# Patient Record
Sex: MALE | Race: BLACK OR AFRICAN AMERICAN | NOT HISPANIC OR LATINO | Employment: FULL TIME | URBAN - METROPOLITAN AREA
[De-identification: names, ages, dates, MRNs, and addresses within clinical notes are randomized per-mention and may not be internally consistent; named-entity substitution may affect disease eponyms.]

---

## 2017-01-17 ENCOUNTER — OFFICE VISIT (OUTPATIENT)
Dept: CARDIOLOGY | Facility: CLINIC | Age: 57
End: 2017-01-17
Attending: NURSE PRACTITIONER
Payer: COMMERCIAL

## 2017-01-17 VITALS
BODY MASS INDEX: 22.49 KG/M2 | DIASTOLIC BLOOD PRESSURE: 90 MMHG | HEART RATE: 70 BPM | SYSTOLIC BLOOD PRESSURE: 148 MMHG | WEIGHT: 157.1 LBS | HEIGHT: 70 IN

## 2017-01-17 DIAGNOSIS — I73.9 PAD (PERIPHERAL ARTERY DISEASE) (H): ICD-10-CM

## 2017-01-17 DIAGNOSIS — E11.00 TYPE 2 DIABETES MELLITUS WITH HYPEROSMOLARITY WITHOUT COMA, WITHOUT LONG-TERM CURRENT USE OF INSULIN (H): Primary | ICD-10-CM

## 2017-01-17 DIAGNOSIS — E78.5 HYPERLIPIDEMIA LDL GOAL <100: ICD-10-CM

## 2017-01-17 LAB
ALT SERPL W P-5'-P-CCNC: 7 U/L (ref 5–30)
CHOLEST SERPL-MCNC: 132 MG/DL
HDLC SERPL-MCNC: 37 MG/DL
LDLC SERPL CALC-MCNC: 84 MG/DL
NONHDLC SERPL-MCNC: 95 MG/DL
TRIGL SERPL-MCNC: 55 MG/DL

## 2017-01-17 PROCEDURE — 80061 LIPID PANEL: CPT | Performed by: NURSE PRACTITIONER

## 2017-01-17 PROCEDURE — 84460 ALANINE AMINO (ALT) (SGPT): CPT | Performed by: NURSE PRACTITIONER

## 2017-01-17 PROCEDURE — 99214 OFFICE O/P EST MOD 30 MIN: CPT | Performed by: INTERNAL MEDICINE

## 2017-01-17 PROCEDURE — 36415 COLL VENOUS BLD VENIPUNCTURE: CPT | Performed by: NURSE PRACTITIONER

## 2017-01-17 NOTE — PROGRESS NOTES
HPI and Plan:   See dictation    Orders Placed This Encounter   Procedures     ENDOCRINOLOGY ADULT REFERRAL     Follow-Up with Cardiologist       No orders of the defined types were placed in this encounter.       Medications Discontinued During This Encounter   Medication Reason     clopidogrel (PLAVIX) 75 MG tablet      docusate sodium (COLACE) 100 MG tablet      GENTLE LAXATIVE 10 MG suppository          Encounter Diagnoses   Name Primary?     PAD (peripheral artery disease) (H)      Type 2 diabetes mellitus with hyperosmolarity without coma, without long-term current use of insulin (H) Yes       CURRENT MEDICATIONS:  Current Outpatient Prescriptions   Medication Sig Dispense Refill     atorvastatin (LIPITOR) 40 MG tablet Take 1 tablet (40 mg) by mouth daily 90 tablet 3     linaclotide (LINZESS) 290 MCG capsule Take 1 capsule (290 mcg) by mouth every morning (before breakfast) 30 capsule 3     gabapentin (NEURONTIN) 300 MG capsule Take 1 tablet (300 mg) every night for 1-3 days, then 1 tablet twice daily for 1-3 days, then 1 tablet three times daily 90 capsule 0     blood glucose monitoring (ONE TOUCH ULTRASOFT) lancets Use as direct 3 Box 1     blood glucose monitoring (ONE TOUCH ULTRA) test strip Use QID or as directed 3 Box 1     insulin pen needle (B-D U/F) 31G X 8 MM USE ONCE DAILY WITH LANTUS SOLOSTAR  each 1     sitagliptin (JANUVIA) 100 MG tablet Take 1 tablet (100 mg) by mouth daily 90 tablet 1     insulin glargine (LANTUS SOLOSTAR) 100 UNIT/ML PEN INJECT 26 UNITS SUBCUTANEOUS TWICE DAILY 15 mL 3     sildenafil (VIAGRA) 100 MG tablet Take 0.5-1 tablets ( mg) by mouth daily as needed for erectile dysfunction Take 30 min to 4 hours before intercourse.  Never use with nitroglycerin, terazosin or doxazosin. 6 tablet prn     Blood Glucose Monitoring Suppl (BLOOD GLUCOSE METER) KIT 1 Device See Admin Instructions. per patient health plan 1 kit 0     ACE/ARB NOT PRESCRIBED, INTENTIONAL, by Other  route continuous prn.       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     eval by optho, considered be related to microvascular problem ie DM     Type II or unspecified type diabetes mellitus with neurological manifestations, not stated as uncontrolled 6/23/2014     PVD (peripheral vascular disease) with claudication (H) 10/12/2015       PAST SURGICAL HISTORY:  Past Surgical History   Procedure Laterality Date     No history of surgery       Colonoscopy  10/27/16     Colonoscopy N/A 10/31/2016     Procedure: COLONOSCOPY;  Surgeon: Pollo Lopez MD;  Location:  GI       FAMILY HISTORY:  Family History   Problem Relation Age of Onset     DIABETES Father      DIABETES Mother      DIABETES Sister      DIABETES Brother      Hypertension No family hx of      CEREBROVASCULAR DISEASE No family hx of      Prostate Cancer No family hx of      Cancer - colorectal No family hx of      Breast Cancer No family hx of        SOCIAL HISTORY:  Social History     Social History     Marital Status:      Spouse Name: Mary     Number of Children: 7     Years of Education: 16     Occupational History      store      Social History Main Topics     Smoking status: Former Smoker -- 0.50 packs/day for 15 years     Types: Cigarettes     Smokeless tobacco: Never Used      Comment: 10 cig daily     Alcohol Use: No     Drug Use: No     Sexual Activity:     Partners: Female     Other Topics Concern      Service No     Blood Transfusions No     Caffeine Concern No     1 coffee,pop 1 time weekly     Occupational Exposure No     Hobby Hazards No     Sleep Concern No     Stress Concern No     Weight Concern Yes     Special Diet No     Back Care No  "    Exercise No     Bike Helmet No     No Bike     Seat Belt Yes     Self-Exams No     Parent/Sibling W/ Cabg, Mi Or Angioplasty Before 65f 55m? No     Social History Narrative    moved here from [place], moved here 1990               Review of Systems:  Skin:  Negative       Eyes:  Positive for glasses    ENT:  Negative      Respiratory:  Negative       Cardiovascular:  Negative      Gastroenterology: Negative      Genitourinary:  Negative      Musculoskeletal:  Negative      Neurologic:         Psychiatric:  Negative      Heme/Lymph/Imm:  Negative      Endocrine:  Positive for diabetes      Physical Exam:  Vitals: /90 mmHg  Pulse 70  Ht 1.778 m (5' 10\")  Wt 71.26 kg (157 lb 1.6 oz)  BMI 22.54 kg/m2    Constitutional:  cooperative;in no acute distress        Skin:  warm and dry to the touch        Head:           Eyes:           ENT:  no pallor or cyanosis        Neck:  carotid pulses are full and equal bilaterally;JVP normal        Chest:  clear to auscultation          Cardiac: regular rhythm;normal S1 and S2;no murmurs, gallops or rubs detected                  Abdomen:  abdomen soft;no masses;non-tender        Vascular:     1+         0 0 2+         1+            Extremities and Back:  no edema              Neurological:  no gross motor deficits              TERI Ferguson Ekman, APRN CNP  [facility]  [address]  [place] [address]                "

## 2017-01-17 NOTE — LETTER
2017            Shaun Bedolla MD   [address] [place]  [address]       RE: Wyatt Mahajan   MRN: [id number]   : 1960   REASON FOR VISIT:  Followup for peripheral arterial disease.      Dear Dr. Bedolla:       I had the pleasure of seeing Mr. Mahajan at the [facility] in [place] this morning.  As you know, he is a very pleasant 56-year-old gentleman with known peripheral vascular disease, poorly controlled diabetes, hyperlipidemia and nicotine dependence (quit smoking recently) who is here for followup.  In , he was evaluated at [facility].  He underwent peripheral angiogram at that time which demonstrated no significant aortoiliac disease.  His right common femoral artery was patent.  The right superficial femoral artery was 100% occluded in its mid segment with flow reconstitution in the distal right SFA above the knee.  His left lower extremity was not assessed at the time.  He was offered to undergo right fem-pop bypass but he elected to continue with a walking program.      He has not seen a vascular specialist until this past summer when I first saw him.  At that time he complained of worsening right lower extremity claudication.  He also complained of mild left lower extremity claudication.  I recommended a repeat peripheral angiogram with an eye towards revascularization of his right SFA disease.  However, he fell ill around the time of his angiogram and has not come back to see us until 2016.  When I saw him in early 2016, his right lower extremity claudication remained stable.  However, he endorsed a rather severe left lower extremity claudication.  We performed a CT angiogram which demonstrated moderate severe right common iliac artery stenosis, occluded right SFA proximally with flow reconstitution distally and a short segment occlusion in his left popliteal.  Given the CTA findings,  the patient proceeded to have peripheral angiogram.  This demonstrated a high-grade right common iliac artery stenosis as well as a short segment occlusion of his left popliteal.  He had a single vessel runoff to the left foot.  His right SFA was occluded proximally with flow reconstitution distally and a three-vessel runoff to the right foot with proximal disease in his tibial arteries.  We then performed an angioplasty with drug-coated balloons of his left popliteal and distal SFA.  We also performed a right common iliac artery angioplasty and stenting.      Since the aforementioned procedure, the patient's left lower extremity claudication resolved.  ELOISA post-procedure demonstrated mild abnormality in the left of 0.85 and moderate arterial insufficiency in the right of 0.70.  Ultrasound was also performed which showed patent right common iliac artery stent and patent left popliteal and distal SFA        IMPRESSION AND PLAN:   1.  Severe peripheral arterial disease.   2.  History of bilateral lower extremity claudication.  Symptoms resolved on the left and are stable on the right.   3.  Diabetes, poorly controlled.   4.  Hyperlipidemia, poorly controlled.   5.  Nicotine dependence.      I have reviewed the patient's recent ABIs as well as ultrasound.  He no longer has claudication in the left since the procedure.  His claudication on the right has also improved significantly after we stented his right common iliac artery.  Overall, he is quite pleased with the outcome of his recent procedure.      He is wondering what to do with residual right SFA disease.  Since his symptoms are stable I recommended that he continue with his walking program.  Down the road if his symptoms become more lifestyle limiting, we can certainly pursue endovascular revascularization or fem-pop as recommended by Vascular Surgery in the past.      He is on excellent medical program which includes atorvastatin with improvement of his  hyperlipidemia.  His diabetes appears to be still poorly controlled.  I again recommended to follow up with an endocrinologist to get this under better control.      We will see him in approximately 6 months for followup or sooner if he develops any worsening vascular symptoms.  I appreciate the opportunity to participate in this patient's care.      Sincerely,         BEV PARKER MD

## 2017-01-18 NOTE — PROGRESS NOTES
2017            Shaun Bedolla MD   [address] [place]  [address]       RE: Wyatt Mahajan   MRN: [id number]   : 1960   REASON FOR VISIT:  Followup for peripheral arterial disease.      Dear Dr. Bedolla:       I had the pleasure of seeing Mr. Mahajan at the [facility] in [place] this morning.  As you know, he is a very pleasant 56-year-old gentleman with known peripheral vascular disease, poorly controlled diabetes, hyperlipidemia and nicotine dependence (quit smoking recently) who is here for followup.  In , he was evaluated at [facility].  He underwent peripheral angiogram at that time which demonstrated no significant aortoiliac disease.  His right common femoral artery was patent.  The right superficial femoral artery was 100% occluded in its mid segment with flow reconstitution in the distal right SFA above the knee.  His left lower extremity was not assessed at the time.  He was offered to undergo right fem-pop bypass but he elected to continue with a walking program.      He has not seen a vascular specialist until this past summer when I first saw him.  At that time he complained of worsening right lower extremity claudication.  He also complained of mild left lower extremity claudication.  I recommended a repeat peripheral angiogram with an eye towards revascularization of his right SFA disease.  However, he fell ill around the time of his angiogram and has not come back to see us until 2016.  When I saw him in early 2016, his right lower extremity claudication remained stable.  However, he endorsed a rather severe left lower extremity claudication.  We performed a CT angiogram which demonstrated moderate severe right common iliac artery stenosis, occluded right SFA proximally with flow reconstitution distally and a short segment occlusion in his left popliteal.  Given the CTA findings,  the patient proceeded to have peripheral angiogram.  This demonstrated a high-grade right common iliac artery stenosis as well as a short segment occlusion of his left popliteal.  He had a single vessel runoff to the left foot.  His right SFA was occluded proximally with flow reconstitution distally and a three-vessel runoff to the right foot with proximal disease in his tibial arteries.  We then performed an angioplasty with drug-coated balloons of his left popliteal and distal SFA.  We also performed a right common iliac artery angioplasty and stenting.      Since the aforementioned procedure, the patient's left lower extremity claudication resolved.  ELOISA post-procedure demonstrated mild abnormality in the left of 0.85 and moderate arterial insufficiency in the right of 0.70.  Ultrasound was also performed which showed patent right common iliac artery stent and patent left popliteal and distal SFA        IMPRESSION AND PLAN:   1.  Severe peripheral arterial disease.   2.  History of bilateral lower extremity claudication.  Symptoms resolved on the left and are stable on the right.   3.  Diabetes, poorly controlled.   4.  Hyperlipidemia, poorly controlled.   5.  Nicotine dependence.      I have reviewed the patient's recent ABIs as well as ultrasound.  He no longer has claudication in the left since the procedure.  His claudication on the right has also improved significantly after we stented his right common iliac artery.  Overall, he is quite pleased with the outcome of his recent procedure.      He is wondering what to do with residual right SFA disease.  Since his symptoms are stable I recommended that he continue with his walking program.  Down the road if his symptoms become more lifestyle limiting, we can certainly pursue endovascular revascularization or fem-pop as recommended by Vascular Surgery in the past.      He is on excellent medical program which includes atorvastatin with improvement of his  hyperlipidemia.  His diabetes appears to be still poorly controlled.  I again recommended to follow up with an endocrinologist to get this under better control.      We will see him in approximately 6 months for followup or sooner if he develops any worsening vascular symptoms.  I appreciate the opportunity to participate in this patient's care.      Sincerely,         BEV PARKER MD             D: 2017 15:16   T: 2017 08:25   MT: KARISSA      Name:     ELENASPRINGAMAURY   MRN:      [id number]        Account:      [id number]   :      1960           Service Date: 2017      Document: [id number]

## 2017-02-20 ENCOUNTER — TRANSFERRED RECORDS (OUTPATIENT)
Dept: HEALTH INFORMATION MANAGEMENT | Facility: CLINIC | Age: 57
End: 2017-02-20

## 2017-03-11 ENCOUNTER — TELEPHONE (OUTPATIENT)
Dept: FAMILY MEDICINE | Facility: CLINIC | Age: 57
End: 2017-03-11

## 2017-03-11 DIAGNOSIS — E11.43 TYPE 2 DIABETES MELLITUS WITH AUTONOMIC NEUROPATHY (H): Primary | ICD-10-CM

## 2017-03-11 RX ORDER — INSULIN GLARGINE 100 [IU]/ML
26 INJECTION, SOLUTION SUBCUTANEOUS 2 TIMES DAILY
Qty: 15 ML | Refills: 1 | Status: SHIPPED | OUTPATIENT
Start: 2017-03-11 | End: 2017-03-17

## 2017-03-11 NOTE — TELEPHONE ENCOUNTER
S-(situation): Patient called stating he is out of Lantus insulin and has been for two days now.  He states that [employer] tells him that his insurance will not cover the Lantus and he needs to have the generic alternative ordered for him.  Per pharmacist the dosing would be the same as the Lantus.  Alternative medication is Basaglar.  Last office visit with PCP was 11/2016.    B-(background): Patient has type 2 diabetes and is out of his Lantus.  Alternative mediation order needed.    A-(assessment): Patient has not taken his insulin in two days, but is doing okay so far and does not feel ill.    R-(recommendations): Page placed with on call provider for [facility] clinic.  Dr. Lai is on call, await response. Dr. Geoff Lai returned call and approved medication.  Order sent and patient advised that medication was sent to his pharmacy.     Katelyn Chang RN  [facility]  [phone]

## 2017-03-16 ENCOUNTER — HOSPITAL ENCOUNTER (EMERGENCY)
Facility: CLINIC | Age: 57
Discharge: HOME | End: 2017-03-16
Attending: NURSE PRACTITIONER | Admitting: NURSE PRACTITIONER
Payer: COMMERCIAL

## 2017-03-16 ENCOUNTER — APPOINTMENT (OUTPATIENT)
Dept: GENERAL RADIOLOGY | Facility: CLINIC | Age: 57
End: 2017-03-16
Attending: EMERGENCY MEDICINE
Payer: COMMERCIAL

## 2017-03-16 VITALS
WEIGHT: 155 LBS | HEART RATE: 102 BPM | SYSTOLIC BLOOD PRESSURE: 129 MMHG | OXYGEN SATURATION: 97 % | DIASTOLIC BLOOD PRESSURE: 62 MMHG | RESPIRATION RATE: 18 BRPM | TEMPERATURE: 96.8 F | BODY MASS INDEX: 29.27 KG/M2 | HEIGHT: 61 IN

## 2017-03-16 DIAGNOSIS — J10.1 INFLUENZA B: ICD-10-CM

## 2017-03-16 DIAGNOSIS — R73.09 ELEVATED GLUCOSE: ICD-10-CM

## 2017-03-16 DIAGNOSIS — J18.9 PNEUMONIA OF RIGHT LUNG DUE TO INFECTIOUS ORGANISM, UNSPECIFIED PART OF LUNG: ICD-10-CM

## 2017-03-16 LAB
ANION GAP SERPL CALCULATED.3IONS-SCNC: 9 MMOL/L (ref 3–14)
BASOPHILS # BLD AUTO: 0 10E9/L (ref 0–0.2)
BASOPHILS NFR BLD AUTO: 0.1 %
BUN SERPL-MCNC: 12 MG/DL (ref 7–30)
CALCIUM SERPL-MCNC: 8 MG/DL (ref 8.5–10.1)
CHLORIDE SERPL-SCNC: 104 MMOL/L (ref 94–109)
CO2 SERPL-SCNC: 25 MMOL/L (ref 20–32)
CREAT SERPL-MCNC: 0.77 MG/DL (ref 0.66–1.25)
DIFFERENTIAL METHOD BLD: ABNORMAL
EOSINOPHIL # BLD AUTO: 0 10E9/L (ref 0–0.7)
EOSINOPHIL NFR BLD AUTO: 0 %
ERYTHROCYTE [DISTWIDTH] IN BLOOD BY AUTOMATED COUNT: 12.8 % (ref 10–15)
FLUAV+FLUBV AG SPEC QL: ABNORMAL
FLUAV+FLUBV AG SPEC QL: NEGATIVE
GFR SERPL CREATININE-BSD FRML MDRD: ABNORMAL ML/MIN/1.7M2
GLUCOSE SERPL-MCNC: 302 MG/DL (ref 70–99)
HCT VFR BLD AUTO: 41.8 % (ref 40–53)
HGB BLD-MCNC: 14.7 G/DL (ref 13.3–17.7)
IMM GRANULOCYTES # BLD: 0 10E9/L (ref 0–0.4)
IMM GRANULOCYTES NFR BLD: 0.3 %
LYMPHOCYTES # BLD AUTO: 1.1 10E9/L (ref 0.8–5.3)
LYMPHOCYTES NFR BLD AUTO: 12 %
MCH RBC QN AUTO: 32.1 PG (ref 26.5–33)
MCHC RBC AUTO-ENTMCNC: 35.2 G/DL (ref 31.5–36.5)
MCV RBC AUTO: 91 FL (ref 78–100)
MONOCYTES # BLD AUTO: 0.2 10E9/L (ref 0–1.3)
MONOCYTES NFR BLD AUTO: 2.3 %
NEUTROPHILS # BLD AUTO: 8.1 10E9/L (ref 1.6–8.3)
NEUTROPHILS NFR BLD AUTO: 85.3 %
NRBC # BLD AUTO: 0 10*3/UL
NRBC BLD AUTO-RTO: 0 /100
PLATELET # BLD AUTO: 119 10E9/L (ref 150–450)
POTASSIUM SERPL-SCNC: 3.5 MMOL/L (ref 3.4–5.3)
RBC # BLD AUTO: 4.58 10E12/L (ref 4.4–5.9)
SODIUM SERPL-SCNC: 138 MMOL/L (ref 133–144)
SPECIMEN SOURCE: ABNORMAL
TROPONIN I SERPL-MCNC: NORMAL UG/L (ref 0–0.04)
WBC # BLD AUTO: 9.7 10E9/L (ref 4–11)

## 2017-03-16 PROCEDURE — 99284 EMERGENCY DEPT VISIT MOD MDM: CPT | Mod: 25

## 2017-03-16 PROCEDURE — 71020 XR CHEST 2 VW: CPT

## 2017-03-16 PROCEDURE — 96374 THER/PROPH/DIAG INJ IV PUSH: CPT

## 2017-03-16 PROCEDURE — 87804 INFLUENZA ASSAY W/OPTIC: CPT | Performed by: EMERGENCY MEDICINE

## 2017-03-16 PROCEDURE — 25000128 H RX IP 250 OP 636: Performed by: NURSE PRACTITIONER

## 2017-03-16 PROCEDURE — 25000132 ZZH RX MED GY IP 250 OP 250 PS 637: Performed by: NURSE PRACTITIONER

## 2017-03-16 PROCEDURE — 80048 BASIC METABOLIC PNL TOTAL CA: CPT | Performed by: EMERGENCY MEDICINE

## 2017-03-16 PROCEDURE — 84484 ASSAY OF TROPONIN QUANT: CPT | Performed by: EMERGENCY MEDICINE

## 2017-03-16 PROCEDURE — 85025 COMPLETE CBC W/AUTO DIFF WBC: CPT | Performed by: EMERGENCY MEDICINE

## 2017-03-16 RX ORDER — METHOCARBAMOL 500 MG/1
1000 TABLET, FILM COATED ORAL ONCE
Status: COMPLETED | OUTPATIENT
Start: 2017-03-16 | End: 2017-03-16

## 2017-03-16 RX ORDER — ACETAMINOPHEN 325 MG/1
650 TABLET ORAL ONCE
Status: COMPLETED | OUTPATIENT
Start: 2017-03-16 | End: 2017-03-16

## 2017-03-16 RX ORDER — KETOROLAC TROMETHAMINE 30 MG/ML
30 INJECTION, SOLUTION INTRAMUSCULAR; INTRAVENOUS ONCE
Status: COMPLETED | OUTPATIENT
Start: 2017-03-16 | End: 2017-03-16

## 2017-03-16 RX ORDER — AZITHROMYCIN 250 MG/1
TABLET, FILM COATED ORAL
Qty: 6 TABLET | Refills: 0 | Status: ON HOLD | OUTPATIENT
Start: 2017-03-16 | End: 2017-04-11

## 2017-03-16 RX ORDER — METHOCARBAMOL 500 MG/1
TABLET, FILM COATED ORAL
Qty: 24 TABLET | Refills: 0 | Status: SHIPPED | OUTPATIENT
Start: 2017-03-16 | End: 2017-03-17

## 2017-03-16 RX ADMIN — METHOCARBAMOL 1000 MG: 500 TABLET ORAL at 12:33

## 2017-03-16 RX ADMIN — KETOROLAC TROMETHAMINE 30 MG: 30 INJECTION, SOLUTION INTRAMUSCULAR at 12:10

## 2017-03-16 RX ADMIN — ACETAMINOPHEN 650 MG: 325 TABLET, FILM COATED ORAL at 12:11

## 2017-03-16 ASSESSMENT — ENCOUNTER SYMPTOMS
COUGH: 1
CHILLS: 1

## 2017-03-16 NOTE — ED NOTES
Pt has been sick for 2 days with cough and fever and chills. This morning woke up with sharp pain in his right lateral rib area. Feels like he can't take a deep breath and it hurts to breathe. Resps shallow and tachypneic.

## 2017-03-16 NOTE — ED PROVIDER NOTES
"  History     Chief Complaint:  Rib Pain     HPI   Wyatt Mahajan is a 57 year old male with a history of diabetes and hyperlipidemia who presents to the emergency department today for evaluation of rib pain. The patient has had a cough, chills and cold symptoms for the past two days. This morning he woke up with increased right sided pain pain and the feeling that he cannot breathe. He has no history of asthma. The patient has been taking Nyquil and Advil, this last time being around 10 AM this morning.     Allergies:  No Known Drug Allergies    Medications:    Insulin   Lipitor   Linzess  Gabapentin   Januvia  Viagra  Aspirin     Past Medical History:    Cranial nerve III palsy   Mixed hyperlipidemia  PVD with claudication   Tobacco use disorder   Type II or unspecified type diabetes mellitus with neurological manifestations     Past Surgical History:    Colonoscopy (2016)     Family History:    Father - Diabetes   Mother - Diabetes  Sister - Diabetes  Brother - Diabetes     Social History:  The patient was accompanied to the ED by his wife.  Smoking Status: Current Some Day Smoker  Smokeless Tobacco: Negative  Alcohol Use: Negative  Marital Status:   [2]     Review of Systems   Constitutional: Positive for chills.   Respiratory: Positive for cough.    Musculoskeletal:        Right sided pain over the lateral ribs   All other systems reviewed and are negative.    Physical Exam   Vitals:   Patient Vitals for the past 24 hrs:   BP Temp Temp src Pulse Resp SpO2 Height Weight   03/16/17 1235 - - - 102 18 97 % - -   03/16/17 1230 129/62 - - - - - - -   03/16/17 1215 132/60 - - 99 - 100 % - -   03/16/17 1200 151/84 - - 99 20 94 % - -   03/16/17 1145 158/82 - - 100 - 96 % - -   03/16/17 1110 147/73 96.8  F (36  C) Temporal 104 20 99 % 1.554 m (5' 1.2\") 70.3 kg (155 lb)     Physical Exam  General: Alert, Mild discomfort, well kept  Eyes: PERRL, conjunctivae pink no scleral icterus or conjunctival injection  ENT: "   Moist mucus membranes, posterior oropharynx clear without erythema or exudates, No lymphadenopathy, Normal voice  Resp:  Course cough and decreased breath sounds due to shallow inspiration, no crackles/rubs/wheezes. Good air movement  CV:  Normal rate and rhythm, no murmurs/rubs/gallops  GI:  Abdomen soft and non-distended.  Normoactive BS.  No tenderness, guarding or rebound, No masses  Skin:  Warm, dry.  No rashes or petechiae  Musculoskeletal: No peripheral edema or calf tenderness, Normal gross ROM. Exactly reproducible lower right rib tenderness.    Neuro: Alert and oriented to person/place/time, normal sensation  Psychiatric: Normal affect, cooperative, good eye contact    Emergency Department Course     Imaging:  Radiology findings were communicated with the patient who voiced understanding of the findings.    Chest XR, PA & LAT:   IMPRESSION:  Somewhat shallow inspiration. Subtle patchy haziness in  the mid and lower right lung could represent a subtle early pneumonia.  Reading per radiology    Laboratory:  Laboratory findings were communicated with the patient who voiced understanding of the findings.    CBC:  (L) o/w WNL. (WBC 9.7, HGB 14.7)   BMP: Glucose 302 (H), Calcium 8.0 (L) (Creatinine 0.77)  Troponin (Collected 1140): <0.015    Influenza A/B antigen: Positive for Influenza B    Interventions:  1210 Toradol 30 mg IV  1211 Tylenol 650 mg PO  1233 Robaxin 1000 mg PO     Emergency Department Course:  Nursing notes and vitals reviewed.  I performed an exam of the patient as documented above.   IV was inserted and blood was drawn for laboratory testing, results above.  The patient was sent for an x-ray while in the emergency department, results above.   At 1234 the patient was rechecked and was updated on the results of his laboratory and imaging studies.   I discussed the treatment plan with the patient and wife. They expressed understanding of this plan and consented to discharge. They will be  discharged home with instructions for care and follow up. In addition, the patient will return to the emergency department if their symptoms persist, worsen, if new symptoms arise or if there is any concern.  All questions were answered.  I personally reviewed the laboratory results with the patient and answered all related questions prior to discharge.    Impression & Plan      Medical Decision Making:  Wyatt Mahajan is a 57 year old male presents for evaluation of upper respiratory symptoms. Patient is concerned for cough and right sided rib pain. Evaluation consisted of CXR, physical exam and rapid flu; positive CXR and positive flu. Exam consistent with pnuemonia and influenza. No meningismus, pneumothorax, or pleural effusion. No evidence of sepsis. Given Azithromycin and Methocarbamol. Discharged with advice for symptomatic treatment including over the counter medication such as Tylenol and Ibuprofen. Advised to follow up with primary care provider in 3-5 days if continued symptoms, immediately if worse. Patient will return to the ER/UR if they develop high fevers not controlled with medication, difficulty breathing, shortness of breath, or has other concerns. Of note, patient's blood sugar was elevated today and patient was made aware of this and the need to keep close track of sugars while sick.     Diagnosis:    ICD-10-CM    1. Influenza B J10.1    2. Pneumonia of right lung due to infectious organism, unspecified part of lung J18.9      Disposition:   Discharge to home    Discharge Medications:  New Prescriptions    AZITHROMYCIN (ZITHROMAX) 250 MG TABLET    2 tabs day one, 1 tab days 2-5    METHOCARBAMOL (ROBAXIN) 500 MG TABLET    1-2 tabs q TID PRN for muscle spasm/pain     Scribe Disclosure:  I, Keara Roy, am serving as a scribe at 11:58 AM on 3/16/2017 to document services personally performed by Jon Gonsalves APRN, based on my observations and the provider's statements to  me.    3/16/2017    EMERGENCY DEPARTMENT       Jon Gonsalves, APRN CNP  03/16/17 9443

## 2017-03-16 NOTE — ED AVS SNAPSHOT
Emergency Department    [address] [facility] [address]    Phone:  [phone]    Fax:  [phone]                                       Wyatt Mahajan   MRN: [id number]    Department:   Emergency Department   Date of Visit:  3/16/2017           Patient Information     Date Of Birth          1960        Your diagnoses for this visit were:     Influenza B     Pneumonia of right lung due to infectious organism, unspecified part of lung     Elevated glucose        You were seen by Jon Gonsalves APRN CNP.      Follow-up Information     Follow up with Shaun Bedolla MD In 5 days.    Specialty:  Family Practice    Why:  if continuned symptoms or sooner if worsening    Contact information:    [place] [id number] [facility] [address] [phone]          Follow up with  Emergency Department.    Specialty:  EMERGENCY MEDICINE    Why:  If symptoms worsen    Contact information:    [address] [facility] [address] [phone]        Discharge Instructions         Pneumonia (Adult)  Pneumonia is an infection deep within the lungs. It is in the small air sacs (alveoli). Pneumonia may be caused by a virus or bacteria. Pneumonia caused by bacteria is usually treated with an antibiotic. Severe cases may need to be treated in the hospital. Milder cases can be treated at home. Symptoms usually start to get better during the first 2 days of treatment.    Home care  Follow these guidelines when caring for yourself at home:    Rest at home for the first 2 to 3 days, or until you feel stronger. Don t let yourself get overly tired when you go back to your activities.    Stay away from cigarette smoke - yours or other people s.    You may use acetaminophen or ibuprofen to control fever or pain, unless another medicine was prescribed. If you have chronic liver or kidney disease, talk with your health care provider before using these medicines. Also  talk with your provider if you ve had a stomach ulcer or GI bleeding. Don t give aspirin to anyone younger than 18 years of age who is ill with a fever. It may cause severe liver damage.    Your appetite may be poor, so a light diet is fine.    Drink 6 to 8 glasses of fluids every day to make sure you are getting enough fluids. Beverages can include water, sport drinks, sodas without caffeine, juices, tea, or soup. Fluids will help loosen secretions in the lung. This will make it easier for you to cough up the phlegm (sputum). If you also have heart or kidney disease, check with your health care provider before you drink extra fluids.    Take antibiotic medicine prescribed until it is all gone, even if you are feeling better after a few days.  Follow-up care  Follow up with your health care provider in the next 2 to 3 days, or as advised. This is to be sure the medicine is helping you get better.  If you are 65 or older, you should get a pneumococcal vaccine and a yearly flu (influenza) shot. You should also get these vaccines if you have chronic lung disease like asthma, emphysema, or COPD. Ask your provider about this.  When to seek medical advice  Call your health care provider right away if any of these occur:    You don t get better within the first 48 hours of treatment    Shortness of breath gets worse    Rapid breathing (more than 25 breaths per minute)    Coughing up blood    Chest pain gets worse with breathing    Fever of 102 F (38 C) or higher that doesn t get better with fever medicine    Weakness, dizziness, or fainting that gets worse    Thirst or dry mouth that gets worse    Sinus pain, headache, or a stiff neck    Chest pain not caused by coughing    6773-1683 [employer]. [address] [place] [address]. All rights reserved. This information is not intended as a substitute for professional medical care. Always follow your healthcare professional's  instructions.        Influenza  Influenza ( the flu ) is an infection that affects your respiratory tract (the mouth, nose, and lungs, and the passages between them). Unlike a cold, the flu can make you very ill. And it can lead to pneumonia, a serious lung infection. For some people, especially older adults, young children, and people with certain chronic conditions, the flu can have serious complications and even be fatal.  What Are the Risk Factors for the Flu?     Viruses that cause influenza spread through the air in droplets when someone who has the flu coughs, sneezes, laughs, or talks.   Anyone can get the flu. But you re more likely to become infected if you:    Have a weakened immune system.    Work in a health care setting where you may be exposed to flu germs.    Live or work with someone who has the flu.    Haven t received an annual flu shot.  How Does the Flu Spread?  The flu is caused by viruses. The viruses spread through the air in droplets when someone who has the flu coughs, sneezes, laughs, or talks. You can become infected when you inhale these viruses directly. You can also become infected when you touch a surface on which the droplets have landed and then transfer the germs to your eyes, nose, or mouth. Touching used tissues, or sharing utensils, drinking glasses, or a toothbrush with an infected person can expose you to flu viruses, too.  What Are the Symptoms of the Flu?  Flu symptoms tend to come on quickly and may last a few days to a few weeks. They include:    Fever usually higher than 101 F  (38.3 C) and chills    Sore throat and headache    Dry cough    Runny nose    Tiredness and weakness    Muscle aches  Factors That Can Make Flu Worse  For some people, the flu can be very serious. The risk of complications is greater for:    Children under age 5.    Adults 65 years of age and older.    People with a chronic illness, such as diabetes or heart, kidney, or lung disease.    People who  live in a nursing home or long-term care facility.   How Is the Flu Treated?  Influenza usually improves after 7 days or so. In some cases, your health care provider may prescribe an antiviral medication. This may help you get well sooner. For the medication to help, you need to take it as soon as possible (ideally within 48 hours) after your symptoms start. If you develop pneumonia or other serious illness, hospital care may be needed.  Easing Flu Symptoms    Drink lots of fluids such as water, juice, and warm soup. A good rule is to drink enough so that you urinate your normal amount.    Get plenty of rest.    Ask your health care provider what to take for fever and pain.    Call your provider if your fever rises over 101 F (38.3 C) or you become dizzy, lightheaded, or short of breath.  Taking Steps to Protect Others    Wash your hands often, especially after coughing or sneezing. Or, clean your hands with an alcohol-based hand  containing at least 60 percent alcohol.    Cough or sneeze into a tissue. Then throw the tissue away and wash your hands. If you don t have a tissue, cough and sneeze into the crook of your elbow.    Stay home until at least 24 hours after you no longer have a fever or chills. Be sure the fever isn t being hidden by fever-reducing medication.    Don t share food, utensils, drinking glasses, or a toothbrush with others.    Ask your health care provider if others in your household should receive antiviral medication to help them avoid infection.  How Can the Flu Be Prevented?    One of the best ways to avoid the flu is to get a flu vaccination each year. Viruses that cause the flu change from year to year. For that reason, doctors recommend getting the flu vaccine each year, as soon as it's available in your area. The vaccine may be given as a shot or as a nasal spray. Your health care provider can tell you which vaccine is right for you.    Wash your hands often. Frequent handwashing  is a proven way to help prevent infection.    Carry an alcohol-based hand gel containing at least 60 percent alcohol. Use it when you don t have access to soap and water. Then wash your hands as soon as you can.    Avoid touching your eyes, nose, and mouth.    At home and work, clean phones, computer keyboards, and toys often with disinfectant wipes.    If possible, avoid close contact with others who have the flu or symptoms of the flu.  Handwashing Tips  Handwashing is one of the best ways to prevent many common infections. If you re caring for or visiting someone with the flu, wash your hands each time you enter and leave the room. Follow these steps:    Use warm water and plenty of soap. Rub your hands together well.    Clean the whole hand, under your nails, between your fingers, and up the wrists.    Wash for at least 15 seconds.    Rinse, letting the water run down your fingers, not up your wrists.    Dry your hands well. Use a paper towel to turn off the faucet and open the door.  Using Alcohol-Based Hand   Alcohol-based hand  are also a good choice. Use them when you don t have access to soap and water. Follow these steps:    Squeeze about a tablespoon of gel into the palm of one hand.    Rub your hands together briskly, cleaning the backs of your hands, the palms, between your fingers, and up the wrists.    Rub until the gel is gone and your hands are completely dry.  Preventing Influenza in Healthcare Settings  The flu is a special concern for people in hospitals and long-term care facilities. To help prevent the spread of flu, many hospitals and nursing homes take these steps:    Health care providers wash their hands or use an alcohol-based hand  before and after treating each patient.    People with the flu have private rooms and bathrooms or share a room with someone with the same infection.    High-risk patients who don t have the flu are encouraged to get the flu and pneumonia  vaccines.    All health care workers are encouraged or required to get flu shots.        4838-3521 [employer]. [address] [place] [address], [place] [address]. All rights reserved. This information is not intended as a substitute for professional medical care. Always follow your healthcare professional's instructions.            24 Hour Appointment Hotline       To make an appointment at any [facility], call [phone] ([phone]). If you don't have a family doctor or clinic, we will help you find one. [facility] clinics are conveniently located to serve the needs of you and your family.             Review of your medicines      START taking        Dose / Directions Last dose taken    azithromycin 250 MG tablet   Commonly known as:  ZITHROMAX   Quantity:  6 tablet        2 tabs day one, 1 tab days 2-5   Refills:  0        methocarbamol 500 MG tablet   Commonly known as:  ROBAXIN   Quantity:  24 tablet        1-2 tabs q TID PRN for muscle spasm/pain   Refills:  0          Our records show that you are taking the medicines listed below. If these are incorrect, please call your family doctor or clinic.        Dose / Directions Last dose taken    ACE/ARB NOT PRESCRIBED (INTENTIONAL)        by Other route continuous prn.   Refills:  0        aspirin 81 MG tablet   Quantity:  100        1 tab per day   Refills:  3        atorvastatin 40 MG tablet   Commonly known as:  LIPITOR   Dose:  40 mg   Quantity:  90 tablet        Take 1 tablet (40 mg) by mouth daily   Refills:  3        blood glucose monitoring lancets   Quantity:  3 Box        Use as direct   Refills:  1        blood glucose monitoring meter device kit   Commonly known as:  no brand specified   Dose:  1 Device   Quantity:  1 kit        1 Device See Admin Instructions. per patient health plan   Refills:  0        blood glucose monitoring test strip   Commonly known as:  ONE TOUCH ULTRA   Quantity:  3 Box        Use QID or as directed   Refills:   1        gabapentin 300 MG capsule   Commonly known as:  NEURONTIN   Quantity:  90 capsule        Take 1 tablet (300 mg) every night for 1-3 days, then 1 tablet twice daily for 1-3 days, then 1 tablet three times daily   Refills:  0        * insulin glargine 100 UNIT/ML injection   Commonly known as:  LANTUS SOLOSTAR   Quantity:  15 mL        INJECT 26 UNITS SUBCUTANEOUS TWICE DAILY   Refills:  3        * insulin glargine 100 UNIT/ML injection   Dose:  26 Units   Quantity:  15 mL        Inject 26 Units Subcutaneous 2 times daily   Refills:  1        insulin pen needle 31G X 8 MM   Commonly known as:  B-D U/F   Quantity:  100 each        USE ONCE DAILY WITH LANTUS SOLOSTAR PEN   Refills:  1        linaclotide 290 MCG capsule   Commonly known as:  LINZESS   Dose:  290 mcg   Quantity:  30 capsule        Take 1 capsule (290 mcg) by mouth every morning (before breakfast)   Refills:  3        sildenafil 100 MG cap/tab   Commonly known as:  VIAGRA   Dose:   mg   Quantity:  6 tablet        Take 0.5-1 tablets ( mg) by mouth daily as needed for erectile dysfunction Take 30 min to 4 hours before intercourse.  Never use with nitroglycerin, terazosin or doxazosin.   Refills:  prn        sitagliptin 100 MG tablet   Commonly known as:  JANUVIA   Dose:  100 mg   Quantity:  90 tablet        Take 1 tablet (100 mg) by mouth daily   Refills:  1        * Notice:  This list has 2 medication(s) that are the same as other medications prescribed for you. Read the directions carefully, and ask your doctor or other care provider to review them with you.            Prescriptions were sent or printed at these locations (2 Prescriptions)                   Other Prescriptions                Printed at Department/Unit printer (2 of 2)         azithromycin (ZITHROMAX) 250 MG tablet               methocarbamol (ROBAXIN) 500 MG tablet                Procedures and tests performed during your visit     Basic metabolic panel    CBC with  platelets differential    Chest XR,  PA & LAT    Influenza A/B antigen    Troponin I      Orders Needing Specimen Collection     None      Pending Results     Date and Time Order Name Status Description    3/16/2017 1120 Chest XR,  PA & LAT Preliminary             Pending Culture Results     No orders found from 3/14/2017 to 3/17/2017.             Test Results from your hospital stay     3/16/2017 11:37 AM - Interface, Radiant Ib      Narrative     CHEST TWO VIEWS  3/16/2017 11:30 AM    HISTORY:  Shortness of breath, diminished breath sounds on the right.    COMPARISON:  None.        Impression     IMPRESSION:  Somewhat shallow inspiration. Subtle patchy haziness in  the mid and lower right lung could represent a subtle early pneumonia.         3/16/2017 12:02 PM - Interface, Flexilab Results      Component Results     Component Value Ref Range & Units Status    WBC 9.7 4.0 - 11.0 10e9/L Final    RBC Count 4.58 4.4 - 5.9 10e12/L Final    Hemoglobin 14.7 13.3 - 17.7 g/dL Final    Hematocrit 41.8 40.0 - 53.0 % Final    MCV 91 78 - 100 fl Final    MCH 32.1 26.5 - 33.0 pg Final    MCHC 35.2 31.5 - 36.5 g/dL Final    RDW 12.8 10.0 - 15.0 % Final    Platelet Count 119 (L) 150 - 450 10e9/L Final    Diff Method Automated Method  Final    % Neutrophils 85.3 % Final    % Lymphocytes 12.0 % Final    % Monocytes 2.3 % Final    % Eosinophils 0.0 % Final    % Basophils 0.1 % Final    % Immature Granulocytes 0.3 % Final    Nucleated RBCs 0 0 /100 Final    Absolute Neutrophil 8.1 1.6 - 8.3 10e9/L Final    Absolute Lymphocytes 1.1 0.8 - 5.3 10e9/L Final    Absolute Monocytes 0.2 0.0 - 1.3 10e9/L Final    Absolute Eosinophils 0.0 0.0 - 0.7 10e9/L Final    Absolute Basophils 0.0 0.0 - 0.2 10e9/L Final    Abs Immature Granulocytes 0.0 0 - 0.4 10e9/L Final    Absolute Nucleated RBC 0.0  Final         3/16/2017 12:10 PM - Interface, Flexilab Results      Component Results     Component Value Ref Range & Units Status    Sodium 138 133 -  144 mmol/L Final    Potassium 3.5 3.4 - 5.3 mmol/L Final    Chloride 104 94 - 109 mmol/L Final    Carbon Dioxide 25 20 - 32 mmol/L Final    Anion Gap 9 3 - 14 mmol/L Final    Glucose 302 (H) 70 - 99 mg/dL Final    Urea Nitrogen 12 7 - 30 mg/dL Final    Creatinine 0.77 0.66 - 1.25 mg/dL Final    GFR Estimate >90  Non  GFR Calc   >60 mL/min/1.7m2 Final    GFR Estimate If Black >90   GFR Calc   >60 mL/min/1.7m2 Final    Calcium 8.0 (L) 8.5 - 10.1 mg/dL Final         3/16/2017 12:14 PM - Interface, Flexilab Results      Component Results     Component Value Ref Range & Units Status    Troponin I ES  0.000 - 0.045 ug/L Final    <0.015  The 99th percentile for upper reference range is 0.045 ug/L.  Troponin values in   the range of 0.045 - 0.120 ug/L may be associated with risks of adverse   clinical events.           3/16/2017 12:26 PM - Interface, Flexilab Results      Component Results     Component Value Ref Range & Units Status    Influenza A/B Agn Specimen Nasal  Final    Influenza A Negative NEG Final    Influenza B  NEG Final    Positive   Test results must be correlated with clinical data. If necessary, results   should be confirmed by a molecular assay or viral culture.   (A)                Clinical Quality Measure: Blood Pressure Screening     Your blood pressure was checked while you were in the emergency department today. The last reading we obtained was  BP: 129/62 . Please read the guidelines below about what these numbers mean and what you should do about them.  If your systolic blood pressure (the top number) is less than 120 and your diastolic blood pressure (the bottom number) is less than 80, then your blood pressure is normal. There is nothing more that you need to do about it.  If your systolic blood pressure (the top number) is 120-139 or your diastolic blood pressure (the bottom number) is 80-89, your blood pressure may be higher than it should be. You should have  your blood pressure rechecked within a year by a primary care provider.  If your systolic blood pressure (the top number) is 140 or greater or your diastolic blood pressure (the bottom number) is 90 or greater, you may have high blood pressure. High blood pressure is treatable, but if left untreated over time it can put you at risk for heart attack, stroke, or kidney failure. You should have your blood pressure rechecked by a primary care provider within the next 4 weeks.  If your provider in the emergency department today gave you specific instructions to follow-up with your doctor or provider even sooner than that, you should follow that instruction and not wait for up to 4 weeks for your follow-up visit.        Thank you for choosing [facility]       Thank you for choosing [facility] for your care. Our goal is always to provide you with excellent care. Hearing back from our patients is one way we can continue to improve our services. Please take a few minutes to complete the written survey that you may receive in the mail after you visit with us. Thank you!        [employer] Information     [employer] gives you secure access to your electronic health record. If you see a primary care provider, you can also send messages to your care team and make appointments. If you have questions, please call your primary care clinic.  If you do not have a primary care provider, please call [phone] and they will assist you.        Care EveryWhere ID     This is your Care EveryWhere ID. This could be used by other organizations to access your [facility] medical records  [id number]        After Visit Summary       This is your record. Keep this with you and show to your community pharmacist(s) and doctor(s) at your next visit.

## 2017-03-16 NOTE — ED AVS SNAPSHOT
Emergency Department    [address] [place] [address]    Phone:  [phone]    Fax:  [phone]                                       Wyatt Mahajan   MRN: [id number]    Department:   Emergency Department   Date of Visit:  3/16/2017           After Visit Summary Signature Page     I have received my discharge instructions, and my questions have been answered. I have discussed any challenges I see with this plan with the nurse or doctor.    ..........................................................................................................................................  Patient/Patient Representative Signature      ..........................................................................................................................................  Patient Representative Print Name and Relationship to Patient    ..................................................               ................................................  Date                                            Time    ..........................................................................................................................................  Reviewed by Signature/Title    ...................................................              ..............................................  Date                                                            Time

## 2017-03-16 NOTE — DISCHARGE INSTRUCTIONS
Pneumonia (Adult)  Pneumonia is an infection deep within the lungs. It is in the small air sacs (alveoli). Pneumonia may be caused by a virus or bacteria. Pneumonia caused by bacteria is usually treated with an antibiotic. Severe cases may need to be treated in the hospital. Milder cases can be treated at home. Symptoms usually start to get better during the first 2 days of treatment.    Home care  Follow these guidelines when caring for yourself at home:    Rest at home for the first 2 to 3 days, or until you feel stronger. Don t let yourself get overly tired when you go back to your activities.    Stay away from cigarette smoke - yours or other people s.    You may use acetaminophen or ibuprofen to control fever or pain, unless another medicine was prescribed. If you have chronic liver or kidney disease, talk with your health care provider before using these medicines. Also talk with your provider if you ve had a stomach ulcer or GI bleeding. Don t give aspirin to anyone younger than 18 years of age who is ill with a fever. It may cause severe liver damage.    Your appetite may be poor, so a light diet is fine.    Drink 6 to 8 glasses of fluids every day to make sure you are getting enough fluids. Beverages can include water, sport drinks, sodas without caffeine, juices, tea, or soup. Fluids will help loosen secretions in the lung. This will make it easier for you to cough up the phlegm (sputum). If you also have heart or kidney disease, check with your health care provider before you drink extra fluids.    Take antibiotic medicine prescribed until it is all gone, even if you are feeling better after a few days.  Follow-up care  Follow up with your health care provider in the next 2 to 3 days, or as advised. This is to be sure the medicine is helping you get better.  If you are 65 or older, you should get a pneumococcal vaccine and a yearly flu (influenza) shot. You should also get these vaccines if you have  chronic lung disease like asthma, emphysema, or COPD. Ask your provider about this.  When to seek medical advice  Call your health care provider right away if any of these occur:    You don t get better within the first 48 hours of treatment    Shortness of breath gets worse    Rapid breathing (more than 25 breaths per minute)    Coughing up blood    Chest pain gets worse with breathing    Fever of 102 F (38 C) or higher that doesn t get better with fever medicine    Weakness, dizziness, or fainting that gets worse    Thirst or dry mouth that gets worse    Sinus pain, headache, or a stiff neck    Chest pain not caused by coughing    2354-6538 [employer]. [address] [place] [address]. All rights reserved. This information is not intended as a substitute for professional medical care. Always follow your healthcare professional's instructions.        Influenza  Influenza ( the flu ) is an infection that affects your respiratory tract (the mouth, nose, and lungs, and the passages between them). Unlike a cold, the flu can make you very ill. And it can lead to pneumonia, a serious lung infection. For some people, especially older adults, young children, and people with certain chronic conditions, the flu can have serious complications and even be fatal.  What Are the Risk Factors for the Flu?     Viruses that cause influenza spread through the air in droplets when someone who has the flu coughs, sneezes, laughs, or talks.   Anyone can get the flu. But you re more likely to become infected if you:    Have a weakened immune system.    Work in a health care setting where you may be exposed to flu germs.    Live or work with someone who has the flu.    Haven t received an annual flu shot.  How Does the Flu Spread?  The flu is caused by viruses. The viruses spread through the air in droplets when someone who has the flu coughs, sneezes, laughs, or talks. You can become infected when you inhale  these viruses directly. You can also become infected when you touch a surface on which the droplets have landed and then transfer the germs to your eyes, nose, or mouth. Touching used tissues, or sharing utensils, drinking glasses, or a toothbrush with an infected person can expose you to flu viruses, too.  What Are the Symptoms of the Flu?  Flu symptoms tend to come on quickly and may last a few days to a few weeks. They include:    Fever usually higher than 101 F  (38.3 C) and chills    Sore throat and headache    Dry cough    Runny nose    Tiredness and weakness    Muscle aches  Factors That Can Make Flu Worse  For some people, the flu can be very serious. The risk of complications is greater for:    Children under age 5.    Adults 65 years of age and older.    People with a chronic illness, such as diabetes or heart, kidney, or lung disease.    People who live in a nursing home or long-term care facility.   How Is the Flu Treated?  Influenza usually improves after 7 days or so. In some cases, your health care provider may prescribe an antiviral medication. This may help you get well sooner. For the medication to help, you need to take it as soon as possible (ideally within 48 hours) after your symptoms start. If you develop pneumonia or other serious illness, hospital care may be needed.  Easing Flu Symptoms    Drink lots of fluids such as water, juice, and warm soup. A good rule is to drink enough so that you urinate your normal amount.    Get plenty of rest.    Ask your health care provider what to take for fever and pain.    Call your provider if your fever rises over 101 F (38.3 C) or you become dizzy, lightheaded, or short of breath.  Taking Steps to Protect Others    Wash your hands often, especially after coughing or sneezing. Or, clean your hands with an alcohol-based hand  containing at least 60 percent alcohol.    Cough or sneeze into a tissue. Then throw the tissue away and wash your hands. If  you don t have a tissue, cough and sneeze into the crook of your elbow.    Stay home until at least 24 hours after you no longer have a fever or chills. Be sure the fever isn t being hidden by fever-reducing medication.    Don t share food, utensils, drinking glasses, or a toothbrush with others.    Ask your health care provider if others in your household should receive antiviral medication to help them avoid infection.  How Can the Flu Be Prevented?    One of the best ways to avoid the flu is to get a flu vaccination each year. Viruses that cause the flu change from year to year. For that reason, doctors recommend getting the flu vaccine each year, as soon as it's available in your area. The vaccine may be given as a shot or as a nasal spray. Your health care provider can tell you which vaccine is right for you.    Wash your hands often. Frequent handwashing is a proven way to help prevent infection.    Carry an alcohol-based hand gel containing at least 60 percent alcohol. Use it when you don t have access to soap and water. Then wash your hands as soon as you can.    Avoid touching your eyes, nose, and mouth.    At home and work, clean phones, computer keyboards, and toys often with disinfectant wipes.    If possible, avoid close contact with others who have the flu or symptoms of the flu.  Handwashing Tips  Handwashing is one of the best ways to prevent many common infections. If you re caring for or visiting someone with the flu, wash your hands each time you enter and leave the room. Follow these steps:    Use warm water and plenty of soap. Rub your hands together well.    Clean the whole hand, under your nails, between your fingers, and up the wrists.    Wash for at least 15 seconds.    Rinse, letting the water run down your fingers, not up your wrists.    Dry your hands well. Use a paper towel to turn off the faucet and open the door.  Using Alcohol-Based Hand   Alcohol-based hand  are also  a good choice. Use them when you don t have access to soap and water. Follow these steps:    Squeeze about a tablespoon of gel into the palm of one hand.    Rub your hands together briskly, cleaning the backs of your hands, the palms, between your fingers, and up the wrists.    Rub until the gel is gone and your hands are completely dry.  Preventing Influenza in Healthcare Settings  The flu is a special concern for people in hospitals and long-term care facilities. To help prevent the spread of flu, many hospitals and nursing homes take these steps:    Health care providers wash their hands or use an alcohol-based hand  before and after treating each patient.    People with the flu have private rooms and bathrooms or share a room with someone with the same infection.    High-risk patients who don t have the flu are encouraged to get the flu and pneumonia vaccines.    All health care workers are encouraged or required to get flu shots.        6662-1332 [employer]. [address] [place] [address], [place] [address]. All rights reserved. This information is not intended as a substitute for professional medical care. Always follow your healthcare professional's instructions.

## 2017-03-17 ENCOUNTER — APPOINTMENT (OUTPATIENT)
Dept: CT IMAGING | Facility: CLINIC | Age: 57
DRG: 870 | End: 2017-03-17
Attending: EMERGENCY MEDICINE
Payer: COMMERCIAL

## 2017-03-17 ENCOUNTER — APPOINTMENT (OUTPATIENT)
Dept: GENERAL RADIOLOGY | Facility: CLINIC | Age: 57
DRG: 870 | End: 2017-03-17
Attending: INTERNAL MEDICINE
Payer: COMMERCIAL

## 2017-03-17 ENCOUNTER — HOSPITAL ENCOUNTER (INPATIENT)
Facility: CLINIC | Age: 57
LOS: 25 days | Discharge: NURSING, REHAB OR LONG-TERM CARE | DRG: 870 | End: 2017-04-11
Attending: EMERGENCY MEDICINE | Admitting: HOSPITALIST
Payer: COMMERCIAL

## 2017-03-17 DIAGNOSIS — J96.01 ACUTE RESPIRATORY FAILURE WITH HYPOXIA (H): Primary | ICD-10-CM

## 2017-03-17 DIAGNOSIS — J10.1 INFLUENZA B: ICD-10-CM

## 2017-03-17 DIAGNOSIS — K64.4 EXTERNAL HEMORRHOIDS: ICD-10-CM

## 2017-03-17 DIAGNOSIS — J15.211 PNEUMONIA DUE TO METHICILLIN SUSCEPTIBLE STAPHYLOCOCCUS AUREUS, UNSPECIFIED LATERALITY, UNSPECIFIED PART OF LUNG: ICD-10-CM

## 2017-03-17 DIAGNOSIS — A41.01 MSSA (METHICILLIN SUSCEPTIBLE STAPHYLOCOCCUS AUREUS) SEPTICEMIA (H): ICD-10-CM

## 2017-03-17 DIAGNOSIS — E11.40 TYPE 2 DIABETES MELLITUS WITH DIABETIC NEUROPATHY, WITH LONG-TERM CURRENT USE OF INSULIN (H): Chronic | ICD-10-CM

## 2017-03-17 DIAGNOSIS — Z79.4 TYPE 2 DIABETES MELLITUS WITH DIABETIC NEUROPATHY, WITH LONG-TERM CURRENT USE OF INSULIN (H): Chronic | ICD-10-CM

## 2017-03-17 DIAGNOSIS — E78.5 HYPERLIPIDEMIA LDL GOAL <100: ICD-10-CM

## 2017-03-17 DIAGNOSIS — R50.9 FEVER, UNSPECIFIED FEVER CAUSE: ICD-10-CM

## 2017-03-17 DIAGNOSIS — D72.829 LEUKOCYTOSIS, UNSPECIFIED TYPE: ICD-10-CM

## 2017-03-17 DIAGNOSIS — J18.9 PNEUMONIA OF BOTH LUNGS DUE TO INFECTIOUS ORGANISM, UNSPECIFIED PART OF LUNG: ICD-10-CM

## 2017-03-17 LAB
ALBUMIN SERPL-MCNC: 2.7 G/DL (ref 3.4–5)
ALBUMIN SERPL-MCNC: 3.2 G/DL (ref 3.4–5)
ALP SERPL-CCNC: 63 U/L (ref 40–150)
ALP SERPL-CCNC: 71 U/L (ref 40–150)
ALT SERPL W P-5'-P-CCNC: 35 U/L (ref 0–70)
ALT SERPL W P-5'-P-CCNC: 45 U/L (ref 0–70)
ANION GAP SERPL CALCULATED.3IONS-SCNC: 13 MMOL/L (ref 3–14)
ANION GAP SERPL CALCULATED.3IONS-SCNC: 7 MMOL/L (ref 3–14)
AST SERPL W P-5'-P-CCNC: 28 U/L (ref 0–45)
AST SERPL W P-5'-P-CCNC: 32 U/L (ref 0–45)
BASE DEFICIT BLDA-SCNC: 2.3 MMOL/L
BASE DEFICIT BLDV-SCNC: 3 MMOL/L
BASOPHILS # BLD AUTO: 0 10E9/L (ref 0–0.2)
BASOPHILS NFR BLD AUTO: 0.2 %
BILIRUB DIRECT SERPL-MCNC: 0.2 MG/DL (ref 0–0.2)
BILIRUB SERPL-MCNC: 0.5 MG/DL (ref 0.2–1.3)
BILIRUB SERPL-MCNC: 0.8 MG/DL (ref 0.2–1.3)
BUN SERPL-MCNC: 15 MG/DL (ref 7–30)
BUN SERPL-MCNC: 17 MG/DL (ref 7–30)
CALCIUM SERPL-MCNC: 7.9 MG/DL (ref 8.5–10.1)
CALCIUM SERPL-MCNC: 8.5 MG/DL (ref 8.5–10.1)
CHLORIDE SERPL-SCNC: 105 MMOL/L (ref 94–109)
CHLORIDE SERPL-SCNC: 109 MMOL/L (ref 94–109)
CO2 SERPL-SCNC: 21 MMOL/L (ref 20–32)
CO2 SERPL-SCNC: 25 MMOL/L (ref 20–32)
CREAT SERPL-MCNC: 0.55 MG/DL (ref 0.66–1.25)
CREAT SERPL-MCNC: 0.67 MG/DL (ref 0.66–1.25)
DIFFERENTIAL METHOD BLD: ABNORMAL
EOSINOPHIL # BLD AUTO: 0 10E9/L (ref 0–0.7)
EOSINOPHIL NFR BLD AUTO: 0.4 %
ERYTHROCYTE [DISTWIDTH] IN BLOOD BY AUTOMATED COUNT: 12.8 % (ref 10–15)
ERYTHROCYTE [DISTWIDTH] IN BLOOD BY AUTOMATED COUNT: 13.2 % (ref 10–15)
GFR SERPL CREATININE-BSD FRML MDRD: ABNORMAL ML/MIN/1.7M2
GFR SERPL CREATININE-BSD FRML MDRD: ABNORMAL ML/MIN/1.7M2
GLUCOSE BLDC GLUCOMTR-MCNC: 100 MG/DL (ref 70–99)
GLUCOSE BLDC GLUCOMTR-MCNC: 103 MG/DL (ref 70–99)
GLUCOSE BLDC GLUCOMTR-MCNC: 122 MG/DL (ref 70–99)
GLUCOSE BLDC GLUCOMTR-MCNC: 146 MG/DL (ref 70–99)
GLUCOSE BLDC GLUCOMTR-MCNC: 165 MG/DL (ref 70–99)
GLUCOSE BLDC GLUCOMTR-MCNC: 219 MG/DL (ref 70–99)
GLUCOSE BLDC GLUCOMTR-MCNC: 226 MG/DL (ref 70–99)
GLUCOSE BLDC GLUCOMTR-MCNC: 253 MG/DL (ref 70–99)
GLUCOSE SERPL-MCNC: 232 MG/DL (ref 70–99)
GLUCOSE SERPL-MCNC: 315 MG/DL (ref 70–99)
GRAM STN SPEC: ABNORMAL
HBA1C MFR BLD: 8.1 % (ref 4.3–6)
HCO3 BLD-SCNC: 23 MMOL/L (ref 21–28)
HCO3 BLDV-SCNC: 24 MMOL/L (ref 21–28)
HCT VFR BLD AUTO: 42.3 % (ref 40–53)
HCT VFR BLD AUTO: 44.2 % (ref 40–53)
HGB BLD-MCNC: 14.6 G/DL (ref 13.3–17.7)
HGB BLD-MCNC: 15.7 G/DL (ref 13.3–17.7)
IMM GRANULOCYTES # BLD: 0 10E9/L (ref 0–0.4)
IMM GRANULOCYTES NFR BLD: 0.8 %
L PNEUMO1 AG UR QL IA: NORMAL
LACTATE BLD-SCNC: 2.1 MMOL/L (ref 0.7–2.1)
LACTATE BLD-SCNC: 3.1 MMOL/L (ref 0.7–2.1)
LACTATE BLD-SCNC: 3.3 MMOL/L (ref 0.7–2.1)
LIPASE SERPL-CCNC: 59 U/L (ref 73–393)
LYMPHOCYTES # BLD AUTO: 0.5 10E9/L (ref 0.8–5.3)
LYMPHOCYTES NFR BLD AUTO: 9.7 %
Lab: ABNORMAL
MCH RBC QN AUTO: 31.7 PG (ref 26.5–33)
MCH RBC QN AUTO: 32.3 PG (ref 26.5–33)
MCHC RBC AUTO-ENTMCNC: 34.5 G/DL (ref 31.5–36.5)
MCHC RBC AUTO-ENTMCNC: 35.5 G/DL (ref 31.5–36.5)
MCV RBC AUTO: 91 FL (ref 78–100)
MCV RBC AUTO: 92 FL (ref 78–100)
MICRO REPORT STATUS: ABNORMAL
MICRO REPORT STATUS: NORMAL
MONOCYTES # BLD AUTO: 0.3 10E9/L (ref 0–1.3)
MONOCYTES NFR BLD AUTO: 5.4 %
NEUTROPHILS # BLD AUTO: 4 10E9/L (ref 1.6–8.3)
NEUTROPHILS NFR BLD AUTO: 83.5 %
NRBC # BLD AUTO: 0 10*3/UL
NRBC BLD AUTO-RTO: 0 /100
O2/TOTAL GAS SETTING VFR VENT: ABNORMAL %
OXYHGB MFR BLD: 88 % (ref 92–100)
OXYHGB MFR BLDV: 95 %
PCO2 BLD: 38 MM HG (ref 35–45)
PCO2 BLDV: 49 MM HG (ref 40–50)
PH BLD: 7.39 PH (ref 7.35–7.45)
PH BLDV: 7.29 PH (ref 7.32–7.43)
PLATELET # BLD AUTO: 118 10E9/L (ref 150–450)
PLATELET # BLD AUTO: 120 10E9/L (ref 150–450)
PO2 BLD: 58 MM HG (ref 80–105)
PO2 BLDV: 94 MM HG (ref 25–47)
POTASSIUM SERPL-SCNC: 3.9 MMOL/L (ref 3.4–5.3)
POTASSIUM SERPL-SCNC: 3.9 MMOL/L (ref 3.4–5.3)
PROCALCITONIN SERPL-MCNC: 27.57 NG/ML
PROT SERPL-MCNC: 6.4 G/DL (ref 6.8–8.8)
PROT SERPL-MCNC: 7.3 G/DL (ref 6.8–8.8)
RBC # BLD AUTO: 4.6 10E12/L (ref 4.4–5.9)
RBC # BLD AUTO: 4.86 10E12/L (ref 4.4–5.9)
SODIUM SERPL-SCNC: 139 MMOL/L (ref 133–144)
SODIUM SERPL-SCNC: 141 MMOL/L (ref 133–144)
SPECIMEN SOURCE: ABNORMAL
SPECIMEN SOURCE: NORMAL
TROPONIN I SERPL-MCNC: NORMAL UG/L (ref 0–0.04)
WBC # BLD AUTO: 4.8 10E9/L (ref 4–11)
WBC # BLD AUTO: 5 10E9/L (ref 4–11)

## 2017-03-17 PROCEDURE — 96361 HYDRATE IV INFUSION ADD-ON: CPT

## 2017-03-17 PROCEDURE — 87205 SMEAR GRAM STAIN: CPT | Performed by: HOSPITALIST

## 2017-03-17 PROCEDURE — 87899 AGENT NOS ASSAY W/OPTIC: CPT | Performed by: INTERNAL MEDICINE

## 2017-03-17 PROCEDURE — 99207 ZZC APP CREDIT; MD BILLING SHARED VISIT: CPT | Performed by: INTERNAL MEDICINE

## 2017-03-17 PROCEDURE — 87077 CULTURE AEROBIC IDENTIFY: CPT | Performed by: EMERGENCY MEDICINE

## 2017-03-17 PROCEDURE — 83605 ASSAY OF LACTIC ACID: CPT | Performed by: HOSPITALIST

## 2017-03-17 PROCEDURE — 99291 CRITICAL CARE FIRST HOUR: CPT | Mod: 25 | Performed by: INTERNAL MEDICINE

## 2017-03-17 PROCEDURE — 84484 ASSAY OF TROPONIN QUANT: CPT | Performed by: EMERGENCY MEDICINE

## 2017-03-17 PROCEDURE — 74177 CT ABD & PELVIS W/CONTRAST: CPT

## 2017-03-17 PROCEDURE — 87070 CULTURE OTHR SPECIMN AEROBIC: CPT | Performed by: HOSPITALIST

## 2017-03-17 PROCEDURE — 36415 COLL VENOUS BLD VENIPUNCTURE: CPT | Performed by: HOSPITALIST

## 2017-03-17 PROCEDURE — 40000275 ZZH STATISTIC RCP TIME EA 10 MIN

## 2017-03-17 PROCEDURE — 36415 COLL VENOUS BLD VENIPUNCTURE: CPT | Performed by: INTERNAL MEDICINE

## 2017-03-17 PROCEDURE — 87186 SC STD MICRODIL/AGAR DIL: CPT | Performed by: EMERGENCY MEDICINE

## 2017-03-17 PROCEDURE — 96375 TX/PRO/DX INJ NEW DRUG ADDON: CPT

## 2017-03-17 PROCEDURE — 87186 SC STD MICRODIL/AGAR DIL: CPT | Performed by: HOSPITALIST

## 2017-03-17 PROCEDURE — 83605 ASSAY OF LACTIC ACID: CPT | Performed by: INTERNAL MEDICINE

## 2017-03-17 PROCEDURE — 87633 RESP VIRUS 12-25 TARGETS: CPT | Performed by: INTERNAL MEDICINE

## 2017-03-17 PROCEDURE — 5A1955Z RESPIRATORY VENTILATION, GREATER THAN 96 CONSECUTIVE HOURS: ICD-10-PCS | Performed by: INTERNAL MEDICINE

## 2017-03-17 PROCEDURE — 80048 BASIC METABOLIC PNL TOTAL CA: CPT | Performed by: INTERNAL MEDICINE

## 2017-03-17 PROCEDURE — 71260 CT THORAX DX C+: CPT

## 2017-03-17 PROCEDURE — 87040 BLOOD CULTURE FOR BACTERIA: CPT | Performed by: EMERGENCY MEDICINE

## 2017-03-17 PROCEDURE — 00000146 ZZHCL STATISTIC GLUCOSE BY METER IP

## 2017-03-17 PROCEDURE — 25000132 ZZH RX MED GY IP 250 OP 250 PS 637: Performed by: EMERGENCY MEDICINE

## 2017-03-17 PROCEDURE — 85027 COMPLETE CBC AUTOMATED: CPT | Performed by: EMERGENCY MEDICINE

## 2017-03-17 PROCEDURE — 31500 INSERT EMERGENCY AIRWAY: CPT | Performed by: INTERNAL MEDICINE

## 2017-03-17 PROCEDURE — 80076 HEPATIC FUNCTION PANEL: CPT | Performed by: HOSPITALIST

## 2017-03-17 PROCEDURE — 25000125 ZZHC RX 250: Performed by: INTERNAL MEDICINE

## 2017-03-17 PROCEDURE — 82805 BLOOD GASES W/O2 SATURATION: CPT | Performed by: INTERNAL MEDICINE

## 2017-03-17 PROCEDURE — 25000128 H RX IP 250 OP 636: Performed by: INTERNAL MEDICINE

## 2017-03-17 PROCEDURE — 99285 EMERGENCY DEPT VISIT HI MDM: CPT | Mod: 25

## 2017-03-17 PROCEDURE — 25000132 ZZH RX MED GY IP 250 OP 250 PS 637: Performed by: HOSPITALIST

## 2017-03-17 PROCEDURE — 87800 DETECT AGNT MULT DNA DIREC: CPT | Performed by: EMERGENCY MEDICINE

## 2017-03-17 PROCEDURE — 93005 ELECTROCARDIOGRAM TRACING: CPT

## 2017-03-17 PROCEDURE — 40000940 XR CHEST PORT 1 VW

## 2017-03-17 PROCEDURE — 25000128 H RX IP 250 OP 636: Performed by: HOSPITALIST

## 2017-03-17 PROCEDURE — 87077 CULTURE AEROBIC IDENTIFY: CPT | Performed by: HOSPITALIST

## 2017-03-17 PROCEDURE — 40000008 ZZH STATISTIC AIRWAY CARE

## 2017-03-17 PROCEDURE — 96365 THER/PROPH/DIAG IV INF INIT: CPT

## 2017-03-17 PROCEDURE — 85025 COMPLETE CBC W/AUTO DIFF WBC: CPT | Performed by: INTERNAL MEDICINE

## 2017-03-17 PROCEDURE — 25000125 ZZHC RX 250: Performed by: HOSPITALIST

## 2017-03-17 PROCEDURE — 80053 COMPREHEN METABOLIC PANEL: CPT | Performed by: EMERGENCY MEDICINE

## 2017-03-17 PROCEDURE — 25500064 ZZH RX 255 OP 636: Performed by: EMERGENCY MEDICINE

## 2017-03-17 PROCEDURE — 25000132 ZZH RX MED GY IP 250 OP 250 PS 637: Performed by: INTERNAL MEDICINE

## 2017-03-17 PROCEDURE — 83605 ASSAY OF LACTIC ACID: CPT | Performed by: EMERGENCY MEDICINE

## 2017-03-17 PROCEDURE — 99223 1ST HOSP IP/OBS HIGH 75: CPT | Mod: AI | Performed by: HOSPITALIST

## 2017-03-17 PROCEDURE — 83690 ASSAY OF LIPASE: CPT | Performed by: EMERGENCY MEDICINE

## 2017-03-17 PROCEDURE — 25000131 ZZH RX MED GY IP 250 OP 636 PS 637: Performed by: INTERNAL MEDICINE

## 2017-03-17 PROCEDURE — 40000940 XR ABDOMEN PORT F1 VW

## 2017-03-17 PROCEDURE — 83036 HEMOGLOBIN GLYCOSYLATED A1C: CPT | Performed by: HOSPITALIST

## 2017-03-17 PROCEDURE — 84145 PROCALCITONIN (PCT): CPT | Performed by: HOSPITALIST

## 2017-03-17 PROCEDURE — 25000128 H RX IP 250 OP 636: Performed by: EMERGENCY MEDICINE

## 2017-03-17 PROCEDURE — 25000125 ZZHC RX 250

## 2017-03-17 PROCEDURE — 20000003 ZZH R&B ICU

## 2017-03-17 PROCEDURE — 25000125 ZZHC RX 250: Performed by: EMERGENCY MEDICINE

## 2017-03-17 RX ORDER — ETOMIDATE 2 MG/ML
20 INJECTION INTRAVENOUS ONCE
Status: COMPLETED | OUTPATIENT
Start: 2017-03-17 | End: 2017-03-17

## 2017-03-17 RX ORDER — CEFTRIAXONE 2 G/1
2 INJECTION, POWDER, FOR SOLUTION INTRAMUSCULAR; INTRAVENOUS EVERY 24 HOURS
Status: DISCONTINUED | OUTPATIENT
Start: 2017-03-18 | End: 2017-03-17

## 2017-03-17 RX ORDER — NICOTINE POLACRILEX 4 MG
15-30 LOZENGE BUCCAL
Status: DISCONTINUED | OUTPATIENT
Start: 2017-03-17 | End: 2017-03-29

## 2017-03-17 RX ORDER — IOPAMIDOL 755 MG/ML
78 INJECTION, SOLUTION INTRAVASCULAR ONCE
Status: COMPLETED | OUTPATIENT
Start: 2017-03-17 | End: 2017-03-17

## 2017-03-17 RX ORDER — AMOXICILLIN 250 MG
1-2 CAPSULE ORAL 2 TIMES DAILY
Status: DISCONTINUED | OUTPATIENT
Start: 2017-03-17 | End: 2017-03-23

## 2017-03-17 RX ORDER — ALBUTEROL SULFATE 0.83 MG/ML
3 SOLUTION RESPIRATORY (INHALATION)
Status: DISCONTINUED | OUTPATIENT
Start: 2017-03-17 | End: 2017-04-11 | Stop reason: HOSPADM

## 2017-03-17 RX ORDER — AMOXICILLIN 250 MG
1-2 CAPSULE ORAL 2 TIMES DAILY PRN
Status: DISCONTINUED | OUTPATIENT
Start: 2017-03-17 | End: 2017-03-17

## 2017-03-17 RX ORDER — ROCURONIUM BROMIDE 50 MG/5 ML
0.6 SYRINGE (ML) INTRAVENOUS ONCE
Status: DISCONTINUED | OUTPATIENT
Start: 2017-03-17 | End: 2017-04-02

## 2017-03-17 RX ORDER — ACETAMINOPHEN 325 MG/1
650 TABLET ORAL EVERY 4 HOURS PRN
Status: DISCONTINUED | OUTPATIENT
Start: 2017-03-17 | End: 2017-04-11 | Stop reason: HOSPADM

## 2017-03-17 RX ORDER — POLYETHYLENE GLYCOL 3350 17 G/17G
17 POWDER, FOR SOLUTION ORAL DAILY PRN
Status: DISCONTINUED | OUTPATIENT
Start: 2017-03-17 | End: 2017-04-11 | Stop reason: HOSPADM

## 2017-03-17 RX ORDER — ATORVASTATIN CALCIUM 40 MG/1
40 TABLET, FILM COATED ORAL DAILY
Status: DISCONTINUED | OUTPATIENT
Start: 2017-03-17 | End: 2017-03-23

## 2017-03-17 RX ORDER — NICOTINE POLACRILEX 4 MG
15-30 LOZENGE BUCCAL
Status: DISCONTINUED | OUTPATIENT
Start: 2017-03-17 | End: 2017-03-17

## 2017-03-17 RX ORDER — NALOXONE HYDROCHLORIDE 0.4 MG/ML
.1-.4 INJECTION, SOLUTION INTRAMUSCULAR; INTRAVENOUS; SUBCUTANEOUS
Status: DISCONTINUED | OUTPATIENT
Start: 2017-03-17 | End: 2017-03-17

## 2017-03-17 RX ORDER — ACETAMINOPHEN 650 MG/1
650 SUPPOSITORY RECTAL EVERY 4 HOURS PRN
Status: DISCONTINUED | OUTPATIENT
Start: 2017-03-17 | End: 2017-04-11 | Stop reason: HOSPADM

## 2017-03-17 RX ORDER — BISACODYL 10 MG
10 SUPPOSITORY, RECTAL RECTAL DAILY PRN
Status: DISCONTINUED | OUTPATIENT
Start: 2017-03-17 | End: 2017-04-11 | Stop reason: HOSPADM

## 2017-03-17 RX ORDER — NALOXONE HYDROCHLORIDE 0.4 MG/ML
.1-.4 INJECTION, SOLUTION INTRAMUSCULAR; INTRAVENOUS; SUBCUTANEOUS
Status: DISCONTINUED | OUTPATIENT
Start: 2017-03-17 | End: 2017-04-11 | Stop reason: HOSPADM

## 2017-03-17 RX ORDER — AZITHROMYCIN 200 MG/5ML
250 POWDER, FOR SUSPENSION ORAL DAILY
Status: DISCONTINUED | OUTPATIENT
Start: 2017-03-17 | End: 2017-03-17

## 2017-03-17 RX ORDER — OSELTAMIVIR PHOSPHATE 75 MG/1
75 CAPSULE ORAL ONCE
Status: COMPLETED | OUTPATIENT
Start: 2017-03-17 | End: 2017-03-17

## 2017-03-17 RX ORDER — PROPOFOL 10 MG/ML
5-75 INJECTION, EMULSION INTRAVENOUS CONTINUOUS
Status: DISCONTINUED | OUTPATIENT
Start: 2017-03-17 | End: 2017-03-21

## 2017-03-17 RX ORDER — ONDANSETRON 4 MG/1
4 TABLET, ORALLY DISINTEGRATING ORAL EVERY 6 HOURS PRN
Status: DISCONTINUED | OUTPATIENT
Start: 2017-03-17 | End: 2017-04-11 | Stop reason: HOSPADM

## 2017-03-17 RX ORDER — DEXTROSE MONOHYDRATE 25 G/50ML
25-50 INJECTION, SOLUTION INTRAVENOUS
Status: DISCONTINUED | OUTPATIENT
Start: 2017-03-17 | End: 2017-03-29

## 2017-03-17 RX ORDER — CHLORHEXIDINE GLUCONATE ORAL RINSE 1.2 MG/ML
15 SOLUTION DENTAL EVERY 12 HOURS
Status: DISCONTINUED | OUTPATIENT
Start: 2017-03-17 | End: 2017-04-11 | Stop reason: HOSPADM

## 2017-03-17 RX ORDER — PROCHLORPERAZINE 25 MG
25 SUPPOSITORY, RECTAL RECTAL EVERY 12 HOURS PRN
Status: DISCONTINUED | OUTPATIENT
Start: 2017-03-17 | End: 2017-04-11 | Stop reason: HOSPADM

## 2017-03-17 RX ORDER — ROCURONIUM BROMIDE 10 MG/ML
41.8 INJECTION, SOLUTION INTRAVENOUS ONCE
Status: DISCONTINUED | OUTPATIENT
Start: 2017-03-17 | End: 2017-03-17

## 2017-03-17 RX ORDER — LIDOCAINE 40 MG/G
CREAM TOPICAL
Status: DISCONTINUED | OUTPATIENT
Start: 2017-03-17 | End: 2017-03-24

## 2017-03-17 RX ORDER — CEFTRIAXONE 1 G/1
1 INJECTION, POWDER, FOR SOLUTION INTRAMUSCULAR; INTRAVENOUS ONCE
Status: COMPLETED | OUTPATIENT
Start: 2017-03-17 | End: 2017-03-17

## 2017-03-17 RX ORDER — SODIUM CHLORIDE 9 MG/ML
INJECTION, SOLUTION INTRAVENOUS CONTINUOUS
Status: DISCONTINUED | OUTPATIENT
Start: 2017-03-17 | End: 2017-04-11 | Stop reason: HOSPADM

## 2017-03-17 RX ORDER — AZITHROMYCIN 250 MG/1
250 TABLET, FILM COATED ORAL DAILY
Status: DISCONTINUED | OUTPATIENT
Start: 2017-03-17 | End: 2017-03-17

## 2017-03-17 RX ORDER — MORPHINE SULFATE 4 MG/ML
4 INJECTION, SOLUTION INTRAMUSCULAR; INTRAVENOUS ONCE
Status: COMPLETED | OUTPATIENT
Start: 2017-03-17 | End: 2017-03-17

## 2017-03-17 RX ORDER — OSELTAMIVIR PHOSPHATE 6 MG/ML
75 FOR SUSPENSION ORAL 2 TIMES DAILY
Status: DISCONTINUED | OUTPATIENT
Start: 2017-03-17 | End: 2017-03-22

## 2017-03-17 RX ORDER — HYDROMORPHONE HYDROCHLORIDE 1 MG/ML
.3-.5 INJECTION, SOLUTION INTRAMUSCULAR; INTRAVENOUS; SUBCUTANEOUS
Status: DISCONTINUED | OUTPATIENT
Start: 2017-03-17 | End: 2017-03-17

## 2017-03-17 RX ORDER — DEXTROSE MONOHYDRATE 25 G/50ML
25-50 INJECTION, SOLUTION INTRAVENOUS
Status: DISCONTINUED | OUTPATIENT
Start: 2017-03-17 | End: 2017-03-17

## 2017-03-17 RX ORDER — PROCHLORPERAZINE MALEATE 5 MG
5-10 TABLET ORAL EVERY 6 HOURS PRN
Status: DISCONTINUED | OUTPATIENT
Start: 2017-03-17 | End: 2017-04-11 | Stop reason: HOSPADM

## 2017-03-17 RX ORDER — ROCURONIUM BROMIDE 10 MG/ML
42.2 INJECTION, SOLUTION INTRAVENOUS ONCE
Status: COMPLETED | OUTPATIENT
Start: 2017-03-17 | End: 2017-03-17

## 2017-03-17 RX ORDER — CEFTRIAXONE 1 G/1
1 INJECTION, POWDER, FOR SOLUTION INTRAMUSCULAR; INTRAVENOUS EVERY 24 HOURS
Status: DISCONTINUED | OUTPATIENT
Start: 2017-03-18 | End: 2017-03-19

## 2017-03-17 RX ORDER — PROPOFOL 10 MG/ML
INJECTION, EMULSION INTRAVENOUS
Status: COMPLETED
Start: 2017-03-17 | End: 2017-03-17

## 2017-03-17 RX ORDER — ONDANSETRON 2 MG/ML
4 INJECTION INTRAMUSCULAR; INTRAVENOUS EVERY 6 HOURS PRN
Status: DISCONTINUED | OUTPATIENT
Start: 2017-03-17 | End: 2017-04-11 | Stop reason: HOSPADM

## 2017-03-17 RX ORDER — ASPIRIN 81 MG/1
81 TABLET ORAL DAILY
Status: DISCONTINUED | OUTPATIENT
Start: 2017-03-17 | End: 2017-03-17

## 2017-03-17 RX ORDER — CEFAZOLIN SODIUM 1 G/50ML
1250 SOLUTION INTRAVENOUS EVERY 12 HOURS
Status: DISCONTINUED | OUTPATIENT
Start: 2017-03-17 | End: 2017-03-18 | Stop reason: DRUGHIGH

## 2017-03-17 RX ORDER — OSELTAMIVIR PHOSPHATE 75 MG/1
75 CAPSULE ORAL 2 TIMES DAILY
Status: DISCONTINUED | OUTPATIENT
Start: 2017-03-17 | End: 2017-03-17

## 2017-03-17 RX ORDER — HYDROMORPHONE HYDROCHLORIDE 1 MG/ML
0.2 INJECTION, SOLUTION INTRAMUSCULAR; INTRAVENOUS; SUBCUTANEOUS
Status: DISCONTINUED | OUTPATIENT
Start: 2017-03-17 | End: 2017-03-27

## 2017-03-17 RX ORDER — LIDOCAINE 40 MG/G
CREAM TOPICAL
Status: DISCONTINUED | OUTPATIENT
Start: 2017-03-17 | End: 2017-04-05

## 2017-03-17 RX ORDER — OXYCODONE HYDROCHLORIDE 5 MG/1
5-10 TABLET ORAL
Status: DISCONTINUED | OUTPATIENT
Start: 2017-03-17 | End: 2017-04-11 | Stop reason: HOSPADM

## 2017-03-17 RX ADMIN — AZITHROMYCIN MONOHYDRATE 250 MG: 500 INJECTION, POWDER, LYOPHILIZED, FOR SOLUTION INTRAVENOUS at 14:48

## 2017-03-17 RX ADMIN — ROCURONIUM BROMIDE 42.2 MG: 10 INJECTION, SOLUTION INTRAVENOUS at 10:30

## 2017-03-17 RX ADMIN — OSELTAMIVIR PHOSPHATE 75 MG: 75 CAPSULE ORAL at 05:05

## 2017-03-17 RX ADMIN — ALBUTEROL SULFATE 2.5 MG: 2.5 SOLUTION RESPIRATORY (INHALATION) at 07:50

## 2017-03-17 RX ADMIN — SODIUM CHLORIDE 87 ML: 9 INJECTION, SOLUTION INTRAVENOUS at 04:23

## 2017-03-17 RX ADMIN — HYDROMORPHONE HYDROCHLORIDE 0.2 MG: 1 INJECTION, SOLUTION INTRAMUSCULAR; INTRAVENOUS; SUBCUTANEOUS at 06:40

## 2017-03-17 RX ADMIN — VANCOMYCIN HYDROCHLORIDE 1250 MG: 5 INJECTION, POWDER, LYOPHILIZED, FOR SOLUTION INTRAVENOUS at 20:52

## 2017-03-17 RX ADMIN — ACETAMINOPHEN 650 MG: 325 TABLET, FILM COATED ORAL at 20:52

## 2017-03-17 RX ADMIN — CHLORHEXIDINE GLUCONATE 15 ML: 1.2 RINSE ORAL at 20:52

## 2017-03-17 RX ADMIN — HYDROMORPHONE HYDROCHLORIDE 0.2 MG: 1 INJECTION, SOLUTION INTRAMUSCULAR; INTRAVENOUS; SUBCUTANEOUS at 17:00

## 2017-03-17 RX ADMIN — CHLORHEXIDINE GLUCONATE 15 ML: 1.2 RINSE ORAL at 12:49

## 2017-03-17 RX ADMIN — CEFTRIAXONE 1 G: 1 INJECTION, POWDER, FOR SOLUTION INTRAMUSCULAR; INTRAVENOUS at 06:49

## 2017-03-17 RX ADMIN — SODIUM CHLORIDE 1 UNITS/HR: 9 INJECTION, SOLUTION INTRAVENOUS at 22:18

## 2017-03-17 RX ADMIN — CEFTRIAXONE 1 G: 1 INJECTION, POWDER, FOR SOLUTION INTRAMUSCULAR; INTRAVENOUS at 04:28

## 2017-03-17 RX ADMIN — SODIUM CHLORIDE 2 UNITS/HR: 9 INJECTION, SOLUTION INTRAVENOUS at 19:27

## 2017-03-17 RX ADMIN — HYDROMORPHONE HYDROCHLORIDE 0.2 MG: 1 INJECTION, SOLUTION INTRAMUSCULAR; INTRAVENOUS; SUBCUTANEOUS at 20:52

## 2017-03-17 RX ADMIN — ACETAMINOPHEN 650 MG: 650 SUPPOSITORY RECTAL at 13:45

## 2017-03-17 RX ADMIN — MORPHINE SULFATE 4 MG: 4 INJECTION, SOLUTION INTRAMUSCULAR; INTRAVENOUS at 04:05

## 2017-03-17 RX ADMIN — HYDROMORPHONE HYDROCHLORIDE 0.2 MG: 1 INJECTION, SOLUTION INTRAMUSCULAR; INTRAVENOUS; SUBCUTANEOUS at 09:19

## 2017-03-17 RX ADMIN — ONDANSETRON 4 MG: 2 SOLUTION INTRAMUSCULAR; INTRAVENOUS at 09:35

## 2017-03-17 RX ADMIN — SODIUM CHLORIDE 1000 ML: 9 INJECTION, SOLUTION INTRAVENOUS at 05:05

## 2017-03-17 RX ADMIN — PROPOFOL 60 MCG/KG/MIN: 10 INJECTION, EMULSION INTRAVENOUS at 20:51

## 2017-03-17 RX ADMIN — PROPOFOL 50 MCG/KG/MIN: 10 INJECTION, EMULSION INTRAVENOUS at 10:00

## 2017-03-17 RX ADMIN — SODIUM CHLORIDE: 9 INJECTION, SOLUTION INTRAVENOUS at 06:46

## 2017-03-17 RX ADMIN — ETOMIDATE 20 MG: 2 INJECTION INTRAVENOUS at 10:20

## 2017-03-17 RX ADMIN — SODIUM CHLORIDE 1000 ML: 9 INJECTION, SOLUTION INTRAVENOUS at 04:00

## 2017-03-17 RX ADMIN — OSELTAMIVIR PHOSPHATE 75 MG: 6 POWDER, FOR SUSPENSION ORAL at 20:52

## 2017-03-17 RX ADMIN — PROPOFOL 60 MCG/KG/MIN: 10 INJECTION, EMULSION INTRAVENOUS at 17:48

## 2017-03-17 RX ADMIN — VANCOMYCIN HYDROCHLORIDE 1250 MG: 5 INJECTION, POWDER, LYOPHILIZED, FOR SOLUTION INTRAVENOUS at 09:26

## 2017-03-17 RX ADMIN — IOPAMIDOL 78 ML: 755 INJECTION, SOLUTION INTRAVENOUS at 04:23

## 2017-03-17 RX ADMIN — SODIUM CHLORIDE 2.5 UNITS/HR: 9 INJECTION, SOLUTION INTRAVENOUS at 13:26

## 2017-03-17 ASSESSMENT — ACTIVITIES OF DAILY LIVING (ADL)
TOILETING: 0-->INDEPENDENT
AMBULATION: 0-->INDEPENDENT
SWALLOWING: 0-->SWALLOWS FOODS/LIQUIDS WITHOUT DIFFICULTY
BATHING: 0-->INDEPENDENT
TRANSFERRING: 0-->INDEPENDENT
RETIRED_EATING: 0-->INDEPENDENT
DRESS: 0-->INDEPENDENT
COGNITION: 0 - NO COGNITION ISSUES REPORTED
RETIRED_COMMUNICATION: 0-->UNDERSTANDS/COMMUNICATES WITHOUT DIFFICULTY
FALL_HISTORY_WITHIN_LAST_SIX_MONTHS: NO

## 2017-03-17 ASSESSMENT — ENCOUNTER SYMPTOMS
FEVER: 0
SHORTNESS OF BREATH: 1
DYSURIA: 0
ABDOMINAL PAIN: 1

## 2017-03-17 NOTE — PROGRESS NOTES
Placed call to wife Sorin, via mobile phone number listed in contacts, no answer left message and call back number.

## 2017-03-17 NOTE — CONSULTS
"CLINICAL NUTRITION SERVICES  -  ASSESSMENT NOTE      Recommendations Ordered by Registered Dietitian (RD): Preliminary TF goal --> Promote with Fiber at 35 mL/hr to provide:  840 kcal, 53 g protein (0.8 g per kg or 96% needs), 116 g CHO, 12 g fiber, 697 mL H2O   Total (TF + Propofol)= 1392 kcal (20 kcal per kg)  Flushes 60 mL every 4 hours   Certavite while on low drip TF    Malnutrition: Patient does not meet two of the above criteria necessary for diagnosing malnutrition        REASON FOR ASSESSMENT  Wyatt Mahajan is a 57 year old male seen by Registered Dietitian for Provider Order - Registered Dietitian to Assess and Order TF per Medical Nutrition protocol + Admission Screen - New/uncontrolled diabetes        NUTRITION HISTORY  - Information obtained from daughter as patient intubated and sedated.  - Daughter reports that patient was eating well as per usual until about 2 days prior to admit.  She indicates that her mom was making him soup and lighter foods but he didn't even want that much.  At baseline he has a good appetite and intake and eats a regular \"no pork\" diet.  She indicated that he is diabetic but does not necessarily follow a diabetic diet.       CURRENT NUTRITION ORDERS  Diet Order:     NPO     Current Intake/Tolerance:  N/A      PHYSICAL FINDINGS  Observed  Thin appearing   Obtained from Chart/Interdisciplinary Team  None noted    ANTHROPOMETRICS  Height: 5' 10\"  Weight: 69.6 kg (153#)(3/17)  Body mass index is 22.02 kg/(m^2).  Weight Status:  Normal BMI  IBW: 75.5 kg   % IBW: 92%  Weight History: Per daughter, weight is stable around 153#    LABS  Hemoglobin A1C 8.1 (H)    MEDICATIONS  Insulin drip  Propofol at 20.9 mL/hr= 552 kcal     Dosing Weight 69.6 kg     ASSESSED NUTRITION NEEDS (2/3 Needs 1st week on TF):  4393-2869 kcal (16-20 kcal per kg)  55-70 grams (0.8-1 g per kg)  Estimated Energy Needs: 8662-1002 kcals (25-30 Kcal/Kg)  Justification: maintenance  Estimated Protein Needs: "  grams protein (1.2-1.5 g pro/Kg)  Justification: hypercatabolism with critical illness  Estimated Fluid Needs: 6197-7404 mL (1 mL/Kcal)  Justification: maintenance    MALNUTRITION:  % Weight Loss:  None noted  % Intake:  Decreased intake does not meet criteria for malnutrition  Subcutaneous Fat Loss:  None observed  Muscle Loss:  None observed  Fluid Retention:  None noted    Malnutrition Diagnosis: Patient does not meet two of the above criteria necessary for diagnosing malnutrition    NUTRITION DIAGNOSIS:  Inadequate protein-energy intake related to NPO, TF to start today as evidenced by meeting 0% protein and 39% preliminary energy needs via Propofol       NUTRITION INTERVENTIONS  Recommendations / Nutrition Prescription  Preliminary TF goal --> Promote with Fiber at 35 mL/hr to provide:  840 kcal, 53 g protein (0.8 g per kg or 96% needs), 116 g CHO, 12 g fiber, 697 mL H2O   Total (TF + Propofol)= 1392 kcal (20 kcal per kg)  Flushes 60 mL every 4 hours   Certavite while on low drip TF       Implementation  Nutrition education: Not appropriate at this time due to patient condition  EN Composition, EN Schedule, Feeding Tube Flush:  Orders written to begin TF at 15 mL/hr and increase every 8 hours by 10 mL to goal.  Flushes as above.  Multivitamin/Mineral:  Ordered as above.    Nutrition Goals  TF + Propofol to meet % preliminary needs      MONITORING AND EVALUATION:  Progress towards goals will be monitored and evaluated per protocol and Practice Guidelines    Yojana Lopez RD, LD, CNSC   Clinical Dietitian - [facility]

## 2017-03-17 NOTE — IP AVS SNAPSHOT
"` `     [facility] INTENSIVE CARE UNIT: [phone]                                              INTERAGENCY TRANSFER FORM - NURSING   3/17/2017                    Hospital Admission Date: 3/17/2017  AMAURY PARKER   : 1960  Sex: Male        Attending Provider: Manny Novak MD     Allergies:  No Known Drug Allergies    Infection:  None   Service:  HOSPITALIST    Ht:  1.778 m (5' 10\")   Wt:  80.9 kg (178 lb 5.6 oz)   Admission Wt:  70.3 kg (155 lb)    BMI:  25.59 kg/m 2   BSA:  2 m 2            Patient PCP Information     Provider PCP Type    Shaun Bedolla MD General      Current Code Status     Date Active Code Status Order ID Comments User Context       Prior      Code Status History     Date Active Date Inactive Code Status Order ID Comments User Context    2017 11:23 AM  Full Code [id number]  Dmoinick Atkins MD Outpatient    3/17/2017  6:17 AM 2017 11:23 AM Full Code [id number]  Manny Novak MD Inpatient    2004  9:53 AM 2004  9:53 AM  None  Yaima Persaud Demographics      Advance Directives        Does patient have a scanned Advance Directive/ACP document in EPIC?           No        Hospital Problems as of 2017              Priority Class Noted POA    Acute respiratory failure with hypoxia (H) Medium  3/17/2017 Yes    MSSA (methicillin susceptible Staphylococcus aureus) septicemia (H) High  2017 Yes    MSSA (methicillin susceptible Staphylococcus aureus) pneumonia (H) High  2017 Yes    Fever High  2017 Yes    Leukocytosis High  2017 Yes    Influenza B High  2017 Yes      Non-Hospital Problems as of 2017              Priority Class Noted    External hemorrhoids   2008    Cranial nerve III palsy   Unknown    Hyperlipidemia LDL goal <100 High  10/31/2010    Type 2 diabetes mellitus with diabetic neuropathy (HCC) High  2012    Low back pain   4/10/2014    Lumbar radiculopathy   4/10/2014    PVD (peripheral vascular disease) " with claudication (H) Medium  10/12/2015    Ischemic vascular disease Medium  12/22/2015      Immunizations     Name Date      Pneumococcal 23 valent 05/10/16     Tdap (Adacel,Boostrix) 06/26/06          END      ASSESSMENT     Discharge Profile Flowsheet     EXPECTED DISCHARGE     Patient's primary language  English 04/05/17 0904    Expected Discharge Date  04/07/17 (LTACH pending AFB Results) 04/06/17 1512   SKIN      DISCHARGE NEEDS ASSESSMENT     Inspection  Full 04/11/17 0833    # of Referrals Placed by CTS  Post Acute Facilities ([facility]. Pt/ Liaison Referral w/ [facility] ) 04/05/17 1143   Skin WDL  ex 04/11/17 0833    GASTROINTESTINAL (ADULT,PEDIATRIC,OB)     Skin Temperature  warm 04/11/17 0659    GI WDL  ex 04/11/17 0833   Skin Moisture  dry 04/11/17 0659    Abdominal Appearance  distended;firm 04/11/17 0833   Skin Integrity  bruise(s);drain/device(s);incision(s);scab(s) 04/11/17 0833    All Quadrants Bowel Sounds  hyperactive 04/11/17 0833   Skin areas NOT inspected  Occiput;Scapula, left;Scapula, right;Spine;Buttock, left;Buttock, right;Sacrum;Coccyx 04/10/17 0617    Last Bowel Movement  04/09/17 04/10/17 0616   Procedural focused assessment (identify areas inspected)   Nose;Cheek, left;Cheek, right;Ear, left;Ear, right;Knee, right;Knee, left;Heel, left;Heel, right 04/04/17 1417    GI Signs/Symptoms  -- 04/09/17 1534   SAFETY      Passing flatus  yes 04/10/17 0318   Safety WDL  WDL 04/11/17 1149    COMMUNICATION ASSESSMENT     Safety Factors  upper side rails raised x 2, lower side rail raised x 1 04/11/17 0007    Patient's communication style  spoken language (English or Bilingual) 04/05/17 0904   Safety Equipment  suction equipment 04/11/17 0404                 Assessment WDL (Within Defined Limits) Definitions           Safety WDL     Effective: 09/28/15    Row Information: <b>WDL Definition:</b> Bed in low position, wheels locked; call light in reach; upper side rails up x 2; ID band on<br>  "<font color=\"gray\"><i>Item=AS safety wdl>>List=AS safety wdl>>Version=F14</i></font>      Skin WDL     Effective: 09/28/15    Row Information: <b>WDL Definition:</b> Warm; dry; intact; elastic; without discoloration; pressure points without redness<br> <font color=\"gray\"><i>Item=AS skin wdl>>List=AS skin wdl>>Version=F14</i></font>      Vitals     Vital Signs Flowsheet     COMMENTS     ANALGESIA SIDE EFFECTS MONITORING      Comments  CT 04/01/17 1150   Side Effects Monitoring: Respiratory Quality  V 04/11/17 1140    VITAL SIGNS     Side Effects Monitoring: Respiratory Depth  V 04/11/17 1140    Temp  99.9  F (37.7  C) 04/11/17 1216   Side Effects Monitoring: Sedation Level  2 04/11/17 1140    Temp src  Bladder 04/10/17 2059   HEIGHT AND WEIGHT      Resp  21 04/11/17 1216   Height  1.778 m (5' 10\") 03/17/17 0317    Pulse  115 03/17/17 0924   Height Method  Stated 03/17/17 0317    Heart Rate  98 04/11/17 1216   Weight  80.9 kg (178 lb 5.6 oz) 04/11/17 0522    Pulse/Heart Rate Source  Monitor 03/17/17 0739   BSA (Calculated - sq m)  1.86 03/17/17 0317    BP  160/86 04/11/17 1216   BMI (Calculated)  22.29 03/17/17 0317    BP Location  Left arm 04/11/17 0721   POSITIONING      OXYGEN THERAPY     Body Position  left, side-lying;supine;neutral body alignment;neutral head position;lower extremity elevated, left;lower extremity elevated, right;upper extremity elevated, left;upper extremity elevated, right 04/11/17 1140    SpO2  100 % 04/11/17 1216   Head of Bed (HOB)  HOB at 30 degrees 04/11/17 1140    O2 Device  Mechanical Ventilator 04/11/17 1151   Positioning/Transfer Devices  pillows;applied;in use 04/11/17 1140    FiO2 (%)  40 % 04/11/17 1151   Chair  Recline and up in chair 04/10/17 1505    Oxygen Delivery  -- (14/9, 70%) 03/17/17 1034   DAILY CARE      Suction Occurrance  1 04/11/17 0358   Activity Type  activity adjusted per tolerance 04/11/17 1140    PAIN/COMFORT     Activity Level of Assistance  assistance, 2 people " 04/11/17 0741    Patient Currently in Pain  denies 04/11/17 1140   Activity Assistive Device  mechanical lift 04/11/17 0741    Preferred Pain Scale  CPOT (Critical-Care Pain Observation Tool) 04/11/17 0824   Additional Documentation  Activity Device Assistance (Row) 04/07/17 1631    0-10 Pain Scale  -- (HR increased, coughing frequently) 04/03/17 1434   ECG      Pain Location  Throat (trach site) 04/10/17 2351   ECG Rhythm  Sinus tachycardia 04/11/17 0741    Pain Orientation  Right;Lower 03/17/17 0854   Ectopy  None 04/11/17 0741    Pain Management Interventions  analgesia administered 04/03/17 1447   Lead Monitored  Lead II;V 1 04/10/17 2351    Pain Intervention(s)  Repositioned 04/11/17 0824   Rhythm Comment  ST Segment Normal 04/09/17 2009    Response to Interventions  Decrease in pain 04/10/17 2351   ANTONINA COMA SCALE      CRITICAL-CARE PAIN OBSERVATION TOOL (CPOT)     Best Eye Response  4-->(E4) spontaneous 04/11/17 1140    Facial Expression  0 04/11/17 1140   Best Motor Response  6-->(M6) obeys commands 04/11/17 1140    Body Movements  0 04/11/17 1140   Best Verbal Response  1-->(V1) none 04/11/17 1140    Compliance w/ventilator (intubated patients)  0 04/11/17 1140   Antonina Coma Scale Score  11 04/11/17 1140    Vocalization (extubated patients)  Intubated 04/11/17 1140   POINT OF CARE TESTING      Muscle Tension  0 04/11/17 1140   Puncture Site  fingertip 04/08/17 1029    Total  0 04/11/17 1140   Bedside Glucose (mg/dl )   347 mg/dl 04/08/17 1029            Patient Lines/Drains/Airways Status    Active LINES/DRAINS/AIRWAYS     Name: Placement date: Placement time: Site: Days: Last dressing change:    Airway - Adult/Peds Shiley 04/04/17   1530      6     Surgical Airway Shiley 8 Cuffed 04/04/17   1420   8   6     Gastrostomy/Enterostomy Gastrostomy LUQ 1 04/04/17   1130   LUQ   7     Urethral Catheter Coude 16 fr 04/10/17   1700   Coude   less than 1     Pressure Injury 03/27/17 Medial Other (Comment)  tongue left tip 03/27/17   0800    15     Wound 04/03/17 Right Ear Shear injury small 0.5cm wound  04/03/17   2000   Ear   7     Incision/Surgical Site 04/04/17 Bilateral Neck 04/04/17   1410    6             Patient Lines/Drains/Airways Status    Active PICC/CVC     Name: Placement date: Placement time: Site: Days: Additional Info Last dressing change:    PICC Triple Lumen 03/27/17 Right Basilic OK TO USE 03/27/17   1537   Basilic   14 External Cath Length (cm): 8 cm   04/08/17 0817 (76.12 hrs)         Size (Fr): 6 Fr            Orientation: Right            Extremity Circumference (cm): 26 cm            Dressing Intervention: Chlorhexidine patch;Transparent;New dressing;Securing device            Description: Valved;Power PICC            Total Catheter Length (cm) Trimmed: 50 cm            Site Prep: Chlorhexidine            Local Anesthetic: Injectable            Inserted by: ANDRAE HARRIS RN            Insertion attempts with ultrasound: 1            Patient Tolerance: Tolerated well            Placement Verification: Blood Return            Difficulty with threading line: No            Tip location: SVC            Full barrier precautions done: Yes            Consent Signed: Yes            Time Out performed: Yes            Lot #: [id number]            Use for : OK TO USE               Intake/Output Detail Report     Date Intake           Output   Net    Shift I.V. NG/GT IV Piggyback Colloid Enteral Blood Components Total Urine Emesis/NG output Total       Noc 04/09/17 2300 - 04/10/17 0659 327.33 480 -- -- 175 -- 982.33 900 -- 900 82.33    Day 04/10/17 0700 - 04/10/17 1459 310 760 -- -- 280 -- 1350 910 -- 910 440    Lorri 04/10/17 1500 - 04/10/17 2259 290 480 -- -- 280 -- 1050 1075 -- 1075 -25    Noc 04/10/17 2300 - 04/11/17 0659 200 480 -- -- 280 -- 960 1325 -- 1325 -365    Day 04/11/17 0700 - 04/11/17 1459 100 720 -- -- 140 -- 960 1275 -- 1275 -315      Last Void/BM       Most Recent Value    Urine Occurrence 1  [during intubation, prior to bell insertion] at 03/17/2017 1000    Stool Occurrence 1 at 04/04/2017 1800      Case Management/Discharge Planning     Case Management/Discharge Planning Flowsheet     REFERRAL INFORMATION     ASSESSMENT OF FAMILY/SOCIAL SUPPORT      Admission Type  inpatient 03/30/17 1109   Who is your support system?  Wife;Children (wife and Dtr Joana on site 24/7) 04/04/17 1241    Arrived From  admitted as an inpatient 03/30/17 1109   COPING/STRESS      Referral Source  interdisciplinary rounds 03/30/17 1109   Major Change/Loss/Stressor  none 03/17/17 0620    # of Referrals Placed by CTS  Post Acute Facilities ([facility]. Pt/ Liaison Referral w/ [facility] ) 04/05/17 1143   EXPECTED DISCHARGE      Post Acute Facilities  LTACH 04/11/17 1136   Expected Discharge Date  04/07/17 (LTACH pending AFB Results) 04/06/17 1512    Reason For Consult  discharge planning;transportation 04/11/17 1136   ABUSE RISK SCREEN      Record Reviewed  medical record 04/11/17 1136   QUESTION TO PATIENT:  Has a member of your family or a partner(now or in the past) intimidated, hurt, manipulated, or controlled you in any way?  no 03/17/17 0312     Assigned to Case  Gopi Ulloa 04/11/17 1136   QUESTION TO PATIENT: Do you feel safe going back to the place where you are living?  yes 03/17/17 0312    [facility] Name  [facility] 03/30/17 1109   OBSERVATION: Is there reason to believe there has been maltreatment of a vulnerable adult (ie. Physical/Sexual/Emotional abuse, self neglect, lack of adequate food, shelter, medical care, or financial exploitation)?  no 03/17/17 0312    Primary Care MD Name  Dr Bedolla 03/30/17 1109   (R) MENTAL HEALTH SUICIDE RISK      LIVING ENVIRONMENT     Are you depressed or being treated for depression?  No (per family no) 03/29/17 1138    Lives With  child(bhupendra), adult;spouse 03/30/17 1109   HOMICIDE RISK      Able to Return to Prior Living Arrangements  no 04/04/17 1239    Homicidal Ideation  no 03/17/17 0312    Living Arrangement Comments  -- (LTACH likely needed post hosp) 04/04/17 9689

## 2017-03-17 NOTE — PROVIDER NOTIFICATION
MD Notification    Notified Person:  MD    Notified Persons Name: Fernando     Notification Date/Time:  Now     Notification Interaction:  Test paged    Purpose of Notification: Po2 58    Orders Received:    Comments:

## 2017-03-17 NOTE — PROVIDER NOTIFICATION
MD Notification    Notified Person:  MD    Notified Persons Name: MD Fernando     Notification Date/Time: 3/17/2017 6:56 AM     Notification Interaction:  Talked with Physician    Purpose of Notification: Lactic Acid 3.1 upon recheck after 2L bolus.    Orders Received:    Comments:

## 2017-03-17 NOTE — PROVIDER NOTIFICATION
MD Notification    Notified Person:  MD    Notified Persons Name: MD Fernando    Notification Date/Time:3/17/2017 7:21 AM    Notification Interaction:  Talked with Physician    Purpose of Notification: Procalcitonin 27.57    Orders Received:    Comments:

## 2017-03-17 NOTE — PROCEDURES
Procedure/Surgery Information   [facility]    Bedside Procedure Note  Date of Service (when I performed the procedure): 03/17/2017    Wyatt Mahajan is a 57 year old male patient.  1. Influenza B    2. Pneumonia of both lungs due to infectious organism, unspecified part of lung      Past Medical History   Diagnosis Date     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     eval by optho, considered be related to microvascular problem ie DM     Mixed hyperlipidemia 3/04     PVD (peripheral vascular disease) with claudication (H) 10/12/2015     Tobacco use disorder QUIT 9/08     smokes for stress     Type II or unspecified type diabetes mellitus with neurological manifestations, not stated as uncontrolled 6/23/2014     Temp: 101.1  F (38.4  C) Temp src: Bladder BP: 95/67 Pulse: 115 Heart Rate: 114 Resp: 27 SpO2: 98 % O2 Device: Mechanical Ventilator Oxygen Delivery: 3 LPM    Intubation  Date/Time: 3/17/2017 10:30 AM  Performed by: GRAHAM RODRIGUEZ  Authorized by: GRAHAM RODRIGUEZ   Consent: The procedure was performed in an emergent situation. Verbal consent obtained.  Consent given by: patient  Patient understanding: patient states understanding of the procedure being performed  Patient consent: the patient's understanding of the procedure matches consent given  Procedure consent: procedure consent matches procedure scheduled  Relevant documents: relevant documents present and verified  Test results: test results available and properly labeled  Site marked: the operative site was marked  Imaging studies: imaging studies available  Required items: required blood products, implants, devices, and special equipment available  Patient identity confirmed: verbally with patient  Indications: respiratory distress and  hypoxemia  Intubation method: video-assisted  Patient status: paralyzed (RSI)  Preoxygenation: BVM  Sedatives: etomidate  Paralytic: rocuronium  Laryngoscope size: Mac 3  Tube size: 7.5 mm  Tube type:  cuffed  Number of attempts: 3  Ventilation between attempts: BVM  Cricoid pressure: no  Cords visualized: yes  Post-procedure assessment: chest rise and CO2 detector  Breath sounds: equal and absent over the epigastrium  Cuff inflated: yes  ETT to lip: 25 cm  Tube secured with: ETT chou  Chest x-ray interpreted by me.  Chest x-ray findings: endotracheal tube too low  Tube repositioned: tube repositioned successfully  Patient tolerance: Patient tolerated the procedure well with no immediate complications  Comments: Withdrew 3 cm after cxr, now 22 at teeth.           Antelmo Aponte

## 2017-03-17 NOTE — PHARMACY-VANCOMYCIN DOSING SERVICE
Pharmacy Vancomycin Initial Note  Date of Service 2017  Patient's  1960  57 year old, male    Indication: Sepsis    Current estimated CrCl = Estimated Creatinine Clearance: 123.9 mL/min (based on Cr of 0.67).    Creatinine for last 3 days  3/16/2017: 11:40 AM Creatinine 0.77 mg/dL  3/17/2017:  3:30 AM Creatinine 0.67 mg/dL    Recent Vancomycin Level(s) for last 3 days  No results found for requested labs within last 72 hours.      Vancomycin IV Administrations (past 72 hours)      No vancomycin orders with administrations in past 72 hours.                Nephrotoxins and other renal medications (Future)    Start     Dose/Rate Route Frequency Ordered Stop    17 0900  vancomycin 1250 mg in 0.9% NaCl 250 mL PREMIX      1,250 mg Intravenous EVERY 12 HOURS 17 0841            Contrast Orders - past 72 hours (72h ago through future)    Start     Dose/Rate Route Frequency Ordered Stop    17 0411  iopamidol (ISOVUE-370) solution 78 mL      78 mL Intravenous ONCE 17 0410 17 0423                Plan:  1.  Start vancomycin  1250 mg IV q12h.   2.  Goal Trough Level: 15-20 mg/L   3.  Pharmacy will check trough levels as appropriate in 1-3 Days.    4. Serum creatinine levels will be ordered daily for the first week of therapy and at least twice weekly for subsequent weeks.    5. [place] method utilized to dose vancomycin therapy: Method 2    Pat Velasco, PharmD

## 2017-03-17 NOTE — IP AVS SNAPSHOT
"    [facility] INTENSIVE CARE UNIT: [phone]                                              INTERAGENCY TRANSFER FORM - LAB / IMAGING / EKG / EMG RESULTS   3/17/2017                    Hospital Admission Date: 3/17/2017  AMAURY PARKER   : 1960  Sex: Male        Attending Provider: Manny Novak MD     Allergies:  No Known Drug Allergies    Infection:  None   Service:  HOSPITALIST    Ht:  1.778 m (5' 10\")   Wt:  80.9 kg (178 lb 5.6 oz)   Admission Wt:  70.3 kg (155 lb)    BMI:  25.59 kg/m 2   BSA:  2 m 2            Patient PCP Information     Provider PCP Type    Shaun Bedolla MD General         Lab Results - 3 Days      Glucose by meter [[id number]] (Abnormal)  Resulted: 17 0746, Result status: Final result    Ordering provider: Manny Novak MD  17 0743 Resulting lab: POINT OF CARE TEST, GLUCOSE    Specimen Information    Type Source Collected On     17 0743          Components       Value Reference Range Flag Lab   Glucose 164 70 - 99 mg/dL H 170            Basic metabolic panel (AM Draw) [[id number]] (Abnormal)  Resulted: 17 0520, Result status: Final result    Ordering provider: Antelmo Aponte MD  17 0000 Resulting lab: [facility]    Specimen Information    Type Source Collected On   Blood  17 0430          Components       Value Reference Range Flag Lab   Sodium 140 133 - 144 mmol/L  FrStHsLb   Potassium 5.0 3.4 - 5.3 mmol/L  FrStHsLb   Chloride 108 94 - 109 mmol/L  FrStHsLb   Carbon Dioxide 26 20 - 32 mmol/L  FrStHsLb   Anion Gap 6 3 - 14 mmol/L  FrStHsLb   Glucose 171 70 - 99 mg/dL H FrStHsLb   Urea Nitrogen 24 7 - 30 mg/dL  FrStHsLb   Creatinine 1.13 0.66 - 1.25 mg/dL  FrStHsLb   GFR Estimate 67 >60 mL/min/1.7m2  FrStHsLb   Comment:  Non  GFR Calc   GFR Estimate If Black 81 >60 mL/min/1.7m2  FrStHsLb   Comment:  African American GFR Calc   Calcium 8.0 8.5 - 10.1 mg/dL L FrStHsLb            Magnesium (AM Draw) " [[id number]]  Resulted: 04/11/17 0520, Result status: Final result    Ordering provider: Antelmo Aponte MD  04/11/17 0000 Resulting lab: [facility]    Specimen Information    Type Source Collected On   Blood  04/11/17 0430          Components       Value Reference Range Flag Lab   Magnesium 2.1 1.6 - 2.3 mg/dL  FrStHsLb            Phosphorus (AM Draw) [[id number]]  Resulted: 04/11/17 0520, Result status: Final result    Ordering provider: Antelmo Aponte MD  04/11/17 0000 Resulting lab: [facility]    Specimen Information    Type Source Collected On   Blood  04/11/17 0430          Components       Value Reference Range Flag Lab   Phosphorus 2.6 2.5 - 4.5 mg/dL  FrStHsLb            Triglyceride (AM Draw) [[id number]] (Abnormal)  Resulted: 04/11/17 0520, Result status: Final result    Ordering provider: Dominick Atkins MD  04/11/17 0000 Resulting lab: [facility]    Specimen Information    Type Source Collected On   Blood  04/11/17 0430          Components       Value Reference Range Flag Lab   Triglycerides 163 <150 mg/dL H FrStHsLb   Comment:         Borderline high:  150-199 mg/dl   High:             200-499 mg/dl   Very high:       >499 mg/dl              CBC (AM Draw) [[id number]] (Abnormal)  Resulted: 04/11/17 0458, Result status: Final result    Ordering provider: Antelmo Aponte MD  04/11/17 0000 Resulting lab: [facility]    Specimen Information    Type Source Collected On   Blood  04/11/17 0430          Components       Value Reference Range Flag Lab   WBC 10.4 4.0 - 11.0 10e9/L  FrStHsLb   RBC Count 2.37 4.4 - 5.9 10e12/L L FrStHsLb   Hemoglobin 7.5 13.3 - 17.7 g/dL L FrStHsLb   Hematocrit 23.2 40.0 - 53.0 % L FrStHsLb   MCV 98 78 - 100 fl  FrStHsLb   MCH 31.6 26.5 - 33.0 pg  FrStHsLb   MCHC 32.3 31.5 - 36.5 g/dL  FrStHsLb   RDW 20.8 10.0 - 15.0 % H FrStHsLb   Platelet Count 302 150 - 450 10e9/L  FrStHsLb            Glucose by meter  [[id number]] (Abnormal)  Resulted: 04/11/17 0453, Result status: Final result    Ordering provider: Manny Novak MD  04/11/17 0444 Resulting lab: POINT OF CARE TEST, GLUCOSE    Specimen Information    Type Source Collected On     04/11/17 0444          Components       Value Reference Range Flag Lab   Glucose 155 70 - 99 mg/dL H 170            Glucose by meter [[id number]] (Abnormal)  Resulted: 04/11/17 0441, Result status: Final result    Ordering provider: Manny Novak MD  04/08/17 0501 Resulting lab: POINT OF CARE TEST, GLUCOSE    Specimen Information    Type Source Collected On     04/08/17 0501          Components       Value Reference Range Flag Lab   Glucose 270 70 - 99 mg/dL H 170            Glucose by meter [[id number]] (Abnormal)  Resulted: 04/11/17 0006, Result status: Final result    Ordering provider: Manny Novak MD  04/10/17 2359 Resulting lab: POINT OF CARE TEST, GLUCOSE    Specimen Information    Type Source Collected On     04/10/17 2359          Components       Value Reference Range Flag Lab   Glucose 179 70 - 99 mg/dL H 170            Glucose by meter [[id number]] (Abnormal)  Resulted: 04/10/17 2116, Result status: Final result    Ordering provider: Manny Novak MD  04/10/17 2113 Resulting lab: POINT OF CARE TEST, GLUCOSE    Specimen Information    Type Source Collected On     04/10/17 2113          Components       Value Reference Range Flag Lab   Glucose 179 70 - 99 mg/dL H 170            Glucose by meter [[id number]] (Abnormal)  Resulted: 04/10/17 1756, Result status: Final result    Ordering provider: Manny Novak MD  04/10/17 1751 Resulting lab: POINT OF CARE TEST, GLUCOSE    Specimen Information    Type Source Collected On     04/10/17 1751          Components       Value Reference Range Flag Lab   Glucose 173 70 - 99 mg/dL H 170            Glucose by meter [[id number]] (Abnormal)  Resulted: 04/10/17 1322, Result status: Final result    Ordering provider: Manny Novak MD  04/10/17  1316 Resulting lab: POINT OF CARE TEST, GLUCOSE    Specimen Information    Type Source Collected On     04/10/17 1316          Components       Value Reference Range Flag Lab   Glucose 191 70 - 99 mg/dL H 170            Lipase [[id number]] (Abnormal)  Resulted: 04/10/17 1138, Result status: Final result    Ordering provider: Marin Fairbanks MD  04/10/17 0405 Resulting lab: [facility]    Specimen Information    Type Source Collected On     04/10/17 0405          Components       Value Reference Range Flag Lab   Lipase 2182 73 - 393 U/L H FrStHsLb            Lactic acid whole blood [[id number]]  Resulted: 04/10/17 1114, Result status: Final result    Ordering provider: Dominick Atkins MD  04/10/17 1055 Resulting lab: [facility]    Specimen Information    Type Source Collected On   Blood  04/10/17 1100          Components       Value Reference Range Flag Lab   Lactic Acid 1.0 0.7 - 2.1 mmol/L  FrStHsLb            Glucose by meter [[id number]] (Abnormal)  Resulted: 04/10/17 0911, Result status: Final result    Ordering provider: Manny Novak MD  04/10/17 0907 Resulting lab: POINT OF CARE TEST, GLUCOSE    Specimen Information    Type Source Collected On     04/10/17 0907          Components       Value Reference Range Flag Lab   Glucose 177 70 - 99 mg/dL H 170            Sputum Culture Aerobic Bacterial [[id number]] (Abnormal)  Resulted: 04/10/17 0852, Result status: Final result    Ordering provider: Yadiel Hughes MD  04/06/17 0631 Resulting lab: [facility]    Specimen Information    Type Source Collected On   Sputum  04/06/17 1845          Components       Value Reference Range Flag Lab   Specimen Description Sputum Endotracheal   75   Culture Micro --  A 225   Result:         Light growth Normal juan  Moderate growth Enterobacter cloacae complex     Micro Report Status FINAL 04/10/2017   225   Result:     Organism: Moderate  growth Enterobacter cloacae complex   225            Comprehensive metabolic panel [[id number]] (Abnormal)  Resulted: 04/10/17 0450, Result status: Final result    Ordering provider: Marin Fairbanks MD  04/10/17 0000 Resulting lab: [facility]    Specimen Information    Type Source Collected On   Blood  04/10/17 0405          Components       Value Reference Range Flag Lab   Sodium 139 133 - 144 mmol/L  FrStHsLb   Potassium 4.1 3.4 - 5.3 mmol/L  FrStHsLb   Chloride 105 94 - 109 mmol/L  FrStHsLb   Carbon Dioxide 26 20 - 32 mmol/L  FrStHsLb   Anion Gap 8 3 - 14 mmol/L  FrStHsLb   Glucose 163 70 - 99 mg/dL H FrStHsLb   Urea Nitrogen 24 7 - 30 mg/dL  FrStHsLb   Creatinine 1.20 0.66 - 1.25 mg/dL  FrStHsLb   GFR Estimate 63 >60 mL/min/1.7m2  FrStHsLb   Comment:  Non  GFR Calc   GFR Estimate If Black 76 >60 mL/min/1.7m2  FrStHsLb   Comment:  African American GFR Calc   Calcium 8.1 8.5 - 10.1 mg/dL L FrStHsLb   Bilirubin Total 1.0 0.2 - 1.3 mg/dL  FrStHsLb   Albumin 1.4 3.4 - 5.0 g/dL L FrStHsLb   Protein Total 7.5 6.8 - 8.8 g/dL  FrStHsLb   Alkaline Phosphatase 366 40 - 150 U/L H FrStHsLb   ALT 24 0 - 70 U/L  FrStHsLb   AST 76 0 - 45 U/L H FrStHsLb            Magnesium (AM Draw) [[id number]]  Resulted: 04/10/17 0439, Result status: Final result    Ordering provider: Antelmo Aponte MD  04/10/17 0000 Resulting lab: [facility]    Specimen Information    Type Source Collected On   Blood  04/10/17 0405          Components       Value Reference Range Flag Lab   Magnesium 2.1 1.6 - 2.3 mg/dL  FrStHsLb            Phosphorus [[id number]]  Resulted: 04/10/17 0439, Result status: Final result    Ordering provider: Antelmo Aponte MD  04/10/17 0000 Resulting lab: [facility]    Specimen Information    Type Source Collected On   Blood  04/10/17 0405          Components       Value Reference Range Flag Lab   Phosphorus 3.0 2.5 - 4.5 mg/dL  FrStHsLb             CBC with platelets differential [[id number]] (Abnormal)  Resulted: 04/10/17 0420, Result status: Final result    Ordering provider: Marin Fairbanks MD  04/10/17 0000 Resulting lab: [facility]    Specimen Information    Type Source Collected On   Blood  04/10/17 0405          Components       Value Reference Range Flag Lab   WBC 10.7 4.0 - 11.0 10e9/L  FrStHsLb   RBC Count 2.47 4.4 - 5.9 10e12/L L FrStHsLb   Hemoglobin 7.6 13.3 - 17.7 g/dL L FrStHsLb   Hematocrit 24.0 40.0 - 53.0 % L FrStHsLb   MCV 97 78 - 100 fl  FrStHsLb   MCH 30.8 26.5 - 33.0 pg  FrStHsLb   MCHC 31.7 31.5 - 36.5 g/dL  FrStHsLb   RDW 20.6 10.0 - 15.0 % H FrStHsLb   Platelet Count 321 150 - 450 10e9/L  FrStHsLb   Diff Method Automated Method   FrStHsLb   % Neutrophils 64.5 %  FrStHsLb   % Lymphocytes 27.7 %  FrStHsLb   % Monocytes 2.9 %  FrStHsLb   % Eosinophils 3.8 %  FrStHsLb   % Basophils 0.6 %  FrStHsLb   % Immature Granulocytes 0.5 %  FrStHsLb   Nucleated RBCs 0 0 /100  FrStHsLb   Absolute Neutrophil 6.9 1.6 - 8.3 10e9/L  FrStHsLb   Absolute Lymphocytes 3.0 0.8 - 5.3 10e9/L  FrStHsLb   Absolute Monocytes 0.3 0.0 - 1.3 10e9/L  FrStHsLb   Absolute Eosinophils 0.4 0.0 - 0.7 10e9/L  FrStHsLb   Absolute Basophils 0.1 0.0 - 0.2 10e9/L  FrStHsLb   Abs Immature Granulocytes 0.1 0 - 0.4 10e9/L  FrStHsLb   Absolute Nucleated RBC 0.0   FrStHsLb            Glucose by meter [[id number]] (Abnormal)  Resulted: 04/10/17 0406, Result status: Final result    Ordering provider: Manny Novak MD  04/10/17 0400 Resulting lab: POINT OF CARE TEST, GLUCOSE    Specimen Information    Type Source Collected On     04/10/17 0400          Components       Value Reference Range Flag Lab   Glucose 165 70 - 99 mg/dL H 170            Glucose by meter [[id number]] (Abnormal)  Resulted: 04/09/17 2351, Result status: Final result    Ordering provider: Manny Novak MD  04/09/17 1015 Resulting lab: POINT OF CARE TEST, GLUCOSE    Specimen Information     Type Source Collected On     04/09/17 2346          Components       Value Reference Range Flag Lab   Glucose 170 70 - 99 mg/dL H 170            Glucose by meter [[id number]] (Abnormal)  Resulted: 04/09/17 2006, Result status: Final result    Ordering provider: Manny Novak MD  04/09/17 1959 Resulting lab: POINT OF CARE TEST, GLUCOSE    Specimen Information    Type Source Collected On     04/09/17 1959          Components       Value Reference Range Flag Lab   Glucose 181 70 - 99 mg/dL H 170            Glucose by meter [[id number]] (Abnormal)  Resulted: 04/09/17 1701, Result status: Final result    Ordering provider: Manny Novak MD  04/09/17 1655 Resulting lab: POINT OF CARE TEST, GLUCOSE    Specimen Information    Type Source Collected On     04/09/17 1655          Components       Value Reference Range Flag Lab   Glucose 216 70 - 99 mg/dL H 170            Glucose by meter [[id number]] (Abnormal)  Resulted: 04/09/17 1221, Result status: Final result    Ordering provider: Manny Novak MD  04/09/17 1217 Resulting lab: POINT OF CARE TEST, GLUCOSE    Specimen Information    Type Source Collected On     04/09/17 1217          Components       Value Reference Range Flag Lab   Glucose 168 70 - 99 mg/dL H 170            Glucose by meter [[id number]] (Abnormal)  Resulted: 04/09/17 0808, Result status: Final result    Ordering provider: Manny Novak MD  04/09/17 0800 Resulting lab: POINT OF CARE TEST, GLUCOSE    Specimen Information    Type Source Collected On     04/09/17 0800          Components       Value Reference Range Flag Lab   Glucose 152 70 - 99 mg/dL H 170            Procalcitonin [[id number]]  Resulted: 04/09/17 0626, Result status: Final result    Ordering provider: Marin Fairbanks MD  04/09/17 0000 Resulting lab: [facility]    Specimen Information    Type Source Collected On   Blood  04/09/17 0530          Components       Value Reference Range Flag Lab   Procalcitonin 0.85  ng/ml  FrStHsLb   Comment:         0.50-1.99 ng/ml  Moderate risk of systemic infection.  Recommendation:   Recommend   antibiotics. Evaluate culture results and clinical features to target   antibacterial therapy. Obtain blood cultures and other relevant cultures if   not   done.   If empiric antibiotics were started, recheck PCT in: 2 days to guide   antibiotic   de-escalation.  Discontinue or de-escalate antibiotics when PCT concentration   is <80% of peak or abs PCT <0.5. If empiric antibiotics were NOT started,   recheck PCT in 6-24 hours to re-evaluate need for antibiotics.              Comprehensive metabolic panel [[id number]] (Abnormal)  Resulted: 04/09/17 0606, Result status: Final result    Ordering provider: Marin Fairbanks MD  04/09/17 0000 Resulting lab: [facility]    Specimen Information    Type Source Collected On   Blood  04/09/17 0530          Components       Value Reference Range Flag Lab   Sodium 140 133 - 144 mmol/L  FrStHsLb   Potassium 3.7 3.4 - 5.3 mmol/L  FrStHsLb   Chloride 107 94 - 109 mmol/L  FrStHsLb   Carbon Dioxide 26 20 - 32 mmol/L  FrStHsLb   Anion Gap 7 3 - 14 mmol/L  FrStHsLb   Glucose 171 70 - 99 mg/dL H FrStHsLb   Urea Nitrogen 23 7 - 30 mg/dL  FrStHsLb   Creatinine 1.37 0.66 - 1.25 mg/dL H FrStHsLb   GFR Estimate 54 >60 mL/min/1.7m2 L FrStHsLb   Comment:  Non  GFR Calc   GFR Estimate If Black 65 >60 mL/min/1.7m2  FrStHsLb   Comment:  African American GFR Calc   Calcium 8.1 8.5 - 10.1 mg/dL L FrStHsLb   Bilirubin Total 1.0 0.2 - 1.3 mg/dL  FrStHsLb   Albumin 1.4 3.4 - 5.0 g/dL L FrStHsLb   Protein Total 7.6 6.8 - 8.8 g/dL  FrStHsLb   Alkaline Phosphatase 265 40 - 150 U/L H FrStHsLb   ALT 24 0 - 70 U/L  FrStHsLb   AST 80 0 - 45 U/L H FrStHsLb            Magnesium (AM Draw) [[id number]]  Resulted: 04/09/17 0604, Result status: Final result    Ordering provider: Antelmo Aponte MD  04/09/17 0000 Resulting lab: [facility]     Specimen Information    Type Source Collected On   Blood  04/09/17 0530          Components       Value Reference Range Flag Lab   Magnesium 1.9 1.6 - 2.3 mg/dL  FrStHsLb            Phosphorus (AM Draw) [[id number]] (Abnormal)  Resulted: 04/09/17 0604, Result status: Final result    Ordering provider: Antelmo Aponte MD  04/09/17 0000 Resulting lab: [facility]    Specimen Information    Type Source Collected On   Blood  04/09/17 0530          Components       Value Reference Range Flag Lab   Phosphorus 2.2 2.5 - 4.5 mg/dL L FrStHsLb            CBC with platelets differential [[id number]] (Abnormal)  Resulted: 04/09/17 0547, Result status: Final result    Ordering provider: Marin Fairbanks MD  04/09/17 0000 Resulting lab: [facility]    Specimen Information    Type Source Collected On   Blood  04/09/17 0530          Components       Value Reference Range Flag Lab   WBC 13.6 4.0 - 11.0 10e9/L H FrStHsLb   RBC Count 2.43 4.4 - 5.9 10e12/L L FrStHsLb   Hemoglobin 7.7 13.3 - 17.7 g/dL L FrStHsLb   Hematocrit 23.5 40.0 - 53.0 % L FrStHsLb   MCV 97 78 - 100 fl  FrStHsLb   MCH 31.7 26.5 - 33.0 pg  FrStHsLb   MCHC 32.8 31.5 - 36.5 g/dL  FrStHsLb   RDW 20.5 10.0 - 15.0 % H FrStHsLb   Platelet Count 318 150 - 450 10e9/L  FrStHsLb   Diff Method Automated Method   FrStHsLb   % Neutrophils 69.1 %  FrStHsLb   % Lymphocytes 24.1 %  FrStHsLb   % Monocytes 3.0 %  FrStHsLb   % Eosinophils 2.9 %  FrStHsLb   % Basophils 0.5 %  FrStHsLb   % Immature Granulocytes 0.4 %  FrStHsLb   Nucleated RBCs 0 0 /100  FrStHsLb   Absolute Neutrophil 9.4 1.6 - 8.3 10e9/L H FrStHsLb   Absolute Lymphocytes 3.3 0.8 - 5.3 10e9/L  FrStHsLb   Absolute Monocytes 0.4 0.0 - 1.3 10e9/L  FrStHsLb   Absolute Eosinophils 0.4 0.0 - 0.7 10e9/L  FrStHsLb   Absolute Basophils 0.1 0.0 - 0.2 10e9/L  FrStHsLb   Abs Immature Granulocytes 0.1 0 - 0.4 10e9/L  FrStHsLb   Absolute Nucleated RBC 0.0   FrStHsLb            Glucose by  meter [[id number]] (Abnormal)  Resulted: 04/09/17 0540, Result status: Final result    Ordering provider: Manny Novak MD  04/09/17 0511 Resulting lab: POINT OF CARE TEST, GLUCOSE    Specimen Information    Type Source Collected On     04/09/17 0511          Components       Value Reference Range Flag Lab   Glucose 173 70 - 99 mg/dL H 170            Glucose by meter [[id number]] (Abnormal)  Resulted: 04/09/17 0016, Result status: Final result    Ordering provider: Manny Novak MD  04/09/17 0009 Resulting lab: POINT OF CARE TEST, GLUCOSE    Specimen Information    Type Source Collected On     04/09/17 0009          Components       Value Reference Range Flag Lab   Glucose 182 70 - 99 mg/dL H 170            Glucose by meter [[id number]] (Abnormal)  Resulted: 04/08/17 2116, Result status: Final result    Ordering provider: Manny Novak MD  04/08/17 2112 Resulting lab: POINT OF CARE TEST, GLUCOSE    Specimen Information    Type Source Collected On     04/08/17 2112          Components       Value Reference Range Flag Lab   Glucose 284 70 - 99 mg/dL H 170            Glucose by meter [[id number]] (Abnormal)  Resulted: 04/08/17 1621, Result status: Final result    Ordering provider: Manny Novak MD  04/08/17 1615 Resulting lab: POINT OF CARE TEST, GLUCOSE    Specimen Information    Type Source Collected On     04/08/17 1615          Components       Value Reference Range Flag Lab   Glucose 354 70 - 99 mg/dL H 170            Glucose by meter [[id number]] (Abnormal)  Resulted: 04/08/17 1231, Result status: Final result    Ordering provider: Manny Novak MD  04/08/17 1225 Resulting lab: POINT OF CARE TEST, GLUCOSE    Specimen Information    Type Source Collected On     04/08/17 1225          Components       Value Reference Range Flag Lab   Glucose 344 70 - 99 mg/dL H 170            Glucose by meter [[id number]] (Abnormal)  Resulted: 04/08/17 0816, Result status: Final result    Ordering provider: Manny Novak MD   04/08/17 0811 Resulting lab: POINT OF CARE TEST, GLUCOSE    Specimen Information    Type Source Collected On     04/08/17 0811          Components       Value Reference Range Flag Lab   Glucose 347 70 - 99 mg/dL H 170            Blood culture [[id number]]  Resulted: 04/08/17 0707, Result status: Final result    Ordering provider: Haseeb Puga MD  04/02/17 0900 Resulting lab: [facility]    Specimen Information    Type Source Collected On   Blood PICC 04/02/17 1025          Components       Value Reference Range Flag Lab   Specimen Description Blood PICC   FrStHsLb   Special Requests Aerobic and anaerobic bottles received   FrStHsLb   Culture Micro No growth   225   Micro Report Status FINAL 04/08/2017   225            Comprehensive metabolic panel [[id number]] (Abnormal)  Resulted: 04/08/17 0539, Result status: Final result    Ordering provider: Marin Fairbanks MD  04/08/17 0000 Resulting lab: [facility]    Specimen Information    Type Source Collected On   Blood  04/08/17 0450          Components       Value Reference Range Flag Lab   Sodium 146 133 - 144 mmol/L H FrStHsLb   Potassium 3.7 3.4 - 5.3 mmol/L  FrStHsLb   Chloride 112 94 - 109 mmol/L H FrStHsLb   Carbon Dioxide 24 20 - 32 mmol/L  FrStHsLb   Anion Gap 10 3 - 14 mmol/L  FrStHsLb   Glucose 291 70 - 99 mg/dL H FrStHsLb   Urea Nitrogen 22 7 - 30 mg/dL  FrStHsLb   Creatinine 1.39 0.66 - 1.25 mg/dL H FrStHsLb   GFR Estimate 53 >60 mL/min/1.7m2 L FrStHsLb   Comment:  Non  GFR Calc   GFR Estimate If Black 64 >60 mL/min/1.7m2  FrStHsLb   Comment:  African American GFR Calc   Calcium 7.8 8.5 - 10.1 mg/dL L FrStHsLb   Bilirubin Total 0.9 0.2 - 1.3 mg/dL  FrStHsLb   Albumin 1.4 3.4 - 5.0 g/dL L FrStHsLb   Protein Total 7.5 6.8 - 8.8 g/dL  FrStHsLb   Alkaline Phosphatase 257 40 - 150 U/L H FrStHsLb   ALT 22 0 - 70 U/L  FrStHsLb   AST 92 0 - 45 U/L H FrStHsLb            Magnesium (AM Draw)  [[id number]]  Resulted: 04/08/17 0527, Result status: Final result    Ordering provider: Antelmo Aponte MD  04/08/17 0000 Resulting lab: [facility]    Specimen Information    Type Source Collected On   Blood  04/08/17 0450          Components       Value Reference Range Flag Lab   Magnesium 2.1 1.6 - 2.3 mg/dL  FrStHsLb            Phosphorus (AM Draw) [[id number]] (Abnormal)  Resulted: 04/08/17 0527, Result status: Final result    Ordering provider: Antelmo Aponte MD  04/08/17 0000 Resulting lab: [facility]    Specimen Information    Type Source Collected On   Blood  04/08/17 0450          Components       Value Reference Range Flag Lab   Phosphorus 1.7 2.5 - 4.5 mg/dL L FrStHsLb            CBC with platelets differential [[id number]] (Abnormal)  Resulted: 04/08/17 0511, Result status: Final result    Ordering provider: Marin Fairbanks MD  04/08/17 0000 Resulting lab: [facility]    Specimen Information    Type Source Collected On   Blood  04/08/17 0450          Components       Value Reference Range Flag Lab   WBC 12.8 4.0 - 11.0 10e9/L H FrStHsLb   RBC Count 2.56 4.4 - 5.9 10e12/L L FrStHsLb   Hemoglobin 7.9 13.3 - 17.7 g/dL L FrStHsLb   Hematocrit 24.8 40.0 - 53.0 % L FrStHsLb   MCV 97 78 - 100 fl  FrStHsLb   MCH 30.9 26.5 - 33.0 pg  FrStHsLb   MCHC 31.9 31.5 - 36.5 g/dL  FrStHsLb   RDW 20.0 10.0 - 15.0 % H FrStHsLb   Platelet Count 327 150 - 450 10e9/L  FrStHsLb   Diff Method Automated Method   FrStHsLb   % Neutrophils 69.0 %  FrStHsLb   % Lymphocytes 22.6 %  FrStHsLb   % Monocytes 5.5 %  FrStHsLb   % Eosinophils 2.3 %  FrStHsLb   % Basophils 0.3 %  FrStHsLb   % Immature Granulocytes 0.3 %  FrStHsLb   Nucleated RBCs 0 0 /100  FrStHsLb   Absolute Neutrophil 8.8 1.6 - 8.3 10e9/L H FrStHsLb   Absolute Lymphocytes 2.9 0.8 - 5.3 10e9/L  FrStHsLb   Absolute Monocytes 0.7 0.0 - 1.3 10e9/L  FrStHsLb   Absolute Eosinophils 0.3 0.0 - 0.7 10e9/L  FrStHsLb  "  Absolute Basophils 0.0 0.0 - 0.2 10e9/L  FrStHsLb   Abs Immature Granulocytes 0.0 0 - 0.4 10e9/L  FrStHsLb   Absolute Nucleated RBC 0.0   FrStHsLb            Glucose by meter [[id number]] (Abnormal)  Resulted: 04/08/17 0111, Result status: Final result    Ordering provider: Manny Novak MD  04/08/17 0106 Resulting lab: POINT OF CARE TEST, GLUCOSE    Specimen Information    Type Source Collected On     04/08/17 0106          Components       Value Reference Range Flag Lab   Glucose 255 70 - 99 mg/dL H 170            Testing Performed By     Lab - Abbreviation Name Director Address Valid Date Range    14 - FrStHsLb [facility] Unknown [address]  [facility] [address] 05/08/15 1057 - Present    75 - Unknown [facility] Unknown [address] [facility] [address] 01/15/15 1019 - Present    170 - Unknown POINT OF CARE TEST, GLUCOSE Unknown Unknown 10/31/11 1114 - Present    225 - Unknown INFECTIOUS DISEASE DIAGNOSTIC LABORATORY Unknown [address] [facility] [id number] 12/19/14 0954 - Present            Unresulted Labs (24h ago through future)    Start       Ordered    04/03/17 0600  Basic metabolic panel  EVERY MONDAY,   Routine     Comments:  Every Monday while on enteral tube feeding.    03/27/17 1638 04/03/17 0600  Magnesium  EVERY MONDAY,   Routine     Comments:  Every Monday while on enteral tube feeding.    03/29/17 1206    04/03/17 0600  Phosphorus  EVERY MONDAY,   Routine     Comments:  Every Monday while on enteral tube feeding.    03/29/17 1206    03/29/17 0600  Magnesium Level scheduled every Mon Wed Fri  (Magnesium Replacement - Adult - \"High\" - Replacement for all levels less than or equal to 2 mg/dL)  EVERY MWF,   Routine     Comments:  Repeat Magnesium Replacement if necessary    03/27/17 1638    03/29/17 0600  Phosphorus  (or    SODIUM Phosphate - \"High\" - Replacement for all levels less than 2.8 mg/dL)  EVERY MWF,   Routine     Comments:  " "Repeat Phosphorus Replacement if necessary    03/27/17 1638    03/28/17 0600  Basic metabolic panel (AM Draw)  AM DRAW,   Routine      03/27/17 1638    03/28/17 0600  CBC (AM Draw)  AM DRAW,   Routine     Comments:  Last Lab Result: Hemoglobin (g/dL)       Date                     Value                 03/27/2017               8.6 (L)          ----------    03/27/17 1638    03/28/17 0600  Magnesium (AM Draw)  AM DRAW,   Routine      03/27/17 1638    03/28/17 0600  Phosphorus (AM Draw)  AM DRAW,   Routine      03/27/17 1638    Unscheduled  Blood gas blood with oxy  CONDITIONAL X 3,   Routine     Comments:  Release order if patient in respiratory distress and Notify Provider.    03/27/17 1638    Unscheduled  Glucose  CONDITIONAL X 3,   Routine     Comments:  Release order if patient experiencing hyperglycemia or hypoglycemia symptoms and Notify Provider.    03/27/17 1638    Unscheduled  Glucose - Diabetes  CONDITIONAL X 1 STAT,   STAT     Comments:  for changes in mental status, diaphoresis, or unexplained tachycardia    03/27/17 1638    Unscheduled  Glucose - Diabetes  CONDITIONAL X 1 STAT,   STAT     Comments:  for changes in mental status, diaphoresis, or unexplained tachycardia    03/27/17 1638    Unscheduled  Hemoglobin  CONDITIONAL X 3,   Routine     Comments:  Last Lab Result: Hemoglobin (g/dL)       Date                     Value                 03/27/2017               8.6 (L)          ----------    03/27/17 1638    Unscheduled  Magnesium  (Magnesium Replacement - Adult - \"High\" - Replacement for all levels less than or equal to 2 mg/dL)  CONDITIONAL (SPECIFY),   Routine     Comments:  Obtain Magnesium Level for these conditions:  *IF no magnesium result within 24 hrs before initiation of order set, draw magnesium level with next lab collect.    *2 HOURS AFTER last magnesium replacement dose when magnesium replacement given for level less than 1.6  *Next morning after magnesium dose.     Repeat Magnesium " "Replacement if necessary.    03/27/17 1638    Unscheduled  Phosphorus  (or    SODIUM Phosphate - \"High\" - Replacement for all levels less than 2.8 mg/dL)  CONDITIONAL (SPECIFY),   Routine     Comments:  Obtain Phosphorus Level for these conditions:  *IF no phosphorus result within 24 hrs before initiation of order set, draw phosphorus level with next lab collect.    *2 HOURS AFTER last phosphorus replacement dose when phosphorus replacement given for level less than 2.0  *Next morning after phosphorus dose.     Repeat Phosphorus Replacement if necessary.    03/27/17 1638    Unscheduled  Phosphorus  (POTASSIUM Phosphate - \"High\" - Replacement for all levels less than 2.8 mg/dL )  CONDITIONAL (SPECIFY),   Routine     Comments:  Obtain Phosphorus Level for these conditions:  *IF no phosphorus result within 24 hrs before initiation of order set, draw phosphorus level with next lab collect.    *2 HOURS AFTER last phosphorus replacement dose when phosphorus replacement given for level less than 2.0  *Next morning after phosphorus dose.     Repeat Phosphorus Replacement if necessary.    03/28/17 0816    Unscheduled  Potassium  (Potassium Replacement - \"Standard\" - For K levels less than 3.4 mmol/L - UU,UR,UA,RH,SH,PH,WY )  CONDITIONAL (SPECIFY),   Routine     Comments:  Obtain Potassium Level for these conditions:  *IF no potassium result within 24 hours before initiation of order set, draw potassium level with next lab collect.    *2 HOURS AFTER last IV potassium replacement dose and 4 hours after an oral replacement dose.  *Next morning after potassium dose.     Repeat Potassium Replacement if necessary.    03/28/17 1648    Unscheduled  Phosphorus  (POTASSIUM Phosphate - \"Standard\" - Replacement for levels less than or equal to 2.4 mg/dL )  CONDITIONAL (SPECIFY),   Routine     Comments:  Obtain Phosphorus Level for these conditions:  *IF no phosphorus result within 24 hrs before initiation of order set, draw phosphorus " level with next lab collect.    *2 HOURS AFTER last phosphorus replacement dose for levels less than 2.0.  *Next morning after phosphorus dose.     Repeat Phosphorus Replacement if necessary.    04/07/17 1629         Imaging Results - 3 Days      CT Abdomen Pelvis w Contrast [[id number]]  Resulted: 04/10/17 1742, Result status: Final result    Ordering provider: Dominick Atkins MD  04/10/17 1334 Resulted by: Boom Petty MD    Performed: 04/10/17 1652 - 04/10/17 1712 Resulting lab: RADIOLOGY RESULTS    Narrative:       CT ABDOMEN AND PELVIS WITH CONTRAST 4/10/2017 5:12 PM     HISTORY: Patient with abdominal distension and persistently elevated  lipase. Assess for pancreatic inflammation.     TECHNIQUE: Initial noncontrast axial images are performed through the  pancreas. Thin section axial images are then performed during  pancreatic arterial phase following intravenous contrast  administration. Portal venous phase images are then performed from the  lung bases to the symphysis. Additional coronal reformatted images are  performed.  90 mL of Isovue 370 is given intravenously. Radiation dose  for this scan was reduced using automated exposure control, adjustment  of the mA and/or kV according to patient size, or iterative  reconstruction technique.    FINDINGS:  Significant respiratory and patient motion artifact is  present during the exam. Patient had difficulty following breathing  instructions during the scanning. There is marked right lung base  consolidation concerning for pneumonia. Right middle lobe, lingula and  left lower lobe infiltrate are also noted. No significant pleural  fluid is appreciated.    Abdomen: Arterial imaging through the abdomen demonstrates no obvious  peripancreatic inflammation. Aorta is unremarkable. Scattered  calcified and noncalcified plaque is noted. No evidence of aneurysm.  More delayed arterial phase images demonstrate normal enhancement of  the liver, spleen,  pancreas, adrenal glands and kidneys. Portal venous  phase imaging again demonstrates normal enhancement of the upper  abdominal organs, including the pancreas. No peripancreatic  inflammation is present to suggest acute pancreatitis. Gallbladder is  unremarkable. No hydronephrosis or urinary tract calculi. Gastrostomy  tube appears in good position within the gastric lumen. No free  intraperitoneal air. Trace ascites is present. No enlarged lymph  nodes. No evidence of diverticulitis. Appendix is normal. Small bowel  is nondistended. Colon is distended with fecal debris and fluid.    Pelvis: Bladder is decompressed with a Levine catheter. Small amount of  air is also noted in the bladder, likely related to the indwelling  catheter. Fecal debris and fluid distends the distal sigmoid colon and  rectum. The mid to distal sigmoid colon is decompressed of uncertain  etiology. No definite evidence of obstruction. No mesenteric edema.  Trace free pelvic fluid is present. No enlarged pelvic lymph nodes.  Bone window examination is within normal limits.      Impression:       IMPRESSION:  1. Bibasilar infiltrates with marked consolidation right lower lobe,  consistent with pneumonia.  2. No evidence of peripancreatic inflammation to indicate  pancreatitis.  3. Abdominal and pelvic organs are otherwise within normal limits. The  colon is mildly distended proximally and also involving the distal  sigmoid colon and rectum of uncertain etiology. No obvious changes of  obstruction.    CHRIS DERAS MD      XR Chest Port 1 View [[id number]]  Resulted: 04/08/17 1553, Result status: Final result    Ordering provider: Marin Fairbanks MD  04/08/17 1045 Resulted by: Shoaib Bashir MD    Performed: 04/08/17 1534 - 04/08/17 1549 Resulting lab: RADIOLOGY RESULTS    Narrative:       PORTABLE CHEST ONE VIEW   4/8/2017 3:49 PM    HISTORY: Desaturation.    COMPARISON: 3/27/2017      Impression:       IMPRESSION: The  previously seen endotracheal tube has been replaced by  a tracheostomy. A previously seen nasogastric tube has been removed.  There has been placement of a right arm PICC line in the superior vena  cava just above the right atrium.    Again seen are mild infiltrates of the right mid to lower lung and to  a lesser degree the left lung. These do not appear significantly  changed. No other abnormality is noted. Other than for the support  device changes, I see no other change since the previous examination.     JENNIFER WHEELER MD      Testing Performed By     Lab - Abbreviation Name Director Address Valid Date Range    104 - Rad Rslts RADIOLOGY RESULTS Unknown Unknown 05 1553 - Present               ECG/EMG Results      Echocardiogram Complete [[id number]]  Resulted: 17 0849, Result status: Edited Result - FINAL    Ordering provider: Graham Rodriguez MD  17 1337 Resulted by: Fish Arzola MD    Performed: 17 0958 - 17 0959 Resulting lab: RADIOLOGY RESULTS    Narrative:       [id number]  [facility]  [id number]  [id number]^[email or web address]^GRAHAM^LULU           [facility]  Echocardiography Laboratory  [address] [place] [address]        Name: AMAURY PARKER  MRN: [id number]  : 1960  Study Date: 2017 08:49 AM  Age: 57 yrs  Gender: Male  Patient Location: [facility]  Reason For Study: Endocarditis  Ordering Physician: GRAHAM RODRIGUEZ  Referring Physician: Shaun Bedolla  Performed By: Nani Lemon,     BSA: 1.9 m2  Height: 70 in  Weight: 158 lb  HR: 99  BP: 123/80 mmHg  _____________________________________________________________________________  __        Procedure  Complete Portable Echo Adult.  _____________________________________________________________________________  __        Interpretation Summary     The visual ejection fraction is estimated at 55-60%.  Left ventricular systolic function is normal.  There is no evidence of a mass or vegetation. This  does not rule out  endocarditis.  _____________________________________________________________________________  __        Left Ventricle  The left ventricle is normal in size. There is borderline concentric left  ventricular hypertrophy. The visual ejection fraction is estimated at 55-60%.  E by E prime ratio is between 8 and 15, which is indeterminate for assessment  of left ventricular filling pressures. Left ventricular systolic function is  normal.     Right Ventricle  The right ventricle is normal in size and function.     Atria  Normal left atrial size. Right atrial size is normal. The atrial septum is  aneurysmal. There is no color Doppler evidence of an atrial shunt.     Mitral Valve  There is no vegetation seen on the mitral valve. There is trace mitral  regurgitation.        Tricuspid Valve  There is no vegetation on the tricuspid valve. There is trace tricuspid  regurgitation.     Aortic Valve  The aortic valve is trileaflet. There is trivial trileaflet aortic sclerosis.  There is no vegetation on the aortic valve. No aortic regurgitation is  present. No hemodynamically significant valvular aortic stenosis.     Pulmonic Valve  There is no vegetation on the pulmonic valve. There is no pulmonic valvular  regurgitation. Normal pulmonic valve velocity.     Vessels  The IVC is normal in size and reactivity with respiration, suggesting normal  central venous pressure.     Pericardium  There is no pericardial effusion.        Rhythm  The rhythm was normal sinus.  _____________________________________________________________________________  __  MMode/2D Measurements & Calculations  IVSd: 1.3 cm     LVIDd: 4.6 cm  LVIDs: 3.1 cm  LVPWd: 0.99 cm  FS: 32.7 %  EDV(Teich): 98.9 ml  ESV(Teich): 38.3 ml  LV mass(C)d: 188.4 grams  Ao root diam: 2.8 cm  asc Aorta Diam: 2.9 cm  LA Volume (BP): 45.9 ml  LA Volume Index (BP): 24.3 ml/m2        Doppler Measurements & Calculations  MV E max pedro: 88.3 cm/sec  MV A max pedro:  70.3 cm/sec  MV E/A: 1.3     MV dec time: 0.21 sec  PA acc time: 0.10 sec  Lateral E/e': 8.0  Medial E/e': 9.1           _____________________________________________________________________________  __           Report approved by: Iliana Remy 03/20/2017 10:29 AM       1    Type Source Collected On     03/20/17 0849          View Image (below)              Encounter-Level Documents:     There are no encounter-level documents.      Order-Level Documents:     There are no order-level documents.

## 2017-03-17 NOTE — PROGRESS NOTES
PULMONARY - Chart Check    Consult received.  Patient has been transferred to the ICU and intubated.  Defer respiratory issues to the Critical Care service.  We would be happy to see the patient once he transfers out to the floor.    Thank you,     Pedro Cole MD    [facility]  [phone] (pager)  [phone] (office)

## 2017-03-17 NOTE — ED PROVIDER NOTES
History     Chief Complaint:    Shortness of Breath     HPI   Wyatt Mahajan is a 57 year old male who presents with shortness of breath. The patient was in the emergency department yesterday for evaluation of a cough, rib pain, and chills. He was diagnosed with influenza B and pneumonia of the right lung. He was discharged with azithromycin and robaxin. The patient states that this is now his 5th day of feeling sick. He was not able to sleep this morning due to right lateral chest wall pain (reportedly present for 3 days, he denies falls), and coughing. The patient also has right sided abdominal pain. He states that he has been taking his robaxin, azithromycin as well as Tylenol and Ibuprofen. The patient states that he has shortness of breath secondary to his chest wall and abdominal pain. The patient denies current fever, or urinary symptoms such as dysuria. He last took Ibuprofen about 5 hours ago.      Cardiac Risk Factors   Sex: Male   Tobacco: Negative   Hypertension: Negative  Diabetes: Positive   Hyperlipidemia: Positive   Family History: Negative     Allergies:  No known drug allergies.      Medications:    Azithromycin (zithromax) 250 mg tablet  Methocarbamol (robaxin) 500 mg tablet  Insulin glargine (basaglar kwikpen) 100 unit/ml injection  Atorvastatin (lipitor) 40 mg tablet  Linaclotide (linzess) 290 mcg capsule  Gabapentin (neurontin) 300 mg capsule  Blood glucose monitoring (one touch ultrasoft) lancets  Blood glucose monitoring (one touch ultra) test strip  Insulin pen needle (b-d u/f) 31g x 8 mm  Sitagliptin (januvia) 100 mg tablet  Insulin glargine (lantus solostar) 100 unit/ml pen  Sildenafil (viagra) 100 mg tablet  Blood glucose monitoring suppl (blood glucose meter) kit  Ace/arb not prescribed, intentional,  Aspirin 81 mg or tabs    Past Medical History:    Cranial nerve III palsy  PVD (peripheral vascular disease) with claudication (H)  Tobacco use disorder  Type II or unspecified type  "diabetes mellitus with neurological manifestations, not stated as uncontrolled  External hemorrhoids  Lumbar radiculopathy  PVD (peripheral vascular disease) with claudication (H)  Ischemic vascular disease  Hyperlipidemia LDL goal <100    Past Surgical History:    History reviewed. No pertinent past surgical history.     Family History:    Diabetes - Father, Mother, Sister, Brother    Social History:  Marital Status:   Presents to the ED with his wife  Tobacco Use: Current someday smoker, 0.5 ppd  For 15 years  Alcohol Use: No  PCP: Shaun Bedolla      Review of Systems   Constitutional: Negative for fever.   Respiratory: Positive for shortness of breath.    Cardiovascular: Positive for chest pain.   Gastrointestinal: Positive for abdominal pain.   Genitourinary: Negative for dysuria.   All other systems reviewed and are negative.    Physical Exam   First Vitals:  BP: 145/76  Heart Rate: 107  Temp: 98.5  F (36.9  C)  Resp: 26  Height: 177.8 cm (5' 10\")  Weight: 70.3 kg (155 lb)  SpO2: 91 %      Physical Exam  General: Appears uncomfortable  Eyes:  The pupils are equal and round  ENT:    The nose is normal    Pinnae are normal    The oropharynx is clear  Neck:  Normal range of motion  CV:  Tachycardic rate and rhythm    Skin warm and well perfused   Resp:  Decreased breath sounds on right side    Tachypnea    No rales    No wheezing   GI:  Abdomen is soft, there is no rigidity    No distension    No rebound tenderness     Mild right lateral chest wall tenderness and right sided abdominal tenderness  MS:  Normal muscular tone  Skin:  No rash or acute skin lesions noted  Neuro:   Awake, alert.      Speech is normal and fluent.    Face is symmetric.     Moves all extremities  Psych:  Normal affect.  Appropriate interactions.    Emergency Department Course   ECG:  @ 0313  Indication: chest pain and shortness of breath   Vent. Rate 104 bpm. WI interval 150 ms. QRS duration 86 ms. QT/QTc 330/433 ms. P-R-T axis 56 47 " 42.   Sinus tachycardia, Possible left atrial enlargement. Borderline ECG.  No significant change when compared to previous ECG from 8/26/16   Read @ 0321 by Dr. Ellington.     Imaging:  CT chest/abdomen/pelvis w contrast:  1. Moderately extensive air-space opacities throughout both lungs,  most prominent in the right lower lobe. These are likely infectious or  inflammatory in etiology.  2. No visualized pulmonary embolus.  3. No other cause of acute pain identified in the abdomen or pelvis  Report per radiology.     Radiographic findings were communicated with the patient who voiced understanding of the findings.    Laboratory:  (0330) Lactic acid: 3.3 (H)     CBC:  WBC 5.0, HGB 15.7,  (L), otherwise WNL     CMP: Glucose 315 (H), Albumin 3.2 (L), otherwise WNL (Creatinine 0.67)     Blood culture x2: pending     (0330) Troponin I: <0.015     Lipase: 59 (L)    Interventions:  (0400) Normal Saline, 1 liter, IV bolus    (0405) Morphine, 4 mg, IV injection  (0505) Normal Saline, 1 liter, IV bolus    (0458) Rocephin, 1 g vial attached to  mL bag, IV    (0505) Tamiflu, 75 mg, PO    Emergency Department Course:  Nursing notes and vitals reviewed.  I performed an exam of the patient as documented above.   EKG was done, interpretation as above.   A peripheral IV was established.   Blood was drawn from the patient. This was sent for laboratory testing, findings above.    The patient was sent for a CT of the chest abdomen and pelvis while in the emergency department, findings above.    (0450) Findings and plan explained to the patient who consents to admission.   (0500) I discussed the patient with Dr. Novak of the hospitalist service, who will admit the patient to a inpatient med bed for further monitoring, evaluation, and treatment.    Impression & Plan    Medical Decision Making:  Wyatt Mahajan is a 57 year old male who presented to the emergency department with shortness of breath and chest pain.  Tachycardic to the low 100's and oxygen saturation low at 91. He did drop to 87-88% on room air and so was placed on oxygen via nasal cannula. The patient has right sided chest wall tenderness as well as some mild right lateral abdominal pain. Given his symptoms and history, most likely pneumonia and chest wall pain from causing as cause of pain but I wanted to rule out PE. Laboratory values obtained. His lactic acid is elevated to 3.3. Glucose elevated to 315. He does have a history of diabetes. Troponin is within normal limits. Given his abdominal pain and chest pain, I did obtain a CT scan and has extensive air space opacities throughout both lungs, most prominently in the right lower lobe that is likely infectious or inflammatory in nature. Given his symptoms, I suspect infectious and will treat for pneumonia and influenza. He was given tamiflu here in the emergency department as well as ceftriaxone IV. He already took his first dose of Azithromycin yesterday. Given his hypoxia, elevated lactate and extensive opacities, will admit him to the hospital. Discussed the patient with the hospitalist who agreed to accept the patient.     Diagnosis:    ICD-10-CM    1. Influenza B J10.1    2. Pneumonia of both lungs due to infectious organism, unspecified part of lung J18.9      Disposition:  Admit to medicine.     I, Jose Nichols, am serving as a scribe on 3/17/2017 at 3:21 AM to personally document services performed by Dr. Ellington based on my observations and the provider's statements to me.     3/17/2017    EMERGENCY DEPARTMENT       Grace Ellington MD  03/17/17 1312

## 2017-03-17 NOTE — PROGRESS NOTES
Patient transferred from [facility].  Intubated and vented.  Current vent settings: CMV 20 450 50% p5.  Suctioning thick sputum from ETT.  Will continue to monitor.

## 2017-03-17 NOTE — PHARMACY-ADMISSION MEDICATION HISTORY
Admission medication history interview status for the 3/17/2017  admission is complete. See EPIC admission navigator for prior to admission medications     Medication history source reliability: moderate    Actions taken by pharmacist (provider contacted, etc):  called [facility] [phone]    Additional medication history information not noted on PTA med list :None    Medication reconciliation/reorder completed by provider prior to medication history? Yes    Time spent in this activity: 15 minutes    Prior to Admission medications    Medication Sig Last Dose Taking? Auth Provider   Insulin Glargine (BASAGLAR KWIKPEN SC) Inject 26 Units Subcutaneous 2 times daily  Yes Unknown, Entered By History   azithromycin (ZITHROMAX) 250 MG tablet 2 tabs day one, 1 tab days 2-5 3/16/2017 at Unknown time Yes Jon Gonsalves, TERRENCE CNP   atorvastatin (LIPITOR) 40 MG tablet Take 1 tablet (40 mg) by mouth daily 3/16/2017 at Unknown time Yes Bettina Dela Cruz, TERRENCE CNP   ASPIRIN 81 MG OR TABS 1 tab per day 3/16/2017 at Unknown time Yes Brian Garrison MD   Linaclotide (LINZESS PO) Take 290 mcg by mouth every morning (before breakfast)   Unknown, Entered By History   GABAPENTIN PO Take 300 mg by mouth 3 times daily   Unknown, Entered By History   blood glucose monitoring (ONE TOUCH ULTRASOFT) lancets Use as direct   Shaun Bedolla MD   blood glucose monitoring (ONE TOUCH ULTRA) test strip Use QID or as directed   Shaun Bedolla MD   insulin pen needle (B-D U/F) 31G X 8 MM USE ONCE DAILY WITH LANTUS SOLOSTAR PEN   Shaun Bedolla MD   Blood Glucose Monitoring Suppl (BLOOD GLUCOSE METER) KIT 1 Device See Admin Instructions. per patient health plan   Brian Garrison MD   ACE/ARB NOT PRESCRIBED, INTENTIONAL, by Other route continuous prn.   Brian Garrison MD Beth Mulder, PharmD

## 2017-03-17 NOTE — PROGRESS NOTES
AM ADMISSION  Patient was seen and assessed  Acute respiratory distress with hypoxia, sepsis, PNA  On BiPAP  ABG was done and showed mild hypoxia  Review labs and vital sign    Plan; Transfer to IMC            Continue BiPAP            Continue Ceftriaxone and Azithr            Will add IV Vancomycin             Continue IVF hydration             Repeat ABG in 2 hours             Suppository for constipation             Pulmonary consult  D/W RN and the charge nurse      Javon King MD  Internal medicine Hospitalist:   Pager [phone]    3/17/2017

## 2017-03-17 NOTE — ED NOTES
"[facility]  ED Nurse Handoff Report    ED Chief complaint: Shortness of Breath (pt was seen here yesterday and was diagnosed with influenza B today he says he is more SOB and in more pain. ) and Chest Pain (pt also states he is having chest pain due to pneumonia. )      ED Diagnosis:   Final diagnoses:   Influenza B   Pneumonia of both lungs due to infectious organism, unspecified part of lung       Code Status: Full Code    Allergies:   Allergies   Allergen Reactions     No Known Drug Allergies        Activity level:  Independent     Needed?: No    Isolation: Yes- droplet (influenza and pneumonia)  Infection: Not Applicable  Influenza    Bariatric?: No      Vital Signs:   Vitals:    03/17/17 0316 03/17/17 0400   BP: 145/76 127/79   Resp: 26 18   Temp: 98.5  F (36.9  C)    TempSrc: Oral    SpO2: 91% 95%   Weight: 70.3 kg (155 lb)    Height: 1.778 m (5' 10\")        Cardiac Rhythm: ,        Pain level: 0-10 Pain Scale: 10    Is this patient confused?: No    Patient Report: Initial Complaint: Shortness of breath, chest pain  Focused Assessment: Wyatt Mahajan is a 57 year old male who presents with shortness of breath. The patient was in the emergency department yesterday for evaluation of a cough, rib pain, and chills. He was diagnosed with influenza B and pneumonia of the right lung. He was discharged with azithromycin and robaxin. The patient states that this is his 5th day of feeling sick. He was not able to sleep this morning due to right lateral chest wall pain (reportedly present for 3 days, he reports to falls), and coughing. The patient also has right sided abdominal pain. He states that he has been taking his robaxin, azithromycin as well as Tylenol and Ibuprofen. The patient states that he has shortness of breath secondary to his chest wall and abdominal pain. The patient denies current fever, or urinary symptoms such as dysuria. He last took Ibuprofen about 5 hours ago. "   Tests Performed: See Epic.  Abnormal Results: Lactic acid 3.3, glucose 315  Treatments provided: IV fluids 1L 0.9% NS x2, IV rocephin, 4 mg IV morphine, tamilflu    Family Comments: Wife at bedside.    OBS brochure/video discussed/provided to patient: N/A    ED Medications:   Medications   0.9% sodium chloride BOLUS (1,000 mLs Intravenous New Bag 3/17/17 0505)   0.9% sodium chloride BOLUS (0 mLs Intravenous Stopped 3/17/17 0501)   cefTRIAXone (ROCEPHIN) 1 g vial to attach to  mL bag for ADULTS or NS 50 mL bag for PEDS (0 g Intravenous Stopped 3/17/17 0458)   morphine (PF) injection 4 mg (4 mg Intravenous Given 3/17/17 0405)   iopamidol (ISOVUE-370) solution 78 mL (78 mLs Intravenous Given 3/17/17 0423)   sodium chloride 0.9 % for CT scan flush dose 87 mL (87 mLs Intravenous Given 3/17/17 0423)   oseltamivir (TAMIFLU) capsule 75 mg (75 mg Oral Given 3/17/17 0505)       Drips infusing?:  Yes- 0.9% NS      ED NURSE PHONE NUMBER: *[id number]

## 2017-03-17 NOTE — IP AVS SNAPSHOT
` [facility] INTENSIVE CARE UNIT: [phone]            Medication Administration Report for Wyatt Mahajan as of 04/11/17 1314   Legend:    Given Hold Not Given Due Canceled Entry Other Actions    Time Time (Time) Time  Time-Action       Inactive    Active    Linked        Medications 04/05/17 04/06/17 04/07/17 04/08/17 04/09/17 04/10/17 04/11/17    0.9% sodium chloride infusion  Rate: 10 mL/hr Freq: CONTINUOUS Route: IV  Start: 03/17/17 0630    0435 ( )-New Bag       2011 ( )-Rate/Dose Verify         1600 ( )-Rate/Dose Verify         2000 ( )-Rate/Dose Change       2100 ( )-Rate/Dose Verify       2200 ( )-Rate/Dose Verify        0244 ( )-New Bag            acetaminophen (TYLENOL) solution 650 mg  Dose: 650 mg Freq: EVERY 4 HOURS PRN Route: ORAL OR FEED  PRN Reason: mild pain  Start: 04/03/17 1045   Admin Instructions: Please limit to 2GM per day  Maximum acetaminophen dose from all sources= 75 mg/kg/day not to exceed 4 grams/day.     2023 (650 mg)-Given        0011 (650 mg)-Given       2217 (650 mg)-Given        0826 (650 mg)-Given        0514 (650 mg)-Given       1625 (650 mg)-Given        0805 (650 mg)-Given       1409 (650 mg)-Given       1952 (650 mg)-Given        0509 (650 mg)-Given       1308 (650 mg)-Given            acetaminophen (TYLENOL) Suppository 650 mg  Dose: 650 mg Freq: EVERY 4 HOURS PRN Route: RE  PRN Reason: mild pain  Start: 03/17/17 0617   Admin Instructions: Alternate ibuprofen (if ordered) with acetaminophen.  Maximum acetaminophen dose from all sources = 75 mg/kg/day not to exceed 4 grams/day.               acetaminophen (TYLENOL) tablet 650 mg  Dose: 650 mg Freq: EVERY 4 HOURS PRN Route: PO  PRN Reason: mild pain  Start: 03/17/17 0617   Admin Instructions: Alternate ibuprofen (if ordered) with acetaminophen.  Maximum acetaminophen dose from all sources = 75 mg/kg/day not to exceed 4 grams/day.     0430 (650 mg)-Given            2100 (650 mg)-Given            albuterol  neb solution 2.5 mg  Dose: 3 mL Freq: EVERY 2 HOURS PRN Route: NEBULIZATION  PRN Reasons: wheezing,shortness of breath / dyspnea  Start: 03/17/17 0617              bisacodyl (DULCOLAX) Suppository 10 mg  Dose: 10 mg Freq: DAILY PRN Route: RE  PRN Reason: constipation  Start: 03/17/17 0840       1635 (10 mg)-Given        1438 (10 mg)-Given        1253 (10 mg)-Given            ceFAZolin (ANCEF) 1 g vial to attach to  ml bag for ADULT or 50 ml bag for PEDS  Dose: 1 g Freq: EVERY 8 HOURS Route: IV  Indications of Use: BACTEREMIA  Start: 03/30/17 1415    0510 (1 g)-New Bag       1439 (1 g)-New Bag       2259 (1 g)-New Bag        0549 (1 g)-New Bag       1454 (1 g)-New Bag       2159 (1 g)-New Bag        0555 (1 g)-New Bag       1302 (1 g)-New Bag       2115 (1 g)-New Bag        0524 (1 g)-New Bag       1622 (1 g)-New Bag       2103 (1 g)-New Bag        0505 (1 g)-New Bag       1406 (1 g)-New Bag       2130 (1 g)-New Bag        0509 (1 g)-New Bag       1435 (1 g)-New Bag       2216 (1 g)-New Bag        0505 (1 g)-New Bag       [ ] 1400       [ ] 2200           chlorhexidine (PERIDEX) 0.12 % solution 15 mL  Dose: 15 mL Freq: EVERY 12 HOURS Route: MT  Start: 03/17/17 1115   Admin Instructions: Do not swallow.  Discontinue when extubated.     0748 (15 mL)-Given       2012 (15 mL)-Given        0813 (15 mL)-Given       2019 (15 mL)-Given        0825 (15 mL)-Given       2115 (15 mL)-Given        0826 (15 mL)-Given       2104 (15 mL)-Given        0817 (15 mL)-Given       1952 (15 mL)-Given        0936 (15 mL)-Given       2100 (15 mL)-Given        0743 (15 mL)-Given       [ ] 2000           dextrose 10 % 1,000 mL infusion  Freq: CONTINUOUS PRN Route: IV  PRN Comment: Hypoglycemia prevention  Start: 03/29/17 1158   Admin Instructions: For Hypoglycemia Prevention for patients on long-acting subcutaneous basal insulin (Glargine, Detemir, NPH) or continuous insulin infusion. Whenever nutrition support is held or interrupted:    1) Infuse IV D10W at nutrition support rate  2) Notify provider for further instructions               fentaNYL Citrate (PF) (SUBLIMAZE) injection 25-50 mcg  Dose: 25-50 mcg Freq: EVERY 1 HOUR PRN Route: IV  PRN Reasons: moderate to severe pain,severe pain  Start: 04/06/17 0835     2159 (50 mcg)-Given        0150 (50 mcg)-Given        0241 (50 mcg)-Given              gabapentin (NEURONTIN) solution 300 mg  Dose: 300 mg Freq: EVERY 8 HOURS SCHEDULED Route: ORAL OR FEED  Start: 03/28/17 0000    0019 (300 mg)-Given       0748 (300 mg)-Given       2012 (300 mg)-Given        0011 (300 mg)-Given       0812 (300 mg)-Given       2019 (300 mg)-Given        0034 (300 mg)-Given       0825 (300 mg)-Given       2122 (300 mg)-Given        0110 (300 mg)-Given       0851 (300 mg)-Given       2107 (300 mg)-Given        0002 (300 mg)-Given       0805 (300 mg)-Given       1953 (300 mg)-Given       2348 (300 mg)-Given        0944 (300 mg)-Given       2106 (300 mg)-Given       2334 (300 mg)-Given        0853 (300 mg)-Given       [ ] 2000           glucose 40 % gel 15-30 g  Dose: 15-30 g Freq: EVERY 15 MIN PRN Route: PO  PRN Reason: low blood sugar  Start: 03/31/17 1109   Admin Instructions: Give 15 g for BG 51 to 69 mg/dL IF patient is conscious and able to swallow. Give 30 g for BG less than or equal to 50 mg/dL IF patient is conscious and able to swallow. Do NOT give glucose gel via enteral tube.  IF patient has enteral tube: give apple juice 120 mL (4 oz or 15 g of CHO) via enteral tube for BG 51 to 69 mg/dL.  Give apple juice 240 mL (8 oz or 30 g of CHO) via enteral tube for BG less than or equal to 50 mg/dL.              Or  dextrose 50 % injection 25-50 mL  Dose: 25-50 mL Freq: EVERY 15 MIN PRN Route: IV  PRN Reason: low blood sugar  Start: 03/31/17 1109   Admin Instructions: Use if have IV access, BG less than 70 mg/dL and meet dose criteria below:  Dose if conscious and alert (or disorientated) and NPO = 25 mL  Dose if  unconscious / not alert = 50 mL  Vesicant.              Or  glucagon injection 1 mg  Dose: 1 mg Freq: EVERY 15 MIN PRN Route: SC  PRN Reason: low blood sugar  PRN Comment: May repeat x 1 only  Start: 03/31/17 1109   Admin Instructions: May give SQ or IM. IF BG less than or equal to 50 mg/dL and no IV access.  ONLY use glucagon IF patient has NO IV access AND is UNABLE to swallow.               heparin lock flush 10 UNIT/ML injection 2-5 mL  Dose: 2-5 mL Freq: ONCE PRN Route: IK  PRN Reason: line flush  PRN Comment: for locking each dormant lumen with line placement  Start: 03/24/17 1202   Admin Instructions: May repeat x 1               heparin sodium PF injection 5,000 Units  Dose: 5,000 Units Freq: EVERY 8 HOURS Route: SC  Start: 03/30/17 1600   Admin Instructions: High concentration HEParin. Not for line flush or cath care.     0429 (5,000 Units)-Given       1154 (5,000 Units)-Given       2012 (5,000 Units)-Given        0350 (5,000 Units)-Given       1200 (5,000 Units)-Given       2019 (5,000 Units)-Given        0411 (5,000 Units)-Given       1301 (5,000 Units)-Given       2115 (5,000 Units)-Given        0506 (5,000 Units)-Given       1221 (5,000 Units)-Given       2104 (5,000 Units)-Given        0505 (5,000 Units)-Given       1401 (5,000 Units)-Given       1953 (5,000 Units)-Given        0404 (5,000 Units)-Given       1247 (5,000 Units)-Given       2100 (5,000 Units)-Given        0446 (5,000 Units)-Given       1223 (5,000 Units)-Given       [ ] 2000           hydrALAZINE (APRESOLINE) injection 20 mg  Dose: 20 mg Freq: EVERY 4 HOURS PRN Route: IV  PRN Reason: high blood pressure  PRN Comment: give for SBP > 180  Start: 03/24/17 1203       0213 (20 mg)-Given              hypromellose-dextran (ARTIFICAL TEARS) ophthalmic solution 2-3 drop  Dose: 2-3 drop Freq: EVERY 1 HOUR PRN Route: OP  PRN Reason: dry eyes  Start: 03/18/17 1636   Admin Instructions: To affected eye(s)               insulin aspart (NovoLOG) inj  (RAPID ACTING)  Dose: 1-12 Units Freq: EVERY 4 HOURS Route: SC  Start: 04/08/17 1045   Admin Instructions: Correction Scale - HIGH INSULIN RESISTANCE DOSING     Do Not give Correction Insulin if BG less than 140  For  - 164 give 1 unit.  For  - 189 give 2 units.  For  - 214 give 3 units.  For  - 239 give 4 units.  For  - 264 give 5 units.  For  - 289 give 6 units.  For  - 314 give 7 units.  For  - 339 give 8 units  For  - 364 give 9 units  For  - 389 give 10 units  For  - 414 give 11 units  For BG greater than or equal to 415 give 12 units  Check blood glucose Q4H and administer based on blood glucose.  Notify provider if glucose greater than or equal to 350 mg/dL after administration of correction dose.  If given at mealtime, must be administered 5 min before meal or immediately after.        1226 (9 Units)-Given       1617 (9 Units)-Given       2112 (6 Units)-Given        0011 (2 Units)-Given       0511 (2 Units)-Given       0855 (1 Units)-Given       1221 (2 Units)-Given       1656 (4 Units)-Given       2000 (2 Units)-Given       2347 (2 Units)-Given [C]        0402 (2 Units)-Given [C]       0942 (2 Units)-Given       1317 (3 Units)-Given       1751 (2 Units)-Given       2123 (2 Units)-Given        0000 (2 Units)-Given       0445 (1 Units)-Given       0744 (1 Units)-Given [C]       1226 (1 Units)-Given [C]       [ ] 1600       [ ] 2000           insulin glargine (LANTUS) injection 18 Units  Dose: 18 Units Freq: EVERY MORNING BEFORE BREAKFAST Route: SC  Start: 04/08/17 0730       0814 (18 Units)-Given        0800 (18 Units)-Given        0945 (18 Units)-Given        0744 (18 Units)-Given           lidocaine (LMX4) cream  Freq: ONCE PRN Route: Top  PRN Reason: moderate pain  PRN Comment: for local anesthetic during PICC insertion  Start: 03/24/17 1202   Admin Instructions: Apply to PICC Insertion Site. Apply 30 minutes prior to procedure. MAX Dose:  "2.5 gm  (1/2 of 5 gm tube)                 lidocaine 1 % 0.5-5 mL  Dose: 0.5-5 mL Freq: ONCE PRN Route: OTHER  PRN Comment: mild pain For local anesthetic during PICC insertion.  Start: 03/27/17 1104   Admin Instructions: Give Sub-Q/Intradermal.  Give in divided doses as needed.               lidocaine 1 % 1 mL  Dose: 1 mL Freq: EVERY 1 HOUR PRN Route: OTHER  PRN Comment: mild pain with VAD insertion or accessing implanted port,  Start: 03/17/17 0823   Admin Instructions: Do NOT give if patient has a history of allergy to any local anesthetic or any \"olayinka\" product. MAX dose 1 mL subcutaneous OR intradermal in divided doses.               LORazepam (ATIVAN) 2 MG/ML (HIGH CONC) solution 2 mg  Dose: 2 mg Freq: EVERY 4 HOURS Route: PO  Start: 04/07/17 1100      1300 (2 mg)-Given       1737 (2 mg)-Given       2115 (2 mg)-Given        0212 (2 mg)-Given       0637 (2 mg)-Given       1230 (2 mg)-Given [C]       1625 (2 mg)-Given       2104 (2 mg)-Given        0002 (2 mg)-Given       0505 (2 mg)-Given       0935 (2 mg)-Given       1410 (2 mg)-Given       1844 (2 mg)-Given [C]       2130 (2 mg)-Given        0230 (2 mg)-Given       0509 (2 mg)-Given       0945 (2 mg)-Given       1441 (2 mg)-Given       1847 (2 mg)-Given       2217 (2 mg)-Given        0234 (2 mg)-Given       0505 (2 mg)-Given       1030 (2 mg)-Given       [ ] 1400       [ ] 1800       [ ] 2200           magnesium sulfate 2 g in NS intermittent infusion (PharMEDium or FV Cmpd)  Dose: 2 g Freq: DAILY PRN Route: IV  PRN Reason: magnesium supplementation  Start: 03/18/17 0546   Admin Instructions: For Serum Mg++ 1.6 - 2 mg/dL  Give 2 g and recheck magnesium level next AM.       0628 (2 g)-New Bag         0650 (2 g)-New Bag             magnesium sulfate 4 g in 100 mL sterile water (premade)  Dose: 4 g Freq: EVERY 4 HOURS PRN Route: IV  PRN Reason: magnesium supplementation  Start: 03/18/17 0546   Admin Instructions: For serum Mg++ less than 1.6 mg/dL  Give 4 g and " recheck magnesium level 2 hours after dose, and next AM.               melatonin tablet 1 mg  Dose: 1 mg Freq: AT BEDTIME PRN Route: PO  PRN Reason: sleep  Start: 03/17/17 0617   Admin Instructions: Do not give unless at least 6 hours of uninterrupted sleep is expected.               meropenem (MERREM) 1 g vial to attach to  mL bag  Dose: 1 g Freq: EVERY 8 HOURS Route: IV  Indications of Use: VENTILATOR-ASSOCIATED PNEUMONIA  Start: 04/08/17 1030       1056 (1 g)-New Bag       1816 (1 g)-New Bag        0223 (1 g)-New Bag       1033 (1 g)-New Bag       1953 (1 g)-New Bag        0231 (1 g)-New Bag       0951 (1 g)-New Bag       1843 (1 g)-New Bag        0234 (1 g)-New Bag       1030 (1 g)-New Bag       [ ] 1830           midazolam (VERSED) injection 2 mg  Dose: 2 mg Freq: EVERY 1 HOUR PRN Route: IV  PRN Reason: sedation  Start: 03/17/17 1053       0230 (2 mg)-Given        0521 (2 mg)-Given        1721 (2 mg)-Given       2334 (2 mg)-Given        0245 (2 mg)-Given       0529 (2 mg)-Given       1307 (2 mg)-Given           multivitamins with minerals (CERTAVITE/CEROVITE) liquid 15 mL  Dose: 15 mL Freq: DAILY Route: PER FEEDING   Start: 03/29/17 1215    0804 (15 mL)-Given        0813 (15 mL)-Given        0826 (15 mL)-Given        0851 (15 mL)-Given        0805 (15 mL)-Given        0944 (15 mL)-Given        0852 (15 mL)-Given           naloxone (NARCAN) injection 0.1-0.4 mg  Dose: 0.1-0.4 mg Freq: EVERY 2 MIN PRN Route: IV  PRN Reason: opioid reversal  Start: 03/17/17 1058   Admin Instructions: For respiratory rate LESS than or EQUAL to 8.  Partial reversal dose:  0.1 mg titrated q 2 minutes for Analgesia Side Effects Monitoring Sedation Level of 3 (frequently drowsy, arousable, drifts to sleep during conversation).Full reversal dose:  0.4 mg bolus for Analgesia Side Effects Monitoring Sedation Level of 4 (somnolent, minimal or no response to stimulation).               OLANZapine (zyPREXA) tablet 20 mg  Dose: 20 mg  Freq: AT BEDTIME Route: PO  Start: 04/04/17 2200   Admin Instructions: Combined IM and PO doses may significantly increase the risk of orthostatic hypotension at 30 mg per day or higher.  Combined IM and PO doses may significantly increase the risk of orthostatic hypotension at 30 mg per day or higher.     2300 (20 mg)-Given        2159 (20 mg)-Given        2115 (20 mg)-Given        2103 (20 mg)-Given        2129 (20 mg)-Given        2217 (20 mg)-Given        [ ] 2200           ondansetron (ZOFRAN-ODT) ODT tab 4 mg  Dose: 4 mg Freq: EVERY 6 HOURS PRN Route: PO  PRN Reason: nausea  Start: 03/17/17 0617   Admin Instructions: This is Step 1 of nausea and vomiting management.  If nausea not resolved in 15 minutes, go to Step 2 prochlorperazine (COMPAZINE). Do not push through foil backing. Peel back foil and gently remove. Place on tongue immediately. Administration with liquid unnecessary              Or  ondansetron (ZOFRAN) injection 4 mg  Dose: 4 mg Freq: EVERY 6 HOURS PRN Route: IV  PRN Reasons: nausea,vomiting  Start: 03/17/17 0617   Admin Instructions: This is Step 1 of nausea and vomiting management.  If nausea not resolved in 15 minutes, go to Step 2 prochlorperazine (COMPAZINE).  Irritant.               oxyCODONE (ROXICODONE) IR tablet 5-10 mg  Dose: 5-10 mg Freq: EVERY 3 HOURS PRN Route: PO  PRN Reason: moderate to severe pain  Start: 03/17/17 0617        0521 (10 mg)-Given        2123 (5 mg)-Given            pantoprazole (PROTONIX) suspension 40 mg  Dose: 40 mg Freq: DAILY Route: PER FEEDING   Start: 04/10/17 0900   Admin Instructions: Pharmacist Dose Change per: IV-PO Policy.          0944 (40 mg)-Given        0851 (40 mg)-Given           pneumococcal vaccine (PNEUMOVAX 23-anirudh) injection 0.5 mL  Dose: 0.5 mL Freq: PRIOR TO DISCHARGE Route: IM  Start: 03/18/17 1000   Admin Instructions: Administer when afebrile (less than 100.4 ) x 24 hr OR prior to discharge. *Give Fact Sheet to Patient*. If patient is  febrile, reassess in 8 hrs or every shift. Minor illnesses with or without fever does NOT contraindicate the vaccination. If not administering when scheduled , change the due time by following the instructions in the reference link below. If patient refuses vaccine, chart as Vaccine Refused.               polyethylene glycol (MIRALAX/GLYCOLAX) Packet 17 g  Dose: 17 g Freq: DAILY PRN Route: PO  PRN Reason: constipation  Start: 03/17/17 0617   Admin Instructions: Give in 8oz of  water, juice, or soda. Hold for loose stools.  This is the second step of a three step constipation treatment protocol.  1 Packet = 17 grams. Mixed prescribed dose in 8 ounces of water. Follow with 8 oz. of water.               potassium chloride (KLOR-CON) Packet 20-40 mEq  Dose: 20-40 mEq Freq: EVERY 2 HOURS PRN Route: ORAL OR FEED  PRN Reason: potassium supplementation  Start: 03/28/17 1647   Admin Instructions: Use if unable to tolerate tablets.  If Serum K+ 3.0-3.3, dose = 60 mEq po total dose (40 mEq x1 followed in 2 hours by 20 mEq x1). Recheck K+ level 4 hours after dose and the next AM.  If Serum K+ 2.5-2.9, dose = 80 mEq po total dose (40 mEq Q2H x2). Recheck K+ level 4 hours after dose and the next AM.  If Serum K+ less than 2.5, See IV order.  Dissolve packet contents in 4-8 ounces of cold water or juice.     0650 (40 mEq)-Given       0946 (20 mEq)-Given                 potassium chloride 10 mEq in 100 mL intermittent infusion  Dose: 10 mEq Freq: EVERY 1 HOUR PRN Route: IV  PRN Reason: potassium supplementation  Start: 03/28/17 1647   Admin Instructions: Infuse via PERIPHERAL LINE or CENTRAL LINE. Use for central line replacement if patient weight less than 65 kg, if patient is on TPN with high potassium content or if unit does not stock 20 mEq bags.   If Serum K+ 3.0-3.3, dose = 10 mEq/hr x4 doses (40 mEq IV total dose). Recheck K+ level 2 hours after dose and the next AM.   If Serum K+ less than 3.0, dose = 10 mEq/hr x6 doses (60  mEq IV total dose). Recheck K+ level 2 hours after dose and the next AM.               potassium chloride 10 mEq in 100 mL intermittent infusion with 10 mg lidocaine  Dose: 10 mEq Freq: EVERY 1 HOUR PRN Route: IV  PRN Reason: potassium supplementation  Start: 03/28/17 1647   Admin Instructions: Infuse via PERIPHERAL LINE. Use potassium with lidocaine for pain with peripheral administration.  If Serum K+ 3.0-3.3, dose = 10 mEq/hr x4 doses (40 mEq IV total dose). Recheck K+ level 2 hours after dose and the next AM.  If Serum K+ less than 3.0, dose = 10 mEq/hr x6 doses (60 mEq IV total dose). Recheck K+ level 2 hours after dose and the next AM.               potassium chloride 20 mEq in 50 mL intermittent infusion  Dose: 20 mEq Freq: EVERY 1 HOUR PRN Route: IV  PRN Reason: potassium supplementation  Last Dose: 20 mEq (03/29/17 1155)  Start: 03/28/17 1647   Admin Instructions: Infuse via CENTRAL LINE Only. May need EKG if less than 65 kg or on TPN - Max rate is 0.3 mEq/kg/hr for patients not on EKG monitoring.   If Serum K+ 3.0-3.3, dose = 20 mEq/hr x2 doses (40 mEq IV total dose). Recheck K+ level 2 hours after dose and the next AM.  If Serum K+ less than 3.0, dose = 20 mEq/hr x3 doses (60 mEq IV total dose). Recheck K+ level 2 hours after dose and the next AM.               potassium chloride SA (K-DUR/KLOR-CON M) CR tablet 20-40 mEq  Dose: 20-40 mEq Freq: EVERY 2 HOURS PRN Route: PO  PRN Reason: potassium supplementation  Start: 03/28/17 1647   Admin Instructions: Use if able to take PO.   If Serum K+ 3.0-3.3, dose = 60 mEq po total dose (40 mEq x1 followed in 2 hours by 20 mEq x1). Recheck K+ level 4 hours after dose and the next AM.  If Serum K+ 2.5-2.9, dose = 80 mEq po total dose (40 mEq Q2H x2). Recheck K+ level 4 hours after dose and the next AM.  If Serum K+ less than 2.5, See IV order.  DO NOT CRUSH               potassium phosphate 15 mmol in D5W 250 mL intermittent infusion  Dose: 15 mmol Freq: DAILY PRN  Route: IV  PRN Reason: phosphorous supplementation  Start: 04/07/17 1629   Admin Instructions: For serum phosphorus level 2-2.4  Do not infuse Phosphorus in the same line as TPN.   Give 15 mmol and recheck phosphorus level next AM.         0751 (15 mmol)-New Bag             potassium phosphate 20 mmol in D5W 250 mL intermittent infusion  Dose: 20 mmol Freq: EVERY 6 HOURS PRN Route: IV  PRN Reason: phosphorous supplementation  Start: 04/07/17 1629   Admin Instructions: For serum phosphorus level 1.1-1.9  For CENTRAL Line ONLY  Do not infuse Phosphorus in the same line as TPN.   Give 20 mmol and recheck phosphorus level 2 hours after last dose and next AM.               potassium phosphate 20 mmol in D5W 500 mL intermittent infusion  Dose: 20 mmol Freq: EVERY 6 HOURS PRN Route: IV  PRN Reason: phosphorous supplementation  Start: 04/07/17 1629   Admin Instructions: For serum phosphorus level 1.1-1.9  For Peripheral Line  Do not infuse Phosphorus in the same line as TPN.   Give 20 mmol and recheck phosphorus level 2 hours after last dose and next AM.        0547 (20 mmol)-New Bag              potassium phosphate 25 mmol in D5W 500 mL intermittent infusion  Dose: 25 mmol Freq: EVERY 8 HOURS PRN Route: IV  PRN Reason: phosphorous supplementation  Start: 04/07/17 1629   Admin Instructions: For serum phosphorus level less than 1.1  Do not infuse Phosphorus in the same line as TPN.   Give 25 mmol and recheck phosphorus level 2 hours after last dose and next AM.               prochlorperazine (COMPAZINE) injection 5-10 mg  Dose: 5-10 mg Freq: EVERY 6 HOURS PRN Route: IV  PRN Reasons: nausea,vomiting  Start: 03/17/17 0617   Admin Instructions: This is Step 2 of nausea and vomiting management.   If nausea not resolved in 15 minutes, give metoclopramide (REGLAN) if ordered (step 3 of nausea and vomiting management)              Or  prochlorperazine (COMPAZINE) tablet 5-10 mg  Dose: 5-10 mg Freq: EVERY 6 HOURS PRN Route: PO  PRN  Reason: vomiting  Start: 03/17/17 0617   Admin Instructions: This is Step 2 of nausea and vomiting management.   If nausea not resolved in 15 minutes, give metoclopramide (REGLANI) if ordered (step 3 of nausea and vomiting management)              Or  prochlorperazine (COMPAZINE) Suppository 25 mg  Dose: 25 mg Freq: EVERY 12 HOURS PRN Route: RE  PRN Reasons: nausea,vomiting  Start: 03/17/17 0617   Admin Instructions: This is Step 2 of nausea and vomiting management.   If nausea not resolved in 15 minutes, give metoclopramide (REGLAN) if ordered (step 3 of nausea and vomiting management)               protein modular (BENEPROTEIN) packet 1 packet  Dose: 1 packet Freq: 6 TIMES DAILY Route: PER FEEDING   Start: 04/09/17 1300   Admin Instructions: Powder  Mix each packet with 60 mL water before administering. Supplied by nutrition department.         1409 (1 packet)-Given       1844 (1 packet)-Given       2130 (1 packet)-Given               0510 (1 packet)-Given              1035 (1 packet)-Given       1223 (1 packet)-Given       1848 (1 packet)-Given              2217 (1 packet)-Given        0747 (1 packet)-Given       1032 (1 packet)-Given       (1233)-Not Given [C]       [ ] 1600       [ ] 1900       [ ] 2200           sennosides (SENOKOT) syrup 5-10 mL  Dose: 5-10 mL Freq: 2 TIMES DAILY Route: ORAL OR FEED  Start: 04/01/17 2100   Admin Instructions: Start with 5 ml, if no BM in 24 hours then increase to 10 ml. Hold for loose stools.     0804 (5 mL)-Given       (2150)-Not Given               (2100)-Not Given        (0826)-Not Given       1737 (10 mL)-Given [C]               (0819)-Not Given [C]       (2105)-Not Given        (2046)-Not Given               (0951)-Not Given [C]       2100 (5 mL)-Given        0851 (10 mL)-Given       [ ] 2100          And  docusate (COLACE) 50 MG/5ML liquid  mg  Dose:  mg Freq: 2 TIMES DAILY Route: ORAL OR FEED  Start: 04/01/17 2100   Admin Instructions: Should be  administered in milk or fruit juice to mask the taste.  Start with 50 mg bid, if no BM in 24 hours then increase to 100 mg. Hold for loose stools.     0804 ( )-Given       (2150)-Not Given               (2100)-Not Given        (0827)-Not Given       1736 (100 mg)-Given               0851 (100 mg)-Given       (2105)-Not Given        (2046)-Not Given               (0952)-Not Given [C]       2103 (100 mg)-Given [C]        0851 (100 mg)-Given       [ ] 2100           sodium chloride (PF) 0.9% PF flush 10 mL  Dose: 10 mL Freq: EVERY 7 DAYS Route: IK  Start: 03/27/17 1115   Admin Instructions: And Q1H PRN, to lock each CVC - Valved (Tunneled and Non-Tunneled) dormant lumen.          (1442)-Not Given            sodium chloride (PF) 0.9% PF flush 10-20 mL  Dose: 10-20 mL Freq: EVERY 1 HOUR PRN Route: IK  PRN Reasons: line flush,post meds or blood draw  Start: 03/27/17 1104   Admin Instructions: to flush CVC - Valved (Tunneled and Non-Tunneled).   10 mL post IV meds; 20 mL post blood draw.           1228 (10 mL)-Given           sodium chloride (PF) 0.9% PF flush 3 mL  Dose: 3 mL Freq: EVERY 8 HOURS Route: IK  Start: 03/17/17 0830   Admin Instructions: And Q1H PRN, to lock peripheral IV dormant line.     0019 (3 mL)-Given       0804 (3 mL)-Given       (1558)-Not Given        (0012)-Not Given       0813 (3 mL)-Given       1755 (3 mL)-Given        (0149)-Not Given       (1012)-Not Given [C]       1552 (3 mL)-Given        0124 (3 mL)-Given       (0852)-Not Given       (1817)-Not Given        0002 (3 mL)-Given       2349 (3 mL)-Given                      (0952)-Not Given       (1849)-Not Given       2334 (3 mL)-Given        0745 (3 mL)-Given       [ ] 1630           sodium chloride (PF) 0.9% PF flush 3 mL  Dose: 3 mL Freq: EVERY 1 HOUR PRN Route: IK  PRN Reasons: line flush,post meds or blood draw  Start: 03/17/17 0823   Admin Instructions: for peripheral IV flush post IV meds               sodium chloride (PF) 0.9% PF flush 5-50  mL  Dose: 5-50 mL Freq: ONCE PRN Route: IK  PRN Reason: line flush  PRN Comment: to flush each lumen with line placement  Start: 03/27/17 1104   Admin Instructions: May repeat x 1              Completed Medications  Medications 04/05/17 04/06/17 04/07/17 04/08/17 04/09/17 04/10/17 04/11/17         Dose: 90 mL Freq: ONCE Route: IV  Start: 04/10/17 1645   End: 04/10/17 1711         1711 (90 mL)-Given              Dose: 10-20 mL Freq: ONCE Route: UR  Start: 04/10/17 1900   End: 04/10/17 1700   Admin Instructions: Into penis for catheter insertion          1700 (10 mL)-Given              Dose: 67 mL Freq: ONCE Route: IV  Start: 04/10/17 1645   End: 04/10/17 1712   Admin Instructions: This entry is for use by Radiology to intermittently used as a flush in patients receiving a CT scan.          1712 (67 mL)-Given           Discontinued Medications  Medications 04/05/17 04/06/17 04/07/17 04/08/17 04/09/17 04/10/17 04/11/17         Dose: 40 mg Freq: EVERY 12 HOURS Route: PER FEEDING   Start: 03/30/17 1200   End: 04/09/17 1101   Admin Instructions: Pharmacist Dose Change per: IV-PO Policy.     0019 (40 mg)-Given       1154 (40 mg)-Given        0011 (40 mg)-Given       1200 (40 mg)-Given        0034 (40 mg)-Given       1301 (40 mg)-Given        0118 (40 mg)-Given       1231 (40 mg)-Given        0001 (40 mg)-Given       1101-Med Discontinued

## 2017-03-17 NOTE — H&P
[facility]    History and Physical  Hospitalist       Date of Admission:  3/17/2017    Assessment & Plan   Wyatt Mahajan is a 57 year old male with past history DM II, hyperlipidemia, PVD who presents with shortness of breath and chest wall pain, found to have influenza and possible PNA.    Acute hypoxic respiratory failure due to influenza B and possible CAP: Saturating 87% on room air upon presentation.  CT of the chest with bilateral airspace opacities.  Influenza swab positive.  Lactate elevated at 3.3, however no leukocytosis.  Received 2L NS in ED.  - unclear if infiltrates are inflammatory related to pneumonia vs superimposed bacterial infection - will check procalcitonin  - continue tamiflu, ceftriaxone and azithromycin  - stat lactate,  ml/hr   sputum gram stain and culture  - follow blood cultures    Right chest wall pain:  Due to above.  - prn tylenol, oxy and dilaudid  - consider toradol once confident he is adequately volume resuscitated  - pain also radiating into RUQ, will add-on LFT's    DM II, uncontrolled:  Most recent A1C 11% in 5/2016.  PTA regimen of Lantus 26 units bid.  - continue PTA Lantus, add medium dose SSI    PVD with claudication:  Continue PTA atorvastatin and aspirin.      DVT Prophylaxis: Pneumatic Compression Devices  Code Status: Full Code    Disposition: Expected discharge in 3 days once respiratory failure resolved.    Manny Novak    Primary Care Physician   Shaun Bedolla    Chief Complaint   Chest wall pain    History is obtained from the patient    History of Present Illness   Wyatt Mahajan is a 57 year old male who presents with chest wall pain.  Patient reports approximately four or five days ago he developed right-sided chest wall pain, cough productive of thick sputum, shortness of breath with subjective fevers and chills.  He reports chest wall pain is worse with cough and pleuritic in nature and has been progressive over the past four days.   He notes no palliative factors to his pain.  In addition to the above symptoms, he also endorses poor appetite.  He denies any lightheadedness, significant nausea or vomiting, diarrhea or headache.  He was evaluated in the emergency room yesterday where influenza swab returned positive and he was discharged home.  He presented again today secondary to increasing chest wall pain.  Actually feels his cough is possibly improved over the past couple days.  His remainder review of systems is negative.    Past Medical History    Diabetes mellitus type 2  Hyperlipidemia  Chronic low back pain  Peripheral vascular disease with claudication    Past Surgical History   Balloon angioplasty of the right lower extremity    Prior to Admission Medications   Prior to Admission Medications   Prescriptions Last Dose Informant Patient Reported? Taking?   ACE/ARB NOT PRESCRIBED, INTENTIONAL,   Yes No   Sig: by Other route continuous prn.   ASPIRIN 81 MG OR TABS 3/16/2017 at Unknown time  No Yes   Si tab per day   Blood Glucose Monitoring Suppl (BLOOD GLUCOSE METER) KIT   No No   Si Device See Admin Instructions. per patient health plan   atorvastatin (LIPITOR) 40 MG tablet 3/16/2017 at Unknown time  No Yes   Sig: Take 1 tablet (40 mg) by mouth daily   azithromycin (ZITHROMAX) 250 MG tablet 3/16/2017 at Unknown time  No Yes   Si tabs day one, 1 tab days 2-5   blood glucose monitoring (ONE TOUCH ULTRA) test strip   No No   Sig: Use QID or as directed   blood glucose monitoring (ONE TOUCH ULTRASOFT) lancets   No No   Sig: Use as direct   insulin glargine (LANTUS SOLOSTAR) 100 UNIT/ML PEN   No No   Sig: INJECT 26 UNITS SUBCUTANEOUS TWICE DAILY   insulin pen needle (B-D U/F) 31G X 8 MM   No No   Sig: USE ONCE DAILY WITH LANTUS SOLOSTAR PEN      Facility-Administered Medications: None     Allergies   Allergies   Allergen Reactions     No Known Drug Allergies        Social History   I have personally reviewed the social history  with the patient showing 30-pack-year history of tobacco use, reporting having quit four days ago.  He denies any alcohol use.    Family History   Denies significant family history.    Review of Systems   The 10 point Review of Systems is negative other than noted in the HPI or here.     Physical Exam   Temp: 98.5  F (36.9  C) Temp src: Oral BP: 127/79   Heart Rate: 110 Resp: 18 SpO2: 95 % O2 Device: Nasal cannula Oxygen Delivery: 3 LPM  Vital Signs with Ranges  Temp:  [96.8  F (36  C)-98.5  F (36.9  C)] 98.5  F (36.9  C)  Pulse:  [] 102  Heart Rate:  [107-110] 110  Resp:  [18-26] 18  BP: (127-158)/(60-84) 127/79  SpO2:  [91 %-100 %] 95 %  155 lbs 0 oz    Constitutional: well developed, well nourished male appearing fatigued  Eyes: pupils equal, round and reactive to light, no icterus  HEENT: head normocephalic, atraumatic, mucous membranes dry, no cervical lymphadenopathy  Respiratory: coarse lung sounds bilaterally with diffuse crackles, no wheezing, tachypneic with small inspiratory volumes due to splinting  Cardiovascular: regular rate and rhythm, normal S1/S2, no murmur, rubs or gallops  GI: abdomen soft, right upper quadrant tenderness to palpation, non-distended, normal bowel sounds, no hepatosplenomegaly  Lymph/Hematologic: No peripheral edema  Skin: No rash or bruising  Musculoskeletal: Extremities warm and well perfused  Neurologic: alert and appropriate, cranial nerves grossly intact, gross motor movements intact, strength grossly intact  Psychiatric: normal affect    Data   Data reviewed today:  I personally reviewed no images or EKG's today.    Recent Labs  Lab 03/17/17  0330 03/16/17  1140   WBC 5.0 9.7   HGB 15.7 14.7   MCV 91 91   * 119*    138   POTASSIUM 3.9 3.5   CHLORIDE 105 104   CO2 21 25   BUN 17 12   CR 0.67 0.77   ANIONGAP 13 9   LEONIE 8.5 8.0*   * 302*   ALBUMIN 3.2*  --    PROTTOTAL 7.3  --    BILITOTAL 0.8  --    ALKPHOS 71  --    ALT 45  --    AST 32  --    LIPASE  59*  --    TROPI <0.015The 99th percentile for upper reference range is 0.045 ug/L.  Troponin values in the range of 0.045 - 0.120 ug/L may be associated with risks of adverse clinical events. <0.015The 99th percentile for upper reference range is 0.045 ug/L.  Troponin values in the range of 0.045 - 0.120 ug/L may be associated with risks of adverse clinical events.       Recent Results (from the past 24 hour(s))   Chest XR,  PA & LAT    Narrative    CHEST TWO VIEWS  3/16/2017 11:30 AM    HISTORY:  Shortness of breath, diminished breath sounds on the right.    COMPARISON:  None.      Impression    IMPRESSION:  Somewhat shallow inspiration. Subtle patchy haziness in  the mid and lower right lung could represent a subtle early pneumonia.    HORACIO LEONG MD   CT Chest/Abdomen/Pelvis w Contrast    Narrative    CT CHEST, ABDOMEN AND PELVIS WITH CONTRAST  3/17/2017 4:28 AM     HISTORY: Chest pain. Right-sided abdominal pain.    COMPARISON: None.    TECHNIQUE: Following the uneventful administration of 78 mL Isovue-370  intravenous contrast, helical sections were acquired through the lungs  according to the pulmonary embolism protocol. Helical sections were  acquired from the top of the diaphragm through the pubic symphysis  during portal venous phase. Coronal reconstructions were generated.  Radiation dose for this scan was reduced using automated exposure  control, adjustment of the mA and/or kV according to the patient's  size, or iterative reconstruction technique.    FINDINGS:     Chest: No visualized acute pulmonary embolus. The aorta is normal in  caliber without dissection. Patchy consolidation throughout the right  lower lobe. Numerous patchy opacities scattered throughout the  remainder of the mid and lower lungs and less so in the upper lungs  bilaterally. A trace amount of right pleural fluid. No left pleural or  pericardial effusion. A few nonspecific borderline and mildly enlarged  mediastinal and bilateral  hilar lymph nodes.    Abdomen: The liver, spleen, pancreas, adrenal glands and kidneys are  unremarkable. The gallbladder is present. No enlarged lymph nodes or  free fluid in the upper abdomen.    Pelvis: The small and large bowel are normal in caliber. The appendix  is unremarkable. No bowel wall thickening, pneumatosis or free  intraperitoneal gas. No enlarged lymph nodes or free fluid in the  pelvis.      Impression    IMPRESSION:   1. Moderately extensive air-space opacities throughout both lungs,  most prominent in the right lower lobe. These are likely infectious or  inflammatory in etiology.  2. No visualized pulmonary embolus.  3. No other cause of acute pain identified in the abdomen or pelvis.    PAUL HERNANDEZ MD

## 2017-03-17 NOTE — H&P
[facility]    History and Physical  [facility] Intensive Care     Date of Admission:  3/17/2017    Assessment & Plan   Wyatt Mahajan is a 57 year old male admitted on 3/17 to the floor, presenting with acute hypoxic respiratory failure due to influenza B and CAP, right chest wall pain and uncontrolled DM-II. Patient was subsequently transferred from the floor to the MICU for increased work of breathing, accessory muscle use and respiratory distress. Was intubated due to increased work of breathing, tachypnea and decreasing O2 sats.      Neurology:  1. Sedation- Propofol while on ventilator, dilaudid and midazolam pushes prn.  Mental status normal (pleasant) prior to intubation.  Plan  - propofol infusion  - goal RASS of -1  - PRN dilaudid, midazolam      Cardiovascular:  1. Normotensive--no shock  Plan  - aim for even to slightly positive fluid status  -serial lactate 3.1 -> 2.1    Pulmonary:        1. Acute hypoxic respiratory failure due to influenza B and possible CAP:  Initially satting well on NC, but worsening distress through the morning, on arrival breathing at rate 50, accessory muscle use, and requiring fio2 70% on bipap.  He communicated that he was tired and couldn't get enough air. Intubated for increased respiratory distress.  Plan  - VBG, drawn 30 minutes after intubation, mild respiratory acidosis, but now overbreathing vent.  - Respiratory viral panel, Legionella urine Ag  - Continue tamiflu, ceftriaxone, vancomycin and azithromycin  - Follow blood cultures  - Sputum culture, pending  - continue lung protective ventilations, goal PaO2 >65, eucapnia, Pplat <30  - HOB at 30 degrees  - IS when extubated    Renal  1. Volume status- Cr acceptable, making adequate urine  2. Acid-base- respiratory acidosis- pH of 7.29, likely residual hypoventilation after paralysis for intubation, resolving.  3. Electrolytes- WNL  Plan  - NS at maintenance fluid levels  - monitor electrolytes,  replacement as needed per ICU protocols  - follow I/O's    ID:         1. Influenza B: swab was positive. Full respiratory panel sent. CXR with b/l patchy infiltrates, pna (?viral?) vs ARDS.  2. Possible superimposed bacterial pneumonia: sputum culture gram stain showed gram positive cocci. Treating for CAP and added vancomycin for empiric staph coverage until further speciation and susceptibilities return.  - Continue tamiflu, ceftriaxone, vancomycin and azithromycin  - follow blood/sputum cultures and respiratory viral panel  - adjust antibiotics as necessary according to susceptibilites  - pneumococcal vaccine, prior to discharge    GI  1. No acute issues  Plan  - GI prophylaxis--protonix  - place OG tube, nutrition consult for feeds.    Nutrition  Enteral feedings @ 35ml/hr + free water flushes q 4h       Endocrine  1. Hyperglycemia 2/2 uncontrolled DM-II: Last A1C (5/2016) was 11. PTA regimen of Lantus 26 units BID. In MICU blood glucose initially measured 232.  Plan  -  Insulin drip  - Regular POCT glucose monitoring    Heme/Onc:  1. No acute issues- no evidence of anemia or coagulopathy  Plan  - DVT prophylaxis  - serial cbc's during hospital course      DVT Prophylaxis: Pneumatic Compression Devices  GI Prophylaxis: PPI  Restraints: Restraints for medical healing needed: YES  Family update by me today: Yes     Time Spent on this Encounter   Billing:  I spent 45 minutes bedside and on the inpatient unit today managing the critical care of Wyatt Mahajan in relation to the issues listed in this note.    Code Status   Full Code    Primary Care Physician   Shaun Bedolla    Chief Complaint   Acute hypoxic respiratory failure    History is obtained from the patient, electronic health record and patient's significant other    History of Present Illness   Wyatt Mahajan is a 57 year old male who presents with respiratory distress, increased work of breathing and R-sided chest wall pain, admitted from the ED to  the floor this morning and now transferred to the MICU for respiratory distress.   His wife reports that she'd brought him into the ED yesterday for cough, chills and shortness of breath. He was diagnosed there with influenza (swab was positive) and probable pneumonia. He was stable and treated as an outpatient with oral antibiotics. He returned home yesterday but became short of breath again last evening so he and his wife returned to the ED. He was admitted to the hospital and work-up began with labs and concurrent treatment with antibiotics. On the floor he had decreasing O2 sats, tachypnea and increased work of breathing. CT showed patchy consolidation in right lower lobe of lung and patchy opacities throughout mid and lower lungs, most likely reflecting an infectious process. Sputum culture was also positive for G+ cocci.  PMH includes uncontrolled DM-II. Elevated lactate (3.3) upon admission.      Past Medical History    I have reviewed this patient's medical history and updated it with pertinent information if needed.   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  Past Surgical History   Procedure Laterality Date     No history of surgery       Colonoscopy  10/27/16     Colonoscopy N/A 10/31/2016     Procedure: COLONOSCOPY;  Surgeon: Pollo Lopez MD;  Location:  GI       Prior to Admission Medications   Prior to Admission Medications   Prescriptions Last Dose Informant Patient Reported? Taking?    ACE/ARB NOT PRESCRIBED, INTENTIONAL,   Yes No   Sig: by Other route continuous prn.   ASPIRIN 81 MG OR TABS 3/16/2017 at Unknown time  No Yes   Si tab per day   Blood Glucose Monitoring Suppl (BLOOD GLUCOSE METER) KIT   No No   Si Device See Admin Instructions. per patient health plan   atorvastatin (LIPITOR) 40 MG tablet 3/16/2017 at Unknown time  No Yes   Sig: Take 1 tablet (40 mg) by mouth daily   azithromycin (ZITHROMAX) 250 MG tablet 3/16/2017 at Unknown time  No Yes   Si tabs day one, 1 tab days 2-5   blood glucose monitoring (ONE TOUCH ULTRA) test strip   No No   Sig: Use QID or as directed   blood glucose monitoring (ONE TOUCH ULTRASOFT) lancets   No No   Sig: Use as direct   insulin glargine (LANTUS SOLOSTAR) 100 UNIT/ML PEN   No No   Sig: INJECT 26 UNITS SUBCUTANEOUS TWICE DAILY   insulin pen needle (B-D U/F) 31G X 8 MM   No No   Sig: USE ONCE DAILY WITH LANTUS SOLOSTAR PEN      Facility-Administered Medications: None     Allergies   Allergies   Allergen Reactions     No Known Drug Allergies        Social History   I have reviewed this patient's social history and updated it with pertinent information if needed. Wyatt A Keyshawn  reports that he has been smoking Cigarettes.  He has a 7.50 pack-year smoking history. He has never used smokeless tobacco. He reports that he does not drink alcohol or use illicit drugs.    Family History   I have reviewed this patient's family history and updated it with pertinent information if needed.   Family History   Problem Relation Age of Onset     DIABETES Father      DIABETES Mother      DIABETES Sister      DIABETES Brother      Hypertension No family hx of      CEREBROVASCULAR DISEASE No family hx of      Prostate Cancer No family hx of      Cancer - colorectal No family hx of      Breast Cancer No family hx of        Review of Systems   Review of systems not obtained due to patient factors - increasing respiratory distress, intubation, and  sedation.    Physical Exam   Temp: 97.1  F (36.2  C) Temp src: Axillary Temp  Min: 97.1  F (36.2  C)  Max: 98.5  F (36.9  C) BP: (!) 189/114 Pulse: 115 Heart Rate: 137 Resp: 12 SpO2: 96 % O2 Device: BiPAP/CPAP Oxygen Delivery: 3 LPM  Vital Signs with Ranges  Temp:  [97.1  F (36.2  C)-98.5  F (36.9  C)] 97.1  F (36.2  C)  Pulse:  [] 115  Heart Rate:  [104-137] 137  Resp:  [12-56] 12  BP: (127-189)/() 189/114  FiO2 (%):  [40 %-70 %] 70 %  SpO2:  [91 %-100 %] 96 %  153 lbs 7.04 oz    GEN: no acute distress, intubated, sedated   HEENT: head ncat, sclera anicteric, PERRL, OP patent, trachea midline  PULM: unlabored synchronous with vent, coarse lung sounds bilaterally with crackles  CV/COR: RRR S1S2 no gallop, No rub, no murmur  ABD: soft, non-distended, normal bowel sounds, no masses  MSK: no peripheral edema, warm and well-perfused  NEURO:  oriented and answering questions appropriately on arrival to MICU, no str/sens deficiencies  SKIN: no obvious rash or bruising  PSYCH: normal affect, insight and judgement    Intake/Output Summary (Last 24 hours) at 03/17/17 1451  Last data filed at 03/17/17 1400   Gross per 24 hour   Intake           871.69 ml   Output              795 ml   Net            76.69 ml     CXR:  ETT in L mainstem to my eye.  Pulled back 3 cm.  Diffuse b/l fluffy infiltrate.  Viral pna vs ARDS.    Data   Results for orders placed or performed during the hospital encounter of 03/17/17 (from the past 24 hour(s))   Lactic acid   Result Value Ref Range    Lactic Acid 3.3 (H) 0.7 - 2.1 mmol/L   CBC (+ platelets, no diff)   Result Value Ref Range    WBC 5.0 4.0 - 11.0 10e9/L    RBC Count 4.86 4.4 - 5.9 10e12/L    Hemoglobin 15.7 13.3 - 17.7 g/dL    Hematocrit 44.2 40.0 - 53.0 %    MCV 91 78 - 100 fl    MCH 32.3 26.5 - 33.0 pg    MCHC 35.5 31.5 - 36.5 g/dL    RDW 12.8 10.0 - 15.0 %    Platelet Count 118 (L) 150 - 450 10e9/L   Comprehensive metabolic panel   Result Value Ref Range    Sodium 139 133 -  144 mmol/L    Potassium 3.9 3.4 - 5.3 mmol/L    Chloride 105 94 - 109 mmol/L    Carbon Dioxide 21 20 - 32 mmol/L    Anion Gap 13 3 - 14 mmol/L    Glucose 315 (H) 70 - 99 mg/dL    Urea Nitrogen 17 7 - 30 mg/dL    Creatinine 0.67 0.66 - 1.25 mg/dL    GFR Estimate >90  Non  GFR Calc   >60 mL/min/1.7m2    GFR Estimate If Black >90   GFR Calc   >60 mL/min/1.7m2    Calcium 8.5 8.5 - 10.1 mg/dL    Bilirubin Total 0.8 0.2 - 1.3 mg/dL    Albumin 3.2 (L) 3.4 - 5.0 g/dL    Protein Total 7.3 6.8 - 8.8 g/dL    Alkaline Phosphatase 71 40 - 150 U/L    ALT 45 0 - 70 U/L    AST 32 0 - 45 U/L   Troponin I (now)   Result Value Ref Range    Troponin I ES  0.000 - 0.045 ug/L     <0.015  The 99th percentile for upper reference range is 0.045 ug/L.  Troponin values in   the range of 0.045 - 0.120 ug/L may be associated with risks of adverse   clinical events.     Blood culture   Result Value Ref Range    Specimen Description Blood Right Arm     Special Requests Aerobic and anaerobic bottles received     Culture Micro No growth after 1 hour     Micro Report Status Pending    Lipase   Result Value Ref Range    Lipase 59 (L) 73 - 393 U/L   Blood culture   Result Value Ref Range    Specimen Description Blood Right Arm     Special Requests Aerobic and anaerobic bottles received     Culture Micro No growth after 1 hour     Micro Report Status Pending    CT Chest/Abdomen/Pelvis w Contrast    Narrative    CT CHEST, ABDOMEN AND PELVIS WITH CONTRAST  3/17/2017 4:28 AM     HISTORY: Chest pain. Right-sided abdominal pain.    COMPARISON: None.    TECHNIQUE: Following the uneventful administration of 78 mL Isovue-370  intravenous contrast, helical sections were acquired through the lungs  according to the pulmonary embolism protocol. Helical sections were  acquired from the top of the diaphragm through the pubic symphysis  during portal venous phase. Coronal reconstructions were generated.  Radiation dose for this scan  was reduced using automated exposure  control, adjustment of the mA and/or kV according to the patient's  size, or iterative reconstruction technique.    FINDINGS:     Chest: No visualized acute pulmonary embolus. The aorta is normal in  caliber without dissection. Patchy consolidation throughout the right  lower lobe. Numerous patchy opacities scattered throughout the  remainder of the mid and lower lungs and less so in the upper lungs  bilaterally. A trace amount of right pleural fluid. No left pleural or  pericardial effusion. A few nonspecific borderline and mildly enlarged  mediastinal and bilateral hilar lymph nodes.    Abdomen: The liver, spleen, pancreas, adrenal glands and kidneys are  unremarkable. The gallbladder is present. No enlarged lymph nodes or  free fluid in the upper abdomen.    Pelvis: The small and large bowel are normal in caliber. The appendix  is unremarkable. No bowel wall thickening, pneumatosis or free  intraperitoneal gas. No enlarged lymph nodes or free fluid in the  pelvis.      Impression    IMPRESSION:   1. Moderately extensive air-space opacities throughout both lungs,  most prominent in the right lower lobe. These are likely infectious or  inflammatory in etiology.  2. No visualized pulmonary embolus.  3. No other cause of acute pain identified in the abdomen or pelvis.    PAUL HERNANDEZ MD   Procalcitonin   Result Value Ref Range    Procalcitonin 27.57 (HH) ng/ml   Lactic acid whole blood   Result Value Ref Range    Lactic Acid 3.1 (H) 0.7 - 2.1 mmol/L   Hemoglobin A1c   Result Value Ref Range    Hemoglobin A1C 8.1 (H) 4.3 - 6.0 %   Hepatic panel   Result Value Ref Range    Bilirubin Direct 0.2 0.0 - 0.2 mg/dL    Bilirubin Total 0.5 0.2 - 1.3 mg/dL    Albumin 2.7 (L) 3.4 - 5.0 g/dL    Protein Total 6.4 (L) 6.8 - 8.8 g/dL    Alkaline Phosphatase 63 40 - 150 U/L    ALT 35 0 - 70 U/L    AST 28 0 - 45 U/L   Glucose by meter   Result Value Ref Range    Glucose 219 (H) 70 - 99 mg/dL    Blood gas arterial with oxyhemoglobin (1200)   Result Value Ref Range    pH Arterial 7.39 7.35 - 7.45 pH    pCO2 Arterial 38 35 - 45 mm Hg    pO2 Arterial 58 (L) 80 - 105 mm Hg    Bicarbonate Arterial 23 21 - 28 mmol/L    FIO2 40%     Oxyhemoglobin Arterial 88 (L) 92 - 100 %    Base Deficit Art 2.3 mmol/L   CBC with platelets differential   Result Value Ref Range    WBC 4.8 4.0 - 11.0 10e9/L    RBC Count 4.60 4.4 - 5.9 10e12/L    Hemoglobin 14.6 13.3 - 17.7 g/dL    Hematocrit 42.3 40.0 - 53.0 %    MCV 92 78 - 100 fl    MCH 31.7 26.5 - 33.0 pg    MCHC 34.5 31.5 - 36.5 g/dL    RDW 13.2 10.0 - 15.0 %    Platelet Count 120 (L) 150 - 450 10e9/L    Diff Method Automated Method     % Neutrophils 83.5 %    % Lymphocytes 9.7 %    % Monocytes 5.4 %    % Eosinophils 0.4 %    % Basophils 0.2 %    % Immature Granulocytes 0.8 %    Nucleated RBCs 0 0 /100    Absolute Neutrophil 4.0 1.6 - 8.3 10e9/L    Absolute Lymphocytes 0.5 (L) 0.8 - 5.3 10e9/L    Absolute Monocytes 0.3 0.0 - 1.3 10e9/L    Absolute Eosinophils 0.0 0.0 - 0.7 10e9/L    Absolute Basophils 0.0 0.0 - 0.2 10e9/L    Abs Immature Granulocytes 0.0 0 - 0.4 10e9/L    Absolute Nucleated RBC 0.0    Basic metabolic panel   Result Value Ref Range    Sodium 141 133 - 144 mmol/L    Potassium 3.9 3.4 - 5.3 mmol/L    Chloride 109 94 - 109 mmol/L    Carbon Dioxide 25 20 - 32 mmol/L    Anion Gap 7 3 - 14 mmol/L    Glucose 232 (H) 70 - 99 mg/dL    Urea Nitrogen 15 7 - 30 mg/dL    Creatinine 0.55 (L) 0.66 - 1.25 mg/dL    GFR Estimate >90  Non  GFR Calc   >60 mL/min/1.7m2    GFR Estimate If Black >90   GFR Calc   >60 mL/min/1.7m2    Calcium 7.9 (L) 8.5 - 10.1 mg/dL   Blood gas venous with oxyhemoglobin (1200)   Result Value Ref Range    Ph Venous 7.29 (L) 7.32 - 7.43 pH    PCO2 Venous 49 40 - 50 mm Hg    PO2 Venous 94 (H) 25 - 47 mm Hg    Bicarbonate Venous 24 21 - 28 mmol/L    Oxyhemoglobin Venous 95 %    Base Deficit Venous 3.0 mmol/L   Lactic acid  whole blood   Result Value Ref Range    Lactic Acid 2.1 0.7 - 2.1 mmol/L

## 2017-03-17 NOTE — PLAN OF CARE
Problem: Goal Outcome Summary  Goal: Goal Outcome Summary  Outcome: Declining  Pt RR remains 40-50s despite, pain control and BIPAP support, c/o nausea with small secretions/mucous like emesis, zofran given, ultimitely RRT called and transfer to ICU.  Face to face report given to Emily in ICU, belongings bag, shoes and cell phone brought and set at bedside, message left for wife oSrin.

## 2017-03-17 NOTE — PROVIDER NOTIFICATION
MD Notification    Notified Person:  MD    Notified Persons Name: Fernando     Notification Date/Time: 3/17, 0745    Notification Interaction:  Talked with Physician    Purpose of Notification: procalcitonin level, lactic acid level, RR 30-40, coughing up thick maroon-blood sputum, right lowere quad abd pain, put on bipap, resp. Therapist asset patient, the need for higher level of care.    Orders Received: ABGs ordered, MD will come assess, continue to monitor closely.     Comments:

## 2017-03-17 NOTE — IP AVS SNAPSHOT
"    [facility] INTENSIVE CARE UNIT: [phone]                                              INTERAGENCY TRANSFER FORM - PHYSICIAN ORDERS   3/17/2017                    Hospital Admission Date: 3/17/2017  AMAURY PARKER   : 1960  Sex: Male        Attending Provider: Manny Novak MD     Allergies:  No Known Drug Allergies    Infection:  None   Service:  HOSPITALIST    Ht:  1.778 m (5' 10\")   Wt:  80.9 kg (178 lb 5.6 oz)   Admission Wt:  70.3 kg (155 lb)    BMI:  25.59 kg/m 2   BSA:  2 m 2            Patient PCP Information     Provider PCP Type    Shaun Bedolla MD General      ED Clinical Impression     Diagnosis Description Comment Added By Time Added    Influenza B [J10.1] Influenza B [J10.1]  Grace Ellington MD 3/17/2017  4:53 AM    Pneumonia of both lungs due to infectious organism, unspecified part of lung [J18.9] Pneumonia of both lungs due to infectious organism, unspecified part of lung [J18.9]  Grace Ellington MD 3/17/2017  4:53 AM      Hospital Problems as of 2017              Priority Class Noted POA    Acute respiratory failure with hypoxia (H) Medium  3/17/2017 Yes    MSSA (methicillin susceptible Staphylococcus aureus) septicemia (H) High  2017 Yes    MSSA (methicillin susceptible Staphylococcus aureus) pneumonia (H) High  2017 Yes    Fever High  2017 Yes    Leukocytosis High  2017 Yes    Influenza B High  2017 Yes      Non-Hospital Problems as of 2017              Priority Class Noted    External hemorrhoids   2008    Cranial nerve III palsy   Unknown    Hyperlipidemia LDL goal <100 High  10/31/2010    Type 2 diabetes mellitus with diabetic neuropathy (HCC) High  2012    Low back pain   4/10/2014    Lumbar radiculopathy   4/10/2014    PVD (peripheral vascular disease) with claudication (H) Medium  10/12/2015    Ischemic vascular disease Medium  2015      Code Status History     Date Active Date Inactive Code Status Order " ID Comments User Context    4/11/2017 11:23 AM  Full Code [id number]  Dominick Atkins MD Outpatient    3/17/2017  6:17 AM 4/11/2017 11:23 AM Full Code [id number]  Manny Novak MD Inpatient    2/16/2004  9:53 AM 2/16/2004  9:53 AM  None  Yaima Persaud Demographics         Medication Review      START taking        Dose / Directions Comments    albuterol (2.5 MG/3ML) 0.083% neb solution        Dose:  3 mL   Take 1 vial (2.5 mg) by nebulization every 2 hours as needed for wheezing or shortness of breath / dyspnea   Quantity:  360 mL   Refills:  0        bisacodyl 10 MG Suppository   Commonly known as:  DULCOLAX   Used for:  External hemorrhoids        Dose:  10 mg   Place 1 suppository (10 mg) rectally daily as needed for constipation   Quantity:  30 suppository   Refills:  0        ceFAZolin 1 GM vial   Commonly known as:  ANCEF   Indication:  Bacteria in the Blood   Used for:  Pneumonia of both lungs due to infectious organism, unspecified part of lung        Dose:  1 g   Inject 1 g into the vein every 8 hours   Quantity:  30 each   Refills:  0        fentaNYL Citrate (PF) 100 MCG/2ML injection   Commonly known as:  SUBLIMAZE        Dose:  25-50 mcg   Inject 0.5-1 mLs (25-50 mcg) into the vein every hour as needed for moderate to severe pain or severe pain   Refills:  0        gabapentin 250 MG/5ML solution   Commonly known as:  NEURONTIN   Replaces:  GABAPENTIN PO        Dose:  300 mg   6 mLs (300 mg) by Oral or Feeding Tube route every 8 hours   Quantity:  450 mL   Refills:  0        heparin sodium PF 5000 UNIT/0.5ML injection        Dose:  5000 Units   Inject 0.5 mLs (5,000 Units) Subcutaneous every 8 hours   Refills:  0        hypromellose-dextran Soln ophthalmic solution   Used for:  Pneumonia of both lungs due to infectious organism, unspecified part of lung        Dose:  2-3 drop   Apply 2-3 drops to eye every hour as needed for dry eyes   Refills:  0        LORazepam 2 MG/ML (HIGH CONC) solution    Commonly known as:  ATIVAN        Dose:  2 mg   Take 1 mL (2 mg) by mouth every 4 hours   Quantity:  30 mL   Refills:  0        melatonin 1 MG Tabs tablet        Dose:  1 mg   Take 1 tablet (1 mg) by mouth nightly as needed for sleep   Refills:  0        meropenem 1 G vial   Commonly known as:  MERREM   Indication:  Ventilator-Associated Pneumonia   Used for:  Pneumonia of both lungs due to infectious organism, unspecified part of lung        Dose:  1 g   Inject 1,000 mg (1 g) into the vein every 8 hours for 10 days   Quantity:  30 each   Refills:  0        midazolam 1 MG/ML injection   Commonly known as:  VERSED        Dose:  2 mg   Inject 2 mLs (2 mg) into the vein every hour as needed for sedation   Quantity:  2.5 mL   Refills:  0        OLANZapine 20 MG tablet   Commonly known as:  zyPREXA        Dose:  20 mg   Take 1 tablet (20 mg) by mouth At Bedtime   Refills:  0        ondansetron 4 MG ODT tab   Commonly known as:  ZOFRAN-ODT        Dose:  4 mg   Take 1 tablet (4 mg) by mouth every 6 hours as needed for nausea   Quantity:  120 tablet   Refills:  0        oxyCODONE 5 MG IR tablet   Commonly known as:  ROXICODONE   Used for:  Pneumonia of both lungs due to infectious organism, unspecified part of lung        Dose:  5-10 mg   Take 1-2 tablets (5-10 mg) by mouth every 3 hours as needed for moderate to severe pain   Refills:  0        pantoprazole Susp suspension   Commonly known as:  PROTONIX   Used for:  Pneumonia of both lungs due to infectious organism, unspecified part of lung        Dose:  40 mg   20 mLs (40 mg) by Per Feeding Tube route daily   Refills:  0          CONTINUE these medications which may have CHANGED, or have new prescriptions. If we are uncertain of the size of tablets/capsules you have at home, strength may be listed as something that might have changed.        Dose / Directions Comments    insulin glargine 100 UNIT/ML injection   Commonly known as:  LANTUS   This may have changed:    -  medication strength  - how much to take  - when to take this   Used for:  Type 2 diabetes mellitus with diabetic neuropathy, with long-term current use of insulin (H)        Dose:  18 Units   Inject 18 Units Subcutaneous every morning (before breakfast)   Refills:  0          CONTINUE these medications which have NOT CHANGED        Dose / Directions Comments    aspirin 81 MG tablet   Used for:  Type II or unspecified type diabetes mellitus without mention of complication, not stated as uncontrolled        1 tab per day   Quantity:  100   Refills:  3        atorvastatin 40 MG tablet   Commonly known as:  LIPITOR   Used for:  Hyperlipidemia LDL goal <100        Dose:  40 mg   Take 1 tablet (40 mg) by mouth daily   Quantity:  90 tablet   Refills:  3        blood glucose monitoring lancets   Used for:  Type 2 diabetes, HbA1C goal < 8% (H)        Use as direct   Quantity:  3 Box   Refills:  1        blood glucose monitoring meter device kit   Commonly known as:  no brand specified   Used for:  Type II or unspecified type diabetes mellitus without mention of complication, not stated as uncontrolled        Dose:  1 Device   1 Device See Admin Instructions. per patient health plan   Quantity:  1 kit   Refills:  0        blood glucose monitoring test strip   Commonly known as:  ONE TOUCH ULTRA   Used for:  Type 2 diabetes, HbA1C goal < 8% (H)        Use QID or as directed   Quantity:  3 Box   Refills:  1        insulin pen needle 31G X 8 MM   Commonly known as:  B-D U/F   Used for:  Lumbar radiculopathy, Diabetic polyneuropathy associated with type 2 diabetes mellitus (H)        USE ONCE DAILY WITH LANTUS SOLOSTAR PEN   Quantity:  100 each   Refills:  1          STOP taking     ACE/ARB NOT PRESCRIBED (INTENTIONAL)           azithromycin 250 MG tablet   Commonly known as:  ZITHROMAX           GABAPENTIN PO   Replaced by:  gabapentin 250 MG/5ML solution           LINZESS PO                   Summary of Visit     Reason for  your hospital stay       Admitted for influenza pneumonia complicated by MRSA pneumonia             After Care     Activity - Up with nursing assistance           Additional Discharge Instructions       Refer to D/C summary       Advance Diet as Tolerated       Follow this diet upon discharge: Orders Placed This Encounter      Adult Formula Drip Feeding: Continuous TwoCal HN; Gastrostomy; Goal Rate: 35; mL/hr; Medication - Tube Feeding Flush Frequency: At least 15-30 mL water before and after medication administration and with tube clogging; Amout to Send (Nutrition use...      NPO Time Specified       General info for SNF       Length of Stay Estimate: Short Term Care: Estimated # of Days <30  Condition at Discharge: Improving  Level of care:skilled   Rehabilitation Potential: Excellent  Admission H&P remains valid and up-to-date: Yes  Recent Chemotherapy: N/A  Use Nursing Home Standing Orders: Yes       Tracheostomy care by nursing       Standard Cares             Procedures     Ventilator       CMV             Referrals     Nutrition Services Adult IP Consult       Reason:    Receiving tube feeds with special formulation       Occupational Therapy Adult Consult       Evaluate and treat as clinically indicated.    Reason:  deconditioning       Physical Therapy Adult Consult       Evaluate and treat as clinically indicated.    Reason:  deconditioning             Your next 10 appointments already scheduled     Apr 12, 2017  9:00 AM [facility] Inpatient with [facility] [facility]    Services Department ([facility])    [address] [place] [id number] [phone]            Apr 13, 2017  9:00 AM [facility] Inpatient with [facility] [facility]    Services Department ([facility])    [address] [place] [id number] [phone]             Apr 14, 2017  9:00 AM CDT   [facility] Inpatient with [facility] [facility]    Services Department ([facility])    [address] [place] [id number] [phone]              Follow-Up Appointment Instructions     Future Labs/Procedures    Follow Up and recommended labs and tests     Comments:    Follow up with Nursing home physician.  No follow up labs or test are needed.      Follow-Up Appointment Instructions     Follow Up and recommended labs and tests       Follow up with Nursing home physician.  No follow up labs or test are needed.             Statement of Approval     Ordered          04/11/17 1125  I have reviewed and agree with all the recommendations and orders detailed in this document.  EFFECTIVE NOW     Approved and electronically signed by:  Dominick Atkins MD

## 2017-03-17 NOTE — PROGRESS NOTES
Patient intubated and under Intensive care  Hospitalist service will sign off and will resume care once patient gets extubated      Javon King MD  Internal medicine Hospitalist:   Pager [phone]    3/17/2017

## 2017-03-17 NOTE — PLAN OF CARE
Problem: Goal Outcome Summary  Goal: Goal Outcome Summary  Outcome: No Change  Pt arrived to unit at 0635. Pt lethargic, oriented X4. HTN, tachycardia, dyspnea, noted. O2 sats low 90's, 5L oxymask. Lung sounds course crackles throughout. Breathing shallow. Lactic acid recheck 3.1 MD notified, awaiting call back. Pt reports pain in lower chest, IV dilaudid administered as ordered.  Up with assist of one. Will continue to monitor.

## 2017-03-17 NOTE — PROVIDER NOTIFICATION
MD Notification    Notified Person:  MD    Notified Persons Name:  Fernando     Notification Date/Time: 3/17, 0913    Notification Interaction:  Text with Physician    Purpose of Notification:  RR 50's, now with rigors and audible moaning.     Orders Received:  Awaiting call back     Comments:

## 2017-03-18 ENCOUNTER — APPOINTMENT (OUTPATIENT)
Dept: GENERAL RADIOLOGY | Facility: CLINIC | Age: 57
DRG: 870 | End: 2017-03-18
Attending: INTERNAL MEDICINE
Payer: COMMERCIAL

## 2017-03-18 LAB
ANION GAP SERPL CALCULATED.3IONS-SCNC: 7 MMOL/L (ref 3–14)
BUN SERPL-MCNC: 22 MG/DL (ref 7–30)
CALCIUM SERPL-MCNC: 7.8 MG/DL (ref 8.5–10.1)
CHLORIDE SERPL-SCNC: 110 MMOL/L (ref 94–109)
CO2 SERPL-SCNC: 27 MMOL/L (ref 20–32)
CREAT SERPL-MCNC: 0.73 MG/DL (ref 0.66–1.25)
ERYTHROCYTE [DISTWIDTH] IN BLOOD BY AUTOMATED COUNT: 13.2 % (ref 10–15)
FLUAV H1 2009 PAND RNA SPEC QL NAA+PROBE: NEGATIVE
FLUAV H1 RNA SPEC QL NAA+PROBE: NEGATIVE
FLUAV H3 RNA SPEC QL NAA+PROBE: NEGATIVE
FLUAV RNA SPEC QL NAA+PROBE: NEGATIVE
FLUBV RNA SPEC QL NAA+PROBE: POSITIVE
GFR SERPL CREATININE-BSD FRML MDRD: ABNORMAL ML/MIN/1.7M2
GLUCOSE BLDC GLUCOMTR-MCNC: 104 MG/DL (ref 70–99)
GLUCOSE BLDC GLUCOMTR-MCNC: 105 MG/DL (ref 70–99)
GLUCOSE BLDC GLUCOMTR-MCNC: 107 MG/DL (ref 70–99)
GLUCOSE BLDC GLUCOMTR-MCNC: 112 MG/DL (ref 70–99)
GLUCOSE BLDC GLUCOMTR-MCNC: 120 MG/DL (ref 70–99)
GLUCOSE BLDC GLUCOMTR-MCNC: 70 MG/DL (ref 70–99)
GLUCOSE BLDC GLUCOMTR-MCNC: 73 MG/DL (ref 70–99)
GLUCOSE BLDC GLUCOMTR-MCNC: 73 MG/DL (ref 70–99)
GLUCOSE BLDC GLUCOMTR-MCNC: 81 MG/DL (ref 70–99)
GLUCOSE BLDC GLUCOMTR-MCNC: 90 MG/DL (ref 70–99)
GLUCOSE SERPL-MCNC: 71 MG/DL (ref 70–99)
HADV DNA SPEC QL NAA+PROBE: NEGATIVE
HADV DNA SPEC QL NAA+PROBE: NEGATIVE
HCT VFR BLD AUTO: 37.9 % (ref 40–53)
HGB BLD-MCNC: 13.5 G/DL (ref 13.3–17.7)
HMPV RNA SPEC QL NAA+PROBE: NEGATIVE
HPIV1 RNA SPEC QL NAA+PROBE: NEGATIVE
HPIV2 RNA SPEC QL NAA+PROBE: NEGATIVE
HPIV3 RNA SPEC QL NAA+PROBE: NEGATIVE
MAGNESIUM SERPL-MCNC: 1.9 MG/DL (ref 1.6–2.3)
MCH RBC QN AUTO: 32.5 PG (ref 26.5–33)
MCHC RBC AUTO-ENTMCNC: 35.6 G/DL (ref 31.5–36.5)
MCV RBC AUTO: 91 FL (ref 78–100)
MICROBIOLOGIST REVIEW: ABNORMAL
PHOSPHATE SERPL-MCNC: 1.5 MG/DL (ref 2.5–4.5)
PLATELET # BLD AUTO: 111 10E9/L (ref 150–450)
POTASSIUM SERPL-SCNC: 3.4 MMOL/L (ref 3.4–5.3)
RBC # BLD AUTO: 4.16 10E12/L (ref 4.4–5.9)
RHINOVIRUS RNA SPEC QL NAA+PROBE: NEGATIVE
RSV RNA SPEC QL NAA+PROBE: NEGATIVE
RSV RNA SPEC QL NAA+PROBE: NEGATIVE
SODIUM SERPL-SCNC: 144 MMOL/L (ref 133–144)
SPECIMEN SOURCE: ABNORMAL
VANCOMYCIN SERPL-MCNC: 8.2 MG/L
WBC # BLD AUTO: 11.4 10E9/L (ref 4–11)

## 2017-03-18 PROCEDURE — 87040 BLOOD CULTURE FOR BACTERIA: CPT | Performed by: INTERNAL MEDICINE

## 2017-03-18 PROCEDURE — 25000125 ZZHC RX 250: Performed by: INTERNAL MEDICINE

## 2017-03-18 PROCEDURE — 85027 COMPLETE CBC AUTOMATED: CPT | Performed by: HOSPITALIST

## 2017-03-18 PROCEDURE — 36415 COLL VENOUS BLD VENIPUNCTURE: CPT | Performed by: INTERNAL MEDICINE

## 2017-03-18 PROCEDURE — 94002 VENT MGMT INPAT INIT DAY: CPT

## 2017-03-18 PROCEDURE — 80202 ASSAY OF VANCOMYCIN: CPT | Performed by: HOSPITALIST

## 2017-03-18 PROCEDURE — 94003 VENT MGMT INPAT SUBQ DAY: CPT

## 2017-03-18 PROCEDURE — 25000132 ZZH RX MED GY IP 250 OP 250 PS 637: Performed by: INTERNAL MEDICINE

## 2017-03-18 PROCEDURE — 87077 CULTURE AEROBIC IDENTIFY: CPT | Performed by: INTERNAL MEDICINE

## 2017-03-18 PROCEDURE — 25000132 ZZH RX MED GY IP 250 OP 250 PS 637: Performed by: HOSPITALIST

## 2017-03-18 PROCEDURE — 36415 COLL VENOUS BLD VENIPUNCTURE: CPT | Performed by: HOSPITALIST

## 2017-03-18 PROCEDURE — 80202 ASSAY OF VANCOMYCIN: CPT | Performed by: INTERNAL MEDICINE

## 2017-03-18 PROCEDURE — 40000275 ZZH STATISTIC RCP TIME EA 10 MIN

## 2017-03-18 PROCEDURE — 71010 XR CHEST PORT 1 VW: CPT

## 2017-03-18 PROCEDURE — 83735 ASSAY OF MAGNESIUM: CPT | Performed by: HOSPITALIST

## 2017-03-18 PROCEDURE — 93010 ELECTROCARDIOGRAM REPORT: CPT | Performed by: INTERNAL MEDICINE

## 2017-03-18 PROCEDURE — 25000128 H RX IP 250 OP 636: Performed by: INTERNAL MEDICINE

## 2017-03-18 PROCEDURE — 27210429 ZZH NUTRITION PRODUCT INTERMEDIATE LITER

## 2017-03-18 PROCEDURE — 80048 BASIC METABOLIC PNL TOTAL CA: CPT | Performed by: HOSPITALIST

## 2017-03-18 PROCEDURE — 93005 ELECTROCARDIOGRAM TRACING: CPT

## 2017-03-18 PROCEDURE — 99291 CRITICAL CARE FIRST HOUR: CPT | Performed by: INTERNAL MEDICINE

## 2017-03-18 PROCEDURE — 00000146 ZZHCL STATISTIC GLUCOSE BY METER IP

## 2017-03-18 PROCEDURE — 20000003 ZZH R&B ICU

## 2017-03-18 PROCEDURE — 25000128 H RX IP 250 OP 636: Performed by: HOSPITALIST

## 2017-03-18 PROCEDURE — 84100 ASSAY OF PHOSPHORUS: CPT | Performed by: HOSPITALIST

## 2017-03-18 RX ORDER — POTASSIUM CHLORIDE 29.8 MG/ML
20 INJECTION INTRAVENOUS
Status: DISCONTINUED | OUTPATIENT
Start: 2017-03-18 | End: 2017-03-28

## 2017-03-18 RX ORDER — QUETIAPINE FUMARATE 25 MG/1
25 TABLET, FILM COATED ORAL 2 TIMES DAILY
Status: DISCONTINUED | OUTPATIENT
Start: 2017-03-18 | End: 2017-03-21

## 2017-03-18 RX ORDER — POTASSIUM CHLORIDE 1.5 G/1.58G
20-40 POWDER, FOR SOLUTION ORAL
Status: DISCONTINUED | OUTPATIENT
Start: 2017-03-18 | End: 2017-03-28

## 2017-03-18 RX ORDER — MAGNESIUM SULFATE HEPTAHYDRATE 40 MG/ML
4 INJECTION, SOLUTION INTRAVENOUS EVERY 4 HOURS PRN
Status: DISCONTINUED | OUTPATIENT
Start: 2017-03-18 | End: 2017-04-11 | Stop reason: HOSPADM

## 2017-03-18 RX ORDER — POTASSIUM CHLORIDE 7.45 MG/ML
10 INJECTION INTRAVENOUS
Status: DISCONTINUED | OUTPATIENT
Start: 2017-03-18 | End: 2017-03-28

## 2017-03-18 RX ORDER — POTASSIUM CHLORIDE 1500 MG/1
20-40 TABLET, EXTENDED RELEASE ORAL
Status: DISCONTINUED | OUTPATIENT
Start: 2017-03-18 | End: 2017-03-28

## 2017-03-18 RX ORDER — HEPARIN SODIUM 5000 [USP'U]/.5ML
5000 INJECTION, SOLUTION INTRAVENOUS; SUBCUTANEOUS EVERY 8 HOURS
Status: DISCONTINUED | OUTPATIENT
Start: 2017-03-18 | End: 2017-03-24

## 2017-03-18 RX ADMIN — VANCOMYCIN HYDROCHLORIDE 1500 MG: 5 INJECTION, POWDER, LYOPHILIZED, FOR SOLUTION INTRAVENOUS at 22:41

## 2017-03-18 RX ADMIN — HEPARIN SODIUM 5000 UNITS: 10000 INJECTION, SOLUTION INTRAVENOUS; SUBCUTANEOUS at 20:32

## 2017-03-18 RX ADMIN — HYDROMORPHONE HYDROCHLORIDE 0.2 MG: 1 INJECTION, SOLUTION INTRAMUSCULAR; INTRAVENOUS; SUBCUTANEOUS at 07:51

## 2017-03-18 RX ADMIN — ACETAMINOPHEN 650 MG: 325 TABLET, FILM COATED ORAL at 03:23

## 2017-03-18 RX ADMIN — SODIUM PHOSPHATE, MONOBASIC, MONOHYDRATE 20 MMOL: 276; 142 INJECTION, SOLUTION INTRAVENOUS at 06:41

## 2017-03-18 RX ADMIN — MULTIVITAMIN 15 ML: LIQUID ORAL at 09:06

## 2017-03-18 RX ADMIN — PANTOPRAZOLE SODIUM 40 MG: 40 TABLET, DELAYED RELEASE ORAL at 09:06

## 2017-03-18 RX ADMIN — PROPOFOL 60 MCG/KG/MIN: 10 INJECTION, EMULSION INTRAVENOUS at 00:23

## 2017-03-18 RX ADMIN — OSELTAMIVIR PHOSPHATE 75 MG: 6 POWDER, FOR SUSPENSION ORAL at 09:06

## 2017-03-18 RX ADMIN — ACETAMINOPHEN 650 MG: 325 TABLET, FILM COATED ORAL at 17:54

## 2017-03-18 RX ADMIN — HEPARIN SODIUM 5000 UNITS: 10000 INJECTION, SOLUTION INTRAVENOUS; SUBCUTANEOUS at 11:32

## 2017-03-18 RX ADMIN — ATORVASTATIN CALCIUM 40 MG: 40 TABLET, FILM COATED ORAL at 09:06

## 2017-03-18 RX ADMIN — PROPOFOL 60 MCG/KG/MIN: 10 INJECTION, EMULSION INTRAVENOUS at 04:48

## 2017-03-18 RX ADMIN — PROPOFOL 60 MCG/KG/MIN: 10 INJECTION, EMULSION INTRAVENOUS at 09:07

## 2017-03-18 RX ADMIN — MIDAZOLAM HYDROCHLORIDE 2 MG: 1 INJECTION, SOLUTION INTRAMUSCULAR; INTRAVENOUS at 06:16

## 2017-03-18 RX ADMIN — OSELTAMIVIR PHOSPHATE 75 MG: 6 POWDER, FOR SUSPENSION ORAL at 20:32

## 2017-03-18 RX ADMIN — SENNOSIDES AND DOCUSATE SODIUM 2 TABLET: 8.6; 5 TABLET ORAL at 20:32

## 2017-03-18 RX ADMIN — PROPOFOL 60 MCG/KG/MIN: 10 INJECTION, EMULSION INTRAVENOUS at 12:54

## 2017-03-18 RX ADMIN — HYDROMORPHONE HYDROCHLORIDE 0.2 MG: 1 INJECTION, SOLUTION INTRAMUSCULAR; INTRAVENOUS; SUBCUTANEOUS at 16:52

## 2017-03-18 RX ADMIN — PROPOFOL 60 MCG/KG/MIN: 10 INJECTION, EMULSION INTRAVENOUS at 16:51

## 2017-03-18 RX ADMIN — Medication 81 MG: at 09:06

## 2017-03-18 RX ADMIN — SENNOSIDES AND DOCUSATE SODIUM 2 TABLET: 8.6; 5 TABLET ORAL at 09:07

## 2017-03-18 RX ADMIN — Medication 2 G: at 14:29

## 2017-03-18 RX ADMIN — ACETAMINOPHEN 650 MG: 325 TABLET, FILM COATED ORAL at 14:07

## 2017-03-18 RX ADMIN — VANCOMYCIN HYDROCHLORIDE 1250 MG: 5 INJECTION, POWDER, LYOPHILIZED, FOR SOLUTION INTRAVENOUS at 10:37

## 2017-03-18 RX ADMIN — CHLORHEXIDINE GLUCONATE 15 ML: 1.2 RINSE ORAL at 07:52

## 2017-03-18 RX ADMIN — CEFTRIAXONE 1 G: 1 INJECTION, POWDER, FOR SOLUTION INTRAMUSCULAR; INTRAVENOUS at 06:02

## 2017-03-18 RX ADMIN — POTASSIUM CHLORIDE 20 MEQ: 1.5 POWDER, FOR SOLUTION ORAL at 07:51

## 2017-03-18 RX ADMIN — QUETIAPINE FUMARATE 25 MG: 25 TABLET, FILM COATED ORAL at 20:32

## 2017-03-18 RX ADMIN — PROPOFOL 60 MCG/KG/MIN: 10 INJECTION, EMULSION INTRAVENOUS at 22:00

## 2017-03-18 RX ADMIN — ACETAMINOPHEN 650 MG: 325 TABLET, FILM COATED ORAL at 07:51

## 2017-03-18 RX ADMIN — SODIUM CHLORIDE: 9 INJECTION, SOLUTION INTRAVENOUS at 04:36

## 2017-03-18 RX ADMIN — QUETIAPINE FUMARATE 25 MG: 25 TABLET, FILM COATED ORAL at 09:06

## 2017-03-18 RX ADMIN — CHLORHEXIDINE GLUCONATE 15 ML: 1.2 RINSE ORAL at 20:32

## 2017-03-18 NOTE — PROGRESS NOTES
[facility] ICU RESPIRATORY NOTE  Date of Admission:3/17/2017  Date of Intubation (most recent):3/17/2017  Reason for Mechanical Ventilation:Resp failure  Number of Days on Mechanical Ventilation: 2  Met Criteria for Pressure Support Trial:No    Ventilation Mode: CMV/AC  FiO2 (%): 40 %  Rate Set (breaths/minute): 20 breaths/min  Tidal Volume Set (mL): 450 mL  PEEP (cm H2O): 5 cmH2O  Oxygen Concentration (%): 50 %  Resp: 32    ABG Results:     Recent Labs  Lab 03/17/17  0755   PH 7.39   PCO2 38   PO2 58*   HCO3 23   O2PER 40%       Patient remains on full vent support. No PS trial this shift. Suctioned large thick brown secretion from ETT. Will continue to follow.    3/18/2017  Mandy Pantoja, RRT

## 2017-03-18 NOTE — PLAN OF CARE
Problem: Goal Outcome Summary  Goal: Goal Outcome Summary  Outcome: No Change  Febrile, Tmax 102.1.  Withdraws from pain.  Sedated on propofol.  LS Coarse.  Thick tan secretions orally and through ETT.  CMV 50% 20/450/5, asynchronus with vent.  ST. Minimal bowel sounds, abd slightly distended.  OG to LIS.   Adequate UOP.  Family at bedside overnight, updated frequently.  Will continue with plan of care.

## 2017-03-18 NOTE — PLAN OF CARE
Problem: Individualization  Goal: Patient Preferences  Pt sedated, withdraws to pain, PERRL. Soft restraints in place. On vent CMV 40% Fio2 peep 5, Vt 450, RR 20. Over breathing vent at 30-26. Thick tan secretions. Turned every two hours, tolerating TF now at 25cc/hr next increment at 0200 to goal of 35cc/hr. Levine with marginal UOP. VSS- continued to remain febrile all day, getting tylenol every 4 hours. ST, no ectopy noted. Multiple family and visitor today, spoke person cecil Bernard updated about POC left for the day, cecil Garcia will stay overnight is the spoke person for the night.. Will continue to monitor

## 2017-03-18 NOTE — PROGRESS NOTES
[facility] ICU RESPIRATORY NOTE  Date of Admission:3/17/2017  Date of Intubation (most recent):3/17/2017  Reason for Mechanical Ventilation:Resp failure  Number of Days on Mechanical Ventilation:2  Met Criteria for Pressure Support Trial:No    Ventilation Mode: CMV/AC  FiO2 (%): 50 %  Rate Set (breaths/minute): 20 breaths/min  Tidal Volume Set (mL): 450 mL  PEEP (cm H2O): 5 cmH2O  Oxygen Concentration (%): 50 %  Resp: 25    Significant Events Today:None overnight    ABG Results:    Recent Labs  Lab 03/17/17  0755   PH 7.39   PCO2 38   PO2 58*   HCO3 23   O2PER 40%           ETT appearance on chest x-ray: No new results after ETT pulled back 3cm    Plan:  Will cont full vent support for now.  3/18/2017  Emelia Hubbard RRT

## 2017-03-18 NOTE — PROGRESS NOTES
Critical Care Progress Note      03/18/2017    Name: Wyatt Mahajan MRN#: [id number]   Age: 57 year old YOB: 1960     [facility]    [facility] DAY #             Problem List:   Active Problems:    Acute respiratory failure with hypoxia (H)           Summary/Hospital Course:   Wyatt Mahajan is a 57 year old male admitted on 3/17 to the floor, presenting with acute hypoxic respiratory failure due to influenza B and CAP, right chest wall pain and uncontrolled DM-II. Patient was subsequently transferred from the floor to the MICU for increased work of breathing, accessory muscle use and respiratory distress. Was intubated due to increased work of breathing, tachypnea and decreasing O2 sats.        Assessment and plan :     Wyatt Mahajan IS a 57 year old male admitted on 3/17/2017 for respiratory failure, flu B, staph pna, staph bacteremia.   I have personally reviewed the daily labs, imaging studies, cultures and discussed the case with referring physician and consulting physicians.     My assessment and plan by system for this patient is as follows:    Neurology/Psychiatry:   1. Sedation:  propfol drip, prn dilaudid and midazolam  2.  Delirium:  Seems delirious when sedation weaned.  Initiating seroquel.  QTC 400s in 2016.  Repeat EKG today.    Cardiovascular:   1. H/o HLD:  Cont atorvastatin  2.  H/o PAD:  cotninue ASA  3.  Hyperlactemia:  Downtrending->resolved.  Hemodynamically stable.    Pulmonary/Ventilator Management:   1.  Respiratory failure, hypoxemic:  2/2 staph pna and flu B.  May also have element of ARDS, but only on minimal vent settings (40%, peep5)  --Cont abx and tamiflu as below.  --Lung protective vent strategy.    GI and Nutrition :   1. Initiate TF  2.  PPI for PUD prophy    Renal/Fluids/Electrolytes:   1. Monitor  Cr/UOP  2. Replete electrolytes prn.    Infectious Disease:   1. Flu B:  Tamiflu  2.  Staph aureus in sputum:  Vancomycin.  Still on Ceftriaxone.  Will  narrow once susceptibilities back.  3.  Staph aureus bacteremia:  See 2 above  4.  Urine legionella neg:  Low suspicion for legionella.  Stop azithro.    Endocrine:   1. Hyperglycemia:  H/o DM2.  Insulin drip.    Hematology/Oncology:   1. Pltlt/hgb stable  2.  Mild leukocytosis-- 2/2 infection     IV/Access:   1. Venous access - peripheral    ICU Prophylaxis:   1. DVT: Hep Subq  2. VAP: HOB 30 degrees, chlorhexidine rinse  3. Stress Ulcer: PPI  4. Restraints: Nonviolent soft two point restraints required and necessary for patient safety and continued cares and good effect as patient continues to pull at necessary lines, tubes despite education and distraction. Will readdress daily.   6. Feeding - TF  7. Family Update: at bedside  8. Disposition - critically ill        Key goals for next 24 hours:   1. Cont. tamiflu  2. Cont. abx.  3. Repeat blood cx.               Interim History:     Weaned vent yesterday and overnight.  Comfortably sedated this AM.  +blood cx for staph aureus overnight.  On vanco--repeating blood cx.         Key Medications:       QUEtiapine  25 mg Oral BID     atorvastatin  40 mg Oral Daily     sodium chloride (PF)  3 mL Intracatheter Q8H     pneumococcal vaccine  0.5 mL Intramuscular Prior to discharge     sodium chloride (PF)  3 mL Intracatheter Q8H     vancomycin (VANCOCIN) IV  1,250 mg Intravenous Q12H     rocuronium  0.6 mg/kg Intravenous Once     senna-docusate  1-2 tablet Oral BID 09 12     chlorhexidine  15 mL Mouth/Throat Q12H     oseltamivir  75 mg Oral BID     azithromycin  250 mg Intravenous Q24H     multivitamins with minerals  15 mL Per Feeding Tube Daily     aspirin  81 mg Oral or Feeding Tube Daily     pantoprazole  40 mg Per Feeding Tube Daily     cefTRIAXone  1 g Intravenous Q24H       NaCl Stopped (03/18/17 9023)     propofol (DIPRIVAN) infusion 60 mcg/kg/min (03/18/17 0907)     insulin (regular) Stopped (03/18/17 3018)     IV fluid REPLACEMENT ONLY                 Physical  Examination:   Temp:  [100  F (37.8  C)-102.2  F (39  C)] 100.8  F (38.2  C)  Heart Rate:  [] 113  Resp:  [12-46] 28  BP: ()/() 99/61  FiO2 (%):  [50 %-100 %] 50 %  SpO2:  [95 %-100 %] 96 %    Intake/Output Summary (Last 24 hours) at 03/18/17 1018  Last data filed at 03/18/17 0800   Gross per 24 hour   Intake          2103.52 ml   Output             1210 ml   Net           893.52 ml     Wt Readings from Last 4 Encounters:   03/17/17 69.6 kg (153 lb 7 oz)   03/16/17 70.3 kg (155 lb)   01/17/17 71.3 kg (157 lb 1.6 oz)   11/30/16 72.6 kg (160 lb)     BP - Mean:  [] 71  Ventilation Mode: CMV/AC  FiO2 (%): 50 %  Rate Set (breaths/minute): 20 breaths/min  Tidal Volume Set (mL): 450 mL  PEEP (cm H2O): 5 cmH2O  Oxygen Concentration (%): 50 %  Resp: 28    Recent Labs  Lab 03/17/17  0755   PH 7.39   PCO2 38   PO2 58*   HCO3 23   O2PER 40%       GEN: no acute distress   HEENT: head ncat, sclera anicteric, OP patent, trachea midline   PULM: unlabored synchronous with vent, clear anteriorly    CV/COR: RRR S1S2 no gallop,  No rub, no murmur  ABD: soft nontender, hypoactive bowel sounds, no mass  EXT:  No Edema,   Warm x4  NEURO: sedated  SKIN: no obvious rash  LINES: clean, dry intact         Data:   All data and imaging reviewed     ROUTINE ICU LABS (Last four results)  CMP  Recent Labs  Lab 03/18/17  0430 03/17/17  1025 03/17/17  0635 03/17/17  0330 03/16/17  1140    141  --  139 138   POTASSIUM 3.4 3.9  --  3.9 3.5   CHLORIDE 110* 109  --  105 104   CO2 27 25  --  21 25   ANIONGAP 7 7  --  13 9   GLC 71 232*  --  315* 302*   BUN 22 15  --  17 12   CR 0.73 0.55*  --  0.67 0.77   GFRESTIMATED >90Non  GFR Calc >90Non  GFR Calc  --  >90Non  GFR Calc >90Non  GFR Calc   GFRESTBLACK >90African American GFR Calc >90African American GFR Calc  --  >90African American GFR Calc >90African American GFR Calc   LEONIE 7.8* 7.9*  --  8.5 8.0*   MAG 1.9   --   --   --   --    PHOS 1.5*  --   --   --   --    PROTTOTAL  --   --  6.4* 7.3  --    ALBUMIN  --   --  2.7* 3.2*  --    BILITOTAL  --   --  0.5 0.8  --    ALKPHOS  --   --  63 71  --    AST  --   --  28 32  --    ALT  --   --  35 45  --      CBC  Recent Labs  Lab 03/18/17  0430 03/17/17  1025 03/17/17  0330 03/16/17  1140   WBC 11.4* 4.8 5.0 9.7   RBC 4.16* 4.60 4.86 4.58   HGB 13.5 14.6 15.7 14.7   HCT 37.9* 42.3 44.2 41.8   MCV 91 92 91 91   MCH 32.5 31.7 32.3 32.1   MCHC 35.6 34.5 35.5 35.2   RDW 13.2 13.2 12.8 12.8   * 120* 118* 119*     INRNo lab results found in last 7 days.  Arterial Blood Gas  Recent Labs  Lab 03/17/17  0755   PH 7.39   PCO2 38   PO2 58*   HCO3 23   O2PER 40%       All cultures:    Recent Labs  Lab 03/17/17  0355 03/17/17  0330   CULT Cultured on the 1st day of incubation: Gram positive cocci in clustersCritical Value/Significant Value, preliminary result only, called to and read back by June Olivo RN at 0456 [id number]* Cultured on the 1st day of incubation: Gram positive cocci in clustersCritical Value/Significant Value, preliminary result only, called to and read back by June Olivo RN at 0108 [id number](Note)POSITIVE for STAPHYLOCOCCUS AUREUS and NEGATIVE for the mecA gene(not MRSA) by Cribspot multiplex nucleic acid test. The mecA gene wasnot detected. Final identification and antimicrobial susceptibilitytesting will be verified by standard methods.Specimen tested with Verigene multiplex, gram-positive blood culturenucleic acid test for the following targets: Staph aureus, Staphepidermidis, Staph lugdunensis, other Staph species, Enterococcusfaecalis, Enterococcus faecium, Streptococcus species, S. agalactiae,S. anginosus grp., S. pneumoniae, S. pyogenes, Listeria sp., mecA(methicillin resistance) and [place]/B (vancomycin resistance).Critical Value/Significant Value called to and read back by SARA Allen at 0350 [id number]*     Recent Results (from the past 24  hour(s))   XR Chest Port 1 View    Narrative    XR CHEST PORT 1 VW 3/17/2017 11:35 AM    HISTORY: Intubation.    COMPARISON: 3/16/2017    FINDINGS: The endotracheal tube tip is at the debi, directed toward  the left mainstem bronchus. In comparison with yesterday's exam there  is significant diffuse airspace opacity with more focal consolidation  in the lower segment of the right upper lobe. No pleural effusion or  pneumothorax.      Impression    IMPRESSION: Endotracheal tube should be pulled back by distance of  approximately 3-4 cm.    TARIQ MALCOLM MD   XR Abdomen Port 1 View    Narrative    XR ABDOMEN PORT F1 VW 3/17/2017 11:37 AM    HISTORY: OG placement.    COMPARISON: None.      Impression    IMPRESSION: The enteric tube tip is in the projection of the stomach.  The gas pattern is nonobstructive.    TARIQ MALCOLM MD   XR Chest Port 1 View    Narrative    XR CHEST PORT 1 VW  3/18/2017 6:40 AM     HISTORY:  Intubated    COMPARISON: Film performed on 3/17/2017    FINDINGS: This is a semiupright film.  ET tube and NG tube are in  place. The ET tube has been pulled back since the previous film.  Extensive bilateral interstitial and alveolar infiltrate is again  seen. This has not changed significantly.      Impression    IMPRESSION: Considering differences in film technique, no significant  change. Persistent bilateral infiltrate.    ALISHA YAN MD         Billing: This patient is critically ill: Yes. Total critical care time today 45 min.

## 2017-03-18 NOTE — PROVIDER NOTIFICATION
0100:  Positive blood culture: specimen collected 3/17 at 0330 from R arm.  Results: Gram + cocci in clusters.  Dr Puga in tele-ICU notified.  No new orders, current abx regimen appropriate for this organism.      0415: Previously positive blood culture grew Staph. Aureus      0500: New positive blood culture: specimen collected 3/17 at 0355 from R arm. Results: Gram + cocci in clusters.  Dr Puga in tele-ICU notified.

## 2017-03-19 LAB
ANION GAP SERPL CALCULATED.3IONS-SCNC: 7 MMOL/L (ref 3–14)
BACTERIA SPEC CULT: ABNORMAL
BUN SERPL-MCNC: 17 MG/DL (ref 7–30)
CALCIUM SERPL-MCNC: 7.2 MG/DL (ref 8.5–10.1)
CHLORIDE SERPL-SCNC: 107 MMOL/L (ref 94–109)
CO2 SERPL-SCNC: 26 MMOL/L (ref 20–32)
CREAT SERPL-MCNC: 0.62 MG/DL (ref 0.66–1.25)
ERYTHROCYTE [DISTWIDTH] IN BLOOD BY AUTOMATED COUNT: 13.4 % (ref 10–15)
GFR SERPL CREATININE-BSD FRML MDRD: ABNORMAL ML/MIN/1.7M2
GLUCOSE BLDC GLUCOMTR-MCNC: 112 MG/DL (ref 70–99)
GLUCOSE BLDC GLUCOMTR-MCNC: 123 MG/DL (ref 70–99)
GLUCOSE BLDC GLUCOMTR-MCNC: 132 MG/DL (ref 70–99)
GLUCOSE BLDC GLUCOMTR-MCNC: 140 MG/DL (ref 70–99)
GLUCOSE BLDC GLUCOMTR-MCNC: 144 MG/DL (ref 70–99)
GLUCOSE BLDC GLUCOMTR-MCNC: 144 MG/DL (ref 70–99)
GLUCOSE BLDC GLUCOMTR-MCNC: 202 MG/DL (ref 70–99)
GLUCOSE SERPL-MCNC: 129 MG/DL (ref 70–99)
HCT VFR BLD AUTO: 33.8 % (ref 40–53)
HGB BLD-MCNC: 11.7 G/DL (ref 13.3–17.7)
MAGNESIUM SERPL-MCNC: 2.5 MG/DL (ref 1.6–2.3)
MCH RBC QN AUTO: 31.6 PG (ref 26.5–33)
MCHC RBC AUTO-ENTMCNC: 34.6 G/DL (ref 31.5–36.5)
MCV RBC AUTO: 91 FL (ref 78–100)
MICRO REPORT STATUS: ABNORMAL
MICROORGANISM SPEC CULT: ABNORMAL
PHOSPHATE SERPL-MCNC: 2 MG/DL (ref 2.5–4.5)
PLATELET # BLD AUTO: 119 10E9/L (ref 150–450)
POTASSIUM SERPL-SCNC: 3.4 MMOL/L (ref 3.4–5.3)
RBC # BLD AUTO: 3.7 10E12/L (ref 4.4–5.9)
SODIUM SERPL-SCNC: 140 MMOL/L (ref 133–144)
SPECIMEN SOURCE: ABNORMAL
WBC # BLD AUTO: 11.3 10E9/L (ref 4–11)

## 2017-03-19 PROCEDURE — 25000128 H RX IP 250 OP 636: Performed by: INTERNAL MEDICINE

## 2017-03-19 PROCEDURE — 94003 VENT MGMT INPAT SUBQ DAY: CPT

## 2017-03-19 PROCEDURE — 25000128 H RX IP 250 OP 636: Performed by: HOSPITALIST

## 2017-03-19 PROCEDURE — 87040 BLOOD CULTURE FOR BACTERIA: CPT | Performed by: INTERNAL MEDICINE

## 2017-03-19 PROCEDURE — 25000125 ZZHC RX 250: Performed by: INTERNAL MEDICINE

## 2017-03-19 PROCEDURE — 83735 ASSAY OF MAGNESIUM: CPT | Performed by: INTERNAL MEDICINE

## 2017-03-19 PROCEDURE — 84100 ASSAY OF PHOSPHORUS: CPT | Performed by: INTERNAL MEDICINE

## 2017-03-19 PROCEDURE — 25000131 ZZH RX MED GY IP 250 OP 636 PS 637: Performed by: INTERNAL MEDICINE

## 2017-03-19 PROCEDURE — 85027 COMPLETE CBC AUTOMATED: CPT | Performed by: INTERNAL MEDICINE

## 2017-03-19 PROCEDURE — 25000132 ZZH RX MED GY IP 250 OP 250 PS 637: Performed by: INTERNAL MEDICINE

## 2017-03-19 PROCEDURE — 40000275 ZZH STATISTIC RCP TIME EA 10 MIN

## 2017-03-19 PROCEDURE — 00000146 ZZHCL STATISTIC GLUCOSE BY METER IP

## 2017-03-19 PROCEDURE — 36415 COLL VENOUS BLD VENIPUNCTURE: CPT | Performed by: INTERNAL MEDICINE

## 2017-03-19 PROCEDURE — 20000003 ZZH R&B ICU

## 2017-03-19 PROCEDURE — 27210429 ZZH NUTRITION PRODUCT INTERMEDIATE LITER

## 2017-03-19 PROCEDURE — 99291 CRITICAL CARE FIRST HOUR: CPT | Performed by: INTERNAL MEDICINE

## 2017-03-19 PROCEDURE — 80048 BASIC METABOLIC PNL TOTAL CA: CPT | Performed by: INTERNAL MEDICINE

## 2017-03-19 PROCEDURE — S0032 INJECTION, NAFCILLIN SODIUM: HCPCS | Performed by: INTERNAL MEDICINE

## 2017-03-19 PROCEDURE — 40000008 ZZH STATISTIC AIRWAY CARE

## 2017-03-19 PROCEDURE — 25000132 ZZH RX MED GY IP 250 OP 250 PS 637: Performed by: HOSPITALIST

## 2017-03-19 RX ORDER — NAFCILLIN SODIUM 2 G/8ML
2 INJECTION, POWDER, FOR SOLUTION INTRAMUSCULAR; INTRAVENOUS EVERY 4 HOURS
Status: DISCONTINUED | OUTPATIENT
Start: 2017-03-19 | End: 2017-03-24

## 2017-03-19 RX ORDER — NICOTINE POLACRILEX 4 MG
15-30 LOZENGE BUCCAL
Status: DISCONTINUED | OUTPATIENT
Start: 2017-03-19 | End: 2017-03-19

## 2017-03-19 RX ORDER — DEXTROSE MONOHYDRATE 25 G/50ML
25-50 INJECTION, SOLUTION INTRAVENOUS
Status: DISCONTINUED | OUTPATIENT
Start: 2017-03-19 | End: 2017-03-19

## 2017-03-19 RX ORDER — FUROSEMIDE 10 MG/ML
20 INJECTION INTRAMUSCULAR; INTRAVENOUS ONCE
Status: COMPLETED | OUTPATIENT
Start: 2017-03-19 | End: 2017-03-19

## 2017-03-19 RX ADMIN — MIDAZOLAM HYDROCHLORIDE 2 MG: 1 INJECTION, SOLUTION INTRAMUSCULAR; INTRAVENOUS at 09:58

## 2017-03-19 RX ADMIN — PANTOPRAZOLE SODIUM 40 MG: 40 TABLET, DELAYED RELEASE ORAL at 08:44

## 2017-03-19 RX ADMIN — FUROSEMIDE 20 MG: 10 INJECTION, SOLUTION INTRAVENOUS at 14:47

## 2017-03-19 RX ADMIN — HEPARIN SODIUM 5000 UNITS: 10000 INJECTION, SOLUTION INTRAVENOUS; SUBCUTANEOUS at 04:17

## 2017-03-19 RX ADMIN — HEPARIN SODIUM 5000 UNITS: 10000 INJECTION, SOLUTION INTRAVENOUS; SUBCUTANEOUS at 11:45

## 2017-03-19 RX ADMIN — QUETIAPINE FUMARATE 25 MG: 25 TABLET, FILM COATED ORAL at 08:44

## 2017-03-19 RX ADMIN — PROPOFOL 30 MCG/KG/MIN: 10 INJECTION, EMULSION INTRAVENOUS at 13:07

## 2017-03-19 RX ADMIN — DEXTRAN 70, AND HYPROMELLOSE 2910 2 DROP: 1; 3 SOLUTION/ DROPS OPHTHALMIC at 07:48

## 2017-03-19 RX ADMIN — VANCOMYCIN HYDROCHLORIDE 1500 MG: 5 INJECTION, POWDER, LYOPHILIZED, FOR SOLUTION INTRAVENOUS at 11:45

## 2017-03-19 RX ADMIN — MIDAZOLAM HYDROCHLORIDE 2 MG: 1 INJECTION, SOLUTION INTRAMUSCULAR; INTRAVENOUS at 06:36

## 2017-03-19 RX ADMIN — HYDROMORPHONE HYDROCHLORIDE 0.2 MG: 1 INJECTION, SOLUTION INTRAMUSCULAR; INTRAVENOUS; SUBCUTANEOUS at 22:50

## 2017-03-19 RX ADMIN — PROPOFOL 60 MCG/KG/MIN: 10 INJECTION, EMULSION INTRAVENOUS at 00:41

## 2017-03-19 RX ADMIN — ACETAMINOPHEN 650 MG: 325 TABLET, FILM COATED ORAL at 00:27

## 2017-03-19 RX ADMIN — HYDROMORPHONE HYDROCHLORIDE 0.2 MG: 1 INJECTION, SOLUTION INTRAMUSCULAR; INTRAVENOUS; SUBCUTANEOUS at 14:02

## 2017-03-19 RX ADMIN — ATORVASTATIN CALCIUM 40 MG: 40 TABLET, FILM COATED ORAL at 08:44

## 2017-03-19 RX ADMIN — POLYETHYLENE GLYCOL 3350 17 G: 17 POWDER, FOR SOLUTION ORAL at 00:27

## 2017-03-19 RX ADMIN — MIDAZOLAM HYDROCHLORIDE 2 MG: 1 INJECTION, SOLUTION INTRAMUSCULAR; INTRAVENOUS at 22:50

## 2017-03-19 RX ADMIN — BISACODYL 10 MG: 10 SUPPOSITORY RECTAL at 16:01

## 2017-03-19 RX ADMIN — CHLORHEXIDINE GLUCONATE 15 ML: 1.2 RINSE ORAL at 19:49

## 2017-03-19 RX ADMIN — ACETAMINOPHEN 650 MG: 325 TABLET, FILM COATED ORAL at 08:44

## 2017-03-19 RX ADMIN — MIDAZOLAM HYDROCHLORIDE 2 MG: 1 INJECTION, SOLUTION INTRAMUSCULAR; INTRAVENOUS at 13:41

## 2017-03-19 RX ADMIN — CHLORHEXIDINE GLUCONATE 15 ML: 1.2 RINSE ORAL at 07:48

## 2017-03-19 RX ADMIN — OXYCODONE HYDROCHLORIDE 5 MG: 5 TABLET ORAL at 11:46

## 2017-03-19 RX ADMIN — MIDAZOLAM HYDROCHLORIDE 2 MG: 1 INJECTION, SOLUTION INTRAMUSCULAR; INTRAVENOUS at 04:25

## 2017-03-19 RX ADMIN — ACETAMINOPHEN 650 MG: 325 TABLET, FILM COATED ORAL at 04:17

## 2017-03-19 RX ADMIN — OSELTAMIVIR PHOSPHATE 75 MG: 6 POWDER, FOR SUSPENSION ORAL at 21:34

## 2017-03-19 RX ADMIN — HYDROMORPHONE HYDROCHLORIDE 0.2 MG: 1 INJECTION, SOLUTION INTRAMUSCULAR; INTRAVENOUS; SUBCUTANEOUS at 15:10

## 2017-03-19 RX ADMIN — MIDAZOLAM HYDROCHLORIDE 2 MG: 1 INJECTION, SOLUTION INTRAMUSCULAR; INTRAVENOUS at 00:42

## 2017-03-19 RX ADMIN — HYDROMORPHONE HYDROCHLORIDE 0.2 MG: 1 INJECTION, SOLUTION INTRAMUSCULAR; INTRAVENOUS; SUBCUTANEOUS at 09:34

## 2017-03-19 RX ADMIN — NAFCILLIN SODIUM 2 G: 2 INJECTION, POWDER, LYOPHILIZED, FOR SOLUTION INTRAMUSCULAR; INTRAVENOUS at 21:34

## 2017-03-19 RX ADMIN — OSELTAMIVIR PHOSPHATE 75 MG: 6 POWDER, FOR SUSPENSION ORAL at 08:44

## 2017-03-19 RX ADMIN — ACETAMINOPHEN 650 MG: 325 TABLET, FILM COATED ORAL at 19:49

## 2017-03-19 RX ADMIN — Medication 81 MG: at 08:44

## 2017-03-19 RX ADMIN — INSULIN ASPART 1 UNITS: 100 INJECTION, SOLUTION INTRAVENOUS; SUBCUTANEOUS at 20:05

## 2017-03-19 RX ADMIN — QUETIAPINE FUMARATE 25 MG: 25 TABLET, FILM COATED ORAL at 21:34

## 2017-03-19 RX ADMIN — PROPOFOL 60 MCG/KG/MIN: 10 INJECTION, EMULSION INTRAVENOUS at 04:38

## 2017-03-19 RX ADMIN — ACETAMINOPHEN 650 MG: 325 TABLET, FILM COATED ORAL at 13:07

## 2017-03-19 RX ADMIN — PROPOFOL 35 MCG/KG/MIN: 10 INJECTION, EMULSION INTRAVENOUS at 19:34

## 2017-03-19 RX ADMIN — HEPARIN SODIUM 5000 UNITS: 10000 INJECTION, SOLUTION INTRAVENOUS; SUBCUTANEOUS at 19:49

## 2017-03-19 RX ADMIN — SENNOSIDES AND DOCUSATE SODIUM 2 TABLET: 8.6; 5 TABLET ORAL at 08:44

## 2017-03-19 RX ADMIN — INSULIN GLARGINE 20 UNITS: 100 INJECTION, SOLUTION SUBCUTANEOUS at 14:47

## 2017-03-19 RX ADMIN — MIDAZOLAM HYDROCHLORIDE 2 MG: 1 INJECTION, SOLUTION INTRAMUSCULAR; INTRAVENOUS at 18:03

## 2017-03-19 RX ADMIN — CEFTRIAXONE 1 G: 1 INJECTION, POWDER, FOR SOLUTION INTRAMUSCULAR; INTRAVENOUS at 06:36

## 2017-03-19 RX ADMIN — POTASSIUM CHLORIDE 20 MEQ: 1.5 POWDER, FOR SOLUTION ORAL at 11:47

## 2017-03-19 RX ADMIN — SODIUM PHOSPHATE, MONOBASIC, MONOHYDRATE 15 MMOL: 276; 142 INJECTION, SOLUTION INTRAVENOUS at 07:49

## 2017-03-19 RX ADMIN — ACETAMINOPHEN 650 MG: 325 TABLET, FILM COATED ORAL at 16:02

## 2017-03-19 RX ADMIN — MULTIVITAMIN 15 ML: LIQUID ORAL at 08:44

## 2017-03-19 RX ADMIN — INSULIN ASPART 1 UNITS: 100 INJECTION, SOLUTION INTRAVENOUS; SUBCUTANEOUS at 23:47

## 2017-03-19 RX ADMIN — NAFCILLIN SODIUM 2 G: 2 INJECTION, POWDER, LYOPHILIZED, FOR SOLUTION INTRAMUSCULAR; INTRAVENOUS at 14:47

## 2017-03-19 RX ADMIN — SENNOSIDES AND DOCUSATE SODIUM 2 TABLET: 8.6; 5 TABLET ORAL at 19:49

## 2017-03-19 RX ADMIN — PROPOFOL 60 MCG/KG/MIN: 10 INJECTION, EMULSION INTRAVENOUS at 08:40

## 2017-03-19 RX ADMIN — INSULIN ASPART 3 UNITS: 100 INJECTION, SOLUTION INTRAVENOUS; SUBCUTANEOUS at 14:02

## 2017-03-19 RX ADMIN — NAFCILLIN SODIUM 2 G: 2 INJECTION, POWDER, LYOPHILIZED, FOR SOLUTION INTRAMUSCULAR; INTRAVENOUS at 18:03

## 2017-03-19 NOTE — PROGRESS NOTES
[facility] ICU RESPIRATORY NOTE  Date of Admission:3/17/2017  Date of Intubation (most recent):3/17/2017  Reason for Mechanical Ventilation:Resp failure  Number of Days on Mechanical Ventilation: 3  Met Criteria for Pressure Support Trial:No     Ventilation Mode: CMV/AC  FiO2 (%): 40 %  Rate Set (breaths/minute): 20 breaths/min  Tidal Volume Set (mL): 450 mL  PEEP (cm H2O): 5 cmH2O  Oxygen Concentration (%): 50 %  Resp: 28    ABG Results:        Recent Labs  Lab 03/17/17  0755   PH 7.39   PCO2 38   PO2 58*   HCO3 23   O2PER 40%       Patient remains on full vent support. No PS trial this shift.  Will continue to monitor.  3/19/2017  Cee Baugh

## 2017-03-19 NOTE — PLAN OF CARE
Problem: Goal Outcome Summary  Goal: Goal Outcome Summary  Outcome: Improving  Afebrile.  Sedated on prop.  Withdraws to pain. PRATER, not to command.  LS clear.  CMV 40% 20/450/5.  Overbreathing, 25-33 bpm.  No secretions suctioned.  SR-ST.  BS hypoactive.  TF at goat 35ml/hr.  Mirolax given. No BM.  UO adequate, dark laura in color.  Skin intact.  Phos replacement per protocol.  Daughters at bedside and updated throughout the night.  All other visitors restricted per families wishes.  Will continue with POC.

## 2017-03-19 NOTE — PROGRESS NOTES
Dr Aponte at the bedside rounding, per MD to wean propofol to goal RAAS -2. And if able may attempt SBT, no plans for extubation today.

## 2017-03-19 NOTE — PROGRESS NOTES
[facility] ICU RESPIRATORY NOTE  Date of Admission: 3/17/2017  Date of Intubation (most recent): 3/17/2017  Reason for Mechanical Ventilation: Resp failure  Number of Days on Mechanical Ventilation: 3  Met Criteria for Pressure Support Trial: No  Length of Pressure Support Trial: None  Reason for Stopping Pressure Support Trial:  Reason for No Pressure Support Trial: Per MD    Significant Events Today: None  Ventilation Mode: CMV/AC  FiO2 (%): 40 %  Rate Set (breaths/minute): 20 breaths/min  Tidal Volume Set (mL): 450 mL  PEEP (cm H2O): 5 cmH2O  Oxygen Concentration (%): 40 %  Resp: 33    ABG Results:  No ABG result for today    ETT appearance on chest x-ray: ET tube in good position.    Plan:  Pt remains intubated, continue full mechanical ventilatory support.  3/19/2017  Nieves Bedolla

## 2017-03-19 NOTE — PHARMACY-VANCOMYCIN DOSING SERVICE
Pharmacy Vancomycin Note  Date of Service 2017  Patient's  1960   57 year old, male    Indication: Sepsis  Goal Trough Level: 15-20 mg/L  Day of Therapy: 2  Current Vancomycin regimen:  1250 mg IV q12h    Current estimated CrCl = Estimated Creatinine Clearance: 112.6 mL/min (based on Cr of 0.73).  Creatinine for last 3 days  3/16/2017: 11:40 AM Creatinine 0.77 mg/dL  3/17/2017: 10:25 AM Creatinine 0.67 mg/dL; 10:25 AM Creatinine 0.55 mg/dL  3/18/2017:  4:30 AM Creatinine 0.73 mg/dL    Recent Vancomycin Levels (past 3 days)  3/18/2017:  9:12 PM Vancomycin Level 8.2 mg/L    Vancomycin IV Administrations (past 72 hours)                   vancomycin 1250 mg in 0.9% NaCl 250 mL PREMIX (mg) 1,250 mg New Bag 17 1037     1,250 mg New Bag 17     1,250 mg New Bag  09              Nephrotoxins and other renal medications (Future)    Start     Dose/Rate Route Frequency Ordered Stop    17 2230  vancomycin (VANCOCIN) 1500 mg in 0.9% NaCl 250 mL PREMIX      1,500 mg Intravenous EVERY 12 HOURS 17 2218           Contrast Orders - past 72 hours (72h ago through future)    Start     Dose/Rate Route Frequency Ordered Stop    17 0411  iopamidol (ISOVUE-370) solution 78 mL      78 mL Intravenous ONCE 17 0410 17 0423        Interpretation of levels and current regimen:  Trough level is  Subtherapeutic  Has serum creatinine changed > 50% in last 72 hours: No  Urine output:  good urine output  Renal Function: Stable    Plan:  1.  Increase Dose to 1500mg IV q12hrs  2.  Pharmacy will check trough levels as appropriate in 1-3 Days.    3. Serum creatinine levels will be ordered daily for the first week of therapy and at least twice weekly for subsequent weeks.      Marly Mistry        .

## 2017-03-19 NOTE — PLAN OF CARE
Problem: Individualization  Goal: Patient Preferences  Pt sedated all day withdraws to pain but doesn't follow command, PERRL, propofol decreased to 35mcg/kg.min. SBT attempted X1 but failed right away. MD notified, same vent setting Min vent 13-16 all day. Prn pain meds given with turns and cares. LS coarse- clear at times, mucus plug removed X1. Thick tan secretions suctioned. Levine with good UOP. Tolerating TF at goal now 35cc/hr. Residual low, no BM today, rectal dulcolax suppository given with no results, getting scheduled stool softener. Multiple families and friends to visit. Daughter ester - spokesperson updated about the POC.Tmax 100.2 F, getting tylenol every 4 hours. Blood cultures yesterday grew gram positive cocci, new set of blood culture drawn today.  Will continue to monitor

## 2017-03-19 NOTE — PROGRESS NOTES
Critical Care Progress Note      03/19/2017    Name: Wyatt Mahajan MRN#: [id number]   Age: 57 year old YOB: 1960     [facility]    [facility] DAY #2             Problem List:   Active Problems:    Acute respiratory failure with hypoxia (H)           Summary/Hospital Course:   Wyatt Mahajan is a 57 year old male admitted on 3/17 to the floor, presenting with acute hypoxic respiratory failure due to influenza B and CAP, right chest wall pain and uncontrolled DM-II. Patient was subsequently transferred from the floor to the MICU for increased work of breathing, accessory muscle use and respiratory distress. Was intubated due to increased work of breathing, tachypnea and decreasing O2 sats.  3/18:  Weaned vent yesterday and overnight.  Comfortably sedated this AM.  +blood cx for staph aureus overnight.  On vanco--repeating blood cx.  3/19:  Still minimal vent settings but minute ventilation still ~13 so won't try to extubate today.  Weaning from insulin drip back to lantus/SSI now on steady TF.  Blood cultures persistently positive--MSSA.  Change to nafcillin.  Reculture.  TTE.      Assessment and plan :     Wyatt Mahajan IS a 57 year old male admitted on 3/17/2017 for respiratory failure, flu B, mssa pna, mssa bacteremia.   I have personally reviewed the daily labs, imaging studies, cultures and discussed the case with referring physician and consulting physicians.   My assessment and plan by system for this patient is as follows:    Neurology/Psychiatry:   1. Sedation:  propfol drip, prn dilaudid and midazolam  2.  Delirium:  Seems delirious when sedation weaned.  On seroquel.  ()     Cardiovascular:   1. H/o HLD:  Cont atorvastatin  2.  H/o PAD:  cotninue ASA  3.  Hyperlactemia:  Downtrending->resolved.  Hemodynamically stable.    Pulmonary/Ventilator Management:   1.  Respiratory failure, hypoxemic:  2/2 mssa pna and influenza B.  May also have element of ARDS, but only on  minimal vent settings (40%, peep5)  --narrow cvg to nafcillin  --cont tamiflu  --Lung protective vent strategy.    GI and Nutrition :   1.  TF at goal  2.  PPI for PUD prophy    Renal/Fluids/Electrolytes:   1. Monitor  Cr/UOP  2. Replete electrolytes prn.  3.  Gentle diuresis--keep even.     Infectious Disease:   1. Flu B:  Tamiflu  2.  mssa pna/bacteremia: narrrow to nafcillin.  Rechecking blood cx. TTE as 2nd set of blood cx turning positive.  Will JEFF if TTE neg and blood cx continue turning positive.    Endocrine:   1. Hyperglycemia:  H/o DM2.  Weaned insulin drip over to lantus (starting at 20 -- on 26 at home)  and ssi.    Hematology/Oncology:   1. Pltlt/hgb stable  2.  Mild leukocytosis-- 2/2 infection     IV/Access:   1. Venous access - peripheral    ICU Prophylaxis:   1. DVT: Hep Subq  2. VAP: HOB 30 degrees, chlorhexidine rinse  3. Stress Ulcer: PPI  4. Restraints: Nonviolent soft two point restraints required and necessary for patient safety and continued cares and good effect as patient continues to pull at necessary lines, tubes despite education and distraction. Will readdress daily.   6. Feeding - TF  7. Family Update: at bedside  8. Disposition - critically ill        Key goals for next 24 hours:   1. Narrowing abx cvg  2. Cont tamiflu  3. Repeat blood cx, perform TTE.               Interim History:     Still minimal vent settings but minute ventilation still ~13 so won't try to extubate today.  Weaning from insulin drip back to lantus/SSI now on steady TF.  Blood cultures persistently positive--MSSA.  Change to nafcillin.  Reculture.  TTE.         Key Medications:       insulin aspart  1-12 Units Subcutaneous Q4H     insulin glargine  20 Units Subcutaneous Once     [START ON 3/20/2017] insulin glargine  20 Units Subcutaneous At Bedtime     nafcillin  2 g Intravenous Q4H     QUEtiapine  25 mg Oral BID     heparin  5,000 Units Subcutaneous Q8H     atorvastatin  40 mg Oral Daily     sodium chloride  (PF)  3 mL Intracatheter Q8H     pneumococcal vaccine  0.5 mL Intramuscular Prior to discharge     sodium chloride (PF)  3 mL Intracatheter Q8H     rocuronium  0.6 mg/kg Intravenous Once     senna-docusate  1-2 tablet Oral BID 09 12     chlorhexidine  15 mL Mouth/Throat Q12H     oseltamivir  75 mg Oral BID     multivitamins with minerals  15 mL Per Feeding Tube Daily     aspirin  81 mg Oral or Feeding Tube Daily     pantoprazole  40 mg Per Feeding Tube Daily       NaCl Stopped (03/19/17 0800)     propofol (DIPRIVAN) infusion 25 mcg/kg/min (03/19/17 1313)     insulin (regular) Stopped (03/18/17 0448)     IV fluid REPLACEMENT ONLY                 Physical Examination:   Temp:  [98.2  F (36.8  C)-101.3  F (38.5  C)] 99.9  F (37.7  C)  Heart Rate:  [] 95  Resp:  [23-34] 34  BP: ()/(55-84) 103/64  FiO2 (%):  [40 %] 40 %  SpO2:  [94 %-100 %] 97 %      Intake/Output Summary (Last 24 hours) at 03/19/17 1353  Last data filed at 03/19/17 1300   Gross per 24 hour   Intake           3863.1 ml   Output              895 ml   Net           2968.1 ml         Wt Readings from Last 4 Encounters:   03/19/17 72.1 kg (158 lb 15.2 oz)   03/16/17 70.3 kg (155 lb)   01/17/17 71.3 kg (157 lb 1.6 oz)   11/30/16 72.6 kg (160 lb)     BP - Mean:  [64-97] 75  Ventilation Mode: CMV/AC  FiO2 (%): 40 %  Rate Set (breaths/minute): 20 breaths/min  Tidal Volume Set (mL): 450 mL  PEEP (cm H2O): 5 cmH2O  Oxygen Concentration (%): 40 %  Resp: 34    Recent Labs  Lab 03/17/17  0755   PH 7.39   PCO2 38   PO2 58*   HCO3 23   O2PER 40%       GEN: no acute distress   HEENT: head ncat, sclera anicteric, OP patent, trachea midline   PULM: unlabored synchronous with vent, clear anteriorly    CV/COR: RRR S1S2 no gallop,  No rub, no murmur  ABD: soft nontender, hypoactive bowel sounds, no mass  EXT:  No Edema,   Warm x4  NEURO: sedated  SKIN: no obvious rash  LINES: clean, dry intact         Data:   All data and imaging reviewed     ROUTINE ICU LABS  (Last four results)  CMP    Recent Labs  Lab 03/19/17  0505 03/18/17  0430 03/17/17  1025 03/17/17  0635 03/17/17  0330    144 141  --  139   POTASSIUM 3.4 3.4 3.9  --  3.9   CHLORIDE 107 110* 109  --  105   CO2 26 27 25  --  21   ANIONGAP 7 7 7  --  13   * 71 232*  --  315*   BUN 17 22 15  --  17   CR 0.62* 0.73 0.55*  --  0.67   GFRESTIMATED >90Non  GFR Calc >90Non  GFR Calc >90Non  GFR Calc  --  >90Non  GFR Calc   GFRESTBLACK >90African American GFR Calc >90African American GFR Calc >90African American GFR Calc  --  >90African American GFR Calc   LEONIE 7.2* 7.8* 7.9*  --  8.5   MAG 2.5* 1.9  --   --   --    PHOS 2.0* 1.5*  --   --   --    PROTTOTAL  --   --   --  6.4* 7.3   ALBUMIN  --   --   --  2.7* 3.2*   BILITOTAL  --   --   --  0.5 0.8   ALKPHOS  --   --   --  63 71   AST  --   --   --  28 32   ALT  --   --   --  35 45     CBC    Recent Labs  Lab 03/19/17  0505 03/18/17  0430 03/17/17  1025 03/17/17  0330   WBC 11.3* 11.4* 4.8 5.0   RBC 3.70* 4.16* 4.60 4.86   HGB 11.7* 13.5 14.6 15.7   HCT 33.8* 37.9* 42.3 44.2   MCV 91 91 92 91   MCH 31.6 32.5 31.7 32.3   MCHC 34.6 35.6 34.5 35.5   RDW 13.4 13.2 13.2 12.8   * 111* 120* 118*     INRNo lab results found in last 7 days.  Arterial Blood Gas    Recent Labs  Lab 03/17/17  0755   PH 7.39   PCO2 38   PO2 58*   HCO3 23   O2PER 40%       All cultures:    Recent Labs  Lab 03/18/17  1055 03/18/17  1050 03/17/17  0725 03/17/17  0355 03/17/17  0330   CULT Cultured on the 1st day of incubation: Gram positive cocci in clustersCritical Value/Significant Value, preliminary result only, called to and read back by Shelia Osuna at [facility] on 03.19.17 12:31 P.M. JT.* No growth after 15 hours Heavy growth Staphylococcus aureus Susceptibility testing in progressPlus Light growth Normal juan* Cultured on the 1st day of incubation: Staphylococcus aureusCritical Value/Significant Value,  preliminary result only, called to and read back by June Olivo RN at 0456 [id number].DKSusceptibility testing done on previous specimen* Cultured on the 1st day of incubation: Staphylococcus aureusCritical Value/Significant Value, preliminary result only, called to and read back by June Olivo RN at 0108 [id number](Note)POSITIVE for STAPHYLOCOCCUS AUREUS and NEGATIVE for the mecA gene(not MRSA) by Verigene multiplex nucleic acid test. The mecA gene wasnot detected. Final identification and antimicrobial susceptibilitytesting will be verified by standard methods.Specimen tested with Verigene multiplex, gram-positive blood culturenucleic acid test for the following targets: Staph aureus, Staphepidermidis, Staph lugdunensis, other Staph species, Enterococcusfaecalis, Enterococcus faecium, Streptococcus species, S. agalactiae,S. anginosus grp., S. pneumoniae, S. pyogenes, Listeria sp., mecA(methicillin resistance) and [place]/B (vancomycin resistance).Critical Value/Significant Value called to and read back by SARA Allen at 0350 [id number]*     Recent Results (from the past 24 hour(s))   XR Chest Port 1 View    Narrative    XR CHEST PORT 1 VW 3/17/2017 11:35 AM    HISTORY: Intubation.    COMPARISON: 3/16/2017    FINDINGS: The endotracheal tube tip is at the debi, directed toward  the left mainstem bronchus. In comparison with yesterday's exam there  is significant diffuse airspace opacity with more focal consolidation  in the lower segment of the right upper lobe. No pleural effusion or  pneumothorax.      Impression    IMPRESSION: Endotracheal tube should be pulled back by distance of  approximately 3-4 cm.    TARIQ MALCOLM MD   XR Abdomen Port 1 View    Narrative    XR ABDOMEN PORT F1 VW 3/17/2017 11:37 AM    HISTORY: OG placement.    COMPARISON: None.      Impression    IMPRESSION: The enteric tube tip is in the projection of the stomach.  The gas pattern is nonobstructive.    TARIQ MALCOLM MD   XR Chest  Port 1 View    Narrative    XR CHEST PORT 1 VW  3/18/2017 6:40 AM     HISTORY:  Intubated    COMPARISON: Film performed on 3/17/2017    FINDINGS: This is a semiupright film.  ET tube and NG tube are in  place. The ET tube has been pulled back since the previous film.  Extensive bilateral interstitial and alveolar infiltrate is again  seen. This has not changed significantly.      Impression    IMPRESSION: Considering differences in film technique, no significant  change. Persistent bilateral infiltrate.    ALISHA YAN MD         Billing: This patient is critically ill: Yes. Total critical care time today 35 min.

## 2017-03-20 ENCOUNTER — APPOINTMENT (OUTPATIENT)
Dept: CARDIOLOGY | Facility: CLINIC | Age: 57
DRG: 870 | End: 2017-03-20
Attending: INTERNAL MEDICINE
Payer: COMMERCIAL

## 2017-03-20 LAB
ANION GAP SERPL CALCULATED.3IONS-SCNC: 11 MMOL/L (ref 3–14)
ANION GAP SERPL CALCULATED.3IONS-SCNC: 9 MMOL/L (ref 3–14)
BACTERIA SPEC CULT: ABNORMAL
BACTERIA SPEC CULT: ABNORMAL
BUN SERPL-MCNC: 17 MG/DL (ref 7–30)
BUN SERPL-MCNC: 22 MG/DL (ref 7–30)
CALCIUM SERPL-MCNC: 7.6 MG/DL (ref 8.5–10.1)
CALCIUM SERPL-MCNC: 7.6 MG/DL (ref 8.5–10.1)
CHLORIDE SERPL-SCNC: 107 MMOL/L (ref 94–109)
CHLORIDE SERPL-SCNC: 107 MMOL/L (ref 94–109)
CO2 SERPL-SCNC: 27 MMOL/L (ref 20–32)
CO2 SERPL-SCNC: 27 MMOL/L (ref 20–32)
CREAT SERPL-MCNC: 0.92 MG/DL (ref 0.66–1.25)
CREAT SERPL-MCNC: 1.21 MG/DL (ref 0.66–1.25)
ERYTHROCYTE [DISTWIDTH] IN BLOOD BY AUTOMATED COUNT: 13.7 % (ref 10–15)
GFR SERPL CREATININE-BSD FRML MDRD: 62 ML/MIN/1.7M2
GFR SERPL CREATININE-BSD FRML MDRD: 86 ML/MIN/1.7M2
GLUCOSE BLDC GLUCOMTR-MCNC: 125 MG/DL (ref 70–99)
GLUCOSE BLDC GLUCOMTR-MCNC: 137 MG/DL (ref 70–99)
GLUCOSE BLDC GLUCOMTR-MCNC: 142 MG/DL (ref 70–99)
GLUCOSE BLDC GLUCOMTR-MCNC: 143 MG/DL (ref 70–99)
GLUCOSE BLDC GLUCOMTR-MCNC: 145 MG/DL (ref 70–99)
GLUCOSE BLDC GLUCOMTR-MCNC: 147 MG/DL (ref 70–99)
GLUCOSE BLDC GLUCOMTR-MCNC: 167 MG/DL (ref 70–99)
GLUCOSE SERPL-MCNC: 131 MG/DL (ref 70–99)
GLUCOSE SERPL-MCNC: 159 MG/DL (ref 70–99)
HCT VFR BLD AUTO: 33.9 % (ref 40–53)
HGB BLD-MCNC: 11.7 G/DL (ref 13.3–17.7)
Lab: ABNORMAL
Lab: ABNORMAL
MAGNESIUM SERPL-MCNC: 2.8 MG/DL (ref 1.6–2.3)
MCH RBC QN AUTO: 31.5 PG (ref 26.5–33)
MCHC RBC AUTO-ENTMCNC: 34.5 G/DL (ref 31.5–36.5)
MCV RBC AUTO: 91 FL (ref 78–100)
MICRO REPORT STATUS: ABNORMAL
MICRO REPORT STATUS: ABNORMAL
MICROORGANISM SPEC CULT: ABNORMAL
PHOSPHATE SERPL-MCNC: 2.9 MG/DL (ref 2.5–4.5)
PLATELET # BLD AUTO: 147 10E9/L (ref 150–450)
POTASSIUM SERPL-SCNC: 3.4 MMOL/L (ref 3.4–5.3)
POTASSIUM SERPL-SCNC: 3.5 MMOL/L (ref 3.4–5.3)
RBC # BLD AUTO: 3.71 10E12/L (ref 4.4–5.9)
SODIUM SERPL-SCNC: 143 MMOL/L (ref 133–144)
SODIUM SERPL-SCNC: 145 MMOL/L (ref 133–144)
SPECIMEN SOURCE: ABNORMAL
SPECIMEN SOURCE: ABNORMAL
WBC # BLD AUTO: 13.2 10E9/L (ref 4–11)

## 2017-03-20 PROCEDURE — 94003 VENT MGMT INPAT SUBQ DAY: CPT

## 2017-03-20 PROCEDURE — 27210429 ZZH NUTRITION PRODUCT INTERMEDIATE LITER

## 2017-03-20 PROCEDURE — 83735 ASSAY OF MAGNESIUM: CPT | Performed by: INTERNAL MEDICINE

## 2017-03-20 PROCEDURE — 40000275 ZZH STATISTIC RCP TIME EA 10 MIN

## 2017-03-20 PROCEDURE — 00000146 ZZHCL STATISTIC GLUCOSE BY METER IP

## 2017-03-20 PROCEDURE — S0032 INJECTION, NAFCILLIN SODIUM: HCPCS | Performed by: INTERNAL MEDICINE

## 2017-03-20 PROCEDURE — 25000131 ZZH RX MED GY IP 250 OP 636 PS 637: Performed by: INTERNAL MEDICINE

## 2017-03-20 PROCEDURE — 40000008 ZZH STATISTIC AIRWAY CARE

## 2017-03-20 PROCEDURE — 25000128 H RX IP 250 OP 636: Performed by: INTERNAL MEDICINE

## 2017-03-20 PROCEDURE — 20000003 ZZH R&B ICU

## 2017-03-20 PROCEDURE — 25000132 ZZH RX MED GY IP 250 OP 250 PS 637: Performed by: INTERNAL MEDICINE

## 2017-03-20 PROCEDURE — 93306 TTE W/DOPPLER COMPLETE: CPT | Mod: 26 | Performed by: INTERNAL MEDICINE

## 2017-03-20 PROCEDURE — 93306 TTE W/DOPPLER COMPLETE: CPT

## 2017-03-20 PROCEDURE — 99291 CRITICAL CARE FIRST HOUR: CPT | Performed by: INTERNAL MEDICINE

## 2017-03-20 PROCEDURE — 80048 BASIC METABOLIC PNL TOTAL CA: CPT | Performed by: INTERNAL MEDICINE

## 2017-03-20 PROCEDURE — 25000128 H RX IP 250 OP 636: Performed by: HOSPITALIST

## 2017-03-20 PROCEDURE — 25000125 ZZHC RX 250: Performed by: INTERNAL MEDICINE

## 2017-03-20 PROCEDURE — 25000132 ZZH RX MED GY IP 250 OP 250 PS 637: Performed by: HOSPITALIST

## 2017-03-20 PROCEDURE — 36415 COLL VENOUS BLD VENIPUNCTURE: CPT | Performed by: INTERNAL MEDICINE

## 2017-03-20 PROCEDURE — 84100 ASSAY OF PHOSPHORUS: CPT | Performed by: INTERNAL MEDICINE

## 2017-03-20 PROCEDURE — 85027 COMPLETE CBC AUTOMATED: CPT | Performed by: INTERNAL MEDICINE

## 2017-03-20 RX ORDER — FUROSEMIDE 10 MG/ML
40 INJECTION INTRAMUSCULAR; INTRAVENOUS EVERY 8 HOURS
Status: COMPLETED | OUTPATIENT
Start: 2017-03-20 | End: 2017-03-20

## 2017-03-20 RX ADMIN — HYDROMORPHONE HYDROCHLORIDE 0.2 MG: 1 INJECTION, SOLUTION INTRAMUSCULAR; INTRAVENOUS; SUBCUTANEOUS at 16:35

## 2017-03-20 RX ADMIN — FUROSEMIDE 40 MG: 10 INJECTION, SOLUTION INTRAVENOUS at 20:04

## 2017-03-20 RX ADMIN — FUROSEMIDE 40 MG: 10 INJECTION, SOLUTION INTRAVENOUS at 13:03

## 2017-03-20 RX ADMIN — NAFCILLIN SODIUM 2 G: 2 INJECTION, POWDER, LYOPHILIZED, FOR SOLUTION INTRAMUSCULAR; INTRAVENOUS at 21:27

## 2017-03-20 RX ADMIN — INSULIN ASPART 1 UNITS: 100 INJECTION, SOLUTION INTRAVENOUS; SUBCUTANEOUS at 16:45

## 2017-03-20 RX ADMIN — NAFCILLIN SODIUM 2 G: 2 INJECTION, POWDER, LYOPHILIZED, FOR SOLUTION INTRAMUSCULAR; INTRAVENOUS at 18:20

## 2017-03-20 RX ADMIN — PROPOFOL 55 MCG/KG/MIN: 10 INJECTION, EMULSION INTRAVENOUS at 17:10

## 2017-03-20 RX ADMIN — HEPARIN SODIUM 5000 UNITS: 10000 INJECTION, SOLUTION INTRAVENOUS; SUBCUTANEOUS at 20:10

## 2017-03-20 RX ADMIN — CHLORHEXIDINE GLUCONATE 15 ML: 1.2 RINSE ORAL at 08:25

## 2017-03-20 RX ADMIN — PROPOFOL 40 MCG/KG/MIN: 10 INJECTION, EMULSION INTRAVENOUS at 07:45

## 2017-03-20 RX ADMIN — HYDROMORPHONE HYDROCHLORIDE 0.2 MG: 1 INJECTION, SOLUTION INTRAMUSCULAR; INTRAVENOUS; SUBCUTANEOUS at 06:35

## 2017-03-20 RX ADMIN — PROPOFOL 50 MCG/KG/MIN: 10 INJECTION, EMULSION INTRAVENOUS at 21:13

## 2017-03-20 RX ADMIN — Medication 81 MG: at 09:55

## 2017-03-20 RX ADMIN — QUETIAPINE FUMARATE 25 MG: 25 TABLET, FILM COATED ORAL at 21:09

## 2017-03-20 RX ADMIN — HYDROMORPHONE HYDROCHLORIDE 0.2 MG: 1 INJECTION, SOLUTION INTRAMUSCULAR; INTRAVENOUS; SUBCUTANEOUS at 02:16

## 2017-03-20 RX ADMIN — ACETAMINOPHEN 650 MG: 325 TABLET, FILM COATED ORAL at 18:20

## 2017-03-20 RX ADMIN — HEPARIN SODIUM 5000 UNITS: 10000 INJECTION, SOLUTION INTRAVENOUS; SUBCUTANEOUS at 05:06

## 2017-03-20 RX ADMIN — ACETAMINOPHEN 650 MG: 325 TABLET, FILM COATED ORAL at 06:25

## 2017-03-20 RX ADMIN — HYDROMORPHONE HYDROCHLORIDE 0.2 MG: 1 INJECTION, SOLUTION INTRAMUSCULAR; INTRAVENOUS; SUBCUTANEOUS at 18:20

## 2017-03-20 RX ADMIN — POTASSIUM CHLORIDE 20 MEQ: 1.5 POWDER, FOR SOLUTION ORAL at 20:07

## 2017-03-20 RX ADMIN — OSELTAMIVIR PHOSPHATE 75 MG: 6 POWDER, FOR SUSPENSION ORAL at 21:08

## 2017-03-20 RX ADMIN — MIDAZOLAM HYDROCHLORIDE 2 MG: 1 INJECTION, SOLUTION INTRAMUSCULAR; INTRAVENOUS at 23:25

## 2017-03-20 RX ADMIN — PROPOFOL 30 MCG/KG/MIN: 10 INJECTION, EMULSION INTRAVENOUS at 02:12

## 2017-03-20 RX ADMIN — OSELTAMIVIR PHOSPHATE 75 MG: 6 POWDER, FOR SUSPENSION ORAL at 09:56

## 2017-03-20 RX ADMIN — NAFCILLIN SODIUM 2 G: 2 INJECTION, POWDER, LYOPHILIZED, FOR SOLUTION INTRAMUSCULAR; INTRAVENOUS at 05:05

## 2017-03-20 RX ADMIN — INSULIN GLARGINE 20 UNITS: 100 INJECTION, SOLUTION SUBCUTANEOUS at 21:27

## 2017-03-20 RX ADMIN — HYDROMORPHONE HYDROCHLORIDE 0.2 MG: 1 INJECTION, SOLUTION INTRAMUSCULAR; INTRAVENOUS; SUBCUTANEOUS at 12:54

## 2017-03-20 RX ADMIN — NAFCILLIN SODIUM 2 G: 2 INJECTION, POWDER, LYOPHILIZED, FOR SOLUTION INTRAMUSCULAR; INTRAVENOUS at 02:11

## 2017-03-20 RX ADMIN — POTASSIUM CHLORIDE 10 MEQ: 14.9 INJECTION, SOLUTION, CONCENTRATE PARENTERAL at 11:05

## 2017-03-20 RX ADMIN — NAFCILLIN SODIUM 2 G: 2 INJECTION, POWDER, LYOPHILIZED, FOR SOLUTION INTRAMUSCULAR; INTRAVENOUS at 14:12

## 2017-03-20 RX ADMIN — INSULIN ASPART 1 UNITS: 100 INJECTION, SOLUTION INTRAVENOUS; SUBCUTANEOUS at 20:13

## 2017-03-20 RX ADMIN — HEPARIN SODIUM 5000 UNITS: 10000 INJECTION, SOLUTION INTRAVENOUS; SUBCUTANEOUS at 11:25

## 2017-03-20 RX ADMIN — MIDAZOLAM HYDROCHLORIDE 2 MG: 1 INJECTION, SOLUTION INTRAMUSCULAR; INTRAVENOUS at 20:35

## 2017-03-20 RX ADMIN — MIDAZOLAM HYDROCHLORIDE 2 MG: 1 INJECTION, SOLUTION INTRAMUSCULAR; INTRAVENOUS at 05:05

## 2017-03-20 RX ADMIN — INSULIN ASPART 2 UNITS: 100 INJECTION, SOLUTION INTRAVENOUS; SUBCUTANEOUS at 11:29

## 2017-03-20 RX ADMIN — POTASSIUM CHLORIDE 10 MEQ: 14.9 INJECTION, SOLUTION, CONCENTRATE PARENTERAL at 12:36

## 2017-03-20 RX ADMIN — POLYETHYLENE GLYCOL 3350 17 G: 17 POWDER, FOR SOLUTION ORAL at 18:20

## 2017-03-20 RX ADMIN — ATORVASTATIN CALCIUM 40 MG: 40 TABLET, FILM COATED ORAL at 09:55

## 2017-03-20 RX ADMIN — SENNOSIDES AND DOCUSATE SODIUM 2 TABLET: 8.6; 5 TABLET ORAL at 20:06

## 2017-03-20 RX ADMIN — PANTOPRAZOLE SODIUM 40 MG: 40 TABLET, DELAYED RELEASE ORAL at 09:55

## 2017-03-20 RX ADMIN — BISACODYL 10 MG: 10 SUPPOSITORY RECTAL at 18:19

## 2017-03-20 RX ADMIN — MULTIVITAMIN 15 ML: LIQUID ORAL at 09:55

## 2017-03-20 RX ADMIN — MIDAZOLAM HYDROCHLORIDE 2 MG: 1 INJECTION, SOLUTION INTRAMUSCULAR; INTRAVENOUS at 17:06

## 2017-03-20 RX ADMIN — SENNOSIDES AND DOCUSATE SODIUM 1 TABLET: 8.6; 5 TABLET ORAL at 09:55

## 2017-03-20 RX ADMIN — NAFCILLIN SODIUM 2 G: 2 INJECTION, POWDER, LYOPHILIZED, FOR SOLUTION INTRAMUSCULAR; INTRAVENOUS at 10:02

## 2017-03-20 RX ADMIN — QUETIAPINE FUMARATE 25 MG: 25 TABLET, FILM COATED ORAL at 09:55

## 2017-03-20 RX ADMIN — INSULIN ASPART 1 UNITS: 100 INJECTION, SOLUTION INTRAVENOUS; SUBCUTANEOUS at 23:28

## 2017-03-20 RX ADMIN — INSULIN ASPART 1 UNITS: 100 INJECTION, SOLUTION INTRAVENOUS; SUBCUTANEOUS at 08:28

## 2017-03-20 RX ADMIN — CHLORHEXIDINE GLUCONATE 15 ML: 1.2 RINSE ORAL at 20:04

## 2017-03-20 RX ADMIN — OXYCODONE HYDROCHLORIDE 5 MG: 5 TABLET ORAL at 12:54

## 2017-03-20 RX ADMIN — MIDAZOLAM HYDROCHLORIDE 2 MG: 1 INJECTION, SOLUTION INTRAMUSCULAR; INTRAVENOUS at 21:50

## 2017-03-20 NOTE — PLAN OF CARE
Problem: Goal Outcome Summary  Goal: Goal Outcome Summary  Outcome: No Change  Withdraws from pain.  PRATER, not to command.  Sedated on propofol.  LS Clear.  Minimal/no secretions.  SR/ST.  BM on evenings.  TF at goal.  Good UOP.  Skin intact.  Sliding scale BS. Family updated at bedside all night.

## 2017-03-20 NOTE — PROGRESS NOTES
[facility] ICU RESPIRATORY NOTE  Date of Admission:3/17/17  Date of Intubation (most recent):3/17/17  Reason for Mechanical Ventilation:Respiratory failure  Number of Days on Mechanical Ventilation: 4  Met Criteria for Pressure Support Trial: no  Ventilation Mode: CMV/AC  FiO2 (%): 40 %  Rate Set (breaths/minute): 20 breaths/min  Tidal Volume Set (mL): 450 mL  PEEP (cm H2O): 5 cmH2O  Oxygen Concentration (%): 40 %  Resp: 31    Significant Events Today: none during this shift    ABG Results: no new ABG    ETT appearance on chest x-ray: in good position    Plan: RT will continue to assess pt readiness for PS trial in the morning  3/20/2017  Rubio Epstein

## 2017-03-20 NOTE — PROGRESS NOTES
Date of Admission: 3/17/2017  Date of Intubation (most recent): 3/17/2017  Reason for Mechanical Ventilation: Resp failure  Number of Days on Mechanical Ventilation: 4  Met Criteria for Pressure Support Trial: No  Length of Pressure Support Trial: None  Reason for No Pressure Support Trial: Per MD     Significant Events Today: None  Ventilation Mode: CMV/AC  FiO2 (%): 40 %  Rate Set (breaths/minute): 20 breaths/min  Tidal Volume Set (mL): 450 mL  PEEP (cm H2O): 5 cmH2O  Oxygen Concentration (%): 40 %  Resp: 33     ABG Results:  No ABG result for today     ETT appearance on chest x-ray: ET tube in good position.     Plan: Pt remains intubated, continue full mechanical ventilatory support.  3/20/2017  Nieves Bedolla

## 2017-03-20 NOTE — PROGRESS NOTES
Critical Care Progress Note      03/20/2017    Name: Wyatt Mahajan MRN#: [id number]   Age: 57 year old YOB: 1960     [facility]  [facility]3    MV DAY #3             Problem List:   Active Problems:    Acute respiratory failure with hypoxia (H)  MSSA pna  MSSA bacteremia  Influenza B         Summary/Hospital Course:   Wyatt Mahajan is a 57 year old male admitted on 3/17 to the floor, presenting with acute hypoxic respiratory failure due to influenza B and CAP, right chest wall pain and uncontrolled DM-II. Patient was subsequently transferred from the floor to the MICU for increased work of breathing, accessory muscle use and respiratory distress. Was intubated due to increased work of breathing, tachypnea and decreasing O2 sats.    3/18:  Weaned vent yesterday and overnight.  Comfortably sedated this AM.  +blood cx for staph aureus overnight.  On vanco--repeating blood cx.    3/19:  Still minimal vent settings but minute ventilation   still ~13 so won't try to extubate today.  Weaning from insulin drip back to lantus/SSI now on steady TF.  Blood cultures persistently positive--MSSA.  Change to nafcillin.  Reculture.  TTE.    3/20:  No changes, no events. Gentle diuresis yesterday but still positive.  Increasing diuresis today.       Assessment and plan :     Wyatt Mahajan IS a 57 year old male admitted on 3/17/2017 for respiratory failure, flu B, mssa pna, mssa bacteremia.   I have personally reviewed the daily labs, imaging studies, cultures and discussed the case with referring physician and consulting physicians.   My assessment and plan by system for this patient is as follows:    Neurology/Psychiatry:   1. Sedation:  propfol drip, prn dilaudid and midazolam  2.  Delirium:  On seroquel.  ()     Cardiovascular:   1. H/o HLD:  Cont atorvastatin  2.  H/o PAD:  cotninue ASA  3.  Hyperlactemia:  Resolved    Pulmonary/Ventilator Management:   1.  Respiratory failure, hypoxemic:  2/2  mssa pna and influenza B.  May also have element of ARDS, but only on minimal vent settings (40%, peep5)  --narrow cvg to nafcillin  --cont tamiflu  --Lung protective vent strategy.    GI and Nutrition :   1.  TF at goal  2.  PPI for PUD prophy    Renal/Fluids/Electrolytes:   1. Monitor  Cr/UOP  2. Replete electrolytes prn.  3.  Gentle diuresis--keep even.     Infectious Disease:   1. Flu B:  Tamiflu  2.  mssa pna/bacteremia: narrrow to nafcillin.  Rechecking blood cx. TTE as 2nd set of blood cx turning positive.  Will JEFF if TTE neg and blood cx continue turning positive.    Endocrine:   1. Hyperglycemia:  H/o DM2.  Weaned insulin drip over to lantus (starting at 20 -- on 26 at home)  and ssi.    Hematology/Oncology:   1. Pltlt/hgb stable  2.  Mild leukocytosis-- 2/2 infection     IV/Access:   1. Venous access - peripheral    ICU Prophylaxis:   1. DVT: Hep Subq  2. VAP: HOB 30 degrees, chlorhexidine rinse  3. Stress Ulcer: PPI  4. Restraints: Nonviolent soft two point restraints required and necessary for patient safety and continued cares and good effect as patient continues to pull at necessary lines, tubes despite education and distraction. Will readdress daily.   6. Feeding - TF  7. Family Update: at bedside  8. Disposition - critically ill        Key goals for next 24 hours:   1. Cont nafcillin  2. Cont tamiflu  3. TTE  4.  Diuresis               Interim History:     No changes, no events. Gentle diuresis yesterday but still positive.  Increasing diuresis today.          Key Medications:       insulin aspart  1-12 Units Subcutaneous Q4H     insulin glargine  20 Units Subcutaneous At Bedtime     nafcillin  2 g Intravenous Q4H     QUEtiapine  25 mg Oral BID     heparin  5,000 Units Subcutaneous Q8H     atorvastatin  40 mg Oral Daily     sodium chloride (PF)  3 mL Intracatheter Q8H     pneumococcal vaccine  0.5 mL Intramuscular Prior to discharge     sodium chloride (PF)  3 mL Intracatheter Q8H     rocuronium   0.6 mg/kg Intravenous Once     senna-docusate  1-2 tablet Oral BID 09 12     chlorhexidine  15 mL Mouth/Throat Q12H     oseltamivir  75 mg Oral BID     multivitamins with minerals  15 mL Per Feeding Tube Daily     aspirin  81 mg Oral or Feeding Tube Daily     pantoprazole  40 mg Per Feeding Tube Daily       NaCl 10 mL/hr at 03/19/17 1403     propofol (DIPRIVAN) infusion 40 mcg/kg/min (03/20/17 1016)     IV fluid REPLACEMENT ONLY                 Physical Examination:   Temp:  [99  F (37.2  C)-100.6  F (38.1  C)] 99.5  F (37.5  C)  Heart Rate:  [] 99  Resp:  [27-36] 36  BP: ()/(55-80) 123/80  FiO2 (%):  [40 %] 40 %  SpO2:  [95 %-100 %] 97 %        Intake/Output Summary (Last 24 hours) at 03/20/17 1140  Last data filed at 03/20/17 1016   Gross per 24 hour   Intake          2747.78 ml   Output             1845 ml   Net           902.78 ml     Wt Readings from Last 4 Encounters:   03/20/17 73.1 kg (161 lb 2.5 oz)   03/16/17 70.3 kg (155 lb)   01/17/17 71.3 kg (157 lb 1.6 oz)   11/30/16 72.6 kg (160 lb)     BP - Mean:  [65-94] 94  Ventilation Mode: CMV/AC  FiO2 (%): 40 %  Rate Set (breaths/minute): 20 breaths/min  Tidal Volume Set (mL): 450 mL  PEEP (cm H2O): 5 cmH2O  Oxygen Concentration (%): 40 %  Resp: 36    Recent Labs  Lab 03/17/17  0755   PH 7.39   PCO2 38   PO2 58*   HCO3 23   O2PER 40%       GEN: no acute distress   HEENT: head ncat, sclera anicteric, OP patent, trachea midline   PULM: unlabored synchronous with vent, clear anteriorly    CV/COR: RRR S1S2 no gallop,  No rub, no murmur  ABD: soft nontender, hypoactive bowel sounds, no mass  EXT:  No Edema,   Warm x4  NEURO: sedated  SKIN: no obvious rash  LINES: clean, dry intact         Data:   All data and imaging reviewed     ROUTINE ICU LABS (Last four results)  CMP    Recent Labs  Lab 03/20/17  0450 03/19/17  0505 03/18/17  0430 03/17/17  1025 03/17/17  0635 03/17/17  0330    140 144 141  --  139   POTASSIUM 3.5 3.4 3.4 3.9  --  3.9   CHLORIDE  107 107 110* 109  --  105   CO2 27 26 27 25  --  21   ANIONGAP 9 7 7 7  --  13   * 129* 71 232*  --  315*   BUN 17 17 22 15  --  17   CR 0.92 0.62* 0.73 0.55*  --  0.67   GFRESTIMATED 86 >90Non  GFR Calc >90Non  GFR Calc >90Non  GFR Calc  --  >90Non  GFR Calc   GFRESTBLACK >90African American GFR Calc >90African American GFR Calc >90African American GFR Calc >90African American GFR Calc  --  >90African American GFR Calc   LEONIE 7.6* 7.2* 7.8* 7.9*  --  8.5   MAG 2.8* 2.5* 1.9  --   --   --    PHOS 2.9 2.0* 1.5*  --   --   --    PROTTOTAL  --   --   --   --  6.4* 7.3   ALBUMIN  --   --   --   --  2.7* 3.2*   BILITOTAL  --   --   --   --  0.5 0.8   ALKPHOS  --   --   --   --  63 71   AST  --   --   --   --  28 32   ALT  --   --   --   --  35 45     CBC    Recent Labs  Lab 03/20/17  0450 03/19/17  0505 03/18/17  0430 03/17/17  1025   WBC 13.2* 11.3* 11.4* 4.8   RBC 3.71* 3.70* 4.16* 4.60   HGB 11.7* 11.7* 13.5 14.6   HCT 33.9* 33.8* 37.9* 42.3   MCV 91 91 91 92   MCH 31.5 31.6 32.5 31.7   MCHC 34.5 34.6 35.6 34.5   RDW 13.7 13.4 13.2 13.2   * 119* 111* 120*     INRNo lab results found in last 7 days.  Arterial Blood Gas    Recent Labs  Lab 03/17/17  0755   PH 7.39   PCO2 38   PO2 58*   HCO3 23   O2PER 40%       All cultures:    Recent Labs  Lab 03/19/17  1610 03/19/17  1605 03/18/17  1055 03/18/17  1050 03/17/17  0725 03/17/17  0355 03/17/17  0330   CULT No growth after 3 hours No growth after 3 hours Cultured on the 1st day of incubation: Staphylococcus aureusCritical Value/Significant Value, preliminary result only, called to and read back by Shelia Osuna at [facility] on 03.19.17 12:31 P.M. JT.Susceptibility testing done on previous specimen* No growth after 1 day Heavy growth Staphylococcus aureusPlus Light growth Normal juan* Cultured on the 1st day of incubation: Staphylococcus aureusCritical Value/Significant Value, preliminary  result only, called to and read back by June Olivo RN at 0456 [id number].DKSusceptibility testing done on previous specimen* Cultured on the 1st day of incubation: Staphylococcus aureusCritical Value/Significant Value, preliminary result only, called to and read back by June Olivo RN at 0108 [id number](Note)POSITIVE for STAPHYLOCOCCUS AUREUS and NEGATIVE for the mecA gene(not MRSA) by Verigene multiplex nucleic acid test. The mecA gene wasnot detected. Final identification and antimicrobial susceptibilitytesting will be verified by standard methods.Specimen tested with Verigene multiplex, gram-positive blood culturenucleic acid test for the following targets: Staph aureus, Staphepidermidis, Staph lugdunensis, other Staph species, Enterococcusfaecalis, Enterococcus faecium, Streptococcus species, S. agalactiae,S. anginosus grp., S. pneumoniae, S. pyogenes, Listeria sp., mecA(methicillin resistance) and [place]/B (vancomycin resistance).Critical Value/Significant Value called to and read back by SARA Allen at 0350 [id number]*     Recent Results (from the past 24 hour(s))   XR Chest Port 1 View    Narrative    XR CHEST PORT 1 VW 3/17/2017 11:35 AM    HISTORY: Intubation.    COMPARISON: 3/16/2017    FINDINGS: The endotracheal tube tip is at the debi, directed toward  the left mainstem bronchus. In comparison with yesterday's exam there  is significant diffuse airspace opacity with more focal consolidation  in the lower segment of the right upper lobe. No pleural effusion or  pneumothorax.      Impression    IMPRESSION: Endotracheal tube should be pulled back by distance of  approximately 3-4 cm.    TARIQ MALCOLM MD   XR Abdomen Port 1 View    Narrative    XR ABDOMEN PORT F1 VW 3/17/2017 11:37 AM    HISTORY: OG placement.    COMPARISON: None.      Impression    IMPRESSION: The enteric tube tip is in the projection of the stomach.  The gas pattern is nonobstructive.    TARIQ MALCOLM MD   XR Chest Port 1 View     Narrative    XR CHEST PORT 1 VW  3/18/2017 6:40 AM     HISTORY:  Intubated    COMPARISON: Film performed on 3/17/2017    FINDINGS: This is a semiupright film.  ET tube and NG tube are in  place. The ET tube has been pulled back since the previous film.  Extensive bilateral interstitial and alveolar infiltrate is again  seen. This has not changed significantly.      Impression    IMPRESSION: Considering differences in film technique, no significant  change. Persistent bilateral infiltrate.    ALISHA YAN MD         Billing: This patient is critically ill: Yes. Total critical care time today 35 min.

## 2017-03-21 ENCOUNTER — APPOINTMENT (OUTPATIENT)
Dept: GENERAL RADIOLOGY | Facility: CLINIC | Age: 57
DRG: 870 | End: 2017-03-21
Attending: INTERNAL MEDICINE
Payer: COMMERCIAL

## 2017-03-21 LAB
ALBUMIN UR-MCNC: 30 MG/DL
ANION GAP SERPL CALCULATED.3IONS-SCNC: 11 MMOL/L (ref 3–14)
APPEARANCE UR: CLEAR
BACTERIA SPEC CULT: ABNORMAL
BILIRUB UR QL STRIP: ABNORMAL
BUN SERPL-MCNC: 23 MG/DL (ref 7–30)
CALCIUM SERPL-MCNC: 7.2 MG/DL (ref 8.5–10.1)
CHLORIDE SERPL-SCNC: 106 MMOL/L (ref 94–109)
CO2 SERPL-SCNC: 28 MMOL/L (ref 20–32)
COLOR UR AUTO: YELLOW
CREAT SERPL-MCNC: 1.42 MG/DL (ref 0.66–1.25)
CREAT UR-MCNC: 51 MG/DL
ERYTHROCYTE [DISTWIDTH] IN BLOOD BY AUTOMATED COUNT: 14.7 % (ref 10–15)
GFR SERPL CREATININE-BSD FRML MDRD: 52 ML/MIN/1.7M2
GLUCOSE BLDC GLUCOMTR-MCNC: 111 MG/DL (ref 70–99)
GLUCOSE BLDC GLUCOMTR-MCNC: 121 MG/DL (ref 70–99)
GLUCOSE BLDC GLUCOMTR-MCNC: 129 MG/DL (ref 70–99)
GLUCOSE BLDC GLUCOMTR-MCNC: 141 MG/DL (ref 70–99)
GLUCOSE BLDC GLUCOMTR-MCNC: 147 MG/DL (ref 70–99)
GLUCOSE SERPL-MCNC: 155 MG/DL (ref 70–99)
GLUCOSE UR STRIP-MCNC: NEGATIVE MG/DL
HCT VFR BLD AUTO: 34.2 % (ref 40–53)
HGB BLD-MCNC: 12 G/DL (ref 13.3–17.7)
HGB UR QL STRIP: ABNORMAL
INTERPRETATION ECG - MUSE: NORMAL
KETONES UR STRIP-MCNC: NEGATIVE MG/DL
LEUKOCYTE ESTERASE UR QL STRIP: NEGATIVE
Lab: ABNORMAL
MAGNESIUM SERPL-MCNC: 2.9 MG/DL (ref 1.6–2.3)
MCH RBC QN AUTO: 32.8 PG (ref 26.5–33)
MCHC RBC AUTO-ENTMCNC: 35.1 G/DL (ref 31.5–36.5)
MCV RBC AUTO: 93 FL (ref 78–100)
MICRO REPORT STATUS: ABNORMAL
MUCOUS THREADS #/AREA URNS LPF: PRESENT /LPF
NITRATE UR QL: NEGATIVE
PH UR STRIP: 6.5 PH (ref 5–7)
PHOSPHATE SERPL-MCNC: 5.6 MG/DL (ref 2.5–4.5)
PLATELET # BLD AUTO: 202 10E9/L (ref 150–450)
POTASSIUM SERPL-SCNC: 3.8 MMOL/L (ref 3.4–5.3)
RBC # BLD AUTO: 3.66 10E12/L (ref 4.4–5.9)
RBC #/AREA URNS AUTO: 3 /HPF (ref 0–2)
SODIUM SERPL-SCNC: 145 MMOL/L (ref 133–144)
SODIUM UR-SCNC: 43 MMOL/L
SP GR UR STRIP: 1.01 (ref 1–1.03)
SPECIMEN SOURCE: ABNORMAL
URN SPEC COLLECT METH UR: ABNORMAL
UROBILINOGEN UR STRIP-MCNC: >12 MG/DL (ref 0–2)
WBC # BLD AUTO: 17 10E9/L (ref 4–11)
WBC #/AREA URNS AUTO: 14 /HPF (ref 0–2)

## 2017-03-21 PROCEDURE — 80048 BASIC METABOLIC PNL TOTAL CA: CPT | Performed by: INTERNAL MEDICINE

## 2017-03-21 PROCEDURE — 99291 CRITICAL CARE FIRST HOUR: CPT | Performed by: INTERNAL MEDICINE

## 2017-03-21 PROCEDURE — 25000132 ZZH RX MED GY IP 250 OP 250 PS 637: Performed by: INTERNAL MEDICINE

## 2017-03-21 PROCEDURE — 87086 URINE CULTURE/COLONY COUNT: CPT | Performed by: HOSPITALIST

## 2017-03-21 PROCEDURE — 25000128 H RX IP 250 OP 636: Performed by: HOSPITALIST

## 2017-03-21 PROCEDURE — 81001 URINALYSIS AUTO W/SCOPE: CPT | Performed by: INTERNAL MEDICINE

## 2017-03-21 PROCEDURE — 83735 ASSAY OF MAGNESIUM: CPT | Performed by: INTERNAL MEDICINE

## 2017-03-21 PROCEDURE — 25000125 ZZHC RX 250: Performed by: INTERNAL MEDICINE

## 2017-03-21 PROCEDURE — 00000146 ZZHCL STATISTIC GLUCOSE BY METER IP

## 2017-03-21 PROCEDURE — 84100 ASSAY OF PHOSPHORUS: CPT | Performed by: INTERNAL MEDICINE

## 2017-03-21 PROCEDURE — 25000132 ZZH RX MED GY IP 250 OP 250 PS 637

## 2017-03-21 PROCEDURE — 25000125 ZZHC RX 250: Performed by: SURGERY

## 2017-03-21 PROCEDURE — 25000132 ZZH RX MED GY IP 250 OP 250 PS 637: Performed by: HOSPITALIST

## 2017-03-21 PROCEDURE — 71010 XR CHEST PORT 1 VW: CPT

## 2017-03-21 PROCEDURE — 84300 ASSAY OF URINE SODIUM: CPT | Performed by: INTERNAL MEDICINE

## 2017-03-21 PROCEDURE — 82570 ASSAY OF URINE CREATININE: CPT | Performed by: INTERNAL MEDICINE

## 2017-03-21 PROCEDURE — 27210429 ZZH NUTRITION PRODUCT INTERMEDIATE LITER

## 2017-03-21 PROCEDURE — 85027 COMPLETE CBC AUTOMATED: CPT | Performed by: INTERNAL MEDICINE

## 2017-03-21 PROCEDURE — 40000275 ZZH STATISTIC RCP TIME EA 10 MIN

## 2017-03-21 PROCEDURE — 94003 VENT MGMT INPAT SUBQ DAY: CPT

## 2017-03-21 PROCEDURE — S0032 INJECTION, NAFCILLIN SODIUM: HCPCS | Performed by: INTERNAL MEDICINE

## 2017-03-21 PROCEDURE — 25000125 ZZHC RX 250

## 2017-03-21 PROCEDURE — 36415 COLL VENOUS BLD VENIPUNCTURE: CPT | Performed by: INTERNAL MEDICINE

## 2017-03-21 PROCEDURE — 25000131 ZZH RX MED GY IP 250 OP 636 PS 637: Performed by: INTERNAL MEDICINE

## 2017-03-21 PROCEDURE — 25000128 H RX IP 250 OP 636: Performed by: INTERNAL MEDICINE

## 2017-03-21 PROCEDURE — 40000008 ZZH STATISTIC AIRWAY CARE

## 2017-03-21 PROCEDURE — 20000003 ZZH R&B ICU

## 2017-03-21 RX ORDER — QUETIAPINE FUMARATE 50 MG/1
50 TABLET, FILM COATED ORAL 2 TIMES DAILY
Status: DISCONTINUED | OUTPATIENT
Start: 2017-03-21 | End: 2017-03-23

## 2017-03-21 RX ORDER — PROPOFOL 10 MG/ML
5-75 INJECTION, EMULSION INTRAVENOUS CONTINUOUS
Status: DISCONTINUED | OUTPATIENT
Start: 2017-03-21 | End: 2017-03-24

## 2017-03-21 RX ORDER — PROPOFOL 10 MG/ML
INJECTION, EMULSION INTRAVENOUS
Status: COMPLETED
Start: 2017-03-21 | End: 2017-03-21

## 2017-03-21 RX ADMIN — CHLORHEXIDINE GLUCONATE 15 ML: 1.2 RINSE ORAL at 19:33

## 2017-03-21 RX ADMIN — HYDROMORPHONE HYDROCHLORIDE 0.2 MG: 1 INJECTION, SOLUTION INTRAMUSCULAR; INTRAVENOUS; SUBCUTANEOUS at 08:04

## 2017-03-21 RX ADMIN — INSULIN GLARGINE 20 UNITS: 100 INJECTION, SOLUTION SUBCUTANEOUS at 22:15

## 2017-03-21 RX ADMIN — DEXMEDETOMIDINE 0.2 MCG/KG/HR: 100 INJECTION, SOLUTION, CONCENTRATE INTRAVENOUS at 13:30

## 2017-03-21 RX ADMIN — MIDAZOLAM HYDROCHLORIDE 2 MG: 1 INJECTION, SOLUTION INTRAMUSCULAR; INTRAVENOUS at 15:56

## 2017-03-21 RX ADMIN — PROPOFOL 70 MCG/KG/MIN: 10 INJECTION, EMULSION INTRAVENOUS at 07:09

## 2017-03-21 RX ADMIN — PROPOFOL 60 MCG/KG/MIN: 10 INJECTION, EMULSION INTRAVENOUS at 20:23

## 2017-03-21 RX ADMIN — NAFCILLIN SODIUM 2 G: 2 INJECTION, POWDER, LYOPHILIZED, FOR SOLUTION INTRAMUSCULAR; INTRAVENOUS at 11:59

## 2017-03-21 RX ADMIN — MIDAZOLAM HYDROCHLORIDE 2 MG: 1 INJECTION, SOLUTION INTRAMUSCULAR; INTRAVENOUS at 02:05

## 2017-03-21 RX ADMIN — SENNOSIDES AND DOCUSATE SODIUM 2 TABLET: 8.6; 5 TABLET ORAL at 19:36

## 2017-03-21 RX ADMIN — INSULIN ASPART 1 UNITS: 100 INJECTION, SOLUTION INTRAVENOUS; SUBCUTANEOUS at 23:56

## 2017-03-21 RX ADMIN — SENNOSIDES AND DOCUSATE SODIUM 2 TABLET: 8.6; 5 TABLET ORAL at 08:02

## 2017-03-21 RX ADMIN — MIDAZOLAM HYDROCHLORIDE 2 MG: 1 INJECTION, SOLUTION INTRAMUSCULAR; INTRAVENOUS at 17:06

## 2017-03-21 RX ADMIN — OSELTAMIVIR PHOSPHATE 75 MG: 6 POWDER, FOR SUSPENSION ORAL at 10:57

## 2017-03-21 RX ADMIN — NAFCILLIN SODIUM 2 G: 2 INJECTION, POWDER, LYOPHILIZED, FOR SOLUTION INTRAMUSCULAR; INTRAVENOUS at 23:57

## 2017-03-21 RX ADMIN — QUETIAPINE FUMARATE 25 MG: 25 TABLET, FILM COATED ORAL at 08:03

## 2017-03-21 RX ADMIN — MIDAZOLAM HYDROCHLORIDE 2 MG: 1 INJECTION, SOLUTION INTRAMUSCULAR; INTRAVENOUS at 05:04

## 2017-03-21 RX ADMIN — INSULIN ASPART 1 UNITS: 100 INJECTION, SOLUTION INTRAVENOUS; SUBCUTANEOUS at 19:35

## 2017-03-21 RX ADMIN — HEPARIN SODIUM 5000 UNITS: 10000 INJECTION, SOLUTION INTRAVENOUS; SUBCUTANEOUS at 19:45

## 2017-03-21 RX ADMIN — CHLORHEXIDINE GLUCONATE 15 ML: 1.2 RINSE ORAL at 08:26

## 2017-03-21 RX ADMIN — Medication 81 MG: at 10:57

## 2017-03-21 RX ADMIN — OXYCODONE HYDROCHLORIDE 5 MG: 5 TABLET ORAL at 15:56

## 2017-03-21 RX ADMIN — POTASSIUM CHLORIDE 20 MEQ: 1.5 POWDER, FOR SOLUTION ORAL at 08:04

## 2017-03-21 RX ADMIN — ACETAMINOPHEN 650 MG: 325 TABLET, FILM COATED ORAL at 02:05

## 2017-03-21 RX ADMIN — PROPOFOL 60 MCG/KG/MIN: 10 INJECTION, EMULSION INTRAVENOUS at 17:24

## 2017-03-21 RX ADMIN — PROPOFOL 50 MCG/KG/MIN: 10 INJECTION, EMULSION INTRAVENOUS at 10:44

## 2017-03-21 RX ADMIN — NAFCILLIN SODIUM 2 G: 2 INJECTION, POWDER, LYOPHILIZED, FOR SOLUTION INTRAMUSCULAR; INTRAVENOUS at 06:25

## 2017-03-21 RX ADMIN — OSELTAMIVIR PHOSPHATE 75 MG: 6 POWDER, FOR SUSPENSION ORAL at 20:04

## 2017-03-21 RX ADMIN — ACETAMINOPHEN 650 MG: 160 SOLUTION ORAL at 15:56

## 2017-03-21 RX ADMIN — METOPROLOL TARTRATE 5 MG: 5 INJECTION INTRAVENOUS at 04:17

## 2017-03-21 RX ADMIN — PANTOPRAZOLE SODIUM 40 MG: 40 TABLET, DELAYED RELEASE ORAL at 11:02

## 2017-03-21 RX ADMIN — NAFCILLIN SODIUM 2 G: 2 INJECTION, POWDER, LYOPHILIZED, FOR SOLUTION INTRAMUSCULAR; INTRAVENOUS at 20:23

## 2017-03-21 RX ADMIN — HYDROMORPHONE HYDROCHLORIDE 0.2 MG: 1 INJECTION, SOLUTION INTRAMUSCULAR; INTRAVENOUS; SUBCUTANEOUS at 05:05

## 2017-03-21 RX ADMIN — QUETIAPINE FUMARATE 50 MG: 50 TABLET, FILM COATED ORAL at 20:04

## 2017-03-21 RX ADMIN — HEPARIN SODIUM 5000 UNITS: 10000 INJECTION, SOLUTION INTRAVENOUS; SUBCUTANEOUS at 13:06

## 2017-03-21 RX ADMIN — HYDROMORPHONE HYDROCHLORIDE 0.2 MG: 1 INJECTION, SOLUTION INTRAMUSCULAR; INTRAVENOUS; SUBCUTANEOUS at 15:56

## 2017-03-21 RX ADMIN — NAFCILLIN SODIUM 2 G: 2 INJECTION, POWDER, LYOPHILIZED, FOR SOLUTION INTRAMUSCULAR; INTRAVENOUS at 15:56

## 2017-03-21 RX ADMIN — POLYETHYLENE GLYCOL 3350 17 G: 17 POWDER, FOR SOLUTION ORAL at 08:03

## 2017-03-21 RX ADMIN — INSULIN ASPART 1 UNITS: 100 INJECTION, SOLUTION INTRAVENOUS; SUBCUTANEOUS at 08:03

## 2017-03-21 RX ADMIN — PROPOFOL 70 MCG/KG/MIN: 10 INJECTION, EMULSION INTRAVENOUS at 03:48

## 2017-03-21 RX ADMIN — VALPROATE SODIUM 500 MG: 100 INJECTION, SOLUTION INTRAVENOUS at 19:31

## 2017-03-21 RX ADMIN — MULTIVITAMIN 15 ML: LIQUID ORAL at 08:03

## 2017-03-21 RX ADMIN — HYDROMORPHONE HYDROCHLORIDE 0.2 MG: 1 INJECTION, SOLUTION INTRAMUSCULAR; INTRAVENOUS; SUBCUTANEOUS at 13:29

## 2017-03-21 RX ADMIN — ATORVASTATIN CALCIUM 40 MG: 40 TABLET, FILM COATED ORAL at 08:02

## 2017-03-21 RX ADMIN — OXYCODONE HYDROCHLORIDE 5 MG: 5 TABLET ORAL at 08:03

## 2017-03-21 RX ADMIN — NAFCILLIN SODIUM 2 G: 2 INJECTION, POWDER, LYOPHILIZED, FOR SOLUTION INTRAMUSCULAR; INTRAVENOUS at 02:05

## 2017-03-21 RX ADMIN — HEPARIN SODIUM 5000 UNITS: 10000 INJECTION, SOLUTION INTRAVENOUS; SUBCUTANEOUS at 03:48

## 2017-03-21 RX ADMIN — PROPOFOL 70 MCG/KG/MIN: 10 INJECTION, EMULSION INTRAVENOUS at 01:01

## 2017-03-21 RX ADMIN — BISACODYL 10 MG: 10 SUPPOSITORY RECTAL at 08:26

## 2017-03-21 RX ADMIN — ACETAMINOPHEN 650 MG: 325 TABLET, FILM COATED ORAL at 08:02

## 2017-03-21 NOTE — PLAN OF CARE
Problem: Individualization  Goal: Patient Preferences  Outcome: No Change  Pt withdraws to pain, restless at times. Pressure support most of night, CMV this am. Daughter at bedside through night. Coarse lung sounds with creamy red tinged secretions. No BM, bowel sounds active.

## 2017-03-21 NOTE — PROGRESS NOTES
CLINICAL NUTRITION SERVICES - REASSESSMENT NOTE    Recommendations Ordered by Registered Dietitian (RD):   Change TF to Isosource 1.5 @ 35 ml/hr to provide 1260 kcal (18 kcal/kg), 57 g pro (0.8 g/kg), 148 g CHO, 13 g fiber, 638 mL free H2O.   -Flushes 60 mL every 4 hours   -Certavite while on low drip TF      EVALUATION OF PROGRESS TOWARD GOALS   Diet: NPO  Nutrition Support: TF at goal since 3/19 @ 0200.   Nutrition Support Enteral:  Type of Feeding Tube: OG  Enteral Frequency:  Continuous  Enteral Regimen: Promote w/ Fiber @ 35 mL/hr  Total Enteral Provisions: 840 kcal, 53 g protein (0.8 g per kg or 96% needs), 116 g CHO, 12 g fiber, 697 mL H2O   Free Water Flush: 60 mL Q4Hrs    Intake: TF providing 73% of preliminary energy needs and 96% preliminary protein needs daily.   --> Propofol changed to precedex today @ 1330, pt no longer receiving kcal from lipid  --> Pt on Multivitamin w/ minerals while on low-dose TF    TF Tolerance:  Labs -   Na 145 (H), up from 143 (wnl) on 3/20  Mg 2.9 (H, trending up)  Phos 5.6 (H, trending up) --> ?replacement given this am  K 3.8 (wnl)  BGs 147, 145, 129, 155 (H), 141, 121 --> High SSI + Lantus 20 units at bedtime    I/O -   Last BM 3/19, BS active --> scheduled Senokot, Miralax PRN  Weight down 3kg from yesterday    Dosing Weight 69.6 kg      ASSESSED NUTRITION NEEDS (2/3 Needs 1st week on TF):  4599-0108 kcal (16-20 kcal per kg)  55-70 grams (0.8-1 g per kg)  Estimated Energy Needs: 2623-8724 kcals (25-30 Kcal/Kg)  Justification: maintenance  Estimated Protein Needs:  grams protein (1.2-1.5 g pro/Kg)  Justification: hypercatabolism with critical illness  Estimated Fluid Needs: 7946-9053 mL (1 mL/Kcal)  Justification: maintenance    Previous Goals:   TF + Propofol to meet % preliminary needs  Evaluation: Not met now with propofol d/c    Previous Nutrition Diagnosis:   Inadequate protein-energy intake related to NPO, TF to start today as evidenced by meeting 0%  protein and 39% preliminary energy needs via Propofol   Evaluation: Improving    CURRENT NUTRITION DIAGNOSIS  Inadequate enteral nutrition infusion related to propofol no longer providing lipid calories as evidenced by TF now meeting 73% of preliminary energy needs.     INTERVENTIONS  Recommendations / Nutrition Prescription  Change TF to Isosource 1.5 @ 35 mL/hr x 24 hours (840 mL) to provide 1260 kcal (18 kcal/kg), 57 g pro (0.8 g/kg), 148 g CHO, 13 g fiber, 638 mL free H2O.   -Flushes 60 mL every 4 hours   -Certavite while on low drip TF     Implementation  EN Composition: ordered as above  Collaboration and Referral of Nutrition care: pt discussed during interdisciplinary rounds    Goals  TF to meet % preliminary needs.       MONITORING AND EVALUATION:  Progress towards goals will be monitored and evaluated per protocol and Practice Guidelines    Marleen Cole RD, LD

## 2017-03-21 NOTE — PROGRESS NOTES
Critical Care Progress Note      03/21/2017    Name: Wyatt Mahajan MRN#: [id number]   Age: 57 year old YOB: 1960     Hsptl Day# 4  ICU DAY #4    MV DAY #4             Problem List:   Active Problems:    Acute respiratory failure with hypoxia (H)  MSSA pna  MSSA bacteremia  Influenza B  JOSE         Summary/Hospital Course:   Wyatt Mahajan is a 57 year old male admitted on 3/17 to the floor, presenting with acute hypoxic respiratory failure due to influenza B and CAP, right chest wall pain and uncontrolled DM-II. Patient was subsequently transferred from the floor to the MICU for increased work of breathing, accessory muscle use and respiratory distress. Was intubated due to increased work of breathing, tachypnea and decreasing O2 sats.    3/18:  Weaned vent yesterday and overnight.  Comfortably sedated this AM.  +blood cx for staph aureus overnight.  On vanco--repeating blood cx.    3/19:  Still minimal vent settings but minute ventilation still ~13 so won't try to extubate today.  Weaning from insulin drip back to lantus/SSI now on steady TF.  Blood cultures persistently positive--MSSA.  Change to nafcillin.  Reculture.  TTE.    3/20:  No changes, no events. Gentle diuresis yesterday but still positive.  Increasing diuresis today.     3/21: Not tolerating CMV/AC last evening (tachycardia, tachypnea, hypertension), so switched to pressure support which improved vital signs overnight. Switched back to CMV/AC this morning at 5:30am, now tolerating. Cr bump so no further diuresis today. Changing sedation to precedex. Echo showed no vegetations/masses on valves. Will continue to follow cultures, now growth so far from 3/20 cultures.      Assessment and plan :     Wyatt Mahajan IS a 57 year old male admitted on 3/17/2017 for respiratory failure, flu B, mssa pna and mssa bacteremia.   I have personally reviewed the daily labs, imaging studies, cultures and discussed the case with referring  physician and consulting physicians.   My assessment and plan by system for this patient is as follows:    Neurology/Psychiatry:   1. Sedation:  Weaning propofol and changing to precedex today, prn dilaudid and midazolam.  2.  Delirium:  Increased seroquel to 50 bid.  ()     Cardiovascular:   1. H/o HLD:  Continue atorvastatin  2.  H/o PAD:  continue ASA  3.  Hyperlactemia:  Resolved    Pulmonary/Ventilator Management:   1.  Respiratory failure, hypoxemic:  2/2 mssa pna and influenza B.  May also have element of ARDS, but only on minimal vent settings (40%, peep5).  Minute ventilation improved today to 10 from 15 yesterday.  --narrow cvg to nafcillin  --cont tamiflu  --Lung protective vent strategy  --Switch to pressure supported respiration, as tolerated    GI and Nutrition :   1.  TF at goal  2.  PPI for PUD prophy  3. Constipation  --Senna daily, BID  --PRN Dulcolax, Miralax    Renal/Fluids/Electrolytes:   1. Increased Cr: likely 2/2 gentle diuresis yesterday. Cr to 1.42 this AM.   --pausing diuresing today  --administer tube feeds/maintainance fluids   --plan for re-starting lasix tomorrow.  --UA and FeNa sent today.  2. Replete electrolytes prn.     Infectious Disease:   1. Flu B:  Tamiflu  2. mssa pna/bacteremia: Nafcillin, day #2.  Rechecking blood cx. TTE did not show valvular masses/vegetations.  Will consider JEFF if blood cx continue turning positive, but 3/20 cx negative to date.    Endocrine:   1. Hyperglycemia:  H/o DM2.  Weaned insulin drip over to lantus (starting at 20 -- on 26 at home) and ssi.    Hematology/Oncology:   1. Pltlt/hgb stable  2. Leukocytosis-- 2/2 infection. WBC to 17 on 3/21. Patient with mild fever to 100.  --Continue Nafcillin and Tamiflu     IV/Access:   1. Venous access - peripheral    ICU Prophylaxis:   1. DVT: Hep Subq  2. VAP: HOB 30 degrees, chlorhexidine rinse  3. Stress Ulcer: PPI  4. Restraints: Nonviolent soft two point restraints required and necessary for patient  safety and continued cares and good effect as patient continues to pull at necessary lines, tubes despite education and distraction. Will readdress daily.   6. Feeding - TF  7. Family Update: at bedside  8. Disposition - critically ill      Key goals for next 24 hours:   1. Cont nafcillin  2. Cont tamiflu  3. Follow cultures, consider TTE if positive  4. Change sedation to precedex  5. Pressure support respiration as tolerated  6. Hold diuresis today          Interim History:     Pressure supported respiration overnight improved vital signs (HR, RR, BP). Back to CMV/AC this morning. Cr bump so no further diuresis today. Changing sedation to precedex. Echo showed no vegetations/masses on valves. Will continue to follow cultures, while also continuing nafcillin and tamiflu. Patient is constipated--given PRN's, Senna to treat.         Key Medications:       QUEtiapine  50 mg Oral BID     insulin aspart  1-12 Units Subcutaneous Q4H     insulin glargine  20 Units Subcutaneous At Bedtime     nafcillin  2 g Intravenous Q4H     heparin  5,000 Units Subcutaneous Q8H     atorvastatin  40 mg Oral Daily     sodium chloride (PF)  3 mL Intracatheter Q8H     pneumococcal vaccine  0.5 mL Intramuscular Prior to discharge     sodium chloride (PF)  3 mL Intracatheter Q8H     rocuronium  0.6 mg/kg Intravenous Once     senna-docusate  1-2 tablet Oral BID 09 12     chlorhexidine  15 mL Mouth/Throat Q12H     oseltamivir  75 mg Oral BID     multivitamins with minerals  15 mL Per Feeding Tube Daily     aspirin  81 mg Oral or Feeding Tube Daily     pantoprazole  40 mg Per Feeding Tube Daily       dexmedetomidine       NaCl 10 mL/hr at 03/20/17 2037     IV fluid REPLACEMENT ONLY                 Physical Examination:   Temp:  [99.1  F (37.3  C)-100.2  F (37.9  C)] 99.5  F (37.5  C)  Heart Rate:  [103-132] 112  Resp:  [22-38] 25  BP: (117-171)/(67-92) 121/71  FiO2 (%):  [40 %] 40 %  SpO2:  [94 %-99 %] 97 %        Intake/Output Summary (Last 24  hours) at 03/21/17 1133  Last data filed at 03/21/17 1100   Gross per 24 hour   Intake          2922.58 ml   Output             3235 ml   Net          -312.42 ml         Wt Readings from Last 4 Encounters:   03/21/17 70.2 kg (154 lb 12.2 oz)   03/16/17 70.3 kg (155 lb)   01/17/17 71.3 kg (157 lb 1.6 oz)   11/30/16 72.6 kg (160 lb)     BP - Mean:  [] 85  Ventilation Mode: CMV/AC  FiO2 (%): 40 %  Rate Set (breaths/minute): 20 breaths/min  Tidal Volume Set (mL): 450 mL  PEEP (cm H2O): 5 cmH2O  Pressure Support (cm H2O): 18 cmH2O  Oxygen Concentration (%): 40 %  Resp: 25    Recent Labs  Lab 03/17/17  0755   PH 7.39   PCO2 38   PO2 58*   HCO3 23   O2PER 40%       GEN: no acute distress, sedated, intubated  HEENT: head ncat, sclera anicteric, OP patent, trachea midline   PULM: unlabored synchronous with vent, clear anteriorly    CV/COR: RRR S1/S2, No rub, murmur or gallop  ABD: soft nontender, hypoactive bowel sounds, no mass  EXT:  No peripheral edema, warm x4 and well-perfused. Strong distal pulses.  NEURO: sedated  SKIN: no obvious rash  LINES: clean, dry intact         Data:   All data and imaging reviewed     ROUTINE ICU LABS (Last four results)  CMP    Recent Labs  Lab 03/21/17  0600 03/20/17  1913 03/20/17  0450 03/19/17  0505 03/18/17  0430  03/17/17  0635 03/17/17  0330   * 145* 143 140 144  < >  --  139   POTASSIUM 3.8 3.4 3.5 3.4 3.4  < >  --  3.9   CHLORIDE 106 107 107 107 110*  < >  --  105   CO2 28 27 27 26 27  < >  --  21   ANIONGAP 11 11 9 7 7  < >  --  13   * 159* 131* 129* 71  < >  --  315*   BUN 23 22 17 17 22  < >  --  17   CR 1.42* 1.21 0.92 0.62* 0.73  < >  --  0.67   GFRESTIMATED 52* 62 86 >90Non  GFR Calc >90Non  GFR Calc  < >  --  >90Non  GFR Calc   GFRESTBLACK 63 75 >90African American GFR Calc >90African American GFR Calc >90African American GFR Calc  < >  --  >90African American GFR Calc   LEONIE 7.2* 7.6* 7.6* 7.2* 7.8*  < >  --   8.5   MAG 2.9*  --  2.8* 2.5* 1.9  --   --   --    PHOS 5.6*  --  2.9 2.0* 1.5*  --   --   --    PROTTOTAL  --   --   --   --   --   --  6.4* 7.3   ALBUMIN  --   --   --   --   --   --  2.7* 3.2*   BILITOTAL  --   --   --   --   --   --  0.5 0.8   ALKPHOS  --   --   --   --   --   --  63 71   AST  --   --   --   --   --   --  28 32   ALT  --   --   --   --   --   --  35 45   < > = values in this interval not displayed.  CBC    Recent Labs  Lab 03/21/17  0600 03/20/17  0450 03/19/17  0505 03/18/17  0430   WBC 17.0* 13.2* 11.3* 11.4*   RBC 3.66* 3.71* 3.70* 4.16*   HGB 12.0* 11.7* 11.7* 13.5   HCT 34.2* 33.9* 33.8* 37.9*   MCV 93 91 91 91   MCH 32.8 31.5 31.6 32.5   MCHC 35.1 34.5 34.6 35.6   RDW 14.7 13.7 13.4 13.2    147* 119* 111*     INRNo lab results found in last 7 days.  Arterial Blood Gas    Recent Labs  Lab 03/17/17  0755   PH 7.39   PCO2 38   PO2 58*   HCO3 23   O2PER 40%       All cultures:    Recent Labs  Lab 03/19/17  1610 03/19/17  1605 03/18/17  1055 03/18/17  1050 03/17/17  0725 03/17/17  0355 03/17/17  0330   CULT No growth after 2 days No growth after 2 days Cultured on the 1st day of incubation: Staphylococcus aureusCritical Value/Significant Value, preliminary result only, called to and read back by Shelia Osuna at [facility] on 03.19.17 12:31 P.M. JT.Susceptibility testing done on previous specimen* Cultured on the 1st day of incubation: Staphylococcus aureus Susceptibility testing done on previous specimenCritical Value/Significant Value, preliminary result only, called to and read back by Patricia Truong RN [facility], @1320 3/20/17. DH.* Heavy growth Staphylococcus aureusPlus Light growth Normal juan* Cultured on the 1st day of incubation: Staphylococcus aureusCritical Value/Significant Value, preliminary result only, called to and read back by June Olivo RN at 5716 3.18.17.DKSusceptibility testing done on previous specimen* Cultured on the 1st day of incubation:  Staphylococcus aureusCritical Value/Significant Value, preliminary result only, called to and read back by June Olivo RN at 0108 [id number](Note)POSITIVE for STAPHYLOCOCCUS AUREUS and NEGATIVE for the mecA gene(not MRSA) by Verigene multiplex nucleic acid test. The mecA gene wasnot detected. Final identification and antimicrobial susceptibilitytesting will be verified by standard methods.Specimen tested with Verigene multiplex, gram-positive blood culturenucleic acid test for the following targets: Staph aureus, Staphepidermidis, Staph lugdunensis, other Staph species, Enterococcusfaecalis, Enterococcus faecium, Streptococcus species, S. agalactiae,S. anginosus grp., S. pneumoniae, S. pyogenes, Listeria sp., mecA(methicillin resistance) and [place]/B (vancomycin resistance).Critical Value/Significant Value called to and read back by SARA Allen at 0350 [id number]*     Recent Results (from the past 24 hour(s))   XR Chest Port 1 View    Narrative    XR CHEST PORT 1 VW 3/17/2017 11:35 AM    HISTORY: Intubation.    COMPARISON: 3/16/2017    FINDINGS: The endotracheal tube tip is at the deib, directed toward  the left mainstem bronchus. In comparison with yesterday's exam there  is significant diffuse airspace opacity with more focal consolidation  in the lower segment of the right upper lobe. No pleural effusion or  pneumothorax.      Impression    IMPRESSION: Endotracheal tube should be pulled back by distance of  approximately 3-4 cm.    TARIQ MALCOLM MD   XR Abdomen Port 1 View    Narrative    XR ABDOMEN PORT F1 VW 3/17/2017 11:37 AM    HISTORY: OG placement.    COMPARISON: None.      Impression    IMPRESSION: The enteric tube tip is in the projection of the stomach.  The gas pattern is nonobstructive.    TARIQ MALCOLM MD   XR Chest Port 1 View    Narrative    XR CHEST PORT 1 VW  3/18/2017 6:40 AM     HISTORY:  Intubated    COMPARISON: Film performed on 3/17/2017    FINDINGS: This is a semiupright film.  ET  tube and NG tube are in  place. The ET tube has been pulled back since the previous film.  Extensive bilateral interstitial and alveolar infiltrate is again  seen. This has not changed significantly.      Impression    IMPRESSION: Considering differences in film technique, no significant  change. Persistent bilateral infiltrate.    ALISHA YAN MD     CXR:  ETT in acceptable position.  Persistent diffuse fluffy infiltrates R>L, no new pathology to my eye.    UA:  No casts.    Billing: This patient is critically ill: Yes. Total critical care time today 35 min.

## 2017-03-21 NOTE — PROGRESS NOTES
Asked by nurse to see patient who was requiring increasing doses of sedation to tolerate ventilator. When I saw him he was very tachycardic, hypertensive and tachypnic. Changed settings to PS of 18/5 with improvement in HR, RR and appearance. Seems to want tidal volumes of 550 to 600 and RR in low 20s (to achieve minute ventilation around 15).  Will keep on this mode but nursing/RT may consider changing back to CMV with settings to achieve above if plateau pressures are acceptable.  Would recommend keeping on PS mode indefinitely if he remains comfortable.

## 2017-03-21 NOTE — PROGRESS NOTES
Critical access hospital ICU RESPIRATORY NOTE  Date of Admission:3/17/17  Date of Intubation (most recent):3/17/17  Reason for Mechanical Ventilation:Respiratory failure  Number of Days on Mechanical Ventilation: 5  Met Criteria for Pressure Support Trial: yes  Ventilation Mode: CPAP/PS  FiO2 (%): 40 %  Rate Set (breaths/minute): 20 breaths/min  Tidal Volume Set (mL): 450 mL  PEEP (cm H2O): 5 cmH2O  Pressure Support (cm H2O): 18 cmH2O  Oxygen Concentration (%): 40 %  Resp: 27       Significant Events Today: patient was on PS all night     ABG Results:     Recent Labs  Lab 03/17/17  0755   PH 7.39   PCO2 38   PO2 58*   HCO3 23   O2PER 40%          ETT appearance on chest x-ray: in good position     Plan: RT will continue to assess and monitor the patient  3/21/2017  Cee Baugh

## 2017-03-21 NOTE — PROGRESS NOTES
[facility] ICU RESPIRATORY NOTE  Date of Admission:  3/17/17  Date of Intubation (most recent): 3/17/17  Reason for Mechanical Ventilation:   Resp failure  Number of Days on Mechanical Ventilation: 5  Met Criteria for Pressure Support Trial:    Length of Pressure Support Trial:  Reason for Stopping Pressure Support Trial:  Pt RR immedialtely went to mid 40's  Reason for No Pressure Support Trial:      Significant Events Today: None    ABG Results:    ETT appearance on chest x-ray:    Plan:  Pt to remain on full vent support

## 2017-03-21 NOTE — PLAN OF CARE
Problem: Goal Outcome Summary  Goal: Goal Outcome Summary  Outcome: No Change  Pt is sedated on propofol.  PRATER, opens eyes spontaneously.  Tylenol given for low grade temp.  VSS.  Vent settings changed to PS for comfort-see MD update progress note.  Tele ST.  mamie Donahue given for comfort.  Constipated.  Dulcolax supp, mirlax given.  Diuresing with IV lasix.  Family at bedside.

## 2017-03-22 ENCOUNTER — APPOINTMENT (OUTPATIENT)
Dept: CT IMAGING | Facility: CLINIC | Age: 57
DRG: 870 | End: 2017-03-22
Attending: INTERNAL MEDICINE
Payer: COMMERCIAL

## 2017-03-22 LAB
ANION GAP SERPL CALCULATED.3IONS-SCNC: 10 MMOL/L (ref 3–14)
BACTERIA SPEC CULT: ABNORMAL
BACTERIA SPEC CULT: NO GROWTH
BUN SERPL-MCNC: 25 MG/DL (ref 7–30)
CA-I BLD-MCNC: 3.8 MG/DL (ref 4.4–5.2)
CALCIUM SERPL-MCNC: 6.7 MG/DL (ref 8.5–10.1)
CHLORIDE SERPL-SCNC: 109 MMOL/L (ref 94–109)
CO2 SERPL-SCNC: 27 MMOL/L (ref 20–32)
CREAT SERPL-MCNC: 1.61 MG/DL (ref 0.66–1.25)
ERYTHROCYTE [DISTWIDTH] IN BLOOD BY AUTOMATED COUNT: 14.8 % (ref 10–15)
GFR SERPL CREATININE-BSD FRML MDRD: 45 ML/MIN/1.7M2
GLUCOSE BLDC GLUCOMTR-MCNC: 128 MG/DL (ref 70–99)
GLUCOSE BLDC GLUCOMTR-MCNC: 138 MG/DL (ref 70–99)
GLUCOSE BLDC GLUCOMTR-MCNC: 146 MG/DL (ref 70–99)
GLUCOSE BLDC GLUCOMTR-MCNC: 146 MG/DL (ref 70–99)
GLUCOSE BLDC GLUCOMTR-MCNC: 152 MG/DL (ref 70–99)
GLUCOSE BLDC GLUCOMTR-MCNC: 154 MG/DL (ref 70–99)
GLUCOSE BLDC GLUCOMTR-MCNC: 188 MG/DL (ref 70–99)
GLUCOSE BLDC GLUCOMTR-MCNC: 243 MG/DL (ref 70–99)
GLUCOSE SERPL-MCNC: 190 MG/DL (ref 70–99)
HCT VFR BLD AUTO: 33.3 % (ref 40–53)
HGB BLD-MCNC: 11.9 G/DL (ref 13.3–17.7)
Lab: ABNORMAL
Lab: NORMAL
MAGNESIUM SERPL-MCNC: 3.4 MG/DL (ref 1.6–2.3)
MCH RBC QN AUTO: 33 PG (ref 26.5–33)
MCHC RBC AUTO-ENTMCNC: 35.7 G/DL (ref 31.5–36.5)
MCV RBC AUTO: 92 FL (ref 78–100)
MICRO REPORT STATUS: ABNORMAL
MICRO REPORT STATUS: NORMAL
PHOSPHATE SERPL-MCNC: 3.7 MG/DL (ref 2.5–4.5)
PLATELET # BLD AUTO: 259 10E9/L (ref 150–450)
POTASSIUM SERPL-SCNC: 3.9 MMOL/L (ref 3.4–5.3)
RBC # BLD AUTO: 3.61 10E12/L (ref 4.4–5.9)
SODIUM SERPL-SCNC: 146 MMOL/L (ref 133–144)
SPECIMEN SOURCE: ABNORMAL
SPECIMEN SOURCE: NORMAL
WBC # BLD AUTO: 19.4 10E9/L (ref 4–11)

## 2017-03-22 PROCEDURE — 94003 VENT MGMT INPAT SUBQ DAY: CPT

## 2017-03-22 PROCEDURE — 00000146 ZZHCL STATISTIC GLUCOSE BY METER IP

## 2017-03-22 PROCEDURE — 40000275 ZZH STATISTIC RCP TIME EA 10 MIN

## 2017-03-22 PROCEDURE — 25000125 ZZHC RX 250: Performed by: INTERNAL MEDICINE

## 2017-03-22 PROCEDURE — 99291 CRITICAL CARE FIRST HOUR: CPT | Performed by: INTERNAL MEDICINE

## 2017-03-22 PROCEDURE — 80048 BASIC METABOLIC PNL TOTAL CA: CPT | Performed by: INTERNAL MEDICINE

## 2017-03-22 PROCEDURE — 36415 COLL VENOUS BLD VENIPUNCTURE: CPT | Performed by: INTERNAL MEDICINE

## 2017-03-22 PROCEDURE — 25000132 ZZH RX MED GY IP 250 OP 250 PS 637: Performed by: INTERNAL MEDICINE

## 2017-03-22 PROCEDURE — S0032 INJECTION, NAFCILLIN SODIUM: HCPCS | Performed by: INTERNAL MEDICINE

## 2017-03-22 PROCEDURE — 25000128 H RX IP 250 OP 636: Performed by: HOSPITALIST

## 2017-03-22 PROCEDURE — 25000128 H RX IP 250 OP 636: Performed by: INTERNAL MEDICINE

## 2017-03-22 PROCEDURE — 82330 ASSAY OF CALCIUM: CPT | Performed by: INTERNAL MEDICINE

## 2017-03-22 PROCEDURE — 85027 COMPLETE CBC AUTOMATED: CPT | Performed by: INTERNAL MEDICINE

## 2017-03-22 PROCEDURE — 25000132 ZZH RX MED GY IP 250 OP 250 PS 637: Performed by: HOSPITALIST

## 2017-03-22 PROCEDURE — 84100 ASSAY OF PHOSPHORUS: CPT | Performed by: INTERNAL MEDICINE

## 2017-03-22 PROCEDURE — 25000131 ZZH RX MED GY IP 250 OP 636 PS 637: Performed by: INTERNAL MEDICINE

## 2017-03-22 PROCEDURE — 40000008 ZZH STATISTIC AIRWAY CARE

## 2017-03-22 PROCEDURE — 20000003 ZZH R&B ICU

## 2017-03-22 PROCEDURE — 40000281 ZZH STATISTIC TRANSPORT TIME EA 15 MIN

## 2017-03-22 PROCEDURE — 83735 ASSAY OF MAGNESIUM: CPT | Performed by: INTERNAL MEDICINE

## 2017-03-22 PROCEDURE — 71250 CT THORAX DX C-: CPT

## 2017-03-22 RX ORDER — GABAPENTIN 250 MG/5ML
300 SOLUTION ORAL EVERY 8 HOURS SCHEDULED
Status: DISCONTINUED | OUTPATIENT
Start: 2017-03-22 | End: 2017-03-23

## 2017-03-22 RX ORDER — CARVEDILOL 6.25 MG/1
6.25 TABLET ORAL 2 TIMES DAILY
Status: DISCONTINUED | OUTPATIENT
Start: 2017-03-22 | End: 2017-03-23

## 2017-03-22 RX ORDER — OSELTAMIVIR PHOSPHATE 6 MG/ML
30 FOR SUSPENSION ORAL 2 TIMES DAILY
Status: DISCONTINUED | OUTPATIENT
Start: 2017-03-22 | End: 2017-03-23

## 2017-03-22 RX ADMIN — PROPOFOL 60 MCG/KG/MIN: 10 INJECTION, EMULSION INTRAVENOUS at 08:01

## 2017-03-22 RX ADMIN — VALPROATE SODIUM 500 MG: 100 INJECTION, SOLUTION INTRAVENOUS at 08:56

## 2017-03-22 RX ADMIN — VALPROATE SODIUM 500 MG: 100 INJECTION, SOLUTION INTRAVENOUS at 21:50

## 2017-03-22 RX ADMIN — GABAPENTIN 300 MG: 250 SOLUTION ORAL at 21:49

## 2017-03-22 RX ADMIN — NAFCILLIN SODIUM 2 G: 2 INJECTION, POWDER, LYOPHILIZED, FOR SOLUTION INTRAMUSCULAR; INTRAVENOUS at 23:08

## 2017-03-22 RX ADMIN — CARVEDILOL 6.25 MG: 6.25 TABLET, FILM COATED ORAL at 21:59

## 2017-03-22 RX ADMIN — PROPOFOL 65 MCG/KG/MIN: 10 INJECTION, EMULSION INTRAVENOUS at 20:49

## 2017-03-22 RX ADMIN — PANTOPRAZOLE SODIUM 40 MG: 40 TABLET, DELAYED RELEASE ORAL at 08:33

## 2017-03-22 RX ADMIN — MIDAZOLAM HYDROCHLORIDE 2 MG: 1 INJECTION, SOLUTION INTRAMUSCULAR; INTRAVENOUS at 08:19

## 2017-03-22 RX ADMIN — PROPOFOL 60 MCG/KG/MIN: 10 INJECTION, EMULSION INTRAVENOUS at 00:17

## 2017-03-22 RX ADMIN — QUETIAPINE FUMARATE 50 MG: 50 TABLET, FILM COATED ORAL at 21:59

## 2017-03-22 RX ADMIN — INSULIN GLARGINE 20 UNITS: 100 INJECTION, SOLUTION SUBCUTANEOUS at 21:50

## 2017-03-22 RX ADMIN — BISACODYL 10 MG: 10 SUPPOSITORY RECTAL at 22:00

## 2017-03-22 RX ADMIN — INSULIN ASPART 1 UNITS: 100 INJECTION, SOLUTION INTRAVENOUS; SUBCUTANEOUS at 16:08

## 2017-03-22 RX ADMIN — CHLORHEXIDINE GLUCONATE 15 ML: 1.2 RINSE ORAL at 08:32

## 2017-03-22 RX ADMIN — GABAPENTIN 300 MG: 250 SOLUTION ORAL at 14:11

## 2017-03-22 RX ADMIN — CARVEDILOL 6.25 MG: 6.25 TABLET, FILM COATED ORAL at 12:25

## 2017-03-22 RX ADMIN — INSULIN ASPART 2 UNITS: 100 INJECTION, SOLUTION INTRAVENOUS; SUBCUTANEOUS at 12:46

## 2017-03-22 RX ADMIN — Medication 81 MG: at 08:55

## 2017-03-22 RX ADMIN — MIDAZOLAM HYDROCHLORIDE 2 MG: 1 INJECTION, SOLUTION INTRAMUSCULAR; INTRAVENOUS at 11:23

## 2017-03-22 RX ADMIN — ACETAMINOPHEN 650 MG: 325 TABLET, FILM COATED ORAL at 00:17

## 2017-03-22 RX ADMIN — HYDROMORPHONE HYDROCHLORIDE 0.2 MG: 1 INJECTION, SOLUTION INTRAMUSCULAR; INTRAVENOUS; SUBCUTANEOUS at 09:39

## 2017-03-22 RX ADMIN — MIDAZOLAM HYDROCHLORIDE 2 MG: 1 INJECTION, SOLUTION INTRAMUSCULAR; INTRAVENOUS at 00:24

## 2017-03-22 RX ADMIN — HEPARIN SODIUM 5000 UNITS: 10000 INJECTION, SOLUTION INTRAVENOUS; SUBCUTANEOUS at 19:35

## 2017-03-22 RX ADMIN — HYDROMORPHONE HYDROCHLORIDE 0.2 MG: 1 INJECTION, SOLUTION INTRAMUSCULAR; INTRAVENOUS; SUBCUTANEOUS at 00:23

## 2017-03-22 RX ADMIN — NAFCILLIN SODIUM 2 G: 2 INJECTION, POWDER, LYOPHILIZED, FOR SOLUTION INTRAMUSCULAR; INTRAVENOUS at 19:35

## 2017-03-22 RX ADMIN — NAFCILLIN SODIUM 2 G: 2 INJECTION, POWDER, LYOPHILIZED, FOR SOLUTION INTRAMUSCULAR; INTRAVENOUS at 04:14

## 2017-03-22 RX ADMIN — SENNOSIDES AND DOCUSATE SODIUM 1 TABLET: 8.6; 5 TABLET ORAL at 08:36

## 2017-03-22 RX ADMIN — PROPOFOL 60 MCG/KG/MIN: 10 INJECTION, EMULSION INTRAVENOUS at 11:50

## 2017-03-22 RX ADMIN — CHLORHEXIDINE GLUCONATE 15 ML: 1.2 RINSE ORAL at 19:35

## 2017-03-22 RX ADMIN — OSELTAMIVIR PHOSPHATE 75 MG: 6 POWDER, FOR SUSPENSION ORAL at 08:54

## 2017-03-22 RX ADMIN — NAFCILLIN SODIUM 2 G: 2 INJECTION, POWDER, LYOPHILIZED, FOR SOLUTION INTRAMUSCULAR; INTRAVENOUS at 12:52

## 2017-03-22 RX ADMIN — DEXTRAN 70, AND HYPROMELLOSE 2910 2 DROP: 1; 3 SOLUTION/ DROPS OPHTHALMIC at 16:09

## 2017-03-22 RX ADMIN — PROPOFOL 60 MCG/KG/MIN: 10 INJECTION, EMULSION INTRAVENOUS at 04:29

## 2017-03-22 RX ADMIN — POTASSIUM CHLORIDE 20 MEQ: 1.5 POWDER, FOR SOLUTION ORAL at 08:31

## 2017-03-22 RX ADMIN — INSULIN ASPART 1 UNITS: 100 INJECTION, SOLUTION INTRAVENOUS; SUBCUTANEOUS at 08:07

## 2017-03-22 RX ADMIN — ATORVASTATIN CALCIUM 40 MG: 40 TABLET, FILM COATED ORAL at 08:33

## 2017-03-22 RX ADMIN — INSULIN ASPART 1 UNITS: 100 INJECTION, SOLUTION INTRAVENOUS; SUBCUTANEOUS at 04:08

## 2017-03-22 RX ADMIN — HEPARIN SODIUM 5000 UNITS: 10000 INJECTION, SOLUTION INTRAVENOUS; SUBCUTANEOUS at 04:18

## 2017-03-22 RX ADMIN — HEPARIN SODIUM 5000 UNITS: 10000 INJECTION, SOLUTION INTRAVENOUS; SUBCUTANEOUS at 12:48

## 2017-03-22 RX ADMIN — SENNOSIDES AND DOCUSATE SODIUM 2 TABLET: 8.6; 5 TABLET ORAL at 22:00

## 2017-03-22 RX ADMIN — NAFCILLIN SODIUM 2 G: 2 INJECTION, POWDER, LYOPHILIZED, FOR SOLUTION INTRAMUSCULAR; INTRAVENOUS at 16:54

## 2017-03-22 RX ADMIN — MULTIVITAMIN 15 ML: LIQUID ORAL at 08:33

## 2017-03-22 RX ADMIN — DEXTRAN 70, AND HYPROMELLOSE 2910 2 DROP: 1; 3 SOLUTION/ DROPS OPHTHALMIC at 20:46

## 2017-03-22 RX ADMIN — PROPOFOL 65 MCG/KG/MIN: 10 INJECTION, EMULSION INTRAVENOUS at 17:13

## 2017-03-22 RX ADMIN — NAFCILLIN SODIUM 2 G: 2 INJECTION, POWDER, LYOPHILIZED, FOR SOLUTION INTRAMUSCULAR; INTRAVENOUS at 08:32

## 2017-03-22 RX ADMIN — OSELTAMIVIR PHOSPHATE 30 MG: 6 POWDER, FOR SUSPENSION ORAL at 21:59

## 2017-03-22 RX ADMIN — MIDAZOLAM HYDROCHLORIDE 2 MG: 1 INJECTION, SOLUTION INTRAMUSCULAR; INTRAVENOUS at 14:09

## 2017-03-22 RX ADMIN — PROPOFOL 70 MCG/KG/MIN: 10 INJECTION, EMULSION INTRAVENOUS at 14:48

## 2017-03-22 RX ADMIN — QUETIAPINE FUMARATE 50 MG: 50 TABLET, FILM COATED ORAL at 08:33

## 2017-03-22 NOTE — PROGRESS NOTES
[facility] ICU RESPIRATORY NOTE  Date of Admission: 3/17/17  Date of Intubation (most recent):3/17/17  Reason for Mechanical Ventilation: Respiratory failure  Number of Days on Mechanical Ventilation:6  Met Criteria for Pressure Support Trial: yes  Length of Pressure Support Trial:none  Reason for Stopping Pressure Support Trial:  Reason for No Pressure Support Trial: per MD    Significant Events Today: none    ABG Results:     Recent Labs  Lab 03/17/17  0755   PH 7.39   PCO2 38   PO2 58*   HCO3 23   O2PER 40%     Ventilation Mode: CMV/AC  FiO2 (%): 40 %  Rate Set (breaths/minute): 20 breaths/min  Tidal Volume Set (mL): 450 mL  PEEP (cm H2O): 5 cmH2O  Pressure Support (cm H2O): 18 cmH2O  Oxygen Concentration (%): 40 %  Resp: 28      ETT appearance on chest x-ray: ETT in good position    Plan:  Continue full vent support  3/22/2017  Sushma Velarde

## 2017-03-22 NOTE — PROGRESS NOTES
Patient seen for a complete reassessment yesterday - see RD note dated 3/21 for details.     Patient back on Propofol at 29.5 mL/hr= 779 kcal  TF was changed yesterday to Isosource 1.5 at 35 mL/hr = 1260 kcal (18 kcal/kg), 57 g pro (0.8 g/kg), 148 g CHO, 13 g fiber, 638 mL free H2O  Total = 2039 kcal (20 kcal per kg)    Patient now exceeding preliminary estimated needs with Propofol back on -->   Dosing Weight 69.6 kg       ASSESSED NUTRITION NEEDS (2/3 Needs 1st week on TF):  4156-2345 kcal (16-20 kcal per kg)  55-70 grams (0.8-1 g per kg)  Estimated Energy Needs: 8268-1347 kcals (25-30 Kcal/Kg)  Justification: maintenance  Estimated Protein Needs:  grams protein (1.2-1.5 g pro/Kg)  Justification: hypercatabolism with critical illness    TF currently off for chest CT.  Spoke with RN - Propfol will continue to run.  Therefore, orders written for the following TF change:  Peptamen Intense VHP at 30 mL/hr to provide:  720 kcal, 67 g protein (1 g per kg), 56 g CHO, 3 g fiber, 605 mL H2O  Total with Propofol = 1499 kcal (21 kcal per kg or 107% prelim energy needs)    Yojana Lopez RD, LD, CNSC   Clinical Dietitian - [facility]

## 2017-03-22 NOTE — PLAN OF CARE
Problem: Pneumonia (Adult)  Goal: Signs and Symptoms of Listed Potential Problems Will be Absent or Manageable (Pneumonia)  Signs and symptoms of listed potential problems will be absent or manageable by discharge/transition of care (reference Pneumonia (Adult) CPG).   Pt remains sedated on Propofol drip. Suctioned every 1-2 hours with saline lavage with moderate amount thick yellow tan secretions. Pt remained tachycardic and hypertensive with  - 169/93. Coreg started. BP improved later after Versed  2 mg IV given and Propofol increased to 70 mcg/kg/mn. Plan for CT chest later today. Pt's wife and children updated.

## 2017-03-22 NOTE — PROGRESS NOTES
Pt opens eyes to voice.  Does not follow commands. Starting Valproate for delirium.  Attempted to wean propofol and transition to precedex.  Pt became increasingly more tachycardic, hypertensive, and tachypneic.  Now back on propofol.  Unable to pressure support today.  Dilaudid, versed, and tylenol given for pain/generalized discomfort.  Tele ST. Dulcolax, and Mirlax given for constipation.  Passing flatus and has had multiple smears.

## 2017-03-22 NOTE — PROVIDER NOTIFICATION
MD Notification    Notified Person:  MD    Notified Persons Name: Dr. Briones    Notification Date/Time: 03/22/2017    Notification Interaction:  Talked with Physician    Purpose of Notification: Crackles in left lung, no morning CXR, lasix has not been given since 3/21/17    Orders Received: Awaiting orders    Comments:

## 2017-03-22 NOTE — PROGRESS NOTES
Critical Care Progress Note      03/22/2017    Name: Wyatt Mahajan MRN#: [id number]   Age: 57 year old YOB: 1960     Hsptl Day# 5  ICU DAY #5    MV DAY #5             Problem List:   Active Problems:    Acute respiratory failure with hypoxia (H)  MSSA pna  MSSA bacteremia  Influenza B  JOSE         Summary/Hospital Course:   Wyatt Mahajan is a 57 year old male admitted on 3/17 to the floor, presenting with acute hypoxic respiratory failure due to influenza B and CAP, right chest wall pain and uncontrolled DM-II. Patient was subsequently transferred from the floor to the MICU for increased work of breathing, accessory muscle use and respiratory distress. Was intubated due to increased work of breathing, tachypnea and decreasing O2 sats.    3/18:  Weaned vent yesterday and overnight.  Comfortably sedated this AM.  +blood cx for staph aureus overnight.  On vanco--repeating blood cx.    3/19:  Still minimal vent settings but minute ventilation still ~13 so won't try to extubate today.  Weaning from insulin drip back to lantus/SSI now on steady TF.  Blood cultures persistently positive--MSSA.  Change to nafcillin.  Reculture.  TTE.    3/20:  No changes, no events. Gentle diuresis yesterday but still positive.  Increasing diuresis today.     3/21: Not tolerating CMV/AC last evening (tachycardia, tachypnea, hypertension), so switched to pressure support which improved vital signs overnight. Switched back to CMV/AC this morning at 5:30am, now tolerating. Cr bump so no further diuresis today. Changing sedation to precedex. Echo showed no vegetations/masses on valves. Will continue to follow cultures, now growth so far from 3/20 cultures.    3/22: Pressure support trial last evening was stopped just after its initiation due to inc. RR to the 40's. Eyes open to voice. Given valproate for delirium. Propofol wean and transition to precedex resulted in inc HR, RR and BP. Nursing notes on exam, 'wet crackles'  in L lung and thick creamy red-streaked secretions. Ppeak to mid-30's. No BM yet, just smears but constipation treated with senna, dulcolax and miralax.      Assessment and plan :     Wyatt Mahajan IS a 57 year old male admitted on 3/17/2017 for respiratory failure, flu B, mssa pna and mssa bacteremia.   I have personally reviewed the daily labs, imaging studies, cultures and discussed the case with referring physician and consulting physicians.   My assessment and plan by system for this patient is as follows:    Neurology/Psychiatry:   1. Sedation:  Propofol, prn dilaudid and midazolam.  2.  Delirium:  Seroquel, 50mg bid.  (). Valproate added evening 3/22.    3. H/o Diabetic neuropathy 2/2 DM-II: re-started home dose of gabapentin 300mg, TID.    Cardiovascular:   1. Hypertension: SBP over last day has been in the range of 150-160. Will start carvedilol 6.25mg, BID.  2. H/o HLD:  Continue atorvastatin  3. H/o PAD:  continue ASA  4. Hyperlactemia:  Resolved    Pulmonary/Ventilator Management:   1.  Respiratory failure, hypoxemic:  2/2 mssa pna and influenza B.  May also have element of ARDS, but only on minimal vent settings (40%, peep5).  Minute ventilation improved since admission.  --narrow cvg to nafcillin  --cont tamiflu  --Lung protective vent strategy  --Switch to pressure supported respiration, as tolerated  --CT chest today--eval for occult lung abscess, etc.    GI and Nutrition :   1.  TF at goal  2.  PPI for PUD prophy  3.  Constipation  --Senna daily, BID  --PRN Dulcolax, Miralax    Renal/Fluids/Electrolytes:   1. JOSE--Cr 1.6 this AM  --hold diuresis one more day  --administer tube feeds/maintainance fluids   --UA and FeNa sent yesterday- UA shows no casts  2. Replete electrolytes prn.     Infectious Disease:   1. Flu B:  Tamiflu  2. mssa pna/bacteremia: Nafcillin, day #3.  Bld cultures show no growth after 3 days. TTE did not show valvular masses/vegetations.  Will consider JEFF if blood cx  turn positive, but 3/19 cx negative to date.     Endocrine:   1. Hyperglycemia:  H/o DM2.  Weaned insulin drip over to lantus (starting at 20 -- on 26 at home) and ssi.    Hematology/Oncology:   1. Pltlt/hgb stable  2. Leukocytosis-- 2/2 infection. WBC to 19.4 on 3/22. Patient with fever to 100.4 overnight. Urine culture pending.  --Continue Nafcillin and Tamiflu     IV/Access:   1. Venous access - peripheral    ICU Prophylaxis:   1. DVT: Hep Subq  2. VAP: HOB 30 degrees, chlorhexidine rinse  3. Stress Ulcer: PPI  4. Restraints: Nonviolent soft two point restraints required and necessary for patient safety and continued cares and good effect as patient continues to pull at necessary lines, tubes despite education and distraction. Will readdress daily.   6. Feeding - TF  7. Family Update: at bedside  8. Disposition - critically ill      Key goals for next 24 hours:   1. Cont nafcillin  2. Cont tamiflu  3. CT chest- possible abscess?  4. Add carvedilol for BP control  5. Re-start home gabapentin  5. Pressure support respiration as tolerated        Interim History:     Pressure support trial last evening was stopped just after its initiation due to inc. RR to the 40's. Eyes open to voice. Given valproate for delirium. Propofol wean and transition to precedex resulted in inc HR, RR and BP. Nursing notes on exam, 'wet crackles' in L lung and thick creamy red-streaked secretions. Ppeak to mid-30's. No BM yet, just smears but constipation treated with senna, dulcolax and miralax.         Key Medications:       QUEtiapine  50 mg Oral BID     valproate (DEPACON) intermittent infusion  500 mg Intravenous Q12H     insulin aspart  1-12 Units Subcutaneous Q4H     insulin glargine  20 Units Subcutaneous At Bedtime     nafcillin  2 g Intravenous Q4H     heparin  5,000 Units Subcutaneous Q8H     atorvastatin  40 mg Oral Daily     sodium chloride (PF)  3 mL Intracatheter Q8H     pneumococcal vaccine  0.5 mL Intramuscular Prior to  discharge     sodium chloride (PF)  3 mL Intracatheter Q8H     rocuronium  0.6 mg/kg Intravenous Once     senna-docusate  1-2 tablet Oral BID 09 12     chlorhexidine  15 mL Mouth/Throat Q12H     oseltamivir  75 mg Oral BID     multivitamins with minerals  15 mL Per Feeding Tube Daily     aspirin  81 mg Oral or Feeding Tube Daily     pantoprazole  40 mg Per Feeding Tube Daily       dexmedetomidine Stopped (03/21/17 1724)     propofol (DIPRIVAN) infusion 60 mcg/kg/min (03/22/17 0801)     NaCl 10 mL/hr at 03/21/17 1954     IV fluid REPLACEMENT ONLY                 Physical Examination:   Temp:  [94.6  F (34.8  C)-100.4  F (38  C)] 100.2  F (37.9  C)  Heart Rate:  [] 112  Resp:  [21-34] 28  BP: (104-170)/(61-88) 159/83  FiO2 (%):  [40 %] 40 %  SpO2:  [92 %-99 %] 97 %      Intake/Output Summary (Last 24 hours) at 03/22/17 1202  Last data filed at 03/22/17 1100   Gross per 24 hour   Intake          2434.28 ml   Output             2225 ml   Net           209.28 ml       Wt Readings from Last 4 Encounters:   03/22/17 71.9 kg (158 lb 8.2 oz)   03/16/17 70.3 kg (155 lb)   01/17/17 71.3 kg (157 lb 1.6 oz)   11/30/16 72.6 kg (160 lb)     BP - Mean:  [] 109  Ventilation Mode: CMV/AC  FiO2 (%): 40 %  Rate Set (breaths/minute): 20 breaths/min  Tidal Volume Set (mL): 450 mL  PEEP (cm H2O): 5 cmH2O  Pressure Support (cm H2O): 18 cmH2O  Oxygen Concentration (%): 40 %  Resp: 28    Recent Labs  Lab 03/17/17  0755   PH 7.39   PCO2 38   PO2 58*   HCO3 23   O2PER 40%       GEN: no acute distress, sedated, intubated  HEENT: head ncat, sclera anicteric, OP patent, trachea midline   PULM: unlabored synchronous with vent, coarse lung sounds anteriorly L>R   CV/COR: RRR S1/S2, No rub, murmur or gallop  ABD: firm, distended, nontender, hypoactive bowel sounds, no mass  EXT:  No peripheral edema, warm x4 and well-perfused. Strong distal pulses.  NEURO: sedated  SKIN: no obvious rash  LINES: clean, dry intact         Data:   All data  and imaging reviewed     ROUTINE ICU LABS (Last four results)  CMP    Recent Labs  Lab 03/22/17  0455 03/21/17  0600 03/20/17  1913 03/20/17  0450 03/19/17  0505  03/17/17  0635 03/17/17  0330   * 145* 145* 143 140  < >  --  139   POTASSIUM 3.9 3.8 3.4 3.5 3.4  < >  --  3.9   CHLORIDE 109 106 107 107 107  < >  --  105   CO2 27 28 27 27 26  < >  --  21   ANIONGAP 10 11 11 9 7  < >  --  13   * 155* 159* 131* 129*  < >  --  315*   BUN 25 23 22 17 17  < >  --  17   CR 1.61* 1.42* 1.21 0.92 0.62*  < >  --  0.67   GFRESTIMATED 45* 52* 62 86 >90Non  GFR Calc  < >  --  >90Non  GFR Calc   GFRESTBLACK 54* 63 75 >90African American GFR Calc >90African American GFR Calc  < >  --  >90African American GFR Calc   LEONIE 6.7* 7.2* 7.6* 7.6* 7.2*  < >  --  8.5   MAG 3.4* 2.9*  --  2.8* 2.5*  < >  --   --    PHOS 3.7 5.6*  --  2.9 2.0*  < >  --   --    PROTTOTAL  --   --   --   --   --   --  6.4* 7.3   ALBUMIN  --   --   --   --   --   --  2.7* 3.2*   BILITOTAL  --   --   --   --   --   --  0.5 0.8   ALKPHOS  --   --   --   --   --   --  63 71   AST  --   --   --   --   --   --  28 32   ALT  --   --   --   --   --   --  35 45   < > = values in this interval not displayed.    CBC    Recent Labs  Lab 03/22/17  0455 03/21/17  0600 03/20/17  0450 03/19/17  0505   WBC 19.4* 17.0* 13.2* 11.3*   RBC 3.61* 3.66* 3.71* 3.70*   HGB 11.9* 12.0* 11.7* 11.7*   HCT 33.3* 34.2* 33.9* 33.8*   MCV 92 93 91 91   MCH 33.0 32.8 31.5 31.6   MCHC 35.7 35.1 34.5 34.6   RDW 14.8 14.7 13.7 13.4    202 147* 119*     INRNo lab results found in last 7 days.  Arterial Blood Gas    Recent Labs  Lab 03/17/17  0755   PH 7.39   PCO2 38   PO2 58*   HCO3 23   O2PER 40%       All cultures:    Recent Labs  Lab 03/21/17  1545 03/19/17  1610 03/19/17  1605 03/18/17  1055 03/18/17  1050 03/17/17  0725 03/17/17  0355 03/17/17  0330   CULT Pending No growth after 3 days No growth after 3 days Cultured on the 1st day of  incubation: Staphylococcus aureusCritical Value/Significant Value, preliminary result only, called to and read back by Shelia Smith at [facility] on 03.19.17 12:31 P.M. JT.Susceptibility testing done on previous specimen* Cultured on the 1st day of incubation: Staphylococcus aureus Susceptibility testing done on previous specimenCritical Value/Significant Value, preliminary result only, called to and read back by Patricia Truong RN [facility], @1324 3/20/17. DH.* Heavy growth Staphylococcus aureusPlus Light growth Normal juan* Cultured on the 1st day of incubation: Staphylococcus aureusCritical Value/Significant Value, preliminary result only, called to and read back by June Olivo RN at 0456 3.18.17.DKSusceptibility testing done on previous specimen* Cultured on the 1st day of incubation: Staphylococcus aureusCritical Value/Significant Value, preliminary result only, called to and read back by June Olivo RN at 0108 [id number](Note)POSITIVE for STAPHYLOCOCCUS AUREUS and NEGATIVE for the mecA gene(not MRSA) by Verigene multiplex nucleic acid test. The mecA gene wasnot detected. Final identification and antimicrobial susceptibilitytesting will be verified by standard methods.Specimen tested with Verigene multiplex, gram-positive blood culturenucleic acid test for the following targets: Staph aureus, Staphepidermidis, Staph lugdunensis, other Staph species, Enterococcusfaecalis, Enterococcus faecium, Streptococcus species, S. agalactiae,S. anginosus grp., S. pneumoniae, S. pyogenes, Listeria sp., mecA(methicillin resistance) and [place]/B (vancomycin resistance).Critical Value/Significant Value called to and read back by SARA Allen at 0350 [id number].DK*     Recent Results (from the past 24 hour(s))   XR Chest Port 1 View    Narrative    XR CHEST PORT 1 VW 3/17/2017 11:35 AM    HISTORY: Intubation.    COMPARISON: 3/16/2017    FINDINGS: The endotracheal tube tip is at the debi, directed toward  the  left mainstem bronchus. In comparison with yesterday's exam there  is significant diffuse airspace opacity with more focal consolidation  in the lower segment of the right upper lobe. No pleural effusion or  pneumothorax.      Impression    IMPRESSION: Endotracheal tube should be pulled back by distance of  approximately 3-4 cm.    TARIQ MALCOLM MD   XR Abdomen Port 1 View    Narrative    XR ABDOMEN PORT F1 VW 3/17/2017 11:37 AM    HISTORY: OG placement.    COMPARISON: None.      Impression    IMPRESSION: The enteric tube tip is in the projection of the stomach.  The gas pattern is nonobstructive.    TARIQ MALCOLM MD   XR Chest Port 1 View    Narrative    XR CHEST PORT 1 VW  3/18/2017 6:40 AM     HISTORY:  Intubated    COMPARISON: Film performed on 3/17/2017    FINDINGS: This is a semiupright film.  ET tube and NG tube are in  place. The ET tube has been pulled back since the previous film.  Extensive bilateral interstitial and alveolar infiltrate is again  seen. This has not changed significantly.      Impression    IMPRESSION: Considering differences in film technique, no significant  change. Persistent bilateral infiltrate.    ALISHA YAN MD       Billing: This patient is critically ill: Yes. Total critical care time today 35 min.

## 2017-03-22 NOTE — PLAN OF CARE
Problem: Individualization  Goal: Patient Preferences  Outcome: No Change  Pt is intubated and sedated. Withdraws to pain. Crackles throughout left lung. See MD notification. Thick creamy, red streak secretions through ET tube. Peak pressures in mid 30's. TF at goal, no residual. No BM this shift, abd firm and distended. Pt is receiving Miralax, Dulcolax suppository and Senna. Nelda urine through bell.

## 2017-03-22 NOTE — PROGRESS NOTES
Respiratory care note: Pt condition marginal improvement today.Smylene oliva amt creations today. We will try a wean in AM.Srikanth

## 2017-03-23 LAB
ALBUMIN UR-MCNC: 100 MG/DL
ANION GAP SERPL CALCULATED.3IONS-SCNC: 8 MMOL/L (ref 3–14)
ANION GAP SERPL CALCULATED.3IONS-SCNC: 8 MMOL/L (ref 3–14)
ANION GAP SERPL CALCULATED.3IONS-SCNC: 9 MMOL/L (ref 3–14)
APPEARANCE UR: ABNORMAL
BILIRUB UR QL STRIP: NEGATIVE
BUN SERPL-MCNC: 24 MG/DL (ref 7–30)
BUN SERPL-MCNC: 25 MG/DL (ref 7–30)
BUN SERPL-MCNC: 27 MG/DL (ref 7–30)
CALCIUM SERPL-MCNC: 6.5 MG/DL (ref 8.5–10.1)
CALCIUM SERPL-MCNC: 6.5 MG/DL (ref 8.5–10.1)
CALCIUM SERPL-MCNC: 7.1 MG/DL (ref 8.5–10.1)
CHLORIDE SERPL-SCNC: 112 MMOL/L (ref 94–109)
CHLORIDE SERPL-SCNC: 112 MMOL/L (ref 94–109)
CHLORIDE SERPL-SCNC: 113 MMOL/L (ref 94–109)
CO2 SERPL-SCNC: 27 MMOL/L (ref 20–32)
CO2 SERPL-SCNC: 28 MMOL/L (ref 20–32)
CO2 SERPL-SCNC: 29 MMOL/L (ref 20–32)
COLOR UR AUTO: ABNORMAL
CREAT SERPL-MCNC: 1.58 MG/DL (ref 0.66–1.25)
CREAT SERPL-MCNC: 1.61 MG/DL (ref 0.66–1.25)
CREAT SERPL-MCNC: 1.65 MG/DL (ref 0.66–1.25)
ERYTHROCYTE [DISTWIDTH] IN BLOOD BY AUTOMATED COUNT: 15.1 % (ref 10–15)
GFR SERPL CREATININE-BSD FRML MDRD: 44 ML/MIN/1.7M2
GFR SERPL CREATININE-BSD FRML MDRD: 45 ML/MIN/1.7M2
GFR SERPL CREATININE-BSD FRML MDRD: 46 ML/MIN/1.7M2
GLUCOSE BLDC GLUCOMTR-MCNC: 152 MG/DL (ref 70–99)
GLUCOSE BLDC GLUCOMTR-MCNC: 163 MG/DL (ref 70–99)
GLUCOSE BLDC GLUCOMTR-MCNC: 180 MG/DL (ref 70–99)
GLUCOSE BLDC GLUCOMTR-MCNC: 182 MG/DL (ref 70–99)
GLUCOSE BLDC GLUCOMTR-MCNC: 184 MG/DL (ref 70–99)
GLUCOSE SERPL-MCNC: 171 MG/DL (ref 70–99)
GLUCOSE SERPL-MCNC: 197 MG/DL (ref 70–99)
GLUCOSE SERPL-MCNC: 210 MG/DL (ref 70–99)
GLUCOSE UR STRIP-MCNC: NEGATIVE MG/DL
HCT VFR BLD AUTO: 33.5 % (ref 40–53)
HGB BLD-MCNC: 12 G/DL (ref 13.3–17.7)
HGB UR QL STRIP: ABNORMAL
INR PPP: 1.19 (ref 0.86–1.14)
KETONES UR STRIP-MCNC: NEGATIVE MG/DL
LEUKOCYTE ESTERASE UR QL STRIP: NEGATIVE
MAGNESIUM SERPL-MCNC: 3.7 MG/DL (ref 1.6–2.3)
MCH RBC QN AUTO: 33.3 PG (ref 26.5–33)
MCHC RBC AUTO-ENTMCNC: 35.8 G/DL (ref 31.5–36.5)
MCV RBC AUTO: 93 FL (ref 78–100)
NITRATE UR QL: NEGATIVE
PH UR STRIP: 5.5 PH (ref 5–7)
PHOSPHATE SERPL-MCNC: 4.1 MG/DL (ref 2.5–4.5)
PLATELET # BLD AUTO: 295 10E9/L (ref 150–450)
POTASSIUM SERPL-SCNC: 3.3 MMOL/L (ref 3.4–5.3)
POTASSIUM SERPL-SCNC: 3.4 MMOL/L (ref 3.4–5.3)
POTASSIUM SERPL-SCNC: 4.2 MMOL/L (ref 3.4–5.3)
RBC # BLD AUTO: 3.6 10E12/L (ref 4.4–5.9)
RBC #/AREA URNS AUTO: >182 /HPF (ref 0–2)
SODIUM SERPL-SCNC: 148 MMOL/L (ref 133–144)
SODIUM SERPL-SCNC: 149 MMOL/L (ref 133–144)
SODIUM SERPL-SCNC: 149 MMOL/L (ref 133–144)
SP GR UR STRIP: 1.01 (ref 1–1.03)
URN SPEC COLLECT METH UR: ABNORMAL
UROBILINOGEN UR STRIP-MCNC: NORMAL MG/DL (ref 0–2)
WBC # BLD AUTO: 19.9 10E9/L (ref 4–11)
WBC #/AREA URNS AUTO: >182 /HPF (ref 0–2)

## 2017-03-23 PROCEDURE — 36415 COLL VENOUS BLD VENIPUNCTURE: CPT | Performed by: SURGERY

## 2017-03-23 PROCEDURE — 25000131 ZZH RX MED GY IP 250 OP 636 PS 637: Performed by: INTERNAL MEDICINE

## 2017-03-23 PROCEDURE — 25000132 ZZH RX MED GY IP 250 OP 250 PS 637: Performed by: INTERNAL MEDICINE

## 2017-03-23 PROCEDURE — 80048 BASIC METABOLIC PNL TOTAL CA: CPT | Performed by: INTERNAL MEDICINE

## 2017-03-23 PROCEDURE — 27210437 ZZH NUTRITION PRODUCT SEMIELEM INTERMED LITER

## 2017-03-23 PROCEDURE — 20000003 ZZH R&B ICU

## 2017-03-23 PROCEDURE — 84100 ASSAY OF PHOSPHORUS: CPT | Performed by: INTERNAL MEDICINE

## 2017-03-23 PROCEDURE — 36415 COLL VENOUS BLD VENIPUNCTURE: CPT | Performed by: INTERNAL MEDICINE

## 2017-03-23 PROCEDURE — 25000125 ZZHC RX 250: Performed by: INTERNAL MEDICINE

## 2017-03-23 PROCEDURE — 00000146 ZZHCL STATISTIC GLUCOSE BY METER IP

## 2017-03-23 PROCEDURE — 94003 VENT MGMT INPAT SUBQ DAY: CPT

## 2017-03-23 PROCEDURE — 40000275 ZZH STATISTIC RCP TIME EA 10 MIN

## 2017-03-23 PROCEDURE — 85027 COMPLETE CBC AUTOMATED: CPT | Performed by: INTERNAL MEDICINE

## 2017-03-23 PROCEDURE — 25000132 ZZH RX MED GY IP 250 OP 250 PS 637: Performed by: HOSPITALIST

## 2017-03-23 PROCEDURE — 85610 PROTHROMBIN TIME: CPT | Performed by: SURGERY

## 2017-03-23 PROCEDURE — 40000008 ZZH STATISTIC AIRWAY CARE

## 2017-03-23 PROCEDURE — 25000128 H RX IP 250 OP 636: Performed by: INTERNAL MEDICINE

## 2017-03-23 PROCEDURE — 25000132 ZZH RX MED GY IP 250 OP 250 PS 637

## 2017-03-23 PROCEDURE — 83735 ASSAY OF MAGNESIUM: CPT | Performed by: INTERNAL MEDICINE

## 2017-03-23 PROCEDURE — 81001 URINALYSIS AUTO W/SCOPE: CPT | Performed by: INTERNAL MEDICINE

## 2017-03-23 PROCEDURE — 99291 CRITICAL CARE FIRST HOUR: CPT | Performed by: INTERNAL MEDICINE

## 2017-03-23 PROCEDURE — S0032 INJECTION, NAFCILLIN SODIUM: HCPCS | Performed by: INTERNAL MEDICINE

## 2017-03-23 RX ORDER — OSELTAMIVIR PHOSPHATE 6 MG/ML
30 FOR SUSPENSION ORAL 2 TIMES DAILY
Status: DISCONTINUED | OUTPATIENT
Start: 2017-03-23 | End: 2017-03-26

## 2017-03-23 RX ORDER — AMOXICILLIN 250 MG
1-2 CAPSULE ORAL 2 TIMES DAILY
Status: DISCONTINUED | OUTPATIENT
Start: 2017-03-23 | End: 2017-04-01

## 2017-03-23 RX ORDER — FUROSEMIDE 10 MG/ML
40 INJECTION INTRAMUSCULAR; INTRAVENOUS EVERY 8 HOURS
Status: COMPLETED | OUTPATIENT
Start: 2017-03-23 | End: 2017-03-24

## 2017-03-23 RX ORDER — GABAPENTIN 250 MG/5ML
300 SOLUTION ORAL EVERY 8 HOURS SCHEDULED
Status: DISCONTINUED | OUTPATIENT
Start: 2017-03-23 | End: 2017-03-24

## 2017-03-23 RX ORDER — QUETIAPINE FUMARATE 50 MG/1
50 TABLET, FILM COATED ORAL 2 TIMES DAILY
Status: DISCONTINUED | OUTPATIENT
Start: 2017-03-23 | End: 2017-03-30

## 2017-03-23 RX ORDER — ATORVASTATIN CALCIUM 40 MG/1
40 TABLET, FILM COATED ORAL DAILY
Status: DISCONTINUED | OUTPATIENT
Start: 2017-03-24 | End: 2017-03-24

## 2017-03-23 RX ORDER — FUROSEMIDE 10 MG/ML
40 INJECTION INTRAMUSCULAR; INTRAVENOUS
Status: DISCONTINUED | OUTPATIENT
Start: 2017-03-23 | End: 2017-03-23

## 2017-03-23 RX ORDER — CARVEDILOL 6.25 MG/1
6.25 TABLET ORAL 2 TIMES DAILY
Status: DISCONTINUED | OUTPATIENT
Start: 2017-03-23 | End: 2017-03-23

## 2017-03-23 RX ORDER — CARVEDILOL 6.25 MG/1
12.5 TABLET ORAL 2 TIMES DAILY WITH MEALS
Status: DISCONTINUED | OUTPATIENT
Start: 2017-03-23 | End: 2017-03-24

## 2017-03-23 RX ADMIN — PROPOFOL 55 MCG/KG/MIN: 10 INJECTION, EMULSION INTRAVENOUS at 06:01

## 2017-03-23 RX ADMIN — PROPOFOL 55 MCG/KG/MIN: 10 INJECTION, EMULSION INTRAVENOUS at 09:44

## 2017-03-23 RX ADMIN — OSELTAMIVIR PHOSPHATE 30 MG: 6 POWDER, FOR SUSPENSION ORAL at 08:27

## 2017-03-23 RX ADMIN — ACETAMINOPHEN 650 MG: 325 TABLET, FILM COATED ORAL at 01:58

## 2017-03-23 RX ADMIN — Medication 81 MG: at 08:26

## 2017-03-23 RX ADMIN — VALPROATE SODIUM 500 MG: 100 INJECTION, SOLUTION INTRAVENOUS at 20:03

## 2017-03-23 RX ADMIN — INSULIN ASPART 2 UNITS: 100 INJECTION, SOLUTION INTRAVENOUS; SUBCUTANEOUS at 12:06

## 2017-03-23 RX ADMIN — OSELTAMIVIR PHOSPHATE 30 MG: 6 POWDER, FOR SUSPENSION ORAL at 20:02

## 2017-03-23 RX ADMIN — CHLORHEXIDINE GLUCONATE 15 ML: 1.2 RINSE ORAL at 08:17

## 2017-03-23 RX ADMIN — ACETAMINOPHEN 650 MG: 160 SOLUTION ORAL at 16:32

## 2017-03-23 RX ADMIN — PANTOPRAZOLE SODIUM 40 MG: 40 TABLET, DELAYED RELEASE ORAL at 08:27

## 2017-03-23 RX ADMIN — PROPOFOL 50 MCG/KG/MIN: 10 INJECTION, EMULSION INTRAVENOUS at 13:43

## 2017-03-23 RX ADMIN — QUETIAPINE FUMARATE 50 MG: 50 TABLET, FILM COATED ORAL at 08:27

## 2017-03-23 RX ADMIN — FUROSEMIDE 40 MG: 10 INJECTION, SOLUTION INTRAVENOUS at 08:53

## 2017-03-23 RX ADMIN — NAFCILLIN SODIUM 2 G: 2 INJECTION, POWDER, LYOPHILIZED, FOR SOLUTION INTRAMUSCULAR; INTRAVENOUS at 21:44

## 2017-03-23 RX ADMIN — NAFCILLIN SODIUM 2 G: 2 INJECTION, POWDER, LYOPHILIZED, FOR SOLUTION INTRAMUSCULAR; INTRAVENOUS at 12:03

## 2017-03-23 RX ADMIN — CARVEDILOL 6.25 MG: 6.25 TABLET, FILM COATED ORAL at 08:27

## 2017-03-23 RX ADMIN — CHLORHEXIDINE GLUCONATE 15 ML: 1.2 RINSE ORAL at 19:56

## 2017-03-23 RX ADMIN — GABAPENTIN 300 MG: 250 SOLUTION ORAL at 16:33

## 2017-03-23 RX ADMIN — INSULIN ASPART 2 UNITS: 100 INJECTION, SOLUTION INTRAVENOUS; SUBCUTANEOUS at 08:29

## 2017-03-23 RX ADMIN — PROPOFOL 55 MCG/KG/MIN: 10 INJECTION, EMULSION INTRAVENOUS at 02:05

## 2017-03-23 RX ADMIN — CARVEDILOL 12.5 MG: 6.25 TABLET, FILM COATED ORAL at 16:33

## 2017-03-23 RX ADMIN — INSULIN ASPART 1 UNITS: 100 INJECTION, SOLUTION INTRAVENOUS; SUBCUTANEOUS at 04:14

## 2017-03-23 RX ADMIN — ATORVASTATIN CALCIUM 40 MG: 40 TABLET, FILM COATED ORAL at 08:27

## 2017-03-23 RX ADMIN — GABAPENTIN 300 MG: 250 SOLUTION ORAL at 08:26

## 2017-03-23 RX ADMIN — NAFCILLIN SODIUM 2 G: 2 INJECTION, POWDER, LYOPHILIZED, FOR SOLUTION INTRAMUSCULAR; INTRAVENOUS at 16:33

## 2017-03-23 RX ADMIN — NAFCILLIN SODIUM 2 G: 2 INJECTION, POWDER, LYOPHILIZED, FOR SOLUTION INTRAMUSCULAR; INTRAVENOUS at 08:53

## 2017-03-23 RX ADMIN — INSULIN ASPART 1 UNITS: 100 INJECTION, SOLUTION INTRAVENOUS; SUBCUTANEOUS at 16:59

## 2017-03-23 RX ADMIN — VALPROATE SODIUM 500 MG: 100 INJECTION, SOLUTION INTRAVENOUS at 08:24

## 2017-03-23 RX ADMIN — INSULIN GLARGINE 20 UNITS: 100 INJECTION, SOLUTION SUBCUTANEOUS at 22:37

## 2017-03-23 RX ADMIN — HEPARIN SODIUM 5000 UNITS: 10000 INJECTION, SOLUTION INTRAVENOUS; SUBCUTANEOUS at 04:08

## 2017-03-23 RX ADMIN — ACETAMINOPHEN 650 MG: 160 SOLUTION ORAL at 22:37

## 2017-03-23 RX ADMIN — SENNOSIDES AND DOCUSATE SODIUM 2 TABLET: 8.6; 5 TABLET ORAL at 20:03

## 2017-03-23 RX ADMIN — PROPOFOL 50 MCG/KG/MIN: 10 INJECTION, EMULSION INTRAVENOUS at 17:01

## 2017-03-23 RX ADMIN — MULTIVITAMIN 15 ML: LIQUID ORAL at 08:27

## 2017-03-23 RX ADMIN — NAFCILLIN SODIUM 2 G: 2 INJECTION, POWDER, LYOPHILIZED, FOR SOLUTION INTRAMUSCULAR; INTRAVENOUS at 04:09

## 2017-03-23 RX ADMIN — SENNOSIDES AND DOCUSATE SODIUM 1 TABLET: 8.6; 5 TABLET ORAL at 08:27

## 2017-03-23 RX ADMIN — FUROSEMIDE 40 MG: 10 INJECTION, SOLUTION INTRAVENOUS at 16:33

## 2017-03-23 RX ADMIN — QUETIAPINE FUMARATE 50 MG: 50 TABLET, FILM COATED ORAL at 20:03

## 2017-03-23 RX ADMIN — INSULIN ASPART 2 UNITS: 100 INJECTION, SOLUTION INTRAVENOUS; SUBCUTANEOUS at 20:03

## 2017-03-23 RX ADMIN — ACETAMINOPHEN 650 MG: 160 SOLUTION ORAL at 05:52

## 2017-03-23 NOTE — PLAN OF CARE
Problem: Goal Outcome Summary  Goal: Goal Outcome Summary  Outcome: No Change  Pt intubated, sedated on vent.  Pt withdraws to pain to all extremities, does not open eyes, no tracking or follow.    Resp: +loose cough, tan creamy secretions.  Overbreaths vent.  CV: SR-ST, SBP<140 continuously, MAP>65.  GI: ABD dist, + bowel sounds.  Senna and bisacodyl supp given for constipation.  :  Urine dark laura, output WNL.  Skin: Intact except for swelling to oral mucosa lips & tongue, and medical drains & lines. Tongue edemitus & trapped between ETT & teeth, scant bleeding noted w/PO secretions.  Bite block applied between teeth.  Pain: No pain behaviors noted.   Family at bedside continuously.  Reviewed plan of care and today's MD note w/family at bedside.  Family verbalize frustration that pt is not off vent or appearing to improve.       Pt transported to CT for Chest CT, tolerated well.

## 2017-03-23 NOTE — PROGRESS NOTES
[facility] ICU RESPIRATORY NOTE  Date of Admission: 3/17/17  Date of Intubation (most recent): 3/17/17  Reason for Mechanical Ventilation: Respiratory failure  Number of Days on Mechanical Ventilation: 7  Met Criteria for Pressure Support Trial: Yes  Length of Pressure Support Trial: Patient placed on PS 18/5 at 1547, remains on 18/10 per Dr. Valdes      Significant Events Today: Patient had brief episode of desaturation this evening, SpO2 84%.  PEEP increased to [id number], FiO2 increased to 60% by Dr. Valdes, SpO2 now 98%.  Plan to titrate FiO2 as able, leave peep at [id number].    ABG Results:    ETT appearance on chest x-ray:  In good position    Plan:    Continue vent support.  Plan to leave patient in PS as long as tolerated per Dr. Valdes.

## 2017-03-23 NOTE — PLAN OF CARE
Problem: Goal Outcome Summary  Goal: Goal Outcome Summary  Outcome: No Change  Tmax 101.9, PRN tyl given. PERRL.  Withdraws minimally from pain.  Sedated on propofol.  Tongue swollen.  LS coarse, rhonchi.  RR 20-26.  Thick secretions suctioned.  SR/ST.  TF at 30.  No BM.  UOP good overnight, dark laura in color.  At 0600, UOP appeared blood red in appearance.   Hgb stable.  New order for INR.  Family updated at bedside throughout the night.

## 2017-03-23 NOTE — PROVIDER NOTIFICATION
Updated Dr. Valdes on pt spo2 84% on cpap ..1712 Dr. Valdes at bedside. Prop gtt off sedation vacation peep 10 fi02 60%

## 2017-03-23 NOTE — PROGRESS NOTES
[facility] ICU RESPIRATORY NOTE  Date of Admission:3/17/17  Date of Intubation (most recent):3/17/17  Reason for Mechanical Ventilation: Respiratory failure  Number of Days on Mechanical Ventilation: 7  Reason for No Pressure Support Trial: per MD  Ventilation Mode: CMV/AC  FiO2 (%): 40 %  Rate Set (breaths/minute): 20 breaths/min  Tidal Volume Set (mL): 450 mL  PEEP (cm H2O): 5 cmH2O  Oxygen Concentration (%): 40 %  Resp: 20      Significant Events Today: pt transported to CT for chest.    ABG Results: no new ABG    ETT appearance on chest x-ray: same positon    Plan: pt remains full vent support. RT will continue to follow.  3/23/2017  Rubio Epstein

## 2017-03-23 NOTE — PLAN OF CARE
Problem: Pneumonia (Adult)  Goal: Signs and Symptoms of Listed Potential Problems Will be Absent or Manageable (Pneumonia)  Signs and symptoms of listed potential problems will be absent or manageable by discharge/transition of care (reference Pneumonia (Adult) CPG).   Sedated on propofol. Withdraws to pain. Suctioned for moderate amt of thick yellow secretions. Diuresed well after lasix - urine bloody, Dr Aponte aware. Much time spent with wife and daughter for explanation of cares and treatment and emotional support.

## 2017-03-23 NOTE — PROGRESS NOTES
Critical Care Progress Note      03/23/2017    Name: Wyatt Mahajan MRN#: [id number]   Age: 57 year old YOB: 1960     [facility]    [facility] DAY #6             Problem List:   Active Problems:    Acute respiratory failure with hypoxia (H)  MSSA pna  MSSA bacteremia  Influenza B  JOSE         Summary/Hospital Course:   Wyatt Mahajan is a 57 year old male admitted on 3/17 to the floor, presenting with acute hypoxic respiratory failure due to influenza B and CAP, right chest wall pain and uncontrolled DM-II. Patient was subsequently transferred from the floor to the MICU for increased work of breathing, accessory muscle use and respiratory distress. Was intubated due to increased work of breathing, tachypnea and decreasing O2 sats.    3/18:  Weaned vent yesterday and overnight.  Comfortably sedated this AM.  +blood cx for staph aureus overnight.  On vanco--repeating blood cx.    3/19:  Still minimal vent settings but minute ventilation still ~13 so won't try to extubate today.  Weaning from insulin drip back to lantus/SSI now on steady TF.  Blood cultures persistently positive--MSSA.  Change to nafcillin.  Reculture.  TTE.    3/20:  No changes, no events. Gentle diuresis yesterday but still positive.  Increasing diuresis today.     3/21: Not tolerating CMV/AC last evening (tachycardia, tachypnea, hypertension), so switched to pressure support which improved vital signs overnight. Switched back to CMV/AC this morning at 5:30am, now tolerating. Cr bump so no further diuresis today. Changing sedation to precedex. Echo showed no vegetations/masses on valves. Will continue to follow cultures, now growth so far from 3/20 cultures.    3/22: Pressure support trial last evening was stopped just after its initiation due to inc. RR to the 40's. Eyes open to voice. Given valproate for delirium. Propofol wean and transition to precedex resulted in inc HR, RR and BP. Nursing notes on exam, 'wet crackles'  in L lung and thick creamy red-streaked secretions. Ppeak to mid-30's. No BM yet, just smears but constipation treated with senna, dulcolax and miralax.    3/23: Episodes of tachycardia and hypertension to 169/93, coreg started and BP improved with versed and propofol. Urine was bloody this morning, suspect bell trauma. Febrile to 101.9 with PRN tylenol given. Still no BM though treated with senna, dulcolax and miralax. Family worried about his condition and the course of his illness.      Assessment and plan :     Wyatt Mahajan IS a 57 year old male admitted on 3/17/2017 for respiratory failure, flu B, mssa pna and mssa bacteremia.   I have personally reviewed the daily labs, imaging studies, cultures and discussed the case with referring physician and consulting physicians.   My assessment and plan by system for this patient is as follows:    Neurology/Psychiatry:   1. Sedation:  Propofol, prn dilaudid and midazolam.  2.  Delirium:  Seroquel, 50mg bid.  (). Valproate added evening 3/22.    3. H/o Diabetic neuropathy 2/2 DM-II: home dose of gabapentin 300mg, TID.    Cardiovascular:   1. Hypertension: SBP over last day has remained in the range of 150-160. Increase carvedilol to 12.5 mg, BID.  2. H/o HLD:  Continue atorvastatin  3. H/o PAD:  continue ASA  4. Hyperlactemia:  Resolved    Pulmonary/Ventilator Management:   1.  Respiratory failure, hypoxemic:  2/2 mssa pna and influenza B.  May also have element of ARDS, but only on minimal vent settings (40%, peep5).  Minute ventilation continues to improve since admission. CT-chest on 3/22 showed infiltrates and consolidation mildly improved, no occult abscess visualized.  --nafcillin  --cont tamiflu  --Lung protective vent strategy  --Switch to pressure supported respiration, as tolerated     GI and Nutrition :   1.  TF at goal   2.  PPI for PUD prophy  3.  Constipation  --Senna daily, BID  --PRN Dulcolax, Miralax    Renal/Fluids/Electrolytes:   1.  JOSE--Cr improved to 1.58 this AM  --restarted diuresis  --administer tube feeds/maintainance fluids   2. Replete electrolytes prn.   3. Mild volume overload:  --re-starting gentle diuresis today, furosemide 40mg, TID  --K+ repletion, as needed    :  1. Hematuria: bloodly urine AM of 3/23 likely due to trauma from the bell catheter. INR ordered by tele-ICU, was 1.19.  --re-sending a UA    Infectious Disease:   1. Flu B:  Tamiflu  2. mssa pna/bacteremia: Nafcillin.  Most recent bld cultures show no growth. TTE did not show valvular masses/vegetations. CT chest negative for occult abscess.    Endocrine:   1. Hyperglycemia:  H/o DM2.  Weaned insulin drip over to lantus (starting at 20 -- on 26 at home) and ssi.    Hematology/Oncology:   1. Pltlt/hgb stable  2. Leukocytosis-- 2/2 infection. WBC to 19.9 on 3/23. Patient with fever to 101.5. Urine culture shows no growth.  --Continue Nafcillin and Tamiflu     IV/Access:   1. Venous access - peripheral    ICU Prophylaxis:   1. DVT: Hep Subq  2. VAP: HOB 30 degrees, chlorhexidine rinse  3. Stress Ulcer: PPI  4. Restraints: Nonviolent soft two point restraints required and necessary for patient safety and continued cares and good effect as patient continues to pull at necessary lines, tubes despite education and distraction. Will readdress daily.   6. Feeding - TF  7. Family Update: at bedside  8. Disposition - critically ill      Key goals for next 24 hours:   1. Cont nafcillin  2. Cont tamiflu  3. Cont bowel regimen/constipation treatment  4. Pressure support respiration as tolerated  5. Re-started gentle diuresis, follow BMP's q8h and replete K+ as needed        Interim History:     Gentle diuresis re-started as Cr has improved today and patient is still volume up. Following BMP's and K+ will be repleted as necessary. New hematuria this AM, likely due to trauma from the bell catheter. We will watch this closely. Met with family to discuss their concerns about their  /father's illness, his progression in the ICU and recovery. Their many questions were addressed and discussed, which was reassuring for his wife.         Key Medications:       furosemide  40 mg Intravenous Q8H     oseltamivir  30 mg Oral BID     gabapentin  300 mg Oral Q8H ALEC     carvedilol  6.25 mg Oral BID     QUEtiapine  50 mg Oral BID     valproate (DEPACON) intermittent infusion  500 mg Intravenous Q12H     insulin aspart  1-12 Units Subcutaneous Q4H     insulin glargine  20 Units Subcutaneous At Bedtime     nafcillin  2 g Intravenous Q4H     heparin  5,000 Units Subcutaneous Q8H     atorvastatin  40 mg Oral Daily     sodium chloride (PF)  3 mL Intracatheter Q8H     pneumococcal vaccine  0.5 mL Intramuscular Prior to discharge     sodium chloride (PF)  3 mL Intracatheter Q8H     rocuronium  0.6 mg/kg Intravenous Once     senna-docusate  1-2 tablet Oral BID 09 12     chlorhexidine  15 mL Mouth/Throat Q12H     multivitamins with minerals  15 mL Per Feeding Tube Daily     aspirin  81 mg Oral or Feeding Tube Daily     pantoprazole  40 mg Per Feeding Tube Daily       dexmedetomidine Stopped (03/21/17 1724)     propofol (DIPRIVAN) infusion 55 mcg/kg/min (03/23/17 0601)     NaCl 10 mL/hr at 03/21/17 1954     IV fluid REPLACEMENT ONLY                 Physical Examination:   Temp:  [98.2  F (36.8  C)-101.5  F (38.6  C)] 100.9  F (38.3  C)  Heart Rate:  [] 107  Resp:  [13-36] 20  BP: (118-169)/(68-93) 156/79  FiO2 (%):  [40 %] 40 %  SpO2:  [92 %-100 %] 96 %          Intake/Output Summary (Last 24 hours) at 03/23/17 1217  Last data filed at 03/23/17 1200   Gross per 24 hour   Intake          2013.19 ml   Output             2350 ml   Net          -336.81 ml         Wt Readings from Last 4 Encounters:   03/23/17 72.1 kg (158 lb 15.2 oz)   03/16/17 70.3 kg (155 lb)   01/17/17 71.3 kg (157 lb 1.6 oz)   11/30/16 72.6 kg (160 lb)     BP - Mean:  [] 104  Ventilation Mode: CMV/AC  FiO2 (%): 40 %  Rate Set  (breaths/minute): 20 breaths/min  Tidal Volume Set (mL): 450 mL  PEEP (cm H2O): 5 cmH2O  Oxygen Concentration (%): 40 %  Resp: 20    Recent Labs  Lab 03/17/17  0755   PH 7.39   PCO2 38   PO2 58*   HCO3 23   O2PER 40%       GEN: no acute distress, sedated, intubated  HEENT: head ncat, sclera anicteric, OP patent, trachea midline   PULM: unlabored synchronous with vent, coarse lung sounds anteriorly L>R   CV/COR: RRR S1/S2, No rub, murmur or gallop  ABD: firm, distended, nontender, hypoactive bowel sounds, no mass  EXT:  Mild peripheral edema, warm x4 and well-perfused. Strong distal pulses.  NEURO: sedated  SKIN: no obvious rash  LINES: clean, dry intact         Data:   All data and imaging reviewed     ROUTINE ICU LABS (Last four results)  CMP    Recent Labs  Lab 03/23/17  0535 03/22/17  0455 03/21/17  0600 03/20/17  1913 03/20/17  0450  03/17/17  0635 03/17/17  0330   * 146* 145* 145* 143  < >  --  139   POTASSIUM 4.2 3.9 3.8 3.4 3.5  < >  --  3.9   CHLORIDE 112* 109 106 107 107  < >  --  105   CO2 27 27 28 27 27  < >  --  21   ANIONGAP 9 10 11 11 9  < >  --  13   * 190* 155* 159* 131*  < >  --  315*   BUN 24 25 23 22 17  < >  --  17   CR 1.58* 1.61* 1.42* 1.21 0.92  < >  --  0.67   GFRESTIMATED 46* 45* 52* 62 86  < >  --  >90Non  GFR Calc   GFRESTBLACK 55* 54* 63 75 >90African American GFR Calc  < >  --  >90African American GFR Calc   LEONIE 6.5* 6.7* 7.2* 7.6* 7.6*  < >  --  8.5   MAG 3.7* 3.4* 2.9*  --  2.8*  < >  --   --    PHOS 4.1 3.7 5.6*  --  2.9  < >  --   --    PROTTOTAL  --   --   --   --   --   --  6.4* 7.3   ALBUMIN  --   --   --   --   --   --  2.7* 3.2*   BILITOTAL  --   --   --   --   --   --  0.5 0.8   ALKPHOS  --   --   --   --   --   --  63 71   AST  --   --   --   --   --   --  28 32   ALT  --   --   --   --   --   --  35 45   < > = values in this interval not displayed.    CBC    Recent Labs  Lab 03/23/17  0535 03/22/17  0455 03/21/17  0600 03/20/17  0450   WBC 19.9*  19.4* 17.0* 13.2*   RBC 3.60* 3.61* 3.66* 3.71*   HGB 12.0* 11.9* 12.0* 11.7*   HCT 33.5* 33.3* 34.2* 33.9*   MCV 93 92 93 91   MCH 33.3* 33.0 32.8 31.5   MCHC 35.8 35.7 35.1 34.5   RDW 15.1* 14.8 14.7 13.7    259 202 147*     INR    Recent Labs  Lab 03/23/17  0650   INR 1.19*     Arterial Blood Gas    Recent Labs  Lab 03/17/17  0755   PH 7.39   PCO2 38   PO2 58*   HCO3 23   O2PER 40%       All cultures:    Recent Labs  Lab 03/21/17  1545 03/19/17  1610 03/19/17  1605 03/18/17  1055 03/18/17  1050 03/17/17  0725 03/17/17  0355 03/17/17  0330   CULT No growth No growth after 4 days No growth after 4 days Cultured on the 1st day of incubation: Staphylococcus aureusCritical Value/Significant Value, preliminary result only, called to and read back by Shelia Smith at [facility] on 03.19.17 12:31 P.M. JT.Susceptibility testing done on previous specimen* Cultured on the 1st day of incubation: Staphylococcus aureus Susceptibility testing done on previous specimenCritical Value/Significant Value, preliminary result only, called to and read back by Patricia Truong RN [facility], @5584 3/20/17. DH.* Heavy growth Staphylococcus aureusPlus Light growth Normal juan* Cultured on the 1st day of incubation: Staphylococcus aureusCritical Value/Significant Value, preliminary result only, called to and read back by June Olivo RN at 0456 [id number].DKSusceptibility testing done on previous specimen* Cultured on the 1st day of incubation: Staphylococcus aureusCritical Value/Significant Value, preliminary result only, called to and read back by June Olivo RN at 0108 [id number](Note)POSITIVE for STAPHYLOCOCCUS AUREUS and NEGATIVE for the mecA gene(not MRSA) by Field Squaredigene multiplex nucleic acid test. The mecA gene wasnot detected. Final identification and antimicrobial susceptibilitytesting will be verified by standard methods.Specimen tested with Field Squaredigene multiplex, gram-positive blood culturenucleic acid test for the  following targets: Staph aureus, Staphepidermidis, Staph lugdunensis, other Staph species, Enterococcusfaecalis, Enterococcus faecium, Streptococcus species, S. agalactiae,S. anginosus grp., S. pneumoniae, S. pyogenes, Listeria sp., mecA(methicillin resistance) and [place]/B (vancomycin resistance).Critical Value/Significant Value called to and read back by SARA Allen at 0350 [id number]*     Imaging:  Recent Results (from the past 24 hour(s))   XR Chest Port 1 View    Narrative    XR CHEST PORT 1 VW 3/17/2017 11:35 AM    HISTORY: Intubation.    COMPARISON: 3/16/2017    FINDINGS: The endotracheal tube tip is at the debi, directed toward  the left mainstem bronchus. In comparison with yesterday's exam there  is significant diffuse airspace opacity with more focal consolidation  in the lower segment of the right upper lobe. No pleural effusion or  pneumothorax.      Impression    IMPRESSION: Endotracheal tube should be pulled back by distance of  approximately 3-4 cm.    TARIQ MALCOLM MD   XR Abdomen Port 1 View    Narrative    XR ABDOMEN PORT F1 VW 3/17/2017 11:37 AM    HISTORY: OG placement.    COMPARISON: None.      Impression    IMPRESSION: The enteric tube tip is in the projection of the stomach.  The gas pattern is nonobstructive.    TARIQ MALCOLM MD   XR Chest Port 1 View    Narrative    XR CHEST PORT 1 VW  3/18/2017 6:40 AM     HISTORY:  Intubated    COMPARISON: Film performed on 3/17/2017    FINDINGS: This is a semiupright film.  ET tube and NG tube are in  place. The ET tube has been pulled back since the previous film.  Extensive bilateral interstitial and alveolar infiltrate is again  seen. This has not changed significantly.      Impression    IMPRESSION: Considering differences in film technique, no significant  change. Persistent bilateral infiltrate.    ALISHA YAN MD     CT-Chest from 3/22:  1. Extensive infiltrate and consolidation in both lungs has overall  improved slightly since 3/17/2017.   2.  Small bilateral pleural effusions are new since the previous exam.  3. Mildly prominent and borderline-enlarged mediastinal and right  hilar lymph nodes have a similar appearance to the previous exam,  given the noncontrast technique on today's study.        Billing: This patient is critically ill: Yes. Total critical care time today 35 min.

## 2017-03-23 NOTE — PROVIDER NOTIFICATION
MD Notification    Notified Person:  MD    Notified Persons Name: Tele-ICU    Notification Date/Time: 3/23 0620    Notification Interaction:  Talked with tele ICU    Purpose of Notification:  0600: Urine output bloody in appearance.      Orders Received:  Check INR    Comments:

## 2017-03-24 ENCOUNTER — APPOINTMENT (OUTPATIENT)
Dept: GENERAL RADIOLOGY | Facility: CLINIC | Age: 57
DRG: 870 | End: 2017-03-24
Attending: INTERNAL MEDICINE
Payer: COMMERCIAL

## 2017-03-24 ENCOUNTER — APPOINTMENT (OUTPATIENT)
Dept: ULTRASOUND IMAGING | Facility: CLINIC | Age: 57
DRG: 870 | End: 2017-03-24
Attending: INTERNAL MEDICINE
Payer: COMMERCIAL

## 2017-03-24 LAB
ALBUMIN SERPL-MCNC: 1 G/DL (ref 3.4–5)
ALBUMIN SERPL-MCNC: 1.2 G/DL (ref 3.4–5)
ALP SERPL-CCNC: 362 U/L (ref 40–150)
ALP SERPL-CCNC: 389 U/L (ref 40–150)
ALT SERPL W P-5'-P-CCNC: 109 U/L (ref 0–70)
ALT SERPL W P-5'-P-CCNC: 119 U/L (ref 0–70)
ANION GAP SERPL CALCULATED.3IONS-SCNC: 11 MMOL/L (ref 3–14)
ANION GAP SERPL CALCULATED.3IONS-SCNC: 6 MMOL/L (ref 3–14)
AST SERPL W P-5'-P-CCNC: 203 U/L (ref 0–45)
AST SERPL W P-5'-P-CCNC: 213 U/L (ref 0–45)
BASE EXCESS BLDA CALC-SCNC: 1.9 MMOL/L
BILIRUB DIRECT SERPL-MCNC: 2 MG/DL (ref 0–0.2)
BILIRUB SERPL-MCNC: 3.5 MG/DL (ref 0.2–1.3)
BILIRUB SERPL-MCNC: 3.8 MG/DL (ref 0.2–1.3)
BUN SERPL-MCNC: 26 MG/DL (ref 7–30)
BUN SERPL-MCNC: 27 MG/DL (ref 7–30)
CALCIUM SERPL-MCNC: 7.3 MG/DL (ref 8.5–10.1)
CALCIUM SERPL-MCNC: 7.4 MG/DL (ref 8.5–10.1)
CHLORIDE SERPL-SCNC: 113 MMOL/L (ref 94–109)
CHLORIDE SERPL-SCNC: 115 MMOL/L (ref 94–109)
CK SERPL-CCNC: 1179 U/L (ref 30–300)
CK SERPL-CCNC: 1405 U/L (ref 30–300)
CO2 SERPL-SCNC: 27 MMOL/L (ref 20–32)
CO2 SERPL-SCNC: 31 MMOL/L (ref 20–32)
CREAT SERPL-MCNC: 1.71 MG/DL (ref 0.66–1.25)
CREAT SERPL-MCNC: 1.77 MG/DL (ref 0.66–1.25)
EOSINOPHIL SPEC QL WRIGHT STN: NORMAL
ERYTHROCYTE [DISTWIDTH] IN BLOOD BY AUTOMATED COUNT: 15.2 % (ref 10–15)
GFR SERPL CREATININE-BSD FRML MDRD: 40 ML/MIN/1.7M2
GFR SERPL CREATININE-BSD FRML MDRD: 42 ML/MIN/1.7M2
GLUCOSE BLDC GLUCOMTR-MCNC: 101 MG/DL (ref 70–99)
GLUCOSE BLDC GLUCOMTR-MCNC: 153 MG/DL (ref 70–99)
GLUCOSE BLDC GLUCOMTR-MCNC: 164 MG/DL (ref 70–99)
GLUCOSE BLDC GLUCOMTR-MCNC: 275 MG/DL (ref 70–99)
GLUCOSE BLDC GLUCOMTR-MCNC: 60 MG/DL (ref 70–99)
GLUCOSE BLDC GLUCOMTR-MCNC: 65 MG/DL (ref 70–99)
GLUCOSE BLDC GLUCOMTR-MCNC: 72 MG/DL (ref 70–99)
GLUCOSE BLDC GLUCOMTR-MCNC: 94 MG/DL (ref 70–99)
GLUCOSE SERPL-MCNC: 83 MG/DL (ref 70–99)
GLUCOSE SERPL-MCNC: 93 MG/DL (ref 70–99)
GRAM STN SPEC: ABNORMAL
HCO3 BLD-SCNC: 25 MMOL/L (ref 21–28)
HCT VFR BLD AUTO: 33.2 % (ref 40–53)
HGB BLD-MCNC: 11.3 G/DL (ref 13.3–17.7)
MAGNESIUM SERPL-MCNC: 3.5 MG/DL (ref 1.6–2.3)
MCH RBC QN AUTO: 31.3 PG (ref 26.5–33)
MCHC RBC AUTO-ENTMCNC: 34 G/DL (ref 31.5–36.5)
MCV RBC AUTO: 92 FL (ref 78–100)
MICRO REPORT STATUS: ABNORMAL
MYOGLOBIN SERPL-MCNC: 552 UG/L
O2/TOTAL GAS SETTING VFR VENT: 40 %
OXYHGB MFR BLD: 97 % (ref 92–100)
PCO2 BLD: 31 MM HG (ref 35–45)
PH BLD: 7.51 PH (ref 7.35–7.45)
PHOSPHATE SERPL-MCNC: 4.2 MG/DL (ref 2.5–4.5)
PLATELET # BLD AUTO: 297 10E9/L (ref 150–450)
PO2 BLD: 95 MM HG (ref 80–105)
POTASSIUM SERPL-SCNC: 3.2 MMOL/L (ref 3.4–5.3)
POTASSIUM SERPL-SCNC: 3.6 MMOL/L (ref 3.4–5.3)
PROT SERPL-MCNC: 7.4 G/DL (ref 6.8–8.8)
PROT SERPL-MCNC: 7.5 G/DL (ref 6.8–8.8)
RBC # BLD AUTO: 3.61 10E12/L (ref 4.4–5.9)
SODIUM SERPL-SCNC: 150 MMOL/L (ref 133–144)
SODIUM SERPL-SCNC: 153 MMOL/L (ref 133–144)
SPECIMEN SOURCE: ABNORMAL
SPECIMEN SOURCE: NORMAL
TRIGL SERPL-MCNC: 677 MG/DL
TROPONIN I SERPL-MCNC: 0.12 UG/L (ref 0–0.04)
WBC # BLD AUTO: 17.9 10E9/L (ref 4–11)

## 2017-03-24 PROCEDURE — 25000132 ZZH RX MED GY IP 250 OP 250 PS 637: Performed by: INTERNAL MEDICINE

## 2017-03-24 PROCEDURE — 25000128 H RX IP 250 OP 636: Performed by: INTERNAL MEDICINE

## 2017-03-24 PROCEDURE — 25000125 ZZHC RX 250: Performed by: INTERNAL MEDICINE

## 2017-03-24 PROCEDURE — 76705 ECHO EXAM OF ABDOMEN: CPT

## 2017-03-24 PROCEDURE — 36415 COLL VENOUS BLD VENIPUNCTURE: CPT | Performed by: INTERNAL MEDICINE

## 2017-03-24 PROCEDURE — 82550 ASSAY OF CK (CPK): CPT | Performed by: INTERNAL MEDICINE

## 2017-03-24 PROCEDURE — 87186 SC STD MICRODIL/AGAR DIL: CPT | Performed by: INTERNAL MEDICINE

## 2017-03-24 PROCEDURE — 40000275 ZZH STATISTIC RCP TIME EA 10 MIN

## 2017-03-24 PROCEDURE — S0032 INJECTION, NAFCILLIN SODIUM: HCPCS | Performed by: INTERNAL MEDICINE

## 2017-03-24 PROCEDURE — 80048 BASIC METABOLIC PNL TOTAL CA: CPT | Performed by: INTERNAL MEDICINE

## 2017-03-24 PROCEDURE — 71010 XR CHEST PORT 1 VW: CPT

## 2017-03-24 PROCEDURE — 36600 WITHDRAWAL OF ARTERIAL BLOOD: CPT

## 2017-03-24 PROCEDURE — 89190 NASAL SMEAR FOR EOSINOPHILS: CPT | Performed by: NURSE PRACTITIONER

## 2017-03-24 PROCEDURE — 99291 CRITICAL CARE FIRST HOUR: CPT | Performed by: INTERNAL MEDICINE

## 2017-03-24 PROCEDURE — 87205 SMEAR GRAM STAIN: CPT | Performed by: INTERNAL MEDICINE

## 2017-03-24 PROCEDURE — 83735 ASSAY OF MAGNESIUM: CPT | Performed by: INTERNAL MEDICINE

## 2017-03-24 PROCEDURE — 84100 ASSAY OF PHOSPHORUS: CPT | Performed by: INTERNAL MEDICINE

## 2017-03-24 PROCEDURE — 87070 CULTURE OTHR SPECIMN AEROBIC: CPT | Performed by: INTERNAL MEDICINE

## 2017-03-24 PROCEDURE — 85027 COMPLETE CBC AUTOMATED: CPT | Performed by: INTERNAL MEDICINE

## 2017-03-24 PROCEDURE — 84478 ASSAY OF TRIGLYCERIDES: CPT | Performed by: INTERNAL MEDICINE

## 2017-03-24 PROCEDURE — 80076 HEPATIC FUNCTION PANEL: CPT | Performed by: INTERNAL MEDICINE

## 2017-03-24 PROCEDURE — 82805 BLOOD GASES W/O2 SATURATION: CPT | Performed by: INTERNAL MEDICINE

## 2017-03-24 PROCEDURE — 25000131 ZZH RX MED GY IP 250 OP 636 PS 637: Performed by: INTERNAL MEDICINE

## 2017-03-24 PROCEDURE — 80053 COMPREHEN METABOLIC PANEL: CPT | Performed by: INTERNAL MEDICINE

## 2017-03-24 PROCEDURE — 27210429 ZZH NUTRITION PRODUCT INTERMEDIATE LITER

## 2017-03-24 PROCEDURE — 87077 CULTURE AEROBIC IDENTIFY: CPT | Performed by: INTERNAL MEDICINE

## 2017-03-24 PROCEDURE — 94003 VENT MGMT INPAT SUBQ DAY: CPT

## 2017-03-24 PROCEDURE — 83874 ASSAY OF MYOGLOBIN: CPT | Performed by: INTERNAL MEDICINE

## 2017-03-24 PROCEDURE — 00000146 ZZHCL STATISTIC GLUCOSE BY METER IP

## 2017-03-24 PROCEDURE — 87106 FUNGI IDENTIFICATION YEAST: CPT | Performed by: INTERNAL MEDICINE

## 2017-03-24 PROCEDURE — 25800025 ZZH RX 258: Performed by: HOSPITALIST

## 2017-03-24 PROCEDURE — 84484 ASSAY OF TROPONIN QUANT: CPT | Performed by: INTERNAL MEDICINE

## 2017-03-24 PROCEDURE — 25000132 ZZH RX MED GY IP 250 OP 250 PS 637: Performed by: HOSPITALIST

## 2017-03-24 PROCEDURE — 25000128 H RX IP 250 OP 636: Performed by: HOSPITALIST

## 2017-03-24 PROCEDURE — 20000003 ZZH R&B ICU

## 2017-03-24 RX ORDER — HEPARIN SODIUM,PORCINE 10 UNIT/ML
2-5 VIAL (ML) INTRAVENOUS
Status: DISCONTINUED | OUTPATIENT
Start: 2017-03-24 | End: 2017-04-11 | Stop reason: HOSPADM

## 2017-03-24 RX ORDER — HYDRALAZINE HYDROCHLORIDE 20 MG/ML
20 INJECTION INTRAMUSCULAR; INTRAVENOUS EVERY 4 HOURS PRN
Status: DISCONTINUED | OUTPATIENT
Start: 2017-03-24 | End: 2017-04-11 | Stop reason: HOSPADM

## 2017-03-24 RX ORDER — HYDRALAZINE HYDROCHLORIDE 20 MG/ML
INJECTION INTRAMUSCULAR; INTRAVENOUS
Status: DISCONTINUED
Start: 2017-03-24 | End: 2017-03-30 | Stop reason: HOSPADM

## 2017-03-24 RX ORDER — GABAPENTIN 250 MG/5ML
100 SOLUTION ORAL EVERY 8 HOURS SCHEDULED
Status: DISCONTINUED | OUTPATIENT
Start: 2017-03-24 | End: 2017-03-27

## 2017-03-24 RX ORDER — AMINO ACIDS/PROTEIN HYDROLYS 15G-100/30
1 LIQUID (ML) ORAL DAILY
Status: DISCONTINUED | OUTPATIENT
Start: 2017-03-24 | End: 2017-03-28

## 2017-03-24 RX ORDER — DEXTROSE MONOHYDRATE 50 MG/ML
INJECTION, SOLUTION INTRAVENOUS CONTINUOUS
Status: DISCONTINUED | OUTPATIENT
Start: 2017-03-24 | End: 2017-03-25

## 2017-03-24 RX ORDER — CARVEDILOL 25 MG/1
25 TABLET ORAL 2 TIMES DAILY WITH MEALS
Status: DISCONTINUED | OUTPATIENT
Start: 2017-03-24 | End: 2017-03-25

## 2017-03-24 RX ORDER — CEFAZOLIN SODIUM 2 G/100ML
2 INJECTION, SOLUTION INTRAVENOUS EVERY 8 HOURS
Status: DISCONTINUED | OUTPATIENT
Start: 2017-03-24 | End: 2017-03-27 | Stop reason: ALTCHOICE

## 2017-03-24 RX ORDER — LIDOCAINE 40 MG/G
CREAM TOPICAL
Status: DISCONTINUED | OUTPATIENT
Start: 2017-03-24 | End: 2017-04-11 | Stop reason: HOSPADM

## 2017-03-24 RX ADMIN — NAFCILLIN SODIUM 2 G: 2 INJECTION, POWDER, LYOPHILIZED, FOR SOLUTION INTRAMUSCULAR; INTRAVENOUS at 00:13

## 2017-03-24 RX ADMIN — CHLORHEXIDINE GLUCONATE 15 ML: 1.2 RINSE ORAL at 20:33

## 2017-03-24 RX ADMIN — POTASSIUM CHLORIDE 40 MEQ: 1.5 POWDER, FOR SOLUTION ORAL at 16:39

## 2017-03-24 RX ADMIN — NAFCILLIN SODIUM 2 G: 2 INJECTION, POWDER, LYOPHILIZED, FOR SOLUTION INTRAMUSCULAR; INTRAVENOUS at 09:34

## 2017-03-24 RX ADMIN — SODIUM CHLORIDE 500 MG: 9 INJECTION, SOLUTION INTRAVENOUS at 20:58

## 2017-03-24 RX ADMIN — FUROSEMIDE 40 MG: 10 INJECTION, SOLUTION INTRAVENOUS at 00:17

## 2017-03-24 RX ADMIN — DEXTROSE MONOHYDRATE: 50 INJECTION, SOLUTION INTRAVENOUS at 16:29

## 2017-03-24 RX ADMIN — GABAPENTIN 100 MG: 250 SUSPENSION ORAL at 14:35

## 2017-03-24 RX ADMIN — CEFAZOLIN SODIUM 2 G: 2 INJECTION, SOLUTION INTRAVENOUS at 23:57

## 2017-03-24 RX ADMIN — Medication 1 PACKET: at 14:35

## 2017-03-24 RX ADMIN — HYDROMORPHONE HYDROCHLORIDE 0.2 MG: 1 INJECTION, SOLUTION INTRAMUSCULAR; INTRAVENOUS; SUBCUTANEOUS at 09:27

## 2017-03-24 RX ADMIN — MIDAZOLAM HYDROCHLORIDE 2 MG: 1 INJECTION, SOLUTION INTRAMUSCULAR; INTRAVENOUS at 22:31

## 2017-03-24 RX ADMIN — INSULIN ASPART 1 UNITS: 100 INJECTION, SOLUTION INTRAVENOUS; SUBCUTANEOUS at 00:13

## 2017-03-24 RX ADMIN — POTASSIUM CHLORIDE 10 MEQ: 14.9 INJECTION, SOLUTION, CONCENTRATE PARENTERAL at 03:21

## 2017-03-24 RX ADMIN — MULTIVITAMIN 15 ML: LIQUID ORAL at 09:38

## 2017-03-24 RX ADMIN — QUETIAPINE FUMARATE 50 MG: 50 TABLET, FILM COATED ORAL at 12:10

## 2017-03-24 RX ADMIN — NAFCILLIN SODIUM 2 G: 2 INJECTION, POWDER, LYOPHILIZED, FOR SOLUTION INTRAMUSCULAR; INTRAVENOUS at 13:08

## 2017-03-24 RX ADMIN — INSULIN GLARGINE 15 UNITS: 100 INJECTION, SOLUTION SUBCUTANEOUS at 21:05

## 2017-03-24 RX ADMIN — QUETIAPINE FUMARATE 50 MG: 50 TABLET, FILM COATED ORAL at 20:30

## 2017-03-24 RX ADMIN — ATORVASTATIN CALCIUM 40 MG: 40 TABLET, FILM COATED ORAL at 09:38

## 2017-03-24 RX ADMIN — OXYCODONE HYDROCHLORIDE 5 MG: 5 TABLET ORAL at 12:10

## 2017-03-24 RX ADMIN — CEFAZOLIN SODIUM 2 G: 2 INJECTION, SOLUTION INTRAVENOUS at 16:47

## 2017-03-24 RX ADMIN — OXYCODONE HYDROCHLORIDE 10 MG: 5 TABLET ORAL at 16:51

## 2017-03-24 RX ADMIN — INSULIN ASPART 6 UNITS: 100 INJECTION, SOLUTION INTRAVENOUS; SUBCUTANEOUS at 20:41

## 2017-03-24 RX ADMIN — NAFCILLIN SODIUM 2 G: 2 INJECTION, POWDER, LYOPHILIZED, FOR SOLUTION INTRAMUSCULAR; INTRAVENOUS at 05:20

## 2017-03-24 RX ADMIN — DEXTROSE MONOHYDRATE 25 ML: 25 INJECTION, SOLUTION INTRAVENOUS at 04:40

## 2017-03-24 RX ADMIN — OXYCODONE HYDROCHLORIDE 10 MG: 5 TABLET ORAL at 21:12

## 2017-03-24 RX ADMIN — SENNOSIDES AND DOCUSATE SODIUM 2 TABLET: 8.6; 5 TABLET ORAL at 20:31

## 2017-03-24 RX ADMIN — POTASSIUM CHLORIDE 10 MEQ: 14.9 INJECTION, SOLUTION, CONCENTRATE PARENTERAL at 02:23

## 2017-03-24 RX ADMIN — SENNOSIDES AND DOCUSATE SODIUM 2 TABLET: 8.6; 5 TABLET ORAL at 09:36

## 2017-03-24 RX ADMIN — DEXTROSE MONOHYDRATE 25 ML: 25 INJECTION, SOLUTION INTRAVENOUS at 12:11

## 2017-03-24 RX ADMIN — OSELTAMIVIR PHOSPHATE 30 MG: 6 POWDER, FOR SUSPENSION ORAL at 21:00

## 2017-03-24 RX ADMIN — POTASSIUM CHLORIDE 20 MEQ: 1.5 POWDER, FOR SOLUTION ORAL at 20:30

## 2017-03-24 RX ADMIN — HYDRALAZINE HYDROCHLORIDE 20 MG: 20 INJECTION INTRAMUSCULAR; INTRAVENOUS at 21:12

## 2017-03-24 RX ADMIN — OSELTAMIVIR PHOSPHATE 30 MG: 6 POWDER, FOR SUSPENSION ORAL at 09:38

## 2017-03-24 RX ADMIN — POTASSIUM CHLORIDE 10 MEQ: 14.9 INJECTION, SOLUTION, CONCENTRATE PARENTERAL at 04:27

## 2017-03-24 RX ADMIN — POLYETHYLENE GLYCOL 3350 17 G: 17 POWDER, FOR SOLUTION ORAL at 09:38

## 2017-03-24 RX ADMIN — CARVEDILOL 25 MG: 25 TABLET, FILM COATED ORAL at 17:44

## 2017-03-24 RX ADMIN — GABAPENTIN 300 MG: 250 SOLUTION ORAL at 00:14

## 2017-03-24 RX ADMIN — CARVEDILOL 12.5 MG: 6.25 TABLET, FILM COATED ORAL at 09:36

## 2017-03-24 RX ADMIN — CHLORHEXIDINE GLUCONATE 15 ML: 1.2 RINSE ORAL at 08:53

## 2017-03-24 RX ADMIN — PANTOPRAZOLE SODIUM 40 MG: 40 TABLET, DELAYED RELEASE ORAL at 09:38

## 2017-03-24 RX ADMIN — INSULIN ASPART 1 UNITS: 100 INJECTION, SOLUTION INTRAVENOUS; SUBCUTANEOUS at 16:35

## 2017-03-24 RX ADMIN — POTASSIUM CHLORIDE 10 MEQ: 14.9 INJECTION, SOLUTION, CONCENTRATE PARENTERAL at 01:21

## 2017-03-24 NOTE — PROGRESS NOTES
Critical Care Progress Note      03/24/2017    Name: Wyatt Mahajan MRN#: [id number]   Age: 57 year old YOB: 1960     \[facility] [place] [facility]\""tl Day# 7  ICU DAY #7    MV DAY #7             Problem List:   Active Problems:    Acute respiratory failure with hypoxia (H)  MSSA pna  MSSA bacteremia  Influenza B  JOSE         Summary/Hospital Course:   Wyatt Mahajan is a 57 year old male admitted on 3/17 to the floor, presenting with acute hypoxic respiratory failure due to influenza B and CAP, right chest wall pain and uncontrolled DM-II. Patient was subsequently transferred from the floor to the MICU for increased work of breathing, accessory muscle use and respiratory distress. Was intubated due to increased work of breathing, tachypnea and decreasing O2 sats.    3/18:  Weaned vent yesterday and overnight.  Comfortably sedated this AM.  +blood cx for staph aureus overnight.  On vanco--repeating blood cx.    3/19:  Still minimal vent settings but minute ventilation still ~13 so won't try to extubate today.  Weaning from insulin drip back to lantus/SSI now on steady TF.  Blood cultures persistently positive--MSSA.  Change to nafcillin.  Reculture.  TTE.    3/20:  No changes, no events. Gentle diuresis yesterday but still positive.  Increasing diuresis today.     3/21: Not tolerating CMV/AC last evening (tachycardia, tachypnea, hypertension), so switched to pressure support which improved vital signs overnight. Switched back to CMV/AC this morning at 5:30am, now tolerating. Cr bump so no further diuresis today. Changing sedation to precedex. Echo showed no vegetations/masses on valves. Will continue to follow cultures, now growth so far from 3/20 cultures.    3/22: Pressure support trial last evening was stopped just after its initiation due to inc. RR to the 40's. Eyes open to voice. Given valproate for delirium. Propofol wean and transition to precedex resulted in inc HR, RR and BP. Nursing notes on exam, 'wet crackles'  in L lung and thick creamy red-streaked secretions. Ppeak to mid-30's. No BM yet, just smears but constipation treated with senna, dulcolax and miralax.    3/23: Episodes of tachycardia and hypertension to 169/93, coreg started and BP improved with versed and propofol. Urine was bloody this morning, suspect bell trauma. Febrile to 101.9 with PRN tylenol given. Still no BM though treated with senna, dulcolax and miralax. Family worried about his condition and the course of his illness.    3/24: On pressure support respiratory trial since 5pm last evening. Off of propofol sedation since 5pm as well. Critical lab value of ZP=5623 returned this AM, triglycerides were also elevated to 677. Patient is febrile to 101.5 this morning and with lower blood glucose on last four checks so Lantus was decreased. 25ml D50 given for bs of 65. Cr increased today, likely due to gentle diuresis. K+ repleted. Urine continues to be bloody. Elevated liver enzymes. WBC still increased though now down-trending.      Assessment and plan :     Wyatt Mahajan IS a 57 year old male admitted on 3/17/2017 for respiratory failure, flu B, mssa pna and mssa bacteremia.   I have personally reviewed the daily labs, imaging studies, cultures and discussed the case with referring physician and consulting physicians.   My assessment and plan by system for this patient is as follows:    Neurology/Psychiatry:   1. Sedation:  Discontinued propofol given CK and Triglyceride lab values this AM, prn dilaudid and midazolam available.  2. Delirium:  Seroquel, 50mg bid.  (). Valproate added evening 3/22.    3. H/o Diabetic neuropathy 2/2 DM-II:    -- gabapentin dosing reduced today to 100mg, TID.  -- decrease valproate to q 24 hr dosing    Cardiovascular:   1. Hypertension: SBP over last day has remained in the range of 150-160. Increased carvedilol to 25 mg, BID today.  2. H/o HLD:  Hold atorvastatin  3. H/o PAD:  continue ASA  4. Hyperlactemia:   Resolved  5. Ischemia: given critical lab value returned of YP=4783, will check troponins today. Trend CK,     Pulmonary/Ventilator Management:   1.  Respiratory failure, hypoxemic:  2/2 mssa pna and influenza B.  May also have element of ARDS, but only on minimal ventt settings (40%, peep5). CT-chest on 3/22 showed infiltrates and consolidation mildly improved, no occult abscess visualized. Continue to ventilate with low tidal volume strategy (490ml ~ 7ml/kg)  --continue nafcillin/cont tamiflu  --Lung protective vent strategy- change to PCV ventilation for synchrony (PIP 25 PEEP 10 - tv ~ 500ml at goal)  --CXR today     GI and Nutrition :   1.  TF increased today following discontinuation of propofol  2.  PPI for PUD prophylaxis  3.  Constipation  --Senna daily, BID  --PRN Dulcolax, Miralax  4. Elevated liver enzymes: newly elevated on 3/24, nl on 3/17, ? Med related vs congestion vs?   --RUQ U/S and liver dopplers  --serial LFT's  --+/- albumin  - dc statin with elevated CK and LFTS    Renal/Fluids/Electrolytes:   1. JOSE--Cr to 1.77 this AM  --continuing gentle diuresis with lasix to keep fluid balance even  --administer tube feeds/maintainance fluids   -- check urine eosinophils - ? Interstitial nephritis as new urinary complaint time to nafcillin dosing  2. Electrolyte abnormalities - mild hypernatremia, hypocalcemia, hypermagnesemia  --monitor function and electrolytes as needed with replacement per ICU protocols  3. Mild volume overload:  --continuing gentle diuresis today, furosemide 40mg, TID, albumin prn  --K+ repletion, as needed    :  1. Hematuria: bloodly urine AM of 3/23 likely due to trauma from the bell catheter. INR ordered by tele-ICU, was 1.19. UA showed WBC's, RBC's, Lg. Blood and protein albumin.  --check collection device for clots, bladder irrigation  --check urine eosinophils, check myoglobin  - consider urology consult   -- dc ASA and heparin until cleared    Infectious Disease:   1. Flu  B:  Tamiflu  2. MSSA pna/bacteremia: Nafcillin.  Most recent bld cultures show no growth. TTE did not show valvular masses/vegetations. CT chest negative for occult abscess. CXR today.  Check RUQ ultrasound    Endocrine:   1. Hypoglycemia:  H/o DM2.  Reduced lantus (on 26 at home) from 20 to 15 units and ssi.    Hematology/Oncology:   1. Pltlt/hgb stable  2. Leukocytosis-- 2/2 infection. WBC from 19.9 on 3/23 to 17.9 on 3/24. Patient febrile to 101.5 this AM. Urine culture shows no growth.  --Continue Nafcillin and Tamiflu     IV/Access:   1. Venous access - peripheral    ICU Prophylaxis:   1. DVT: Discontinue Hep Subq given hematuria of unknown cause, has mechanical prophylaxis  2. VAP: HOB 30 degrees, chlorhexidine rinse  3. Stress Ulcer: PPI  4. Restraints: Nonviolent soft two point restraints required and necessary for patient safety and continued cares and good effect as patient continues to pull at necessary lines, tubes despite education and distraction. Will readdress daily.   6. Feeding - TF increased today  7. Family Update: at bedside  8. Disposition - critically ill      Key goals for next 24 hours:   1. Cont nafcillin/tamiflu   2. Repeat CXR  3. RUQ US and liver dopplers  4. Serial LFTS  5. Decrease gabapentin and valproate  6. PICC line        Interim History:     - continued hematuria, LFT elevated on labs this AM, vent changed to PS 18/10 given intermittent asynchrony.   - Propofol sedation is off since 5pm last evening.   Critical lab value of DR=2630 returned this AM, triglycerides were also elevated to 677.   -  febrile to 101.5 WBC still increased though now down-trending.   - new hypoglycemia (on lantus)          Key Medications:       gabapentin  100 mg Oral or Feeding Tube Q8H ALEC     insulin glargine  15 Units Subcutaneous At Bedtime     oseltamivir  30 mg Oral or Feeding Tube BID     QUEtiapine  50 mg Oral or Feeding Tube BID     senna-docusate  1-2 tablet Oral or Feeding Tube BID 09 12      carvedilol  12.5 mg Oral or Feeding Tube BID w/meals     valproate (DEPACON) intermittent infusion  500 mg Intravenous Q12H     insulin aspart  1-12 Units Subcutaneous Q4H     nafcillin  2 g Intravenous Q4H     heparin  5,000 Units Subcutaneous Q8H     sodium chloride (PF)  3 mL Intracatheter Q8H     pneumococcal vaccine  0.5 mL Intramuscular Prior to discharge     sodium chloride (PF)  3 mL Intracatheter Q8H     rocuronium  0.6 mg/kg Intravenous Once     chlorhexidine  15 mL Mouth/Throat Q12H     multivitamins with minerals  15 mL Per Feeding Tube Daily     aspirin  81 mg Oral or Feeding Tube Daily     pantoprazole  40 mg Per Feeding Tube Daily       dexmedetomidine Stopped (03/21/17 1724)     NaCl 10 mL/hr at 03/23/17 1900     IV fluid REPLACEMENT ONLY                 Physical Examination:   Temp:  [97.3  F (36.3  C)-100.9  F (38.3  C)] 100.9  F (38.3  C)  Heart Rate:  [] 101  Resp:  [12-23] 15  BP: (124-173)/(64-97) 151/80  FiO2 (%):  [40 %] 40 %  SpO2:  [88 %-99 %] 95 %      Intake/Output Summary (Last 24 hours) at 03/24/17 1309  Last data filed at 03/24/17 1200   Gross per 24 hour   Intake          2195.77 ml   Output             3200 ml   Net         -1004.23 ml         Wt Readings from Last 4 Encounters:   03/24/17 71 kg (156 lb 8.4 oz)   03/16/17 70.3 kg (155 lb)   01/17/17 71.3 kg (157 lb 1.6 oz)   11/30/16 72.6 kg (160 lb)     BP - Mean:  [] 110  Ventilation Mode: PS  FiO2 (%): 40 %  Rate Set (breaths/minute): 20 breaths/min  Tidal Volume Set (mL): 450 mL  PEEP (cm H2O): 10 cmH2O  Pressure Support (cm H2O): 18 cmH2O  Oxygen Concentration (%): 40 %  Resp: 15  No lab results found in last 7 days.    GEN: moderate distress, intubated, opens eyes but otherwise non-responsive  HEENT: head ncat, sclera anicteric, OP patent, trachea midline   PULM: unlabored, asynchronous at times, coarse lung sounds anteriorly L>R   CV/COR: RRR S1/S2, No rub, murmur or gallop  ABD: firm, distended, nontender,  hypoactive bowel sounds, no mass  EXT:  peripheral edema present in hands>feet, warm x4 and well-perfused. strong distal pulses.  NEURO: moving all 4 extremities, not following commands.  SKIN: no obvious rash  LINES: clean, dry intact         Data:   All data and imaging reviewed     ROUTINE ICU LABS (Last four results)  CMP    Recent Labs  Lab 03/24/17  0430 03/23/17  2215 03/23/17  1423 03/23/17  0535 03/22/17  0455 03/21/17  0600   * 149* 149* 148* 146* 145*   POTASSIUM 3.6 3.3* 3.4 4.2 3.9 3.8   CHLORIDE 113* 112* 113* 112* 109 106   CO2 31 29 28 27 27 28   ANIONGAP 6 8 8 9 10 11   GLC 83 197* 210* 171* 190* 155*   BUN 26 25 27 24 25 23   CR 1.77* 1.61* 1.65* 1.58* 1.61* 1.42*   GFRESTIMATED 40* 45* 44* 46* 45* 52*   GFRESTBLACK 49* 54* 53* 55* 54* 63   LEONIE 7.3* 7.1* 6.5* 6.5* 6.7* 7.2*   MAG 3.5*  --   --  3.7* 3.4* 2.9*   PHOS 4.2  --   --  4.1 3.7 5.6*   PROTTOTAL 7.4  --   --   --   --   --    ALBUMIN 1.0*  --   --   --   --   --    BILITOTAL 3.8*  --   --   --   --   --    ALKPHOS 362*  --   --   --   --   --    *  --   --   --   --   --    *  --   --   --   --   --        CBC    Recent Labs  Lab 03/24/17  0430 03/23/17  0535 03/22/17  0455 03/21/17  0600   WBC 17.9* 19.9* 19.4* 17.0*   RBC 3.61* 3.60* 3.61* 3.66*   HGB 11.3* 12.0* 11.9* 12.0*   HCT 33.2* 33.5* 33.3* 34.2*   MCV 92 93 92 93   MCH 31.3 33.3* 33.0 32.8   MCHC 34.0 35.8 35.7 35.1   RDW 15.2* 15.1* 14.8 14.7    295 259 202     INR    Recent Labs  Lab 03/23/17  0650   INR 1.19*     Arterial Blood Gas  No lab results found in last 7 days.    All cultures:    Recent Labs  Lab 03/21/17  1545 03/19/17  1610 03/19/17  1605 03/18/17  1055 03/18/17  1050   CULT No growth No growth after 5 days No growth after 5 days Cultured on the 1st day of incubation: Staphylococcus aureusCritical Value/Significant Value, preliminary result only, called to and read back by Shelia Osuna at [facility] on 03.19.17 12:31 P.M.  JT.Susceptibility testing done on previous specimen* Cultured on the 1st day of incubation: Staphylococcus aureus Susceptibility testing done on previous specimenCritical Value/Significant Value, preliminary result only, called to and read back by Patricia Truong RN [facility], @2590 3/20/17. .*     Imaging:    CT-Chest from 3/22:  1. Extensive infiltrate and consolidation in both lungs has overall  improved slightly since 3/17/2017.   2. Small bilateral pleural effusions are new since the previous exam.  3. Mildly prominent and borderline-enlarged mediastinal and right  hilar lymph nodes have a similar appearance to the previous exam,  given the noncontrast technique on today's study.    CXR, 3/24:   IMPRESSION: Endotracheal tube in good position with tip 2 cm above the  debi. Enteric tube enters the stomach, but the tip is not seen.  Diffuse bilateral mixed interstitial and alveolar opacities, not  significantly changed. No new pulmonary opacities are seen.    Billing: This patient is critically ill: Yes. Total critical care time today 40 min.

## 2017-03-24 NOTE — PROGRESS NOTES
[facility] ICU RESPIRATORY NOTE  Date of Admission: 3/17/17  Date of Intubation (most recent): 3/17/17  Reason for Mechanical Ventilation: Respiratory failure  Number of Days on Mechanical Ventilation: 8  Met Criteria for Pressure Support Trial: yes  Length of Pressure Support Trial: pt is on PS 18/10 since yesterday 1547    Significant Events Today: titrated FiO2 to 40%, SpO2 >92%    ABG Results: no new ABG    ETT appearance on chest x-ray: same position    Plan:  Continue PS as tolerated.  3/24/2017  Rubio Epstein

## 2017-03-24 NOTE — PROGRESS NOTES
CLINICAL NUTRITION SERVICES - REASSESSMENT NOTE    Recommendations Ordered by Registered Dietitian (RD):   Change TF to Isosource 1.5 at goal of 55 mL/hr (1320 mL).  - Add 1 pkt ProStat via FT daily to meet protein needs.    Total provisions (TF + ProStat) = 2080 kcal (30 kcal/kg), 105 g pro (1.5 g/kg).      EVALUATION OF PROGRESS TOWARD GOALS   Diet:  NPO  Nutrition Support:  Pt continues on TF to meet preliminary needs x7 days as follows:    Nutrition Support Enteral:  Type of Feeding Tube: OG (placed 3/17)  Enteral Frequency:  Continuous  Enteral Regimen: Peptamen Intense VHP at 30 mL/hr   Total Enteral Provisions: 720 kcal, 67 g protein (1 g per kg), 56 g CHO, 3 g fiber, 605 mL H2O  Free Water Flush: 90 mL Q4Hrs    Meds:  - Propofol d/c'd today per rounds, pt now meeting 41% energy and 64% protein needs. Pt now sedated on Precedex.    TF Tolerance:   Labs-  Na 150 (H) - trend up  Phos/K wnl  Mg 3.5 (H, stable)  BGs 163 (H), 180 (H), 197 (H), 164 (H), 83, 68 (L) -- HSSI, Lantus 20 Units daily  I/O -   No BM since 3/19 - on bowel regimen    Dosing Weight 69.6 kg       ASSESSED NUTRITION NEEDS:  Estimated Energy Needs: 5340-9335 kcals (25-30 Kcal/Kg)  Justification: maintenance  Estimated Protein Needs: 105+ grams protein (1.5+ g pro/Kg)  Justification: hypercatabolism with critical illness    Previous Goals:   TF to meet % preliminary needs.    Evaluation: Not met    Previous Nutrition Diagnosis:   Inadequate enteral nutrition infusion related to propofol no longer providing lipid calories as evidenced by TF now meeting 73% of preliminary energy needs.   Evaluation: No change, updated    CURRENT NUTRITION DIAGNOSIS  Inadequate enteral nutrition infusion related to TF ordered to meet 2/3 needs, propofol d/c as evidenced by current regimen meeting 41% final energy and 64% final protein goal.     INTERVENTIONS  Recommendations / Nutrition Prescription  Change TF to Isosource 1.5 at goal of 55 mL/hr (1320 mL) to  provide 1980 kcal (28 kcal/kg), 90 g pro (1.3 g/kg), 232 g CHO, 20 g fiber, 1003 mL free H2O.  - Add 1 pkt ProStat via FT daily to meet protein needs.    Total provisions (TF + ProStat) = 2080 kcal (30 kcal/kg), 105 g pro (1.5 g/kg).     Implementation  EN Composition, EN Schedule - Orders changed as above  Collaboration and Referral of Nutrition care - pt discussed during interdisciplinary rounds.     Goals  TF + ProStat to meet % final energy and protein needs daily.       MONITORING AND EVALUATION:  Progress towards goals will be monitored and evaluated per protocol and Practice Guidelines    Marleen Cole, YFIAN, LD

## 2017-03-24 NOTE — PLAN OF CARE
Problem: Individualization  Goal: Patient Preferences  Outcome: No Change  Pt alerts to voice and opens eyes. Moving more spontaneously in all 4 extremities this afternoon but not following commands. SR/tachycardic on tele. PERRL. Lungs coarse with moderate, thin, pale yellow secretions through ET tube. Febrile through day but no tylenol due to liver per Dr. Fairbanks. Urine output adequate-continues to be bloody. Levine catheter irrigated, no clots returned. Small smear, no BM.

## 2017-03-24 NOTE — PROGRESS NOTES
[facility] ICU RESPIRATORY NOTE  Date of Admission:3/17/17  Date of Intubation (most recent):3/17/17  Reason for Mechanical Ventilation: Respiratory failure  Number of Days on Mechanical Ventilation: 8    Ventilation Mode: PCV Plus assist  FiO2 (%): 40 %  Rate Set (breaths/minute): 20 breaths/min  Tidal Volume Set (mL): 450 mL  PEEP (cm H2O): 10 cmH2O  Pressure Support (cm H2O): 16 cmH2O  Oxygen Concentration (%): 40 %  Peak Inspiratory Pressure (cm H2O) (Drager Arianne): 25  Resp: 16      Plan: Cont full vent support overnight.    3/24/2017  Mandy Pantoja, RRT

## 2017-03-24 NOTE — PROGRESS NOTES
ICU Update    Will dc nafcillin and change to ancef over concern for possible nephritis,though awaiting urine eos and no casts on recent UA. D5W started for hypernatremia as well precedex restarted.     Marin Fairbanks

## 2017-03-24 NOTE — PROVIDER NOTIFICATION
Critical lab value:  CK 1179.  Urine output bloody, red in appearance.  Tele-ICU notified.  No new orders at this time.

## 2017-03-24 NOTE — PROGRESS NOTES
Febrile, Tmax 101.9.  Neuro: PERRL  Withdraws on RUE, no withdraw from pain on LUE, LLE and RLE. Spontaneous movement of RUE witnessed.  On CPAP 18/10 40% since 1700 last night.  Propofol off since 1700 as well.  Maintaining O2 sats and RR 15-25.  LS Coarse, moderate secretions.  SR-ST TF 30 w/ 90mL flushes q4.  Faint BS, no BM.  UOP: Bloody red. Q4 sliding scale insulin, 0400 bs 65, 25mL D50 given, recheck bs 101.  K replaced overnight, needs another 20 mEq following AM labs.  Daughter and spouse updated at bedside.

## 2017-03-24 NOTE — PROGRESS NOTES
"Patient family members refusing PICC at this time. With everything he has had happened \"don't want to give him one more thing.\"  "

## 2017-03-25 ENCOUNTER — APPOINTMENT (OUTPATIENT)
Dept: ULTRASOUND IMAGING | Facility: CLINIC | Age: 57
DRG: 870 | End: 2017-03-25
Attending: INTERNAL MEDICINE
Payer: COMMERCIAL

## 2017-03-25 ENCOUNTER — APPOINTMENT (OUTPATIENT)
Dept: GENERAL RADIOLOGY | Facility: CLINIC | Age: 57
DRG: 870 | End: 2017-03-25
Attending: SURGERY
Payer: COMMERCIAL

## 2017-03-25 LAB
ALBUMIN SERPL-MCNC: 1.2 G/DL (ref 3.4–5)
ALBUMIN UR-MCNC: 100 MG/DL
ALP SERPL-CCNC: 353 U/L (ref 40–150)
ALT SERPL W P-5'-P-CCNC: 86 U/L (ref 0–70)
ANION GAP SERPL CALCULATED.3IONS-SCNC: 8 MMOL/L (ref 3–14)
APPEARANCE UR: ABNORMAL
AST SERPL W P-5'-P-CCNC: 196 U/L (ref 0–45)
BACTERIA SPEC CULT: NO GROWTH
BACTERIA SPEC CULT: NO GROWTH
BASE EXCESS BLDA CALC-SCNC: 1.4 MMOL/L
BASOPHILS # BLD AUTO: 0 10E9/L (ref 0–0.2)
BASOPHILS NFR BLD AUTO: 0.2 %
BILIRUB SERPL-MCNC: 1.4 MG/DL (ref 0.2–1.3)
BILIRUB UR QL STRIP: NEGATIVE
BUN SERPL-MCNC: 26 MG/DL (ref 7–30)
CALCIUM SERPL-MCNC: 7.6 MG/DL (ref 8.5–10.1)
CHLORIDE SERPL-SCNC: 117 MMOL/L (ref 94–109)
CK SERPL-CCNC: 1448 U/L (ref 30–300)
CO2 SERPL-SCNC: 26 MMOL/L (ref 20–32)
COLOR UR AUTO: ABNORMAL
CREAT SERPL-MCNC: 1.47 MG/DL (ref 0.66–1.25)
DIFFERENTIAL METHOD BLD: ABNORMAL
EOSINOPHIL # BLD AUTO: 0 10E9/L (ref 0–0.7)
EOSINOPHIL NFR BLD AUTO: 0.2 %
ERYTHROCYTE [DISTWIDTH] IN BLOOD BY AUTOMATED COUNT: 15 % (ref 10–15)
GFR SERPL CREATININE-BSD FRML MDRD: 50 ML/MIN/1.7M2
GLUCOSE BLDC GLUCOMTR-MCNC: 236 MG/DL (ref 70–99)
GLUCOSE BLDC GLUCOMTR-MCNC: 237 MG/DL (ref 70–99)
GLUCOSE BLDC GLUCOMTR-MCNC: 279 MG/DL (ref 70–99)
GLUCOSE BLDC GLUCOMTR-MCNC: 292 MG/DL (ref 70–99)
GLUCOSE BLDC GLUCOMTR-MCNC: 301 MG/DL (ref 70–99)
GLUCOSE BLDC GLUCOMTR-MCNC: 355 MG/DL (ref 70–99)
GLUCOSE BLDC GLUCOMTR-MCNC: 375 MG/DL (ref 70–99)
GLUCOSE BLDC GLUCOMTR-MCNC: 386 MG/DL (ref 70–99)
GLUCOSE BLDC GLUCOMTR-MCNC: 389 MG/DL (ref 70–99)
GLUCOSE BLDC GLUCOMTR-MCNC: 437 MG/DL (ref 70–99)
GLUCOSE BLDC GLUCOMTR-MCNC: 488 MG/DL (ref 70–99)
GLUCOSE SERPL-MCNC: 252 MG/DL (ref 70–99)
GLUCOSE UR STRIP-MCNC: >1000 MG/DL
HCO3 BLD-SCNC: 25 MMOL/L (ref 21–28)
HCT VFR BLD AUTO: 29.5 % (ref 40–53)
HGB BLD-MCNC: 10.3 G/DL (ref 13.3–17.7)
HGB UR QL STRIP: ABNORMAL
IMM GRANULOCYTES # BLD: 0.2 10E9/L (ref 0–0.4)
IMM GRANULOCYTES NFR BLD: 1.1 %
KETONES UR STRIP-MCNC: NEGATIVE MG/DL
LEUKOCYTE ESTERASE UR QL STRIP: NEGATIVE
LYMPHOCYTES # BLD AUTO: 1.6 10E9/L (ref 0.8–5.3)
LYMPHOCYTES NFR BLD AUTO: 8.1 %
Lab: NORMAL
Lab: NORMAL
MAGNESIUM SERPL-MCNC: 3.4 MG/DL (ref 1.6–2.3)
MCH RBC QN AUTO: 31.5 PG (ref 26.5–33)
MCHC RBC AUTO-ENTMCNC: 34.9 G/DL (ref 31.5–36.5)
MCV RBC AUTO: 90 FL (ref 78–100)
MICRO REPORT STATUS: NORMAL
MICRO REPORT STATUS: NORMAL
MONOCYTES # BLD AUTO: 1 10E9/L (ref 0–1.3)
MONOCYTES NFR BLD AUTO: 5.1 %
NEUTROPHILS # BLD AUTO: 16.6 10E9/L (ref 1.6–8.3)
NEUTROPHILS NFR BLD AUTO: 85.3 %
NITRATE UR QL: NEGATIVE
NRBC # BLD AUTO: 0 10*3/UL
NRBC BLD AUTO-RTO: 0 /100
O2/TOTAL GAS SETTING VFR VENT: 40 %
PCO2 BLD: 33 MM HG (ref 35–45)
PH BLD: 7.49 PH (ref 7.35–7.45)
PH UR STRIP: 6.5 PH (ref 5–7)
PHOSPHATE SERPL-MCNC: 2.7 MG/DL (ref 2.5–4.5)
PLATELET # BLD AUTO: 247 10E9/L (ref 150–450)
PO2 BLD: 68 MM HG (ref 80–105)
POTASSIUM SERPL-SCNC: 3.2 MMOL/L (ref 3.4–5.3)
POTASSIUM SERPL-SCNC: 3.8 MMOL/L (ref 3.4–5.3)
POTASSIUM SERPL-SCNC: 3.9 MMOL/L (ref 3.4–5.3)
PROCALCITONIN SERPL-MCNC: 2.94 NG/ML
PROT SERPL-MCNC: 7.2 G/DL (ref 6.8–8.8)
RBC # BLD AUTO: 3.27 10E12/L (ref 4.4–5.9)
RBC #/AREA URNS AUTO: >182 /HPF (ref 0–2)
SODIUM SERPL-SCNC: 151 MMOL/L (ref 133–144)
SP GR UR STRIP: 1.02 (ref 1–1.03)
SPECIMEN SOURCE: NORMAL
SPECIMEN SOURCE: NORMAL
URN SPEC COLLECT METH UR: ABNORMAL
UROBILINOGEN UR STRIP-MCNC: NORMAL MG/DL (ref 0–2)
WBC # BLD AUTO: 19.5 10E9/L (ref 4–11)
WBC #/AREA URNS AUTO: >182 /HPF (ref 0–2)

## 2017-03-25 PROCEDURE — 36415 COLL VENOUS BLD VENIPUNCTURE: CPT | Performed by: HOSPITALIST

## 2017-03-25 PROCEDURE — 81001 URINALYSIS AUTO W/SCOPE: CPT | Performed by: INTERNAL MEDICINE

## 2017-03-25 PROCEDURE — 76770 US EXAM ABDO BACK WALL COMP: CPT

## 2017-03-25 PROCEDURE — 40000275 ZZH STATISTIC RCP TIME EA 10 MIN

## 2017-03-25 PROCEDURE — 84100 ASSAY OF PHOSPHORUS: CPT | Performed by: INTERNAL MEDICINE

## 2017-03-25 PROCEDURE — 84132 ASSAY OF SERUM POTASSIUM: CPT | Performed by: INTERNAL MEDICINE

## 2017-03-25 PROCEDURE — 36600 WITHDRAWAL OF ARTERIAL BLOOD: CPT

## 2017-03-25 PROCEDURE — 82803 BLOOD GASES ANY COMBINATION: CPT | Performed by: INTERNAL MEDICINE

## 2017-03-25 PROCEDURE — 85025 COMPLETE CBC W/AUTO DIFF WBC: CPT | Performed by: INTERNAL MEDICINE

## 2017-03-25 PROCEDURE — 83735 ASSAY OF MAGNESIUM: CPT | Performed by: INTERNAL MEDICINE

## 2017-03-25 PROCEDURE — 25000132 ZZH RX MED GY IP 250 OP 250 PS 637: Performed by: INTERNAL MEDICINE

## 2017-03-25 PROCEDURE — 25000128 H RX IP 250 OP 636: Performed by: INTERNAL MEDICINE

## 2017-03-25 PROCEDURE — 82550 ASSAY OF CK (CPK): CPT | Performed by: INTERNAL MEDICINE

## 2017-03-25 PROCEDURE — 25000125 ZZHC RX 250: Performed by: INTERNAL MEDICINE

## 2017-03-25 PROCEDURE — 27210429 ZZH NUTRITION PRODUCT INTERMEDIATE LITER

## 2017-03-25 PROCEDURE — 36415 COLL VENOUS BLD VENIPUNCTURE: CPT | Performed by: INTERNAL MEDICINE

## 2017-03-25 PROCEDURE — 25000132 ZZH RX MED GY IP 250 OP 250 PS 637: Performed by: HOSPITALIST

## 2017-03-25 PROCEDURE — 80053 COMPREHEN METABOLIC PANEL: CPT | Performed by: INTERNAL MEDICINE

## 2017-03-25 PROCEDURE — 94003 VENT MGMT INPAT SUBQ DAY: CPT

## 2017-03-25 PROCEDURE — 25000128 H RX IP 250 OP 636: Performed by: SURGERY

## 2017-03-25 PROCEDURE — P9047 ALBUMIN (HUMAN), 25%, 50ML: HCPCS | Performed by: INTERNAL MEDICINE

## 2017-03-25 PROCEDURE — 84145 PROCALCITONIN (PCT): CPT | Performed by: INTERNAL MEDICINE

## 2017-03-25 PROCEDURE — 25000131 ZZH RX MED GY IP 250 OP 636 PS 637: Performed by: INTERNAL MEDICINE

## 2017-03-25 PROCEDURE — 99291 CRITICAL CARE FIRST HOUR: CPT | Performed by: INTERNAL MEDICINE

## 2017-03-25 PROCEDURE — 25000125 ZZHC RX 250: Performed by: SURGERY

## 2017-03-25 PROCEDURE — 71010 XR CHEST PORT 1 VW: CPT

## 2017-03-25 PROCEDURE — 84132 ASSAY OF SERUM POTASSIUM: CPT | Performed by: HOSPITALIST

## 2017-03-25 PROCEDURE — 87040 BLOOD CULTURE FOR BACTERIA: CPT | Performed by: INTERNAL MEDICINE

## 2017-03-25 PROCEDURE — 00000146 ZZHCL STATISTIC GLUCOSE BY METER IP

## 2017-03-25 PROCEDURE — 25000128 H RX IP 250 OP 636

## 2017-03-25 PROCEDURE — 20000003 ZZH R&B ICU

## 2017-03-25 RX ORDER — MIDAZOLAM (PF) 1 MG/ML IN 0.9 % SODIUM CHLORIDE INTRAVENOUS SOLUTION
1-8 CONTINUOUS
Status: DISCONTINUED | OUTPATIENT
Start: 2017-03-25 | End: 2017-03-30

## 2017-03-25 RX ORDER — NICOTINE POLACRILEX 4 MG
15-30 LOZENGE BUCCAL
Status: DISCONTINUED | OUTPATIENT
Start: 2017-03-25 | End: 2017-04-01

## 2017-03-25 RX ORDER — DEXTROSE MONOHYDRATE 25 G/50ML
25-50 INJECTION, SOLUTION INTRAVENOUS
Status: DISCONTINUED | OUTPATIENT
Start: 2017-03-25 | End: 2017-04-01

## 2017-03-25 RX ORDER — FUROSEMIDE 10 MG/ML
40 INJECTION INTRAMUSCULAR; INTRAVENOUS EVERY 8 HOURS
Status: DISCONTINUED | OUTPATIENT
Start: 2017-03-25 | End: 2017-03-25

## 2017-03-25 RX ORDER — ALBUMIN, HUMAN INJ 5% 5 %
25 SOLUTION INTRAVENOUS 3 TIMES DAILY
Status: DISCONTINUED | OUTPATIENT
Start: 2017-03-25 | End: 2017-03-25

## 2017-03-25 RX ORDER — ALBUMIN (HUMAN) 12.5 G/50ML
12.5 SOLUTION INTRAVENOUS EVERY 8 HOURS
Status: COMPLETED | OUTPATIENT
Start: 2017-03-25 | End: 2017-03-26

## 2017-03-25 RX ADMIN — POTASSIUM CHLORIDE 20 MEQ: 1.5 POWDER, FOR SOLUTION ORAL at 16:27

## 2017-03-25 RX ADMIN — ALBUMIN (HUMAN) 12.5 G: 12.5 SOLUTION INTRAVENOUS at 17:03

## 2017-03-25 RX ADMIN — DEXTROSE MONOHYDRATE: 50 INJECTION, SOLUTION INTRAVENOUS at 05:29

## 2017-03-25 RX ADMIN — OXYCODONE HYDROCHLORIDE 10 MG: 5 TABLET ORAL at 01:22

## 2017-03-25 RX ADMIN — DEXMEDETOMIDINE HYDROCHLORIDE 1 MCG/KG/HR: 100 INJECTION, SOLUTION, CONCENTRATE INTRAVENOUS at 06:01

## 2017-03-25 RX ADMIN — PANTOPRAZOLE SODIUM 40 MG: 40 TABLET, DELAYED RELEASE ORAL at 08:04

## 2017-03-25 RX ADMIN — QUETIAPINE FUMARATE 50 MG: 50 TABLET, FILM COATED ORAL at 20:41

## 2017-03-25 RX ADMIN — SODIUM CHLORIDE 12 UNITS/HR: 9 INJECTION, SOLUTION INTRAVENOUS at 18:38

## 2017-03-25 RX ADMIN — SODIUM CHLORIDE 500 MG: 9 INJECTION, SOLUTION INTRAVENOUS at 21:05

## 2017-03-25 RX ADMIN — SODIUM CHLORIDE 5.5 UNITS/HR: 9 INJECTION, SOLUTION INTRAVENOUS at 14:50

## 2017-03-25 RX ADMIN — SODIUM CHLORIDE 8 UNITS/HR: 9 INJECTION, SOLUTION INTRAVENOUS at 21:02

## 2017-03-25 RX ADMIN — CARVEDILOL 25 MG: 25 TABLET, FILM COATED ORAL at 08:03

## 2017-03-25 RX ADMIN — Medication 1 PACKET: at 12:27

## 2017-03-25 RX ADMIN — POTASSIUM CHLORIDE 20 MEQ: 1.5 POWDER, FOR SOLUTION ORAL at 04:20

## 2017-03-25 RX ADMIN — POTASSIUM CHLORIDE 40 MEQ: 1.5 POWDER, FOR SOLUTION ORAL at 01:22

## 2017-03-25 RX ADMIN — INSULIN ASPART 7 UNITS: 100 INJECTION, SOLUTION INTRAVENOUS; SUBCUTANEOUS at 00:06

## 2017-03-25 RX ADMIN — INSULIN ASPART 4 UNITS: 100 INJECTION, SOLUTION INTRAVENOUS; SUBCUTANEOUS at 04:09

## 2017-03-25 RX ADMIN — OXYCODONE HYDROCHLORIDE 10 MG: 5 TABLET ORAL at 08:03

## 2017-03-25 RX ADMIN — HYDRALAZINE HYDROCHLORIDE 20 MG: 20 INJECTION INTRAMUSCULAR; INTRAVENOUS at 07:46

## 2017-03-25 RX ADMIN — OSELTAMIVIR PHOSPHATE 30 MG: 6 POWDER, FOR SUSPENSION ORAL at 08:04

## 2017-03-25 RX ADMIN — CHLORHEXIDINE GLUCONATE 15 ML: 1.2 RINSE ORAL at 20:42

## 2017-03-25 RX ADMIN — OSELTAMIVIR PHOSPHATE 30 MG: 6 POWDER, FOR SUSPENSION ORAL at 20:42

## 2017-03-25 RX ADMIN — CEFAZOLIN SODIUM 2 G: 2 INJECTION, SOLUTION INTRAVENOUS at 08:04

## 2017-03-25 RX ADMIN — GABAPENTIN 100 MG: 250 SUSPENSION ORAL at 16:11

## 2017-03-25 RX ADMIN — CHLORHEXIDINE GLUCONATE 15 ML: 1.2 RINSE ORAL at 08:04

## 2017-03-25 RX ADMIN — CEFAZOLIN SODIUM 2 G: 2 INJECTION, SOLUTION INTRAVENOUS at 23:07

## 2017-03-25 RX ADMIN — GABAPENTIN 100 MG: 250 SUSPENSION ORAL at 08:04

## 2017-03-25 RX ADMIN — INSULIN ASPART 9 UNITS: 100 INJECTION, SOLUTION INTRAVENOUS; SUBCUTANEOUS at 08:33

## 2017-03-25 RX ADMIN — Medication 4 MG/HR: at 08:09

## 2017-03-25 RX ADMIN — DEXMEDETOMIDINE 0.2 MCG/KG/HR: 100 INJECTION, SOLUTION, CONCENTRATE INTRAVENOUS at 00:30

## 2017-03-25 RX ADMIN — QUETIAPINE FUMARATE 50 MG: 50 TABLET, FILM COATED ORAL at 08:03

## 2017-03-25 RX ADMIN — ALBUMIN (HUMAN) 12.5 G: 12.5 SOLUTION INTRAVENOUS at 23:06

## 2017-03-25 RX ADMIN — OXYCODONE HYDROCHLORIDE 10 MG: 5 TABLET ORAL at 16:24

## 2017-03-25 RX ADMIN — SENNOSIDES AND DOCUSATE SODIUM 1 TABLET: 8.6; 5 TABLET ORAL at 08:03

## 2017-03-25 RX ADMIN — CEFAZOLIN SODIUM 2 G: 2 INJECTION, SOLUTION INTRAVENOUS at 16:10

## 2017-03-25 RX ADMIN — Medication 6 MG/HR: at 21:54

## 2017-03-25 RX ADMIN — HYDRALAZINE HYDROCHLORIDE 20 MG: 20 INJECTION INTRAMUSCULAR; INTRAVENOUS at 16:47

## 2017-03-25 RX ADMIN — INSULIN ASPART 12 UNITS: 100 INJECTION, SOLUTION INTRAVENOUS; SUBCUTANEOUS at 12:32

## 2017-03-25 RX ADMIN — SENNOSIDES AND DOCUSATE SODIUM 2 TABLET: 8.6; 5 TABLET ORAL at 20:41

## 2017-03-25 RX ADMIN — GABAPENTIN 100 MG: 250 SUSPENSION ORAL at 00:17

## 2017-03-25 RX ADMIN — GABAPENTIN 100 MG: 250 SUSPENSION ORAL at 23:09

## 2017-03-25 NOTE — PROGRESS NOTES
Critical Care Progress Note      03/25/2017    Name: Wyatt Mahajan MRN#: [id number]   Age: 57 year old YOB: 1960     [facility]    MV DAY #8           Problem List:   Active Problems:    Acute respiratory failure with hypoxia (H)  MSSA pna  MSSA bacteremia  Influenza B  JOSE         Summary/Hospital Course:   Wyatt Mahajan is a 57 year old male admitted on 3/17 to the floor, presenting with acute hypoxic respiratory failure due to influenza B and CAP, right chest wall pain and uncontrolled DM-II. Patient was subsequently transferred from the floor to the MICU for increased work of breathing, accessory muscle use and respiratory distress. Was intubated due to increased work of breathing, tachypnea and decreasing O2 sats.    3/18:  Weaned vent yesterday and overnight.  Comfortably sedated this AM.  +blood cx for staph aureus overnight.  On vanco--repeating blood cx.    3/19:  Still minimal vent settings but minute ventilation still ~13 so won't try to extubate today.  Weaning from insulin drip back to lantus/SSI now on steady TF.  Blood cultures persistently positive--MSSA.  Change to nafcillin.  Reculture.  TTE.    3/20:  No changes, no events. Gentle diuresis yesterday but still positive.  Increasing diuresis today.     3/21: Not tolerating CMV/AC last evening (tachycardia, tachypnea, hypertension), so switched to pressure support which improved vital signs overnight. Switched back to CMV/AC this morning at 5:30am, now tolerating. Cr bump so no further diuresis today. Changing sedation to precedex. Echo showed no vegetations/masses on valves. Will continue to follow cultures, now growth so far from 3/20 cultures.    3/22: Pressure support trial last evening was stopped just after its initiation due to inc. RR to the 40's. Eyes open to voice. Given valproate for delirium. Propofol wean and transition to precedex resulted in inc HR, RR and BP. Nursing notes on exam, 'wet crackles'  in L lung and thick creamy red-streaked secretions. Ppeak to mid-30's. No BM yet, just smears but constipation treated with senna, dulcolax and miralax.    3/23: Episodes of tachycardia and hypertension to 169/93, coreg started and BP improved with versed and propofol. Urine was bloody this morning, suspect bell trauma. Febrile to 101.9 with PRN tylenol given. Still no BM though treated with senna, dulcolax and miralax. Family worried about his condition and the course of his illness.    3/24: On pressure support respiratory trial since 5pm last evening. Off of propofol sedation since 5pm as well. Critical lab value of WR=[id number] returned this AM, triglycerides were also elevated to 677. Patient is febrile to 101.5 this morning and with lower blood glucose on last four checks so Lantus was decreased. 25ml D50 given for bs of 65. Cr increased today, likely due to gentle diuresis. K+ repleted. Urine continues to be bloody. Elevated liver enzymes. WBC still increased though now down-trending.    3/25: Family refused PICC line yesterday evening. Abx's for mssa were changed from nafcillin to ancef. D5W started for hypernatremia. Overnight had episodes of HR pause and thrashing; sedation with precedex, then changed to versed. Also bradycardic in early morning; CXR checked and ET tube was in good position.      Assessment and plan :     Wyatt Mahajan IS a 57 year old male admitted on 3/17/2017 for respiratory failure, flu B, mssa pna and mssa bacteremia.   I have personally reviewed the daily labs, imaging studies, cultures and discussed the case with referring physician and consulting physicians.   My assessment and plan by system for this patient is as follows:    Neurology/Psychiatry:   1. Sedation:  Discontinued propofol given CK and Triglyceride lab values on 3/24, transitioned to precedex but patient thrashing and hypertensive/bradycardic. Versed drip begun 3/25 AM is providing adequate sedation. Prn dilaudid and  midazolam also available.  2. Delirium:  Seroquel, 50mg bid.  (). Valproate added evening 3/22, decreased to q 24 hr dosing 3/24.  3. H/o Diabetic neuropathy 2/2 DM-II:    -- gabapentin 100mg, TID.    Cardiovascular:   1. Hypertension: SBP over last day has been in the range of 155-205.   --Carvedilol to 25 mg, BID on hold due to bradycardia  --Add amlodipine 5mg, BID, hydralazine prn  2. H/o HLD:  hold atorvastatin  3. H/o PAD:  hold ASA  4. Hyperlactemia:  Resolved  5. Ischemia: given critical lab value returned of OU=6483, and increasing CK trend (1405, 1448), will re-check troponin for trend today.    Pulmonary/Ventilator Management:   1.  Respiratory failure, hypoxemic:  2/2 mssa pna and influenza B.  May also have element of ARDS, but only on minimal vent settings (40%, peep5). CT-chest on 3/22 showed infiltrates and consolidation mildly improved, no occult abscess visualized. Continue to ventilate with low tidal volume strategy (490ml ~ 7ml/kg). CXR 3/25 showed same to slightly improved b/l lung infiltrate.  --continue nafcillin/cont tamiflu  --Lung protective vent strategy- change to PCV plus assist ventilation  2. Respiratory alkalosis: ABG on 3/24 w/pH of 7.51, pCO2=31. Changed vent settings to PCV plus assist and subsequent ABG shows improvement (pH= 7.49, pCO2=33).     GI and Nutrition :   1.  TF increased 3/24, following discontinuation of propofol  2.  PPI for PUD prophylaxis  3.  Constipation  --Senna daily, BID  --PRN Dulcolax, Miralax  4. Elevated liver enzymes: newly elevated on 3/24, nl on 3/17, ? Med related vs congestion vs? RUQ U/S revealed a distended GB w/sludge. Subsequent LFT's have shown decreases of Alk phos, AST and ALT levels since discontinuation of statin.  --serial LFT's  - dc statin with elevated CK and LFTS    Renal/Fluids/Electrolytes:   1. JOSE--Cr decreased to 1.47 this AM, 3/24- discontinued nafcillin as there is some literature associating it with interstitial nephritis  and the timeline of our Cr disturbance and hematuria fits. Ancef begun instead. UA showed no casts.  --continuing diuresis with lasix (albumin same time) to keep fluid balance even  --administer tube feeds/maintainance fluids   --check urine eosinophils, pending  2. Hypernatremia - rising Na trend 3/24-25. Began treatment with D5W and Na decreasing.  --D5W, as needed  2. Electrolyte abnormalities - hypocalcemia, hypermagnesemia  --monitor function and electrolytes as needed with replacement per ICU protocols  3. Mild volume overload:  --continuing gentle diuresis today, furosemide 40mg, TID with albumin   --K+ repletion, as needed    :  1. Hematuria: bloodly urine AM of 3/23 possibly due to trauma from the bell catheter vs. rhabdomyolysis. INR ordered by tele-ICU, was 1.19. UA showed WBC's, RBC's, Lg. Blood and protein albumin. No blood clots were found in the collection device. Myoglobin elevated to 552.  --check urine eosinophils, pending  -- kidney US    Infectious Disease:   1. Flu B:  Tamiflu  2. MSSA pna/bacteremia: Nafcillin discontinued 3/24, changed to Ancef.  Most recent bld cultures show no growth. TTE did not show valvular masses/vegetations. CT chest negative for occult abscess. CXR 3/25 revealed same to slightly improved lung infiltrates.   --sputum culture from 3/24 pending    Endocrine:   1. Hyperglycemia:  H/o DM2. Latest bs have been elevated to 250-350, likely because lantus dosage had been reduced yesterday and TF was increased after propofol sedation stopped.   --re-start previous dosing of lantus, 20 units (on 26 at home) and ssi.    Hematology/Oncology:   1. Plt/hgb stable  2. Leukocytosis-- 2/2 infection. WBC from 19.9 on 3/23 to 19.5 on 3/25. Patient febrile to 101.5 this AM. Sputum culture 3/24, pending.  --Continue Ancef and Tamiflu     IV/Access:   1. Venous access - peripheral  --Family has refused PICC at this time (3/24)    ICU Prophylaxis:   1. DVT: Discontinue Hep Subq given  hematuria of unknown cause, has mechanical prophylaxis  2. VAP: HOB 30 degrees, chlorhexidine rinse  3. Stress Ulcer: PPI  4. Restraints: Nonviolent soft two point restraints required and necessary for patient safety and continued cares and good effect as patient continues to pull at necessary lines, tubes despite education and distraction. Will readdress daily.   6. Feeding - TF   7. Family Update: at bedside  8. Disposition - critically ill    Key goals for next 24 hours:   1. Cont ancefr/tamiflu   2. Sedation with versed drip  3. SBP control with amlodipine added today  4. Continue gentle diuresis  5. Increase lantus to 20 units for better glycemic control        Interim History:     - continued hematuria, LFT decreasing though still elevated on labs this AM, vent changed to PVC plus assist with improving respiratory alkalosis on ABG's.   - Agitated, hypertensive on precedex sedation--> changed to versed drip  - Following CK trend, serial LFT's   - febrile to 101.5, WBC of 19.5  - hyperglycemia (on lantus) in range of 250-350, increasing Lantus.         Key Medications:       gabapentin  100 mg Oral or Feeding Tube Q8H ALEC     insulin glargine  15 Units Subcutaneous At Bedtime     protein modular  1 packet Per Feeding Tube Daily     carvedilol  25 mg Oral or Feeding Tube BID w/meals     valproate (DEPACON) intermittent infusion  500 mg Intravenous Q24H     ceFAZolin  2 g Intravenous Q8H     oseltamivir  30 mg Oral or Feeding Tube BID     QUEtiapine  50 mg Oral or Feeding Tube BID     senna-docusate  1-2 tablet Oral or Feeding Tube BID 09 12     insulin aspart  1-12 Units Subcutaneous Q4H     sodium chloride (PF)  3 mL Intracatheter Q8H     pneumococcal vaccine  0.5 mL Intramuscular Prior to discharge     sodium chloride (PF)  3 mL Intracatheter Q8H     rocuronium  0.6 mg/kg Intravenous Once     chlorhexidine  15 mL Mouth/Throat Q12H     pantoprazole  40 mg Per Feeding Tube Daily       dexmedetomidine 1 mcg/kg/hr  (03/25/17 0601)     midazolam 6 mg/hr (03/25/17 0900)     D5W 75 mL/hr at 03/25/17 0529     NaCl 10 mL/hr at 03/23/17 1900     IV fluid REPLACEMENT ONLY                 Physical Examination:   Temp:  [98.6  F (37  C)-101.5  F (38.6  C)] 101.1  F (38.4  C)  Heart Rate:  [] 88  Resp:  [0-45] 39  BP: (146-211)/() 152/77  FiO2 (%):  [40 %] 40 %  SpO2:  [94 %-100 %] 95 %    Intake/Output Summary (Last 24 hours) at 03/25/17 1103  Last data filed at 03/25/17 1000   Gross per 24 hour   Intake          2922.13 ml   Output             2370 ml   Net           552.13 ml     Wt Readings from Last 4 Encounters:   03/25/17 71.4 kg (157 lb 6.5 oz)   03/16/17 70.3 kg (155 lb)   01/17/17 71.3 kg (157 lb 1.6 oz)   11/30/16 72.6 kg (160 lb)     BP - Mean:  [] 99  Ventilation Mode: PCV Plus assist  FiO2 (%): 40 %  PEEP (cm H2O): 10 cmH2O  Pressure Support (cm H2O): 16 cmH2O  Oxygen Concentration (%): 40 %  Peak Inspiratory Pressure (cm H2O) (Drager Arianne): 25  Resp: 39    Recent Labs  Lab 03/25/17  0515 03/24/17  2320   PH 7.49* 7.51*   PCO2 33* 31*   PO2 68* 95   HCO3 25 25   O2PER 40 40     GEN: sedated, intubated, opens eyes but otherwise non-responsive  HEENT: head ncat, sclera anicteric, OP patent, trachea midline   PULM: unlabored, coarse lung sounds anteriorly L>R, rhonchi bilaterally.  CV/COR: RRR S1/S2, No rub, murmur or gallop  ABD: firm, distended, nontender, hypoactive bowel sounds, no masses  EXT:  peripheral edema present in hands>feet, warm x4 and well-perfused.   NEURO: sedated, not following commands.  SKIN: no obvious rash  LINES: clean, dry intact         Data:   All data and imaging reviewed     ROUTINE ICU LABS (Last four results)  CMP    Recent Labs  Lab 03/25/17  0430 03/25/17  0030 03/24/17  1427 03/24/17  0430 03/23/17  2215  03/23/17  0535 03/22/17  0455   *  --  153* 150* 149*  < > 148* 146*   POTASSIUM 3.8 3.2* 3.2* 3.6 3.3*  < > 4.2 3.9   CHLORIDE 117*  --  115* 113* 112*  < > 112*  109   CO2 26  --  27 31 29  < > 27 27   ANIONGAP 8  --  11 6 8  < > 9 10   *  --  93 83 197*  < > 171* 190*   BUN 26  --  27 26 25  < > 24 25   CR 1.47*  --  1.71* 1.77* 1.61*  < > 1.58* 1.61*   GFRESTIMATED 50*  --  42* 40* 45*  < > 46* 45*   GFRESTBLACK 60*  --  51* 49* 54*  < > 55* 54*   LEONIE 7.6*  --  7.4* 7.3* 7.1*  < > 6.5* 6.7*   MAG 3.4*  --   --  3.5*  --   --  3.7* 3.4*   PHOS 2.7  --   --  4.2  --   --  4.1 3.7   PROTTOTAL 7.2  --  7.5 7.4  --   --   --   --    ALBUMIN 1.2*  --  1.2* 1.0*  --   --   --   --    BILITOTAL 1.4*  --  3.5* 3.8*  --   --   --   --    ALKPHOS 353*  --  389* 362*  --   --   --   --    *  --  213* 203*  --   --   --   --    ALT 86*  --  109* 119*  --   --   --   --    < > = values in this interval not displayed.    CBC    Recent Labs  Lab 03/25/17  0430 03/24/17  0430 03/23/17  0535 03/22/17  0455   WBC 19.5* 17.9* 19.9* 19.4*   RBC 3.27* 3.61* 3.60* 3.61*   HGB 10.3* 11.3* 12.0* 11.9*   HCT 29.5* 33.2* 33.5* 33.3*   MCV 90 92 93 92   MCH 31.5 31.3 33.3* 33.0   MCHC 34.9 34.0 35.8 35.7   RDW 15.0 15.2* 15.1* 14.8    297 295 259     INR    Recent Labs  Lab 03/23/17  0650   INR 1.19*     Arterial Blood Gas    Recent Labs  Lab 03/25/17  0515 03/24/17  2320   PH 7.49* 7.51*   PCO2 33* 31*   PO2 68* 95   HCO3 25 25   O2PER 40 40       All cultures:    Recent Labs  Lab 03/24/17  1750 03/21/17  1545 03/19/17  1610 03/19/17  1605 03/18/17  1055 03/18/17  1050   CULT Pending No growth No growth No growth Cultured on the 1st day of incubation: Staphylococcus aureusCritical Value/Significant Value, preliminary result only, called to and read back by Shelia Osuna at [facility] on 03.19.17 12:31 P.M. JT.Susceptibility testing done on previous specimen* Cultured on the 1st day of incubation: Staphylococcus aureus Susceptibility testing done on previous specimenCritical Value/Significant Value, preliminary result only, called to and read back by Patricia Truong RN  [facility] ICU, @1324 3/20/17. .*     Imaging:    CT-Chest from 3/22:  1. Extensive infiltrate and consolidation in both lungs has overall  improved slightly since 3/17/2017.   2. Small bilateral pleural effusions are new since the previous exam.  3. Mildly prominent and borderline-enlarged mediastinal and right  hilar lymph nodes have a similar appearance to the previous exam,  given the noncontrast technique on today's study.    CXR, 3/24:   IMPRESSION: Endotracheal tube in good position with tip 2 cm above the  debi. Enteric tube enters the stomach, but the tip is not seen.  Diffuse bilateral mixed interstitial and alveolar opacities, not  significantly changed. No new pulmonary opacities are seen.    CXR, 3/25:  1. Endotracheal tube and enteric sump are again projected in  appropriate positions, unchanged since the prior study.  2. Mixed interstitial and airspace opacities are again seen  bilaterally, possibly slightly improved since the prior study.      Billing: This patient is critically ill: Yes. Total critical care time today 35 min.    Sarah Rehman MD

## 2017-03-25 NOTE — PROVIDER NOTIFICATION
MD Notification    Notified Person:  MD    Notified Persons Name: Tele hub    Notification Date/Time: 3/25/17 5729    Notification Interaction:  Talked with Physician    Purpose of Notification: Bradycardic, Possible ET tube too far down.    Orders Received: chest -xray    Comments:

## 2017-03-25 NOTE — PROVIDER NOTIFICATION
MD Notification    Notified Person:  MD    Notified Persons Name: Tele Hub    Notification Date/Time:  3/25/17 0000    Notification Interaction:  Talked with Physician    Purpose of Notification: Heart rate pause, MD parrish caused by vasovagal.  Pt thrashing in bed. Question restart of precedex. Updated on ABGs.    Orders Received: Ok to start precedex regardless of pauses.     Comments:

## 2017-03-25 NOTE — PROGRESS NOTES
[facility] ICU RESPIRATORY NOTE  Date of Admission:3/17/17  Date of Intubation (most recent):3/17/17  Reason for Mechanical Ventilation: Respiratory failure  Number of Days on Mechanical Ventilation: 9  Ventilation Mode: PCV Plus assist  FiO2 (%): 40 %  Rate Set (breaths/minute): 20 breaths/min  PEEP (cm H2O): 10 cmH2O  Oxygen Concentration (%): 40 %  Peak Inspiratory Pressure (cm H2O) (Drager Arianne): 25  Resp: 34  Plan: pt remains intubated on full ventilator support at this time.     Cruz Mahajan  RT.

## 2017-03-25 NOTE — PROVIDER NOTIFICATION
MD Notification    Notified Person:  MD    Notified Persons Name: Telel Hub    Notification Date/Time:0145 3/25/17    Notification Interaction:  Talked with Physician    Purpose of Notification: Precedex ineffective.    Orders Received: May increase to 1mcg/kg/hr    Comments:

## 2017-03-25 NOTE — PROGRESS NOTES
[facility] ICU RESPIRATORY NOTE  Date of Admission: 3/17/17  Date of Intubation (most recent): 3/17/17  Reason for Mechanical Ventilation: Hypoxic respiratory failure 2/2 influenza B  Number of Days on Mechanical Ventilation: 9  Met Criteria for Pressure Support Trial: Yes     Ventilation Mode: PCV Plus assist  FiO2 (%): 40 %  PEEP (cm H2O): 10 cmH2O  Pressure Support (cm H2O): 16 cmH2O  Oxygen Concentration (%): 40 %  Peak Inspiratory Pressure (cm H2O) (Drager Arianne): 0.85  Resp: 12      ABG Results:    Recent Labs  Lab 03/24/17  2320   PH 7.51*   PCO2 31*   PO2 95   HCO3 25   O2PER 40       Plan:  Patient remains full vent support and assess daily weaning.    Raymond Benavidez RRT  3/25/2017

## 2017-03-26 ENCOUNTER — APPOINTMENT (OUTPATIENT)
Dept: GENERAL RADIOLOGY | Facility: CLINIC | Age: 57
DRG: 870 | End: 2017-03-26
Attending: INTERNAL MEDICINE
Payer: COMMERCIAL

## 2017-03-26 LAB
ALBUMIN SERPL-MCNC: 1.6 G/DL (ref 3.4–5)
ALP SERPL-CCNC: 306 U/L (ref 40–150)
ALT SERPL W P-5'-P-CCNC: 61 U/L (ref 0–70)
ANION GAP SERPL CALCULATED.3IONS-SCNC: 8 MMOL/L (ref 3–14)
AST SERPL W P-5'-P-CCNC: 162 U/L (ref 0–45)
BASOPHILS # BLD AUTO: 0.1 10E9/L (ref 0–0.2)
BASOPHILS NFR BLD AUTO: 0.2 %
BILIRUB SERPL-MCNC: 1.1 MG/DL (ref 0.2–1.3)
BUN SERPL-MCNC: 36 MG/DL (ref 7–30)
CALCIUM SERPL-MCNC: 7.7 MG/DL (ref 8.5–10.1)
CHLORIDE SERPL-SCNC: 117 MMOL/L (ref 94–109)
CO2 SERPL-SCNC: 27 MMOL/L (ref 20–32)
CREAT SERPL-MCNC: 1.29 MG/DL (ref 0.66–1.25)
DIFFERENTIAL METHOD BLD: ABNORMAL
EOSINOPHIL # BLD AUTO: 0 10E9/L (ref 0–0.7)
EOSINOPHIL NFR BLD AUTO: 0.1 %
ERYTHROCYTE [DISTWIDTH] IN BLOOD BY AUTOMATED COUNT: 16.6 % (ref 10–15)
GFR SERPL CREATININE-BSD FRML MDRD: 58 ML/MIN/1.7M2
GLUCOSE BLDC GLUCOMTR-MCNC: 133 MG/DL (ref 70–99)
GLUCOSE BLDC GLUCOMTR-MCNC: 156 MG/DL (ref 70–99)
GLUCOSE BLDC GLUCOMTR-MCNC: 156 MG/DL (ref 70–99)
GLUCOSE BLDC GLUCOMTR-MCNC: 166 MG/DL (ref 70–99)
GLUCOSE BLDC GLUCOMTR-MCNC: 171 MG/DL (ref 70–99)
GLUCOSE BLDC GLUCOMTR-MCNC: 171 MG/DL (ref 70–99)
GLUCOSE BLDC GLUCOMTR-MCNC: 193 MG/DL (ref 70–99)
GLUCOSE BLDC GLUCOMTR-MCNC: 199 MG/DL (ref 70–99)
GLUCOSE BLDC GLUCOMTR-MCNC: 207 MG/DL (ref 70–99)
GLUCOSE BLDC GLUCOMTR-MCNC: 216 MG/DL (ref 70–99)
GLUCOSE BLDC GLUCOMTR-MCNC: 217 MG/DL (ref 70–99)
GLUCOSE BLDC GLUCOMTR-MCNC: 218 MG/DL (ref 70–99)
GLUCOSE BLDC GLUCOMTR-MCNC: 219 MG/DL (ref 70–99)
GLUCOSE BLDC GLUCOMTR-MCNC: 221 MG/DL (ref 70–99)
GLUCOSE BLDC GLUCOMTR-MCNC: 223 MG/DL (ref 70–99)
GLUCOSE BLDC GLUCOMTR-MCNC: 227 MG/DL (ref 70–99)
GLUCOSE BLDC GLUCOMTR-MCNC: 232 MG/DL (ref 70–99)
GLUCOSE BLDC GLUCOMTR-MCNC: 232 MG/DL (ref 70–99)
GLUCOSE BLDC GLUCOMTR-MCNC: 234 MG/DL (ref 70–99)
GLUCOSE BLDC GLUCOMTR-MCNC: 234 MG/DL (ref 70–99)
GLUCOSE SERPL-MCNC: 232 MG/DL (ref 70–99)
HCT VFR BLD AUTO: 30 % (ref 40–53)
HGB BLD-MCNC: 10 G/DL (ref 13.3–17.7)
IMM GRANULOCYTES # BLD: 0.3 10E9/L (ref 0–0.4)
IMM GRANULOCYTES NFR BLD: 0.9 %
LYMPHOCYTES # BLD AUTO: 2.6 10E9/L (ref 0.8–5.3)
LYMPHOCYTES NFR BLD AUTO: 9.5 %
MAGNESIUM SERPL-MCNC: 3.7 MG/DL (ref 1.6–2.3)
MCH RBC QN AUTO: 31.5 PG (ref 26.5–33)
MCHC RBC AUTO-ENTMCNC: 33.3 G/DL (ref 31.5–36.5)
MCV RBC AUTO: 95 FL (ref 78–100)
MONOCYTES # BLD AUTO: 1.2 10E9/L (ref 0–1.3)
MONOCYTES NFR BLD AUTO: 4.4 %
NEUTROPHILS # BLD AUTO: 23.4 10E9/L (ref 1.6–8.3)
NEUTROPHILS NFR BLD AUTO: 84.9 %
NRBC # BLD AUTO: 0 10*3/UL
NRBC BLD AUTO-RTO: 0 /100
PHOSPHATE SERPL-MCNC: 3.3 MG/DL (ref 2.5–4.5)
PLATELET # BLD AUTO: 247 10E9/L (ref 150–450)
POTASSIUM SERPL-SCNC: 4.6 MMOL/L (ref 3.4–5.3)
PROT SERPL-MCNC: 7.5 G/DL (ref 6.8–8.8)
RBC # BLD AUTO: 3.17 10E12/L (ref 4.4–5.9)
SODIUM SERPL-SCNC: 152 MMOL/L (ref 133–144)
URATE SERPL-MCNC: 2.4 MG/DL (ref 3.5–7.2)
WBC # BLD AUTO: 27.6 10E9/L (ref 4–11)

## 2017-03-26 PROCEDURE — 80053 COMPREHEN METABOLIC PANEL: CPT | Performed by: INTERNAL MEDICINE

## 2017-03-26 PROCEDURE — P9047 ALBUMIN (HUMAN), 25%, 50ML: HCPCS | Performed by: INTERNAL MEDICINE

## 2017-03-26 PROCEDURE — 00000146 ZZHCL STATISTIC GLUCOSE BY METER IP

## 2017-03-26 PROCEDURE — 80048 BASIC METABOLIC PNL TOTAL CA: CPT | Performed by: INTERNAL MEDICINE

## 2017-03-26 PROCEDURE — 25000125 ZZHC RX 250: Performed by: INTERNAL MEDICINE

## 2017-03-26 PROCEDURE — 25000128 H RX IP 250 OP 636

## 2017-03-26 PROCEDURE — 40000275 ZZH STATISTIC RCP TIME EA 10 MIN

## 2017-03-26 PROCEDURE — 25000132 ZZH RX MED GY IP 250 OP 250 PS 637: Performed by: INTERNAL MEDICINE

## 2017-03-26 PROCEDURE — 36415 COLL VENOUS BLD VENIPUNCTURE: CPT | Performed by: INTERNAL MEDICINE

## 2017-03-26 PROCEDURE — 71010 XR CHEST PORT 1 VW: CPT

## 2017-03-26 PROCEDURE — 25000131 ZZH RX MED GY IP 250 OP 636 PS 637: Performed by: INTERNAL MEDICINE

## 2017-03-26 PROCEDURE — 94003 VENT MGMT INPAT SUBQ DAY: CPT

## 2017-03-26 PROCEDURE — 25000128 H RX IP 250 OP 636: Performed by: INTERNAL MEDICINE

## 2017-03-26 PROCEDURE — 27210429 ZZH NUTRITION PRODUCT INTERMEDIATE LITER

## 2017-03-26 PROCEDURE — 83735 ASSAY OF MAGNESIUM: CPT | Performed by: INTERNAL MEDICINE

## 2017-03-26 PROCEDURE — 25000132 ZZH RX MED GY IP 250 OP 250 PS 637: Performed by: HOSPITALIST

## 2017-03-26 PROCEDURE — 20000003 ZZH R&B ICU

## 2017-03-26 PROCEDURE — 99291 CRITICAL CARE FIRST HOUR: CPT | Performed by: INTERNAL MEDICINE

## 2017-03-26 PROCEDURE — 25000132 ZZH RX MED GY IP 250 OP 250 PS 637

## 2017-03-26 PROCEDURE — 84550 ASSAY OF BLOOD/URIC ACID: CPT | Performed by: INTERNAL MEDICINE

## 2017-03-26 PROCEDURE — 25000125 ZZHC RX 250

## 2017-03-26 PROCEDURE — 85025 COMPLETE CBC W/AUTO DIFF WBC: CPT | Performed by: INTERNAL MEDICINE

## 2017-03-26 PROCEDURE — 84100 ASSAY OF PHOSPHORUS: CPT | Performed by: INTERNAL MEDICINE

## 2017-03-26 RX ORDER — DEXTROSE MONOHYDRATE 50 MG/ML
INJECTION, SOLUTION INTRAVENOUS CONTINUOUS
Status: DISCONTINUED | OUTPATIENT
Start: 2017-03-26 | End: 2017-03-27

## 2017-03-26 RX ORDER — ALBUMIN (HUMAN) 12.5 G/50ML
12.5 SOLUTION INTRAVENOUS EVERY 8 HOURS
Status: COMPLETED | OUTPATIENT
Start: 2017-03-26 | End: 2017-03-26

## 2017-03-26 RX ORDER — PIPERACILLIN SODIUM, TAZOBACTAM SODIUM 3; .375 G/15ML; G/15ML
3.38 INJECTION, POWDER, LYOPHILIZED, FOR SOLUTION INTRAVENOUS EVERY 6 HOURS
Status: DISCONTINUED | OUTPATIENT
Start: 2017-03-26 | End: 2017-03-30

## 2017-03-26 RX ORDER — FUROSEMIDE 10 MG/ML
20 INJECTION INTRAMUSCULAR; INTRAVENOUS
Status: COMPLETED | OUTPATIENT
Start: 2017-03-26 | End: 2017-03-26

## 2017-03-26 RX ORDER — OSELTAMIVIR PHOSPHATE 6 MG/ML
75 FOR SUSPENSION ORAL 2 TIMES DAILY
Status: DISCONTINUED | OUTPATIENT
Start: 2017-03-26 | End: 2017-03-28

## 2017-03-26 RX ADMIN — Medication: at 08:11

## 2017-03-26 RX ADMIN — ALBUMIN (HUMAN) 12.5 G: 12.5 SOLUTION INTRAVENOUS at 18:27

## 2017-03-26 RX ADMIN — GABAPENTIN 100 MG: 250 SUSPENSION ORAL at 23:54

## 2017-03-26 RX ADMIN — SODIUM CHLORIDE 11 UNITS/HR: 9 INJECTION, SOLUTION INTRAVENOUS at 14:42

## 2017-03-26 RX ADMIN — CEFAZOLIN SODIUM 2 G: 2 INJECTION, SOLUTION INTRAVENOUS at 16:27

## 2017-03-26 RX ADMIN — DEXTROSE MONOHYDRATE: 50 INJECTION, SOLUTION INTRAVENOUS at 23:31

## 2017-03-26 RX ADMIN — SODIUM CHLORIDE 8 UNITS/HR: 9 INJECTION, SOLUTION INTRAVENOUS at 11:09

## 2017-03-26 RX ADMIN — SODIUM CHLORIDE 13 UNITS/HR: 9 INJECTION, SOLUTION INTRAVENOUS at 17:19

## 2017-03-26 RX ADMIN — ALBUMIN (HUMAN) 12.5 G: 12.5 SOLUTION INTRAVENOUS at 08:10

## 2017-03-26 RX ADMIN — SODIUM CHLORIDE 8 UNITS/HR: 9 INJECTION, SOLUTION INTRAVENOUS at 23:32

## 2017-03-26 RX ADMIN — OSELTAMIVIR PHOSPHATE 75 MG: 6 POWDER, FOR SUSPENSION ORAL at 20:25

## 2017-03-26 RX ADMIN — SENNOSIDES AND DOCUSATE SODIUM 2 TABLET: 8.6; 5 TABLET ORAL at 08:12

## 2017-03-26 RX ADMIN — SODIUM CHLORIDE 13 UNITS/HR: 9 INJECTION, SOLUTION INTRAVENOUS at 03:18

## 2017-03-26 RX ADMIN — CEFAZOLIN SODIUM 2 G: 2 INJECTION, SOLUTION INTRAVENOUS at 08:11

## 2017-03-26 RX ADMIN — PIPERACILLIN SODIUM,TAZOBACTAM SODIUM 3.38 G: 3; .375 INJECTION, POWDER, FOR SOLUTION INTRAVENOUS at 11:23

## 2017-03-26 RX ADMIN — VANCOMYCIN HYDROCHLORIDE 1500 MG: 5 INJECTION, POWDER, LYOPHILIZED, FOR SOLUTION INTRAVENOUS at 12:49

## 2017-03-26 RX ADMIN — SODIUM CHLORIDE 500 MG: 9 INJECTION, SOLUTION INTRAVENOUS at 20:25

## 2017-03-26 RX ADMIN — OSELTAMIVIR PHOSPHATE 30 MG: 6 POWDER, FOR SUSPENSION ORAL at 08:12

## 2017-03-26 RX ADMIN — Medication 6 MG/HR: at 14:41

## 2017-03-26 RX ADMIN — DEXTROSE MONOHYDRATE: 50 INJECTION, SOLUTION INTRAVENOUS at 11:10

## 2017-03-26 RX ADMIN — QUETIAPINE FUMARATE 50 MG: 50 TABLET, FILM COATED ORAL at 08:12

## 2017-03-26 RX ADMIN — PIPERACILLIN SODIUM,TAZOBACTAM SODIUM 3.38 G: 3; .375 INJECTION, POWDER, FOR SOLUTION INTRAVENOUS at 23:33

## 2017-03-26 RX ADMIN — PIPERACILLIN SODIUM,TAZOBACTAM SODIUM 3.38 G: 3; .375 INJECTION, POWDER, FOR SOLUTION INTRAVENOUS at 17:12

## 2017-03-26 RX ADMIN — GABAPENTIN 100 MG: 250 SUSPENSION ORAL at 16:00

## 2017-03-26 RX ADMIN — CHLORHEXIDINE GLUCONATE 15 ML: 1.2 RINSE ORAL at 20:25

## 2017-03-26 RX ADMIN — SODIUM CHLORIDE 15 UNITS/HR: 9 INJECTION, SOLUTION INTRAVENOUS at 21:12

## 2017-03-26 RX ADMIN — FUROSEMIDE 20 MG: 10 INJECTION, SOLUTION INTRAVENOUS at 16:23

## 2017-03-26 RX ADMIN — QUETIAPINE FUMARATE 50 MG: 50 TABLET, FILM COATED ORAL at 20:24

## 2017-03-26 RX ADMIN — SODIUM CHLORIDE 14 UNITS/HR: 9 INJECTION, SOLUTION INTRAVENOUS at 05:50

## 2017-03-26 RX ADMIN — GABAPENTIN 100 MG: 250 SUSPENSION ORAL at 08:12

## 2017-03-26 RX ADMIN — INSULIN GLARGINE 10 UNITS: 100 INJECTION, SOLUTION SUBCUTANEOUS at 21:39

## 2017-03-26 RX ADMIN — CHLORHEXIDINE GLUCONATE 15 ML: 1.2 RINSE ORAL at 08:12

## 2017-03-26 RX ADMIN — ALBUMIN (HUMAN) 12.5 G: 12.5 SOLUTION INTRAVENOUS at 11:23

## 2017-03-26 RX ADMIN — SODIUM CHLORIDE 12 UNITS/HR: 9 INJECTION, SOLUTION INTRAVENOUS at 00:49

## 2017-03-26 RX ADMIN — FUROSEMIDE 20 MG: 10 INJECTION, SOLUTION INTRAVENOUS at 11:24

## 2017-03-26 RX ADMIN — SODIUM CHLORIDE 15 UNITS/HR: 9 INJECTION, SOLUTION INTRAVENOUS at 19:18

## 2017-03-26 RX ADMIN — BISACODYL 10 MG: 10 SUPPOSITORY RECTAL at 20:25

## 2017-03-26 RX ADMIN — SODIUM CHLORIDE 14 UNITS/HR: 9 INJECTION, SOLUTION INTRAVENOUS at 07:29

## 2017-03-26 RX ADMIN — PANTOPRAZOLE SODIUM 40 MG: 40 TABLET, DELAYED RELEASE ORAL at 08:12

## 2017-03-26 NOTE — PROGRESS NOTES
[facility] ICU RESPIRATORY NOTE  Date of Admission:3/17/17  Date of Intubation (most recent):3/17/17  Reason for Mechanical Ventilation: Respiratory failure  Number of Days on Mechanical Ventilation: 10     Ventilation Mode: PCV Plus assist  FiO2 (%): 45 %  Rate Set (breaths/minute): 20 breaths/min  PEEP (cm H2O): 10 cmH2O  Pressure Support (cm H2O): 25 cmH2O  Oxygen Concentration (%): 45 %  Peak Inspiratory Pressure (cm H2O) (Drager Arianne): 25  Resp: 22      Recent Labs  Lab 03/25/17  0515 03/24/17  2320   PH 7.49* 7.51*   PCO2 33* 31*   PO2 68* 95   HCO3 25 25   O2PER 40 40               Plan: Cont full vent support overnight.  3/26/2017  Cee Baugh

## 2017-03-26 NOTE — PROGRESS NOTES
Patient remains vented, no wean trials today. Remains sedated with versed drip. Vital signs stable. Patient family at bedside all shift, given updates as requested.

## 2017-03-26 NOTE — CONSULTS
RENAL CONSULTATION      REQUESTING PHYSICIAN:  Manny Novak MD      REASON FOR CONSULTATION:  Acute kidney injury.      HISTORY OF PRESENT ILLNESS:  Wyatt Mahajan is a 57-year-old admitted on 03/17/2017 with respiratory failure.  He was felt to have influenza B and community-acquired pneumonia.  He has been on multiple antibiotics, he was subsequently intubated.  On 03/16 he got 30 mg of Toradol IV and on 03/17 he got IV contrast as part of our diagnostic study.  He has also been on vancomycin and nafcillin in the past.      PAST MEDICAL HISTORY:  Diabetes, peripheral vascular disease and is a smoker.  He apparently was not taking much in the way of medications on admission.      CURRENT MEDICATIONS:  Include IV albumin and water per his feeding tube at 100 cc per hour.  He is on Ancef and Tamiflu.  He is on Protonix.  He is on valproate, Neurontin and Seroquel.  He is on an insulin drip.      SOCIAL HISTORY:  He is a smoker.  He does not drink.      FAMILY HISTORY:  Positive for diabetes in multiple family members.  No one has hypertension or chronic kidney disease.      PHYSICAL EXAMINATION:   GENERAL:  He is sedated.  He is intubated.  His urine output yesterday was 2645 cc.  His weight on admission was 70.3, today it is 71.4.   SKIN:  No rash.   HEENT:  Head shows no trauma.  The eyes show pupils are constricted.  He is intubated.  He has an oral gastric tube in place.   LUNGS:  Show coarse breath sounds bilaterally with bilateral rhonchi and rales.   CARDIAC:  Tachycardia.  Normal S1, S2, no murmur.   ABDOMEN:  Normal bowel sounds, soft and nontender, no hepatosplenomegaly.   GENITOURINARY:  Levine catheter in place.   EXTREMITIES:  Normal nails, joints and pulses throughout.  No edema.   NEUROLOGIC:  He is sedated.      LABORATORY DATA:  His sodium is 151, potassium 3.8, bicarbonate 26, creatinine 1.47.  His creatinine on admission was 0.62 on 03/19/2017 and it ghada to 1.77 on 03/24/2017 again and again it  is now 1.47.  His CK and myoglobin are both slightly up.  His chest x-ray shows bilateral infiltrates.      IMPRESSION:   1.  Acute kidney injury:  His urine output is good.  His creatinine is improving.  His electrolytes are in balance in terms of potassium and bicarbonate.  He does have a high sodium.  He was getting D5 but developed worsening hyperglycemia, so now he is getting water per his NG tube at a brisk rate and we will certainly follow this.  He is getting IV albumin for 3 doses for volume expansion.   2.  Respiratory failure, mechanical ventilation:  He is on antibiotics, including Ancef and Tamiflu.  His ventilator management is per the Critical Care Service.   3.  Diabetes:  He is on an insulin drip.         KAMLESH HUMPHRIES MD             D: 2017 15:54   T: 2017 20:52   MT: [id number]      Name:     AMAURY PARKER   MRN:      [id number]        Account:       [id number]   :      1960           Consult Date:  2017      Document: [id number]

## 2017-03-26 NOTE — PROGRESS NOTES
Assessment and Plan:   JOSE: Cr continues to improve and UO brisk. Lytes nl except for hypernatremia. I/O 3433/3390, net + 5.2 L since adm. Wt down 2 kg in last 4 days. Getting IV alb and IV lasix. Getting D5W at 75 per h. Also 400 cc q 4 h per NG.             Interval History:   Resp failure/ Mech vent. Felt to have pneumonia. On antibiotics.   DM  PVD     Hypertension: BP elevated. Monitoring without meds for now.               Review of Systems:   Intubated.           Medications:       oseltamivir  75 mg Oral or Feeding Tube BID     piperacillin-tazobactam  3.375 g Intravenous Q6H     albumin human  12.5 g Intravenous Q8H     furosemide  20 mg Intravenous BID     vancomycin (VANCOCIN) IV  1,500 mg Intravenous Q12H     gabapentin  100 mg Oral or Feeding Tube Q8H ALEC     protein modular  1 packet Per Feeding Tube Daily     valproate (DEPACON) intermittent infusion  500 mg Intravenous Q24H     ceFAZolin  2 g Intravenous Q8H     QUEtiapine  50 mg Oral or Feeding Tube BID     senna-docusate  1-2 tablet Oral or Feeding Tube BID 09 12     sodium chloride (PF)  3 mL Intracatheter Q8H     pneumococcal vaccine  0.5 mL Intramuscular Prior to discharge     sodium chloride (PF)  3 mL Intracatheter Q8H     rocuronium  0.6 mg/kg Intravenous Once     chlorhexidine  15 mL Mouth/Throat Q12H     pantoprazole  40 mg Per Feeding Tube Daily       D5W 75 mL/hr at 03/26/17 1110     midazolam 6 mg/hr (03/26/17 0700)     IV fluid REPLACEMENT ONLY       insulin (regular) 11 Units/hr (03/26/17 1300)     NaCl 10 mL/hr at 03/23/17 1900     IV fluid REPLACEMENT ONLY       Current active medications and PTA medications reviewed, see medication list for details.            Physical Exam:   Vitals were reviewed  Patient Vitals for the past 24 hrs:   BP Temp Temp src Heart Rate Resp SpO2 Weight   03/26/17 1230 147/67 98.4  F (36.9  C) - 97 (!) 32 97 % -   03/26/17 1200 151/70 98.2  F (36.8  C) Bladder 96 (!) 31 97 % -   03/26/17 1145 -  98.4  F (36.9  C) - 97 (!) 32 97 % -   03/26/17 1130 147/68 98.6  F (37  C) - 98 (!) 32 97 % -   03/26/17 1115 - 98.8  F (37.1  C) - 100 (!) 33 97 % -   03/26/17 1100 152/66 98.8  F (37.1  C) - 98 (!) 34 96 % -   03/26/17 1045 148/68 99  F (37.2  C) - 97 (!) 35 96 % -   03/26/17 1030 165/78 98.6  F (37  C) - 95 (!) 35 96 % -   03/26/17 1015 154/74 95.7  F (35.4  C) - 97 (!) 34 95 % -   03/26/17 1000 151/74 99.1  F (37.3  C) - 94 (!) 35 95 % -   03/26/17 0945 155/74 99  F (37.2  C) - 99 (!) 34 94 % -   03/26/17 0930 152/73 99  F (37.2  C) - 92 (!) 35 95 % -   03/26/17 0915 151/76 99  F (37.2  C) - 95 (!) 35 95 % -   03/26/17 0900 153/76 99  F (37.2  C) - 66 (!) 42 94 % -   03/26/17 0845 147/72 99  F (37.2  C) - 95 (!) 35 94 % -   03/26/17 0830 165/71 98.8  F (37.1  C) - 90 (!) 37 96 % -   03/26/17 0815 157/74 98.8  F (37.1  C) - 93 (!) 33 96 % -   03/26/17 0800 156/75 99  F (37.2  C) Bladder 94 (!) 34 96 % -   03/26/17 0745 159/76 99  F (37.2  C) - 94 (!) 34 96 % -   03/26/17 0730 157/69 99  F (37.2  C) - 82 28 95 % -   03/26/17 0715 148/73 99  F (37.2  C) - 98 (!) 35 95 % -   03/26/17 0700 154/76 98.8  F (37.1  C) - 94 (!) 34 95 % -   03/26/17 0600 150/74 - - 98 (!) 33 95 % -   03/26/17 0545 159/75 - - 92 (!) 35 93 % -   03/26/17 0530 155/74 98.4  F (36.9  C) - 95 (!) 34 94 % -   03/26/17 0515 154/75 98.2  F (36.8  C) - 73 (!) 41 93 % -   03/26/17 0500 158/74 - - 93 (!) 34 94 % -   03/26/17 0445 161/74 98.1  F (36.7  C) - 89 (!) 33 94 % -   03/26/17 0430 143/78 98.1  F (36.7  C) - - - - -   03/26/17 0415 162/74 98.1  F (36.7  C) - 88 (!) 33 94 % -   03/26/17 0400 171/75 97.9  F (36.6  C) - 82 22 94 % -   03/26/17 0345 175/76 97.9  F (36.6  C) - 87 (!) 31 95 % -   03/26/17 0330 165/75 - - 88 (!) 32 95 % -   03/26/17 0324 - - - - - - 70.1 kg (154 lb 8.7 oz)   03/26/17 0315 168/76 97.5  F (36.4  C) - 90 (!) 32 94 % -   03/26/17 0300 165/78 97.3  F (36.3  C) - 98 (!) 32 95 % -   03/26/17 0248 - - - - - 95 % -   03/26/17  0245 166/75 97.3  F (36.3  C) - 89 30 95 % -   03/26/17 0230 160/75 97.2  F (36.2  C) - 85 30 95 % -   03/26/17 0215 152/73 96.8  F (36  C) - 92 (!) 31 95 % -   03/26/17 0200 157/74 97  F (36.1  C) - 86 29 - -   03/26/17 0100 153/75 96.8  F (36  C) - 88 29 - -   03/26/17 0000 156/76 97.3  F (36.3  C) - 89 30 - -   03/25/17 2329 - - - - - 96 % -   03/25/17 2300 153/72 - - 94 - 96 % -   03/25/17 2200 160/69 - - 95 20 94 % -   03/25/17 2158 - - - - 20 - -   03/25/17 2100 161/73 99.3  F (37.4  C) - 83 20 - -   03/25/17 2000 156/70 99.1  F (37.3  C) - 97 20 - -   03/25/17 1935 - - - - - 97 % -   03/25/17 1900 163/72 99.1  F (37.3  C) - 102 20 - -   03/25/17 1815 164/76 99.3  F (37.4  C) - 96 20 96 % -   03/25/17 1800 149/73 99.5  F (37.5  C) - 90 21 96 % -   03/25/17 1745 151/66 99.7  F (37.6  C) - 89 22 96 % -   03/25/17 1730 157/70 99.9  F (37.7  C) - 105 23 96 % -   03/25/17 1715 172/79 99.9  F (37.7  C) - 101 21 96 % -   03/25/17 1700 186/78 99.9  F (37.7  C) - 85 21 96 % -   03/25/17 1645 183/89 99.9  F (37.7  C) - 116 21 96 % -   03/25/17 1630 180/85 99.9  F (37.7  C) - 94 26 96 % -   03/25/17 1615 178/82 98.8  F (37.1  C) - 95 22 96 % -   03/25/17 1600 (!) 189/91 99.9  F (37.7  C) Bladder 99 24 96 % -   03/25/17 1545 173/80 99.9  F (37.7  C) - 98 22 96 % -   03/25/17 1530 170/84 99.9  F (37.7  C) - 99 21 96 % -   03/25/17 1515 178/89 99.7  F (37.6  C) - 99 22 96 % -   03/25/17 1500 170/85 99.7  F (37.6  C) - 100 27 96 % -   03/25/17 1445 173/84 99.7  F (37.6  C) - 93 28 95 % -   03/25/17 1430 164/87 99.9  F (37.7  C) - 97 22 95 % -   03/25/17 1415 174/77 100  F (37.8  C) - 91 24 95 % -   03/25/17 1400 175/75 100  F (37.8  C) - 90 22 94 % -   03/25/17 1345 171/83 100  F (37.8  C) - 98 20 94 % -       Temp:  [95.7  F (35.4  C)-100  F (37.8  C)] 98.4  F (36.9  C)  Heart Rate:  [] 97  Resp:  [20-42] 32  BP: (143-189)/(66-91) 147/67  FiO2 (%):  [40 %-50 %] 45 %  SpO2:  [93 %-97 %] 97 %    Temperatures:  Current -  Temp: 98.4  F (36.9  C); Max - Temp  Av.8  F (37.1  C)  Min: 95.7  F (35.4  C)  Max: 100  F (37.8  C)  Respiration range: Resp  Av.9  Min: 20  Max: 42  Pulse range: No Data Recorded  Blood pressure range: Systolic (24hrs), Av , Min:143 , Max:189   ; Diastolic (24hrs), Av, Min:66, Max:91    Pulse oximetry range: SpO2  Av.3 %  Min: 93 %  Max: 97 %    I/O last 3 completed shifts:  In: 2351 [I.V.:571; NG/GT:740]  Out: 3165 [Urine:3165]      Intake/Output Summary (Last 24 hours) at 17 1330  Last data filed at 17 1300   Gross per 24 hour   Intake             2605 ml   Output             2620 ml   Net              -15 ml       Intubated  FT in place with water running  Levine with red urine       Wt Readings from Last 4 Encounters:   17 70.1 kg (154 lb 8.7 oz)   17 70.3 kg (155 lb)   17 71.3 kg (157 lb 1.6 oz)   16 72.6 kg (160 lb)          Data:          Lab Results   Component Value Date     2017     2017     2017    Lab Results   Component Value Date    CHLORIDE 117 2017    CHLORIDE 117 2017    CHLORIDE 115 2017    Lab Results   Component Value Date    BUN 36 2017    BUN 26 2017    BUN 27 2017      Lab Results   Component Value Date    POTASSIUM 4.6 2017    POTASSIUM 3.9 2017    POTASSIUM 3.8 2017    Lab Results   Component Value Date    CO2 27 2017    CO2 26 2017    CO2 27 2017    Lab Results   Component Value Date    CR 1.29 2017    CR 1.47 2017    CR 1.71 2017        Recent Labs   Lab Test  17   0340  17   0430  17   0430   WBC  27.6*  19.5*  17.9*   HGB  10.0*  10.3*  11.3*   HCT  30.0*  29.5*  33.2*   MCV  95  90  92   PLT  247  247  297     Recent Labs   Lab Test  17   0340  17   0430  17   1427   AST  162*  196*  213*   ALT  61  86*  109*   ALKPHOS  306*  353*  389*   BILITOTAL  1.1  1.4*  3.5*        Recent Labs   Lab Test  03/26/17   0340  03/25/17   0430  03/24/17   0430   MAG  3.7*  3.4*  3.5*     Recent Labs   Lab Test  03/26/17   0340  03/25/17   0430  03/24/17   0430   PHOS  3.3  2.7  4.2     Recent Labs   Lab Test  03/26/17   0340  03/25/17   0430  03/24/17   1427   LEONIE  7.7*  7.6*  7.4*       Lab Results   Component Value Date    LEONIE 7.7 (L) 03/26/2017     Lab Results   Component Value Date    WBC 27.6 (H) 03/26/2017    HGB 10.0 (L) 03/26/2017    HCT 30.0 (L) 03/26/2017    MCV 95 03/26/2017     03/26/2017     Lab Results   Component Value Date     (H) 03/26/2017    POTASSIUM 4.6 03/26/2017    CHLORIDE 117 (H) 03/26/2017    CO2 27 03/26/2017     (H) 03/26/2017     Lab Results   Component Value Date    BUN 36 (H) 03/26/2017    CR 1.29 (H) 03/26/2017     Lab Results   Component Value Date    MAG 3.7 (H) 03/26/2017     Lab Results   Component Value Date    PHOS 3.3 03/26/2017       Creatinine   Date Value Ref Range Status   03/26/2017 1.29 (H) 0.66 - 1.25 mg/dL Final   03/25/2017 1.47 (H) 0.66 - 1.25 mg/dL Final   03/24/2017 1.71 (H) 0.66 - 1.25 mg/dL Final   03/24/2017 1.77 (H) 0.66 - 1.25 mg/dL Final   03/23/2017 1.61 (H) 0.66 - 1.25 mg/dL Final   03/23/2017 1.65 (H) 0.66 - 1.25 mg/dL Final       Attestation:  I have reviewed today's vital signs, notes, medications, labs and imaging.     Freddy Keyes MD

## 2017-03-26 NOTE — PROGRESS NOTES
Pt remains intubated on full ventilator support, current ventilator setting; PCV plus assist, PIP 25, RR 20, P10, Fio2 45%, SpO2 95, no resp complication today's shift, RT will continue to monitor the pt.     Cruz Mahajan RT.

## 2017-03-26 NOTE — PLAN OF CARE
Problem: Goal Outcome Summary  Goal: Goal Outcome Summary  Outcome: No Change  No changes overnight. PERRLA.  VSS.  No vent changes.  SR/ST on the monitor.  Versed for sedation, synching the vent.  Decreased oral secretions.  Continues to have episodes of brief bradycardia with major turns and deep suctioning. UOP adequate.  Urine maroon in color.  BS +.  No bowel movement overnight. Insulin gtt continues for high blood sugars.

## 2017-03-26 NOTE — PHARMACY-VANCOMYCIN DOSING SERVICE
Pharmacy Vancomycin Initial Note  Date of Service 2017  Patient's  1960  57 year old, male    Indication: [facility]    Current estimated CrCl = Estimated Creatinine Clearance: 64.2 mL/min (based on Cr of 1.29).    Creatinine for last 3 days  3/23/2017:  2:23 PM Creatinine 1.65 mg/dL; 10:15 PM Creatinine 1.61 mg/dL  3/24/2017:  4:30 AM Creatinine 1.77 mg/dL;  2:27 PM Creatinine 1.71 mg/dL  3/25/2017:  4:30 AM Creatinine 1.47 mg/dL  3/26/2017:  3:40 AM Creatinine 1.29 mg/dL    Recent Vancomycin Level(s) for last 3 days  No results found for requested labs within last 72 hours.      Vancomycin IV Administrations (past 72 hours)      No vancomycin orders with administrations in past 72 hours.                Nephrotoxins and other renal medications (Future)    Start     Dose/Rate Route Frequency Ordered Stop    17 1215  vancomycin (VANCOCIN) 1500 mg in 0.9% NaCl 250 mL PREMIX      1,500 mg Intravenous EVERY 12 HOURS 17 1200      17 1100  furosemide (LASIX) injection 20 mg      20 mg Intravenous 2 TIMES DAILY. 17 1047 17 0759    17 1045  piperacillin-tazobactam (ZOSYN) 3.375 g vial to attach to  mL bag     Comments:  Pharmacy can adjust dose based on renal function.    3.375 g  over 1 Hours Intravenous EVERY 6 HOURS 17 1033            Contrast Orders - past 72 hours     None                Plan:  1.  Start vancomycin  1500 mg IV q12h.   2.  Goal Trough Level: 15-20 mg/L   3.  Pharmacy will check trough levels as appropriate in 1-3 Days.    4. Serum creatinine levels will be ordered daily for the first week of therapy and at least twice weekly for subsequent weeks.    5. [place] method utilized to dose vancomycin therapy: Method 1    Cookie Venegas

## 2017-03-26 NOTE — PROGRESS NOTES
Critical Care Progress Note      03/26/2017    Name: Wyatt Mahajan MRN#: [id number]   Age: 57 year old YOB: 1960     Hsptl Day# 9  ICU DAY #9    MV DAY #9           Problem List:   Active Problems:    Acute respiratory failure with hypoxia (H)  MSSA pna  MSSA bacteremia  Influenza B  JOSE         Summary/Hospital Course:   Wyatt Mahajan is a 57 year old male admitted on 3/17 to the floor, presenting with acute hypoxic respiratory failure due to influenza B and CAP, right chest wall pain and uncontrolled DM-II. Patient was subsequently transferred from the floor to the MICU for increased work of breathing, accessory muscle use and respiratory distress. Was intubated due to increased work of breathing, tachypnea and decreasing O2 sats.    3/18:  Weaned vent yesterday and overnight.  Comfortably sedated this AM.  +blood cx for staph aureus overnight.  On vanco--repeating blood cx.    3/19:  Still minimal vent settings but minute ventilation still ~13 so won't try to extubate today.  Weaning from insulin drip back to lantus/SSI now on steady TF.  Blood cultures persistently positive--MSSA.  Change to nafcillin.  Reculture.  TTE.    3/20:  No changes, no events. Gentle diuresis yesterday but still positive.  Increasing diuresis today.     3/21: Not tolerating CMV/AC last evening (tachycardia, tachypnea, hypertension), so switched to pressure support which improved vital signs overnight. Switched back to CMV/AC this morning at 5:30am, now tolerating. Cr bump so no further diuresis today. Changing sedation to precedex. Echo showed no vegetations/masses on valves. Will continue to follow cultures, now growth so far from 3/20 cultures.    3/22: Pressure support trial last evening was stopped just after its initiation due to inc. RR to the 40's. Eyes open to voice. Given valproate for delirium. Propofol wean and transition to precedex resulted in inc HR, RR and BP. Nursing notes on exam, 'wet crackles'  in L lung and thick creamy red-streaked secretions. Ppeak to mid-30's. No BM yet, just smears but constipation treated with senna, dulcolax and miralax.    3/23: Episodes of tachycardia and hypertension to 169/93, coreg started and BP improved with versed and propofol. Urine was bloody this morning, suspect bell trauma. Febrile to 101.9 with PRN tylenol given. Still no BM though treated with senna, dulcolax and miralax. Family worried about his condition and the course of his illness.    3/24: On pressure support respiratory trial since 5pm last evening. Off of propofol sedation since 5pm as well. Critical lab value of RG=[id number] returned this AM, triglycerides were also elevated to 677. Patient is febrile to 101.5 this morning and with lower blood glucose on last four checks so Lantus was decreased. 25ml D50 given for bs of 65. Cr increased today, likely due to gentle diuresis. K+ repleted. Urine continues to be bloody. Elevated liver enzymes. WBC still increased though now down-trending.    3/25: Family refused PICC line yesterday evening. Abx's for mssa were changed from nafcillin to ancef. D5W started for hypernatremia. Overnight had episodes of HR pause and thrashing; sedation with precedex, then changed to versed. Also bradycardic in early morning; CXR checked and ET tube was in good position.      Assessment and plan :     Wyatt Mahajan IS a 57 year old male admitted on 3/17/2017 for respiratory failure, flu B, mssa pna and mssa bacteremia.   I have personally reviewed the daily labs, imaging studies, cultures and discussed the case with referring physician and consulting physicians.   My assessment and plan by system for this patient is as follows:    Neurology/Psychiatry:   1. Sedation:  Discontinued propofol given CK and Triglyceride lab values on 3/24, transitioned to precedex but patient thrashing and hypertensive/bradycardic. Versed drip begun 3/25 AM is providing adequate sedation. Prn dilaudid also  available.  2. Delirium:  Seroquel, 50mg bid.  (). Valproate added evening 3/22, decreased to q 24 hr dosing 3/24.  3. H/o Diabetic neuropathy 2/2 DM-II:    -- gabapentin 100mg, TID.    Cardiovascular:   1. Hypertension: SBP over last day has been in the range of 155-205.   --Carvedilol to 25 mg, BID on hold due to bradycardia  --Hydralazine prn  2. H/o HLD:  hold atorvastatin  3. H/o PAD:  hold ASA  4. Hyperlactemia:  Resolved  5. Ischemia: trend CK     Pulmonary/Ventilator Management:   1.  Respiratory failure, hypoxemic:  2/2 mssa pna and influenza B.  May also have element of ARDS, but only on minimal vent settings (40 %, peep 5). Continue to ventilate with low tidal volume strategy (490ml ~ 7ml/kg). CXR 3/26 with bilat infilt. Sputum culture 3/24 + for GNR (3 strains) and staph aureus (sensitivty pending)  --Stop nafcillin 3/26, start zosyn and vanco 3/26 until sensitivity available/cont tamiflu  --Lung protective vent strategy- PCV plus assist ventilation     GI and Nutrition :   1.  TF  2.  PPI for PUD prophylaxis  3.  Constipation  --Senna daily, BID  --PRN Dulcolax, Miralax  4. Elevated liver enzymes: newly elevated on 3/24, nl on 3/17, ? Med related vs congestion vs? RUQ U/S revealed a distended GB w/sludge. Subsequent LFT's have shown decreases of Alk phos, AST and ALT levels since discontinuation of statin.  --serial LFT's    Renal/Fluids/Electrolytes:   1. JOSE--improving UA showed no casts. Urine eos < 1%  --continuing diuresis with lasix (albumin same time) to keep fluid balance even  --administer tube feeds/maintainance fluids   --check urine eosinophils, pending  2. Hypernatremia - . Began treatment with D5W   --D5W, as needed  2. Electrolyte abnormalities - hypocalcemia, hypermagnesemia  --monitor function and electrolytes as needed with replacement per ICU protocols  3. Mild volume overload:  --continuing gentle diuresis today, furosemide 20mg with albumin     :  1. Hematuria: bloodly  urine AM of 3/23 possibly due to trauma from the bell catheter vs. Rhabdomyolysis. No blood clots were found in the collection device. Myoglobin elevated to 552. Kidney US 3/25 normal  -- Monitor    Infectious Disease:   1. Flu B:  Tamiflu  2. MSSA pna/bacteremia: Nafcillin discontinued 3/24, changed to Ancef. Now sputum GNR and staph (see pulm) Most recent bld cultures show no growth. TTE did not show valvular masses/vegetations. CT chest negative for occult abscess.   -- Switched abx as above    Endocrine:   1. Hyperglycemia:  H/o DM2. Latest bs have been elevated to 250-350, likely because lantus dosage had been reduced yesterday and TF was increased after propofol sedation stopped.   --Insulin drip    Hematology/Oncology:   1. Plt/hgb stable  2. Leukocytosis-- 2/2 infection. WBC from 19.9 on 3/23 to 19.5 on 3/25. Patient febrile to 101.5 this AM. Sputum culture 3/24, pending.  --Abx as above     IV/Access:   1. Venous access - peripheral  --Family has refused PICC at this time (3/24)    ICU Prophylaxis:   1. DVT: Discontinue Hep Subq given hematuria of unknown cause, has mechanical prophylaxis  2. VAP: HOB 30 degrees, chlorhexidine rinse  3. Stress Ulcer: PPI  4. Restraints: Nonviolent soft two point restraints required and necessary for patient safety and continued cares and good effect as patient continues to pull at necessary lines, tubes despite education and distraction. Will readdress daily.   6. Feeding - TF   7. Family Update: at bedside  8. Disposition - critically ill         Key Medications:   Reviewed          Physical Examination:   Temp:  [96.8  F (36  C)-100.6  F (38.1  C)] 99.1  F (37.3  C)  Heart Rate:  [] 94  Resp:  [16-42] 35  BP: (143-189)/(66-91) 151/74  FiO2 (%):  [40 %-50 %] 45 %  SpO2:  [93 %-97 %] 95 %    Intake/Output Summary (Last 24 hours) at 03/26/17 1055  Last data filed at 03/26/17 1000   Gross per 24 hour   Intake             2252 ml   Output             2905 ml   Net              -653 ml     Wt Readings from Last 4 Encounters:   03/26/17 70.1 kg (154 lb 8.7 oz)   03/16/17 70.3 kg (155 lb)   01/17/17 71.3 kg (157 lb 1.6 oz)   11/30/16 72.6 kg (160 lb)     BP - Mean:  [] 103  Ventilation Mode: PCV Plus assist  FiO2 (%): 45 %  Rate Set (breaths/minute): 20 breaths/min  PEEP (cm H2O): 10 cmH2O  Pressure Support (cm H2O): 25 cmH2O  Oxygen Concentration (%): 45 %  Peak Inspiratory Pressure (cm H2O) ([place] Arianne): 25  Resp: 35    Recent Labs  Lab 03/25/17  0515 03/24/17  2320   PH 7.49* 7.51*   PCO2 33* 31*   PO2 68* 95   HCO3 25 25   O2PER 40 40     GEN: sedated, intubated, opens eyes but otherwise non-responsive  HEENT: head ncat, sclera anicteric, OP patent, trachea midline   PULM: unlabored, coarse lung sounds anteriorly L>R, rhonchi bilaterally.  CV/COR: RRR S1/S2, No rub, murmur or gallop  ABD: firm, distended, nontender, hypoactive bowel sounds, no masses  EXT:  peripheral edema present in hands>feet, warm x4 and well-perfused.   NEURO: sedated, not following commands.  SKIN: no obvious rash  LINES: clean, dry intact         Data:       Recent Labs  Lab 03/26/17  0340 03/25/17  1559 03/25/17  0430 03/25/17  0030 03/24/17  1427 03/24/17  0430  03/23/17  0535   *  --  151*  --  153* 150*  < > 148*   POTASSIUM 4.6 3.9 3.8 3.2* 3.2* 3.6  < > 4.2   CHLORIDE 117*  --  117*  --  115* 113*  < > 112*   CO2 27  --  26  --  27 31  < > 27   ANIONGAP 8  --  8  --  11 6  < > 9   *  --  252*  --  93 83  < > 171*   BUN 36*  --  26  --  27 26  < > 24   CR 1.29*  --  1.47*  --  1.71* 1.77*  < > 1.58*   GFRESTIMATED 58*  --  50*  --  42* 40*  < > 46*   GFRESTBLACK 70  --  60*  --  51* 49*  < > 55*   LEONIE 7.7*  --  7.6*  --  7.4* 7.3*  < > 6.5*   MAG 3.7*  --  3.4*  --   --  3.5*  --  3.7*   PHOS 3.3  --  2.7  --   --  4.2  --  4.1   PROTTOTAL 7.5  --  7.2  --  7.5 7.4  --   --    ALBUMIN 1.6*  --  1.2*  --  1.2* 1.0*  --   --    BILITOTAL 1.1  --  1.4*  --  3.5* 3.8*  --   --     ALKPHOS 306*  --  353*  --  389* 362*  --   --    *  --  196*  --  213* 203*  --   --    ALT 61  --  86*  --  109* 119*  --   --    < > = values in this interval not displayed.    CBC    Recent Labs  Lab 03/26/17  0340 03/25/17  0430 03/24/17  0430 03/23/17  0535   WBC 27.6* 19.5* 17.9* 19.9*   RBC 3.17* 3.27* 3.61* 3.60*   HGB 10.0* 10.3* 11.3* 12.0*   HCT 30.0* 29.5* 33.2* 33.5*   MCV 95 90 92 93   MCH 31.5 31.5 31.3 33.3*   MCHC 33.3 34.9 34.0 35.8   RDW 16.6* 15.0 15.2* 15.1*    247 297 295     INR    Recent Labs  Lab 03/23/17  0650   INR 1.19*     Arterial Blood Gas    Recent Labs  Lab 03/25/17  0515 03/24/17  2320   PH 7.49* 7.51*   PCO2 33* 31*   PO2 68* 95   HCO3 25 25   O2PER 40 40       All cultures:    Recent Labs  Lab 03/25/17  1245 03/24/17  1750 03/21/17  1545 03/19/17  1610 03/19/17  1605   CULT No growth after 16 hours Heavy growth Non lactose fermenting gram negative rodsLight growth Lactose fermenting gram negative rodsLight growth Strain 2 Non lactose fermenting gram negative rodsHeavy growth Staphylococcus aureus Susceptibility testing in progressLight growth Yeast* No growth No growth No growth     Imaging:    CT-Chest from 3/22:  1. Extensive infiltrate and consolidation in both lungs has overall  improved slightly since 3/17/2017.   2. Small bilateral pleural effusions are new since the previous exam.  3. Mildly prominent and borderline-enlarged mediastinal and right  hilar lymph nodes have a similar appearance to the previous exam,  given the noncontrast technique on today's study.    Billing: This patient is critically ill: Yes. Total critical care time today 35 min.    Sarah Rehman MD

## 2017-03-27 ENCOUNTER — APPOINTMENT (OUTPATIENT)
Dept: GENERAL RADIOLOGY | Facility: CLINIC | Age: 57
DRG: 870 | End: 2017-03-27
Attending: INTERNAL MEDICINE
Payer: COMMERCIAL

## 2017-03-27 ENCOUNTER — APPOINTMENT (OUTPATIENT)
Dept: CT IMAGING | Facility: CLINIC | Age: 57
DRG: 870 | End: 2017-03-27
Attending: INTERNAL MEDICINE
Payer: COMMERCIAL

## 2017-03-27 LAB
ALBUMIN SERPL-MCNC: 1.8 G/DL (ref 3.4–5)
ALP SERPL-CCNC: 315 U/L (ref 40–150)
ALT SERPL W P-5'-P-CCNC: 79 U/L (ref 0–70)
ANION GAP SERPL CALCULATED.3IONS-SCNC: 8 MMOL/L (ref 3–14)
ANION GAP SERPL CALCULATED.3IONS-SCNC: 9 MMOL/L (ref 3–14)
APTT PPP: 38 SEC (ref 22–37)
AST SERPL W P-5'-P-CCNC: 224 U/L (ref 0–45)
BACTERIA SPEC CULT: ABNORMAL
BASE EXCESS BLDA CALC-SCNC: 1.3 MMOL/L
BASE EXCESS BLDA CALC-SCNC: 2.7 MMOL/L
BASOPHILS # BLD AUTO: 0.1 10E9/L (ref 0–0.2)
BASOPHILS NFR BLD AUTO: 0.2 %
BILIRUB SERPL-MCNC: 1.3 MG/DL (ref 0.2–1.3)
BUN SERPL-MCNC: 43 MG/DL (ref 7–30)
BUN SERPL-MCNC: 48 MG/DL (ref 7–30)
CA-I BLD-MCNC: 4.2 MG/DL (ref 4.4–5.2)
CA-I BLD-MCNC: 4.3 MG/DL (ref 4.4–5.2)
CALCIUM SERPL-MCNC: 7.6 MG/DL (ref 8.5–10.1)
CALCIUM SERPL-MCNC: 7.9 MG/DL (ref 8.5–10.1)
CHLORIDE SERPL-SCNC: 108 MMOL/L (ref 94–109)
CHLORIDE SERPL-SCNC: 111 MMOL/L (ref 94–109)
CK SERPL-CCNC: 881 U/L (ref 30–300)
CO2 SERPL-SCNC: 26 MMOL/L (ref 20–32)
CO2 SERPL-SCNC: 28 MMOL/L (ref 20–32)
CREAT SERPL-MCNC: 1.47 MG/DL (ref 0.66–1.25)
CREAT SERPL-MCNC: 1.55 MG/DL (ref 0.66–1.25)
DIFFERENTIAL METHOD BLD: ABNORMAL
EOSINOPHIL # BLD AUTO: 0.1 10E9/L (ref 0–0.7)
EOSINOPHIL NFR BLD AUTO: 0.2 %
ERYTHROCYTE [DISTWIDTH] IN BLOOD BY AUTOMATED COUNT: 17.5 % (ref 10–15)
FIBRINOGEN PPP-MCNC: 707 MG/DL (ref 200–420)
GFR SERPL CREATININE-BSD FRML MDRD: 47 ML/MIN/1.7M2
GFR SERPL CREATININE-BSD FRML MDRD: 50 ML/MIN/1.7M2
GLUCOSE BLDC GLUCOMTR-MCNC: 125 MG/DL (ref 70–99)
GLUCOSE BLDC GLUCOMTR-MCNC: 147 MG/DL (ref 70–99)
GLUCOSE BLDC GLUCOMTR-MCNC: 153 MG/DL (ref 70–99)
GLUCOSE BLDC GLUCOMTR-MCNC: 155 MG/DL (ref 70–99)
GLUCOSE BLDC GLUCOMTR-MCNC: 172 MG/DL (ref 70–99)
GLUCOSE BLDC GLUCOMTR-MCNC: 174 MG/DL (ref 70–99)
GLUCOSE BLDC GLUCOMTR-MCNC: 175 MG/DL (ref 70–99)
GLUCOSE BLDC GLUCOMTR-MCNC: 184 MG/DL (ref 70–99)
GLUCOSE BLDC GLUCOMTR-MCNC: 186 MG/DL (ref 70–99)
GLUCOSE BLDC GLUCOMTR-MCNC: 186 MG/DL (ref 70–99)
GLUCOSE BLDC GLUCOMTR-MCNC: 202 MG/DL (ref 70–99)
GLUCOSE BLDC GLUCOMTR-MCNC: 211 MG/DL (ref 70–99)
GLUCOSE BLDC GLUCOMTR-MCNC: 217 MG/DL (ref 70–99)
GLUCOSE BLDC GLUCOMTR-MCNC: 241 MG/DL (ref 70–99)
GLUCOSE BLDC GLUCOMTR-MCNC: 249 MG/DL (ref 70–99)
GLUCOSE BLDC GLUCOMTR-MCNC: 84 MG/DL (ref 70–99)
GLUCOSE BLDC GLUCOMTR-MCNC: 94 MG/DL (ref 70–99)
GLUCOSE SERPL-MCNC: 174 MG/DL (ref 70–99)
GLUCOSE SERPL-MCNC: 180 MG/DL (ref 70–99)
HCO3 BLD-SCNC: 28 MMOL/L (ref 21–28)
HCO3 BLD-SCNC: 29 MMOL/L (ref 21–28)
HCT VFR BLD AUTO: 26.3 % (ref 40–53)
HGB BLD-MCNC: 8.6 G/DL (ref 13.3–17.7)
IMM GRANULOCYTES # BLD: 0.2 10E9/L (ref 0–0.4)
IMM GRANULOCYTES NFR BLD: 0.6 %
INR PPP: 1.15 (ref 0.86–1.14)
LACTATE BLD-SCNC: 1.6 MMOL/L (ref 0.7–2.1)
LIPASE SERPL-CCNC: 4581 U/L (ref 73–393)
LYMPHOCYTES # BLD AUTO: 2.1 10E9/L (ref 0.8–5.3)
LYMPHOCYTES NFR BLD AUTO: 7.5 %
MAGNESIUM SERPL-MCNC: 3.2 MG/DL (ref 1.6–2.3)
MCH RBC QN AUTO: 31.7 PG (ref 26.5–33)
MCHC RBC AUTO-ENTMCNC: 32.7 G/DL (ref 31.5–36.5)
MCV RBC AUTO: 97 FL (ref 78–100)
MICRO REPORT STATUS: ABNORMAL
MICROORGANISM SPEC CULT: ABNORMAL
MONOCYTES # BLD AUTO: 1.7 10E9/L (ref 0–1.3)
MONOCYTES NFR BLD AUTO: 5.9 %
NEUTROPHILS # BLD AUTO: 24.3 10E9/L (ref 1.6–8.3)
NEUTROPHILS NFR BLD AUTO: 85.6 %
NRBC # BLD AUTO: 0 10*3/UL
NRBC BLD AUTO-RTO: 0 /100
O2/TOTAL GAS SETTING VFR VENT: 60 %
O2/TOTAL GAS SETTING VFR VENT: ABNORMAL %
OXYHGB MFR BLD: 94 % (ref 92–100)
PCO2 BLD: 48 MM HG (ref 35–45)
PCO2 BLD: 67 MM HG (ref 35–45)
PH BLD: 7.24 PH (ref 7.35–7.45)
PH BLD: 7.38 PH (ref 7.35–7.45)
PHOSPHATE SERPL-MCNC: 2.4 MG/DL (ref 2.5–4.5)
PLATELET # BLD AUTO: 248 10E9/L (ref 150–450)
PO2 BLD: 68 MM HG (ref 80–105)
PO2 BLD: 83 MM HG (ref 80–105)
POTASSIUM SERPL-SCNC: 3.9 MMOL/L (ref 3.4–5.3)
POTASSIUM SERPL-SCNC: 4.3 MMOL/L (ref 3.4–5.3)
PROT SERPL-MCNC: 7.2 G/DL (ref 6.8–8.8)
RBC # BLD AUTO: 2.71 10E12/L (ref 4.4–5.9)
SODIUM SERPL-SCNC: 144 MMOL/L (ref 133–144)
SODIUM SERPL-SCNC: 146 MMOL/L (ref 133–144)
SPECIMEN SOURCE: ABNORMAL
WBC # BLD AUTO: 28.4 10E9/L (ref 4–11)

## 2017-03-27 PROCEDURE — 82803 BLOOD GASES ANY COMBINATION: CPT | Performed by: INTERNAL MEDICINE

## 2017-03-27 PROCEDURE — 25000131 ZZH RX MED GY IP 250 OP 636 PS 637: Performed by: INTERNAL MEDICINE

## 2017-03-27 PROCEDURE — 82550 ASSAY OF CK (CPK): CPT | Performed by: INTERNAL MEDICINE

## 2017-03-27 PROCEDURE — 82330 ASSAY OF CALCIUM: CPT | Performed by: INTERNAL MEDICINE

## 2017-03-27 PROCEDURE — 25000132 ZZH RX MED GY IP 250 OP 250 PS 637: Performed by: INTERNAL MEDICINE

## 2017-03-27 PROCEDURE — 94003 VENT MGMT INPAT SUBQ DAY: CPT

## 2017-03-27 PROCEDURE — 36415 COLL VENOUS BLD VENIPUNCTURE: CPT | Performed by: INTERNAL MEDICINE

## 2017-03-27 PROCEDURE — 25000128 H RX IP 250 OP 636: Performed by: INTERNAL MEDICINE

## 2017-03-27 PROCEDURE — 74176 CT ABD & PELVIS W/O CONTRAST: CPT

## 2017-03-27 PROCEDURE — 25000128 H RX IP 250 OP 636

## 2017-03-27 PROCEDURE — 25000132 ZZH RX MED GY IP 250 OP 250 PS 637: Performed by: HOSPITALIST

## 2017-03-27 PROCEDURE — 40000008 ZZH STATISTIC AIRWAY CARE

## 2017-03-27 PROCEDURE — 84100 ASSAY OF PHOSPHORUS: CPT | Performed by: INTERNAL MEDICINE

## 2017-03-27 PROCEDURE — 40000275 ZZH STATISTIC RCP TIME EA 10 MIN

## 2017-03-27 PROCEDURE — 25000132 ZZH RX MED GY IP 250 OP 250 PS 637

## 2017-03-27 PROCEDURE — 85384 FIBRINOGEN ACTIVITY: CPT | Performed by: INTERNAL MEDICINE

## 2017-03-27 PROCEDURE — 25000125 ZZHC RX 250

## 2017-03-27 PROCEDURE — 36569 INSJ PICC 5 YR+ W/O IMAGING: CPT

## 2017-03-27 PROCEDURE — 36600 WITHDRAWAL OF ARTERIAL BLOOD: CPT

## 2017-03-27 PROCEDURE — 85610 PROTHROMBIN TIME: CPT | Performed by: INTERNAL MEDICINE

## 2017-03-27 PROCEDURE — P9047 ALBUMIN (HUMAN), 25%, 50ML: HCPCS | Performed by: INTERNAL MEDICINE

## 2017-03-27 PROCEDURE — 25000125 ZZHC RX 250: Performed by: INTERNAL MEDICINE

## 2017-03-27 PROCEDURE — 25000125 ZZHC RX 250: Performed by: SURGERY

## 2017-03-27 PROCEDURE — 40000281 ZZH STATISTIC TRANSPORT TIME EA 15 MIN

## 2017-03-27 PROCEDURE — 80048 BASIC METABOLIC PNL TOTAL CA: CPT | Performed by: INTERNAL MEDICINE

## 2017-03-27 PROCEDURE — 80053 COMPREHEN METABOLIC PANEL: CPT | Performed by: INTERNAL MEDICINE

## 2017-03-27 PROCEDURE — 99291 CRITICAL CARE FIRST HOUR: CPT | Performed by: INTERNAL MEDICINE

## 2017-03-27 PROCEDURE — 83690 ASSAY OF LIPASE: CPT | Performed by: INTERNAL MEDICINE

## 2017-03-27 PROCEDURE — 20000003 ZZH R&B ICU

## 2017-03-27 PROCEDURE — 82805 BLOOD GASES W/O2 SATURATION: CPT | Performed by: SURGERY

## 2017-03-27 PROCEDURE — 00000146 ZZHCL STATISTIC GLUCOSE BY METER IP

## 2017-03-27 PROCEDURE — 85730 THROMBOPLASTIN TIME PARTIAL: CPT | Performed by: INTERNAL MEDICINE

## 2017-03-27 PROCEDURE — 83735 ASSAY OF MAGNESIUM: CPT | Performed by: INTERNAL MEDICINE

## 2017-03-27 PROCEDURE — 27210222 ZZH KIT SHRLOCK 6FR POWER PICC

## 2017-03-27 PROCEDURE — 71010 XR CHEST PORT 1 VW: CPT

## 2017-03-27 PROCEDURE — 25000128 H RX IP 250 OP 636: Performed by: SURGERY

## 2017-03-27 PROCEDURE — 85025 COMPLETE CBC W/AUTO DIFF WBC: CPT | Performed by: INTERNAL MEDICINE

## 2017-03-27 PROCEDURE — 83605 ASSAY OF LACTIC ACID: CPT | Performed by: INTERNAL MEDICINE

## 2017-03-27 RX ORDER — GABAPENTIN 250 MG/5ML
300 SOLUTION ORAL EVERY 8 HOURS SCHEDULED
Status: DISCONTINUED | OUTPATIENT
Start: 2017-03-28 | End: 2017-04-11 | Stop reason: HOSPADM

## 2017-03-27 RX ORDER — FUROSEMIDE 10 MG/ML
40 INJECTION INTRAMUSCULAR; INTRAVENOUS EVERY 8 HOURS
Status: COMPLETED | OUTPATIENT
Start: 2017-03-27 | End: 2017-03-28

## 2017-03-27 RX ORDER — HYDROMORPHONE HYDROCHLORIDE 1 MG/ML
.3-.5 INJECTION, SOLUTION INTRAMUSCULAR; INTRAVENOUS; SUBCUTANEOUS
Status: DISCONTINUED | OUTPATIENT
Start: 2017-03-27 | End: 2017-04-04

## 2017-03-27 RX ORDER — HYDRALAZINE HYDROCHLORIDE 10 MG/1
10 TABLET, FILM COATED ORAL 3 TIMES DAILY
Status: DISCONTINUED | OUTPATIENT
Start: 2017-03-27 | End: 2017-03-28

## 2017-03-27 RX ORDER — ALBUMIN (HUMAN) 12.5 G/50ML
12.5 SOLUTION INTRAVENOUS EVERY 8 HOURS
Status: COMPLETED | OUTPATIENT
Start: 2017-03-27 | End: 2017-03-27

## 2017-03-27 RX ADMIN — CEFAZOLIN SODIUM 2 G: 2 INJECTION, SOLUTION INTRAVENOUS at 08:09

## 2017-03-27 RX ADMIN — GABAPENTIN 100 MG: 250 SUSPENSION ORAL at 08:09

## 2017-03-27 RX ADMIN — ALBUMIN (HUMAN) 12.5 G: 12.5 SOLUTION INTRAVENOUS at 20:12

## 2017-03-27 RX ADMIN — Medication 6 MG/HR: at 07:38

## 2017-03-27 RX ADMIN — LIDOCAINE HYDROCHLORIDE 0.5 ML: 10 INJECTION, SOLUTION EPIDURAL; INFILTRATION; INTRACAUDAL; PERINEURAL at 15:37

## 2017-03-27 RX ADMIN — AMIODARONE HYDROCHLORIDE 150 MG: 1.5 INJECTION, SOLUTION INTRAVENOUS at 06:27

## 2017-03-27 RX ADMIN — AMIODARONE HYDROCHLORIDE 0.5 MG/MIN: 50 INJECTION, SOLUTION INTRAVENOUS at 17:03

## 2017-03-27 RX ADMIN — OXYCODONE HYDROCHLORIDE 10 MG: 5 TABLET ORAL at 10:57

## 2017-03-27 RX ADMIN — PIPERACILLIN SODIUM,TAZOBACTAM SODIUM 3.38 G: 3; .375 INJECTION, POWDER, FOR SOLUTION INTRAVENOUS at 05:01

## 2017-03-27 RX ADMIN — SODIUM CHLORIDE 14 UNITS/HR: 9 INJECTION, SOLUTION INTRAVENOUS at 12:00

## 2017-03-27 RX ADMIN — QUETIAPINE FUMARATE 50 MG: 50 TABLET, FILM COATED ORAL at 08:44

## 2017-03-27 RX ADMIN — CHLORHEXIDINE GLUCONATE 15 ML: 1.2 RINSE ORAL at 20:11

## 2017-03-27 RX ADMIN — VANCOMYCIN HYDROCHLORIDE 1500 MG: 5 INJECTION, POWDER, LYOPHILIZED, FOR SOLUTION INTRAVENOUS at 13:19

## 2017-03-27 RX ADMIN — SODIUM CHLORIDE 10 UNITS/HR: 9 INJECTION, SOLUTION INTRAVENOUS at 02:57

## 2017-03-27 RX ADMIN — PANTOPRAZOLE SODIUM 40 MG: 40 TABLET, DELAYED RELEASE ORAL at 08:44

## 2017-03-27 RX ADMIN — SODIUM PHOSPHATE, MONOBASIC, MONOHYDRATE 15 MMOL: 276; 142 INJECTION, SOLUTION INTRAVENOUS at 06:33

## 2017-03-27 RX ADMIN — OSELTAMIVIR PHOSPHATE 75 MG: 6 POWDER, FOR SUSPENSION ORAL at 08:44

## 2017-03-27 RX ADMIN — CEFAZOLIN SODIUM 2 G: 2 INJECTION, SOLUTION INTRAVENOUS at 00:18

## 2017-03-27 RX ADMIN — HYDRALAZINE HYDROCHLORIDE 10 MG: 10 TABLET ORAL at 20:11

## 2017-03-27 RX ADMIN — AMIODARONE HYDROCHLORIDE 0.5 MG/MIN: 50 INJECTION, SOLUTION INTRAVENOUS at 12:17

## 2017-03-27 RX ADMIN — ALBUMIN (HUMAN) 12.5 G: 12.5 SOLUTION INTRAVENOUS at 13:19

## 2017-03-27 RX ADMIN — CHLORHEXIDINE GLUCONATE 15 ML: 1.2 RINSE ORAL at 07:39

## 2017-03-27 RX ADMIN — PIPERACILLIN SODIUM,TAZOBACTAM SODIUM 3.38 G: 3; .375 INJECTION, POWDER, FOR SOLUTION INTRAVENOUS at 10:57

## 2017-03-27 RX ADMIN — SODIUM CHLORIDE 12 UNITS/HR: 9 INJECTION, SOLUTION INTRAVENOUS at 09:46

## 2017-03-27 RX ADMIN — VANCOMYCIN HYDROCHLORIDE 1500 MG: 5 INJECTION, POWDER, LYOPHILIZED, FOR SOLUTION INTRAVENOUS at 01:22

## 2017-03-27 RX ADMIN — SENNOSIDES AND DOCUSATE SODIUM 1 TABLET: 8.6; 5 TABLET ORAL at 20:11

## 2017-03-27 RX ADMIN — SODIUM CHLORIDE 500 MG: 9 INJECTION, SOLUTION INTRAVENOUS at 20:23

## 2017-03-27 RX ADMIN — AMIODARONE HYDROCHLORIDE 1 MG/MIN: 50 INJECTION, SOLUTION INTRAVENOUS at 06:40

## 2017-03-27 RX ADMIN — SODIUM CHLORIDE 7 UNITS/HR: 9 INJECTION, SOLUTION INTRAVENOUS at 21:03

## 2017-03-27 RX ADMIN — SODIUM CHLORIDE 14 UNITS/HR: 9 INJECTION, SOLUTION INTRAVENOUS at 14:09

## 2017-03-27 RX ADMIN — OSELTAMIVIR PHOSPHATE 75 MG: 6 POWDER, FOR SUSPENSION ORAL at 20:11

## 2017-03-27 RX ADMIN — SODIUM CHLORIDE 10 UNITS/HR: 9 INJECTION, SOLUTION INTRAVENOUS at 06:56

## 2017-03-27 RX ADMIN — SENNOSIDES AND DOCUSATE SODIUM 2 TABLET: 8.6; 5 TABLET ORAL at 08:45

## 2017-03-27 RX ADMIN — Medication 6 MG/HR: at 23:51

## 2017-03-27 RX ADMIN — PIPERACILLIN SODIUM,TAZOBACTAM SODIUM 3.38 G: 3; .375 INJECTION, POWDER, FOR SOLUTION INTRAVENOUS at 23:25

## 2017-03-27 RX ADMIN — QUETIAPINE FUMARATE 50 MG: 50 TABLET, FILM COATED ORAL at 20:11

## 2017-03-27 RX ADMIN — PIPERACILLIN SODIUM,TAZOBACTAM SODIUM 3.38 G: 3; .375 INJECTION, POWDER, FOR SOLUTION INTRAVENOUS at 17:03

## 2017-03-27 RX ADMIN — FUROSEMIDE 40 MG: 10 INJECTION, SOLUTION INTRAVENOUS at 13:19

## 2017-03-27 RX ADMIN — Medication 1 PACKET: at 08:44

## 2017-03-27 RX ADMIN — PANTOPRAZOLE SODIUM 40 MG: 40 INJECTION, POWDER, FOR SOLUTION INTRAVENOUS at 23:43

## 2017-03-27 RX ADMIN — SODIUM CHLORIDE: 9 INJECTION, SOLUTION INTRAVENOUS at 17:03

## 2017-03-27 RX ADMIN — FUROSEMIDE 40 MG: 10 INJECTION, SOLUTION INTRAVENOUS at 20:12

## 2017-03-27 RX ADMIN — PANTOPRAZOLE SODIUM 40 MG: 40 INJECTION, POWDER, FOR SOLUTION INTRAVENOUS at 13:19

## 2017-03-27 NOTE — PROGRESS NOTES
Renal Medicine Progress Note                                [facility] MRN# [id number]   Age: 57 year old YOB: 1960   Date of Admission: 3/17/2017 Hospital LOS: 10                  Assessment/Plan:     Admitted with hypoxic respiratory failure due to influenza B and CAP.  Seen 03/25/17  regarding ARF.    1.  Apparent baseline normal renal function  2.  Historical absence of significant proteinuria  3.  ARF   -nonoliguric   -normal renal US   -U eosinophils non diagnostic   -modest elevation of myoglobin/CK   -presumed ATN  4.  Hypernatremia  5.  Gross hematuria   -traumatic bell?   -pulmonary renal syndrome seems unlikley  6.  Respiratory failure   -vented    Avoid nephrotoxins  Follow labs        Interval History:     Intubated and sedated.  Family in room.    ROS:     Intubated and sedated: ROS unable    Medications and Allergies:     Reviewed    Physical Exam:     Vitals were reviewed  Patient Vitals for the past 8 hrs:   BP Temp Temp src Heart Rate Resp SpO2   03/27/17 1045 157/74 99.7  F (37.6  C) - 96 (!) 32 96 %   03/27/17 1030 153/73 99.9  F (37.7  C) - 96 (!) 31 98 %   03/27/17 1015 152/72 100  F (37.8  C) - 96 (!) 34 97 %   03/27/17 1000 159/71 100  F (37.8  C) - 99 (!) 33 97 %   03/27/17 0945 147/72 100  F (37.8  C) - 98 (!) 32 98 %   03/27/17 0930 149/72 100  F (37.8  C) - 99 30 99 %   03/27/17 0915 132/87 100.2  F (37.9  C) - 136 27 99 %   03/27/17 0900 142/79 100.2  F (37.9  C) - 123 30 99 %   03/27/17 0845 140/86 100.2  F (37.9  C) - 145 29 99 %   03/27/17 0830 140/83 100.2  F (37.9  C) - 113 29 100 %   03/27/17 0815 150/75 100.2  F (37.9  C) - 123 30 99 %   03/27/17 0800 (!) 151/94 100  F (37.8  C) Bladder 116 30 99 %   03/27/17 0719 - - - - - 99 %   03/27/17 0700 102/87 100  F (37.8  C) - 151 26 -   03/27/17 0600 142/79 100  F (37.8  C) - 129 (!) 31 -   03/27/17 0500 (!) 124/94 99.5  F (37.5  C) - 144 26 -   03/27/17 0400 119/87 99.3  F (37.4  C) - 132 25 -     I/O last 3  completed shifts:  In: 4268.25 [I.V.:1938.25; NG/GT:1460]  Out: 2190 [Urine:2190]    Vitals:    03/22/17 0600 03/23/17 0423 03/24/17 0600 03/25/17 0502   Weight: 71.9 kg (158 lb 8.2 oz) 72.1 kg (158 lb 15.2 oz) 71 kg (156 lb 8.4 oz) 71.4 kg (157 lb 6.5 oz)    03/26/17 0324   Weight: 70.1 kg (154 lb 8.7 oz)         GENERAL:  intubated and sedated  HEENT: ETT  RESP:  clear anteriorly  CV: RRR, normal S1 S2  ABDOMEN: soft, nontender, no HSM or masses and bowel sounds normal  :  bell catheter  MS: no clubbing, cyanosis   SKIN: clear without significant rashes or lesions  EXT: warm, no edema    Data:       Recent Labs  Lab 03/27/17 0420 03/26/17  0340 03/25/17  1559 03/25/17  0430  03/24/17  1427   * 152*  --  151*  --  153*   POTASSIUM 4.3 4.6 3.9 3.8  < > 3.2*   CHLORIDE 111* 117*  --  117*  --  115*   CO2 26 27  --  26  --  27   ANIONGAP 9 8  --  8  --  11   * 232*  --  252*  --  93   BUN 43* 36*  --  26  --  27   CR 1.47* 1.29*  --  1.47*  --  1.71*   GFRESTIMATED 50* 58*  --  50*  --  42*   GFRESTBLACK 60* 70  --  60*  --  51*   LEONIE 7.9* 7.7*  --  7.6*  --  7.4*   < > = values in this interval not displayed.      Recent Labs   Lab Test  03/27/17   0420 03/26/17   0340  03/25/17   0430  03/24/17   1427  03/24/17   0430  03/23/17   2215  03/23/17   1423  03/23/17   0535  03/22/17   0455  03/21/17   0600   CR  1.47*  1.29*  1.47*  1.71*  1.77*  1.61*  1.65*  1.58*  1.61*  1.42*     Recent Labs   Lab Test  03/27/17   0420  03/26/17   0340  03/25/17   0430  03/24/17   1427  03/24/17   0430  03/23/17   2215  03/23/17   1423  03/23/17   0535  03/22/17   0455  03/21/17   0600   NA  146*  152*  151*  153*  150*  149*  149*  148*  146*  145*       Recent Labs  Lab 03/27/17  0420 03/26/17  0340 03/25/17  0430 03/24/17  1427   ALBUMIN 1.8* 1.6* 1.2* 1.2*       Recent Labs  Lab 03/27/17  0420 03/26/17  0340 03/25/17  0430 03/24/17  0430   PHOS 2.4* 3.3 2.7 4.2   HGB 8.6* 10.0* 10.3* 11.3*       Recent Labs  Lab  03/25/17  1418   COLOR Red   APPEARANCE Cloudy   URINEGLC >1000*   URINEBILI Negative   URINEKETONE Negative   SG 1.019   UBLD Moderate*   URINEPH 6.5   PROTEIN 100*   NITRITE Negative   LEUKEST Negative   RBCU >182*   WBCU >182*     No lab results found.  Recent Labs   Lab Test  03/27/17   0420  03/25/17   0430  03/24/17   1427  03/24/17   0430   MYOGLOBIN   --    --    --   552*   CKT  881*  1448*  1405*  1179*         G Tamir Godoy    [facility] - Nephrology  [phone]

## 2017-03-27 NOTE — PROGRESS NOTES
Critical access hospital ICU RESPIRATORY NOTE  Date of Admission: 3/17/2017  Date of Intubation (most recent): 3/17/2017  Reason for Mechanical Ventilation: Respiratory Failure  Number of Days on Mechanical Ventilation: 11  Met Criteria for Pressure Support Trial: No  Length of Pressure Support Trial: None  Reason for No Pressure Support Trial: Per MD    Significant Events Today: None  Ventilation Mode: CMV/AC  FiO2 (%): 40 %  Rate Set (breaths/minute): 18 breaths/min  Tidal Volume Set (mL): 400 mL  PEEP (cm H2O): 10 cmH2O  Pressure Support (cm H2O): 25 cmH2O  Oxygen Concentration (%): 70 %  Peak Inspiratory Pressure (cm H2O) (Drager Arianne): 25  Resp: 29    ABG Results:     Recent Labs  Lab 03/27/17  0240 03/27/17  0050 03/25/17  0515 03/24/17  2320   PH 7.38 7.24* 7.49* 7.51*   PCO2 48* 67* 33* 31*   PO2 68* 83 68* 95   HCO3 28 29* 25 25   O2PER 60% 60 40 40     ETT appearance on chest x-ray: ET tube in good position    Plan:  Pt remains intubated, continue full mechanical ventilatory support.  3/27/2017  Nieves Bedolla

## 2017-03-27 NOTE — PLAN OF CARE
Problem: Goal Outcome Summary  Goal: Goal Outcome Summary  Outcome: Declining  Neuro: Withdraws from pain.  Continues on Versed drip to synch with vent. Pupils equal and sluggish.  CV: SR/ST at start of the shift.  This am around 4 am flipped into a-fib rvr. Rates 120-160s. Blood pressure stable.   Pulmonary: Switch from PCV+ to CMV/AC r/t blood gas.  Thick secretions from tube. Lungs coarse.   GI/: Continues with to have adequate urine output.  Still blood draining into bell.  Bowel movement overnight.    Skin: No skin issues.   Drips: Versed at 6 mg/hr. Insulin gtt continues with high blood sugars.  D5 at 75mL/hr.

## 2017-03-27 NOTE — PROVIDER NOTIFICATION
MD Notification    Notified Person:  MD    Notified Persons Name:Tele Hub    Notification Date/Time:0400 3/27/17    Notification Interaction:  Talked with Physician    Purpose of Notification: New A-fib with RVR    Orders Received: awaiting orders.     Comments:

## 2017-03-27 NOTE — PROGRESS NOTES
Critical Care Progress Note      03/27/2017    Name: Wyatt Mahajan MRN#: [id number]   Age: 57 year old YOB: 1960     Hsptl Day# 10  ICU DAY #9    MV DAY #9           Problem List:   Active Problems:    Acute respiratory failure with hypoxia (H)  MSSA/enterobacter/klebsiella pna  MSSA bacteremia--resolved  Influenza B  JOSE--improving  Hematuria---bell trauma?  pancreatitis         Summary/Hospital Course:   Wyatt Mahajan is a 57 year old male admitted on 3/17 to the floor, presenting with acute hypoxic respiratory failure due to influenza B and CAP, right chest wall pain and uncontrolled DM-II. Patient was subsequently transferred from the floor to the MICU for increased work of breathing, accessory muscle use and respiratory distress. Was intubated due to increased work of breathing, tachypnea and decreasing O2 sats.    3/18:  Weaned vent yesterday and overnight.  Comfortably sedated this AM.  +blood cx for staph aureus overnight.  On vanco--repeating blood cx.    3/19:  Still minimal vent settings but minute ventilation still ~13 so won't try to extubate today.  Weaning from insulin drip back to lantus/SSI now on steady TF.  Blood cultures persistently positive--MSSA.  Change to nafcillin.  Reculture.  TTE.    3/20:  No changes, no events. Gentle diuresis yesterday but still positive.  Increasing diuresis today.     3/21: Not tolerating CMV/AC last evening (tachycardia, tachypnea, hypertension), so switched to pressure support which improved vital signs overnight. Switched back to CMV/AC this morning at 5:30am, now tolerating. Cr bump so no further diuresis today. Changing sedation to precedex. Echo showed no vegetations/masses on valves. Will continue to follow cultures, now growth so far from 3/20 cultures.    3/22: Pressure support trial last evening was stopped just after its initiation due to inc. RR to the 40's. Eyes open to voice. Given valproate for delirium. Propofol wean and  transition to precedex resulted in inc HR, RR and BP. Nursing notes on exam, 'wet crackles' in L lung and thick creamy red-streaked secretions. Ppeak to mid-30's. No BM yet, just smears but constipation treated with senna, dulcolax and miralax.    3/23: Episodes of tachycardia and hypertension to 169/93, coreg started and BP improved with versed and propofol. Urine was bloody this morning, suspect bell trauma. Febrile to 101.9 with PRN tylenol given. Still no BM though treated with senna, dulcolax and miralax. Family worried about his condition and the course of his illness.    3/24: On pressure support respiratory trial since 5pm last evening. Off of propofol sedation since 5pm as well. Critical lab value of EE=3254 returned this AM, triglycerides were also elevated to 677. Patient is febrile to 101.5 this morning and with lower blood glucose on last four checks so Lantus was decreased. 25ml D50 given for bs of 65. Cr increased today, likely due to gentle diuresis. K+ repleted. Urine continues to be bloody. Elevated liver enzymes. WBC still increased though now down-trending.    3/25: Family refused PICC line yesterday evening. Abx's for mssa were changed from nafcillin to ancef. D5W started for hypernatremia. Overnight had episodes of HR pause and thrashing; sedation with precedex, then changed to versed. Also bradycardic in early morning; CXR checked and ET tube was in good position.    3/27: low calcium and hyperglycemia over weekend-->checked lipase: 4k.  Changing d5 to po free h2o.  Low dose hydralazine for hypertension given recent bradycardia.  In a fib with rvr overnight--resolved with amiodarone.      Assessment and plan :     Wyatt Mahajan IS a 57 year old male admitted on 3/17/2017 for respiratory failure, flu B, mssa pna and mssa bacteremia.   I have personally reviewed the daily labs, imaging studies, cultures and discussed the case with referring physician and consulting physicians.   My  assessment and plan by system for this patient is as follows:    Neurology/Psychiatry:   1. Sedation:  Discontinued propofol given CK and Triglyceride lab values on 3/24, transitioned to precedex but patient thrashing and hypertensive/bradycardic. Versed drip begun 3/25 AM is providing adequate sedation. Prn dilaudid also available.  3/27:  Versed effective.  2. Delirium:  Seroquel, 50mg bid.  (). Valproate added evening 3/22, decreased to q 24 hr dosing 3/24.  3. H/o Diabetic neuropathy 2/2 DM-II:    -- gabapentin 300mg, TID.    Cardiovascular:   1. Hypertension: standing hydralazine started  2. H/o HLD:  hold atorvastatin (LFT elevated)  3. H/o PAD:  hold ASA  4. Hyperlactemia:  Resolved  5. Ischemia: trend CK     Pulmonary/Ventilator Management:   1.  Respiratory failure, hypoxemic:  2/2 mssa pna and influenza B.  May also have element of ARDS, but only on minimal vent settings (40 %, peep 5-10). Continue to ventilate with low tidal volume strategy (490ml ~ 7ml/kg). CXR 3/26 with bilat infilt. Sputum culture 3/24 + for GNR (3 strains) and staph aureus (sensitivty pending)  --Stop nafcillin 3/26, start zosyn and vanco 3/26 until sensitivity available/cont tamiflu  --Lung protective vent strategy- PCV plus assist ventilation     GI and Nutrition :   1.  TF  2.  PPI for PUD prophylaxis  3.  Constipation--improved on bowel reg.  4. Elevated liver enzymes: newly elevated on 3/24, nl on 3/17, ? Med related vs congestion vs? RUQ U/S revealed a distended GB w/sludge. Subsequent LFT's have shown decreases of Alk phos, AST and ALT levels since discontinuation of statin.  --serial LFT's  5.  Pancreatitis:  Due to critical illness vs propofol.  No further propofol.  --NPO for today.  Placing post-pyloric tube  --Increased PPI to bid  --bladder pressure 15, but belly more firm--will CT belly today.  Also, this may be why peak pressure increased on vent.  --trend calcium    Renal/Fluids/Electrolytes:   1. JOSE--improving  UA showed no casts. Urine eos < 1%  --continuing diuresis with lasix (albumin same time) to keep fluid balance neg  --administer free h2o   --check urine eosinophils, pending  2. Hypernatremia - . PO free water  2. Electrolyte abnormalities - hypocalcemia, hypermagnesemia  --monitor function and electrolytes as needed with replacement per ICU protocols  3. Mild volume overload:  --continuing gentle diuresis today, furosemide 40mg with albumin     :  1. Hematuria: bloodly urine AM of 3/23 possibly due to trauma from the bell catheter. No blood clots were found in the collection device.  Kidney US 3/25 normal  -- Monitor    Infectious Disease:   1. Flu B:  Tamiflu  2. MSSA pna/bacteremia: Nafcillin discontinued 3/24, changed to Ancef. Now sputum GNR and staph (see pulm) Most recent bld cultures show no growth. TTE did not show valvular masses/vegetations. CT chest negative for occult abscess. Now switched to vanc/zos 3/26 for HCAP.  -- Switched abx as above    Endocrine:   1. Hyperglycemia:    --Insulin drip    Hematology/Oncology:   1. Plt/hgb stable  2. Leukocytosis-- 2/2 infection. WBC from 19.9 on 3/23 to 19.5 on 3/25. Patient febrile on and off. Sputum culture 3/24, pending.  --Abx as above     IV/Access:   1. Venous access - peripheral  --Family now agreeable to PICC    ICU Prophylaxis:   1. DVT: Discontinue Hep Subq given hematuria of unknown cause, has mechanical prophylaxis  2. VAP: HOB 30 degrees, chlorhexidine rinse  3. Stress Ulcer: PPI  4. Restraints: Nonviolent soft two point restraints required and necessary for patient safety and continued cares and good effect as patient continues to pull at necessary lines, tubes despite education and distraction. Will readdress daily.   6. Feeding - npo for now  7. Family Update: below  8. Disposition - critically ill    Fam mtg 3/27:  Held full care conference with wife, brother and daughter.  Discussed his course and complications.  Will conitnue with current  txt for now.  All questions asked and answered.         Key Medications:   Reviewed          Physical Examination:   Temp:  [98.2  F (36.8  C)-100.2  F (37.9  C)] 99.3  F (37.4  C)  Heart Rate:  [] 98  Resp:  [0-39] 32  BP: (102-173)/(68-95) 167/95  FiO2 (%):  [40 %-70 %] 40 %  SpO2:  [90 %-100 %] 90 %      Intake/Output Summary (Last 24 hours) at 03/27/17 1709  Last data filed at 03/27/17 1600   Gross per 24 hour   Intake          5169.02 ml   Output             2370 ml   Net          2799.02 ml         Wt Readings from Last 4 Encounters:   03/26/17 70.1 kg (154 lb 8.7 oz)   03/16/17 70.3 kg (155 lb)   01/17/17 71.3 kg (157 lb 1.6 oz)   11/30/16 72.6 kg (160 lb)     BP - Mean:  [] 129  Ventilation Mode: CMV/AC  FiO2 (%): 40 %  Rate Set (breaths/minute): 18 breaths/min  Tidal Volume Set (mL): 400 mL  PEEP (cm H2O): 10 cmH2O  Pressure Support (cm H2O): 25 cmH2O  Oxygen Concentration (%): 70 %  Peak Inspiratory Pressure (cm H2O) (Drager Arianne): 25  Resp: 32    Recent Labs  Lab 03/27/17  0240 03/27/17  0050 03/25/17  0515 03/24/17  2320   PH 7.38 7.24* 7.49* 7.51*   PCO2 48* 67* 33* 31*   PO2 68* 83 68* 95   HCO3 28 29* 25 25   O2PER 60% 60 40 40     GEN: sedated, intubated, opens eyes but otherwise non-responsive  HEENT: head ncat, sclera anicteric, OP patent, trachea midline   PULM: unlabored, coarse lung sounds anteriorly L>R, rhonchi bilaterally.  CV/COR: RRR S1/S2, No rub, murmur or gallop  ABD: firm, distended, nontender, hypoactive bowel sounds, no masses  EXT:  peripheral edema present in hands>feet, warm x4 and well-perfused.   NEURO: sedated, not following commands.  SKIN: no obvious rash  LINES: clean, dry intact         Data:       Recent Labs  Lab 03/27/17  0420 03/26/17  0340 03/25/17  1559 03/25/17  0430  03/24/17  1427 03/24/17  0430   * 152*  --  151*  --  153* 150*   POTASSIUM 4.3 4.6 3.9 3.8  < > 3.2* 3.6   CHLORIDE 111* 117*  --  117*  --  115* 113*   CO2 26 27  --  26  --  27 31    ANIONGAP 9 8  --  8  --  11 6   * 232*  --  252*  --  93 83   BUN 43* 36*  --  26  --  27 26   CR 1.47* 1.29*  --  1.47*  --  1.71* 1.77*   GFRESTIMATED 50* 58*  --  50*  --  42* 40*   GFRESTBLACK 60* 70  --  60*  --  51* 49*   LEONIE 7.9* 7.7*  --  7.6*  --  7.4* 7.3*   MAG 3.2* 3.7*  --  3.4*  --   --  3.5*   PHOS 2.4* 3.3  --  2.7  --   --  4.2   PROTTOTAL 7.2 7.5  --  7.2  --  7.5 7.4   ALBUMIN 1.8* 1.6*  --  1.2*  --  1.2* 1.0*   BILITOTAL 1.3 1.1  --  1.4*  --  3.5* 3.8*   ALKPHOS 315* 306*  --  353*  --  389* 362*   * 162*  --  196*  --  213* 203*   ALT 79* 61  --  86*  --  109* 119*   < > = values in this interval not displayed.    CBC    Recent Labs  Lab 03/27/17  0420 03/26/17  0340 03/25/17  0430 03/24/17  0430   WBC 28.4* 27.6* 19.5* 17.9*   RBC 2.71* 3.17* 3.27* 3.61*   HGB 8.6* 10.0* 10.3* 11.3*   HCT 26.3* 30.0* 29.5* 33.2*   MCV 97 95 90 92   MCH 31.7 31.5 31.5 31.3   MCHC 32.7 33.3 34.9 34.0   RDW 17.5* 16.6* 15.0 15.2*    247 247 297     INR    Recent Labs  Lab 03/23/17  0650   INR 1.19*     Arterial Blood Gas    Recent Labs  Lab 03/27/17  0240 03/27/17  0050 03/25/17  0515 03/24/17  2320   PH 7.38 7.24* 7.49* 7.51*   PCO2 48* 67* 33* 31*   PO2 68* 83 68* 95   HCO3 28 29* 25 25   O2PER 60% 60 40 40       All cultures:    Recent Labs  Lab 03/25/17  1245 03/24/17  1750 03/21/17  1545   CULT No growth after 2 days Heavy growth Enterobacter cloacae complexLight growth Klebsiella pneumoniaeLight growth Strain 2 Enterobacter cloacae complexHeavy growth Staphylococcus aureusLight growth Anna albicans / dubliniensis Candida albicans and Candida dubliniensis are not routinely speciated Susceptibility testing not routinely done* No growth     Imaging:    CT-Chest from 3/22:  1. Extensive infiltrate and consolidation in both lungs has overall  improved slightly since 3/17/2017.   2. Small bilateral pleural effusions are new since the previous exam.  3. Mildly prominent and  borderline-enlarged mediastinal and right  hilar lymph nodes have a similar appearance to the previous exam,  given the noncontrast technique on today's study.    D/w Dr. Pinzon of nephrology service.    Billing: This patient is critically ill: Yes. Total critical care time today 60 min.    Antelmo Aponte MD

## 2017-03-27 NOTE — PROGRESS NOTES
Spouse agreed to PICC placement, care conference scheduled for 1300 today with  [place] . Also plan for PICC placement after care conference using the same .      Lipase elevated, per MD to stop TF, daughter at the bedside informed, per daughter request to wait till the care conference. MD informed

## 2017-03-27 NOTE — PROVIDER NOTIFICATION
MD Notification    Notified Person:  MD    Notified Persons Name: Tele Hub    Notification Date/Time: 0115 3/27/17    Notification Interaction:  Talked with Physician    Purpose of Notification: Concern for increasing respiratory distress.  High RR.      Orders Received: Change vent settings to correct acidosis.  PCV + to CMV/AC.  Redraw ABG in one hour.    Comments: Nursing concerned for ARDS.

## 2017-03-27 NOTE — PROGRESS NOTES
[facility] Nurse Inpatient Adult Pressure Injury (PI) Assessment     Initial Assessment of PI(s) on pt's: Suspected mucosal PI: Lt lateral tip of tongue    Data:   Patient History:      Admitted 3-17-17: Wyatt Mahajan is a 57 year old male admitted on 3/17 to the floor, presenting with acute hypoxic respiratory failure due to influenza B and CAP, right chest wall pain and uncontrolled DM-II. Patient was subsequently transferred from the floor to the MICU for increased work of breathing, accessory muscle use and respiratory distress. Was intubated due to increased work of breathing, tachypnea and decreasing O2 sats.     Current mattress:  Atmos air  Current pressure relieving devices:  Pillows      Moisture Management: bell for UIC/ incontinence protocol for FIC    Catheter secured? Yes      Current Diet / Nutrition:     Active Diet Order      NPO. On TF      Dipak Assessment and sub scores:   Dipak Score  Av.8  Min: 10  Max: 22          Vented/sedated; Nutrition following; Bell for UIC / incontinence protocol for FIC; HOB @ 30 degrees for VAPS/TF; immobility;     Labs:   Recent Labs   Lab Test  17   0420   17   0650   17   0635   09   1021   ALBUMIN  1.8*   < >   --    --   2.7*   < >  4.0   HGB  8.6*   < >   --    < >   --    < >   --    INR   --    --   1.19*   --    --    < >   --    WBC  28.4*   < >   --    < >   --    < >   --    A1C   --    --    --    --   8.1*   < >  11.6*   CRP   --    --    --    --    --    --   8.2    < > = values in this interval not displayed.                                                                                                                          Pressure Injury Assessment  (location):  Tip of tongue  Wound History:   Pt intubated on 3-18-17.  On 3-22 Nursing noted swelling to oral mucosa, lips and tongue with tongue trapped between ET and teeth. Bite block was applied. Lips, tongue remain edematous with tongue  "hanging out between teeth    Wound Base: remains dark ecchymotic    Specific Dimensions (length x width x depth, in cm) :  .8cm x .5cm with dark ecchymoitic    Palpation of the wound bed:  Remains soft    Drainage: No bleeding noted     Odor: none    Pain: unable to assess         Intervention:     Patient's chart evaluated.      Dipak Interventions:  Current Dipak Interventions and Care Plan reviewed and updated, appropriate at this time.    Orders  In epic             Assessment:     Pressure Injury (PI) located on tip of tongue: Mucosal PI (these are not staged)          Plan:     Nursing to notify the Provider(s) and re-consult the WOC Nurse if wound(s) deteriorate(s)or if the wound care plan needs reevaluation.    If pt is refusing to turn or reposition they must be educated on the  potential injury from not off loading pressure.  Then this \"educated refusal\" needs to be documented as an \"educated refusal to turn/ reposition\" and document if alert, etc.           Mucosal PI on Lt lateral tip of tongue:         1. Diligent assessment and movement of ET and NG tube to off-load pressure on tongue and lips    Face to face time: 15 minutes    "

## 2017-03-28 ENCOUNTER — APPOINTMENT (OUTPATIENT)
Dept: GENERAL RADIOLOGY | Facility: CLINIC | Age: 57
DRG: 870 | End: 2017-03-28
Attending: INTERNAL MEDICINE
Payer: COMMERCIAL

## 2017-03-28 LAB
ANION GAP SERPL CALCULATED.3IONS-SCNC: 8 MMOL/L (ref 3–14)
BUN SERPL-MCNC: 53 MG/DL (ref 7–30)
C3 SERPL-MCNC: 119 MG/DL (ref 76–169)
C4 SERPL-MCNC: 31 MG/DL (ref 15–50)
CA-I BLD-MCNC: 4.2 MG/DL (ref 4.4–5.2)
CALCIUM SERPL-MCNC: 7.8 MG/DL (ref 8.5–10.1)
CHLORIDE SERPL-SCNC: 110 MMOL/L (ref 94–109)
CO2 SERPL-SCNC: 28 MMOL/L (ref 20–32)
CREAT SERPL-MCNC: 1.79 MG/DL (ref 0.66–1.25)
ERYTHROCYTE [DISTWIDTH] IN BLOOD BY AUTOMATED COUNT: 15.6 % (ref 10–15)
GBM IGG SER IA-ACNC: NORMAL AI (ref 0–0.9)
GFR SERPL CREATININE-BSD FRML MDRD: 40 ML/MIN/1.7M2
GLUCOSE BLDC GLUCOMTR-MCNC: 103 MG/DL (ref 70–99)
GLUCOSE BLDC GLUCOMTR-MCNC: 108 MG/DL (ref 70–99)
GLUCOSE BLDC GLUCOMTR-MCNC: 114 MG/DL (ref 70–99)
GLUCOSE BLDC GLUCOMTR-MCNC: 118 MG/DL (ref 70–99)
GLUCOSE BLDC GLUCOMTR-MCNC: 119 MG/DL (ref 70–99)
GLUCOSE BLDC GLUCOMTR-MCNC: 122 MG/DL (ref 70–99)
GLUCOSE BLDC GLUCOMTR-MCNC: 123 MG/DL (ref 70–99)
GLUCOSE BLDC GLUCOMTR-MCNC: 127 MG/DL (ref 70–99)
GLUCOSE BLDC GLUCOMTR-MCNC: 129 MG/DL (ref 70–99)
GLUCOSE BLDC GLUCOMTR-MCNC: 130 MG/DL (ref 70–99)
GLUCOSE BLDC GLUCOMTR-MCNC: 139 MG/DL (ref 70–99)
GLUCOSE BLDC GLUCOMTR-MCNC: 152 MG/DL (ref 70–99)
GLUCOSE BLDC GLUCOMTR-MCNC: 87 MG/DL (ref 70–99)
GLUCOSE SERPL-MCNC: 119 MG/DL (ref 70–99)
HBV CORE AB SERPL QL IA: NONREACTIVE
HBV SURFACE AB SERPL IA-ACNC: 0 M[IU]/ML
HBV SURFACE AG SERPL QL IA: NONREACTIVE
HCT VFR BLD AUTO: 22.3 % (ref 40–53)
HCV AB SERPL QL IA: NORMAL
HGB BLD-MCNC: 7.6 G/DL (ref 13.3–17.7)
HIV 1+2 AB+HIV1 P24 AG SERPL QL IA: NORMAL
MAGNESIUM SERPL-MCNC: 3.1 MG/DL (ref 1.6–2.3)
MCH RBC QN AUTO: 31.4 PG (ref 26.5–33)
MCHC RBC AUTO-ENTMCNC: 34.1 G/DL (ref 31.5–36.5)
MCV RBC AUTO: 92 FL (ref 78–100)
PHOSPHATE SERPL-MCNC: 1.9 MG/DL (ref 2.5–4.5)
PHOSPHATE SERPL-MCNC: 4.3 MG/DL (ref 2.5–4.5)
PLATELET # BLD AUTO: 326 10E9/L (ref 150–450)
POTASSIUM SERPL-SCNC: 3.3 MMOL/L (ref 3.4–5.3)
POTASSIUM SERPL-SCNC: 3.6 MMOL/L (ref 3.4–5.3)
RBC # BLD AUTO: 2.42 10E12/L (ref 4.4–5.9)
SODIUM SERPL-SCNC: 146 MMOL/L (ref 133–144)
VANCOMYCIN SERPL-MCNC: 39.5 MG/L
WBC # BLD AUTO: 23.3 10E9/L (ref 4–11)

## 2017-03-28 PROCEDURE — 25000125 ZZHC RX 250: Performed by: INTERNAL MEDICINE

## 2017-03-28 PROCEDURE — 25000132 ZZH RX MED GY IP 250 OP 250 PS 637: Performed by: HOSPITALIST

## 2017-03-28 PROCEDURE — 25000128 H RX IP 250 OP 636: Performed by: INTERNAL MEDICINE

## 2017-03-28 PROCEDURE — 40000275 ZZH STATISTIC RCP TIME EA 10 MIN

## 2017-03-28 PROCEDURE — 86255 FLUORESCENT ANTIBODY SCREEN: CPT | Performed by: INTERNAL MEDICINE

## 2017-03-28 PROCEDURE — 84100 ASSAY OF PHOSPHORUS: CPT | Performed by: INTERNAL MEDICINE

## 2017-03-28 PROCEDURE — 25000125 ZZHC RX 250

## 2017-03-28 PROCEDURE — 94003 VENT MGMT INPAT SUBQ DAY: CPT

## 2017-03-28 PROCEDURE — 86160 COMPLEMENT ANTIGEN: CPT | Performed by: INTERNAL MEDICINE

## 2017-03-28 PROCEDURE — 20000003 ZZH R&B ICU

## 2017-03-28 PROCEDURE — 86803 HEPATITIS C AB TEST: CPT | Performed by: INTERNAL MEDICINE

## 2017-03-28 PROCEDURE — 00000146 ZZHCL STATISTIC GLUCOSE BY METER IP

## 2017-03-28 PROCEDURE — 86706 HEP B SURFACE ANTIBODY: CPT | Performed by: INTERNAL MEDICINE

## 2017-03-28 PROCEDURE — 25000125 ZZHC RX 250: Performed by: SURGERY

## 2017-03-28 PROCEDURE — 80202 ASSAY OF VANCOMYCIN: CPT | Performed by: HOSPITALIST

## 2017-03-28 PROCEDURE — 40000008 ZZH STATISTIC AIRWAY CARE

## 2017-03-28 PROCEDURE — 25000125 ZZHC RX 250: Performed by: PHYSICIAN ASSISTANT

## 2017-03-28 PROCEDURE — 85027 COMPLETE CBC AUTOMATED: CPT | Performed by: INTERNAL MEDICINE

## 2017-03-28 PROCEDURE — 99291 CRITICAL CARE FIRST HOUR: CPT | Performed by: INTERNAL MEDICINE

## 2017-03-28 PROCEDURE — 40000257 ZZH STATISTIC CONSULT NO CHARGE VASC ACCESS

## 2017-03-28 PROCEDURE — 80048 BASIC METABOLIC PNL TOTAL CA: CPT | Performed by: INTERNAL MEDICINE

## 2017-03-28 PROCEDURE — 25000131 ZZH RX MED GY IP 250 OP 636 PS 637: Performed by: INTERNAL MEDICINE

## 2017-03-28 PROCEDURE — 82330 ASSAY OF CALCIUM: CPT | Performed by: INTERNAL MEDICINE

## 2017-03-28 PROCEDURE — 87389 HIV-1 AG W/HIV-1&-2 AB AG IA: CPT | Performed by: INTERNAL MEDICINE

## 2017-03-28 PROCEDURE — 25000128 H RX IP 250 OP 636

## 2017-03-28 PROCEDURE — 83735 ASSAY OF MAGNESIUM: CPT | Performed by: INTERNAL MEDICINE

## 2017-03-28 PROCEDURE — 84132 ASSAY OF SERUM POTASSIUM: CPT | Performed by: INTERNAL MEDICINE

## 2017-03-28 PROCEDURE — 40000239 ZZH STATISTIC VAT ROUNDS

## 2017-03-28 PROCEDURE — 87340 HEPATITIS B SURFACE AG IA: CPT | Performed by: INTERNAL MEDICINE

## 2017-03-28 PROCEDURE — 25000132 ZZH RX MED GY IP 250 OP 250 PS 637: Performed by: INTERNAL MEDICINE

## 2017-03-28 PROCEDURE — 25000125 ZZHC RX 250: Performed by: HOSPITALIST

## 2017-03-28 PROCEDURE — 25000132 ZZH RX MED GY IP 250 OP 250 PS 637

## 2017-03-28 PROCEDURE — 40000940 XR ABDOMEN PORT F1 VW

## 2017-03-28 PROCEDURE — 83516 IMMUNOASSAY NONANTIBODY: CPT | Performed by: INTERNAL MEDICINE

## 2017-03-28 PROCEDURE — 86704 HEP B CORE ANTIBODY TOTAL: CPT | Performed by: INTERNAL MEDICINE

## 2017-03-28 PROCEDURE — 25000128 H RX IP 250 OP 636: Performed by: HOSPITALIST

## 2017-03-28 RX ORDER — POTASSIUM CHLORIDE 1500 MG/1
20-40 TABLET, EXTENDED RELEASE ORAL
Status: DISCONTINUED | OUTPATIENT
Start: 2017-03-28 | End: 2017-04-11 | Stop reason: HOSPADM

## 2017-03-28 RX ORDER — METOCLOPRAMIDE HYDROCHLORIDE 5 MG/ML
10 INJECTION INTRAMUSCULAR; INTRAVENOUS EVERY 6 HOURS
Status: DISPENSED | OUTPATIENT
Start: 2017-03-28 | End: 2017-03-29

## 2017-03-28 RX ORDER — POTASSIUM CHLORIDE 29.8 MG/ML
20 INJECTION INTRAVENOUS
Status: DISCONTINUED | OUTPATIENT
Start: 2017-03-28 | End: 2017-04-11 | Stop reason: HOSPADM

## 2017-03-28 RX ORDER — POTASSIUM CHLORIDE 7.45 MG/ML
10 INJECTION INTRAVENOUS
Status: DISCONTINUED | OUTPATIENT
Start: 2017-03-28 | End: 2017-04-11 | Stop reason: HOSPADM

## 2017-03-28 RX ORDER — METOCLOPRAMIDE HYDROCHLORIDE 5 MG/ML
10 INJECTION INTRAMUSCULAR; INTRAVENOUS ONCE
Status: COMPLETED | OUTPATIENT
Start: 2017-03-28 | End: 2017-03-28

## 2017-03-28 RX ORDER — OSELTAMIVIR PHOSPHATE 6 MG/ML
30 FOR SUSPENSION ORAL 2 TIMES DAILY
Status: DISCONTINUED | OUTPATIENT
Start: 2017-03-28 | End: 2017-03-31

## 2017-03-28 RX ORDER — POTASSIUM CHLORIDE 1.5 G/1.58G
20-40 POWDER, FOR SOLUTION ORAL
Status: DISCONTINUED | OUTPATIENT
Start: 2017-03-28 | End: 2017-04-11 | Stop reason: HOSPADM

## 2017-03-28 RX ADMIN — AMIODARONE HYDROCHLORIDE 0.5 MG/MIN: 50 INJECTION, SOLUTION INTRAVENOUS at 02:22

## 2017-03-28 RX ADMIN — CHLORHEXIDINE GLUCONATE 15 ML: 1.2 RINSE ORAL at 07:37

## 2017-03-28 RX ADMIN — METOCLOPRAMIDE HYDROCHLORIDE 10 MG: 5 INJECTION INTRAMUSCULAR; INTRAVENOUS at 17:32

## 2017-03-28 RX ADMIN — SENNOSIDES AND DOCUSATE SODIUM 2 TABLET: 8.6; 5 TABLET ORAL at 08:27

## 2017-03-28 RX ADMIN — LIDOCAINE HYDROCHLORIDE: 20 JELLY TOPICAL at 11:46

## 2017-03-28 RX ADMIN — PANTOPRAZOLE SODIUM 40 MG: 40 INJECTION, POWDER, FOR SOLUTION INTRAVENOUS at 23:53

## 2017-03-28 RX ADMIN — POTASSIUM PHOSPHATE, MONOBASIC AND POTASSIUM PHOSPHATE, DIBASIC 20 MMOL: 224; 236 INJECTION, SOLUTION INTRAVENOUS at 09:03

## 2017-03-28 RX ADMIN — QUETIAPINE FUMARATE 50 MG: 50 TABLET, FILM COATED ORAL at 08:26

## 2017-03-28 RX ADMIN — METOCLOPRAMIDE 10 MG: 5 INJECTION, SOLUTION INTRAMUSCULAR; INTRAVENOUS at 11:46

## 2017-03-28 RX ADMIN — FUROSEMIDE 40 MG: 10 INJECTION, SOLUTION INTRAVENOUS at 04:28

## 2017-03-28 RX ADMIN — GABAPENTIN 300 MG: 250 SUSPENSION ORAL at 08:25

## 2017-03-28 RX ADMIN — VANCOMYCIN HYDROCHLORIDE 1500 MG: 5 INJECTION, POWDER, LYOPHILIZED, FOR SOLUTION INTRAVENOUS at 01:14

## 2017-03-28 RX ADMIN — PANTOPRAZOLE SODIUM 40 MG: 40 INJECTION, POWDER, FOR SOLUTION INTRAVENOUS at 11:46

## 2017-03-28 RX ADMIN — OSELTAMIVIR PHOSPHATE 30 MG: 6 POWDER, FOR SUSPENSION ORAL at 09:02

## 2017-03-28 RX ADMIN — SODIUM CHLORIDE 500 MG: 9 INJECTION, SOLUTION INTRAVENOUS at 20:04

## 2017-03-28 RX ADMIN — HYDROMORPHONE HYDROCHLORIDE 0.5 MG: 1 INJECTION, SOLUTION INTRAMUSCULAR; INTRAVENOUS; SUBCUTANEOUS at 07:37

## 2017-03-28 RX ADMIN — PIPERACILLIN SODIUM,TAZOBACTAM SODIUM 3.38 G: 3; .375 INJECTION, POWDER, FOR SOLUTION INTRAVENOUS at 04:28

## 2017-03-28 RX ADMIN — HYDROMORPHONE HYDROCHLORIDE 0.5 MG: 1 INJECTION, SOLUTION INTRAMUSCULAR; INTRAVENOUS; SUBCUTANEOUS at 16:24

## 2017-03-28 RX ADMIN — PIPERACILLIN SODIUM,TAZOBACTAM SODIUM 3.38 G: 3; .375 INJECTION, POWDER, FOR SOLUTION INTRAVENOUS at 23:53

## 2017-03-28 RX ADMIN — PIPERACILLIN SODIUM,TAZOBACTAM SODIUM 3.38 G: 3; .375 INJECTION, POWDER, FOR SOLUTION INTRAVENOUS at 11:02

## 2017-03-28 RX ADMIN — METOCLOPRAMIDE HYDROCHLORIDE 10 MG: 5 INJECTION INTRAMUSCULAR; INTRAVENOUS at 23:53

## 2017-03-28 RX ADMIN — HYDRALAZINE HYDROCHLORIDE 10 MG: 10 TABLET ORAL at 08:26

## 2017-03-28 RX ADMIN — GABAPENTIN 300 MG: 250 SUSPENSION ORAL at 01:14

## 2017-03-28 RX ADMIN — PIPERACILLIN SODIUM,TAZOBACTAM SODIUM 3.38 G: 3; .375 INJECTION, POWDER, FOR SOLUTION INTRAVENOUS at 17:32

## 2017-03-28 RX ADMIN — CHLORHEXIDINE GLUCONATE 15 ML: 1.2 RINSE ORAL at 20:04

## 2017-03-28 RX ADMIN — SODIUM CHLORIDE 2 UNITS/HR: 9 INJECTION, SOLUTION INTRAVENOUS at 08:25

## 2017-03-28 NOTE — PHARMACY-VANCOMYCIN DOSING SERVICE
Pharmacy Vancomycin Note  Date of Service 2017  Patient's  1960   57 year old, male    Indication: [facility]-Associated Pneumonia  Goal Trough Level: 15-20 mg/L  Day of Therapy: 3  Current Vancomycin regimen:  1500 mg IV q12h    Current estimated CrCl = Estimated Creatinine Clearance: 50.1 mL/min (based on Cr of 1.79).    Creatinine for last 3 days  3/26/2017:  3:40 AM Creatinine 1.29 mg/dL  3/27/2017:  4:20 AM Creatinine 1.47 mg/dL;  6:30 PM Creatinine 1.55 mg/dL  3/28/2017:  4:30 AM Creatinine 1.79 mg/dL    Recent Vancomycin Levels (past 3 days)  3/28/2017: 12:18 PM Vancomycin Level 39.5 mg/L    Vancomycin IV Administrations (past 72 hours)                   vancomycin (VANCOCIN) 1500 mg in 0.9% NaCl 250 mL PREMIX (mg) 1,500 mg New Bag 17 0114     1,500 mg New Bag 17 1319     1,500 mg New Bag  0122     1,500 mg New Bag 17 1249                Nephrotoxins and other renal medications (Future)    Start     Dose/Rate Route Frequency Ordered Stop    17 1303  vancomycin place chou - receiving intermittent dosing      1 each Does not apply SEE ADMIN INSTRUCTIONS 17 1303      17 1045  piperacillin-tazobactam (ZOSYN) 3.375 g vial to attach to  mL bag     Comments:  Pharmacy can adjust dose based on renal function.    3.375 g  over 1 Hours Intravenous EVERY 6 HOURS 17 1033               Contrast Orders - past 72 hours     None          Interpretation of levels and current regimen:  Trough level is  Supratherapeutic    Has serum creatinine changed > 50% in last 72 hours: Yes    Urine output:  diminished urine output    Renal Function: Worsening    Plan:  1.  change to intermittent dosing  2.  Pharmacy will check trough levels as appropriate in am tomorrow to assess if OK to restart..    3. Serum creatinine levels will be ordered daily for the first week of therapy and at least twice weekly for subsequent weeks.      Cookie Venegas        .

## 2017-03-28 NOTE — PLAN OF CARE
Problem: Individualization  Goal: Patient Preferences  Pt sedated, withdraws to pain, LS continues to remain coarse with copious secretions. Turned every 2 hours, on CMV 40% PEEP 10, Vt 400 RR 18. Pt breathing 28-30, with peak pressure high 30's to low 40;s. TF stopped due to elevated Lipase, family had care conference at 1300 with Dr mace. Plan to remain NPO and start post pyloric tube after a day or two. abd continues to remain distended and firm, BS faint. CT abd completed.During the route pt had a one time  episode of sebastian with pause per flying squad, monitor did not print any strip. Continues to remain SR with occasional HR drop to 50's with suctioning. Left arm PICC in place. Soft restraints in place. Levine with bloody urine unchanged. Good UOP. Skin intact except pressure injury of the lip.  Family- spouse and daughter updatd about POC. Blood sugars labile was off insulin drip and now restarted per protocol. Continue to monitor

## 2017-03-28 NOTE — PLAN OF CARE
Problem: Individualization  Goal: Patient Preferences  Sedation vacation attempted, pt continues to remain sedated and lethargic, opens eyes only to pain, doesnot follow command. PS trial started at 1545 till 1900. Pt tolerated well, started getting tachypnic after suctioning, sedation - versed drip restarted at 2mg/hr.  Turned every 2 hours, urine started clearing this morning but now red again, no obvious clot noted, good UOP. OG tube removed , NG post pyloric tube placed, Xray - tube not post pyloric, MD informed, tube left in place at 70cm, reglan scheduled. BS audible. Order to Xray abd tomorrow morning if post pylorus will start trickle feed. ET secretions decreased but continues to remain thick, creamy and yellow. Right lung coarse, left lung at times clear. Blood sugar WNL, insulin drip off. Per MD to  Hold off on PO meds.remained febrile all day- low grade temp.  Will continue to monitor

## 2017-03-28 NOTE — CONSULTS
[facility]  Antimicrobial Stewardship Team (AST) Note   Antimicrobial Stewardship Program Clinical Note - a joint venture between [facility] and [facility] to optimize antibiotic management.      Allergies: No known drug allergies    Brief Summary: 57 year old male admitted on 3/17 to the floor, presenting with acute hypoxic respiratory failure due to influenza B and CAP, right chest wall pain and uncontrolled DM-II. Patient was subsequently transferred from the floor to the MICU for increased work of breathing, accessory muscle use and respiratory distress. Was intubated due to increased work of breathing, tachypnea and decreasing O2 sats.    Infectious Disease:   1. Flu B: Tamiflu  2. MSSA pna/bacteremia: Nafcillin discontinued 3/24, changed to Ancef. Now sputum GNR and staph.  Most recent bld cultures show no growth. TTE did not show valvular masses/vegetations. CT chest negative for occult abscess. Now switched to vanc/zos 3/26 for HCAP.      Assessment:  Zosyn will cover all organisms isolated in the sputum culture.  Recommend discontinue vancomycin as no resistant gram positive organisms isolated.        Current Anti-infective Orders: Zosyn (3/25- ) + Vanco (3/25- ) + Tamiflu (3/17- )      Clinical Features/Vital Signs  Vital signs:  Temp 3/24 = 101.5, 3/25 = 101.3, 3/26 = 99, 3/27 = 100.9, 3/28 = 100.8      Lab Results  WBC 3/24 = 17.9, 3/25 = 19.5, 3/26 = 27.6, 3/27 = 28.4, 3/28 = 23.3  Procal 3/25 = 2.94      Culture Results -  3/16 Influenza B positive  3/17 Blood x 2 MSSA  3/17 Sputum Culture MSSA   3/18 Bloodx 2 MSSA  3/19 Blood x2 : no growth  3;21 Cath Urine no growth  3/24 Sputum Heavy growth Enterobacter cloacae complex, Light growth Klebsiella pneumoniae, Light growth Strain 2 Enterobacter cloacae complex, Heavy growth Staphylococcus aureus, Light growth Candida albicans / dubliniensis (Gram stain >25 PMNs, few GPCs)  3/25 Blood No  growth    Imaging - past 3 days:    Xr Chest Port 1 View    Result Date: 3/27/2017  XR CHEST PORT 1 VW  3/27/2017 6:50 AM HISTORY:  Vented patient.     IMPRESSION: No significant change since yesterday. Bilateral pulmonary infiltrates most marked in the right lung base. Blunting right costophrenic angle by fluid or thickened pleura. Tip of the endotracheal tube lies above the debi. NG tube present. CANDICE RASHEED MD    Xr Chest Port 1 View    Result Date: 3/26/2017  CHEST PORTABLE ONE VIEW March 26, 2017 7:00 AM HISTORY: Pneumonia. COMPARISON: 3/25/2017.     IMPRESSION: Bibasilar infiltrates with slight progression since prior days exam. ET tube and nasogastric tube remain in place. Heart size is stable. There is also a developing right pleural effusion. WINSTON PIMENTEL MD    Ct Abdomen Pelvis W/o Contrast    Result Date: 3/27/2017  CT ABDOMEN AND PELVIS WITHOUT CONTRAST  3/27/2017  6:07 PM HISTORY:  Abdominal distention.  TECHNIQUE:  Noncontrast at the request of the referring clinician. Axial images with coronal reconstructions. Radiation dose for this scan was reduced using automated exposure control, adjustment of the mA and/or kV according to patient size, or iterative reconstruction technique. COMPARISON:  3/17/2017 FINDINGS:  Motion and beam hardening artifact on multiple images is causing some indistinctness and haziness of most intra-abdominal and intrapelvic structures. Small right pleural effusion with extensive consolidation at the right lung base, worsened since the prior exam. Probable tiny left pleural effusion with atelectasis. Additional patchy infiltrates through the lung bases may have improved. The upper abdominal organs are grossly normal given limitations of artifact and lack of intravenous contrast. Nasogastric tube in the stomach. Levine catheter within a decompressed urinary bladder. The colon is normal. The appendix is normal. No bowel obstruction. There may be a tiny amount of fluid in the  small bowel mesentery. Right common iliac vascular stent.     IMPRESSION:  1. No obvious intraabdominal or intrapelvic abnormality to explain the patient's symptoms. 2. Small bilateral pleural effusions. 3. Worsening consolidation at the right lung base. 4. Patchy infiltrates at both lung bases may have decreased overall since the prior exam. HORACIO LEONG MD      Recommendations/Interventions:  Discontinue Vancomycin    Discussed with ID Staff - Dr. Herring

## 2017-03-28 NOTE — PROGRESS NOTES
CLINICAL NUTRITION SERVICES - REASSESSMENT NOTE      Future/Additional Recommendations:    Recommend TF Goal Options:    Option #1  Peptamen 1.5 at 55 mL/hr = 1980 kcals, 90 gm pro, 1016 mL H20, 248 gm CHO, no fiber  ProStat 1 packet per day = 100 kcals, 15 gm pro  Total (TF + ProStat):  2080 kcals (30 kcal/kg), 105 gm pro (1.5 gm/kg)    Option #2:  Promote with Fiber at 75 mL/hr = 1800 kcals (26 kcal/kg), 113 gm pro (1.6 gm/kg), 1494 mL H20, 248 gm CHO, 25 gm fiber    Recommend add Certavite daily via FT due to mucosal PI.       EVALUATION OF PROGRESS TOWARD GOALS   Diet:  NPO on vent    Nutrition Support:    -The TF via OG tube was previously running at goal Isosource 1.5 at 55 mL/hr up until 3/27 noon when TF was held due to elevated Lipase level (4581).  MD requested a P/P FT placement which RN attempted today.  Per abd x-ray, The feeding tube is curled back upon itself in the distal stomach. The tip is not visualized, however, it is probably in the mid esophagus.    Pt was also receiving ProStat 1 packet per day.      Intake/Tolerance:    Stool:  x4 yesterday.  Senokot for bowel program.  Accuchecks:  103-123 range.  3/28  Na 146 (H)  K 3.3 (L)  BUN 53 (H)  Cr 1.79 (H)  Mg 3.1 (H)  Phos 1.9 (L) - Pt with JOSE  3/27  Lipase 4581 (H) - Pancreatitis per MD  Wt:  75.9 kg (up 6.3 kg since admit).  Pt with +2-3 UE (wrists, hands).    Dosing Weight 69.6 kg (admit weight)      ASSESSED NUTRITION NEEDS:  Estimated Energy Needs: 5286-9676 kcals (25-30 Kcal/Kg)  Justification: maintenance  Estimated Protein Needs: 105+ grams protein (1.5+ g pro/Kg)  Justification: hypercatabolism with critical illness    NEW FINDINGS:   3/27:  Abd CT =   1. No obvious intraabdominal or intrapelvic abnormality to explain the patient's symptoms.  2. Small bilateral pleural effusions.  3. Worsening consolidation at the right lung base.  4. Patchy infiltrates at both lung bases may have decreased overall since the prior exam.    3/27:  WOCN =      Pressure Injury (PI) located on tip of tongue: Mucosal PI (these are not staged)     Goals are to diurese and do PS trials as tolerated today.  MD had wanted trickle TF today if FT could be placed.    Previous Goals (3/24):   TF + ProStat to meet % final energy and protein needs daily.   Evaluation: Not met    Previous Nutrition Diagnosis (3/24):   Inadequate enteral nutrition infusion related to TF ordered to meet 2/3 needs, propofol d/c as evidenced by current regimen meeting 41% final energy and 64% final protein goal.   Evaluation: Declining      CURRENT NUTRITION DIAGNOSIS  Inadequate enteral nutrition infusion related to altered GI function (pancreatitis) and inability to place P/P FT as evidenced TF off, meeting 0% estimated needs.    INTERVENTIONS  Recommendations / Nutrition Prescription  Recommend TF goal options:    Option #1  Peptamen 1.5 at 55 mL/hr = 1980 kcals, 90 gm pro, 1016 mL H20, 248 gm CHO, no fiber  ProStat 1 packet per day = 100 kcals, 15 gm pro  Total (TF + ProStat):  2080 kcals (30 kcal/kg), 105 gm pro (1.5 gm/kg)    Option #2:  Promote with Fiber at 75 mL/hr = 1800 kcals (26 kcal/kg), 113 gm pro (1.6 gm/kg), 1494 mL H20, 248 gm CHO, 25 gm fiber    - Recommend add Certavite daily via FT due to mucosal PI.    Implementation  Collaboration and Referral of Nutrition care - Discussed pt during ICU rounds this morning and later with bedside RN who stated she will give Reglan and turn pt on right side to help migrate the FT further down.  Diet Orders - Canceled ProStat in EPIC for now.    Goals  FT will be successfully placed and TF goal achieved in the next 48-72 hrs.    MONITORING AND EVALUATION:  Progress towards goals will be monitored and evaluated per protocol and Practice Guidelines    Carie Matos, RD, LD, CNSC

## 2017-03-28 NOTE — PROGRESS NOTES
[facility] ICU RESPIRATORY NOTE  Date of Admission: 3/17/2017  Date of Intubation (most recent): 3/17/2017  Reason for Mechanical Ventilation: Respiratory Failure  Number of Days on Mechanical Ventilation: 12  Met Criteria for Pressure Support Trial: Yes  Length of Pressure Support Trial: Placed on PS 12/5 at 1445 and still on.    Ventilation Mode: CPAP/PS  FiO2 (%): 40 %  Rate Set (breaths/minute): 18 breaths/min  Tidal Volume Set (mL): 400 mL  PEEP (cm H2O): 5 cmH2O  Pressure Support (cm H2O): 12 cmH2O  Oxygen Concentration (%): 40 %  Resp: 24      ABG Results:     Recent Labs  Lab 03/27/17  0240 03/27/17  0050 03/25/17  0515 03/24/17  2320   PH 7.38 7.24* 7.49* 7.51*   PCO2 48* 67* 33* 31*   PO2 68* 83 68* 95   HCO3 28 29* 25 25   O2PER 60% 60 40 40       Plan: Place on full vent support overnight.    3/28/2017  Mandy Pantoja, RRT

## 2017-03-28 NOTE — PROGRESS NOTES
[facility] ICU RESPIRATORY NOTE  Date of Admission: 3/17/2017  Date of Intubation (most recent): 3/17/2017  Reason for Mechanical Ventilation: Respiratory Failure  Number of Days on Mechanical Ventilation: 11  Met Criteria for Pressure Support Trial: No  Length of Pressure Support Trial: None  Reason for No Pressure Support Trial: Per MD      Recent Labs  Lab 03/27/17  0240 03/27/17  0050 03/25/17  0515 03/24/17  2320   PH 7.38 7.24* 7.49* 7.51*   PCO2 48* 67* 33* 31*   PO2 68* 83 68* 95   HCO3 28 29* 25 25   O2PER 60% 60 40 40     Ventilation Mode: CMV/AC  FiO2 (%): 40 %  Rate Set (breaths/minute): 18 breaths/min  Tidal Volume Set (mL): 400 mL  PEEP (cm H2O): 10 cmH2O  Pressure Support (cm H2O): 25 cmH2O  Oxygen Concentration (%): 70 %  Peak Inspiratory Pressure (cm H2O) (Drager Arianne): 25  Resp: 28    Tamir Duong RT

## 2017-03-28 NOTE — PROGRESS NOTES
Critical Care Progress Note      03/28/2017    Name: Wyatt Mahajan MRN#: [id number]   Age: 57 year old YOB: 1960     Hsptl Day# 11  ICU DAY #10    MV DAY #10           Problem List:   Active Problems:    Acute respiratory failure with hypoxia (H)  MSSA/enterobacter/klebsiella pna   MSSA bacteremia--resolved  Influenza B  JOSE--improving  Hematuria---bell trauma?  pancreatitis          Summary/Hospital Course:   Wyatt Mahajan is a 57 year old male admitted on 3/17 to the floor, presenting with acute hypoxic respiratory failure due to influenza B and CAP, right chest wall pain and uncontrolled DM-II. Patient was subsequently transferred from the floor to the MICU for increased work of breathing, accessory muscle use and respiratory distress. Was intubated due to increased work of breathing, tachypnea and decreasing O2 sats.    3/18:  Weaned vent yesterday and overnight.  Comfortably sedated this AM.  +blood cx for staph aureus overnight.  On vanco--repeating blood cx.    3/19:  Still minimal vent settings but minute ventilation still ~13 so won't try to extubate today.  Weaning from insulin drip back to lantus/SSI now on steady TF.  Blood cultures persistently positive--MSSA.  Change to nafcillin.  Reculture.  TTE.    3/20:  No changes, no events. Gentle diuresis yesterday but still positive.  Increasing diuresis today.     3/21: Not tolerating CMV/AC last evening (tachycardia, tachypnea, hypertension), so switched to pressure support which improved vital signs overnight. Switched back to CMV/AC this morning at 5:30am, now tolerating. Cr bump so no further diuresis today. Changing sedation to precedex. Echo showed no vegetations/masses on valves. Will continue to follow cultures, now growth so far from 3/20 cultures.    3/22: Pressure support trial last evening was stopped just after its initiation due to inc. RR to the 40's. Eyes open to voice. Given valproate for delirium. Propofol wean and  transition to precedex resulted in inc HR, RR and BP. Nursing notes on exam, 'wet crackles' in L lung and thick creamy red-streaked secretions. Ppeak to mid-30's. No BM yet, just smears but constipation treated with senna, dulcolax and miralax.    3/23: Episodes of tachycardia and hypertension to 169/93, coreg started and BP improved with versed and propofol. Urine was bloody this morning, suspect bell trauma. Febrile to 101.9 with PRN tylenol given. Still no BM though treated with senna, dulcolax and miralax. Family worried about his condition and the course of his illness.    3/24: On pressure support respiratory trial since 5pm last evening. Off of propofol sedation since 5pm as well. Critical lab value of NB=3640 returned this AM, triglycerides were also elevated to 677. Patient is febrile to 101.5 this morning and with lower blood glucose on last four checks so Lantus was decreased. 25ml D50 given for bs of 65. Cr increased today, likely due to gentle diuresis. K+ repleted. Urine continues to be bloody. Elevated liver enzymes. WBC still increased though now down-trending.    3/25: Family refused PICC line yesterday evening. Abx's for mssa were changed from nafcillin to ancef. D5W started for hypernatremia. Overnight had episodes of HR pause and thrashing; sedation with precedex, then changed to versed. Also bradycardic in early morning; CXR checked and ET tube was in good position.    3/27: low calcium and hyperglycemia over weekend-->checked lipase: 4k.  Changing d5 to po free h2o.  Low dose hydralazine for hypertension given recent bradycardia.  In a fib with rvr overnight--resolved with amiodarone.    3/28:  No events overnight.  NPO for now.  Placing post-pyloric tube.      Assessment and plan :     Wyatt Mahajan IS a 57 year old male admitted on 3/17/2017 for respiratory failure, flu B, mssa pna and mssa bacteremia.   I have personally reviewed the daily labs, imaging studies, cultures and discussed  the case with referring physician and consulting physicians.   My assessment and plan by system for this patient is as follows:    Neurology/Psychiatry:   1. Sedation:  Discontinued propofol given CK and Triglyceride lab values on 3/24, transitioned to precedex but patient thrashing and hypertensive/bradycardic. Versed drip begun 3/25 AM is providing adequate sedation. Prn dilaudid also available.  2. Delirium:  Seroquel, 50mg bid.  (). Valproate added evening 3/22, decreased to q 24 hr dosing 3/24.  3. H/o Diabetic neuropathy 2/2 DM-II:     -- gabapentin 300mg, TID.    Cardiovascular:   1. Hypertension: standing hydralazine started  2. H/o HLD:  hold atorvastatin (LFT elevated)  3. H/o PAD:  hold ASA  4. Hyperlactemia:  Resolved  5. Ischemia: trend CK   6.  A fib with RVR:  In setting of critical illness.  Resolved on amiodarone.    Pulmonary/Ventilator Management:   1.  Respiratory failure, hypoxemic:  2/2 mssa pna and influenza B.  May also have element of ARDS, but only on minimal vent settings (40 %, peep 5-10). Continue to ventilate with low tidal volume strategy (490ml ~ 7ml/kg). CXR 3/26 with bilat infilt. Sputum culture 3/24 + for GNR (3 strains) and staph aureus (sensitivty pending)  --Stop nafcillin 3/26, start zosyn and vanco 3/26 until sensitivity available/cont tamiflu  --Lung protective vent strategy- PCV plus assist ventilation     GI and Nutrition :   1.  TF  2.  PPI for PUD prophylaxis  3.  Constipation--improved on bowel reg.  4. Elevated liver enzymes: newly elevated on 3/24, nl on 3/17, ? Med related vs congestion vs? RUQ U/S revealed a distended GB w/sludge. Subsequent LFT's have shown decreases of Alk phos, AST and ALT levels since discontinuation of statin.  --serial LFT's  5.  Pancreatitis:  Due to critical illness vs propofol.  No further propofol.  --NPO for today.  Placing post-pyloric tube  --Increased PPI to bid  --bladder pressure 15, but belly more firm, but CT abd/pelv without  acute abnormality  --trend calcium    Renal/Fluids/Electrolytes:   1. JOSE--fluctuating. UA showed no casts. Urine eos < 1%  -- slightly worse. hold diuresis today.    2. Hypernatremia - . PO free water  2. Electrolyte abnormalities - hypocalcemia, hypermagnesemia  --monitor function and electrolytes as needed with replacement per ICU protocols    :  1. Hematuria: bloodly urine AM of 3/23 probably due to trauma from the bell catheter. No blood clots were found in the collection device.  Kidney US 3/25 normal  -- Monitor    Infectious Disease:   1. Flu B:  Tamiflu  2. MSSA pna/bacteremia: Nafcillin discontinued 3/24, changed to Ancef. Now sputum GNR and staph (see pulm) Most recent bld cultures show no growth. TTE did not show valvular masses/vegetations. CT chest negative for occult abscess. Now switched to vanc/zos 3/26 for HCAP.  -- Switched abx as above    Endocrine:   1. Hyperglycemia:    --Insulin drip    Hematology/Oncology:   1. Plt/hgb stable  2. Leukocytosis-- 2/2 infection. WBC from 19.9 on 3/23 to 19.5 on 3/25. Patient febrile on and off. Sputum culture 3/24, pending.  --Abx as above     IV/Access:   1. Venous access - peripheral  --Family now agreeable to PICC    ICU Prophylaxis:   1. DVT: Discontinue Hep Subq given hematuria of unknown cause, has mechanical prophylaxis  2. VAP: HOB 30 degrees, chlorhexidine rinse  3. Stress Ulcer: PPI  4. Restraints: Nonviolent soft two point restraints required and necessary for patient safety and continued cares and good effect as patient continues to pull at necessary lines, tubes despite education and distraction. Will readdress daily.   6. Feeding - npo for now  7. Family Update: wife at bedside.  8. Disposition - critically ill    Fam mt 3/27:  Held full care conference with wife, brother and daughter.  Discussed his course and complications.  Will conitnue with current txt for now.  All questions asked and answered.         Key Medications:   Reviewed           Physical Examination:   Temp:  [99  F (37.2  C)-100.9  F (38.3  C)] 100.6  F (38.1  C)  Heart Rate:  [] 113  Resp:  [10-34] 25  BP: (122-176)/(59-95) 138/62  FiO2 (%):  [40 %] 40 %  SpO2:  [90 %-100 %] 98 %        Intake/Output Summary (Last 24 hours) at 03/28/17 1556  Last data filed at 03/28/17 1400   Gross per 24 hour   Intake          1683.64 ml   Output             2570 ml   Net          -886.36 ml             Wt Readings from Last 4 Encounters:   03/28/17 75.9 kg (167 lb 5.3 oz)   03/16/17 70.3 kg (155 lb)   01/17/17 71.3 kg (157 lb 1.6 oz)   11/30/16 72.6 kg (160 lb)     BP - Mean:  [] 84  Ventilation Mode: CPAP/PS  FiO2 (%): 40 %  Rate Set (breaths/minute): 18 breaths/min  Tidal Volume Set (mL): 400 mL  PEEP (cm H2O): 5 cmH2O  Pressure Support (cm H2O): 12 cmH2O  Oxygen Concentration (%): 40 %  Resp: 25    Recent Labs  Lab 03/27/17  0240 03/27/17  0050 03/25/17  0515 03/24/17  2320   PH 7.38 7.24* 7.49* 7.51*   PCO2 48* 67* 33* 31*   PO2 68* 83 68* 95   HCO3 28 29* 25 25   O2PER 60% 60 40 40     GEN: sedated, intubated, opens eyes but otherwise non-responsive  HEENT: head ncat, sclera anicteric, OP patent, trachea midline   PULM: unlabored, coarse lung sounds anteriorly L>R, rhonchi bilaterally.  CV/COR: RRR S1/S2, No rub, murmur or gallop  ABD: firm, distended, nontender, hypoactive bowel sounds, no masses  EXT:  peripheral edema present in hands>feet, warm x4 and well-perfused.   NEURO: sedated, not following commands.  SKIN: no obvious rash  LINES: clean, dry intact         Data:       Recent Labs  Lab 03/28/17  1324 03/28/17  0430 03/27/17  1830 03/27/17  0420 03/26/17  0340  03/25/17  0430  03/24/17  1427   NA  --  146* 144 146* 152*  --  151*  --  153*   POTASSIUM 3.6 3.3* 3.9 4.3 4.6  < > 3.8  < > 3.2*   CHLORIDE  --  110* 108 111* 117*  --  117*  --  115*   CO2  --  28 28 26 27  --  26  --  27   ANIONGAP  --  8 8 9 8  --  8  --  11   GLC  --  119* 180* 174* 232*  --  252*  --  93   BUN   --  53* 48* 43* 36*  --  26  --  27   CR  --  1.79* 1.55* 1.47* 1.29*  --  1.47*  --  1.71*   GFRESTIMATED  --  40* 47* 50* 58*  --  50*  --  42*   GFRESTBLACK  --  48* 57* 60* 70  --  60*  --  51*   LEONIE  --  7.8* 7.6* 7.9* 7.7*  --  7.6*  --  7.4*   MAG  --  3.1*  --  3.2* 3.7*  --  3.4*  --   --    PHOS 4.3 1.9*  --  2.4* 3.3  --  2.7  --   --    PROTTOTAL  --   --   --  7.2 7.5  --  7.2  --  7.5   ALBUMIN  --   --   --  1.8* 1.6*  --  1.2*  --  1.2*   BILITOTAL  --   --   --  1.3 1.1  --  1.4*  --  3.5*   ALKPHOS  --   --   --  315* 306*  --  353*  --  389*   AST  --   --   --  224* 162*  --  196*  --  213*   ALT  --   --   --  79* 61  --  86*  --  109*   < > = values in this interval not displayed.    CBC    Recent Labs  Lab 03/28/17  0430 03/27/17  0420 03/26/17  0340 03/25/17  0430   WBC 23.3* 28.4* 27.6* 19.5*   RBC 2.42* 2.71* 3.17* 3.27*   HGB 7.6* 8.6* 10.0* 10.3*   HCT 22.3* 26.3* 30.0* 29.5*   MCV 92 97 95 90   MCH 31.4 31.7 31.5 31.5   MCHC 34.1 32.7 33.3 34.9   RDW 15.6* 17.5* 16.6* 15.0    248 247 247     INR    Recent Labs  Lab 03/27/17  1830 03/23/17  0650   INR 1.15* 1.19*     Arterial Blood Gas    Recent Labs  Lab 03/27/17  0240 03/27/17  0050 03/25/17  0515 03/24/17  2320   PH 7.38 7.24* 7.49* 7.51*   PCO2 48* 67* 33* 31*   PO2 68* 83 68* 95   HCO3 28 29* 25 25   O2PER 60% 60 40 40       All cultures:    Recent Labs  Lab 03/25/17  1245 03/24/17  1750 03/21/17  1545   CULT No growth after 3 days Heavy growth Enterobacter cloacae complexLight growth Klebsiella pneumoniaeLight growth Strain 2 Enterobacter cloacae complexHeavy growth Staphylococcus aureusLight growth Anna albicans / dubliniensis Candida albicans and Candida dubliniensis are not routinely speciated Susceptibility testing not routinely done* No growth     Imaging:    CT-Chest from 3/22:  1. Extensive infiltrate and consolidation in both lungs has overall  improved slightly since 3/17/2017.   2. Small bilateral pleural  effusions are new since the previous exam.  3. Mildly prominent and borderline-enlarged mediastinal and right  hilar lymph nodes have a similar appearance to the previous exam,  given the noncontrast technique on today's study.    D/w Dr. Root of nephrology service.    Billing: This patient is critically ill: Yes. Total critical care time today 45 min.    Antelmo Aponte MD

## 2017-03-28 NOTE — PROGRESS NOTES
Renal Medicine Progress Note                                Wyatt LAWSON Keyshawn MRN# [id number]   Age: 57 year old YOB: 1960   Date of Admission: 3/17/2017 Hospital LOS: 11                  Assessment/Plan:     Admitted with hypoxic respiratory failure due to influenza B and CAP.  Seen 03/25/17  regarding ARF.    1.  Apparent baseline normal renal function  2.  Historical absence of significant proteinuria  3.  ARF   -nonoliguric   -normal renal US   -U eosinophils non diagnostic   -modest elevation of myoglobin/CK   -presumed ATN  4.  Hypernatremia  5.  Gross hematuria   -traumatic bell?   -pulmonary renal syndrome seems unlikley  6.  Respiratory failure   -vented      Serologic studies pending  Increased creatinine with albumin/diuretic trial    Continue current management  Increase free water as indicated    Consider holding diuretics        Interval History:     Intubated and sedated.  Urine with less blood.      ROS:     Intubated and sedated: ROS unable    Medications and Allergies:     Reviewed    Physical Exam:     Vitals were reviewed  Patient Vitals for the past 8 hrs:   BP Temp Temp src Heart Rate Resp SpO2 Weight   03/28/17 1030 136/61 100.6  F (38.1  C) - 108 26 100 % -   03/28/17 1015 151/73 100.8  F (38.2  C) - 106 26 100 % -   03/28/17 1000 130/65 100.6  F (38.1  C) Bladder 106 23 98 % -   03/28/17 0945 135/64 100.6  F (38.1  C) - 106 24 99 % -   03/28/17 0930 135/66 100.4  F (38  C) - 105 24 98 % -   03/28/17 0915 140/66 100.4  F (38  C) - 105 24 98 % -   03/28/17 0900 139/68 100.4  F (38  C) - 103 26 97 % -   03/28/17 0845 132/66 100.2  F (37.9  C) - 104 25 100 % -   03/28/17 0830 147/67 100.2  F (37.9  C) - 101 29 100 % -   03/28/17 0815 143/66 100.2  F (37.9  C) - 102 24 100 % -   03/28/17 0800 146/70 100.4  F (38  C) Bladder 102 30 99 % -   03/28/17 0745 154/72 100.4  F (38  C) - 100 28 98 % -   03/28/17 0730 148/79 100.4  F (38  C) - 100 28 100 % -   03/28/17 0715 162/81 100.2  F  (37.9  C) - 107 (!) 31 100 % -   03/28/17 0714 - - - - - 100 % -   03/28/17 0700 160/82 100  F (37.8  C) - 105 (!) 31 100 % 75.9 kg (167 lb 5.3 oz)   03/28/17 0600 157/77 100.2  F (37.9  C) Bladder 106 30 100 % -   03/28/17 0500 164/79 100.8  F (38.2  C) - 105 29 100 % -   03/28/17 0400 154/78 100.9  F (38.3  C) - 105 (!) 32 100 % -   03/28/17 0300 143/74 100.8  F (38.2  C) - 104 29 100 % -   03/28/17 0249 - - - - - 100 % -     I/O last 3 completed shifts:  In: 4854.24 [I.V.:3219.24; NG/GT:700]  Out: 2555 [Urine:2555]    Vitals:    03/23/17 0423 03/24/17 0600 03/25/17 0502 03/26/17 0324   Weight: 72.1 kg (158 lb 15.2 oz) 71 kg (156 lb 8.4 oz) 71.4 kg (157 lb 6.5 oz) 70.1 kg (154 lb 8.7 oz)    03/28/17 0700   Weight: 75.9 kg (167 lb 5.3 oz)       GENERAL:  intubated and sedated  HEENT: ETT  RESP:  clear anteriorly  CV: RRR, normal S1 S2  ABDOMEN: soft, nontender  :  bell catheter  MS: no clubbing, cyanosis   SKIN: clear without significant rashes or lesions  EXT: warm, no edema    Data:       Recent Labs  Lab 03/28/17  0430 03/27/17  1830 03/27/17  0420 03/26/17  0340   * 144 146* 152*   POTASSIUM 3.3* 3.9 4.3 4.6   CHLORIDE 110* 108 111* 117*   CO2 28 28 26 27   ANIONGAP 8 8 9 8   * 180* 174* 232*   BUN 53* 48* 43* 36*   CR 1.79* 1.55* 1.47* 1.29*   GFRESTIMATED 40* 47* 50* 58*   GFRESTBLACK 48* 57* 60* 70   LEONIE 7.8* 7.6* 7.9* 7.7*         Recent Labs   Lab Test  03/28/17   0430  03/27/17   1830  03/27/17   0420  03/26/17   0340  03/25/17   0430  03/24/17   1427  03/24/17   0430  03/23/17   2215 03/23/17   1423  03/23/17   0535   CR  1.79*  1.55*  1.47*  1.29*  1.47*  1.71*  1.77*  1.61*  1.65*  1.58*     Recent Labs   Lab Test  03/28/17   0430  03/27/17   1830  03/27/17   0420  03/26/17   0340  03/25/17   0430  03/24/17   1427  03/24/17   0430  03/23/17   2215 03/23/17   1423  03/23/17   0535   NA  146*  144  146*  152*  151*  153*  150*  149*  149*  148*       Recent Labs  Lab 03/27/17  0420  03/26/17  0340 03/25/17  0430 03/24/17  1427   ALBUMIN 1.8* 1.6* 1.2* 1.2*       Recent Labs  Lab 03/28/17  0430 03/27/17  0420 03/26/17  0340 03/25/17  0430   PHOS 1.9* 2.4* 3.3 2.7   HGB 7.6* 8.6* 10.0* 10.3*       Recent Labs  Lab 03/25/17  1418   COLOR Red   APPEARANCE Cloudy   URINEGLC >1000*   URINEBILI Negative   URINEKETONE Negative   SG 1.019   UBLD Moderate*   URINEPH 6.5   PROTEIN 100*   NITRITE Negative   LEUKEST Negative   RBCU >182*   WBCU >182*     No lab results found.  Recent Labs   Lab Test  03/27/17   0420  03/25/17   0430  03/24/17   1427  03/24/17   0430   MYOGLOBIN   --    --    --   552*   CKT  881*  1448*  1405*  1179*       G Tamir Godoy    [facility] - Nephrology  [phone]

## 2017-03-28 NOTE — PROGRESS NOTES
Provider -dr mace notified about abd xray, tube feed appears coiled in stomach, pulled back to 80cm and left clamped in place, per MD will xray again tomorrow morning for placement, pt to be NPO till tomorrow. Family updated.

## 2017-03-29 ENCOUNTER — APPOINTMENT (OUTPATIENT)
Dept: GENERAL RADIOLOGY | Facility: CLINIC | Age: 57
DRG: 870 | End: 2017-03-29
Attending: INTERNAL MEDICINE
Payer: COMMERCIAL

## 2017-03-29 LAB
ANCA IGG TITR SER IF: NORMAL {TITER}
ANION GAP SERPL CALCULATED.3IONS-SCNC: 6 MMOL/L (ref 3–14)
ANION GAP SERPL CALCULATED.3IONS-SCNC: 9 MMOL/L (ref 3–14)
BUN SERPL-MCNC: 40 MG/DL (ref 7–30)
BUN SERPL-MCNC: 46 MG/DL (ref 7–30)
CALCIUM SERPL-MCNC: 7.6 MG/DL (ref 8.5–10.1)
CALCIUM SERPL-MCNC: 7.7 MG/DL (ref 8.5–10.1)
CHLORIDE SERPL-SCNC: 116 MMOL/L (ref 94–109)
CHLORIDE SERPL-SCNC: 118 MMOL/L (ref 94–109)
CO2 SERPL-SCNC: 27 MMOL/L (ref 20–32)
CO2 SERPL-SCNC: 28 MMOL/L (ref 20–32)
CREAT SERPL-MCNC: 1.55 MG/DL (ref 0.66–1.25)
CREAT SERPL-MCNC: 1.78 MG/DL (ref 0.66–1.25)
ERYTHROCYTE [DISTWIDTH] IN BLOOD BY AUTOMATED COUNT: 15.1 % (ref 10–15)
GFR SERPL CREATININE-BSD FRML MDRD: 40 ML/MIN/1.7M2
GFR SERPL CREATININE-BSD FRML MDRD: 47 ML/MIN/1.7M2
GLUCOSE BLDC GLUCOMTR-MCNC: 110 MG/DL (ref 70–99)
GLUCOSE BLDC GLUCOMTR-MCNC: 112 MG/DL (ref 70–99)
GLUCOSE BLDC GLUCOMTR-MCNC: 113 MG/DL (ref 70–99)
GLUCOSE BLDC GLUCOMTR-MCNC: 121 MG/DL (ref 70–99)
GLUCOSE BLDC GLUCOMTR-MCNC: 129 MG/DL (ref 70–99)
GLUCOSE BLDC GLUCOMTR-MCNC: 130 MG/DL (ref 70–99)
GLUCOSE BLDC GLUCOMTR-MCNC: 138 MG/DL (ref 70–99)
GLUCOSE BLDC GLUCOMTR-MCNC: 146 MG/DL (ref 70–99)
GLUCOSE BLDC GLUCOMTR-MCNC: 94 MG/DL (ref 70–99)
GLUCOSE SERPL-MCNC: 125 MG/DL (ref 70–99)
GLUCOSE SERPL-MCNC: 138 MG/DL (ref 70–99)
GRAM STN SPEC: NORMAL
HCT VFR BLD AUTO: 21.4 % (ref 40–53)
HGB BLD-MCNC: 7.3 G/DL (ref 13.3–17.7)
MAGNESIUM SERPL-MCNC: 2.9 MG/DL (ref 1.6–2.3)
MCH RBC QN AUTO: 31.3 PG (ref 26.5–33)
MCHC RBC AUTO-ENTMCNC: 34.1 G/DL (ref 31.5–36.5)
MCV RBC AUTO: 92 FL (ref 78–100)
MICRO REPORT STATUS: NORMAL
PHOSPHATE SERPL-MCNC: 2.6 MG/DL (ref 2.5–4.5)
PLATELET # BLD AUTO: 462 10E9/L (ref 150–450)
POTASSIUM SERPL-SCNC: 3.2 MMOL/L (ref 3.4–5.3)
POTASSIUM SERPL-SCNC: 3.8 MMOL/L (ref 3.4–5.3)
RBC # BLD AUTO: 2.33 10E12/L (ref 4.4–5.9)
SODIUM SERPL-SCNC: 152 MMOL/L (ref 133–144)
SODIUM SERPL-SCNC: 152 MMOL/L (ref 133–144)
SPECIMEN SOURCE: NORMAL
VALPROATE SERPL-MCNC: 22 MG/L (ref 50–100)
VANCOMYCIN SERPL-MCNC: 23.7 MG/L
WBC # BLD AUTO: 21.7 10E9/L (ref 4–11)

## 2017-03-29 PROCEDURE — 25000128 H RX IP 250 OP 636: Performed by: INTERNAL MEDICINE

## 2017-03-29 PROCEDURE — 00000146 ZZHCL STATISTIC GLUCOSE BY METER IP

## 2017-03-29 PROCEDURE — 87186 SC STD MICRODIL/AGAR DIL: CPT | Performed by: INTERNAL MEDICINE

## 2017-03-29 PROCEDURE — 25000132 ZZH RX MED GY IP 250 OP 250 PS 637: Performed by: HOSPITALIST

## 2017-03-29 PROCEDURE — 25000132 ZZH RX MED GY IP 250 OP 250 PS 637

## 2017-03-29 PROCEDURE — 84100 ASSAY OF PHOSPHORUS: CPT | Performed by: INTERNAL MEDICINE

## 2017-03-29 PROCEDURE — 40000275 ZZH STATISTIC RCP TIME EA 10 MIN

## 2017-03-29 PROCEDURE — 87077 CULTURE AEROBIC IDENTIFY: CPT | Performed by: INTERNAL MEDICINE

## 2017-03-29 PROCEDURE — 25000132 ZZH RX MED GY IP 250 OP 250 PS 637: Performed by: INTERNAL MEDICINE

## 2017-03-29 PROCEDURE — 87070 CULTURE OTHR SPECIMN AEROBIC: CPT | Performed by: INTERNAL MEDICINE

## 2017-03-29 PROCEDURE — 40000008 ZZH STATISTIC AIRWAY CARE

## 2017-03-29 PROCEDURE — 25000125 ZZHC RX 250: Performed by: INTERNAL MEDICINE

## 2017-03-29 PROCEDURE — 87205 SMEAR GRAM STAIN: CPT | Performed by: INTERNAL MEDICINE

## 2017-03-29 PROCEDURE — 25000131 ZZH RX MED GY IP 250 OP 636 PS 637: Performed by: INTERNAL MEDICINE

## 2017-03-29 PROCEDURE — 40000281 ZZH STATISTIC TRANSPORT TIME EA 15 MIN

## 2017-03-29 PROCEDURE — 25000125 ZZHC RX 250: Performed by: HOSPITALIST

## 2017-03-29 PROCEDURE — 80164 ASSAY DIPROPYLACETIC ACD TOT: CPT | Performed by: INTERNAL MEDICINE

## 2017-03-29 PROCEDURE — 80048 BASIC METABOLIC PNL TOTAL CA: CPT | Performed by: INTERNAL MEDICINE

## 2017-03-29 PROCEDURE — 85027 COMPLETE CBC AUTOMATED: CPT | Performed by: INTERNAL MEDICINE

## 2017-03-29 PROCEDURE — 74000 XR ABDOMEN PORT F1 VW: CPT

## 2017-03-29 PROCEDURE — 25000125 ZZHC RX 250: Performed by: PHYSICIAN ASSISTANT

## 2017-03-29 PROCEDURE — 99291 CRITICAL CARE FIRST HOUR: CPT | Performed by: INTERNAL MEDICINE

## 2017-03-29 PROCEDURE — 83735 ASSAY OF MAGNESIUM: CPT | Performed by: INTERNAL MEDICINE

## 2017-03-29 PROCEDURE — 94003 VENT MGMT INPAT SUBQ DAY: CPT

## 2017-03-29 PROCEDURE — 20000003 ZZH R&B ICU

## 2017-03-29 PROCEDURE — 80202 ASSAY OF VANCOMYCIN: CPT | Performed by: INTERNAL MEDICINE

## 2017-03-29 PROCEDURE — 76000 FLUOROSCOPY <1 HR PHYS/QHP: CPT

## 2017-03-29 PROCEDURE — 25000128 H RX IP 250 OP 636

## 2017-03-29 RX ORDER — AMINO ACIDS/PROTEIN HYDROLYS 15G-100/30
1 LIQUID (ML) ORAL DAILY
Status: DISCONTINUED | OUTPATIENT
Start: 2017-03-29 | End: 2017-04-07 | Stop reason: CLARIF

## 2017-03-29 RX ORDER — DEXTROSE MONOHYDRATE 50 MG/ML
INJECTION, SOLUTION INTRAVENOUS CONTINUOUS
Status: DISCONTINUED | OUTPATIENT
Start: 2017-03-29 | End: 2017-03-29

## 2017-03-29 RX ADMIN — PIPERACILLIN SODIUM,TAZOBACTAM SODIUM 3.38 G: 3; .375 INJECTION, POWDER, FOR SOLUTION INTRAVENOUS at 17:06

## 2017-03-29 RX ADMIN — HYDROMORPHONE HYDROCHLORIDE 0.5 MG: 1 INJECTION, SOLUTION INTRAMUSCULAR; INTRAVENOUS; SUBCUTANEOUS at 20:35

## 2017-03-29 RX ADMIN — BISACODYL 10 MG: 10 SUPPOSITORY RECTAL at 16:00

## 2017-03-29 RX ADMIN — Medication 2 MG/HR: at 00:10

## 2017-03-29 RX ADMIN — OSELTAMIVIR PHOSPHATE 30 MG: 6 POWDER, FOR SUSPENSION ORAL at 11:24

## 2017-03-29 RX ADMIN — POLYETHYLENE GLYCOL 3350 17 G: 17 POWDER, FOR SOLUTION ORAL at 11:21

## 2017-03-29 RX ADMIN — PIPERACILLIN SODIUM,TAZOBACTAM SODIUM 3.38 G: 3; .375 INJECTION, POWDER, FOR SOLUTION INTRAVENOUS at 11:06

## 2017-03-29 RX ADMIN — HYDROMORPHONE HYDROCHLORIDE 0.5 MG: 1 INJECTION, SOLUTION INTRAMUSCULAR; INTRAVENOUS; SUBCUTANEOUS at 17:06

## 2017-03-29 RX ADMIN — LIDOCAINE HYDROCHLORIDE 1 ML: 20 JELLY TOPICAL at 10:23

## 2017-03-29 RX ADMIN — GABAPENTIN 300 MG: 250 SUSPENSION ORAL at 16:00

## 2017-03-29 RX ADMIN — SENNOSIDES AND DOCUSATE SODIUM 2 TABLET: 8.6; 5 TABLET ORAL at 11:23

## 2017-03-29 RX ADMIN — MULTIVITAMIN 15 ML: LIQUID ORAL at 13:42

## 2017-03-29 RX ADMIN — OSELTAMIVIR PHOSPHATE 30 MG: 6 POWDER, FOR SUSPENSION ORAL at 20:22

## 2017-03-29 RX ADMIN — SODIUM CHLORIDE 2 UNITS/HR: 9 INJECTION, SOLUTION INTRAVENOUS at 14:45

## 2017-03-29 RX ADMIN — PIPERACILLIN SODIUM,TAZOBACTAM SODIUM 3.38 G: 3; .375 INJECTION, POWDER, FOR SOLUTION INTRAVENOUS at 22:14

## 2017-03-29 RX ADMIN — QUETIAPINE FUMARATE 50 MG: 50 TABLET, FILM COATED ORAL at 20:22

## 2017-03-29 RX ADMIN — POTASSIUM CHLORIDE 20 MEQ: 29.8 INJECTION, SOLUTION INTRAVENOUS at 11:55

## 2017-03-29 RX ADMIN — HYDROMORPHONE HYDROCHLORIDE 0.5 MG: 1 INJECTION, SOLUTION INTRAMUSCULAR; INTRAVENOUS; SUBCUTANEOUS at 08:40

## 2017-03-29 RX ADMIN — SODIUM CHLORIDE 500 MG: 9 INJECTION, SOLUTION INTRAVENOUS at 20:22

## 2017-03-29 RX ADMIN — DEXTROSE MONOHYDRATE: 50 INJECTION, SOLUTION INTRAVENOUS at 08:40

## 2017-03-29 RX ADMIN — PIPERACILLIN SODIUM,TAZOBACTAM SODIUM 3.38 G: 3; .375 INJECTION, POWDER, FOR SOLUTION INTRAVENOUS at 05:39

## 2017-03-29 RX ADMIN — CHLORHEXIDINE GLUCONATE 15 ML: 1.2 RINSE ORAL at 08:40

## 2017-03-29 RX ADMIN — HYDROMORPHONE HYDROCHLORIDE 0.5 MG: 1 INJECTION, SOLUTION INTRAMUSCULAR; INTRAVENOUS; SUBCUTANEOUS at 11:21

## 2017-03-29 RX ADMIN — PANTOPRAZOLE SODIUM 40 MG: 40 INJECTION, POWDER, FOR SOLUTION INTRAVENOUS at 11:55

## 2017-03-29 RX ADMIN — Medication 1 PACKET: at 13:42

## 2017-03-29 RX ADMIN — CHLORHEXIDINE GLUCONATE 15 ML: 1.2 RINSE ORAL at 20:22

## 2017-03-29 RX ADMIN — QUETIAPINE FUMARATE 50 MG: 50 TABLET, FILM COATED ORAL at 11:22

## 2017-03-29 RX ADMIN — POTASSIUM CHLORIDE 20 MEQ: 29.8 INJECTION, SOLUTION INTRAVENOUS at 07:39

## 2017-03-29 RX ADMIN — SENNOSIDES AND DOCUSATE SODIUM 2 TABLET: 8.6; 5 TABLET ORAL at 20:22

## 2017-03-29 RX ADMIN — GABAPENTIN 300 MG: 250 SUSPENSION ORAL at 11:24

## 2017-03-29 NOTE — PROGRESS NOTES
[facility] ICU RESPIRATORY NOTE  Date of Admission: 3/17/2017  Date of Intubation (most recent): 3/17/2017  Reason for Mechanical Ventilation: Respiratory Failure  Number of Days on Mechanical Ventilation: 13  Met Criteria for Pressure Support Trial: Yes  Length of Pressure Support Trial: Placed on PS 12/5 at 1130 and still on.    Ventilation Mode: CPAP/PS  FiO2 (%): 40 %  Rate Set (breaths/minute): 18 breaths/min  Tidal Volume Set (mL): 400 mL  PEEP (cm H2O): 5 cmH2O  Pressure Support (cm H2O): 12 cmH2O  Oxygen Concentration (%): 40 %  Resp: 26      ABG Results:     Recent Labs  Lab 03/27/17  0240 03/27/17  0050 03/25/17  0515 03/24/17  2320   PH 7.38 7.24* 7.49* 7.51*   PCO2 48* 67* 33* 31*   PO2 68* 83 68* 95   HCO3 28 29* 25 25   O2PER 60% 60 40 40       Plan: Cont PS trial as tolerated.    3/29/2017  Mandy Pantoja, RRT

## 2017-03-29 NOTE — PLAN OF CARE
Pt sedated overnight withdrawing from pain and  occasionally opening eyes to voice.  Blood pressure remained stable, no changes to vent settings.  Pt's daughter at bedside and updated throughout the night.  Will continue to monitor.

## 2017-03-29 NOTE — PLAN OF CARE
Problem: Individualization  Goal: Patient Preferences  Outcome: Improving  Pt sedated on versed drip.open eyes to voice but doesnot follow command and drifts off to sleep, SBT 10/5 done for 7.5 hours, pt tolerated well, LS coarse right side, at times clear on the left. Sputum amount decreasing , creamy white, sputum specimen sent for gram stain and culture. ST, low grade temp all day. Levine with good UOP. Urine clear laura all day. Blood sugar within parameter, off insulin drip now, trickle feed started peptamen at 10ml/hr with 100 cc water flush every hour. BS hypoactive, NO BM today prn suppository and miralax given. Turned every 2 hours, skin intact. Lesion on the left tongue improving. Family- spouse and brother at the bedside. POC discussed.

## 2017-03-29 NOTE — PROGRESS NOTES
Pt remains intubated on full ventilator support, no resp complication overnight.  Current ventilator setting;  Ventilation Mode: CMV/AC  FiO2 (%): 40 %  Rate Set (breaths/minute): 18 breaths/min  Tidal Volume Set (mL): 400 mL  PEEP (cm H2O): 5 cmH2O  Oxygen Concentration (%): 40 %  Resp: 31    Will continue to monitor the pt and assess daily.     Cruz Mahajan RT.

## 2017-03-29 NOTE — PROGRESS NOTES
Renal Medicine Progress Note                                Wyatt LAWSON Keyshawn MRN# [id number]   Age: 57 year old YOB: 1960   Date of Admission: 3/17/2017 Hospital LOS: 12                  Assessment/Plan:     Admitted with hypoxic respiratory failure due to influenza B and CAP.  Seen 03/25/17  regarding ARF.    1.  Apparent baseline normal renal function  2.  Historical absence of significant proteinuria  3.  ARF   -nonoliguric   -normal renal US   -U eosinophils non diagnostic   -modest elevation of myoglobin/CK   -presumed ATN  4.  Hypernatremia  5.  Gross hematuria   -traumatic bell?/clearing  6.  Respiratory failure   -vented      Serologic studies all non diagnostic  D5 added  Holding diuretics    Continue current management        Interval History:     Intubated and sedated.  Urine with less blood.  Difficulty with feeding tube limiting free water  intake.  D5 infusion begun earlier today.  UO reviewed         ROS:     Intubated and sedated: ROS unable    Medications and Allergies:     Reviewed    Physical Exam:     Vitals were reviewed  Patient Vitals for the past 8 hrs:   BP Temp Temp src Heart Rate Resp SpO2 Weight   03/29/17 0845 - 99.3  F (37.4  C) - 109 (!) 34 99 % -   03/29/17 0830 163/89 99.3  F (37.4  C) - 112 24 99 % -   03/29/17 0800 140/78 99.3  F (37.4  C) Bladder 102 24 98 % -   03/29/17 0748 - - - 103 (!) 32 96 % -   03/29/17 0734 - - - - - 99 % -   03/29/17 0730 153/79 99.7  F (37.6  C) - 108 19 100 % -   03/29/17 0700 135/74 100  F (37.8  C) - 105 28 100 % -   03/29/17 0600 140/74 100.8  F (38.2  C) - 107 28 99 % -   03/29/17 0500 - 102  F (38.9  C) - 117 (!) 32 98 % 75.9 kg (167 lb 5.3 oz)   03/29/17 0400 155/70 101.8  F (38.8  C) Bladder 119 28 99 % -   03/29/17 0300 150/66 101.8  F (38.8  C) - 117 29 97 % -   03/29/17 0200 149/69 101.5  F (38.6  C) - 113 27 98 % -     I/O last 3 completed shifts:  In: 1085.47 [I.V.:1025.47; NG/GT:60]  Out: 2600 [Urine:2600]    Vitals:     03/24/17 0600 03/25/17 0502 03/26/17 0324 03/28/17 0700   Weight: 71 kg (156 lb 8.4 oz) 71.4 kg (157 lb 6.5 oz) 70.1 kg (154 lb 8.7 oz) 75.9 kg (167 lb 5.3 oz)    03/29/17 0500   Weight: 75.9 kg (167 lb 5.3 oz)       GENERAL:  intubated and sedated  HEENT: ETT  RESP:  clear anteriorly  CV: RRR, normal S1 S2  ABDOMEN: soft, nontender  :  bell catheter  MS: no clubbing, cyanosis   SKIN: clear without significant rashes or lesions  EXT: warm, no edema    Data:       Recent Labs  Lab 03/29/17  0530 03/28/17  1324 03/28/17  0430 03/27/17  1830 03/27/17  0420   *  --  146* 144 146*   POTASSIUM 3.2* 3.6 3.3* 3.9 4.3   CHLORIDE 116*  --  110* 108 111*   CO2 27  --  28 28 26   ANIONGAP 9  --  8 8 9   *  --  119* 180* 174*   BUN 46*  --  53* 48* 43*   CR 1.78*  --  1.79* 1.55* 1.47*   GFRESTIMATED 40*  --  40* 47* 50*   GFRESTBLACK 48*  --  48* 57* 60*   LEONIE 7.7*  --  7.8* 7.6* 7.9*         Recent Labs   Lab Test  03/29/17   0530  03/28/17   0430  03/27/17   1830  03/27/17   0420  03/26/17   0340  03/25/17   0430  03/24/17   1427  03/24/17   0430  03/23/17   2215  03/23/17   1423   CR  1.78*  1.79*  1.55*  1.47*  1.29*  1.47*  1.71*  1.77*  1.61*  1.65*     Recent Labs   Lab Test  03/29/17   0530  03/28/17   0430  03/27/17   1830  03/27/17   0420  03/26/17   0340  03/25/17   0430  03/24/17   1427  03/24/17   0430  03/23/17   2215  03/23/17   1423   NA  152*  146*  144  146*  152*  151*  153*  150*  149*  149*       Recent Labs  Lab 03/27/17  0420 03/26/17  0340 03/25/17  0430 03/24/17  1427   ALBUMIN 1.8* 1.6* 1.2* 1.2*       Recent Labs  Lab 03/29/17  0530 03/28/17  1324 03/28/17  0430 03/27/17  0420 03/26/17  0340   PHOS 2.6 4.3 1.9* 2.4* 3.3   HGB 7.3*  --  7.6* 8.6* 10.0*       Recent Labs  Lab 03/25/17  1418   COLOR Red   APPEARANCE Cloudy   URINEGLC >1000*   URINEBILI Negative   URINEKETONE Negative   SG 1.019   UBLD Moderate*   URINEPH 6.5   PROTEIN 100*   NITRITE Negative   LEUKEST Negative   RBCU  >182*   WBCU >182*     No lab results found.  Recent Labs   Lab Test  03/27/17   0420  03/25/17   0430  03/24/17   1427  03/24/17   0430   MYOGLOBIN   --    --    --   552*   CKT  881*  1448*  1405*  1179*       G Tamir Godoy    [facility] - Nephrology  [phone]

## 2017-03-29 NOTE — PROGRESS NOTES
RADIOLOGY PROCEDURE NOTE  Patient name: Wyatt Mahajan  MRN: [id number]  : 1960    Pre-procedure diagnosis: Nutrition needs, intubated  Post-procedure diagnosis: Same    Procedure Date/Time: 2017  10:31 AM  Procedure: Advancement of feeding tube past pylorus  Estimated blood loss: None  Specimen(s) collected with description: none  The patient tolerated the procedure well with no immediate complications.  Significant findings:Tube tip near ligament of Treitz, ready for immediate use    See imaging dictation for procedural details.    Provider name: Jamaal Cannon  Assistant(s):None

## 2017-03-29 NOTE — PROGRESS NOTES
SPIRITUAL HEALTH SERVICES  Progress Note  [facility] ICU    Initial visit with patent's brother. The patient's brother understands English and was able to speak for the patient.  Introduced  services.  The brother reports that the family prays for the patient daily and feels that he is doing well.  I offered to make fela connections with an imam but he declined this at this time.   Plan: I let the family know that we can be paged if needed.    Olamide Gonzalez  Chaplain Resident  Pager [phone]

## 2017-03-29 NOTE — PHARMACY-VANCOMYCIN DOSING SERVICE
Pharmacy Vancomycin Note  Date of Service 2017  Patient's  1960   57 year old, male    Indication: [facility] Pneumonia  Goal Trough Level: 15-20 mg/L  Day of Therapy: 4  Current Vancomycin regimen:  Intermittent doses based on levels  Current estimated CrCl = Estimated Creatinine Clearance: 50.3 mL/min (based on Cr of 1.78).    Creatinine for last 3 days  3/27/2017:  4:20 AM Creatinine 1.47 mg/dL;  6:30 PM Creatinine 1.55 mg/dL  3/28/2017:  4:30 AM Creatinine 1.79 mg/dL  3/29/2017:  5:30 AM Creatinine 1.78 mg/dL    Recent Vancomycin Levels (past 3 days)  3/28/2017: 12:18 PM Vancomycin Level 39.5 mg/L  3/29/2017:  5:30 AM Vancomycin Level 23.7 mg/L    Vancomycin IV Administrations (past 72 hours)                   vancomycin (VANCOCIN) 1500 mg in 0.9% NaCl 250 mL PREMIX (mg) 1,500 mg New Bag 17 0114     1,500 mg New Bag 17 1319     1,500 mg New Bag  0122     1,500 mg New Bag 17 1249                Nephrotoxins and other renal medications (Future)    Start     Dose/Rate Route Frequency Ordered Stop    17 1800  vancomycin (VANCOCIN) 1500 mg in 0.9% NaCl 250 mL PREMIX      1,500 mg Intravenous ONCE 17 0754      17 1303  vancomycin place chou - receiving intermittent dosing      1 each Does not apply SEE ADMIN INSTRUCTIONS 17 1303      17 1045  piperacillin-tazobactam (ZOSYN) 3.375 g vial to attach to  mL bag     Comments:  Pharmacy can adjust dose based on renal function.    3.375 g  over 1 Hours Intravenous EVERY 6 HOURS 17 1033               Contrast Orders - past 72 hours     None          Interpretation of levels and current regimen:  Trough level is  Supratherapeutic    Has serum creatinine changed > 50% in last 72 hours: No    Urine output:  good urine output    Renal Function: SCr same as 3-28  Plan:  1.  Will give 1.5gm x 1 dose at 1800 tonight  2.  Pharmacy will check trough levels as appropriate in AM 3-31.    3. Serum  creatinine levels will be ordered daily for the first week of therapy and at least twice weekly for subsequent weeks.      Krystal Truong        .

## 2017-03-29 NOTE — CONSULTS
CLINICAL NUTRITION SERVICES BRIEF NOTE    Consult received for RD to assess and order TF per MNT protocol - restart trickle TF     See RD note from 3/28 for full nutrition reassessment.     Dosing Weight 69.6 kg (admit weight)      ASSESSED NUTRITION NEEDS:  Estimated Energy Needs: 5248-8813 kcals (25-30 Kcal/Kg)  Justification: maintenance  Estimated Protein Needs: 105+ grams protein (1.5+ g pro/Kg)  Justification: hypercatabolism with critical illness    New Findings:  - Per rounds, p/p FT successfully placed. Trickle TF to restart this afternoon.     - Labs:   Na 152 (H)  K 3.2 (L) --> replacement given this am  Mg 2.9 (H)  Phos 2.6 wnl  BGs 121-152    - I/O:   Last BM 3/27 --> Scheduled Senokot for bowel regimen, PRN Miralax given    - Meds:  D5 @ 100 ml/hr for BG control until TF start (provides 408 kcal daily from dextrose)  Insulin gtt  Versed for sedation    Interventions:  Begin Trickle TF: Peptamen 1.5 @ 10 ml/hr (240 ml) to provide 360 kcal, 16 g pro, 45 g CHO, 0 g fiber, 184 mL free H2O.   - Add 1 Pkt Prostat daily = 100 kcal, 15 g pro  - Add Certavite daily via FT due to mucosal PI  - Free water per provider (400 mL Q4Hrs)    Total provisions (TF + Prostat + D5) = 868 kcal (12 kcal/kg), 31 g pro (0.4 g/kg).     Recommend d/c D5 IVF with TF start.     Implementation:  Collaboration and referral of nutrition care: Pt POC discussed during interdisciplinary rounds.  EN Schedule: ordered in EPIC as above.       Marleen Cole, RD, LD

## 2017-03-29 NOTE — PROGRESS NOTES
Critical Care Progress Note      03/29/2017    Name: Wyatt Mahajan MRN#: [id number]   Age: 57 year old YOB: 1960     Hsptl Day# 12  ICU DAY #10    MV DAY #10           Problem List:   Active Problems:    Acute respiratory failure with hypoxia (H)  MSSA/enterobacter/klebsiella pna   MSSA bacteremia--resolved  VAP (GNR)  Influenza B  JOSE--stable  Hematuria---bell trauma?--improving  pancreatitis          Summary/Hospital Course:   Wyatt Mahajan is a 57 year old male admitted on 3/17 to the floor, presenting with acute hypoxic respiratory failure due to influenza B and CAP, right chest wall pain and uncontrolled DM-II. Patient was subsequently transferred from the floor to the MICU for increased work of breathing, accessory muscle use and respiratory distress. Was intubated due to increased work of breathing, tachypnea and decreasing O2 sats.    3/18:  Weaned vent yesterday and overnight.  Comfortably sedated this AM.  +blood cx for staph aureus overnight.  On vanco--repeating blood cx.    3/19:  Still minimal vent settings but minute ventilation still ~13 so won't try to extubate today.  Weaning from insulin drip back to lantus/SSI now on steady TF.  Blood cultures persistently positive--MSSA.  Change to nafcillin.  Reculture.  TTE.    3/20:  No changes, no events. Gentle diuresis yesterday but still positive.  Increasing diuresis today.     3/21: Not tolerating CMV/AC last evening (tachycardia, tachypnea, hypertension), so switched to pressure support which improved vital signs overnight. Switched back to CMV/AC this morning at 5:30am, now tolerating. Cr bump so no further diuresis today. Changing sedation to precedex. Echo showed no vegetations/masses on valves. Will continue to follow cultures, now growth so far from 3/20 cultures.    3/22: Pressure support trial last evening was stopped just after its initiation due to inc. RR to the 40's. Eyes open to voice. Given valproate for delirium.  Propofol wean and transition to precedex resulted in inc HR, RR and BP. Nursing notes on exam, 'wet crackles' in L lung and thick creamy red-streaked secretions. Ppeak to mid-30's. No BM yet, just smears but constipation treated with senna, dulcolax and miralax.    3/23: Episodes of tachycardia and hypertension to 169/93, coreg started and BP improved with versed and propofol. Urine was bloody this morning, suspect bell trauma. Febrile to 101.9 with PRN tylenol given. Still no BM though treated with senna, dulcolax and miralax. Family worried about his condition and the course of his illness.    3/24: On pressure support respiratory trial since 5pm last evening. Off of propofol sedation since 5pm as well. Critical lab value of IR=8549 returned this AM, triglycerides were also elevated to 677. Patient is febrile to 101.5 this morning and with lower blood glucose on last four checks so Lantus was decreased. 25ml D50 given for bs of 65. Cr increased today, likely due to gentle diuresis. K+ repleted. Urine continues to be bloody. Elevated liver enzymes. WBC still increased though now down-trending.    3/25: Family refused PICC line yesterday evening. Abx's for mssa were changed from nafcillin to ancef. D5W started for hypernatremia. Overnight had episodes of HR pause and thrashing; sedation with precedex, then changed to versed. Also bradycardic in early morning; CXR checked and ET tube was in good position.    3/27: low calcium and hyperglycemia over weekend-->checked lipase: 4k.  Changing d5 to po free h2o.  Low dose hydralazine for hypertension given recent bradycardia.  In a fib with rvr overnight--resolved with amiodarone.    3/28:  No events overnight.  NPO for now.  Placing post-pyloric tube.    3/29:  No events overnight.  Feeding tube by IR today; post-pyloric.  Awakens and opens eyes on sedation wean, but still not following.      Assessment and plan :     Wyatt Mahajan IS a 57 year old male admitted on  3/17/2017 for respiratory failure, flu B, mssa pna and mssa bacteremia.   I have personally reviewed the daily labs, imaging studies, cultures and discussed the case with referring physician and consulting physicians.   My assessment and plan by system for this patient is as follows:    Neurology/Psychiatry:   1. Sedation:  Discontinued propofol given CK and Triglyceride lab values on 3/24, transitioned to precedex but patient thrashing and hypertensive/bradycardic. Versed drip begun 3/25 AM is providing adequate sedation. Prn dilaudid also available.  2. Delirium:  Seroquel, 50mg bid.  (). Valproate added evening 3/22, decreased to q 24 hr dosing 3/24.  3. H/o Diabetic neuropathy 2/2 DM-II:     -- gabapentin 300mg, TID.    Cardiovascular:   1. Hypertension: standing hydralazine started  2. H/o HLD:  hold atorvastatin (LFT elevated)  3. H/o PAD:  hold ASA  4. Hyperlactemia:  Resolved  5.  A fib with RVR:  In setting of critical illness.  Resolved on amiodarone.    Pulmonary/Ventilator Management:   1.  Respiratory failure, hypoxemic:  2/2 mssa pna and influenza B.  Continue to ventilate with low tidal volume strategy (490ml ~ 7ml/kg). CXR 3/26 with bilat infilt. Sputum culture 3/24 + for GNR (3 strains) and staph aureus (still MSSA)  --Stop nafcillin 3/26, start zosyn and vanco 3/26 until sensitivity available/cont tamiflu.  Stopping vanco 3/29.  --Lung protective vent strategy- PCV plus assist ventilation     GI and Nutrition :   1.  TF  2.  PPI for PUD prophylaxis  3.  Constipation--improved on bowel reg.  4. Elevated liver enzymes: newly elevated on 3/24, nl on 3/17, ? Med related vs congestion vs? RUQ U/S revealed a distended GB w/sludge. Subsequent LFT's have shown decreases of Alk phos, AST and ALT levels since discontinuation of statin.  --serial LFT's  --likely 2/2 critical illness vs pancreatitis  5.  Pancreatitis:  Due to critical illness vs propofol.  No further propofol.  --restarting pos-pyloric  trickle feeds.  post-pyloric tube by IR.  --Increased PPI to bid  --bladder pressure 15,  CT abd/pelv without acute abnormality, abd distension improving.  --calcium has been stable, ok to stop trending.    Renal/Fluids/Electrolytes:   1. JOSE--fluctuating. UA showed no casts. Urine eos < 1%  -- stable today, keep holding diuresis  2. Hypernatremia - . PO free water    :  1. Hematuria: bloodly urine AM of 3/23 probably due to trauma from the bell catheter. Now improving.    Infectious Disease:   1. Flu B:  Tamiflu  2. MSSA pna/bacteremia: Nafcillin discontinued 3/24, changed to Ancef. Now sputum GNR and staph (see pulm) Most recent bld cultures show no growth. TTE did not show valvular masses/vegetations. CT chest negative for occult abscess. Now switched to vanc/zos 3/26 for HCAP.  Vanco stopped 3/29 as all cultures zosyn sensitive.  -- Switched abx as above  --repeat sputum cx 3/29.    Endocrine:   1. Hyperglycemia:    --Insulin drip    Hematology/Oncology:   1. Plt/hgb stable  2. Leukocytosis-- 2/2 infection. Patient febrile on and off. Sputum culture 3/29, pending.  --Abx as above     IV/Access:   1. Venous access - peripheral and PICC    ICU Prophylaxis:   1. DVT: Discontinue Hep Subq given hematuria of unknown cause, has mechanical prophylaxis  2. VAP: HOB 30 degrees, chlorhexidine rinse  3. Stress Ulcer: PPI  4. Restraints: Nonviolent soft two point restraints required and necessary for patient safety and continued cares and good effect as patient continues to pull at necessary lines, tubes despite education and distraction. Will readdress daily.   6. Feeding - post-pyloric trickle feeds  7. Family Update: wife at bedside.  8. Disposition - critically ill    Fam mtg 3/27:  Held full care conference with wife, brother and daughter.  Discussed his course and complications.  Will conitnue with current txt for now.  All questions asked and answered.         Key Medications:   Reviewed          Physical  Examination:   Temp:  [99  F (37.2  C)-102  F (38.9  C)] 99  F (37.2  C)  Heart Rate:  [] 101  Resp:  [9-34] 18  BP: (122-163)/(61-92) 129/63  FiO2 (%):  [40 %] 40 %  SpO2:  [95 %-100 %] 99 %    Intake/Output Summary (Last 24 hours) at 03/29/17 1255  Last data filed at 03/29/17 1200   Gross per 24 hour   Intake            659.4 ml   Output             2700 ml   Net          -2040.6 ml       Wt Readings from Last 4 Encounters:   03/29/17 75.9 kg (167 lb 5.3 oz)   03/16/17 70.3 kg (155 lb)   01/17/17 71.3 kg (157 lb 1.6 oz)   11/30/16 72.6 kg (160 lb)     BP - Mean:  [] 97  Ventilation Mode: CPAP/PS  FiO2 (%): 40 %  Rate Set (breaths/minute): 18 breaths/min  Tidal Volume Set (mL): 400 mL  PEEP (cm H2O): 5 cmH2O  Pressure Support (cm H2O): 12 cmH2O  Oxygen Concentration (%): 40 %  Resp: 18    Recent Labs  Lab 03/27/17  0240 03/27/17  0050 03/25/17  0515 03/24/17  2320   PH 7.38 7.24* 7.49* 7.51*   PCO2 48* 67* 33* 31*   PO2 68* 83 68* 95   HCO3 28 29* 25 25   O2PER 60% 60 40 40     GEN: sedated, intubated, opens eyes but otherwise non-responsive  HEENT: head ncat, sclera anicteric, OP patent, trachea midline   PULM: unlabored, coarse lung sounds anteriorly L>R, rhonchi bilaterally.  CV/COR: RRR S1/S2, No rub, murmur or gallop  ABD: firm, distended but improving, nontender, hypoactive bowel sounds, no masses  EXT:  peripheral edema present in hands>feet, warm x4 and well-perfused.   NEURO: sedated, not following commands.  SKIN: no obvious rash  LINES: clean, dry intact         Data:       Recent Labs  Lab 03/29/17  0530 03/28/17  1324 03/28/17  0430 03/27/17  1830 03/27/17  0420 03/26/17  0340  03/25/17  0430  03/24/17  1427   *  --  146* 144 146* 152*  --  151*  --  153*   POTASSIUM 3.2* 3.6 3.3* 3.9 4.3 4.6  < > 3.8  < > 3.2*   CHLORIDE 116*  --  110* 108 111* 117*  --  117*  --  115*   CO2 27  --  28 28 26 27  --  26  --  27   ANIONGAP 9  --  8 8 9 8  --  8  --  11   *  --  119* 180* 174*  232*  --  252*  --  93   BUN 46*  --  53* 48* 43* 36*  --  26  --  27   CR 1.78*  --  1.79* 1.55* 1.47* 1.29*  --  1.47*  --  1.71*   GFRESTIMATED 40*  --  40* 47* 50* 58*  --  50*  --  42*   GFRESTBLACK 48*  --  48* 57* 60* 70  --  60*  --  51*   LEONIE 7.7*  --  7.8* 7.6* 7.9* 7.7*  --  7.6*  --  7.4*   MAG 2.9*  --  3.1*  --  3.2* 3.7*  --  3.4*  --   --    PHOS 2.6 4.3 1.9*  --  2.4* 3.3  --  2.7  --   --    PROTTOTAL  --   --   --   --  7.2 7.5  --  7.2  --  7.5   ALBUMIN  --   --   --   --  1.8* 1.6*  --  1.2*  --  1.2*   BILITOTAL  --   --   --   --  1.3 1.1  --  1.4*  --  3.5*   ALKPHOS  --   --   --   --  315* 306*  --  353*  --  389*   AST  --   --   --   --  224* 162*  --  196*  --  213*   ALT  --   --   --   --  79* 61  --  86*  --  109*   < > = values in this interval not displayed.    CBC    Recent Labs  Lab 03/29/17  0530 03/28/17  0430 03/27/17  0420 03/26/17  0340   WBC 21.7* 23.3* 28.4* 27.6*   RBC 2.33* 2.42* 2.71* 3.17*   HGB 7.3* 7.6* 8.6* 10.0*   HCT 21.4* 22.3* 26.3* 30.0*   MCV 92 92 97 95   MCH 31.3 31.4 31.7 31.5   MCHC 34.1 34.1 32.7 33.3   RDW 15.1* 15.6* 17.5* 16.6*   * 326 248 247     INR    Recent Labs  Lab 03/27/17  1830 03/23/17  0650   INR 1.15* 1.19*     Arterial Blood Gas    Recent Labs  Lab 03/27/17  0240 03/27/17  0050 03/25/17  0515 03/24/17  2320   PH 7.38 7.24* 7.49* 7.51*   PCO2 48* 67* 33* 31*   PO2 68* 83 68* 95   HCO3 28 29* 25 25   O2PER 60% 60 40 40       All cultures:    Recent Labs  Lab 03/25/17  1245 03/24/17  1750   CULT No growth after 4 days Heavy growth Enterobacter cloacae complexLight growth Klebsiella pneumoniaeLight growth Strain 2 Enterobacter cloacae complexHeavy growth Staphylococcus aureusLight growth Anna albicans / dubliniensis Candida albicans and Candida dubliniensis are not routinely speciated Susceptibility testing not routinely done*     Imaging:    CT-Chest from 3/22:  1. Extensive infiltrate and consolidation in both lungs has  overall  improved slightly since 3/17/2017.   2. Small bilateral pleural effusions are new since the previous exam.  3. Mildly prominent and borderline-enlarged mediastinal and right  hilar lymph nodes have a similar appearance to the previous exam,  given the noncontrast technique on today's study.    D/w Dr. Root of nephrology service.    Billing: This patient is critically ill: Yes. Total critical care time today 35 min.    Antelmo Aponte MD

## 2017-03-30 ENCOUNTER — APPOINTMENT (OUTPATIENT)
Dept: ULTRASOUND IMAGING | Facility: CLINIC | Age: 57
DRG: 870 | End: 2017-03-30
Attending: INTERNAL MEDICINE
Payer: COMMERCIAL

## 2017-03-30 LAB
ABO + RH BLD: NORMAL
ACID FAST STN SPEC: NORMAL
ANION GAP SERPL CALCULATED.3IONS-SCNC: 6 MMOL/L (ref 3–14)
BLD GP AB SCN SERPL QL: NORMAL
BLD PROD TYP BPU: NORMAL
BLOOD BANK CMNT PATIENT-IMP: NORMAL
BUN SERPL-MCNC: 35 MG/DL (ref 7–30)
CALCIUM SERPL-MCNC: 7.7 MG/DL (ref 8.5–10.1)
CHLORIDE SERPL-SCNC: 117 MMOL/L (ref 94–109)
CO2 SERPL-SCNC: 29 MMOL/L (ref 20–32)
CREAT SERPL-MCNC: 1.57 MG/DL (ref 0.66–1.25)
ERYTHROCYTE [DISTWIDTH] IN BLOOD BY AUTOMATED COUNT: 15.4 % (ref 10–15)
GFR SERPL CREATININE-BSD FRML MDRD: 46 ML/MIN/1.7M2
GLUCOSE BLDC GLUCOMTR-MCNC: 109 MG/DL (ref 70–99)
GLUCOSE BLDC GLUCOMTR-MCNC: 111 MG/DL (ref 70–99)
GLUCOSE BLDC GLUCOMTR-MCNC: 137 MG/DL (ref 70–99)
GLUCOSE BLDC GLUCOMTR-MCNC: 151 MG/DL (ref 70–99)
GLUCOSE BLDC GLUCOMTR-MCNC: 152 MG/DL (ref 70–99)
GLUCOSE BLDC GLUCOMTR-MCNC: 153 MG/DL (ref 70–99)
GLUCOSE BLDC GLUCOMTR-MCNC: 154 MG/DL (ref 70–99)
GLUCOSE BLDC GLUCOMTR-MCNC: 159 MG/DL (ref 70–99)
GLUCOSE BLDC GLUCOMTR-MCNC: 166 MG/DL (ref 70–99)
GLUCOSE BLDC GLUCOMTR-MCNC: 170 MG/DL (ref 70–99)
GLUCOSE BLDC GLUCOMTR-MCNC: 179 MG/DL (ref 70–99)
GLUCOSE BLDC GLUCOMTR-MCNC: 98 MG/DL (ref 70–99)
GLUCOSE SERPL-MCNC: 159 MG/DL (ref 70–99)
HCT VFR BLD AUTO: 20.1 % (ref 40–53)
HGB BLD-MCNC: 6.7 G/DL (ref 13.3–17.7)
LIPASE SERPL-CCNC: 2752 U/L (ref 73–393)
MAGNESIUM SERPL-MCNC: 2.7 MG/DL (ref 1.6–2.3)
MCH RBC QN AUTO: 31.6 PG (ref 26.5–33)
MCHC RBC AUTO-ENTMCNC: 33.3 G/DL (ref 31.5–36.5)
MCV RBC AUTO: 95 FL (ref 78–100)
MICRO REPORT STATUS: NORMAL
NUM BPU REQUESTED: 1
PHOSPHATE SERPL-MCNC: 2.8 MG/DL (ref 2.5–4.5)
PLATELET # BLD AUTO: 528 10E9/L (ref 150–450)
POTASSIUM SERPL-SCNC: 3.7 MMOL/L (ref 3.4–5.3)
RBC # BLD AUTO: 2.12 10E12/L (ref 4.4–5.9)
SODIUM SERPL-SCNC: 152 MMOL/L (ref 133–144)
SPECIMEN EXP DATE BLD: NORMAL
SPECIMEN EXP DATE BLD: NORMAL
SPECIMEN SOURCE: NORMAL
WBC # BLD AUTO: 20 10E9/L (ref 4–11)

## 2017-03-30 PROCEDURE — 93971 EXTREMITY STUDY: CPT | Mod: RT

## 2017-03-30 PROCEDURE — 99291 CRITICAL CARE FIRST HOUR: CPT | Performed by: INTERNAL MEDICINE

## 2017-03-30 PROCEDURE — 86900 BLOOD TYPING SEROLOGIC ABO: CPT | Performed by: HOSPITALIST

## 2017-03-30 PROCEDURE — 86901 BLOOD TYPING SEROLOGIC RH(D): CPT | Performed by: INTERNAL MEDICINE

## 2017-03-30 PROCEDURE — 87116 MYCOBACTERIA CULTURE: CPT | Performed by: HOSPITALIST

## 2017-03-30 PROCEDURE — 25000128 H RX IP 250 OP 636: Performed by: HOSPITALIST

## 2017-03-30 PROCEDURE — 87015 SPECIMEN INFECT AGNT CONCNTJ: CPT | Performed by: HOSPITALIST

## 2017-03-30 PROCEDURE — 86900 BLOOD TYPING SEROLOGIC ABO: CPT | Performed by: INTERNAL MEDICINE

## 2017-03-30 PROCEDURE — 40000008 ZZH STATISTIC AIRWAY CARE

## 2017-03-30 PROCEDURE — 86850 RBC ANTIBODY SCREEN: CPT | Performed by: INTERNAL MEDICINE

## 2017-03-30 PROCEDURE — 83690 ASSAY OF LIPASE: CPT | Performed by: INTERNAL MEDICINE

## 2017-03-30 PROCEDURE — 84100 ASSAY OF PHOSPHORUS: CPT | Performed by: INTERNAL MEDICINE

## 2017-03-30 PROCEDURE — 25000128 H RX IP 250 OP 636: Performed by: INTERNAL MEDICINE

## 2017-03-30 PROCEDURE — 25000132 ZZH RX MED GY IP 250 OP 250 PS 637

## 2017-03-30 PROCEDURE — 94003 VENT MGMT INPAT SUBQ DAY: CPT

## 2017-03-30 PROCEDURE — 87206 SMEAR FLUORESCENT/ACID STAI: CPT | Performed by: HOSPITALIST

## 2017-03-30 PROCEDURE — 85027 COMPLETE CBC AUTOMATED: CPT | Performed by: INTERNAL MEDICINE

## 2017-03-30 PROCEDURE — 00000146 ZZHCL STATISTIC GLUCOSE BY METER IP

## 2017-03-30 PROCEDURE — 25000125 ZZHC RX 250: Performed by: INTERNAL MEDICINE

## 2017-03-30 PROCEDURE — 25000131 ZZH RX MED GY IP 250 OP 636 PS 637: Performed by: INTERNAL MEDICINE

## 2017-03-30 PROCEDURE — 83735 ASSAY OF MAGNESIUM: CPT | Performed by: INTERNAL MEDICINE

## 2017-03-30 PROCEDURE — 80048 BASIC METABOLIC PNL TOTAL CA: CPT | Performed by: INTERNAL MEDICINE

## 2017-03-30 PROCEDURE — 94640 AIRWAY INHALATION TREATMENT: CPT

## 2017-03-30 PROCEDURE — 86923 COMPATIBILITY TEST ELECTRIC: CPT | Performed by: INTERNAL MEDICINE

## 2017-03-30 PROCEDURE — 25000132 ZZH RX MED GY IP 250 OP 250 PS 637: Performed by: HOSPITALIST

## 2017-03-30 PROCEDURE — 40000281 ZZH STATISTIC TRANSPORT TIME EA 15 MIN

## 2017-03-30 PROCEDURE — 20000003 ZZH R&B ICU

## 2017-03-30 PROCEDURE — 40000239 ZZH STATISTIC VAT ROUNDS

## 2017-03-30 PROCEDURE — 86480 TB TEST CELL IMMUN MEASURE: CPT | Performed by: INTERNAL MEDICINE

## 2017-03-30 PROCEDURE — 40000275 ZZH STATISTIC RCP TIME EA 10 MIN

## 2017-03-30 PROCEDURE — 25000132 ZZH RX MED GY IP 250 OP 250 PS 637: Performed by: INTERNAL MEDICINE

## 2017-03-30 PROCEDURE — 86901 BLOOD TYPING SEROLOGIC RH(D): CPT | Performed by: HOSPITALIST

## 2017-03-30 RX ORDER — CEFAZOLIN SODIUM 1 G/3ML
1 INJECTION, POWDER, FOR SOLUTION INTRAMUSCULAR; INTRAVENOUS EVERY 8 HOURS
Status: DISCONTINUED | OUTPATIENT
Start: 2017-03-30 | End: 2017-04-11 | Stop reason: HOSPADM

## 2017-03-30 RX ORDER — QUETIAPINE FUMARATE 100 MG/1
100 TABLET, FILM COATED ORAL 2 TIMES DAILY
Status: DISCONTINUED | OUTPATIENT
Start: 2017-03-30 | End: 2017-03-31

## 2017-03-30 RX ORDER — PIPERACILLIN SODIUM, TAZOBACTAM SODIUM 4; .5 G/20ML; G/20ML
4.5 INJECTION, POWDER, LYOPHILIZED, FOR SOLUTION INTRAVENOUS EVERY 6 HOURS
Status: DISCONTINUED | OUTPATIENT
Start: 2017-03-30 | End: 2017-04-08

## 2017-03-30 RX ORDER — QUETIAPINE FUMARATE 50 MG/1
50 TABLET, FILM COATED ORAL ONCE
Status: COMPLETED | OUTPATIENT
Start: 2017-03-30 | End: 2017-03-30

## 2017-03-30 RX ORDER — HEPARIN SODIUM 5000 [USP'U]/.5ML
5000 INJECTION, SOLUTION INTRAVENOUS; SUBCUTANEOUS EVERY 8 HOURS
Status: DISCONTINUED | OUTPATIENT
Start: 2017-03-30 | End: 2017-04-11 | Stop reason: HOSPADM

## 2017-03-30 RX ADMIN — OSELTAMIVIR PHOSPHATE 30 MG: 6 POWDER, FOR SUSPENSION ORAL at 08:09

## 2017-03-30 RX ADMIN — PIPERACILLIN SODIUM,TAZOBACTAM SODIUM 3.38 G: 3; .375 INJECTION, POWDER, FOR SOLUTION INTRAVENOUS at 04:08

## 2017-03-30 RX ADMIN — MIDAZOLAM HYDROCHLORIDE 2 MG: 1 INJECTION, SOLUTION INTRAMUSCULAR; INTRAVENOUS at 19:02

## 2017-03-30 RX ADMIN — PANTOPRAZOLE SODIUM 40 MG: 40 TABLET, DELAYED RELEASE ORAL at 12:12

## 2017-03-30 RX ADMIN — OXYCODONE HYDROCHLORIDE 5 MG: 5 TABLET ORAL at 08:09

## 2017-03-30 RX ADMIN — PIPERACILLIN AND TAZOBACTAM 4.5 G: 4; .5 INJECTION, POWDER, FOR SOLUTION INTRAVENOUS at 17:55

## 2017-03-30 RX ADMIN — OSELTAMIVIR PHOSPHATE 30 MG: 6 POWDER, FOR SUSPENSION ORAL at 20:42

## 2017-03-30 RX ADMIN — VALPROATE SODIUM 500 MG: 100 INJECTION, SOLUTION INTRAVENOUS at 14:15

## 2017-03-30 RX ADMIN — GABAPENTIN 300 MG: 250 SUSPENSION ORAL at 17:55

## 2017-03-30 RX ADMIN — QUETIAPINE FUMARATE 50 MG: 50 TABLET, FILM COATED ORAL at 14:15

## 2017-03-30 RX ADMIN — CEFAZOLIN SODIUM 1 G: 1 INJECTION, POWDER, FOR SOLUTION INTRAMUSCULAR; INTRAVENOUS at 21:00

## 2017-03-30 RX ADMIN — GABAPENTIN 300 MG: 250 SUSPENSION ORAL at 00:17

## 2017-03-30 RX ADMIN — Medication 1 PACKET: at 10:11

## 2017-03-30 RX ADMIN — SODIUM CHLORIDE 1 UNITS/HR: 9 INJECTION, SOLUTION INTRAVENOUS at 20:42

## 2017-03-30 RX ADMIN — QUETIAPINE FUMARATE 100 MG: 100 TABLET, FILM COATED ORAL at 20:42

## 2017-03-30 RX ADMIN — CEFAZOLIN SODIUM 1 G: 1 INJECTION, POWDER, FOR SOLUTION INTRAMUSCULAR; INTRAVENOUS at 14:45

## 2017-03-30 RX ADMIN — ACETAMINOPHEN 650 MG: 325 TABLET, FILM COATED ORAL at 06:08

## 2017-03-30 RX ADMIN — PANTOPRAZOLE SODIUM 40 MG: 40 INJECTION, POWDER, FOR SOLUTION INTRAVENOUS at 00:17

## 2017-03-30 RX ADMIN — SENNOSIDES AND DOCUSATE SODIUM 1 TABLET: 8.6; 5 TABLET ORAL at 20:42

## 2017-03-30 RX ADMIN — GABAPENTIN 300 MG: 250 SUSPENSION ORAL at 08:08

## 2017-03-30 RX ADMIN — QUETIAPINE FUMARATE 50 MG: 50 TABLET, FILM COATED ORAL at 08:09

## 2017-03-30 RX ADMIN — MULTIVITAMIN 15 ML: LIQUID ORAL at 08:09

## 2017-03-30 RX ADMIN — CHLORHEXIDINE GLUCONATE 15 ML: 1.2 RINSE ORAL at 08:09

## 2017-03-30 RX ADMIN — ACETAMINOPHEN 650 MG: 160 SOLUTION ORAL at 14:11

## 2017-03-30 RX ADMIN — CHLORHEXIDINE GLUCONATE 15 ML: 1.2 RINSE ORAL at 20:42

## 2017-03-30 RX ADMIN — HEPARIN SODIUM 5000 UNITS: 10000 INJECTION, SOLUTION INTRAVENOUS; SUBCUTANEOUS at 18:02

## 2017-03-30 RX ADMIN — PIPERACILLIN AND TAZOBACTAM 4.5 G: 4; .5 INJECTION, POWDER, FOR SOLUTION INTRAVENOUS at 10:11

## 2017-03-30 RX ADMIN — HYDROMORPHONE HYDROCHLORIDE 0.5 MG: 1 INJECTION, SOLUTION INTRAMUSCULAR; INTRAVENOUS; SUBCUTANEOUS at 02:01

## 2017-03-30 NOTE — PROVIDER NOTIFICATION
Notified tele hub of Hgb of 6.7, orders to transfuse 1 unit of blood. Unable to get a hold of wife for consent. Let tele hub know. Wife will be in around 7-730 to get consent.

## 2017-03-30 NOTE — PROGRESS NOTES
[facility] ICU RESPIRATORY NOTE  Date of Admission: 3/17/2017  Date of Intubation (most recent): 3/17/2017  Reason for Mechanical Ventilation: Respiratory Failure  Number of Days on Mechanical Ventilation: 14  Met Criteria for Pressure Support Trial:yes  Length of Pressure Support Trial:  Reason for Stopping Pressure Support Trial:  Reason for No Pressure Support Trial: per MD    Significant Events Today: None    ABG Results:     Recent Labs  Lab 03/27/17  0240 03/27/17  0050 03/25/17  0515 03/24/17  2320   PH 7.38 7.24* 7.49* 7.51*   PCO2 48* 67* 33* 31*   PO2 68* 83 68* 95   HCO3 28 29* 25 25   O2PER 60% 60 40 40     Ventilation Mode: CMV/AC  FiO2 (%): 40 %  Rate Set (breaths/minute): 18 breaths/min  Tidal Volume Set (mL): 400 mL  PEEP (cm H2O): 5 cmH2O  Pressure Support (cm H2O): 10 cmH2O  Oxygen Concentration (%): 40 %  Resp: 26        ETT appearance on chest x-ray: ETT in good place.    Plan:  Continue full ventilatory support and ps trials as tolerated.

## 2017-03-30 NOTE — PROGRESS NOTES
Renal Medicine Progress Note                                Wyatt LAWSON Keyshawn MRN# [id number]   Age: 57 year old YOB: 1960   Date of Admission: 3/17/2017 Hospital LOS: 13                  Assessment/Plan:     Admitted with hypoxic respiratory failure due to influenza B and CAP.  Seen 03/25/17  regarding ARF.    1.  Apparent baseline normal renal function  2.  Historical absence of significant proteinuria  3.  ARF   -nonoliguric   -normal renal US   -U eosinophils non diagnostic   -modest elevation of myoglobin/CK   -presumed ATN   -serologic studies negative  4.  Hypernatremia  5.  Gross hematuria   -traumatic bell?/clearing  6.  Respiratory failure   -vented  7.  Hypoalbuminemia      Creatinine stable    Follow Na  Adjust free water as indicated    Avoid nephrotoxins  If further diuresis indicated follow creatinine closely    No further recommendations        Interval History:     Intubated and sedated. Up in bedside chair. Urine has cleared.  Creatinine stable.  Free water per  q4.        ROS:     Intubated and sedated: ROS unable    Medications and Allergies:     Reviewed    Physical Exam:     Vitals were reviewed  Patient Vitals for the past 8 hrs:   BP Temp Heart Rate Resp SpO2 Weight   03/30/17 0700 - 100.9  F (38.3  C) 119 20 100 % -   03/30/17 0658 - - - - 100 % -   03/30/17 0630 158/83 100.9  F (38.3  C) 119 25 100 % -   03/30/17 0600 144/77 101.1  F (38.4  C) 118 29 99 % 77.1 kg (169 lb 15.6 oz)   03/30/17 0530 150/82 100.9  F (38.3  C) 119 20 100 % -   03/30/17 0500 137/86 100.9  F (38.3  C) 118 22 99 % -   03/30/17 0430 142/75 100.9  F (38.3  C) 117 26 99 % -   03/30/17 0400 145/80 99.7  F (37.6  C) 120 17 100 % -   03/30/17 0357 - - - - 99 % -   03/30/17 0330 143/83 99.7  F (37.6  C) 119 22 100 % -   03/30/17 0300 153/78 99.9  F (37.7  C) 120 8 100 % -   03/30/17 0230 150/79 99.7  F (37.6  C) 118 25 99 % -   03/30/17 0200 136/76 100  F (37.8  C) 121 14 99 % -   03/30/17 0130 145/76  98.6  F (37  C) 119 14 99 % -   03/30/17 0100 138/75 99.3  F (37.4  C) 118 18 100 % -     I/O last 3 completed shifts:  In: 2810.01 [I.V.:715.01; NG/GT:1920]  Out: 2510 [Urine:2510]    Vitals:    03/25/17 0502 03/26/17 0324 03/28/17 0700 03/29/17 0500   Weight: 71.4 kg (157 lb 6.5 oz) 70.1 kg (154 lb 8.7 oz) 75.9 kg (167 lb 5.3 oz) 75.9 kg (167 lb 5.3 oz)    03/30/17 0600   Weight: 77.1 kg (169 lb 15.6 oz)       GENERAL:  intubated and sedated  HEENT: ETT  RESP:  clear anteriorly  CV: RRR, normal S1 S2  ABDOMEN: soft, nontender  :  bell catheter  MS: no clubbing, cyanosis   SKIN: clear without significant rashes or lesions  EXT: warm, no edema    Data:       Recent Labs  Lab 03/30/17  0425 03/29/17  1800 03/29/17  0530 03/28/17  1324 03/28/17  0430   * 152* 152*  --  146*   POTASSIUM 3.7 3.8 3.2* 3.6 3.3*   CHLORIDE 117* 118* 116*  --  110*   CO2 29 28 27  --  28   ANIONGAP 6 6 9  --  8   * 125* 138*  --  119*   BUN 35* 40* 46*  --  53*   CR 1.57* 1.55* 1.78*  --  1.79*   GFRESTIMATED 46* 47* 40*  --  40*   GFRESTBLACK 56* 57* 48*  --  48*   LEONIE 7.7* 7.6* 7.7*  --  7.8*         Recent Labs   Lab Test  03/30/17   0425  03/29/17   1800  03/29/17   0530  03/28/17   0430  03/27/17   1830  03/27/17   0420  03/26/17   0340  03/25/17   0430  03/24/17   1427  03/24/17   0430   CR  1.57*  1.55*  1.78*  1.79*  1.55*  1.47*  1.29*  1.47*  1.71*  1.77*     Recent Labs   Lab Test  03/30/17   0425  03/29/17   1800  03/29/17   0530  03/28/17   0430  03/27/17   1830  03/27/17   0420  03/26/17   0340  03/25/17   0430  03/24/17   1427  03/24/17   0430   NA  152*  152*  152*  146*  144  146*  152*  151*  153*  150*       Recent Labs  Lab 03/27/17  0420 03/26/17  0340 03/25/17  0430 03/24/17  1427   ALBUMIN 1.8* 1.6* 1.2* 1.2*       Recent Labs  Lab 03/30/17  0425 03/29/17  0530 03/28/17  1324 03/28/17  0430 03/27/17  0420   PHOS 2.8 2.6 4.3 1.9* 2.4*   HGB 6.7* 7.3*  --  7.6* 8.6*       Recent Labs  Lab 03/25/17  1418    COLOR Red   APPEARANCE Cloudy   URINEGLC >1000*   URINEBILI Negative   URINEKETONE Negative   SG 1.019   UBLD Moderate*   URINEPH 6.5   PROTEIN 100*   NITRITE Negative   LEUKEST Negative   RBCU >182*   WBCU >182*     No lab results found.  Recent Labs   Lab Test  03/27/17   0420  03/25/17   0430  03/24/17   1427  03/24/17   0430   MYOGLOBIN   --    --    --   552*   CKT  881*  1448*  1405*  1179*       G Tamir Godoy    [facility] - Nephrology  [phone]

## 2017-03-30 NOTE — PROGRESS NOTES
Family care conference with Dr Aponte at 2PM today.  Pt's wife (Mary) and son (Keyshawn) will be present.

## 2017-03-30 NOTE — PROGRESS NOTES
[facility] ICU RESPIRATORY NOTE  Date of Admission: 3/17/2017  Date of Intubation (most recent): 3/17/2017  Reason for Mechanical Ventilation: Respiratory Failure  Number of Days on Mechanical Ventilation: 14  Met Criteria for Pressure Support Trial:yes  Length of Pressure Support Trial: 4+ hrs this AM  Reason for Stopping Pressure Support Trial: Tachypnea  Reason for No Pressure Support Trial:      Significant Events Today: None    Ventilation Mode: CMV/AC  FiO2 (%): 40 %  Rate Set (breaths/minute): 18 breaths/min  Tidal Volume Set (mL): 400 mL  PEEP (cm H2O): 5 cmH2O  Pressure Support (cm H2O): 12 cmH2O  Oxygen Concentration (%): 40 %  Resp: 23      Recent Labs  Lab 03/27/17  0240 03/27/17  0050 03/25/17  0515 03/24/17  2320   PH 7.38 7.24* 7.49* 7.51*   PCO2 48* 67* 33* 31*   PO2 68* 83 68* 95   HCO3 28 29* 25 25   O2PER 60% 60 40 40

## 2017-03-30 NOTE — PROGRESS NOTES
Critical Care Progress Note      03/30/2017    Name: Wyatt Mahajan MRN#: [id number]   Age: 57 year old YOB: 1960     Hsptl Day# 13  ICU DAY #10    MV DAY #10           Problem List:   Active Problems:    Acute respiratory failure with hypoxia (H)  MSSA/enterobacter/klebsiella pna   MSSA bacteremia--resolved  VAP (GNR)  Influenza B  JOSE--stable  Hematuria---bell trauma?--improving  pancreatitis          Summary/Hospital Course:   Wyatt Mahajan is a 57 year old male admitted on 3/17 to the floor, presenting with acute hypoxic respiratory failure due to influenza B and CAP, right chest wall pain and uncontrolled DM-II. Patient was subsequently transferred from the floor to the MICU for increased work of breathing, accessory muscle use and respiratory distress. Was intubated due to increased work of breathing, tachypnea and decreasing O2 sats.    3/18:  Weaned vent yesterday and overnight.  Comfortably sedated this AM.  +blood cx for staph aureus overnight.  On vanco--repeating blood cx.    3/19:  Still minimal vent settings but minute ventilation still ~13 so won't try to extubate today.  Weaning from insulin drip back to lantus/SSI now on steady TF.  Blood cultures persistently positive--MSSA.  Change to nafcillin.  Reculture.  TTE.    3/20:  No changes, no events. Gentle diuresis yesterday but still positive.  Increasing diuresis today.     3/21: Not tolerating CMV/AC last evening (tachycardia, tachypnea, hypertension), so switched to pressure support which improved vital signs overnight. Switched back to CMV/AC this morning at 5:30am, now tolerating. Cr bump so no further diuresis today. Changing sedation to precedex. Echo showed no vegetations/masses on valves. Will continue to follow cultures, now growth so far from 3/20 cultures.    3/22: Pressure support trial last evening was stopped just after its initiation due to inc. RR to the 40's. Eyes open to voice. Given valproate for delirium.  Propofol wean and transition to precedex resulted in inc HR, RR and BP. Nursing notes on exam, 'wet crackles' in L lung and thick creamy red-streaked secretions. Ppeak to mid-30's. No BM yet, just smears but constipation treated with senna, dulcolax and miralax.    3/23: Episodes of tachycardia and hypertension to 169/93, coreg started and BP improved with versed and propofol. Urine was bloody this morning, suspect bell trauma. Febrile to 101.9 with PRN tylenol given. Still no BM though treated with senna, dulcolax and miralax. Family worried about his condition and the course of his illness.    3/24: On pressure support respiratory trial since 5pm last evening. Off of propofol sedation since 5pm as well. Critical lab value of QD=6674 returned this AM, triglycerides were also elevated to 677. Patient is febrile to 101.5 this morning and with lower blood glucose on last four checks so Lantus was decreased. 25ml D50 given for bs of 65. Cr increased today, likely due to gentle diuresis. K+ repleted. Urine continues to be bloody. Elevated liver enzymes. WBC still increased though now down-trending.    3/25: Family refused PICC line yesterday evening. Abx's for mssa were changed from nafcillin to ancef. D5W started for hypernatremia. Overnight had episodes of HR pause and thrashing; sedation with precedex, then changed to versed. Also bradycardic in early morning; CXR checked and ET tube was in good position.    3/27: low calcium and hyperglycemia over weekend-->checked lipase: 4k.  Changing d5 to po free h2o.  Low dose hydralazine for hypertension given recent bradycardia.  In a fib with rvr overnight--resolved with amiodarone.    3/28:  No events overnight.  NPO for now.  Placing post-pyloric tube.    3/29:  No events overnight.  Feeding tube by IR today; post-pyloric.  Awakens and opens eyes on sedation wean, but still not following.    3/30:  No events, more calm when sedation weaned.  hgb 6.7 this AM, discussed  risks/benefits of transfusion with family, they prefer not to transfuse.  Still with gm(+) and gm(-) organisms on sputum gram stain, consulting ID today.  R arm swollen, US to r/o dvt today.       Assessment and plan :     Wyatt Mahajan IS a 57 year old male admitted on 3/17/2017 for respiratory failure, flu B, mssa pna and mssa bacteremia.   I have personally reviewed the daily labs, imaging studies, cultures and discussed the case with referring physician and consulting physicians.   My assessment and plan by system for this patient is as follows:    Neurology/Psychiatry:   1. Sedation:  Discontinued propofol given CK and Triglyceride lab values on 3/24, transitioned to precedex but patient thrashing and hypertensive/bradycardic. Versed drip begun 3/25 AM is providing adequate sedation. Prn dilaudid also available.  2. Delirium:  Seroquel, 100mg bid.  (). Valproate added evening 3/22, decreased to q 24 hr dosing 3/24, back to bid dosing 3/30 (will monitor LFTs).  3. H/o Diabetic neuropathy 2/2 DM-II:     -- gabapentin 300mg, TID.    Cardiovascular:   1. Hypertension: standing hydralazine started  2. H/o HLD:  hold atorvastatin (LFT elevated)  3. H/o PAD:  hold ASA  4. Hyperlactemia:  Resolved  5.  A fib with RVR:  In setting of critical illness.  Resolved on amiodarone.    Pulmonary/Ventilator Management:   1.  Respiratory failure, hypoxemic:  2/2 mssa pna, vap, and influenza B.  Continue to ventilate with low tidal volume strategy (490ml ~ 7ml/kg). Sputum culture 3/24 + for GNR (3 strains) and staph aureus (still MSSA)  --Stop nafcillin 3/26, start zosyn and vanco 3/26 until sensitivity available/cont tamiflu.  Stopping vanco 3/29.  --Lung protective vent strategy- PCV plus assist ventilation     GI and Nutrition :   1.  TF  2.  PPI for PUD prophylaxis  3.  Constipation--improved on bowel reg.  4. Elevated liver enzymes: newly elevated on 3/24, nl on 3/17, ? Med related vs ? RUQ U/S revealed a  distended GB w/sludge vs related to pancreatitis. Subsequent LFT's have shown decreases of Alk phos, AST and ALT levels since discontinuation of statin.  --serial LFT's  --likely 2/2 critical illness vs pancreatitis  5.  Pancreatitis:  Due to critical illness vs propofol.  No further propofol.  --restarting pos-pyloric trickle feeds.  post-pyloric tube by IR.  --Increased PPI to bid  --bladder pressure 15,  CT abd/pelv without acute abnormality, abd distension improving.  --lipase trending down    Renal/Fluids/Electrolytes:   1. JOSE--fluctuating. UA showed no casts. Urine eos < 1%  -- stable today, keep holding diuresis  2. Hypernatremia - . PO free water    :  1. Hematuria: bloodly urine AM of 3/23 probably due to trauma from the bell catheter. Resolved 3/30.    Infectious Disease:   1. Flu B:  Tamiflu  2. MSSA pna/bacteremia: Nafcillin discontinued 3/24, changed to Ancef. Now sputum GNR and staph (see pulm) Most recent bld cultures show no growth. TTE did not show valvular masses/vegetations. CT chest negative for occult abscess. Now switched to vanc/zos 3/26 for HCAP.  Vanco stopped 3/29 as all cultures zosyn sensitive.  Ancef re-added 3/30 per ID 2/2 prior bacteremia.  -- Switched abx as above  --repeat sputum cx 3/29-- gm+ and gm- still on stain.  --ID consulted 3/30 for persistent + sputum cx.    Endocrine:   1. Hyperglycemia:    --Insulin drip    Hematology/Oncology:   1. Plt stable  2. Hgb 6.7 today (drifted down from 7.3).  No active bleeding. C/w anemia of chronic disease. Hemodynamics OK. Discussed risks/benefits of transfusion with family, they declined transfusion at this time.  Will monitor for now.  Re-address if continues to fall.  3. Leukocytosis-- 2/2 infection. Patient febrile on and off. Slowly decreasing  4.  R arm swelling  -- R/o DVT with US today.     IV/Access:   1. Venous access - peripheral and PICC    ICU Prophylaxis:   1. DVT: Restart hep SubQ 3/30  2. VAP: HOB 30 degrees,  chlorhexidine rinse  3. Stress Ulcer: PPI  4. Restraints: Nonviolent soft two point restraints required and necessary for patient safety and continued cares and good effect as patient continues to pull at necessary lines, tubes despite education and distraction. Will readdress daily.   6. Feeding - post-pyloric trickle feeds  7. Family Update: care conf below.  8. Disposition - critically ill    [facility] 3/27 (Fadi):  Held full care conference with wife, brother and daughter.  Discussed his course and complications.  Will conitnue with current txt for now.  All questions asked and answered.    [facility] 3/30 (Fadi):  Met with wife and brother.  Overall he has been making slow progress, but I doubt he will be ready for extubation soon, and has already been intubated 14 days.  I have recommended tracheostomy to continue with further cares.  Family has acknowledged this, but wishes to wait through the weekend.  They asked for a trial of extubation early next week prior to deciding on trach, I strongly advised against this unless his sbt's are better next week.  Also briefly discussed his delirium, pancreatitis, and anemia, but no major decisions except decision not to transfuse today as above.         Key Medications:   Reviewed          Physical Examination:   Temp:  [97.5  F (36.4  C)-101.1  F (38.4  C)] 99.7  F (37.6  C)  Heart Rate:  [106-121] 115  Resp:  [0-33] 18  BP: ()/(60-86) 156/80  FiO2 (%):  [40 %] 40 %  SpO2:  [95 %-100 %] 98 %      Intake/Output Summary (Last 24 hours) at 03/30/17 1516  Last data filed at 03/30/17 1000   Gross per 24 hour   Intake          2377.53 ml   Output             1935 ml   Net           442.53 ml           Wt Readings from Last 4 Encounters:   03/30/17 77.1 kg (169 lb 15.6 oz)   03/16/17 70.3 kg (155 lb)   01/17/17 71.3 kg (157 lb 1.6 oz)   11/30/16 72.6 kg (160 lb)     BP - Mean:  [] 89  Ventilation Mode: PS  FiO2 (%): 40 %  Rate Set (breaths/minute): 18  breaths/min  Tidal Volume Set (mL): 400 mL  PEEP (cm H2O): 5 cmH2O  Pressure Support (cm H2O): 12 cmH2O  Oxygen Concentration (%): 40 %  Resp: 18    Recent Labs  Lab 03/27/17  0240 03/27/17  0050 03/25/17  0515 03/24/17  2320   PH 7.38 7.24* 7.49* 7.51*   PCO2 48* 67* 33* 31*   PO2 68* 83 68* 95   HCO3 28 29* 25 25   O2PER 60% 60 40 40     GEN: sedated, intubated, opens eyes but otherwise non-responsive  HEENT: head ncat, sclera anicteric, OP patent, trachea midline   PULM: unlabored, coarse lung sounds anteriorly L>R, rhonchi bilaterally.  CV/COR: RRR S1/S2, No rub, murmur or gallop  ABD: firm, distended but improving, nontender, hypoactive bowel sounds, no masses  EXT:  peripheral edema present in hands>feet, warm x4 and well-perfused.   NEURO: sedated, not following commands.  SKIN: no obvious rash  LINES: clean, dry intact         Data:       Recent Labs  Lab 03/30/17  0425 03/29/17  1800 03/29/17  0530 03/28/17  1324 03/28/17  0430  03/27/17  0420 03/26/17  0340  03/25/17  0430  03/24/17  1427   * 152* 152*  --  146*  < > 146* 152*  --  151*  --  153*   POTASSIUM 3.7 3.8 3.2* 3.6 3.3*  < > 4.3 4.6  < > 3.8  < > 3.2*   CHLORIDE 117* 118* 116*  --  110*  < > 111* 117*  --  117*  --  115*   CO2 29 28 27  --  28  < > 26 27  --  26  --  27   ANIONGAP 6 6 9  --  8  < > 9 8  --  8  --  11   * 125* 138*  --  119*  < > 174* 232*  --  252*  --  93   BUN 35* 40* 46*  --  53*  < > 43* 36*  --  26  --  27   CR 1.57* 1.55* 1.78*  --  1.79*  < > 1.47* 1.29*  --  1.47*  --  1.71*   GFRESTIMATED 46* 47* 40*  --  40*  < > 50* 58*  --  50*  --  42*   GFRESTBLACK 56* 57* 48*  --  48*  < > 60* 70  --  60*  --  51*   LEONIE 7.7* 7.6* 7.7*  --  7.8*  < > 7.9* 7.7*  --  7.6*  --  7.4*   MAG 2.7*  --  2.9*  --  3.1*  --  3.2* 3.7*  --  3.4*  --   --    PHOS 2.8  --  2.6 4.3 1.9*  --  2.4* 3.3  --  2.7  --   --    PROTTOTAL  --   --   --   --   --   --  7.2 7.5  --  7.2  --  7.5   ALBUMIN  --   --   --   --   --   --  1.8*  1.6*  --  1.2*  --  1.2*   BILITOTAL  --   --   --   --   --   --  1.3 1.1  --  1.4*  --  3.5*   ALKPHOS  --   --   --   --   --   --  315* 306*  --  353*  --  389*   AST  --   --   --   --   --   --  224* 162*  --  196*  --  213*   ALT  --   --   --   --   --   --  79* 61  --  86*  --  109*   < > = values in this interval not displayed.    CBC    Recent Labs  Lab 03/30/17  0425 03/29/17  0530 03/28/17  0430 03/27/17  0420   WBC 20.0* 21.7* 23.3* 28.4*   RBC 2.12* 2.33* 2.42* 2.71*   HGB 6.7* 7.3* 7.6* 8.6*   HCT 20.1* 21.4* 22.3* 26.3*   MCV 95 92 92 97   MCH 31.6 31.3 31.4 31.7   MCHC 33.3 34.1 34.1 32.7   RDW 15.4* 15.1* 15.6* 17.5*   * 462* 326 248     INR    Recent Labs  Lab 03/27/17  1830   INR 1.15*     Arterial Blood Gas    Recent Labs  Lab 03/27/17  0240 03/27/17  0050 03/25/17  0515 03/24/17  2320   PH 7.38 7.24* 7.49* 7.51*   PCO2 48* 67* 33* 31*   PO2 68* 83 68* 95   HCO3 28 29* 25 25   O2PER 60% 60 40 40       All cultures:    Recent Labs  Lab 03/29/17  1805 03/25/17  1245 03/24/17  1750   CULT Culture in progress No growth after 5 days Heavy growth Enterobacter cloacae complexLight growth Klebsiella pneumoniaeLight growth Strain 2 Enterobacter cloacae complexHeavy growth Staphylococcus aureusLight growth Anna albicans / dubliniensis Candida albicans and Candida dubliniensis are not routinely speciated Susceptibility testing not routinely done*     Imaging:    CT-Chest from 3/22:  1. Extensive infiltrate and consolidation in both lungs has overall  improved slightly since 3/17/2017.   2. Small bilateral pleural effusions are new since the previous exam.  3. Mildly prominent and borderline-enlarged mediastinal and right  hilar lymph nodes have a similar appearance to the previous exam,  given the noncontrast technique on today's study.    D/w Dr. Herring of ID.    Billing: This patient is critically ill: Yes. Total critical care time today 55 min.    Antelmo Aponte MD

## 2017-03-30 NOTE — CONSULTS
"[facility]    Infectious Disease Consultation     Date of Admission:  3/17/2017  Date of Consult (When I saw the patient): 03/30/17    Assessment & Plan   Wyatt Mahajan is a 57 year old male who was admitted on 3/17/2017. I was asked to see the patient for Pneumonia.    Impression:   57 y.o male admitted about 2 weeks ago with concern for secondary bacterial pneumonia on the top of Influenza B.   Worsening respiratory status intubated.   Sputum Cultures positive for MSSA.   Patient is originally from [facility]. Immigrated in 1990. The records in the [place] system start in 2004, no data available of his TB status, I do not see any PPD done. CXR from before have been negative.   Per wife when they first moved to [place] they were \" checked for Tb and all was negative\".   He was admitted this occasion with complaints of pleuritic chest pain. This was after he was diagnosed with Influenza B. His respiratory status quickly worsened. He was intubated and remains that way.  Blood cultures 2/2 positive for MSSA. Cultures from the sputum positive for MSSA, Enterobacter one strain more resistant than the other, Klebsiella. He has been on appropriate antibiotics since admission but has been febrile, still intubated.     Recommendations:   Would get TB Quantiferon.   Isolation room, get sputum for AFB stain and cultures.   Start Ancef for MSSA bacteremia.   Ok to continue zosyn for GNR in the sputum.   Will follow up on the pending labs.       Armen Herring MD    Reason for Consult   Reason for consult: I was asked by Dr. Aponte to evaluate this patient for above mentioned.    Primary Care Physician   Shaun Bedolla    Chief Complaint   Chest pain     History is obtained from the patient and medical records    History of Present Illness   Wyatt Mahajan is a 57 year old male who has been admitted to the hospital for about 2 weeks now. He is currently intubated and sedated and the history was obtained by talking " to the attending provider and medical records.   Patient is originally from [facility]. Immigrated in . The records in the [place] system start in , no data available of his TB status, I do not see any PPD done. CXR from before have been negative. He was admitted this occasion with complaints of pleuritic chest pain. This was after he was diagnosed with Influenza B. His respiratory status quickly worsened. He was intubated and remains that way. Cultures from Blood /2 positive for MSSA. Cultures from the sputum positive for MSSA, Enterobacter one strain more resistant than the other, Klebsiella. He has been on appropriate antibiotics since admission but has been febrile, still intubated.     Past Medical History   I have reviewed this patient's medical history and updated it with pertinent information if needed.   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  Past Surgical History:   Procedure Laterality Date     COLONOSCOPY  10/27/16     COLONOSCOPY N/A 10/31/2016    Procedure: COLONOSCOPY;  Surgeon: Pollo Lopez MD;  Location:  GI     NO HISTORY OF SURGERY         Prior to Admission Medications   Prior to Admission Medications   Prescriptions Last Dose Informant Patient Reported? Taking?   ACE/ARB NOT PRESCRIBED, INTENTIONAL,   Yes No   Sig: by Other route continuous prn.   ASPIRIN 81 MG OR TABS 3/16/2017 at Unknown time  No Yes   Si tab per day   Blood Glucose Monitoring Suppl (BLOOD GLUCOSE  METER) KIT   No No   Si Device See Admin Instructions. per patient health plan   GABAPENTIN PO   Yes No   Sig: Take 300 mg by mouth 3 times daily   Insulin Glargine (BASAGLAR KWIKPEN SC)   Yes Yes   Sig: Inject 26 Units Subcutaneous 2 times daily   Linaclotide (LINZESS PO)   Yes No   Sig: Take 290 mcg by mouth every morning (before breakfast)   atorvastatin (LIPITOR) 40 MG tablet 3/16/2017 at Unknown time  No Yes   Sig: Take 1 tablet (40 mg) by mouth daily   azithromycin (ZITHROMAX) 250 MG tablet 3/16/2017 at Unknown time  No Yes   Si tabs day one, 1 tab days 2-5   blood glucose monitoring (ONE TOUCH ULTRA) test strip   No No   Sig: Use QID or as directed   blood glucose monitoring (ONE TOUCH ULTRASOFT) lancets   No No   Sig: Use as direct   insulin pen needle (B-D U/F) 31G X 8 MM   No No   Sig: USE ONCE DAILY WITH LANTUS SOLOSTAR PEN      Facility-Administered Medications: None     Allergies   Allergies   Allergen Reactions     No Known Drug Allergies        Immunization History   Immunization History   Administered Date(s) Administered     Pneumococcal 23 valent 05/10/2016     Tdap (Adacel,Boostrix) 2006       Social History   I have reviewed this patient's social history and updated it with pertinent information if needed. Wyatt Mahajan  reports that he has been smoking Cigarettes.  He has a 7.50 pack-year smoking history. He has never used smokeless tobacco. He reports that he does not drink alcohol or use illicit drugs.    Family History   I have reviewed this patient's family history and updated it with pertinent information if needed.   Family History   Problem Relation Age of Onset     DIABETES Father      DIABETES Mother      DIABETES Sister      DIABETES Brother      Hypertension No family hx of      CEREBROVASCULAR DISEASE No family hx of      Prostate Cancer No family hx of      Cancer - colorectal No family hx of      Breast Cancer No family hx of        Review of Systems   The 10  point Review of Systems could not be obtained patient is intubated and sedated     Physical Exam   Temp: 100.8  F (38.2  C) Temp src: Bladder BP: 128/61   Heart Rate: 113 Resp: 23 SpO2: 99 % O2 Device: Mechanical Ventilator    Vital Signs with Ranges  Temp:  [97.5  F (36.4  C)-101.1  F (38.4  C)] 100.8  F (38.2  C)  Heart Rate:  [106-121] 113  Resp:  [0-33] 23  BP: ()/(60-86) 128/61  FiO2 (%):  [40 %] 40 %  SpO2:  [95 %-100 %] 99 %  169 lbs 15.59 oz    GENERAL APPEARANCE: intubated and sedated   EYES: Eyes grossly normal to inspection, PERRL and conjunctivae and sclerae normal  HENT: ear canals and TM's normal and nose and mouth without ulcers or lesions  NECK: no adenopathy, no asymmetry, masses, or scars and thyroid normal to palpation  RESP: lungs clear to auscultation - no rales, rhonchi or wheezes  CV: regular rates and rhythm, normal S1 S2, no S3 or S4 and no murmur, click or rub  LYMPHATICS: normal ant/post cervical and supraclavicular nodes  ABDOMEN: soft, nontender, without hepatosplenomegaly or masses and bowel sounds normal  MS: extremities normal- no gross deformities noted  SKIN: no suspicious lesions or rashes    Data   Lab Results   Component Value Date    WBC 20.0 (H) 03/30/2017    HGB 6.7 (LL) 03/30/2017    HCT 20.1 (L) 03/30/2017     (H) 03/30/2017     (H) 03/30/2017    POTASSIUM 3.7 03/30/2017    CHLORIDE 117 (H) 03/30/2017    CO2 29 03/30/2017    BUN 35 (H) 03/30/2017    CR 1.57 (H) 03/30/2017     (H) 03/30/2017    SED 9 09/14/2009    TROPI 0.119 (H) 03/24/2017     (H) 03/27/2017    ALT 79 (H) 03/27/2017    ALKPHOS 315 (H) 03/27/2017    BILITOTAL 1.3 03/27/2017    INR 1.15 (H) 03/27/2017       Recent Labs  Lab 03/29/17  1805 03/25/17  1245 03/24/17  1750   CULT Culture in progress No growth after 5 days Heavy growth Enterobacter cloacae complexLight growth Klebsiella pneumoniaeLight growth Strain 2 Enterobacter cloacae complexHeavy growth Staphylococcus  aureusLight growth Anna albicans / dubliniensis Candida albicans and Candida dubliniensis are not routinely speciated Susceptibility testing not routinely done*     Recent Labs   Lab Test  03/29/17   1805  03/25/17   1245  03/24/17   1750  03/21/17   1545  03/19/17   1610  03/19/17   1605  03/18/17   1055  03/18/17   1050  03/17/17   0725   CULT  Culture in progress  No growth after 5 days  Heavy growth Enterobacter cloacae complex  Light growth Klebsiella pneumoniae  Light growth Strain 2 Enterobacter cloacae complex  Heavy growth Staphylococcus aureus  Light growth Anna albicans / dubliniensis Candida albicans and Candida   dubliniensis are not routinely speciated Susceptibility testing not routinely   done  *  No growth  No growth  No growth  Cultured on the 1st day of incubation: Staphylococcus aureus  Critical Value/Significant Value, preliminary result only, called to and read   back by Shelia Osuna at [facility] on 03.19.17 12:31 P.M. JT.  Susceptibility testing done on previous specimen  *  Cultured on the 1st day of incubation: Staphylococcus aureus Susceptibility   testing done on previous specimen  Critical Value/Significant Value, preliminary result only, called to and read   back by Patricia Truong RN [facility], @7407 3/20/17. .  *  Heavy growth Staphylococcus aureus  Plus Light growth Normal juan  *

## 2017-03-30 NOTE — PLAN OF CARE
Problem: Goal Outcome Summary  Goal: Goal Outcome Summary  Outcome: No Change  Remained vented and sedated overnight. Tele tachycardic. Hgb 6.7, orders to transfuse but can't get consent. Wife should be here early AM to discuss. Levine good UO. 1 BM during shift. Versed drip for sedation. Insulin drip restarted for increasing blood sugars. Continue to monitor.

## 2017-03-30 NOTE — PLAN OF CARE
Problem: Goal Outcome Summary  Goal: Goal Outcome Summary  Outcome: Improving  Able to wean off MIdazolam infusion, patient with less aggressive agitation,, weaned for several hours 12/5, up to converter chair times two.  Still febrile, treated with Tylenol, and fan  Family fully updated on POC

## 2017-03-31 LAB
ALBUMIN SERPL-MCNC: 1.4 G/DL (ref 3.4–5)
ALP SERPL-CCNC: 176 U/L (ref 40–150)
ALT SERPL W P-5'-P-CCNC: 78 U/L (ref 0–70)
ANION GAP SERPL CALCULATED.3IONS-SCNC: 8 MMOL/L (ref 3–14)
AST SERPL W P-5'-P-CCNC: 414 U/L (ref 0–45)
BACTERIA SPEC CULT: ABNORMAL
BACTERIA SPEC CULT: NO GROWTH
BILIRUB DIRECT SERPL-MCNC: 0.6 MG/DL (ref 0–0.2)
BILIRUB SERPL-MCNC: 1 MG/DL (ref 0.2–1.3)
BUN SERPL-MCNC: 25 MG/DL (ref 7–30)
CALCIUM SERPL-MCNC: 7.6 MG/DL (ref 8.5–10.1)
CHLORIDE SERPL-SCNC: 115 MMOL/L (ref 94–109)
CO2 SERPL-SCNC: 27 MMOL/L (ref 20–32)
CREAT SERPL-MCNC: 1.42 MG/DL (ref 0.66–1.25)
ERYTHROCYTE [DISTWIDTH] IN BLOOD BY AUTOMATED COUNT: 15.5 % (ref 10–15)
GFR SERPL CREATININE-BSD FRML MDRD: 52 ML/MIN/1.7M2
GLUCOSE BLDC GLUCOMTR-MCNC: 112 MG/DL (ref 70–99)
GLUCOSE BLDC GLUCOMTR-MCNC: 118 MG/DL (ref 70–99)
GLUCOSE BLDC GLUCOMTR-MCNC: 128 MG/DL (ref 70–99)
GLUCOSE BLDC GLUCOMTR-MCNC: 130 MG/DL (ref 70–99)
GLUCOSE BLDC GLUCOMTR-MCNC: 140 MG/DL (ref 70–99)
GLUCOSE BLDC GLUCOMTR-MCNC: 141 MG/DL (ref 70–99)
GLUCOSE BLDC GLUCOMTR-MCNC: 162 MG/DL (ref 70–99)
GLUCOSE BLDC GLUCOMTR-MCNC: 179 MG/DL (ref 70–99)
GLUCOSE SERPL-MCNC: 123 MG/DL (ref 70–99)
HCT VFR BLD AUTO: 19.4 % (ref 40–53)
HGB BLD-MCNC: 6.5 G/DL (ref 13.3–17.7)
M TB TUBERC IFN-G BLD QL: ABNORMAL
M TB TUBERC IFN-G/MITOGEN IGNF BLD: ABNORMAL IU/ML
MAGNESIUM SERPL-MCNC: 2.4 MG/DL (ref 1.6–2.3)
MCH RBC QN AUTO: 32.3 PG (ref 26.5–33)
MCHC RBC AUTO-ENTMCNC: 33.5 G/DL (ref 31.5–36.5)
MCV RBC AUTO: 97 FL (ref 78–100)
MICRO REPORT STATUS: ABNORMAL
MICRO REPORT STATUS: NORMAL
MICROORGANISM SPEC CULT: ABNORMAL
MICROORGANISM SPEC CULT: ABNORMAL
PHOSPHATE SERPL-MCNC: 3 MG/DL (ref 2.5–4.5)
PLATELET # BLD AUTO: 616 10E9/L (ref 150–450)
POTASSIUM SERPL-SCNC: 3.7 MMOL/L (ref 3.4–5.3)
PROT SERPL-MCNC: 7.2 G/DL (ref 6.8–8.8)
RBC # BLD AUTO: 2.01 10E12/L (ref 4.4–5.9)
SODIUM SERPL-SCNC: 150 MMOL/L (ref 133–144)
SPECIMEN SOURCE: ABNORMAL
SPECIMEN SOURCE: NORMAL
WBC # BLD AUTO: 17.7 10E9/L (ref 4–11)

## 2017-03-31 PROCEDURE — 80048 BASIC METABOLIC PNL TOTAL CA: CPT | Performed by: INTERNAL MEDICINE

## 2017-03-31 PROCEDURE — 20000003 ZZH R&B ICU

## 2017-03-31 PROCEDURE — 94003 VENT MGMT INPAT SUBQ DAY: CPT

## 2017-03-31 PROCEDURE — 25000128 H RX IP 250 OP 636: Performed by: INTERNAL MEDICINE

## 2017-03-31 PROCEDURE — 25000125 ZZHC RX 250: Performed by: INTERNAL MEDICINE

## 2017-03-31 PROCEDURE — 40000275 ZZH STATISTIC RCP TIME EA 10 MIN

## 2017-03-31 PROCEDURE — 85027 COMPLETE CBC AUTOMATED: CPT | Performed by: INTERNAL MEDICINE

## 2017-03-31 PROCEDURE — 84100 ASSAY OF PHOSPHORUS: CPT | Performed by: INTERNAL MEDICINE

## 2017-03-31 PROCEDURE — 00000146 ZZHCL STATISTIC GLUCOSE BY METER IP

## 2017-03-31 PROCEDURE — 80076 HEPATIC FUNCTION PANEL: CPT | Performed by: INTERNAL MEDICINE

## 2017-03-31 PROCEDURE — 99291 CRITICAL CARE FIRST HOUR: CPT | Performed by: INTERNAL MEDICINE

## 2017-03-31 PROCEDURE — 25000132 ZZH RX MED GY IP 250 OP 250 PS 637

## 2017-03-31 PROCEDURE — 25000131 ZZH RX MED GY IP 250 OP 636 PS 637: Performed by: INTERNAL MEDICINE

## 2017-03-31 PROCEDURE — 25000128 H RX IP 250 OP 636: Performed by: HOSPITALIST

## 2017-03-31 PROCEDURE — 25000132 ZZH RX MED GY IP 250 OP 250 PS 637: Performed by: INTERNAL MEDICINE

## 2017-03-31 PROCEDURE — 40000008 ZZH STATISTIC AIRWAY CARE

## 2017-03-31 PROCEDURE — 40000239 ZZH STATISTIC VAT ROUNDS

## 2017-03-31 PROCEDURE — 86480 TB TEST CELL IMMUN MEASURE: CPT | Performed by: INTERNAL MEDICINE

## 2017-03-31 PROCEDURE — 25000132 ZZH RX MED GY IP 250 OP 250 PS 637: Performed by: HOSPITALIST

## 2017-03-31 PROCEDURE — 83735 ASSAY OF MAGNESIUM: CPT | Performed by: INTERNAL MEDICINE

## 2017-03-31 RX ORDER — NICOTINE POLACRILEX 4 MG
15-30 LOZENGE BUCCAL
Status: DISCONTINUED | OUTPATIENT
Start: 2017-03-31 | End: 2017-04-11 | Stop reason: HOSPADM

## 2017-03-31 RX ORDER — OSELTAMIVIR PHOSPHATE 6 MG/ML
75 FOR SUSPENSION ORAL 2 TIMES DAILY
Status: DISCONTINUED | OUTPATIENT
Start: 2017-03-31 | End: 2017-04-03

## 2017-03-31 RX ORDER — OLANZAPINE 10 MG/1
10 TABLET ORAL AT BEDTIME
Status: DISCONTINUED | OUTPATIENT
Start: 2017-03-31 | End: 2017-04-04

## 2017-03-31 RX ORDER — MIDAZOLAM (PF) 1 MG/ML IN 0.9 % SODIUM CHLORIDE INTRAVENOUS SOLUTION
1-8 CONTINUOUS
Status: DISCONTINUED | OUTPATIENT
Start: 2017-03-31 | End: 2017-04-07

## 2017-03-31 RX ORDER — DEXTROSE MONOHYDRATE 25 G/50ML
25-50 INJECTION, SOLUTION INTRAVENOUS
Status: DISCONTINUED | OUTPATIENT
Start: 2017-03-31 | End: 2017-04-11 | Stop reason: HOSPADM

## 2017-03-31 RX ADMIN — INSULIN GLARGINE 12 UNITS: 100 INJECTION, SOLUTION SUBCUTANEOUS at 12:21

## 2017-03-31 RX ADMIN — SENNOSIDES AND DOCUSATE SODIUM 1 TABLET: 8.6; 5 TABLET ORAL at 21:02

## 2017-03-31 RX ADMIN — GABAPENTIN 300 MG: 250 SUSPENSION ORAL at 15:27

## 2017-03-31 RX ADMIN — Medication 1 PACKET: at 10:51

## 2017-03-31 RX ADMIN — GABAPENTIN 300 MG: 250 SUSPENSION ORAL at 08:28

## 2017-03-31 RX ADMIN — PIPERACILLIN AND TAZOBACTAM 4.5 G: 4; .5 INJECTION, POWDER, FOR SOLUTION INTRAVENOUS at 05:17

## 2017-03-31 RX ADMIN — PANTOPRAZOLE SODIUM 40 MG: 40 TABLET, DELAYED RELEASE ORAL at 11:53

## 2017-03-31 RX ADMIN — INSULIN ASPART 1 UNITS: 100 INJECTION, SOLUTION INTRAVENOUS; SUBCUTANEOUS at 12:21

## 2017-03-31 RX ADMIN — INSULIN ASPART 1 UNITS: 100 INJECTION, SOLUTION INTRAVENOUS; SUBCUTANEOUS at 20:59

## 2017-03-31 RX ADMIN — SENNOSIDES AND DOCUSATE SODIUM 1 TABLET: 8.6; 5 TABLET ORAL at 08:28

## 2017-03-31 RX ADMIN — OSELTAMIVIR PHOSPHATE 30 MG: 6 POWDER, FOR SUSPENSION ORAL at 08:45

## 2017-03-31 RX ADMIN — HYDROMORPHONE HYDROCHLORIDE 0.5 MG: 1 INJECTION, SOLUTION INTRAMUSCULAR; INTRAVENOUS; SUBCUTANEOUS at 05:33

## 2017-03-31 RX ADMIN — INSULIN ASPART 1 UNITS: 100 INJECTION, SOLUTION INTRAVENOUS; SUBCUTANEOUS at 15:34

## 2017-03-31 RX ADMIN — HEPARIN SODIUM 5000 UNITS: 10000 INJECTION, SOLUTION INTRAVENOUS; SUBCUTANEOUS at 15:27

## 2017-03-31 RX ADMIN — MIDAZOLAM HYDROCHLORIDE 2 MG: 1 INJECTION, SOLUTION INTRAMUSCULAR; INTRAVENOUS at 00:55

## 2017-03-31 RX ADMIN — HYDRALAZINE HYDROCHLORIDE 20 MG: 20 INJECTION INTRAMUSCULAR; INTRAVENOUS at 05:10

## 2017-03-31 RX ADMIN — QUETIAPINE FUMARATE 100 MG: 100 TABLET, FILM COATED ORAL at 08:28

## 2017-03-31 RX ADMIN — MIDAZOLAM HYDROCHLORIDE 2 MG: 1 INJECTION, SOLUTION INTRAMUSCULAR; INTRAVENOUS at 02:07

## 2017-03-31 RX ADMIN — HYDROMORPHONE HYDROCHLORIDE 0.5 MG: 1 INJECTION, SOLUTION INTRAMUSCULAR; INTRAVENOUS; SUBCUTANEOUS at 21:09

## 2017-03-31 RX ADMIN — PIPERACILLIN AND TAZOBACTAM 4.5 G: 4; .5 INJECTION, POWDER, FOR SOLUTION INTRAVENOUS at 00:36

## 2017-03-31 RX ADMIN — HEPARIN SODIUM 5000 UNITS: 10000 INJECTION, SOLUTION INTRAVENOUS; SUBCUTANEOUS at 08:34

## 2017-03-31 RX ADMIN — HYDROMORPHONE HYDROCHLORIDE 0.5 MG: 1 INJECTION, SOLUTION INTRAMUSCULAR; INTRAVENOUS; SUBCUTANEOUS at 18:04

## 2017-03-31 RX ADMIN — HYDRALAZINE HYDROCHLORIDE 20 MG: 20 INJECTION INTRAMUSCULAR; INTRAVENOUS at 15:11

## 2017-03-31 RX ADMIN — OSELTAMIVIR PHOSPHATE 75 MG: 6 POWDER, FOR SUSPENSION ORAL at 21:02

## 2017-03-31 RX ADMIN — CHLORHEXIDINE GLUCONATE 15 ML: 1.2 RINSE ORAL at 08:28

## 2017-03-31 RX ADMIN — VALPROATE SODIUM 500 MG: 100 INJECTION, SOLUTION INTRAVENOUS at 00:36

## 2017-03-31 RX ADMIN — HYDROMORPHONE HYDROCHLORIDE 0.5 MG: 1 INJECTION, SOLUTION INTRAMUSCULAR; INTRAVENOUS; SUBCUTANEOUS at 16:50

## 2017-03-31 RX ADMIN — PIPERACILLIN AND TAZOBACTAM 4.5 G: 4; .5 INJECTION, POWDER, FOR SOLUTION INTRAVENOUS at 11:53

## 2017-03-31 RX ADMIN — CEFAZOLIN SODIUM 1 G: 1 INJECTION, POWDER, FOR SOLUTION INTRAMUSCULAR; INTRAVENOUS at 05:17

## 2017-03-31 RX ADMIN — MULTIVITAMIN 15 ML: LIQUID ORAL at 08:28

## 2017-03-31 RX ADMIN — Medication 1 MG/HR: at 05:55

## 2017-03-31 RX ADMIN — MIDAZOLAM HYDROCHLORIDE 2 MG: 1 INJECTION, SOLUTION INTRAMUSCULAR; INTRAVENOUS at 05:09

## 2017-03-31 RX ADMIN — OLANZAPINE 10 MG: 10 TABLET, FILM COATED ORAL at 21:23

## 2017-03-31 RX ADMIN — CEFAZOLIN SODIUM 1 G: 1 INJECTION, POWDER, FOR SOLUTION INTRAMUSCULAR; INTRAVENOUS at 14:22

## 2017-03-31 RX ADMIN — CHLORHEXIDINE GLUCONATE 15 ML: 1.2 RINSE ORAL at 21:01

## 2017-03-31 RX ADMIN — PIPERACILLIN AND TAZOBACTAM 4.5 G: 4; .5 INJECTION, POWDER, FOR SOLUTION INTRAVENOUS at 17:46

## 2017-03-31 RX ADMIN — CEFAZOLIN SODIUM 1 G: 1 INJECTION, POWDER, FOR SOLUTION INTRAMUSCULAR; INTRAVENOUS at 21:24

## 2017-03-31 RX ADMIN — HEPARIN SODIUM 5000 UNITS: 10000 INJECTION, SOLUTION INTRAVENOUS; SUBCUTANEOUS at 00:36

## 2017-03-31 RX ADMIN — PANTOPRAZOLE SODIUM 40 MG: 40 TABLET, DELAYED RELEASE ORAL at 00:36

## 2017-03-31 RX ADMIN — GABAPENTIN 300 MG: 250 SUSPENSION ORAL at 00:36

## 2017-03-31 NOTE — PROGRESS NOTES
[facility] ICU RESPIRATORY NOTE  Date of Admission: 3/17/2017  Date of Intubation (most recent): 3/17/2017  Reason for Mechanical Ventilation: Respiratory Failure  Number of Days on Mechanical Ventilation: 15  Met Criteria for Pressure Support Trial:yes  Length of Pressure Support Trial:  Reason for Stopping Pressure Support Trial:  Reason for No Pressure Support Trial: per MD     Significant Events Today: None     ABG Results:         Recent Labs  Lab 03/27/17  0240 03/27/17  0050 03/25/17  0515 03/24/17  2320   PH 7.38 7.24* 7.49* 7.51*   PCO2 48* 67* 33* 31*   PO2 68* 83 68* 95   HCO3 28 29* 25 25   O2PER 60% 60 40 40     Ventilation Mode: CMV/AC  FiO2 (%): 40 %  Rate Set (breaths/minute): 18 breaths/min  Tidal Volume Set (mL): 400 mL  PEEP (cm H2O): 5 cmH2O  Pressure Support (cm H2O): 12 cmH2O  Oxygen Concentration (%): 40 %  Resp: 19             ETT appearance on chest x-ray: ETT in good place.     Plan: Continue full ventilatory support and ps trials as tolerated.  3/31/2017  Cee Baugh

## 2017-03-31 NOTE — PROGRESS NOTES
THORACIC SURGERY    Discussed tracheostomy with pt's wife.  Operation, goals, risks and beefits reviewed    Scheduled for Monday am  Will try to schedule PEG during the same anesthetic    EMANUEL CLINE MD [facility] ONCOLOGY THORACIC SURGERY  CELL:  [phone]  OFFICE: [phone]

## 2017-03-31 NOTE — PROGRESS NOTES
Family meeting 3/31:  Met again with patient's wife and brother.  Discussed care and prognosis.  They are now willing to proceed with trach and peg for which I will place consults.  They are still hesitant about blood transfusion unless absolutely necessary, but are willing to transfuse if he is actively bleeding, if hgb falls below 6, or if necessary for preparation for surgery.

## 2017-03-31 NOTE — PLAN OF CARE
Pt more awake and becoming more agitated overnight.  Versed gtt restarted this morning per tele hub.  Pt now turning to voice occasionally, not following commands.  Family remains at bedside, updated throughout shift.  Large amounts of mucous drained, lungs sound to be clearing at times.  Good urine output with laura urine, small amount of red sediment.  Will continue to monitor.

## 2017-03-31 NOTE — PROGRESS NOTES
"[facility]  Infectious Disease Progress Note          Assessment and Plan:   Impression:   57 y.o male admitted about 2 weeks ago with concern for secondary bacterial pneumonia on the top of Influenza B.   Worsening respiratory status intubated.   Sputum Cultures positive for MSSA.   Patient is originally from [facility]. Immigrated in 1990. The records in the [place] system start in 2004, no data available of his TB status, I do not see any PPD done. CXR from before have been negative.   Per wife when they first moved to [place] they were \" checked for Tb and all was negative\".   He was admitted this occasion with complaints of pleuritic chest pain. This was after he was diagnosed with Influenza B. His respiratory status quickly worsened. He was intubated and remains that way.  Blood cultures 2/2 positive for MSSA. Cultures from the sputum positive for MSSA, Enterobacter one strain more resistant than the other, Klebsiella. He has been on appropriate antibiotics since admission but has been febrile, still intubated.      Recommendations:   Would get TB Quantiferon.   Isolation room, get sputum for AFB stain and cultures.   Cont  Ancef for MSSA bacteremia.   Ok to continue zosyn for GNR in the sputum.   Will follow up on the pending labs.            Interval History:   On vent.  Temp to 101.3.  Sputum cx from 3/29 with Enterobacter and Klebsiella, sens pending.              Medications:       oseltamivir  75 mg Per Feeding Tube BID     OLANZapine  10 mg Oral At Bedtime     insulin aspart  1-6 Units Subcutaneous Q4H     insulin glargine  12 Units Subcutaneous QAM AC     piperacillin-tazobactam  4.5 g Intravenous Q6H     pantoprazole  40 mg Per Feeding Tube Q12H     ceFAZolin  1 g Intravenous Q8H     heparin  5,000 Units Subcutaneous Q8H     multivitamins with minerals  15 mL Per Feeding Tube Daily     protein modular  1 packet Per Feeding Tube Daily     sodium chloride (PF)  10 mL Intracatheter Q7 " "Days     gabapentin  300 mg Oral or Feeding Tube Q8H ALEC     senna-docusate  1-2 tablet Oral or Feeding Tube BID 09 12     pneumococcal vaccine  0.5 mL Intramuscular Prior to discharge     sodium chloride (PF)  3 mL Intracatheter Q8H     rocuronium  0.6 mg/kg Intravenous Once     chlorhexidine  15 mL Mouth/Throat Q12H                  Physical Exam:   Blood pressure 143/78, pulse 115, temperature 101.1  F (38.4  C), resp. rate 29, height 1.778 m (5' 10\"), weight 77.1 kg (169 lb 15.6 oz), SpO2 100 %.  [unfilled]  Vital Signs with Ranges  Temp:  [99.5  F (37.5  C)-101.3  F (38.5  C)] 101.1  F (38.4  C)  Heart Rate:  [106-125] 120  Resp:  [18-42] 29  BP: (122-170)/() 143/78  FiO2 (%):  [40 %] 40 %  SpO2:  [98 %-100 %] 100 %    Constitutional: Awake, alert, cooperative, no apparent distress   Lungs: Clear to auscultation bilaterally, no crackles or wheezing   Cardiovascular: Regular rate and rhythm, normal S1 and S2, and no murmur noted   Abdomen: Normal bowel sounds, soft, non-distended, non-tender   Skin: No rashes, no cyanosis, no edema   Other:           Data:   All microbiology laboratory data reviewed.  Recent Labs   Lab Test  03/31/17   0530  03/30/17 0425  03/29/17   0530   WBC  17.7*  20.0*  21.7*   HGB  6.5*  6.7*  7.3*   HCT  19.4*  20.1*  21.4*   MCV  97  95  92   PLT  616*  528*  462*     Recent Labs   Lab Test  03/31/17   0530  03/30/17   0425  03/29/17   1800   CR  1.42*  1.57*  1.55*     Recent Labs   Lab Test  09/14/09   1021   SED  9     "

## 2017-03-31 NOTE — PROGRESS NOTES
[facility] ICU RESPIRATORY NOTE  Date of Admission: 3/17/2017  Date of Intubation (most recent): 3/17/2017  Reason for Mechanical Ventilation: Respiratory Failure  Number of Days on Mechanical Ventilation: 15  Met Criteria for Pressure Support Trial:  Length of Pressure Support Trial:  Reason for Stopping Pressure Support Trial:  Reason for No Pressure Support Trial: Agitation/Tachypnea      Recent Labs  Lab 03/27/17  0240 03/27/17  0050 03/25/17  0515 03/24/17  2320   PH 7.38 7.24* 7.49* 7.51*   PCO2 48* 67* 33* 31*   PO2 68* 83 68* 95   HCO3 28 29* 25 25   O2PER 60% 60 40 40     Ventilation Mode: CMV/AC  FiO2 (%): 40 %  Rate Set (breaths/minute): 18 breaths/min  Tidal Volume Set (mL): 400 mL  PEEP (cm H2O): 5 cmH2O  Pressure Support (cm H2O): 12 cmH2O  Oxygen Concentration (%): 40 %  Resp: 29    Tamir MARIE

## 2017-03-31 NOTE — PROGRESS NOTES
Critical Care Progress Note      03/31/2017    Name: Wyatt Mahajan MRN#: [id number]   Age: 57 year old YOB: 1960     [facility]    MV DAY #10           Problem List:   Active Problems:    Acute respiratory failure with hypoxia (H)  MSSA/enterobacter/klebsiella pna   MSSA bacteremia--resolved  VAP (GNR)  Influenza B  JOSE--stable  Hematuria---bell trauma?--improving  pancreatitis          Summary/Hospital Course:   Wyatt Mahajan is a 57 year old male admitted on 3/17 to the floor, presenting with acute hypoxic respiratory failure due to influenza B and CAP, right chest wall pain and uncontrolled DM-II. Patient was subsequently transferred from the floor to the MICU for increased work of breathing, accessory muscle use and respiratory distress. Was intubated due to increased work of breathing, tachypnea and decreasing O2 sats.  His hospital course has been complicated by:  1. Vent-associated pneumonia for which his abx coverage was broadened to zoysn.  Ancef was restarted on 3/30 per ID request for MSSA bacteremia.  2.   MSSA bacteremia, likely from the pna which cleared after the second set of blood cx.  3.  Delirium which has persisted through propofol, precedex, valproate, seroquel and gabapentin.  4.  Pancreatitis, likely due to propofol, which is now improving with post-pyloric feeds.  5.  Mild transaminitis:  Drug induced vs crit illness induced  6.  Mild JOSE:  Usually worsens with attempted diuresis.  7.  Hematuria:  Believed 2/2 bell trauma, now resolved.  8.  Bradycardia:  Seems due to precedex, resolved when off precedex.  9.  A fib with rvr:  Resolved with amio loading.  10.  Anemia of critical illness, family has been hesitant to allow transfusions for hgb 7.  Will re-address for hgb 6.    3/31:  No events, more calm when sedation weaned.  hgb 6.5 this AM, discussed risks/benefits of transfusion with family, they still prefer not to transfuse.  Sputum cx with gm neg  rods.  R arm swollen, US showed superficial phlebitis.       Assessment and plan :     Wyatt Mahajan IS a 57 year old male admitted on 3/17/2017 for respiratory failure, flu B, mssa pna and mssa bacteremia.   I have personally reviewed the daily labs, imaging studies, cultures and discussed the case with referring physician and consulting physicians.   My assessment and plan by system for this patient is as follows:    Neurology/Psychiatry:   1. Sedation:  Discontinued propofol given CK and Triglyceride lab values on 3/24, transitioned to precedex but patient thrashing and hypertensive/bradycardic. Versed drip begun 3/25 AM is providing adequate sedation. Prn dilaudid also available.  2. Delirium:  3/31:  seroquel not having desired effect, tranisition to olanzapine today.  Will also hold valproate, as it does not seem to be improving his delerium.  3. H/o Diabetic neuropathy 2/2 DM-II:     -- gabapentin 300mg, TID.    Cardiovascular:   1. Hypertension: standing hydralazine started  2. H/o HLD:  hold atorvastatin (LFT elevated)  3. H/o PAD:  hold ASA  4. Hyperlactemia:  Resolved  5.  A fib with RVR:  In setting of critical illness.  Resolved on amiodarone.    Pulmonary/Ventilator Management:   1.  Respiratory failure, hypoxemic:  2/2 mssa pna, vap, and influenza B.  Sputum culture 3/29 still + for GNR, but MSSA did not grow this time.  --Lung protective vent strategy- PCV plus assist ventilation     GI and Nutrition :   1.  TF  2.  PPI for PUD prophylaxis  3.  Constipation--improved on bowel reg.  4. Elevated liver enzymes: newly elevated on 3/24, nl on 3/17, ? Med related vs ? RUQ U/S revealed a distended GB w/sludge vs related to pancreatitis. Subsequent LFT's have shown decreases of Alk phos and ALT levels since discontinuation of statin.  --serial LFT's  5.  Pancreatitis:  Due to critical illness vs propofol.  No further propofol.  --restarting post-pyloric trickle feeds.  post-pyloric tube by  IR.  --Increased PPI to bid  --bladder pressure 15,  CT abd/pelv without acute abnormality, abd distension improving.  --lipase trending down    Renal/Fluids/Electrolytes:   1. JOSE--now improving. UA showed no casts. Urine eos < 1%  -- stable today, keep holding diuresis  2. Hypernatremia - . PO free water    :  1. Hematuria: bloodly urine AM of 3/23 probably due to trauma from the bell catheter. Resolved 3/30.    Infectious Disease:   1. Flu B:  Tamiflu  2. MSSA pna/bacteremia and gm neg VAP: Nafcillin until 3/24, then ancef until 3/26, then zosyn and vanco 3/26 until  vanco stopped 3/29.  Ancef re-added to existing zosyn on 3/30 per ID.  Most recent bld cultures show no growth. TTE did not show valvular masses/vegetations. CT chest negative for occult abscess.   3/29 sputum cx still with GNR, but no further staph.  --ID consulted 3/30 for persistent + sputum cx.    Endocrine:   1. Hyperglycemia:    --Transitioning insulin drip to lantus/SSI on 3/31.    Hematology/Oncology:   1. Plt stable  2. Hgb 6.5 today (drifted down from 6.7).  No active bleeding. C/w anemia of chronic disease. Hemodynamics OK. Discussed risks/benefits of transfusion with family, they declined transfusion at this time.  Will monitor for now.  Re-address if continues to fall.  3. Leukocytosis-- 2/2 infection. Patient febrile on and off. Slowly decreasing  4.  R arm swelling  -- superficial thrombus only.  Warm compresses.     IV/Access:   1. Venous access - peripheral and PICC    ICU Prophylaxis:   1. DVT: Restart hep SubQ 3/30  2. VAP: HOB 30 degrees, chlorhexidine rinse  3. Stress Ulcer: PPI  4. Restraints: Nonviolent soft two point restraints required and necessary for patient safety and continued cares and good effect as patient continues to pull at necessary lines, tubes despite education and distraction. Will readdress daily.   6. Feeding - post-pyloric trickle feeds  7. Family Update: wife and daughter at bedside  8. Disposition -  critically ill    [facility] 3/27 (Fadi):  Held full care conference with wife, brother and daughter.  Discussed his course and complications.  Will conitnue with current txt for now.  All questions asked and answered.    [facility] 3/30 (Fadi):  Met with wife and brother.  Overall he has been making slow progress, but I doubt he will be ready for extubation soon, and has already been intubated 14 days.  I have recommended tracheostomy to continue with further cares.  Family has acknowledged this, but wishes to wait through the weekend.  They asked for a trial of extubation early next week prior to deciding on trach, I strongly advised against this unless his sbt's are better next week.  Also briefly discussed his delirium, pancreatitis, and anemia, but no major decisions except decision not to transfuse today as above.    [facility] 3/31 (fadi): Met again with patient's wife and brother.  Discussed care and prognosis.  They are now willing to proceed with trach and peg for which I will place consults.  They are still hesitant about blood transfusion unless absolutely necessary, but are willing to transfuse if he is actively bleeding, if hgb falls below 6, or if necessary for preparation for surgery.         Key Medications:   Reviewed          Physical Examination:   Temp:  [99.5  F (37.5  C)-100.9  F (38.3  C)] 100  F (37.8  C)  Heart Rate:  [106-125] 112  Resp:  [18-42] 21  BP: (122-170)/() 126/64  FiO2 (%):  [40 %] 40 %  SpO2:  [98 %-100 %] 100 %        Intake/Output Summary (Last 24 hours) at 03/31/17 1138  Last data filed at 03/31/17 1000   Gross per 24 hour   Intake          4114.62 ml   Output             2590 ml   Net          1524.62 ml               Wt Readings from Last 4 Encounters:   03/30/17 77.1 kg (169 lb 15.6 oz)   03/16/17 70.3 kg (155 lb)   01/17/17 71.3 kg (157 lb 1.6 oz)   11/30/16 72.6 kg (160 lb)     BP - Mean:  [] 80  Ventilation Mode: CMV/AC  FiO2 (%): 40 %  Rate Set  (breaths/minute): 18 breaths/min  Tidal Volume Set (mL): 400 mL  PEEP (cm H2O): 5 cmH2O  Pressure Support (cm H2O): 12 cmH2O  Oxygen Concentration (%): 40 %  Resp: 21    Recent Labs  Lab 03/27/17  0240 03/27/17  0050 03/25/17  0515 03/24/17  2320   PH 7.38 7.24* 7.49* 7.51*   PCO2 48* 67* 33* 31*   PO2 68* 83 68* 95   HCO3 28 29* 25 25   O2PER 60% 60 40 40     GEN: sedated, intubated, opens eyes but otherwise non-responsive  HEENT: head ncat, sclera anicteric, OP patent, trachea midline   PULM: unlabored, coarse lung sounds anteriorly, rhonchi bilaterally.  CV/COR: RRR S1/S2, No rub, murmur or gallop  ABD: softer, distended but improving, nontender, hypoactive bowel sounds, no masses  EXT:  peripheral edema present in R>L hand, warm x4 and well-perfused.   NEURO: sedated, mildly agitated delirium, not following commands.  SKIN: no obvious rash  LINES: clean, dry intact         Data:       Recent Labs  Lab 03/31/17  0530 03/30/17  0425 03/29/17  1800 03/29/17  0530 03/28/17  1324 03/28/17  0430  03/27/17  0420 03/26/17  0340  03/25/17  0430   * 152* 152* 152*  --  146*  < > 146* 152*  --  151*   POTASSIUM 3.7 3.7 3.8 3.2* 3.6 3.3*  < > 4.3 4.6  < > 3.8   CHLORIDE 115* 117* 118* 116*  --  110*  < > 111* 117*  --  117*   CO2 27 29 28 27  --  28  < > 26 27  --  26   ANIONGAP 8 6 6 9  --  8  < > 9 8  --  8   * 159* 125* 138*  --  119*  < > 174* 232*  --  252*   BUN 25 35* 40* 46*  --  53*  < > 43* 36*  --  26   CR 1.42* 1.57* 1.55* 1.78*  --  1.79*  < > 1.47* 1.29*  --  1.47*   GFRESTIMATED 52* 46* 47* 40*  --  40*  < > 50* 58*  --  50*   GFRESTBLACK 63 56* 57* 48*  --  48*  < > 60* 70  --  60*   LEONIE 7.6* 7.7* 7.6* 7.7*  --  7.8*  < > 7.9* 7.7*  --  7.6*   MAG 2.4* 2.7*  --  2.9*  --  3.1*  --  3.2* 3.7*  --  3.4*   PHOS 3.0 2.8  --  2.6 4.3 1.9*  --  2.4* 3.3  --  2.7   PROTTOTAL 7.2  --   --   --   --   --   --  7.2 7.5  --  7.2   ALBUMIN 1.4*  --   --   --   --   --   --  1.8* 1.6*  --  1.2*   BILITOTAL  1.0  --   --   --   --   --   --  1.3 1.1  --  1.4*   ALKPHOS 176*  --   --   --   --   --   --  315* 306*  --  353*   *  --   --   --   --   --   --  224* 162*  --  196*   ALT 78*  --   --   --   --   --   --  79* 61  --  86*   < > = values in this interval not displayed.    CBC    Recent Labs  Lab 03/31/17  0530 03/30/17  0425 03/29/17  0530 03/28/17  0430   WBC 17.7* 20.0* 21.7* 23.3*   RBC 2.01* 2.12* 2.33* 2.42*   HGB 6.5* 6.7* 7.3* 7.6*   HCT 19.4* 20.1* 21.4* 22.3*   MCV 97 95 92 92   MCH 32.3 31.6 31.3 31.4   MCHC 33.5 33.3 34.1 34.1   RDW 15.5* 15.4* 15.1* 15.6*   * 528* 462* 326     INR    Recent Labs  Lab 03/27/17  1830   INR 1.15*     Arterial Blood Gas    Recent Labs  Lab 03/27/17  0240 03/27/17  0050 03/25/17  0515 03/24/17  2320   PH 7.38 7.24* 7.49* 7.51*   PCO2 48* 67* 33* 31*   PO2 68* 83 68* 95   HCO3 28 29* 25 25   O2PER 60% 60 40 40       All cultures:    Recent Labs  Lab 03/29/17  1805 03/25/17  1245 03/24/17  1750   CULT Light growth Gram negative rodsLight growth Lactose fermenting gram negative rodsSusceptibility testing in progress* No growth Heavy growth Enterobacter cloacae complexLight growth Klebsiella pneumoniaeLight growth Strain 2 Enterobacter cloacae complexHeavy growth Staphylococcus aureusLight growth Anna albicans / dubliniensis Candida albicans and Candida dubliniensis are not routinely speciated Susceptibility testing not routinely done*     Imaging:    CT-Chest from 3/22:  1. Extensive infiltrate and consolidation in both lungs has overall  improved slightly since 3/17/2017.   2. Small bilateral pleural effusions are new since the previous exam.  3. Mildly prominent and borderline-enlarged mediastinal and right  hilar lymph nodes have a similar appearance to the previous exam,  given the noncontrast technique on today's study.    Billing: This patient is critically ill: Yes. Total critical care time today 45 min.    Antelmo Aponte MD

## 2017-03-31 NOTE — PLAN OF CARE
Problem: Individualization  Goal: Patient Preferences  Outcome: No Change  Restless, spontaneous eye opening, no tracking.  Grossly moves all extremities, follows no commands.  Freddie wrists restrained.  Febrile; cool cloths applied to forehead, linens minimized, fan on.  Tachypneic on CMV, no PSV today.  LS coarse, dim.  Copious and thick ETT secretions.  Tachycardic, low 100's.    Keofeed at 100cm, rate was increased to 25ml/hr at 1800 hours.  No BM.  Levine with pedro luis/laura output.  On versed drip currently at 1.5mg/hr (was increased from 1mg/hr due to restlessness, family prefers minimal sedation to keep pt comfortable).  Insulin drip stopped, now on SSI.  Consent was signed for trach (likely Monday).  TB quant was redrawn and sent to lab (may take 2-3 days to result).  Family always at bedside, multiple updates throughout day.

## 2017-03-31 NOTE — PROGRESS NOTES
CLINICAL NUTRITION SERVICES - REASSESSMENT NOTE      Recommendations Ordered by Registered Dietitian (RD):   Increase TF Peptamen 1.5 now to 25 mL/hr;  Increase by 15 mL every 24 hrs to goal 55 mL/hr = Peptamen 1.5 at 55 mL/hr = 1980 kcals, 90 gm pro, 1016 mL H20, 248 gm CHO, no fiber  ProStat 1 packet per day = 100 kcals, 15 gm pro  Total (TF + ProStat): 2080 kcals (30 kcal/kg), 105 gm pro (1.5 gm/kg)   Malnutrition:  Severe malnutrition  In Context of:  Acute illness or injury       EVALUATION OF PROGRESS TOWARD GOALS   Diet:  NPO on vent    Nutrition Support:  TF was restarted on 3/29 at 10 mL/hr as below:    Nutrition Support Enteral:  Type of Feeding Tube:  ND  Enteral Frequency:  Continuous  Enteral Regimen:  Peptamen 1.5 at 10 mL/hr  Total Enteral Provisions:  360 kcal, 16 g pro, 45 g CHO, 0 g fiber, 184 mL free H2O.  Free Water Flush:  500 mL every 4 hrs     ProStat 1 packet daily via FT = 100 kcals, 15 gm pro  The D5 IVF was D/Cd.  Total (TF + ProStat):  460 kcals (7 kcal/kg), 31 gm pro (0.4 gm/kg)    Intake/Tolerance:    Pt having fecal incontinence.  Na 150 (H)  Lipase yesterday 2752 (H, improving)  Accuchecks:  1112-163 range --> changed from Insulin drip to Med Res SSI + Lantus 12 Units every am  Wt:  77.1 kg  (up 7.5 kg since admit).  Pt with +2-3 edema in UE    Dosing Weight 69.6 kg (admit weight)      ASSESSED NUTRITION NEEDS:  Estimated Energy Needs: 9791-2541 kcals (25-30 Kcal/Kg)  Justification: maintenance  Estimated Protein Needs: 105+ grams protein (1.5+ g pro/Kg)  Justification: hypercatabolism with critical illness      NEW FINDINGS:   Pt continues to receive Certavite daily via FT - for mucosal PI on tongue.  Plan trach/PEG on Monday.    Previous Goals (3/28):   FT will be successfully placed and TF goal achieved in the next 48-72 hrs.  Evaluation: Not met    Previous Nutrition Diagnosis (3/28):   Inadequate enteral nutrition infusion related to altered GI function (pancreatitis) and  inability to place P/P FT as evidenced TF off, meeting 0% estimated needs  Evaluation: Improving      MALNUTRITION  % Weight Loss:  None noted  % Intake:  </= 50% for >/= 5 days (severe malnutrition)  Subcutaneous Fat Loss:  None observed  Muscle Loss:  None observed  Fluid Retention:  Moderate     Malnutrition Diagnosis: Severe malnutrition  In Context of:  Acute illness or injury    CURRENT NUTRITION DIAGNOSIS  Inadequate enteral nutrition infusion related to low TF rate as evidenced by TF meeting < 30% estimated needs.     INTERVENTIONS  Recommendations / Nutrition Prescription  Increase TF Peptamen 1.5 now to 25 mL/hr;  Increase by 15 mL every 24 hrs to goal 55 mL/hr = Peptamen 1.5 at 55 mL/hr = 1980 kcals, 90 gm pro, 1016 mL H20, 248 gm CHO, no fiber  ProStat 1 packet per day = 100 kcals, 15 gm pro  Total (TF + ProStat): 2080 kcals (30 kcal/kg), 105 gm pro (1.5 gm/kg)     Implementation  Collaboration and Referral of Nutrition care - Discussed pt during ICU interdisciplinary rounds this morning and later with Dr. Aponte who approved TF rate increase today.  EN Schedule - Entered order in EPIC for above TF increase.    Goals  TF + ProStat will meet % estimated needs.      MONITORING AND EVALUATION:  Progress towards goals will be monitored and evaluated per protocol and Practice Guidelines    Carie Matos, RD, LD, CNSC

## 2017-03-31 NOTE — PROGRESS NOTES
Renal Medicine       Renal function stable to improving  Adjust free water as indicated    Diurese as tolerated      No new recommendations  Please call with questions will sign off          Recent Labs  Lab 03/31/17  0530   *   POTASSIUM 3.7   CHLORIDE 115*   CO2 27   ANIONGAP 8   *   BUN 25   CR 1.42*   GFRESTIMATED 52*   GFRESTBLACK 63   LEONIE 7.6*     Recent Labs   Lab Test  03/31/17   0530  03/30/17   0425  03/29/17   1800  03/29/17   0530  03/28/17   0430  03/27/17   1830  03/27/17   0420  03/26/17   0340  03/25/17   0430  03/24/17   1427   CR  1.42*  1.57*  1.55*  1.78*  1.79*  1.55*  1.47*  1.29*  1.47*  1.71*       Recent Labs  Lab 03/31/17  0530 03/30/17  0425 03/29/17  0530 03/28/17  1324 03/28/17  0430   PHOS 3.0 2.8 2.6 4.3 1.9*   HGB 6.5* 6.7* 7.3*  --  7.6*           SYBIL Godoy    [facility]  [phone]

## 2017-04-01 ENCOUNTER — APPOINTMENT (OUTPATIENT)
Dept: CT IMAGING | Facility: CLINIC | Age: 57
DRG: 870 | End: 2017-04-01
Attending: INTERNAL MEDICINE
Payer: COMMERCIAL

## 2017-04-01 PROBLEM — J10.1 INFLUENZA B: Status: ACTIVE | Noted: 2017-04-01

## 2017-04-01 PROBLEM — A41.01 MSSA (METHICILLIN SUSCEPTIBLE STAPHYLOCOCCUS AUREUS) SEPTICEMIA (H): Status: ACTIVE | Noted: 2017-04-01

## 2017-04-01 PROBLEM — D72.829 LEUKOCYTOSIS: Status: ACTIVE | Noted: 2017-04-01

## 2017-04-01 PROBLEM — J15.211 MSSA (METHICILLIN SUSCEPTIBLE STAPHYLOCOCCUS AUREUS) PNEUMONIA (H): Status: ACTIVE | Noted: 2017-04-01

## 2017-04-01 PROBLEM — R50.9 FEVER: Status: ACTIVE | Noted: 2017-04-01

## 2017-04-01 LAB
ANION GAP SERPL CALCULATED.3IONS-SCNC: 7 MMOL/L (ref 3–14)
BUN SERPL-MCNC: 29 MG/DL (ref 7–30)
CALCIUM SERPL-MCNC: 7.6 MG/DL (ref 8.5–10.1)
CHLORIDE SERPL-SCNC: 113 MMOL/L (ref 94–109)
CO2 SERPL-SCNC: 27 MMOL/L (ref 20–32)
CREAT SERPL-MCNC: 1.52 MG/DL (ref 0.66–1.25)
ERYTHROCYTE [DISTWIDTH] IN BLOOD BY AUTOMATED COUNT: 15.8 % (ref 10–15)
GFR SERPL CREATININE-BSD FRML MDRD: 48 ML/MIN/1.7M2
GLUCOSE BLDC GLUCOMTR-MCNC: 152 MG/DL (ref 70–99)
GLUCOSE BLDC GLUCOMTR-MCNC: 184 MG/DL (ref 70–99)
GLUCOSE BLDC GLUCOMTR-MCNC: 187 MG/DL (ref 70–99)
GLUCOSE BLDC GLUCOMTR-MCNC: 197 MG/DL (ref 70–99)
GLUCOSE BLDC GLUCOMTR-MCNC: 205 MG/DL (ref 70–99)
GLUCOSE BLDC GLUCOMTR-MCNC: 219 MG/DL (ref 70–99)
GLUCOSE SERPL-MCNC: 172 MG/DL (ref 70–99)
HCT VFR BLD AUTO: 19.2 % (ref 40–53)
HGB BLD-MCNC: 6.3 G/DL (ref 13.3–17.7)
MAGNESIUM SERPL-MCNC: 2.5 MG/DL (ref 1.6–2.3)
MCH RBC QN AUTO: 31.3 PG (ref 26.5–33)
MCHC RBC AUTO-ENTMCNC: 32.8 G/DL (ref 31.5–36.5)
MCV RBC AUTO: 96 FL (ref 78–100)
MICRO REPORT STATUS: NORMAL
MYCOBACTERIUM SPEC CULT: NORMAL
PHOSPHATE SERPL-MCNC: 2.9 MG/DL (ref 2.5–4.5)
PLATELET # BLD AUTO: 540 10E9/L (ref 150–450)
POTASSIUM SERPL-SCNC: 3.6 MMOL/L (ref 3.4–5.3)
RBC # BLD AUTO: 2.01 10E12/L (ref 4.4–5.9)
SODIUM SERPL-SCNC: 147 MMOL/L (ref 133–144)
SPECIMEN SOURCE: NORMAL
WBC # BLD AUTO: 16.3 10E9/L (ref 4–11)

## 2017-04-01 PROCEDURE — 84100 ASSAY OF PHOSPHORUS: CPT | Performed by: INTERNAL MEDICINE

## 2017-04-01 PROCEDURE — 25000128 H RX IP 250 OP 636: Performed by: INTERNAL MEDICINE

## 2017-04-01 PROCEDURE — 25000132 ZZH RX MED GY IP 250 OP 250 PS 637

## 2017-04-01 PROCEDURE — 25000131 ZZH RX MED GY IP 250 OP 636 PS 637: Performed by: INTERNAL MEDICINE

## 2017-04-01 PROCEDURE — 40000008 ZZH STATISTIC AIRWAY CARE

## 2017-04-01 PROCEDURE — 85027 COMPLETE CBC AUTOMATED: CPT | Performed by: INTERNAL MEDICINE

## 2017-04-01 PROCEDURE — 25000128 H RX IP 250 OP 636: Performed by: HOSPITALIST

## 2017-04-01 PROCEDURE — 87015 SPECIMEN INFECT AGNT CONCNTJ: CPT | Performed by: INTERNAL MEDICINE

## 2017-04-01 PROCEDURE — 25000132 ZZH RX MED GY IP 250 OP 250 PS 637: Performed by: INTERNAL MEDICINE

## 2017-04-01 PROCEDURE — 40000276 ZZH STATISTIC RCP TIME ED VENT EA 10 MIN

## 2017-04-01 PROCEDURE — 80048 BASIC METABOLIC PNL TOTAL CA: CPT | Performed by: INTERNAL MEDICINE

## 2017-04-01 PROCEDURE — 00000146 ZZHCL STATISTIC GLUCOSE BY METER IP

## 2017-04-01 PROCEDURE — 40000281 ZZH STATISTIC TRANSPORT TIME EA 15 MIN

## 2017-04-01 PROCEDURE — 20000003 ZZH R&B ICU

## 2017-04-01 PROCEDURE — 25000132 ZZH RX MED GY IP 250 OP 250 PS 637: Performed by: HOSPITALIST

## 2017-04-01 PROCEDURE — 40000275 ZZH STATISTIC RCP TIME EA 10 MIN

## 2017-04-01 PROCEDURE — 87116 MYCOBACTERIA CULTURE: CPT | Performed by: INTERNAL MEDICINE

## 2017-04-01 PROCEDURE — 40000239 ZZH STATISTIC VAT ROUNDS

## 2017-04-01 PROCEDURE — 99291 CRITICAL CARE FIRST HOUR: CPT | Performed by: INTERNAL MEDICINE

## 2017-04-01 PROCEDURE — 94003 VENT MGMT INPAT SUBQ DAY: CPT

## 2017-04-01 PROCEDURE — 87206 SMEAR FLUORESCENT/ACID STAI: CPT | Performed by: INTERNAL MEDICINE

## 2017-04-01 PROCEDURE — 70450 CT HEAD/BRAIN W/O DYE: CPT

## 2017-04-01 PROCEDURE — 83735 ASSAY OF MAGNESIUM: CPT | Performed by: INTERNAL MEDICINE

## 2017-04-01 RX ADMIN — GABAPENTIN 300 MG: 250 SUSPENSION ORAL at 15:38

## 2017-04-01 RX ADMIN — MIDAZOLAM HYDROCHLORIDE 2 MG: 1 INJECTION, SOLUTION INTRAMUSCULAR; INTRAVENOUS at 21:00

## 2017-04-01 RX ADMIN — PIPERACILLIN AND TAZOBACTAM 4.5 G: 4; .5 INJECTION, POWDER, FOR SOLUTION INTRAVENOUS at 00:32

## 2017-04-01 RX ADMIN — OSELTAMIVIR PHOSPHATE 75 MG: 6 POWDER, FOR SUSPENSION ORAL at 08:34

## 2017-04-01 RX ADMIN — INSULIN ASPART 2 UNITS: 100 INJECTION, SOLUTION INTRAVENOUS; SUBCUTANEOUS at 16:22

## 2017-04-01 RX ADMIN — HYDROMORPHONE HYDROCHLORIDE 0.5 MG: 1 INJECTION, SOLUTION INTRAMUSCULAR; INTRAVENOUS; SUBCUTANEOUS at 10:36

## 2017-04-01 RX ADMIN — OLANZAPINE 10 MG: 10 TABLET, FILM COATED ORAL at 21:43

## 2017-04-01 RX ADMIN — HYDROMORPHONE HYDROCHLORIDE 0.5 MG: 1 INJECTION, SOLUTION INTRAMUSCULAR; INTRAVENOUS; SUBCUTANEOUS at 18:13

## 2017-04-01 RX ADMIN — PIPERACILLIN AND TAZOBACTAM 4.5 G: 4; .5 INJECTION, POWDER, FOR SOLUTION INTRAVENOUS at 18:13

## 2017-04-01 RX ADMIN — CEFAZOLIN SODIUM 1 G: 1 INJECTION, POWDER, FOR SOLUTION INTRAMUSCULAR; INTRAVENOUS at 21:43

## 2017-04-01 RX ADMIN — HEPARIN SODIUM 5000 UNITS: 10000 INJECTION, SOLUTION INTRAVENOUS; SUBCUTANEOUS at 15:39

## 2017-04-01 RX ADMIN — Medication 1 PACKET: at 08:36

## 2017-04-01 RX ADMIN — CHLORHEXIDINE GLUCONATE 15 ML: 1.2 RINSE ORAL at 20:59

## 2017-04-01 RX ADMIN — INSULIN ASPART 1 UNITS: 100 INJECTION, SOLUTION INTRAVENOUS; SUBCUTANEOUS at 12:56

## 2017-04-01 RX ADMIN — INSULIN ASPART 1 UNITS: 100 INJECTION, SOLUTION INTRAVENOUS; SUBCUTANEOUS at 08:40

## 2017-04-01 RX ADMIN — PANTOPRAZOLE SODIUM 40 MG: 40 TABLET, DELAYED RELEASE ORAL at 12:52

## 2017-04-01 RX ADMIN — INSULIN ASPART 1 UNITS: 100 INJECTION, SOLUTION INTRAVENOUS; SUBCUTANEOUS at 01:07

## 2017-04-01 RX ADMIN — SENNOSIDES AND DOCUSATE SODIUM 2 TABLET: 8.6; 5 TABLET ORAL at 08:36

## 2017-04-01 RX ADMIN — ACETAMINOPHEN 650 MG: 160 SOLUTION ORAL at 08:48

## 2017-04-01 RX ADMIN — HEPARIN SODIUM 5000 UNITS: 10000 INJECTION, SOLUTION INTRAVENOUS; SUBCUTANEOUS at 00:32

## 2017-04-01 RX ADMIN — GABAPENTIN 300 MG: 250 SUSPENSION ORAL at 00:31

## 2017-04-01 RX ADMIN — ACETAMINOPHEN 650 MG: 160 SOLUTION ORAL at 15:38

## 2017-04-01 RX ADMIN — OSELTAMIVIR PHOSPHATE 75 MG: 6 POWDER, FOR SUSPENSION ORAL at 21:00

## 2017-04-01 RX ADMIN — INSULIN ASPART 2 UNITS: 100 INJECTION, SOLUTION INTRAVENOUS; SUBCUTANEOUS at 04:05

## 2017-04-01 RX ADMIN — INSULIN ASPART 2 UNITS: 100 INJECTION, SOLUTION INTRAVENOUS; SUBCUTANEOUS at 20:59

## 2017-04-01 RX ADMIN — Medication 3 MG/HR: at 14:09

## 2017-04-01 RX ADMIN — PIPERACILLIN AND TAZOBACTAM 4.5 G: 4; .5 INJECTION, POWDER, FOR SOLUTION INTRAVENOUS at 12:52

## 2017-04-01 RX ADMIN — CEFAZOLIN SODIUM 1 G: 1 INJECTION, POWDER, FOR SOLUTION INTRAMUSCULAR; INTRAVENOUS at 14:13

## 2017-04-01 RX ADMIN — HEPARIN SODIUM 5000 UNITS: 10000 INJECTION, SOLUTION INTRAVENOUS; SUBCUTANEOUS at 08:34

## 2017-04-01 RX ADMIN — SENNOSIDES A AND B 5 ML: 415.36 LIQUID ORAL at 21:00

## 2017-04-01 RX ADMIN — INSULIN GLARGINE 12 UNITS: 100 INJECTION, SOLUTION SUBCUTANEOUS at 08:40

## 2017-04-01 RX ADMIN — PANTOPRAZOLE SODIUM 40 MG: 40 TABLET, DELAYED RELEASE ORAL at 00:31

## 2017-04-01 RX ADMIN — DOCUSATE SODIUM 50 MG: 50 LIQUID ORAL at 21:00

## 2017-04-01 RX ADMIN — MULTIVITAMIN 15 ML: LIQUID ORAL at 08:34

## 2017-04-01 RX ADMIN — PIPERACILLIN AND TAZOBACTAM 4.5 G: 4; .5 INJECTION, POWDER, FOR SOLUTION INTRAVENOUS at 06:36

## 2017-04-01 RX ADMIN — HYDROMORPHONE HYDROCHLORIDE 0.5 MG: 1 INJECTION, SOLUTION INTRAMUSCULAR; INTRAVENOUS; SUBCUTANEOUS at 21:51

## 2017-04-01 RX ADMIN — MIDAZOLAM HYDROCHLORIDE 2 MG: 1 INJECTION, SOLUTION INTRAMUSCULAR; INTRAVENOUS at 10:41

## 2017-04-01 RX ADMIN — CHLORHEXIDINE GLUCONATE 15 ML: 1.2 RINSE ORAL at 08:33

## 2017-04-01 RX ADMIN — CEFAZOLIN SODIUM 1 G: 1 INJECTION, POWDER, FOR SOLUTION INTRAMUSCULAR; INTRAVENOUS at 05:48

## 2017-04-01 RX ADMIN — GABAPENTIN 300 MG: 250 SUSPENSION ORAL at 08:33

## 2017-04-01 NOTE — PROVIDER NOTIFICATION
Updated Hgb called to Dr. Garrison.  Spoke with RN at tele hub who will relay information to Dr. Garrison.  Hgb is 6.3 no active bleeding.  Urine remains dark  Tea,  but not elis.  Spouse at bedside updated as well.

## 2017-04-01 NOTE — PROGRESS NOTES
[facility] ICU RESPIRATORY NOTE  Date of Admission: 3/17/2017  Date of Intubation (most recent): 3/17/2017  Reason for Mechanical Ventilation: Respiratory Failure  Number of Days on Mechanical Ventilation: 17  Met Criteria for Pressure Support Trial:  Length of Pressure Support Trial:  Reason for Stopping Pressure Support Trial:  Reason for No Pressure Support Trial: per MD    Pt remains intubated on the following settings:    Ventilation Mode: CMV/AC  FiO2 (%): 40 %  Rate Set (breaths/minute): 18 breaths/min  Tidal Volume Set (mL): 400 mL  PEEP (cm H2O): 5 cmH2O  Oxygen Concentration (%): 40 %  Resp: 24      Recent Labs  Lab 03/27/17  0240 03/27/17  0050   PH 7.38 7.24*   PCO2 48* 67*   PO2 68* 83   HCO3 28 29*   O2PER 60% 60     Plan: Cont full ventilatory support. Assess for weaning ability. Trach possible for Monday. Will cont to follow.     4/1/2017  Nga Ferreira RT

## 2017-04-01 NOTE — PLAN OF CARE
"Problem: Individualization  Goal: Patient Preferences  Outcome: No Change  Pt remains confused unable to follow commands.  Versed drip increased overnight to 3mg/hr for increased agitation.  freq cough, thick white/yellow sputum.  Lungs mostly coarse but clears with suctioning.  Spouse at bedside and frequently updated with cares.  Spouse voices concern over  \"not getting better after 15 days.\"  Discussed conversations she has had with doctors and plan of care.  Hgb 6.3 no active signs of bleeding.  Tolerating T.F. And water flushes.  No stools.        "

## 2017-04-01 NOTE — PROGRESS NOTES
[facility]  CRITICAL CARE PROGRESS NOTE    Wyatt LAWSON Keyshawn MRN: [id number]  1960  Date of Admission:3/17/2017          Problem List:   1. Acute hypoxemic respiratory failure   2. Delirium   3. MSSA/Klebsiella PNA, MSSA bacteremia   4. JOSE      24-Hour Goals   1. Controlled mechanical ventilation   2. Head CT today      Overnight Events   No acute issues overnight.        Lines/Tubes/Draines/Devices   ETT: Placed 3/17 Size 7.5  Levine: 3/17      ICU Prophylaxis:   1. DVT: Hep Subq  2. VAP: HOB 30 degrees, chlorhexidine rinse  3. Stress Ulcer: PPI/H2 blocker  4. Restraints: Nonviolent soft two point restraints required and necessary for patient safety and continued cares and good effect as patient continues to pull at necessary lines, tubes despite education and distraction. Will readdress daily.   5. IV Access - central access required and necessary for continued patient cares  6. Feeding - enteral      Medications:       oseltamivir  75 mg Per Feeding Tube BID     OLANZapine  10 mg Oral At Bedtime     insulin aspart  1-6 Units Subcutaneous Q4H     insulin glargine  12 Units Subcutaneous QAM AC     piperacillin-tazobactam  4.5 g Intravenous Q6H     pantoprazole  40 mg Per Feeding Tube Q12H     ceFAZolin  1 g Intravenous Q8H     heparin  5,000 Units Subcutaneous Q8H     multivitamins with minerals  15 mL Per Feeding Tube Daily     protein modular  1 packet Per Feeding Tube Daily     sodium chloride (PF)  10 mL Intracatheter Q7 Days     gabapentin  300 mg Oral or Feeding Tube Q8H ALEC     senna-docusate  1-2 tablet Oral or Feeding Tube BID 09 12     pneumococcal vaccine  0.5 mL Intramuscular Prior to discharge     sodium chloride (PF)  3 mL Intracatheter Q8H     rocuronium  0.6 mg/kg Intravenous Once     chlorhexidine  15 mL Mouth/Throat Q12H       Review of Systems:   Unable to obtain due to critical illness          Physical Exam:   Temp:  [99.7  F (37.6  C)-101.3  F (38.5  C)] 100.8  F (38.2   C)  Heart Rate:  [104-131] 114  Resp:  [7-37] 15  BP: (110-161)/(56-86) 130/72  FiO2 (%):  [40 %] 40 %  SpO2:  [90 %-100 %] 100 %    Intake/Output Summary (Last 24 hours) at 04/01/17 0946  Last data filed at 04/01/17 0800   Gross per 24 hour   Intake          4329.73 ml   Output             1985 ml   Net          2344.73 ml     Wt Readings from Last 4 Encounters:   04/01/17 76.8 kg (169 lb 5 oz)   03/16/17 70.3 kg (155 lb)   01/17/17 71.3 kg (157 lb 1.6 oz)   11/30/16 72.6 kg (160 lb)     BP - Mean:  [] 94  Ventilation Mode: CMV/AC  FiO2 (%): 40 %  Rate Set (breaths/minute): 18 breaths/min  Tidal Volume Set (mL): 400 mL  PEEP (cm H2O): 5 cmH2O  Oxygen Concentration (%): 40 %  Resp: 15    Recent Labs  Lab 03/27/17  0240 03/27/17  0050   PH 7.38 7.24*   PCO2 48* 67*   PO2 68* 83   HCO3 28 29*   O2PER 60% 60     GEN: no acute distress   HEENT: head ncat, sclera anicteric, OP patent, trachea midline   PULM: coarse bs bilaterally    CV/COR: RRR S1S2 no gallop,  No rub, no murmur  ABD: soft nontender, hypoactive bowel sounds, no mass  EXT:  Edema   warm  NEURO: encephalopathic  SKIN: no obvious rash      Assessment and plan :     Neurology/Psychiatry/Pain/Sedation:   1. Encephalopathy - most likely related to metabolic/sepsis in addition to delirium. Did not tolerate precedex previously. Now sedated with versed and prn dilaudid for vent synchrony. Cont olanzapine for delirium. Get head CT today for completeness.     Cardiovascular/Hemodynamics:   1. Afib w/ RVR - controlled with amiodarone.     Pulmonary/Ventilator Management/ID:   1. Hypoxemic respiratory failure 2/2 sepsis. On zosyn and tamiflu for MSSA/klebsiella and influenza. Easy to oxygenate/ventilate with synchronous with vent. Given ongoing delirium and minimal progress toward extubation, agree with plans to move for tracheostomy Monday.  2. AFB r/o in progress. Cont neg pressure rm and resp isolation.     GI/Nutrition:   1. Cont enteral  nutrition    Renal/Fluids/Electrolytes:   1. JOSE - UOP adequate, cont to monitor    Endocrine/Hematology/Oncology:   1. Cont icu glucose protocol    Disposition/Code Status/Other  1. Cont supportive care and treatment for sepsis. Plan for tracheostomy on Monday. Updated wife at bedside.   2. Code: Full    I have personally reviewed the daily labs, imaging studies, cultures and discussed the case with referring physician and consulting physicians.     This patient is critically ill and I have provided 30 minutes of critical care time on April 1, 2017.     Haseeb Puga MD   Critical Care Staff  [phone]      Data:   All data and imaging reviewed     ROUTINE ICU LABS (Last four results)  CMP  Recent Labs  Lab 04/01/17  0540 03/31/17  0530 03/30/17  0425 03/29/17  1800 03/29/17  0530  03/27/17  0420 03/26/17  0340   * 150* 152* 152* 152*  < > 146* 152*   POTASSIUM 3.6 3.7 3.7 3.8 3.2*  < > 4.3 4.6   CHLORIDE 113* 115* 117* 118* 116*  < > 111* 117*   CO2 27 27 29 28 27  < > 26 27   ANIONGAP 7 8 6 6 9  < > 9 8   * 123* 159* 125* 138*  < > 174* 232*   BUN 29 25 35* 40* 46*  < > 43* 36*   CR 1.52* 1.42* 1.57* 1.55* 1.78*  < > 1.47* 1.29*   GFRESTIMATED 48* 52* 46* 47* 40*  < > 50* 58*   GFRESTBLACK 58* 63 56* 57* 48*  < > 60* 70   LEONIE 7.6* 7.6* 7.7* 7.6* 7.7*  < > 7.9* 7.7*   MAG 2.5* 2.4* 2.7*  --  2.9*  < > 3.2* 3.7*   PHOS 2.9 3.0 2.8  --  2.6  < > 2.4* 3.3   PROTTOTAL  --  7.2  --   --   --   --  7.2 7.5   ALBUMIN  --  1.4*  --   --   --   --  1.8* 1.6*   BILITOTAL  --  1.0  --   --   --   --  1.3 1.1   ALKPHOS  --  176*  --   --   --   --  315* 306*   AST  --  414*  --   --   --   --  224* 162*   ALT  --  78*  --   --   --   --  79* 61   < > = values in this interval not displayed.  CBC  Recent Labs  Lab 04/01/17  0540 03/31/17  0530 03/30/17  0425 03/29/17 0530   WBC 16.3* 17.7* 20.0* 21.7*   RBC 2.01* 2.01* 2.12* 2.33*   HGB 6.3* 6.5* 6.7* 7.3*   HCT 19.2* 19.4* 20.1* 21.4*   MCV 96 97 95 92   MCH 31.3  32.3 31.6 31.3   MCHC 32.8 33.5 33.3 34.1   RDW 15.8* 15.5* 15.4* 15.1*   * 616* 528* 462*     INR  Recent Labs  Lab 03/27/17  1830   INR 1.15*     Arterial Blood Gas  Recent Labs  Lab 03/27/17  0240 03/27/17  0050   PH 7.38 7.24*   PCO2 48* 67*   PO2 68* 83   HCO3 28 29*   O2PER 60% 60     All cultures:    Recent Labs  Lab 03/29/17  1805 03/25/17  1245   CULT Light growth Enterobacter cloacae complexLight growth Klebsiella pneumoniae* No growth

## 2017-04-01 NOTE — PROGRESS NOTES
Pt on full ventilatory support discussed with MD, set on Pressure support, very agitated RR on 40's, set back on full support, suggested encephalopathic diagnostics.4/1/2017  Lilly Mauro

## 2017-04-02 ENCOUNTER — APPOINTMENT (OUTPATIENT)
Dept: ULTRASOUND IMAGING | Facility: CLINIC | Age: 57
DRG: 870 | End: 2017-04-02
Attending: INTERNAL MEDICINE
Payer: COMMERCIAL

## 2017-04-02 LAB
ANION GAP SERPL CALCULATED.3IONS-SCNC: 7 MMOL/L (ref 3–14)
BLD PROD TYP BPU: NORMAL
BLD PROD TYP BPU: NORMAL
BLD UNIT ID BPU: 0
BLD UNIT ID BPU: 0
BLOOD PRODUCT CODE: NORMAL
BLOOD PRODUCT CODE: NORMAL
BPU ID: NORMAL
BPU ID: NORMAL
BUN SERPL-MCNC: 29 MG/DL (ref 7–30)
CALCIUM SERPL-MCNC: 7.7 MG/DL (ref 8.5–10.1)
CHLORIDE SERPL-SCNC: 111 MMOL/L (ref 94–109)
CO2 SERPL-SCNC: 28 MMOL/L (ref 20–32)
CREAT SERPL-MCNC: 1.66 MG/DL (ref 0.66–1.25)
ERYTHROCYTE [DISTWIDTH] IN BLOOD BY AUTOMATED COUNT: 16.9 % (ref 10–15)
GFR SERPL CREATININE-BSD FRML MDRD: 43 ML/MIN/1.7M2
GLUCOSE BLDC GLUCOMTR-MCNC: 144 MG/DL (ref 70–99)
GLUCOSE BLDC GLUCOMTR-MCNC: 160 MG/DL (ref 70–99)
GLUCOSE BLDC GLUCOMTR-MCNC: 195 MG/DL (ref 70–99)
GLUCOSE BLDC GLUCOMTR-MCNC: 195 MG/DL (ref 70–99)
GLUCOSE BLDC GLUCOMTR-MCNC: 215 MG/DL (ref 70–99)
GLUCOSE BLDC GLUCOMTR-MCNC: 217 MG/DL (ref 70–99)
GLUCOSE SERPL-MCNC: 159 MG/DL (ref 70–99)
HCT VFR BLD AUTO: 18.2 % (ref 40–53)
HGB BLD-MCNC: 6.1 G/DL (ref 13.3–17.7)
MAGNESIUM SERPL-MCNC: 2.5 MG/DL (ref 1.6–2.3)
MCH RBC QN AUTO: 32.8 PG (ref 26.5–33)
MCHC RBC AUTO-ENTMCNC: 33.5 G/DL (ref 31.5–36.5)
MCV RBC AUTO: 98 FL (ref 78–100)
PHOSPHATE SERPL-MCNC: 3.1 MG/DL (ref 2.5–4.5)
PLATELET # BLD AUTO: 530 10E9/L (ref 150–450)
POTASSIUM SERPL-SCNC: 3.8 MMOL/L (ref 3.4–5.3)
RBC # BLD AUTO: 1.86 10E12/L (ref 4.4–5.9)
SODIUM SERPL-SCNC: 146 MMOL/L (ref 133–144)
TRANSFUSION STATUS PATIENT QL: NORMAL
WBC # BLD AUTO: 16 10E9/L (ref 4–11)

## 2017-04-02 PROCEDURE — 80048 BASIC METABOLIC PNL TOTAL CA: CPT | Performed by: INTERNAL MEDICINE

## 2017-04-02 PROCEDURE — 99291 CRITICAL CARE FIRST HOUR: CPT | Performed by: INTERNAL MEDICINE

## 2017-04-02 PROCEDURE — 86901 BLOOD TYPING SEROLOGIC RH(D): CPT | Performed by: INTERNAL MEDICINE

## 2017-04-02 PROCEDURE — P9016 RBC LEUKOCYTES REDUCED: HCPCS | Performed by: INTERNAL MEDICINE

## 2017-04-02 PROCEDURE — 85027 COMPLETE CBC AUTOMATED: CPT | Performed by: INTERNAL MEDICINE

## 2017-04-02 PROCEDURE — 20000003 ZZH R&B ICU

## 2017-04-02 PROCEDURE — 87631 RESP VIRUS 3-5 TARGETS: CPT | Performed by: INTERNAL MEDICINE

## 2017-04-02 PROCEDURE — 86850 RBC ANTIBODY SCREEN: CPT | Performed by: INTERNAL MEDICINE

## 2017-04-02 PROCEDURE — 87040 BLOOD CULTURE FOR BACTERIA: CPT | Performed by: INTERNAL MEDICINE

## 2017-04-02 PROCEDURE — 00000146 ZZHCL STATISTIC GLUCOSE BY METER IP

## 2017-04-02 PROCEDURE — 93970 EXTREMITY STUDY: CPT

## 2017-04-02 PROCEDURE — 25000132 ZZH RX MED GY IP 250 OP 250 PS 637

## 2017-04-02 PROCEDURE — 25000131 ZZH RX MED GY IP 250 OP 636 PS 637: Performed by: INTERNAL MEDICINE

## 2017-04-02 PROCEDURE — 25000128 H RX IP 250 OP 636: Performed by: INTERNAL MEDICINE

## 2017-04-02 PROCEDURE — 40000239 ZZH STATISTIC VAT ROUNDS

## 2017-04-02 PROCEDURE — 25000132 ZZH RX MED GY IP 250 OP 250 PS 637: Performed by: HOSPITALIST

## 2017-04-02 PROCEDURE — 83735 ASSAY OF MAGNESIUM: CPT | Performed by: INTERNAL MEDICINE

## 2017-04-02 PROCEDURE — 94003 VENT MGMT INPAT SUBQ DAY: CPT

## 2017-04-02 PROCEDURE — 40000008 ZZH STATISTIC AIRWAY CARE

## 2017-04-02 PROCEDURE — 40000275 ZZH STATISTIC RCP TIME EA 10 MIN

## 2017-04-02 PROCEDURE — 84100 ASSAY OF PHOSPHORUS: CPT | Performed by: INTERNAL MEDICINE

## 2017-04-02 PROCEDURE — 86923 COMPATIBILITY TEST ELECTRIC: CPT | Performed by: INTERNAL MEDICINE

## 2017-04-02 PROCEDURE — 86900 BLOOD TYPING SEROLOGIC ABO: CPT | Performed by: INTERNAL MEDICINE

## 2017-04-02 PROCEDURE — 25000132 ZZH RX MED GY IP 250 OP 250 PS 637: Performed by: INTERNAL MEDICINE

## 2017-04-02 PROCEDURE — 25000128 H RX IP 250 OP 636: Performed by: HOSPITALIST

## 2017-04-02 PROCEDURE — P9041 ALBUMIN (HUMAN),5%, 50ML: HCPCS | Performed by: INTERNAL MEDICINE

## 2017-04-02 RX ORDER — CEFAZOLIN SODIUM 2 G/100ML
2 INJECTION, SOLUTION INTRAVENOUS
Status: COMPLETED | OUTPATIENT
Start: 2017-04-02 | End: 2017-04-04

## 2017-04-02 RX ORDER — CEFAZOLIN SODIUM 1 G/3ML
1 INJECTION, POWDER, FOR SOLUTION INTRAMUSCULAR; INTRAVENOUS SEE ADMIN INSTRUCTIONS
Status: DISCONTINUED | OUTPATIENT
Start: 2017-04-02 | End: 2017-04-04 | Stop reason: HOSPADM

## 2017-04-02 RX ORDER — ALBUMIN, HUMAN INJ 5% 5 %
25 SOLUTION INTRAVENOUS ONCE
Status: COMPLETED | OUTPATIENT
Start: 2017-04-02 | End: 2017-04-02

## 2017-04-02 RX ADMIN — INSULIN GLARGINE 12 UNITS: 100 INJECTION, SOLUTION SUBCUTANEOUS at 08:21

## 2017-04-02 RX ADMIN — INSULIN ASPART 2 UNITS: 100 INJECTION, SOLUTION INTRAVENOUS; SUBCUTANEOUS at 08:21

## 2017-04-02 RX ADMIN — CHLORHEXIDINE GLUCONATE 15 ML: 1.2 RINSE ORAL at 08:23

## 2017-04-02 RX ADMIN — PIPERACILLIN AND TAZOBACTAM 4.5 G: 4; .5 INJECTION, POWDER, FOR SOLUTION INTRAVENOUS at 11:57

## 2017-04-02 RX ADMIN — HYDROMORPHONE HYDROCHLORIDE 0.5 MG: 1 INJECTION, SOLUTION INTRAMUSCULAR; INTRAVENOUS; SUBCUTANEOUS at 02:34

## 2017-04-02 RX ADMIN — Medication 1 PACKET: at 08:24

## 2017-04-02 RX ADMIN — HEPARIN SODIUM 5000 UNITS: 10000 INJECTION, SOLUTION INTRAVENOUS; SUBCUTANEOUS at 16:00

## 2017-04-02 RX ADMIN — GABAPENTIN 300 MG: 250 SUSPENSION ORAL at 08:41

## 2017-04-02 RX ADMIN — HEPARIN SODIUM 5000 UNITS: 10000 INJECTION, SOLUTION INTRAVENOUS; SUBCUTANEOUS at 08:25

## 2017-04-02 RX ADMIN — INSULIN ASPART 1 UNITS: 100 INJECTION, SOLUTION INTRAVENOUS; SUBCUTANEOUS at 00:12

## 2017-04-02 RX ADMIN — INSULIN ASPART 2 UNITS: 100 INJECTION, SOLUTION INTRAVENOUS; SUBCUTANEOUS at 12:04

## 2017-04-02 RX ADMIN — GABAPENTIN 300 MG: 250 SUSPENSION ORAL at 16:00

## 2017-04-02 RX ADMIN — OLANZAPINE 10 MG: 10 TABLET, FILM COATED ORAL at 21:48

## 2017-04-02 RX ADMIN — HYDROMORPHONE HYDROCHLORIDE 0.5 MG: 1 INJECTION, SOLUTION INTRAMUSCULAR; INTRAVENOUS; SUBCUTANEOUS at 06:28

## 2017-04-02 RX ADMIN — PIPERACILLIN AND TAZOBACTAM 4.5 G: 4; .5 INJECTION, POWDER, FOR SOLUTION INTRAVENOUS at 06:17

## 2017-04-02 RX ADMIN — SENNOSIDES A AND B 5 ML: 415.36 LIQUID ORAL at 08:24

## 2017-04-02 RX ADMIN — MIDAZOLAM HYDROCHLORIDE 2 MG: 1 INJECTION, SOLUTION INTRAMUSCULAR; INTRAVENOUS at 03:26

## 2017-04-02 RX ADMIN — ALBUMIN (HUMAN) 25 G: 12.5 SOLUTION INTRAVENOUS at 10:05

## 2017-04-02 RX ADMIN — PIPERACILLIN AND TAZOBACTAM 4.5 G: 4; .5 INJECTION, POWDER, FOR SOLUTION INTRAVENOUS at 18:00

## 2017-04-02 RX ADMIN — CEFAZOLIN SODIUM 1 G: 1 INJECTION, POWDER, FOR SOLUTION INTRAMUSCULAR; INTRAVENOUS at 14:47

## 2017-04-02 RX ADMIN — INSULIN ASPART 1 UNITS: 100 INJECTION, SOLUTION INTRAVENOUS; SUBCUTANEOUS at 04:19

## 2017-04-02 RX ADMIN — HEPARIN SODIUM 5000 UNITS: 10000 INJECTION, SOLUTION INTRAVENOUS; SUBCUTANEOUS at 00:12

## 2017-04-02 RX ADMIN — PANTOPRAZOLE SODIUM 40 MG: 40 TABLET, DELAYED RELEASE ORAL at 00:11

## 2017-04-02 RX ADMIN — SENNOSIDES A AND B 5 ML: 415.36 LIQUID ORAL at 21:48

## 2017-04-02 RX ADMIN — GABAPENTIN 300 MG: 250 SUSPENSION ORAL at 00:11

## 2017-04-02 RX ADMIN — INSULIN ASPART 2 UNITS: 100 INJECTION, SOLUTION INTRAVENOUS; SUBCUTANEOUS at 20:07

## 2017-04-02 RX ADMIN — OSELTAMIVIR PHOSPHATE 75 MG: 6 POWDER, FOR SUSPENSION ORAL at 21:48

## 2017-04-02 RX ADMIN — CEFAZOLIN SODIUM 1 G: 1 INJECTION, POWDER, FOR SOLUTION INTRAMUSCULAR; INTRAVENOUS at 06:16

## 2017-04-02 RX ADMIN — PIPERACILLIN AND TAZOBACTAM 4.5 G: 4; .5 INJECTION, POWDER, FOR SOLUTION INTRAVENOUS at 00:11

## 2017-04-02 RX ADMIN — CHLORHEXIDINE GLUCONATE 15 ML: 1.2 RINSE ORAL at 20:00

## 2017-04-02 RX ADMIN — CEFAZOLIN SODIUM 1 G: 1 INJECTION, POWDER, FOR SOLUTION INTRAMUSCULAR; INTRAVENOUS at 21:47

## 2017-04-02 RX ADMIN — INSULIN ASPART 2 UNITS: 100 INJECTION, SOLUTION INTRAVENOUS; SUBCUTANEOUS at 16:00

## 2017-04-02 RX ADMIN — ACETAMINOPHEN 650 MG: 160 SOLUTION ORAL at 10:13

## 2017-04-02 RX ADMIN — PANTOPRAZOLE SODIUM 40 MG: 40 TABLET, DELAYED RELEASE ORAL at 11:57

## 2017-04-02 RX ADMIN — MULTIVITAMIN 15 ML: LIQUID ORAL at 08:24

## 2017-04-02 RX ADMIN — MIDAZOLAM HYDROCHLORIDE 4 MG: 1 INJECTION, SOLUTION INTRAMUSCULAR; INTRAVENOUS at 08:44

## 2017-04-02 RX ADMIN — OSELTAMIVIR PHOSPHATE 75 MG: 6 POWDER, FOR SUSPENSION ORAL at 08:41

## 2017-04-02 NOTE — PLAN OF CARE
Problem: Individualization  Goal: Patient Preferences  Outcome: No Change  Remains restless, increased midaz drip to 4mg/hr, seems to be more effective.  Still opens eyes randomly, no tracking, 'startles' at times, gross PRATER x4 but not to command.  Got 1 unit PRBC today for hemoglobin 6.1.  APAP per FT for 38.5 C, okay per  (was hesitant due to liver values).  Consents signed and in chart for trach & peg tomorrow, 10:30AM.  Copious thick oral secretions, much ETT suctioning.  Had LE US today at bedside.  Family at bedside, multiple updates.

## 2017-04-02 NOTE — PROGRESS NOTES
"Note Infectious Disease Consult Service Progress Note   Pt Name Wyatt LAWSON [facility]   Date 04/01/2017   MRN [id number]       Notes / labs / imaging test results and other data were reviewed    CHIEF COMPLAINT: REASON FOR VISIT    Fever / cough    HPI  56 yo  [place] 1988    3.17 Admit w MSSA sepsis / pneumonia / Influenza B  4.1 Remains intubated, on atb for S aureus and E cloacae & K pneumoniae  isolated from sputum    ROS  Pt on vent, not able to give ROS    PFSH:  Personal / Family / Social Histories were reviewed and updated  Wife in room, tearful at times      CURRENT MED REVIEWD    Prescription Medications as of 4/1/2017             Insulin Glargine (BASAGLAR KWIKPEN SC) Inject 26 Units Subcutaneous 2 times daily    Linaclotide (LINZESS PO) Take 290 mcg by mouth every morning (before breakfast)    GABAPENTIN PO Take 300 mg by mouth 3 times daily    azithromycin (ZITHROMAX) 250 MG tablet 2 tabs day one, 1 tab days 2-5    atorvastatin (LIPITOR) 40 MG tablet Take 1 tablet (40 mg) by mouth daily    blood glucose monitoring (ONE TOUCH ULTRASOFT) lancets Use as direct    blood glucose monitoring (ONE TOUCH ULTRA) test strip Use QID or as directed    insulin pen needle (B-D U/F) 31G X 8 MM USE ONCE DAILY WITH LANTUS SOLOSTAR PEN    Blood Glucose Monitoring Suppl (BLOOD GLUCOSE METER) KIT 1 Device See Admin Instructions. per patient health plan    ACE/ARB NOT PRESCRIBED, INTENTIONAL, by Other route continuous prn.    ASPIRIN 81 MG OR TABS 1 tab per day      Facility Administered Medications as of 4/1/2017             sennosides (SENOKOT) syrup 5-10 mL 5-10 mLs by Oral or Feeding Tube route 2 times daily    Linked Group 1:  \"And\" Linked Group Details     docusate (COLACE) 50 MG/5ML liquid  mg 5-10 mLs ( mg) by Oral or Feeding Tube route 2 times daily    Linked Group 1:  \"And\" Linked Group Details     midazolam (VERSED) 100 mg in sodium chloride 0.9% 100 mL drip Inject 1-3 mg/hr into the vein continuous " "   oseltamivir (TAMIFLU) suspension 75 mg 12.5 mLs (75 mg) by Per Feeding Tube route 2 times daily    OLANZapine (zyPREXA) tablet 10 mg Take 1 tablet (10 mg) by mouth At Bedtime    glucose 40 % gel 15-30 g Take 15-30 g by mouth every 15 minutes as needed for low blood sugar    Linked Group 2:  \"Or\" Linked Group Details     dextrose 50 % injection 25-50 mL Inject 25-50 mLs into the vein every 15 minutes as needed for low blood sugar    Linked Group 2:  \"Or\" Linked Group Details     glucagon injection 1 mg Inject 1 mg Subcutaneous every 15 minutes as needed for low blood sugar (May repeat x 1 only)    Linked Group 2:  \"Or\" Linked Group Details     insulin aspart (NovoLOG) inj (RAPID ACTING) Inject 1-6 Units Subcutaneous every 4 hours    insulin glargine (LANTUS) injection 12 Units Inject 12 Units Subcutaneous every morning (before breakfast)    piperacillin-tazobactam (ZOSYN) 4.5 g vial to attach to  mL bag Inject 4.5 g into the vein every 6 hours    pantoprazole (PROTONIX) suspension 40 mg 20 mLs (40 mg) by Per Feeding Tube route every 12 hours    ceFAZolin (ANCEF) 1 g vial to attach to  ml bag for ADULT or 50 ml bag for PEDS Inject 1 g into the vein every 8 hours    heparin sodium PF injection 5,000 Units Inject 0.5 mLs (5,000 Units) Subcutaneous every 8 hours    dextrose 10 % 1,000 mL infusion Inject into the vein continuous prn (Hypoglycemia prevention)    multivitamins with minerals (CERTAVITE/CEROVITE) liquid 15 mL 15 mLs by Per Feeding Tube route daily    protein modular (PROSTAT SUGAR FREE) packet 1 packet 1 packet by Per Feeding Tube route daily    potassium chloride SA (K-DUR/KLOR-CON M) CR tablet 20-40 mEq Take 1-2 tablets (20-40 mEq) by mouth every 2 hours as needed for potassium supplementation    potassium chloride (KLOR-CON) Packet 20-40 mEq 20-40 mEq by Oral or Feeding Tube route every 2 hours as needed for potassium supplementation    potassium chloride 10 mEq in 100 mL intermittent " "infusion Inject 100 mLs (10 mEq) into the vein every hour as needed for potassium supplementation    potassium chloride 10 mEq in 100 mL intermittent infusion with 10 mg lidocaine Inject 100 mLs (10 mEq) into the vein every hour as needed for potassium supplementation    potassium chloride 20 mEq in 50 mL intermittent infusion Inject 50 mLs (20 mEq) into the vein every hour as needed for potassium supplementation    lidocaine 1 % 0.5-5 mL 0.5-5 mLs by Other route once as needed (mild pain For local anesthetic during PICC insertion.)    sodium chloride (PF) 0.9% PF flush 5-50 mL 5-50 mLs by Intracatheter route once as needed for line flush (to flush each lumen with line placement)    sodium chloride (PF) 0.9% PF flush 10-20 mL 10-20 mLs by Intracatheter route every hour as needed for line flush or post meds or blood draw    sodium chloride (PF) 0.9% PF flush 10 mL 10 mLs by Intracatheter route every 7 days    HYDROmorphone (PF) (DILAUDID) injection 0.3-0.5 mg Inject 0.3-0.5 mg into the vein every 2 hours as needed for severe pain    gabapentin (NEURONTIN) solution 300 mg 6 mLs (300 mg) by Oral or Feeding Tube route every 8 hours    dextrose 10 % 1,000 mL infusion Inject into the vein continuous prn (Give if on IV Insulin Infusion, and Parenteral or Enteral nutrition held or cycled off. )    insulin 1 unit/mL in saline (NovoLIN, HumuLIN Regular) drip - ADULT IV Infusion Inject 0-24 Units/hr into the vein continuous    glucose 40 % gel 15-30 g (Discontinued) Take 15-30 g by mouth every 15 minutes as needed for low blood sugar    Linked Group 3:  \"Or\" Linked Group Details     dextrose 50 % injection 25-50 mL (Discontinued) Inject 25-50 mLs into the vein every 15 minutes as needed for low blood sugar    Linked Group 3:  \"Or\" Linked Group Details     glucagon injection 1 mg (Discontinued) Inject 1 mg Subcutaneous every 15 minutes as needed for low blood sugar (May repeat x 1 only)    Linked Group 3:  \"Or\" Linked Group " Details     lidocaine (LMX4) cream Apply topically once as needed for moderate pain (for local anesthetic during PICC insertion)    heparin lock flush 10 UNIT/ML injection 2-5 mL 2-5 mLs by Intracatheter route once as needed for line flush (for locking each dormant lumen with line placement)    hydrALAZINE (APRESOLINE) injection 20 mg Inject 1 mL (20 mg) into the vein every 4 hours as needed for high blood pressure (give for SBP > 180)    senna-docusate (SENOKOT-S;PERICOLACE) 8.6-50 MG per tablet 1-2 tablet (Discontinued) 1-2 tablets by Oral or Feeding Tube route 2 times daily    acetaminophen (TYLENOL) solution 650 mg 20.3 mLs (650 mg) by Oral or Feeding Tube route every 4 hours as needed for mild pain    magnesium sulfate 2 g in NS intermittent infusion (PharMEDium or FV Cmpd) Inject 50 mLs (2 g) into the vein daily as needed for magnesium supplementation    magnesium sulfate 4 g in 100 mL sterile water (premade) Inject 100 mLs (4 g) into the vein every 4 hours as needed for magnesium supplementation    hypromellose-dextran (ARTIFICAL TEARS) ophthalmic solution 2-3 drop Apply 2-3 drops to eye every hour as needed for dry eyes    0.9% sodium chloride infusion Inject into the vein continuous    albuterol neb solution 2.5 mg Take 3 mLs (2.5 mg) by nebulization every 2 hours as needed for wheezing or shortness of breath / dyspnea    acetaminophen (TYLENOL) Suppository 650 mg Place 1 suppository (650 mg) rectally every 4 hours as needed for mild pain    acetaminophen (TYLENOL) tablet 650 mg Take 2 tablets (650 mg) by mouth every 4 hours as needed for mild pain    oxyCODONE (ROXICODONE) IR tablet 5-10 mg Take 1-2 tablets (5-10 mg) by mouth every 3 hours as needed for moderate to severe pain    melatonin tablet 1 mg Take 1 tablet (1 mg) by mouth nightly as needed for sleep    polyethylene glycol (MIRALAX/GLYCOLAX) Packet 17 g Take 17 g by mouth daily as needed for constipation    prochlorperazine (COMPAZINE) injection  "5-10 mg Inject 1-2 mLs (5-10 mg) into the vein every 6 hours as needed for nausea or vomiting    Linked Group 4:  \"Or\" Linked Group Details     prochlorperazine (COMPAZINE) tablet 5-10 mg Take 1-2 tablets (5-10 mg) by mouth every 6 hours as needed for vomiting    Linked Group 4:  \"Or\" Linked Group Details     prochlorperazine (COMPAZINE) Suppository 25 mg Place 1 suppository (25 mg) rectally every 12 hours as needed for nausea or vomiting    Linked Group 4:  \"Or\" Linked Group Details     ondansetron (ZOFRAN-ODT) ODT tab 4 mg Take 1 tablet (4 mg) by mouth every 6 hours as needed for nausea    Linked Group 5:  \"Or\" Linked Group Details     ondansetron (ZOFRAN) injection 4 mg Inject 2 mLs (4 mg) into the vein every 6 hours as needed for nausea or vomiting    Linked Group 5:  \"Or\" Linked Group Details     pneumococcal vaccine (PNEUMOVAX 23-anirudh) injection 0.5 mL Inject 0.5 mLs into the muscle Prior to discharge    lidocaine 1 % 1 mL 1 mL by Other route every hour as needed (mild pain with VAD insertion or accessing implanted port,)    lidocaine (LMX4) cream Apply topically every hour as needed for mild pain (with VAD insertion or accessing implanted port,)    sodium chloride (PF) 0.9% PF flush 3 mL 3 mLs by Intracatheter route every hour as needed for line flush or post meds or blood draw    sodium chloride (PF) 0.9% PF flush 3 mL 3 mLs by Intracatheter route every 8 hours    bisacodyl (DULCOLAX) Suppository 10 mg Place 1 suppository (10 mg) rectally daily as needed for constipation    rocuronium (ZEMURON) injection 42.2 mg Inject 4.22 mLs (42.2 mg) into the vein once    midazolam (VERSED) injection 2 mg Inject 2 mLs (2 mg) into the vein every hour as needed for sedation    naloxone (NARCAN) injection 0.1-0.4 mg Inject 0.25-1 mLs (0.1-0.4 mg) into the vein every 2 minutes as needed for opioid reversal    chlorhexidine (PERIDEX) 0.12 % solution 15 mL Take 15 mLs by mouth every 12 hours          Vital Signs: /65  " "Pulse 115  Temp 100  F (37.8  C)  Resp 10  Ht 1.778 m (5' 10\")  Wt 76.8 kg (169 lb 5 oz)  SpO2 99%  BMI 24.29 kg/m2  EXAM    general   On vent     neuro   Awake  Eyes open  A bit restless     lungs   coarse     abd   Distended, (+) sounds     skin   Very dark  No obvious rash     CV   Tachy  I don't discern any murmer     joints   Not abnormal to gross inspection       Data  Imaging    3/27 CT 1. No obvious intraabdominal or intrapelvic abnormality to explain the patient's symptoms.  2. Small bilateral pleural effusions.  3. Worsening consolidation at the right lung base.  4. Patchy infiltrates at both lung bases may have decreased overall since the prior exam.    Micro information    Blood  3/17 & 3/18 MSSA  Blood  3/19 - 3/25 All negative    Sputum  3/17 MSSA  Sputum  3/24 MSSA & E cloacae & K pneumo  Sputum  3/29 E cloacae & K pneumo          LABS  Lab Results   Component Value Date/Time    WBC 16.3 (H) 04/01/2017 05:40 AM    WBC 17.7 (H) 03/31/2017 05:30 AM    WBC 20.0 (H) 03/30/2017 04:25 AM    WBC 21.7 (H) 03/29/2017 05:30 AM     (H) 03/31/2017 05:30 AM     (H) 03/27/2017 04:20 AM     (H) 03/26/2017 03:40 AM     (H) 03/25/2017 04:30 AM    ALT 78 (H) 03/31/2017 05:30 AM    ALT 79 (H) 03/27/2017 04:20 AM    ALT 61 03/26/2017 03:40 AM    ALT 86 (H) 03/25/2017 04:30 AM    ALKPHOS 176 (H) 03/31/2017 05:30 AM    ALKPHOS 315 (H) 03/27/2017 04:20 AM    ALKPHOS 306 (H) 03/26/2017 03:40 AM    ALKPHOS 353 (H) 03/25/2017 04:30 AM           ASSESSMENT & SUGGESTIONS    J96.01  Acute respiratory failure with hypoxia (H)  (primary encounter diagnosis)  J10.1  Influenza B  J18.9  Pneumonia of both lungs due to infectious organism, unspecified part of lung  A41.01 MSSA septicemia (H)  J15.211 Pneumonia due to MSSA, (H)  R50.9 Fever, unspecified fever cause  D72.829 Leukocytosis, unspecified type  J10.1 Influenza B       Continued VDRF and LGF but WBC slowly improving since March 27  On broad " spectrum antimicrobial agents to cover MSSA & GNR found in recent cultures  Severe influenza B pneumonia and MSSA infection sufficient to explain current illness  Doubt also active TB ... But awaiting additional studies to rule out    Continue broad spectrum antimicrobial agents (antibacterial / antiviral)  Monitor for new troubles (C diff, MRSA, ...)  r/o TB per Dr Hughes / Pulmonary team       Martinez Rubin MD  Covering for Caitie Morales & Nima  [facility] Infectious Diseases  [phone]

## 2017-04-02 NOTE — PROGRESS NOTES
[facility]  CRITICAL CARE PROGRESS NOTE    Wyatt LAWSON Keyshawn MRN: [id number]  1960  Date of Admission:3/17/2017          Problem List:   1. Acute hypoxemic respiratory failure   2. Delirium   3. MSSA/Klebsiella PNA, MSSA bacteremia   4. JOSE      24-Hour Goals   1. Optimize sedation  2. Transfuse 1U PRBC  3. LE dopplers and blood cultures       Overnight Events   No issues overnight. Received prn dilaudid for sedation multiple times.        Lines/Tubes/Draines/Devices   ETT: Placed 3/17 Size 7.5  Levine: 3/17      ICU Prophylaxis:   1. DVT: Hep Subq  2. VAP: HOB 30 degrees, chlorhexidine rinse  3. Stress Ulcer: PPI/H2 blocker  4. Restraints: Nonviolent soft two point restraints required and necessary for patient safety and continued cares and good effect as patient continues to pull at necessary lines, tubes despite education and distraction. Will readdress daily.   5. IV Access - central access required and necessary for continued patient cares  6. Feeding - enteral         Medications:       sennosides  5-10 mL Oral or Feeding Tube BID    And     docusate   mg Oral or Feeding Tube BID     oseltamivir  75 mg Per Feeding Tube BID     OLANZapine  10 mg Oral At Bedtime     insulin aspart  1-6 Units Subcutaneous Q4H     insulin glargine  12 Units Subcutaneous QAM AC     piperacillin-tazobactam  4.5 g Intravenous Q6H     pantoprazole  40 mg Per Feeding Tube Q12H     ceFAZolin  1 g Intravenous Q8H     heparin  5,000 Units Subcutaneous Q8H     multivitamins with minerals  15 mL Per Feeding Tube Daily     protein modular  1 packet Per Feeding Tube Daily     sodium chloride (PF)  10 mL Intracatheter Q7 Days     gabapentin  300 mg Oral or Feeding Tube Q8H ALEC     pneumococcal vaccine  0.5 mL Intramuscular Prior to discharge     sodium chloride (PF)  3 mL Intracatheter Q8H     rocuronium  0.6 mg/kg Intravenous Once     chlorhexidine  15 mL Mouth/Throat Q12H         Review of Systems:   Unable to obtain  due to critical illness          Physical Exam:   Temp:  [99.5  F (37.5  C)-101.1  F (38.4  C)] 100.6  F (38.1  C)  Heart Rate:  [101-121] 115  Resp:  [0-30] 29  BP: (105-153)/(58-86) 153/84  FiO2 (%):  [40 %] 40 %  SpO2:  [94 %-100 %] 100 %    Intake/Output Summary (Last 24 hours) at 04/02/17 0900  Last data filed at 04/02/17 0800   Gross per 24 hour   Intake             4364 ml   Output             2110 ml   Net             2254 ml     Wt Readings from Last 4 Encounters:   04/02/17 78.5 kg (173 lb 1 oz)   03/16/17 70.3 kg (155 lb)   01/17/17 71.3 kg (157 lb 1.6 oz)   11/30/16 72.6 kg (160 lb)     BP - Mean:  [] 105  Ventilation Mode: CMV/AC  FiO2 (%): 40 %  Rate Set (breaths/minute): 18 breaths/min  Tidal Volume Set (mL): 400 mL  PEEP (cm H2O): 5 cmH2O  Oxygen Concentration (%): 40 %  Resp: 29    Recent Labs  Lab 03/27/17  0240 03/27/17  0050   PH 7.38 7.24*   PCO2 48* 67*   PO2 68* 83   HCO3 28 29*   O2PER 60% 60     GEN: no acute distress   HEENT: head ncat, sclera anicteric, OP patent, trachea midline   PULM: coarse bs bilaterally    CV/COR: RRR S1S2 no gallop, No rub, no murmur  ABD: soft nontender, hypoactive bowel sounds, no mass  EXT: Edema warm  NEURO: encephalopathic  SKIN: no obvious rash      Assessment and plan :     Neurology/Psychiatry/Pain/Sedation:   1. Encephalopathy - most likely related to metabolic/sepsis in addition to delirium. Did not tolerate precedex previously. Now sedated with versed and prn dilaudid for vent synchrony. Cont olanzapine for delirium. CT head without acute pathology on 4/1. Plan today is to optimize sedation for vent synchrony.      Cardiovascular/Hemodynamics:   1. Afib w/ RVR - controlled with amiodarone.      Pulmonary/Ventilator Management/ID:   1. Hypoxemic respiratory failure 2/2 sepsis. On ancef, zosyn and tamiflu for MSSA/klebsiella/enterobacter and influenza. Easy to oxygenate/ventilate when synchronous with vent. Given ongoing delirium and minimal progress  toward extubation, agree with plans to move for tracheostomy Monday.   2. AFB r/o in progress. Cont neg pressure rm and resp isolation.   3. Persistent fever. Likely multifactorial including thrombophlebitis of UE, PNA and bacteremia. Will recheck blood cultures today and get LE dopplers to r/o DVT.      GI/Nutrition:   1. Cont enteral nutrition     Renal/Fluids/Electrolytes:   1. JOSE - Increasing SCr today with hypernatremia. Will give albumin infusion today in addition to 1U PRBC     Endocrine/Hematology/Oncology:   1. Cont icu glucose protocol  2. Anemia. 1U PRBC.      Disposition/Code Status/Other  1. Cont supportive care and treatment for sepsis. Plan for tracheostomy on Monday. Updated wife at bedside.   2. Code: Full     I have personally reviewed the daily labs, imaging studies, cultures and discussed the case with referring physician and consulting physicians.      This patient is critically ill and I have provided 30 minutes of critical care time on April 1, 2017.      Haseeb Puga MD   Critical Care Staff  [phone]      Data:   All data and imaging reviewed     ROUTINE ICU LABS (Last four results)  CMP  Recent Labs  Lab 04/02/17  0400 04/01/17  0540 03/31/17  0530 03/30/17  0425  03/27/17  0420   * 147* 150* 152*  < > 146*   POTASSIUM 3.8 3.6 3.7 3.7  < > 4.3   CHLORIDE 111* 113* 115* 117*  < > 111*   CO2 28 27 27 29  < > 26   ANIONGAP 7 7 8 6  < > 9   * 172* 123* 159*  < > 174*   BUN 29 29 25 35*  < > 43*   CR 1.66* 1.52* 1.42* 1.57*  < > 1.47*   GFRESTIMATED 43* 48* 52* 46*  < > 50*   GFRESTBLACK 52* 58* 63 56*  < > 60*   LEONIE 7.7* 7.6* 7.6* 7.7*  < > 7.9*   MAG 2.5* 2.5* 2.4* 2.7*  < > 3.2*   PHOS 3.1 2.9 3.0 2.8  < > 2.4*   PROTTOTAL  --   --  7.2  --   --  7.2   ALBUMIN  --   --  1.4*  --   --  1.8*   BILITOTAL  --   --  1.0  --   --  1.3   ALKPHOS  --   --  176*  --   --  315*   AST  --   --  414*  --   --  224*   ALT  --   --  78*  --   --  79*   < > = values in this interval not  displayed.  CBC  Recent Labs  Lab 04/02/17  0400 04/01/17  0540 03/31/17  0530 03/30/17  0425   WBC 16.0* 16.3* 17.7* 20.0*   RBC 1.86* 2.01* 2.01* 2.12*   HGB 6.1* 6.3* 6.5* 6.7*   HCT 18.2* 19.2* 19.4* 20.1*   MCV 98 96 97 95   MCH 32.8 31.3 32.3 31.6   MCHC 33.5 32.8 33.5 33.3   RDW 16.9* 15.8* 15.5* 15.4*   * 540* 616* 528*     INR  Recent Labs  Lab 03/27/17  1830   INR 1.15*     Arterial Blood Gas  Recent Labs  Lab 03/27/17  0240 03/27/17  0050   PH 7.38 7.24*   PCO2 48* 67*   PO2 68* 83   HCO3 28 29*   O2PER 60% 60       All cultures:    Recent Labs  Lab 04/01/17  1013 03/29/17  1805   CULT Canceled, Test credited Duplicate request Light growth Enterobacter cloacae complexLight growth Klebsiella pneumoniae*     Recent Results (from the past 24 hour(s))   CT Head w/o Contrast    Narrative    CT HEAD WITHOUT CONTRAST  4/1/2017 11:28 AM    HISTORY:  Encephalopathy.    Radiation dose for this scan was reduced using automated exposure  control, adjustment of the mA and/or kV according to patient size, or  iterative reconstruction technique.    FINDINGS:  Images are degraded by patient motion artifact. Cerebral  ventricles and cortical sulci appear within normal limits. No definite  intra or extra-axial hemorrhage is identified. There is no definite CT  evidence of acute stroke. There is no shift of the midline. Sphenoid  and ethmoid sinus opacities are noted. Right maxillary sinus mucosal  thickening is also noted. Evaluation of the calvarium is limited by  patient motion artifact.      Impression    IMPRESSION:  Scan limited by artifact due to patient motion. No shift  of the midline. No apparent acute intracranial hemorrhage. Paranasal  sinus disease is noted.    ROSS YUAN MD

## 2017-04-02 NOTE — PLAN OF CARE
Problem: Ventilation, Mechanical Invasive (Adult)  Goal: Signs and Symptoms of Listed Potential Problems Will be Absent or Manageable (Ventilation, Mechanical Invasive)  Signs and symptoms of listed potential problems will be absent or manageable by discharge/transition of care (reference Ventilation, Mechanical Invasive (Adult) CPG).   Outcome: No Change  [facility]   Intensive Care Unit   Nursing Note           Vital signs stable over night.  PERRL.  Moves all extremities slightly.  Does not follow commands.  Tolerating ventilator poorly on 3 mg/hr versed with frequent need for dilaudid push, often coughing and thrashing head from side to side.  AM labs noted.  Hgb continues to trend down slowly.  Continue to monitor closely.        ROUTINE IP LABS (Last four results)  BMP  Recent Labs  Lab 04/02/17  0400 04/01/17 0540 03/31/17  0530 03/30/17  0425   * 147* 150* 152*   POTASSIUM 3.8 3.6 3.7 3.7   CHLORIDE 111* 113* 115* 117*   LEONIE 7.7* 7.6* 7.6* 7.7*   CO2 28 27 27 29   BUN 29 29 25 35*   CR 1.66* 1.52* 1.42* 1.57*   * 172* 123* 159*     CBC  Recent Labs  Lab 04/02/17  0400 04/01/17 0540 03/31/17  0530 03/30/17  0425   WBC 16.0* 16.3* 17.7* 20.0*   RBC 1.86* 2.01* 2.01* 2.12*   HGB 6.1* 6.3* 6.5* 6.7*   HCT 18.2* 19.2* 19.4* 20.1*   MCV 98 96 97 95   MCH 32.8 31.3 32.3 31.6   MCHC 33.5 32.8 33.5 33.3   RDW 16.9* 15.8* 15.5* 15.4*   * 540* 616* 528*           Blood Glucose  Glucose (mg/dL)   Date Value   04/02/2017 144 (H)   04/02/2017 160 (H)   04/01/2017 205 (H)   04/01/2017 219 (H)   04/01/2017 184 (H)   04/01/2017 152 (H)           Shailesh Churchill RN, BSN, PHN, CCRN  [facility]  Intensive Care Unit

## 2017-04-03 ENCOUNTER — ANESTHESIA EVENT (OUTPATIENT)
Dept: SURGERY | Facility: CLINIC | Age: 57
DRG: 870 | End: 2017-04-03
Payer: COMMERCIAL

## 2017-04-03 ENCOUNTER — ANESTHESIA (OUTPATIENT)
Dept: SURGERY | Facility: CLINIC | Age: 57
DRG: 870 | End: 2017-04-03
Payer: COMMERCIAL

## 2017-04-03 LAB
ACID FAST STN SPEC QL: NORMAL
ACID FAST STN SPEC QL: NORMAL
ACID FAST STN SPEC: NORMAL
ANION GAP SERPL CALCULATED.3IONS-SCNC: 8 MMOL/L (ref 3–14)
BUN SERPL-MCNC: 28 MG/DL (ref 7–30)
C DIFF TOX B STL QL: NORMAL
CALCIUM SERPL-MCNC: 7.6 MG/DL (ref 8.5–10.1)
CHLORIDE SERPL-SCNC: 111 MMOL/L (ref 94–109)
CO2 SERPL-SCNC: 26 MMOL/L (ref 20–32)
CREAT SERPL-MCNC: 1.66 MG/DL (ref 0.66–1.25)
ERYTHROCYTE [DISTWIDTH] IN BLOOD BY AUTOMATED COUNT: 17.1 % (ref 10–15)
FLUAV+FLUBV RNA SPEC QL NAA+PROBE: NORMAL
FLUAV+FLUBV RNA SPEC QL NAA+PROBE: NORMAL
GFR SERPL CREATININE-BSD FRML MDRD: 43 ML/MIN/1.7M2
GLUCOSE BLDC GLUCOMTR-MCNC: 117 MG/DL (ref 70–99)
GLUCOSE BLDC GLUCOMTR-MCNC: 148 MG/DL (ref 70–99)
GLUCOSE BLDC GLUCOMTR-MCNC: 166 MG/DL (ref 70–99)
GLUCOSE BLDC GLUCOMTR-MCNC: 171 MG/DL (ref 70–99)
GLUCOSE BLDC GLUCOMTR-MCNC: 182 MG/DL (ref 70–99)
GLUCOSE SERPL-MCNC: 125 MG/DL (ref 70–99)
HCT VFR BLD AUTO: 20.3 % (ref 40–53)
HGB BLD-MCNC: 6.7 G/DL (ref 13.3–17.7)
M TB TUBERC IFN-G BLD QL: ABNORMAL
M TB TUBERC IFN-G/MITOGEN IGNF BLD: 4.5 IU/ML
MAGNESIUM SERPL-MCNC: 2.6 MG/DL (ref 1.6–2.3)
MCH RBC QN AUTO: 32.2 PG (ref 26.5–33)
MCHC RBC AUTO-ENTMCNC: 33 G/DL (ref 31.5–36.5)
MCV RBC AUTO: 98 FL (ref 78–100)
MICRO REPORT STATUS: NORMAL
PHOSPHATE SERPL-MCNC: 2.6 MG/DL (ref 2.5–4.5)
PLATELET # BLD AUTO: 479 10E9/L (ref 150–450)
POTASSIUM SERPL-SCNC: 3.6 MMOL/L (ref 3.4–5.3)
RBC # BLD AUTO: 2.08 10E12/L (ref 4.4–5.9)
RSV RNA SPEC NAA+PROBE: NORMAL
SODIUM SERPL-SCNC: 145 MMOL/L (ref 133–144)
SPECIMEN SOURCE: NORMAL
WBC # BLD AUTO: 14.1 10E9/L (ref 4–11)

## 2017-04-03 PROCEDURE — 87116 MYCOBACTERIA CULTURE: CPT | Performed by: INTERNAL MEDICINE

## 2017-04-03 PROCEDURE — 25000131 ZZH RX MED GY IP 250 OP 636 PS 637: Performed by: INTERNAL MEDICINE

## 2017-04-03 PROCEDURE — 25000128 H RX IP 250 OP 636: Performed by: INTERNAL MEDICINE

## 2017-04-03 PROCEDURE — 99291 CRITICAL CARE FIRST HOUR: CPT | Performed by: INTERNAL MEDICINE

## 2017-04-03 PROCEDURE — 83735 ASSAY OF MAGNESIUM: CPT | Performed by: INTERNAL MEDICINE

## 2017-04-03 PROCEDURE — 00000146 ZZHCL STATISTIC GLUCOSE BY METER IP

## 2017-04-03 PROCEDURE — 25000128 H RX IP 250 OP 636: Performed by: HOSPITALIST

## 2017-04-03 PROCEDURE — 85027 COMPLETE CBC AUTOMATED: CPT | Performed by: INTERNAL MEDICINE

## 2017-04-03 PROCEDURE — 40000008 ZZH STATISTIC AIRWAY CARE

## 2017-04-03 PROCEDURE — 25000132 ZZH RX MED GY IP 250 OP 250 PS 637: Performed by: INTERNAL MEDICINE

## 2017-04-03 PROCEDURE — 99212 OFFICE O/P EST SF 10 MIN: CPT

## 2017-04-03 PROCEDURE — 87493 C DIFF AMPLIFIED PROBE: CPT | Performed by: INTERNAL MEDICINE

## 2017-04-03 PROCEDURE — 84100 ASSAY OF PHOSPHORUS: CPT | Performed by: INTERNAL MEDICINE

## 2017-04-03 PROCEDURE — 40000275 ZZH STATISTIC RCP TIME EA 10 MIN

## 2017-04-03 PROCEDURE — 20000003 ZZH R&B ICU

## 2017-04-03 PROCEDURE — 80048 BASIC METABOLIC PNL TOTAL CA: CPT | Performed by: INTERNAL MEDICINE

## 2017-04-03 PROCEDURE — 94003 VENT MGMT INPAT SUBQ DAY: CPT

## 2017-04-03 PROCEDURE — 87015 SPECIMEN INFECT AGNT CONCNTJ: CPT | Performed by: INTERNAL MEDICINE

## 2017-04-03 PROCEDURE — 87206 SMEAR FLUORESCENT/ACID STAI: CPT | Performed by: INTERNAL MEDICINE

## 2017-04-03 PROCEDURE — 25000132 ZZH RX MED GY IP 250 OP 250 PS 637: Performed by: HOSPITALIST

## 2017-04-03 RX ADMIN — OLANZAPINE 10 MG: 10 TABLET, FILM COATED ORAL at 21:35

## 2017-04-03 RX ADMIN — PIPERACILLIN AND TAZOBACTAM 4.5 G: 4; .5 INJECTION, POWDER, FOR SOLUTION INTRAVENOUS at 23:11

## 2017-04-03 RX ADMIN — PIPERACILLIN AND TAZOBACTAM 4.5 G: 4; .5 INJECTION, POWDER, FOR SOLUTION INTRAVENOUS at 06:48

## 2017-04-03 RX ADMIN — PIPERACILLIN AND TAZOBACTAM 4.5 G: 4; .5 INJECTION, POWDER, FOR SOLUTION INTRAVENOUS at 00:16

## 2017-04-03 RX ADMIN — CEFAZOLIN SODIUM 1 G: 1 INJECTION, POWDER, FOR SOLUTION INTRAMUSCULAR; INTRAVENOUS at 21:35

## 2017-04-03 RX ADMIN — Medication 4 MG/HR: at 08:36

## 2017-04-03 RX ADMIN — GABAPENTIN 300 MG: 250 SUSPENSION ORAL at 20:25

## 2017-04-03 RX ADMIN — SODIUM CHLORIDE: 9 INJECTION, SOLUTION INTRAVENOUS at 16:32

## 2017-04-03 RX ADMIN — INSULIN ASPART 1 UNITS: 100 INJECTION, SOLUTION INTRAVENOUS; SUBCUTANEOUS at 11:32

## 2017-04-03 RX ADMIN — CHLORHEXIDINE GLUCONATE 15 ML: 1.2 RINSE ORAL at 08:35

## 2017-04-03 RX ADMIN — HEPARIN SODIUM 5000 UNITS: 10000 INJECTION, SOLUTION INTRAVENOUS; SUBCUTANEOUS at 20:24

## 2017-04-03 RX ADMIN — HYDROMORPHONE HYDROCHLORIDE 0.5 MG: 1 INJECTION, SOLUTION INTRAMUSCULAR; INTRAVENOUS; SUBCUTANEOUS at 17:22

## 2017-04-03 RX ADMIN — HYDROMORPHONE HYDROCHLORIDE 0.5 MG: 1 INJECTION, SOLUTION INTRAMUSCULAR; INTRAVENOUS; SUBCUTANEOUS at 02:11

## 2017-04-03 RX ADMIN — HYDROMORPHONE HYDROCHLORIDE 0.5 MG: 1 INJECTION, SOLUTION INTRAMUSCULAR; INTRAVENOUS; SUBCUTANEOUS at 00:16

## 2017-04-03 RX ADMIN — INSULIN ASPART 1 UNITS: 100 INJECTION, SOLUTION INTRAVENOUS; SUBCUTANEOUS at 20:01

## 2017-04-03 RX ADMIN — GABAPENTIN 300 MG: 250 SUSPENSION ORAL at 11:13

## 2017-04-03 RX ADMIN — HYDROMORPHONE HYDROCHLORIDE 0.5 MG: 1 INJECTION, SOLUTION INTRAMUSCULAR; INTRAVENOUS; SUBCUTANEOUS at 14:13

## 2017-04-03 RX ADMIN — HEPARIN SODIUM 5000 UNITS: 10000 INJECTION, SOLUTION INTRAVENOUS; SUBCUTANEOUS at 11:25

## 2017-04-03 RX ADMIN — HYDROMORPHONE HYDROCHLORIDE 0.5 MG: 1 INJECTION, SOLUTION INTRAMUSCULAR; INTRAVENOUS; SUBCUTANEOUS at 11:08

## 2017-04-03 RX ADMIN — INSULIN ASPART 1 UNITS: 100 INJECTION, SOLUTION INTRAVENOUS; SUBCUTANEOUS at 00:28

## 2017-04-03 RX ADMIN — INSULIN ASPART 1 UNITS: 100 INJECTION, SOLUTION INTRAVENOUS; SUBCUTANEOUS at 16:02

## 2017-04-03 RX ADMIN — Medication 1 PACKET: at 11:36

## 2017-04-03 RX ADMIN — HYDROMORPHONE HYDROCHLORIDE 0.5 MG: 1 INJECTION, SOLUTION INTRAMUSCULAR; INTRAVENOUS; SUBCUTANEOUS at 20:21

## 2017-04-03 RX ADMIN — CEFAZOLIN SODIUM 1 G: 1 INJECTION, POWDER, FOR SOLUTION INTRAMUSCULAR; INTRAVENOUS at 06:06

## 2017-04-03 RX ADMIN — CEFAZOLIN SODIUM 1 G: 1 INJECTION, POWDER, FOR SOLUTION INTRAMUSCULAR; INTRAVENOUS at 14:15

## 2017-04-03 RX ADMIN — ACETAMINOPHEN 650 MG: 325 TABLET, FILM COATED ORAL at 11:10

## 2017-04-03 RX ADMIN — HEPARIN SODIUM 5000 UNITS: 10000 INJECTION, SOLUTION INTRAVENOUS; SUBCUTANEOUS at 00:15

## 2017-04-03 RX ADMIN — PIPERACILLIN AND TAZOBACTAM 4.5 G: 4; .5 INJECTION, POWDER, FOR SOLUTION INTRAVENOUS at 11:34

## 2017-04-03 RX ADMIN — MULTIVITAMIN 15 ML: LIQUID ORAL at 11:12

## 2017-04-03 RX ADMIN — PIPERACILLIN AND TAZOBACTAM 4.5 G: 4; .5 INJECTION, POWDER, FOR SOLUTION INTRAVENOUS at 17:22

## 2017-04-03 RX ADMIN — Medication 3 MG/HR: at 17:29

## 2017-04-03 RX ADMIN — INSULIN GLARGINE 12 UNITS: 100 INJECTION, SOLUTION SUBCUTANEOUS at 11:30

## 2017-04-03 RX ADMIN — PANTOPRAZOLE SODIUM 40 MG: 40 TABLET, DELAYED RELEASE ORAL at 11:12

## 2017-04-03 RX ADMIN — CHLORHEXIDINE GLUCONATE 15 ML: 1.2 RINSE ORAL at 20:24

## 2017-04-03 ASSESSMENT — LIFESTYLE VARIABLES: TOBACCO_USE: 1

## 2017-04-03 NOTE — PLAN OF CARE
Problem: Goal Outcome Summary  Goal: Goal Outcome Summary  Outcome: Therapy, progress toward functional goals is gradual  Neuro: Patient is moves upper extremities at times, legs withdrawal to pain. Patient opens eyes spontaneously but does not follow commands or track. Mild spastic movements at times.   Resp: Stable on current vent support. Lots of secretions.  CV: Stable  GI: 2 stools, soft brown; tolerating TF  : Good UO  Updated family continuously at bedside. Plan to trach/PEG in AM.

## 2017-04-03 NOTE — PROGRESS NOTES
Note Infectious Disease Consult Service Progress Note   Pt Name Wyatt LAWSON [facility]   Date 04/02/2017   MRN [id number]       Notes / labs / imaging test results and other data were reviewed    CHIEF COMPLAINT: REASON FOR VISIT    Fever / cough    HPI  56 yo  [place] 1988    3.17 Admit w MSSA sepsis / pneumonia / Influenza B  4.1 Remains intubated, on atb for S aureus and E cloacae & K pneumoniae  isolated from sputum    ROS  Pt on vent, not able to give ROS    [facility]:  Personal / Family / Social Histories were reviewed and updated    Wife in room  Not tearful today      CURRENT MED REVIEWD      Prescription Medications as of 4/2/2017             Insulin Glargine (BASAGLAR KWIKPEN SC) Inject 26 Units Subcutaneous 2 times daily    Linaclotide (LINZESS PO) Take 290 mcg by mouth every morning (before breakfast)    GABAPENTIN PO Take 300 mg by mouth 3 times daily    azithromycin (ZITHROMAX) 250 MG tablet 2 tabs day one, 1 tab days 2-5    atorvastatin (LIPITOR) 40 MG tablet Take 1 tablet (40 mg) by mouth daily    blood glucose monitoring (ONE TOUCH ULTRASOFT) lancets Use as direct    blood glucose monitoring (ONE TOUCH ULTRA) test strip Use QID or as directed    insulin pen needle (B-D U/F) 31G X 8 MM USE ONCE DAILY WITH LANTUS SOLOSTAR PEN    Blood Glucose Monitoring Suppl (BLOOD GLUCOSE METER) KIT 1 Device See Admin Instructions. per patient health plan    ACE/ARB NOT PRESCRIBED, INTENTIONAL, by Other route continuous prn.    ASPIRIN 81 MG OR TABS 1 tab per day      Facility Administered Medications as of 4/2/2017             ceFAZolin sodium-dextrose (ANCEF) infusion 2 g Inject 100 mLs (2 g) into the vein Pre-Op/Pre-procedure x 1 dose    ceFAZolin (ANCEF) 1 g vial to attach to  ml bag for ADULT or 50 ml bag for PEDS Inject 1 g into the vein See Admin Instructions    midazolam (VERSED) injection 4 mg Inject 4 mLs (4 mg) into the vein once    albumin human 5 % injection 25 g Inject 500 mLs (25 g) into the vein  "once    sennosides (SENOKOT) syrup 5-10 mL 5-10 mLs by Oral or Feeding Tube route 2 times daily    Linked Group 1:  \"And\" Linked Group Details     docusate (COLACE) 50 MG/5ML liquid  mg 5-10 mLs ( mg) by Oral or Feeding Tube route 2 times daily    Linked Group 1:  \"And\" Linked Group Details     midazolam (VERSED) 100 mg in sodium chloride 0.9% 100 mL drip Inject 1-8 mg/hr into the vein continuous    oseltamivir (TAMIFLU) suspension 75 mg 12.5 mLs (75 mg) by Per Feeding Tube route 2 times daily    OLANZapine (zyPREXA) tablet 10 mg Take 1 tablet (10 mg) by mouth At Bedtime    glucose 40 % gel 15-30 g Take 15-30 g by mouth every 15 minutes as needed for low blood sugar    Linked Group 2:  \"Or\" Linked Group Details     dextrose 50 % injection 25-50 mL Inject 25-50 mLs into the vein every 15 minutes as needed for low blood sugar    Linked Group 2:  \"Or\" Linked Group Details     glucagon injection 1 mg Inject 1 mg Subcutaneous every 15 minutes as needed for low blood sugar (May repeat x 1 only)    Linked Group 2:  \"Or\" Linked Group Details     insulin aspart (NovoLOG) inj (RAPID ACTING) Inject 1-6 Units Subcutaneous every 4 hours    insulin glargine (LANTUS) injection 12 Units Inject 12 Units Subcutaneous every morning (before breakfast)    piperacillin-tazobactam (ZOSYN) 4.5 g vial to attach to  mL bag Inject 4.5 g into the vein every 6 hours    pantoprazole (PROTONIX) suspension 40 mg 20 mLs (40 mg) by Per Feeding Tube route every 12 hours    ceFAZolin (ANCEF) 1 g vial to attach to  ml bag for ADULT or 50 ml bag for PEDS Inject 1 g into the vein every 8 hours    heparin sodium PF injection 5,000 Units Inject 0.5 mLs (5,000 Units) Subcutaneous every 8 hours    dextrose 10 % 1,000 mL infusion Inject into the vein continuous prn (Hypoglycemia prevention)    multivitamins with minerals (CERTAVITE/CEROVITE) liquid 15 mL 15 mLs by Per Feeding Tube route daily    protein modular (PROSTAT SUGAR FREE) " packet 1 packet 1 packet by Per Feeding Tube route daily    potassium chloride SA (K-DUR/KLOR-CON M) CR tablet 20-40 mEq Take 1-2 tablets (20-40 mEq) by mouth every 2 hours as needed for potassium supplementation    potassium chloride (KLOR-CON) Packet 20-40 mEq 20-40 mEq by Oral or Feeding Tube route every 2 hours as needed for potassium supplementation    potassium chloride 10 mEq in 100 mL intermittent infusion Inject 100 mLs (10 mEq) into the vein every hour as needed for potassium supplementation    potassium chloride 10 mEq in 100 mL intermittent infusion with 10 mg lidocaine Inject 100 mLs (10 mEq) into the vein every hour as needed for potassium supplementation    potassium chloride 20 mEq in 50 mL intermittent infusion Inject 50 mLs (20 mEq) into the vein every hour as needed for potassium supplementation    lidocaine 1 % 0.5-5 mL 0.5-5 mLs by Other route once as needed (mild pain For local anesthetic during PICC insertion.)    sodium chloride (PF) 0.9% PF flush 5-50 mL 5-50 mLs by Intracatheter route once as needed for line flush (to flush each lumen with line placement)    sodium chloride (PF) 0.9% PF flush 10-20 mL 10-20 mLs by Intracatheter route every hour as needed for line flush or post meds or blood draw    sodium chloride (PF) 0.9% PF flush 10 mL 10 mLs by Intracatheter route every 7 days    HYDROmorphone (PF) (DILAUDID) injection 0.3-0.5 mg Inject 0.3-0.5 mg into the vein every 2 hours as needed for severe pain    gabapentin (NEURONTIN) solution 300 mg 6 mLs (300 mg) by Oral or Feeding Tube route every 8 hours    dextrose 10 % 1,000 mL infusion Inject into the vein continuous prn (Give if on IV Insulin Infusion, and Parenteral or Enteral nutrition held or cycled off. )    lidocaine (LMX4) cream Apply topically once as needed for moderate pain (for local anesthetic during PICC insertion)    heparin lock flush 10 UNIT/ML injection 2-5 mL 2-5 mLs by Intracatheter route once as needed for line flush  "(for locking each dormant lumen with line placement)    hydrALAZINE (APRESOLINE) injection 20 mg Inject 1 mL (20 mg) into the vein every 4 hours as needed for high blood pressure (give for SBP > 180)    acetaminophen (TYLENOL) solution 650 mg 20.3 mLs (650 mg) by Oral or Feeding Tube route every 4 hours as needed for mild pain    magnesium sulfate 2 g in NS intermittent infusion (PharMEDium or FV Cmpd) Inject 50 mLs (2 g) into the vein daily as needed for magnesium supplementation    magnesium sulfate 4 g in 100 mL sterile water (premade) Inject 100 mLs (4 g) into the vein every 4 hours as needed for magnesium supplementation    hypromellose-dextran (ARTIFICAL TEARS) ophthalmic solution 2-3 drop Apply 2-3 drops to eye every hour as needed for dry eyes    0.9% sodium chloride infusion Inject into the vein continuous    albuterol neb solution 2.5 mg Take 3 mLs (2.5 mg) by nebulization every 2 hours as needed for wheezing or shortness of breath / dyspnea    acetaminophen (TYLENOL) Suppository 650 mg Place 1 suppository (650 mg) rectally every 4 hours as needed for mild pain    acetaminophen (TYLENOL) tablet 650 mg Take 2 tablets (650 mg) by mouth every 4 hours as needed for mild pain    oxyCODONE (ROXICODONE) IR tablet 5-10 mg Take 1-2 tablets (5-10 mg) by mouth every 3 hours as needed for moderate to severe pain    melatonin tablet 1 mg Take 1 tablet (1 mg) by mouth nightly as needed for sleep    polyethylene glycol (MIRALAX/GLYCOLAX) Packet 17 g Take 17 g by mouth daily as needed for constipation    prochlorperazine (COMPAZINE) injection 5-10 mg Inject 1-2 mLs (5-10 mg) into the vein every 6 hours as needed for nausea or vomiting    Linked Group 3:  \"Or\" Linked Group Details     prochlorperazine (COMPAZINE) tablet 5-10 mg Take 1-2 tablets (5-10 mg) by mouth every 6 hours as needed for vomiting    Linked Group 3:  \"Or\" Linked Group Details     prochlorperazine (COMPAZINE) Suppository 25 mg Place 1 suppository (25 mg) " "rectally every 12 hours as needed for nausea or vomiting    Linked Group 3:  \"Or\" Linked Group Details     ondansetron (ZOFRAN-ODT) ODT tab 4 mg Take 1 tablet (4 mg) by mouth every 6 hours as needed for nausea    Linked Group 4:  \"Or\" Linked Group Details     ondansetron (ZOFRAN) injection 4 mg Inject 2 mLs (4 mg) into the vein every 6 hours as needed for nausea or vomiting    Linked Group 4:  \"Or\" Linked Group Details     pneumococcal vaccine (PNEUMOVAX 23-anirudh) injection 0.5 mL Inject 0.5 mLs into the muscle Prior to discharge    lidocaine 1 % 1 mL 1 mL by Other route every hour as needed (mild pain with VAD insertion or accessing implanted port,)    lidocaine (LMX4) cream Apply topically every hour as needed for mild pain (with VAD insertion or accessing implanted port,)    sodium chloride (PF) 0.9% PF flush 3 mL 3 mLs by Intracatheter route every hour as needed for line flush or post meds or blood draw    sodium chloride (PF) 0.9% PF flush 3 mL 3 mLs by Intracatheter route every 8 hours    bisacodyl (DULCOLAX) Suppository 10 mg Place 1 suppository (10 mg) rectally daily as needed for constipation    midazolam (VERSED) injection 2 mg Inject 2 mLs (2 mg) into the vein every hour as needed for sedation    naloxone (NARCAN) injection 0.1-0.4 mg Inject 0.25-1 mLs (0.1-0.4 mg) into the vein every 2 minutes as needed for opioid reversal    chlorhexidine (PERIDEX) 0.12 % solution 15 mL Take 15 mLs by mouth every 12 hours    rocuronium (ZEMURON) injection 42.2 mg (Discontinued) Inject 4.22 mLs (42.2 mg) into the vein once          Vital Signs: /72  Pulse 115  Temp 100.4  F (38  C)  Resp 22  Ht 1.778 m (5' 10\")  Wt 78.5 kg (173 lb 1 oz)  SpO2 100%  BMI 24.83 kg/m2    Temp (12hrs), Av.8  F (38.2  C), Min:100.4  F (38  C), Max:101.3  F (38.5  C)  Temp (48hrs), Av.1  F (37.8  C), Min:99.5  F (37.5  C), Max:101.3  F (38.5  C)       EXAM    Skin   No new lesions     Lungs   Coarse, rhonchi all fields   "   abd   Distended  No apparent tenderness     Iv sites   Look OK   CV   Tachy  No new sounds     Neuro   Not interactive w me today         Data  Imaging    3/27 CT 1. No obvious intraabdominal or intrapelvic abnormality to explain the patient's symptoms.  2. Small bilateral pleural effusions.  3. Worsening consolidation at the right lung base.  4. Patchy infiltrates at both lung bases may have decreased overall since the prior exam.    Micro information    Blood  3/17 & 3/18 MSSA  Blood  3/19 - 3/25 All negative    Sputum  3/17 MSSA  Sputum  3/24 MSSA & E cloacae & K pneumo  Sputum  3/29 E cloacae & K pneumo    NP swab (+) influenza B    LABS  Lab Results   Component Value Date/Time    WBC 16.0 (H) 04/02/2017 04:00 AM    WBC 16.3 (H) 04/01/2017 05:40 AM    WBC 17.7 (H) 03/31/2017 05:30 AM    WBC 20.0 (H) 03/30/2017 04:25 AM     (H) 03/31/2017 05:30 AM     (H) 03/27/2017 04:20 AM     (H) 03/26/2017 03:40 AM     (H) 03/25/2017 04:30 AM    ALT 78 (H) 03/31/2017 05:30 AM    ALT 79 (H) 03/27/2017 04:20 AM    ALT 61 03/26/2017 03:40 AM    ALT 86 (H) 03/25/2017 04:30 AM    ALKPHOS 176 (H) 03/31/2017 05:30 AM    ALKPHOS 315 (H) 03/27/2017 04:20 AM    ALKPHOS 306 (H) 03/26/2017 03:40 AM    ALKPHOS 353 (H) 03/25/2017 04:30 AM           ASSESSMENT & SUGGESTIONS    J96.01  Acute respiratory failure with hypoxia (H)  (primary encounter diagnosis)  J10.1  Influenza B  J18.9  Pneumonia of both lungs due to infectious organism, unspecified part of lung  A41.01 MSSA septicemia (H)  J15.211 Pneumonia due to MSSA, (H)  R50.9 Fever, unspecified fever cause  D72.829 Leukocytosis, unspecified type  J10.1 Influenza B       Clinically not much different  No confirmation of new infection  All c/w result of severe influenza B complicated by S aureus sepsis  Would not need to invoke additional infections      Continue broad spectrum antimicrobial agents  Recheck NP swab for influ B by PCR -- if negative, consider  stop oseltamivir  Monitor for new troubles   r/o TB per Dr Hughes / Pulmonary team       Martinez Rubin MD  Covering for Caitie Hughes & Patti & Nima  [facility] Infectious Diseases  [phone]

## 2017-04-03 NOTE — PLAN OF CARE
Problem: Goal Outcome Summary  Goal: Goal Outcome Summary  Outcome: No Change  [id number]  Neuro: Versed off X 3 hrs.  E: Pt moved fingers and wiggled toes to command but did not answer questions   Lungs: Had freq amt of mod sputum at beginning of shift that has decreased.  Began weaning at 1425  E: -400cc, RR 25 to 30 post coughing.  Versed restarted due to frequent coughing.  GI: Tube feeding restarted. Had only smears of stool with coughing.  U/O adequate.   here for wife 0900 to 1300. Dr Fairbanks and  Dr Puga discussed procedure change to tomorrow with son and wife.

## 2017-04-03 NOTE — PROGRESS NOTES
[facility] ICU RESPIRATORY NOTE  Date of Admission: 3/17/17  Date of Intubation (most recent): 3/17/17  Reason for Mechanical Ventilation: Respiratory failure  Number of Days on Mechanical Ventilation: 18  Met Criteria for Pressure Support Trial: Yes  Length of Pressure Support Trial: Patient placed on PS 12/5 at 143, tolerating well.  Plan to place back in CMV at 1900 to rest.      Plan:    Continue vent support.  PS trials daily.  Plan for possible trach tomorrow.

## 2017-04-03 NOTE — PROGRESS NOTES
"[facility]  Infectious Disease Progress Note          Assessment and Plan:   Impression:   57 y.o male admitted about 2 weeks ago with concern for secondary bacterial pneumonia on the top of Influenza B.   Worsening respiratory status intubated.   Sputum Cultures positive for MSSA.   Patient is originally from [facility]. Immigrated in 1990. The records in the [place] system start in 2004, no data available of his TB status, I do not see any PPD done. CXR from before have been negative.   Per wife when they first moved to [place] they were \" checked for Tb and all was negative\".   He was admitted this occasion with complaints of pleuritic chest pain. This was after he was diagnosed with Influenza B. His respiratory status quickly worsened. He was intubated and remains that way.  Blood cultures 2/2 positive for MSSA. Cultures from the sputum positive for MSSA, Enterobacter one strain more resistant than the other, Klebsiella. He has been on appropriate antibiotics since admission but has been febrile, still intubated.   Diarrhea-r/o C diff     Recommendations:   Await  TB Quantiferon.   Isolation room, get sputum for AFB stain and cultures.   Cont  Ancef for MSSA bacteremia.   Ok to continue zosyn for GNR in the sputum.   With neg repeat influenza PCR can stop TamiFlu  Await stool for C diff           Interval History:   Stable on vent.  Afebrile.  Repeat influenza neg.  Discussed with family.              Medications:       ceFAZolin  2 g Intravenous Pre-Op/Pre-procedure x 1 dose     ceFAZolin  1 g Intravenous See Admin Instructions     sennosides  5-10 mL Oral or Feeding Tube BID    And     docusate   mg Oral or Feeding Tube BID     oseltamivir  75 mg Per Feeding Tube BID     OLANZapine  10 mg Oral At Bedtime     insulin aspart  1-6 Units Subcutaneous Q4H     insulin glargine  12 Units Subcutaneous QAM AC     piperacillin-tazobactam  4.5 g Intravenous Q6H     pantoprazole  40 mg Per Feeding " "Tube Q12H     ceFAZolin  1 g Intravenous Q8H     heparin  5,000 Units Subcutaneous Q8H     multivitamins with minerals  15 mL Per Feeding Tube Daily     protein modular  1 packet Per Feeding Tube Daily     sodium chloride (PF)  10 mL Intracatheter Q7 Days     gabapentin  300 mg Oral or Feeding Tube Q8H ALEC     pneumococcal vaccine  0.5 mL Intramuscular Prior to discharge     sodium chloride (PF)  3 mL Intracatheter Q8H     chlorhexidine  15 mL Mouth/Throat Q12H                  Physical Exam:   Blood pressure 143/68, pulse 115, temperature 99  F (37.2  C), resp. rate 12, height 1.778 m (5' 10\"), weight 81 kg (178 lb 9.2 oz), SpO2 97 %.  [unfilled]  Vital Signs with Ranges  Temp:  [99  F (37.2  C)-101.3  F (38.5  C)] 99  F (37.2  C)  Heart Rate:  [] 95  Resp:  [12-28] 12  BP: (124-166)/(67-88) 143/68  FiO2 (%):  [40 %] 40 %  SpO2:  [97 %-100 %] 97 %    Constitutional: Awake, alert, cooperative, no apparent distress   Lungs: Clear to auscultation bilaterally, no crackles or wheezing   Cardiovascular: Regular rate and rhythm, normal S1 and S2, and no murmur noted   Abdomen: Normal bowel sounds, soft, non-distended, non-tender   Skin: No rashes, no cyanosis, no edema   Other:           Data:   All microbiology laboratory data reviewed.  Recent Labs   Lab Test  04/03/17   0430  04/02/17   0400  04/01/17   0540   WBC  14.1*  16.0*  16.3*   HGB  6.7*  6.1*  6.3*   HCT  20.3*  18.2*  19.2*   MCV  98  98  96   PLT  479*  530*  540*     Recent Labs   Lab Test  04/03/17   0430  04/02/17   0400  04/01/17   0540   CR  1.66*  1.66*  1.52*     Recent Labs   Lab Test  09/14/09   1021   SED  9     "

## 2017-04-03 NOTE — PROGRESS NOTES
[facility] ICU RESPIRATORY NOTE  Date of Admission: 3/17/2017  Date of Intubation (most recent): 3/17/2017  Reason for Mechanical Ventilation: Respiratory failure  Number of Days on Mechanical Ventilation:  17  Met Criteria for Pressure Support Trial: No  Length of Pressure Support Trial: None  Reason for No Pressure Support Trial: Per MD    Significant Events Today: None  Ventilation Mode: CMV/AC  FiO2 (%): 40 %  Rate Set (breaths/minute): 18 breaths/min  Tidal Volume Set (mL): 400 mL  PEEP (cm H2O): 5 cmH2O  Oxygen Concentration (%): 40 %  Resp: 22    ABG Results: No ABG result for today    ETT appearance on chest x-ray: Et tube in good position    Plan:  Pt scheduled for Trach and PEG tomorrow.  4/2/2017  Nieves Bedolla

## 2017-04-03 NOTE — PROGRESS NOTES
[facility] ICU RESPIRATORY NOTE  Date of Admission: 3/17/2017  Date of Intubation (most recent): 3/17/2017  Reason for Mechanical Ventilation: Respiratory failure  Number of Days on Mechanical Ventilation: 18  Met Criteria for Pressure Support Trial: No  Length of Pressure Support Trial: None  Reason for No Pressure Support Trial: Rest     Significant events: No significant events this shift. Pt remains on the following vent settings:    Ventilation Mode: CMV/AC  FiO2 (%): 40 %  Rate Set (breaths/minute): 18 breaths/min  Tidal Volume Set (mL): 400 mL  PEEP (cm H2O): 5 cmH2O  Oxygen Concentration (%): 40 %  Resp: 20    No lab results found in last 7 days.     Plan: Pt to get trach and peg this am. Will cont to follow.     4/3/2017  Nga Ferreira RT

## 2017-04-03 NOTE — DOWNTIME EVENT NOTE
The EMR was down for 4 hours on 4/2/2017.    Maryana Gonzales RN was responsible for completing the paper charting during this time period.     The following information was re-entered into the system by Maryana Gonzales: Flowsheet data, Intake and output and MAR; restraints.    The following information will remain in the paper chart: Patients assessment    Maryana Gonzales  4/2/2017

## 2017-04-03 NOTE — ANESTHESIA PREPROCEDURE EVALUATION
Anesthesia Evaluation     . Pt has had prior anesthetic.     No history of anesthetic complications          ROS/MED HX    ENT/Pulmonary:     (+)tobacco use, Current use , recent URI unresolved influenza, bacterial: . .   (-) sleep apnea   Neurologic:       Cardiovascular:     (+) Dyslipidemia, -Peripheral Vascular Disease---. : . . . :. .       METS/Exercise Tolerance:     Hematologic:     (+) Anemia, -      Musculoskeletal:         GI/Hepatic:        (-) GERD   Renal/Genitourinary:         Endo:     (+) type II DM .      Psychiatric:         Infectious Disease:         Malignancy:         Other:                     Physical Exam      Airway   Comment: intubated    Dental     Cardiovascular   Rhythm and rate: regular and abnormal    PE comment: tachy    Pulmonary (+) decreased breath sounds                       Anesthesia Plan      History & Physical Review  History and physical reviewed and following examination; no interval change.    ASA Status:  3 .    NPO Status:  > 8 hours    Plan for General and ETT with Inhalation induction. Maintenance will be Inhalation.           Postoperative Care      Consents                          .

## 2017-04-03 NOTE — PROGRESS NOTES
[facility]  WO Nurse Inpatient Adult Pressure Injury (PI) Assessment      Follow up Assessment of PI(s) on pt's: Suspected mucosal PI: Lt lateral tip of tongue  Data:   Patient History:    Admitted 3-17-17: Wyatt Mahajan is a 57 year old male admitted on 3/17 to the floor, presenting with acute hypoxic respiratory failure due to influenza B and CAP, right chest wall pain and uncontrolled DM-II. Patient was subsequently transferred from the floor to the MICU for increased work of breathing, accessory muscle use and respiratory distress. Was intubated due to increased work of breathing, tachypnea and decreasing O2 sats.     Current mattress: Atmos air  Current pressure relieving devices: Pillows       Moisture Management: bell for UIC/ incontinence protocol for FIC    Catheter secured? Yes       Current Diet / Nutrition:   Active Diet Order  NPO. On TF       Dipak Assessment and sub scores:  Dipak Score Av.8 Min: 10 Max: 22   Vented/sedated; Nutrition following; Bell for UIC / incontinence protocol for FIC; HOB @ 30 degrees for VAPS/TF; immobility;      Labs:               Recent Labs   Lab Test 17  0420   17  0650   17  0635   09  1021   ALBUMIN 1.8* < > --  --  2.7* < > 4.0   HGB 8.6* < > --  < > --  < > --    INR --  --  1.19* --  --  < > --    WBC 28.4* < > --  < > --  < > --    A1C --  --  --  --  8.1* < > 11.6*   CRP --  --  --  --  --  --  8.2   < > = values in this interval not displayed.            Pressure Injury Assessment (location): Tip of tongue  Wound History: Pt intubated on 3-18-17. On 3-22 Nursing noted swelling to oral mucosa, lips and tongue with tongue trapped between ET and teeth. Bite block was applied. Lips, tongue remain edematous with tongue hanging out between teeth    Wound Base: remains dark ecchymotic    Specific Dimensions (length x width x depth, in cm) :  .4cm x .5cm with dark black/red ecchymoitic    Palpation of the wound bed:  "Remains soft    Tongue: upside down \"V\" shaped laceration to left tip of tongue, red, edematous    Drainage: slight bleeding to tongue    Odor: none    Pain: unable to assess     Intervention:     Patient's chart evaluated.     Dipak Interventions: Current Dipak Interventions and Care Plan reviewed and updated, appropriate at this time.    Orders In epic         Assessment:     Pressure Injury (PI) located on tip of tongue: Mucosal PI (these are not staged)     Tongue is very edematous and sits on his teeth, pt tongue naturally falling to left side of mouth       Plan:     Nursing to notify the Provider(s) and re-consult the [facility] Nurse if wound(s) deteriorate(s)or if the wound care plan needs reevaluation.    If pt is refusing to turn or reposition they must be educated on the potential injury from not off loading pressure. Then this \"educated refusal\" needs to be documented as an \"educated refusal to turn/ reposition\" and document if alert, etc.     Mucosal PI on Lt lateral tip of tongue:  1. Diligent assessment and movement of ET and NG tube to off-load pressure on tongue and lips  2. Mouth cares with toothette to provide moisture and cleanse     Face to face time: 30 minutes    Gerard AGUILARN       "

## 2017-04-03 NOTE — PROVIDER NOTIFICATION
RN notified tele hub re: morning hemoglobin= 6.7; patient received 1 unit of PRBC's yesterday.  Will discuss with patient's wife and rounding MD this morning.  Also notified tele hub of 2 large loose stools in past 2 hours; c-diff sample sent, pending results.

## 2017-04-03 NOTE — PROGRESS NOTES
Critical Care Progress Note      04/03/2017    Name: Waytt Mahajan MRN#: [id number]   Age: 57 year old YOB: 1960     [facility]    [facility] DAY #13           Problem List:   Active Problems:    MSSA (methicillin susceptible Staphylococcus aureus) septicemia (H)    MSSA (methicillin susceptible Staphylococcus aureus) pneumonia (H)    Fever    Leukocytosis    Influenza B    Acute respiratory failure with hypoxia (H)  MSSA/enterobacter/klebsiella pna   MSSA bacteremia--resolved  VAP (GNR)  Influenza B  JOSE--stable  Hematuria---bell trauma?--improving  pancreatitis   Quanterferon positive         Summary/Hospital Course:   Wyatt Mahajan is a 57 year old male admitted on 3/17 to the floor, presenting with acute hypoxic respiratory failure due to influenza B and CAP, right chest wall pain and uncontrolled DM-II. Patient was subsequently transferred from the floor to the MICU for increased work of breathing, accessory muscle use and respiratory distress. Was intubated due to increased work of breathing, tachypnea and decreasing O2 sats.  His hospital course has been complicated by:  1. Vent-associated pneumonia for which his abx coverage was broadened to zoysn.  Ancef was restarted on 3/30 per ID request for MSSA bacteremia.  2.   MSSA bacteremia, likely from the pna which cleared after the second set of blood cx.  3.  Delirium which has persisted through propofol, precedex, valproate, seroquel and gabapentin.  4.  Pancreatitis, likely due to propofol, which is now improving with post-pyloric feeds.  5.  Mild transaminitis:  Drug induced vs crit illness induced  6.  Mild JOSE:  Usually worsens with attempted diuresis.  7.  Hematuria:  Believed 2/2 bell trauma, now resolved.  8.  Bradycardia:  Seems due to precedex, resolved when off precedex.  9.  A fib with rvr:  Resolved with amio loading.  10.  Anemia of critical illness, family has been hesitant to allow transfusions for hgb 7.  Will  re-address for hgb 6.    4/3:  Event over weekend noted, some agitation, head CT negative, transfused 1 unit PRBC with partial response,  PEG and trach delayed as quantiferon pending , ruling out for TB  - AFB smear neg x 2.       Assessment and plan :     Wyatt Mahajan IS a 57 year old male admitted on 3/17/2017 for respiratory failure, flu B, mssa pna and mssa bacteremia.   I have personally reviewed the daily labs, imaging studies, cultures and discussed the case with referring physician and consulting physicians.   My assessment and plan by system for this patient is as follows:    Neurology/Psychiatry:   1. Sedation: Off  propofol given CK and Triglyceride lab values on 3/24, transitioned to precedex but patient thrashing and hypertensive/bradycardic. Versed drip begun 3/25 AM is providing adequate sedation. Prn dilaudid also available. More awake today, following commands - improved per family   2. Delirium:  3/31:  seroquel not having desired effect, tranisition to olanzapine Valproate in effective   3. H/o Diabetic neuropathy 2/2 DM-II:     -- gabapentin 300mg, TID.    Cardiovascular:   1. Hypertension: standing hydralazine started  2. H/o HLD:  hold atorvastatin (LFT elevated)  3. H/o PAD:  hold ASA  4. Hyperlactemia:  Resolved  5.  A fib with RVR:  In setting of critical illness.  Resolved on amiodarone.    Pulmonary/Ventilator Management:   1.  Respiratory failure, hypoxemic:  2/2 mssa pna, vap (atb for S aureus and E cloacae & K pneumoniae isolated from sputum) , and influenza B - repeat swab negative.    --Lung protective vent strategy- PCV plus assist ventilation  - PS trials as tolerated  - trach on hold until AFB status clarified     GI and Nutrition :   1.  TF  2.  PPI for PUD prophylaxis  3.  Constipation--improved on bowel reg.  4.  Elevated liver enzymes: newly elevated on 3/24, nl on 3/17, ? Med related vs ? RUQ U/S revealed a distended GB w/sludge vs related to pancreatitis. Subsequent  LFT's have shown decreases of Alk phos and ALT levels since discontinuation of statin.  --serial LFT's  5.  Pancreatitis:  Due to critical illness vs propofol.  No further propofol.  --restarting post-pyloric trickle feeds.  post-pyloric tube by IR.  --Increased PPI to bid  --bladder pressure 15,  CT abd/pelv without acute abnormality, abd distension improving.  --lipase trending down    Renal/Fluids/Electrolytes:   1. JOSE-- cr stablilized , UOP stable  UA showed no casts. Urine eos < 1%  -- stable today, keep holding diuresis  2. Hypernatremia - . PO free water    :  1. Hematuria: bloodly urine AM of 3/23 probably due to trauma from the bell catheter. Resolved 3/30.    Infectious Disease:   1. Flu B:  Repeat flu swab negative thus DC Tamiflu -   2. MSSA pna/bacteremia and gm neg VAP: Nafcillin until 3/24, then ancef until 3/26, then zosyn and vanco 3/26 until  vanco stopped 3/29.  Ancef re-added to existing zosyn on 3/30 per ID.  Most recent bld cultures show no growth. TTE did not show valvular masses/vegetations. CT chest negative for occult abscess.   3/29 sputum cx still with GNR, but no further staph.  --ID consulted 3/30 for persistent + sputum cx.  3. ? AFB - pending quantiferon, AFB neg x 2 (smear)    Endocrine:   1. Hyperglycemia:    -- on lantus 3/31 (from drip)     Hematology/Oncology:   1. Plt stable  2. Hgb 6.7 received 1 u PRBC  (from 6.1).  No overt active bleeding. C/w anemia of chronic disease. Hemodynamics OK. Discussed risks/benefits of transfusion with family, will plan transfusion prior to OR for procedures   3. Leukocytosis-- 2/2 infection. Patient febrile on and off. Slowly decreasing  4.  R arm swelling  -- superficial thrombus only.  Warm compresses.     IV/Access:   1. Venous access - peripheral and PICC    ICU Prophylaxis:   1. DVT:  hep SubQ 3/30  2. VAP: HOB 30 degrees, chlorhexidine rinse  3. Stress Ulcer: PPI  4. Restraints: Nonviolent soft two point restraints required and  necessary for patient safety and continued cares and good effect as patient continues to pull at necessary lines, tubes despite education and distraction. Will readdress daily.   6. Feeding - post-pyloric trickle feeds  7. Family Update: wife and daughter at bedside  8. Disposition - critically ill    [facility] 3/27 (Fadi):  Held full care conference with wife, brother and daughter.  Discussed his course and complications.  Will conitnue with current txt for now.  All questions asked and answered.    [facility] 3/30 (Fadi):  Met with wife and brother.  Overall he has been making slow progress, but I doubt he will be ready for extubation soon, and has already been intubated 14 days.  I have recommended tracheostomy to continue with further cares.  Family has acknowledged this, but wishes to wait through the weekend.  They asked for a trial of extubation early next week prior to deciding on trach, I strongly advised against this unless his sbt's are better next week.  Also briefly discussed his delirium, pancreatitis, and anemia, but no major decisions except decision not to transfuse today as above.    [facility] 3/31 (fadi): Met again with patient's wife and brother.  Discussed care and prognosis.  They are now willing to proceed with trach and peg for which I will place consults.  They are still hesitant about blood transfusion unless absolutely necessary, but are willing to transfuse if he is actively bleeding, if hgb falls below 6, or if necessary for preparation for surgery.             Key Medications:   Reviewed          Physical Examination:   Temp:  [98.8  F (37.1  C)-100.8  F (38.2  C)] 99.5  F (37.5  C)  Heart Rate:  [] 98  Resp:  [12-31] 19  BP: (124-166)/(65-88) 140/74  FiO2 (%):  [40 %] 40 %  SpO2:  [97 %-100 %] 100 %        Intake/Output Summary (Last 24 hours) at 04/03/17 1513  Last data filed at 04/03/17 1500   Gross per 24 hour   Intake           3203.5 ml   Output             2690 ml   Net             513.5 ml       Wt Readings from Last 4 Encounters:   04/03/17 81 kg (178 lb 9.2 oz)   03/16/17 70.3 kg (155 lb)   01/17/17 71.3 kg (157 lb 1.6 oz)   11/30/16 72.6 kg (160 lb)     BP - Mean:  [] 91  Ventilation Mode: CMV/AC  FiO2 (%): 40 %  Rate Set (breaths/minute): 18 breaths/min  Tidal Volume Set (mL): 40 mL  PEEP (cm H2O): 5 cmH2O  Oxygen Concentration (%): 40 %  Resp: 19  No lab results found in last 7 days.  GEN: sedate intubated, opens eyes interactive   HEENT: head ncat, sclera anicteric, OP patent, trachea midline   PULM: unlabored, coarse lung sounds anteriorly, rhonchi bilaterally.  CV/COR: RRR S1/S2, No rub, murmur or gallop  ABD: softer, distended but improving, nontender, hypoactive bowel sounds, no masses  EXT:  peripheral edema present in R>L hand, warm x4 and well-perfused.   NEURO: sedate, follows commands bilateral lower extremities 4 extremities weak  SKIN: no obvious rash  LINES: clean, dry intact         Data:       Recent Labs  Lab 04/03/17  0430 04/02/17  0400 04/01/17  0540 03/31/17  0530   * 146* 147* 150*   POTASSIUM 3.6 3.8 3.6 3.7   CHLORIDE 111* 111* 113* 115*   CO2 26 28 27 27   ANIONGAP 8 7 7 8   * 159* 172* 123*   BUN 28 29 29 25   CR 1.66* 1.66* 1.52* 1.42*   GFRESTIMATED 43* 43* 48* 52*   GFRESTBLACK 52* 52* 58* 63   LEONIE 7.6* 7.7* 7.6* 7.6*   MAG 2.6* 2.5* 2.5* 2.4*   PHOS 2.6 3.1 2.9 3.0   PROTTOTAL  --   --   --  7.2   ALBUMIN  --   --   --  1.4*   BILITOTAL  --   --   --  1.0   ALKPHOS  --   --   --  176*   AST  --   --   --  414*   ALT  --   --   --  78*       CBC    Recent Labs  Lab 04/03/17  0430 04/02/17  0400 04/01/17  0540 03/31/17  0530   WBC 14.1* 16.0* 16.3* 17.7*   RBC 2.08* 1.86* 2.01* 2.01*   HGB 6.7* 6.1* 6.3* 6.5*   HCT 20.3* 18.2* 19.2* 19.4*   MCV 98 98 96 97   MCH 32.2 32.8 31.3 32.3   MCHC 33.0 33.5 32.8 33.5   RDW 17.1* 16.9* 15.8* 15.5*   * 530* 540* 616*     INR    Recent Labs  Lab 03/27/17  1830   INR 1.15*     Arterial Blood  Gas  No lab results found in last 7 days.    All cultures:    Recent Labs  Lab 04/02/17  1025 04/01/17  1013 03/30/17  2050 03/29/17  1805   CULT No growth after 22 hours Culture received and in progress.  Positive AFB results are called as soon as detected.  Final report to follow in 7 to 8 weeks.  Canceled, Test credited Duplicate request Culture received and in progress.  Positive AFB results are called as soon as detected.  Final report to follow in 7 to 8 weeks. Light growth Enterobacter cloacae complexLight growth Klebsiella pneumoniae*     Imaging:  CXR No results found for this or any previous visit (from the past 24 hour(s)).    Billing: This patient is critically ill: Yes. Total critical care time today 45 min.

## 2017-04-04 ENCOUNTER — APPOINTMENT (OUTPATIENT)
Dept: ULTRASOUND IMAGING | Facility: CLINIC | Age: 57
DRG: 870 | End: 2017-04-04
Attending: INTERNAL MEDICINE
Payer: COMMERCIAL

## 2017-04-04 ENCOUNTER — ANESTHESIA EVENT (OUTPATIENT)
Dept: SURGERY | Facility: CLINIC | Age: 57
DRG: 870 | End: 2017-04-04
Payer: COMMERCIAL

## 2017-04-04 ENCOUNTER — ANESTHESIA (OUTPATIENT)
Dept: SURGERY | Facility: CLINIC | Age: 57
DRG: 870 | End: 2017-04-04
Payer: COMMERCIAL

## 2017-04-04 LAB
ALBUMIN SERPL-MCNC: 1.5 G/DL (ref 3.4–5)
ALP SERPL-CCNC: 155 U/L (ref 40–150)
ALT SERPL W P-5'-P-CCNC: 37 U/L (ref 0–70)
ANION GAP SERPL CALCULATED.3IONS-SCNC: 8 MMOL/L (ref 3–14)
AST SERPL W P-5'-P-CCNC: 322 U/L (ref 0–45)
BASOPHILS # BLD AUTO: 0.1 10E9/L (ref 0–0.2)
BASOPHILS NFR BLD AUTO: 0.4 %
BILIRUB SERPL-MCNC: 1 MG/DL (ref 0.2–1.3)
BUN SERPL-MCNC: 26 MG/DL (ref 7–30)
CALCIUM SERPL-MCNC: 8.4 MG/DL (ref 8.5–10.1)
CHLORIDE SERPL-SCNC: 114 MMOL/L (ref 94–109)
CO2 SERPL-SCNC: 25 MMOL/L (ref 20–32)
CREAT SERPL-MCNC: 1.5 MG/DL (ref 0.66–1.25)
DIFFERENTIAL METHOD BLD: ABNORMAL
EOSINOPHIL # BLD AUTO: 0.1 10E9/L (ref 0–0.7)
EOSINOPHIL NFR BLD AUTO: 0.8 %
ERYTHROCYTE [DISTWIDTH] IN BLOOD BY AUTOMATED COUNT: 18.5 % (ref 10–15)
GFR SERPL CREATININE-BSD FRML MDRD: 49 ML/MIN/1.7M2
GLUCOSE BLDC GLUCOMTR-MCNC: 134 MG/DL (ref 70–99)
GLUCOSE BLDC GLUCOMTR-MCNC: 149 MG/DL (ref 70–99)
GLUCOSE BLDC GLUCOMTR-MCNC: 158 MG/DL (ref 70–99)
GLUCOSE BLDC GLUCOMTR-MCNC: 159 MG/DL (ref 70–99)
GLUCOSE BLDC GLUCOMTR-MCNC: 175 MG/DL (ref 70–99)
GLUCOSE BLDC GLUCOMTR-MCNC: 205 MG/DL (ref 70–99)
GLUCOSE SERPL-MCNC: 143 MG/DL (ref 70–99)
HCT VFR BLD AUTO: 24 % (ref 40–53)
HGB BLD-MCNC: 7.7 G/DL (ref 13.3–17.7)
IMM GRANULOCYTES # BLD: 0.1 10E9/L (ref 0–0.4)
IMM GRANULOCYTES NFR BLD: 0.7 %
INR PPP: 1.18 (ref 0.86–1.14)
LYMPHOCYTES # BLD AUTO: 3.6 10E9/L (ref 0.8–5.3)
LYMPHOCYTES NFR BLD AUTO: 23 %
MAGNESIUM SERPL-MCNC: 2.3 MG/DL (ref 1.6–2.3)
MCH RBC QN AUTO: 31.6 PG (ref 26.5–33)
MCHC RBC AUTO-ENTMCNC: 32.1 G/DL (ref 31.5–36.5)
MCV RBC AUTO: 98 FL (ref 78–100)
MONOCYTES # BLD AUTO: 0.8 10E9/L (ref 0–1.3)
MONOCYTES NFR BLD AUTO: 5.2 %
NEUTROPHILS # BLD AUTO: 11 10E9/L (ref 1.6–8.3)
NEUTROPHILS NFR BLD AUTO: 69.9 %
NRBC # BLD AUTO: 0 10*3/UL
NRBC BLD AUTO-RTO: 0 /100
PHOSPHATE SERPL-MCNC: 2.7 MG/DL (ref 2.5–4.5)
PLATELET # BLD AUTO: 503 10E9/L (ref 150–450)
POTASSIUM SERPL-SCNC: 3.6 MMOL/L (ref 3.4–5.3)
PROT SERPL-MCNC: 7.9 G/DL (ref 6.8–8.8)
RBC # BLD AUTO: 2.44 10E12/L (ref 4.4–5.9)
SODIUM SERPL-SCNC: 147 MMOL/L (ref 133–144)
WBC # BLD AUTO: 15.8 10E9/L (ref 4–11)

## 2017-04-04 PROCEDURE — 25000128 H RX IP 250 OP 636: Performed by: SURGERY

## 2017-04-04 PROCEDURE — 27210437 ZZH NUTRITION PRODUCT SEMIELEM INTERMED LITER

## 2017-04-04 PROCEDURE — 20000003 ZZH R&B ICU

## 2017-04-04 PROCEDURE — 87116 MYCOBACTERIA CULTURE: CPT | Performed by: INTERNAL MEDICINE

## 2017-04-04 PROCEDURE — 40000008 ZZH STATISTIC AIRWAY CARE

## 2017-04-04 PROCEDURE — 80053 COMPREHEN METABOLIC PANEL: CPT | Performed by: INTERNAL MEDICINE

## 2017-04-04 PROCEDURE — 87206 SMEAR FLUORESCENT/ACID STAI: CPT | Performed by: INTERNAL MEDICINE

## 2017-04-04 PROCEDURE — 85610 PROTHROMBIN TIME: CPT | Performed by: INTERNAL MEDICINE

## 2017-04-04 PROCEDURE — 25000128 H RX IP 250 OP 636: Performed by: NURSE ANESTHETIST, CERTIFIED REGISTERED

## 2017-04-04 PROCEDURE — 27210995 ZZH RX 272: Performed by: THORACIC SURGERY (CARDIOTHORACIC VASCULAR SURGERY)

## 2017-04-04 PROCEDURE — 25000132 ZZH RX MED GY IP 250 OP 250 PS 637: Performed by: INTERNAL MEDICINE

## 2017-04-04 PROCEDURE — 25000128 H RX IP 250 OP 636: Performed by: INTERNAL MEDICINE

## 2017-04-04 PROCEDURE — 40000257 ZZH STATISTIC CONSULT NO CHARGE VASC ACCESS

## 2017-04-04 PROCEDURE — 00000146 ZZHCL STATISTIC GLUCOSE BY METER IP

## 2017-04-04 PROCEDURE — 27210794 ZZH OR GENERAL SUPPLY STERILE: Performed by: THORACIC SURGERY (CARDIOTHORACIC VASCULAR SURGERY)

## 2017-04-04 PROCEDURE — 25000125 ZZHC RX 250: Performed by: INTERNAL MEDICINE

## 2017-04-04 PROCEDURE — 83735 ASSAY OF MAGNESIUM: CPT | Performed by: INTERNAL MEDICINE

## 2017-04-04 PROCEDURE — 43246 EGD PLACE GASTROSTOMY TUBE: CPT | Performed by: SURGERY

## 2017-04-04 PROCEDURE — 40000275 ZZH STATISTIC RCP TIME EA 10 MIN

## 2017-04-04 PROCEDURE — 99291 CRITICAL CARE FIRST HOUR: CPT | Performed by: INTERNAL MEDICINE

## 2017-04-04 PROCEDURE — 25000125 ZZHC RX 250: Performed by: NURSE ANESTHETIST, CERTIFIED REGISTERED

## 2017-04-04 PROCEDURE — 36000050 ZZH SURGERY LEVEL 2 1ST 30 MIN: Performed by: THORACIC SURGERY (CARDIOTHORACIC VASCULAR SURGERY)

## 2017-04-04 PROCEDURE — 76705 ECHO EXAM OF ABDOMEN: CPT

## 2017-04-04 PROCEDURE — 37000008 ZZH ANESTHESIA TECHNICAL FEE, 1ST 30 MIN: Performed by: THORACIC SURGERY (CARDIOTHORACIC VASCULAR SURGERY)

## 2017-04-04 PROCEDURE — 84100 ASSAY OF PHOSPHORUS: CPT | Performed by: INTERNAL MEDICINE

## 2017-04-04 PROCEDURE — 36000052 ZZH SURGERY LEVEL 2 EA 15 ADDTL MIN: Performed by: THORACIC SURGERY (CARDIOTHORACIC VASCULAR SURGERY)

## 2017-04-04 PROCEDURE — 85025 COMPLETE CBC W/AUTO DIFF WBC: CPT | Performed by: INTERNAL MEDICINE

## 2017-04-04 PROCEDURE — 94003 VENT MGMT INPAT SUBQ DAY: CPT

## 2017-04-04 PROCEDURE — 87015 SPECIMEN INFECT AGNT CONCNTJ: CPT | Performed by: INTERNAL MEDICINE

## 2017-04-04 PROCEDURE — 25000132 ZZH RX MED GY IP 250 OP 250 PS 637: Performed by: HOSPITALIST

## 2017-04-04 PROCEDURE — 25800025 ZZH RX 258: Performed by: NURSE ANESTHETIST, CERTIFIED REGISTERED

## 2017-04-04 PROCEDURE — 37000009 ZZH ANESTHESIA TECHNICAL FEE, EACH ADDTL 15 MIN: Performed by: THORACIC SURGERY (CARDIOTHORACIC VASCULAR SURGERY)

## 2017-04-04 PROCEDURE — 25000128 H RX IP 250 OP 636: Performed by: HOSPITALIST

## 2017-04-04 PROCEDURE — 25000125 ZZHC RX 250: Performed by: SURGERY

## 2017-04-04 PROCEDURE — 40000239 ZZH STATISTIC VAT ROUNDS

## 2017-04-04 PROCEDURE — 25000566 ZZH SEVOFLURANE, EA 15 MIN: Performed by: THORACIC SURGERY (CARDIOTHORACIC VASCULAR SURGERY)

## 2017-04-04 RX ORDER — SODIUM CHLORIDE, SODIUM LACTATE, POTASSIUM CHLORIDE, CALCIUM CHLORIDE 600; 310; 30; 20 MG/100ML; MG/100ML; MG/100ML; MG/100ML
INJECTION, SOLUTION INTRAVENOUS CONTINUOUS PRN
Status: DISCONTINUED | OUTPATIENT
Start: 2017-04-04 | End: 2017-04-04

## 2017-04-04 RX ORDER — MAGNESIUM HYDROXIDE 1200 MG/15ML
LIQUID ORAL PRN
Status: DISCONTINUED | OUTPATIENT
Start: 2017-04-04 | End: 2017-04-04 | Stop reason: HOSPADM

## 2017-04-04 RX ORDER — HYDROMORPHONE HYDROCHLORIDE 1 MG/ML
.3-.5 INJECTION, SOLUTION INTRAMUSCULAR; INTRAVENOUS; SUBCUTANEOUS
Status: DISCONTINUED | OUTPATIENT
Start: 2017-04-04 | End: 2017-04-06

## 2017-04-04 RX ORDER — FENTANYL CITRATE 50 UG/ML
50-100 INJECTION, SOLUTION INTRAMUSCULAR; INTRAVENOUS ONCE
Status: COMPLETED | OUTPATIENT
Start: 2017-04-04 | End: 2017-04-04

## 2017-04-04 RX ORDER — FENTANYL CITRATE 50 UG/ML
INJECTION, SOLUTION INTRAMUSCULAR; INTRAVENOUS PRN
Status: DISCONTINUED | OUTPATIENT
Start: 2017-04-04 | End: 2017-04-04

## 2017-04-04 RX ORDER — OLANZAPINE 10 MG/1
20 TABLET ORAL AT BEDTIME
Status: DISCONTINUED | OUTPATIENT
Start: 2017-04-04 | End: 2017-04-11 | Stop reason: HOSPADM

## 2017-04-04 RX ADMIN — CHLORHEXIDINE GLUCONATE 15 ML: 1.2 RINSE ORAL at 08:14

## 2017-04-04 RX ADMIN — INSULIN ASPART 1 UNITS: 100 INJECTION, SOLUTION INTRAVENOUS; SUBCUTANEOUS at 22:17

## 2017-04-04 RX ADMIN — FENTANYL CITRATE 50 MCG/HR: 50 INJECTION, SOLUTION INTRAMUSCULAR; INTRAVENOUS at 12:31

## 2017-04-04 RX ADMIN — HYDROMORPHONE HYDROCHLORIDE 0.5 MG: 1 INJECTION, SOLUTION INTRAMUSCULAR; INTRAVENOUS; SUBCUTANEOUS at 20:49

## 2017-04-04 RX ADMIN — OLANZAPINE 20 MG: 10 TABLET, FILM COATED ORAL at 22:08

## 2017-04-04 RX ADMIN — PIPERACILLIN AND TAZOBACTAM 4.5 G: 4; .5 INJECTION, POWDER, FOR SOLUTION INTRAVENOUS at 12:32

## 2017-04-04 RX ADMIN — HEPARIN SODIUM 5000 UNITS: 10000 INJECTION, SOLUTION INTRAVENOUS; SUBCUTANEOUS at 22:07

## 2017-04-04 RX ADMIN — INSULIN ASPART 1 UNITS: 100 INJECTION, SOLUTION INTRAVENOUS; SUBCUTANEOUS at 16:14

## 2017-04-04 RX ADMIN — ROCURONIUM BROMIDE 25 MG: 10 INJECTION INTRAVENOUS at 14:16

## 2017-04-04 RX ADMIN — DOCUSATE SODIUM 100 MG: 50 LIQUID ORAL at 22:08

## 2017-04-04 RX ADMIN — MIDAZOLAM HYDROCHLORIDE 4 MG: 1 INJECTION, SOLUTION INTRAMUSCULAR; INTRAVENOUS at 11:35

## 2017-04-04 RX ADMIN — CEFAZOLIN SODIUM 1 G: 1 INJECTION, POWDER, FOR SOLUTION INTRAMUSCULAR; INTRAVENOUS at 22:13

## 2017-04-04 RX ADMIN — MIDAZOLAM HYDROCHLORIDE 2 MG: 1 INJECTION, SOLUTION INTRAMUSCULAR; INTRAVENOUS at 14:25

## 2017-04-04 RX ADMIN — CEFAZOLIN SODIUM 1 G: 1 INJECTION, POWDER, FOR SOLUTION INTRAMUSCULAR; INTRAVENOUS at 13:49

## 2017-04-04 RX ADMIN — HYDROMORPHONE HYDROCHLORIDE 0.5 MG: 1 INJECTION, SOLUTION INTRAMUSCULAR; INTRAVENOUS; SUBCUTANEOUS at 04:21

## 2017-04-04 RX ADMIN — INSULIN ASPART 2 UNITS: 100 INJECTION, SOLUTION INTRAVENOUS; SUBCUTANEOUS at 00:50

## 2017-04-04 RX ADMIN — FENTANYL CITRATE 50 MCG/HR: 50 INJECTION, SOLUTION INTRAMUSCULAR; INTRAVENOUS at 14:08

## 2017-04-04 RX ADMIN — HYDROMORPHONE HYDROCHLORIDE 0.5 MG: 1 INJECTION, SOLUTION INTRAMUSCULAR; INTRAVENOUS; SUBCUTANEOUS at 06:55

## 2017-04-04 RX ADMIN — PIPERACILLIN AND TAZOBACTAM 4.5 G: 4; .5 INJECTION, POWDER, FOR SOLUTION INTRAVENOUS at 17:45

## 2017-04-04 RX ADMIN — METOPROLOL TARTRATE 5 MG: 5 INJECTION INTRAVENOUS at 08:47

## 2017-04-04 RX ADMIN — GABAPENTIN 300 MG: 250 SUSPENSION ORAL at 00:51

## 2017-04-04 RX ADMIN — MIDAZOLAM HYDROCHLORIDE 6 MG/HR: 5 INJECTION, SOLUTION INTRAMUSCULAR; INTRAVENOUS at 14:08

## 2017-04-04 RX ADMIN — PANTOPRAZOLE SODIUM 40 MG: 40 TABLET, DELAYED RELEASE ORAL at 00:51

## 2017-04-04 RX ADMIN — SODIUM CHLORIDE, POTASSIUM CHLORIDE, SODIUM LACTATE AND CALCIUM CHLORIDE: 600; 310; 30; 20 INJECTION, SOLUTION INTRAVENOUS at 14:08

## 2017-04-04 RX ADMIN — INSULIN ASPART 1 UNITS: 100 INJECTION, SOLUTION INTRAVENOUS; SUBCUTANEOUS at 12:36

## 2017-04-04 RX ADMIN — HYDROMORPHONE HYDROCHLORIDE 0.5 MG: 1 INJECTION, SOLUTION INTRAMUSCULAR; INTRAVENOUS; SUBCUTANEOUS at 08:25

## 2017-04-04 RX ADMIN — Medication 6 MG/HR: at 13:13

## 2017-04-04 RX ADMIN — CEFAZOLIN SODIUM 2 G: 2 INJECTION, SOLUTION INTRAVENOUS at 14:08

## 2017-04-04 RX ADMIN — FENTANYL CITRATE 50 MCG: 50 INJECTION, SOLUTION INTRAMUSCULAR; INTRAVENOUS at 14:25

## 2017-04-04 RX ADMIN — CEFAZOLIN SODIUM 1 G: 1 INJECTION, POWDER, FOR SOLUTION INTRAMUSCULAR; INTRAVENOUS at 05:47

## 2017-04-04 RX ADMIN — FENTANYL CITRATE 50 MCG: 50 INJECTION, SOLUTION INTRAMUSCULAR; INTRAVENOUS at 14:27

## 2017-04-04 RX ADMIN — SENNOSIDES A AND B 5 ML: 415.36 LIQUID ORAL at 22:09

## 2017-04-04 RX ADMIN — INSULIN ASPART 1 UNITS: 100 INJECTION, SOLUTION INTRAVENOUS; SUBCUTANEOUS at 04:29

## 2017-04-04 RX ADMIN — MIDAZOLAM HYDROCHLORIDE 4 MG: 1 INJECTION, SOLUTION INTRAMUSCULAR; INTRAVENOUS at 08:49

## 2017-04-04 RX ADMIN — FENTANYL CITRATE 100 MCG: 50 INJECTION INTRAMUSCULAR; INTRAVENOUS at 11:39

## 2017-04-04 RX ADMIN — ACETAMINOPHEN 650 MG: 160 SUSPENSION ORAL at 10:02

## 2017-04-04 RX ADMIN — PIPERACILLIN AND TAZOBACTAM 4.5 G: 4; .5 INJECTION, POWDER, FOR SOLUTION INTRAVENOUS at 06:56

## 2017-04-04 RX ADMIN — HYDRALAZINE HYDROCHLORIDE 20 MG: 20 INJECTION INTRAMUSCULAR; INTRAVENOUS at 08:05

## 2017-04-04 RX ADMIN — HEPARIN SODIUM 5000 UNITS: 10000 INJECTION, SOLUTION INTRAVENOUS; SUBCUTANEOUS at 04:20

## 2017-04-04 RX ADMIN — GABAPENTIN 300 MG: 250 SUSPENSION ORAL at 22:37

## 2017-04-04 RX ADMIN — CHLORHEXIDINE GLUCONATE 15 ML: 1.2 RINSE ORAL at 22:06

## 2017-04-04 ASSESSMENT — LIFESTYLE VARIABLES: TOBACCO_USE: 1

## 2017-04-04 NOTE — OP NOTE
General Surgery Operative Note    PREOPERATIVE DIAGNOSIS:  Malnutrition and need feeding tube placement    POSTOPERATIVE DIAGNOSIS:   Malnutrition and need feeding tube placement    PROCEDURE:  1-Esophagogatroscopy        2- Percutaneous endoscopic gastrostomy tube placement    SURGEON:  Marcial Obregon M.D.    ASSISTANT:  Roxana Maldonado PA-C    ANESTHESIA:  Versed 4 mg. Fentanyl 100 mcg    BLOOD LOSS: None    OPERATIVE INDICATIONS: Mr. Mahajan has been in critical condition in the intensive care unit and needs long-term feeding tube placement due to malnutrition.The risks of the procedure including, but not limited to, bleeding, infection, injury to the bowel or spleen, aspiration, MI, PE,and need for additional procedures if anycomplications occurred.The power of  understood and signed consent.    PROCEDURE  Consent was obtained. A surgical time out was performed.  The standard EGD scope was used to evaluate the esophagus and stomach All areas were meticulously evaluated and did not show any significant abnormalities.The scope was then brought into the stomach. I palpated the anterior abdominal wall and easily saw an indentation. The area was then transilluminated and the light was easily seen. Once the area was selected it was prepped and draped in the usual fashion. The area was injected with 1% lidocaine with epinephrine. A small incision was then made with a scalpel. Using the 14-gauge catheter it was slowly brought through the anterior abdominal wall while aspirating. No air bubbles were seen until the needle was visualized into the stomach. The guidewire was then inserted through the catheter. It was grasped with a snare and brought out through the mouth under direct visualization. The gastrostomy tube was then placed around the wire and was then slowly brought through the mouth and anterior abdominal wall under direct visualization. This was done without difficulty. The gastrostomy was then spun  around with no tension. There was no bleeding whatsoever. It was anchored at 4 CM.  The stomach was then evaluated meticulously which showed no injuries or bleeding from gastrostomy tube placement. The scope was slowly brought out evaluating the esophagus which also showed no injuries. The gastrostomy was then anchored in the usual fashion. There was no external bleeding. All sponge, instrument, and needle counts were correct at the conclusion of the case. The patient tolerated the procedure well. The Physician Assistant was medically necessary for their expertise in retraction/exposure, prepping the abdominal wall, and actual placement of the gastrostomy tube.      Marcial Obregon M.D.  [facility]  [phone]    Please route or send letter to:  Primary Care Provider (PCP) and Referring Provider

## 2017-04-04 NOTE — PROGRESS NOTES
D: D/c Planning for post hosp needs.  Per ICU MD pt will likely need LTACH placement post hosp.  Okay to speak w/ family regarding post hosp d/c plans.  Currently pt has ongoing fever, AFB on 4/1 Neg, awaiting AFB test Results from 4/2 & 4/3.  Scheduled for OR today for Trach and Peg Placement.  Per Bedside Nurse--OR staff requesting lab results for pending obtained AFB Sputum results prior to proceeding w/ scheduled OR Visit today. LTACH at the earliest will accept stable pt's 24 hour post PEG and Trach.     I: Contacted Dtr Joana, who is currently going to [employer] for  Deg[employer], to inform of post hosp LTACH placement recommended by ICU MD.  Dtr is in agreement that potential for LTACH is appropriate post hosp POC and she will speak with both her mom and her uncle concerning LTACH facilities.  Pt and spouse live in [place], therefore [facility] LTACH in  is nearest facility to their house.  Address and phone # given to dtr, Joana, for [facility].  Encouraged on site visit by family. Joana is in agreement that [facility] LTACH staff can assess pt's chart for post hosp medical placement criteria. Joana reassured that pt will not d/c from [facility] to [facility] prior to both [facility] providers and [facility] LTACH providers agreed to appropriateness of d/c.      Dtr Joana is planning to come back to the hospital this evening and spend the noc again in pt's room.  Dtr states her mother would otherwise want to spend the noc herself.  Joana, is attempting to have her mother get more rest.  Joana will be onsite tomorrow morning and is willing to meet with Care Coordinator in the AM (07:30) to further discuss LTACH placement and likely further family discussion will ensue.    [facility]     Briana Limonn Care Transitions Nurse,  RN, BSN, [facility]  Cell Phone # [phone]

## 2017-04-04 NOTE — PLAN OF CARE
Problem: Pneumonia (Adult)  Goal: Signs and Symptoms of Listed Potential Problems Will be Absent or Manageable (Pneumonia)  Signs and symptoms of listed potential problems will be absent or manageable by discharge/transition of care (reference Pneumonia (Adult) CPG).   Outcome: No Change  Pt restless and agitated off and on during the night. On versed gtt and PRN dilaudid. Pt on ventilator, suctioned frequently during the night. Pt with low grade temps during the night. 3rd AFB pending. Pt scheduled for trach and peg today. TF held after midnight. Will continue to monitor.

## 2017-04-04 NOTE — ANESTHESIA CARE TRANSFER NOTE
Patient: Wyatt Mahajan    Procedure(s):  TRACHEOSTOMY        - Wound Class: II-Clean Contaminated    Diagnosis: RESPIRATORY FAILURE   Diagnosis Additional Information: No value filed.    Anesthesia Type:   General, ETT     Note:  Airway :Tracheostomy  Patient transferred to:ICU  Comments: Transferred to ICU, ETT in place, ambu bag with 10L oxygen for transport, all monitors and alarms on for transport, IV/lines patent and drips continued per anesthesia record.  Placed on ventilator per RT in ICU, TV, RR, PEEP, FiO2. Placed on ICU monitors per ICU RN, alarms on, VSS.  Report to ICU RN.       Vitals: (Last set prior to Anesthesia Care Transfer)    CRNA VITALS  4/4/2017 1405 - 4/4/2017 1454      4/4/2017             Resp Rate (observed): 11                Electronically Signed By: TERRENCE Fajardo CRNA  April 4, 2017  2:54 PM

## 2017-04-04 NOTE — PROGRESS NOTES
Critical Care Progress Note      04/04/2017    Name: Wyatt Mahajan MRN#: [id number]   Age: 57 year old YOB: 1960     [facility]    MV DAY #14           Problem List:   Active Problems:    MSSA (methicillin susceptible Staphylococcus aureus) septicemia (H)    MSSA (methicillin susceptible Staphylococcus aureus) pneumonia (H)    Fever    Leukocytosis    Influenza B    Acute respiratory failure with hypoxia (H)  MSSA/enterobacter/klebsiella pna   MSSA bacteremia--resolved  VAP (GNR)  Influenza B  JOSE--stable  Hematuria---bell trauma?--improving  pancreatitis   Quanterferon positive         Summary/Hospital Course:   Wyatt Mahajan is a 57 year old male admitted on 3/17 to the floor, presenting with acute hypoxic respiratory failure due to influenza B and CAP, right chest wall pain and uncontrolled DM-II. Patient was subsequently transferred from the floor to the MICU for increased work of breathing, accessory muscle use and respiratory distress. Was intubated due to increased work of breathing, tachypnea and decreasing O2 sats.  His hospital course has been complicated by:  1. Vent-associated pneumonia for which his abx coverage was broadened to zoysn.  Ancef was restarted on 3/30 per ID request for MSSA bacteremia.  2.   MSSA bacteremia, likely from the pna which cleared after the second set of blood cx.  3.  Delirium which has persisted through propofol, precedex, valproate, seroquel and gabapentin.  4.  Pancreatitis, likely due to propofol, which is now improving with post-pyloric feeds.  5.  Mild transaminitis:  Drug induced vs crit illness induced  6.  Mild JOSE:  Usually worsens with attempted diuresis.  7.  Hematuria:  Believed 2/2 bell trauma, now resolved.  8.  Bradycardia:  Seems due to precedex, resolved when off precedex.  9.  A fib with rvr:  Resolved with amio loading.  10.  Anemia of critical illness, family has been hesitant to allow transfusions for hgb 7.  Will  re-address for hgb 6.    4/3:  Event over weekend noted, some agitation, head CT negative, transfused 1 unit PRBC with partial response,  PEG and trach delayed as quantiferon pending , ruling out for TB  - AFB smear neg x 2.   4/4 -emerges with sedation holiday, quantiferon positive 4.5, 3rd, AFB sputum collected, pending, still temp 39,       Assessment and plan :     Wyatt Mahajan IS a 57 year old male admitted on 3/17/2017 for respiratory failure, flu B, mssa pna and mssa bacteremia.   I have personally reviewed the daily labs, imaging studies, cultures and discussed the case with referring physician and consulting physicians.   My assessment and plan by system for this patient is as follows:    Neurology/Psychiatry:   1. Sedation: Off propofol given CK and Triglyceride lab values on 3/24, transitioned to precedex but patient thrashing and hypertensive/bradycardic. Versed drip begun 3/25 AM is providing adequate sedation. Prn dilaudid used.  More awake today, following commands - improved per family   - ideally wean off versed drip move to prns  - will start fentanyl drip - address pain/analgesia   2. Delirium:  3/31:  tranisition to olanzapine will uptitrate (valproate ineffective as well no response to quetiapine)   3. H/o Diabetic neuropathy 2/2 DM-II:     -- gabapentin 300mg, TID.    Cardiovascular:   1. Hypertension: standing hydralazine started  2. H/o HLD:  hold atorvastatin (LFT elevated)  3. H/o PAD:  hold ASA given prior hematuria and anemia   4. Hyperlactemia:  Resolved  5.  A fib with RVR:  In setting of critical illness.  Resolved on amiodarone. Will dose prn metoprolol       Pulmonary/Ventilator Management:   1.  Respiratory failure, hypoxemic:  2/2 mssa pna, vap (atb for S aureus and E cloacae & K pneumoniae isolated from sputum) , and influenza B - repeat swab negative.    --Lung protective vent strategy- PCV plus assist ventilation  - PS trials as tolerated  - plan for trach today      GI and  Nutrition :   1.  TF  2.  PPI for PUD prophylaxis  3.  Constipation--improved on bowel reg.  4.  Elevated liver enzymes: newly elevated on 3/24, nl on 3/17, ? Med related vs ? RUQ U/S revealed a distended GB w/sludge vs related to pancreatitis. Subsequent LFT's have shown decreases of Alk phos and ALT levels since discontinuation of statin.  --serial LFT's,   - repeat ABD US (noted recent CT ABD w/o acute abnormality)   5.  Pancreatitis:  Due to critical illness vs propofol.  No further propofol.  --restarting post-pyloric trickle feeds.  post-pyloric tube by IR.  - PPI to bid    Renal/Fluids/Electrolytes:   1. JOSE-- cr stablilized , UOP stable  UA showed no casts. Urine eos < 1%   -- stable today, keep holding diuresis  2. Hypernatremia - . PO free water    :  1. Hematuria: bloodly urine AM of 3/23 probably due to trauma from the bell catheter. Resolved 3/30.    Infectious Disease:   1. Flu B:  Repeat flu swab negative thus DC Tamiflu -   2. MSSA pna/bacteremia and gm neg VAP: Nafcillin until 3/24, then ancef until 3/26, then zosyn and vanco 3/26 until  vanco stopped 3/29.  Ancef re-added to existing zosyn on 3/30 per ID.  Most recent bld cultures show no growth. TTE did not show valvular masses/vegetations. CT chest negative for occult abscess.   3/29 sputum cx still with GNR, but no further staph.  --ID consulted 3/30 for persistent + sputum cx.  3. ?Latent AFB - positive quantiferon, AFB neg x 2 (smear) awaiting 3rd     Endocrine:   1. Hyperglycemia:    -- on lantus 3/31 (from drip)     Hematology/Oncology:   1. Plt stable  2. Hgb 6.7 received 1 u PRBC  (from 6.1) - now 7.7 no overt bleeding . C/w anemia of chronic disease. Hemodynamics OK. Discussed risks/benefits of transfusion with family, PRN transfusion   3. Leukocytosis-- 2/2 infection. Patient febrile on and off. Slowly decreasing  4.  R arm swelling  -- superficial thrombus only.  Warm compresses.     IV/Access:   1. Venous access - peripheral and  PICC    ICU Prophylaxis:   1. DVT:  hep SubQ 3/30  2. VAP: HOB 30 degrees, chlorhexidine rinse  3. Stress Ulcer: PPI  4. Restraints: Nonviolent soft two point restraints required and necessary for patient safety and continued cares and good effect as patient continues to pull at necessary lines, tubes despite education and distraction. Will readdress daily.   6. Feeding - post-pyloric trickle feeds and free water  7. Family Update: wife and daughter at bedside  8. Disposition - critically ill    [facility] 3/27 (Fadi):  Held full care conference with wife, brother and daughter.  Discussed his course and complications.  Will conitnue with current txt for now.  All questions asked and answered.    [facility] 3/30 (Fadi):  Met with wife and brother.  Overall he has been making slow progress, but I doubt he will be ready for extubation soon, and has already been intubated 14 days.  I have recommended tracheostomy to continue with further cares.  Family has acknowledged this, but wishes to wait through the weekend.  They asked for a trial of extubation early next week prior to deciding on trach, I strongly advised against this unless his sbt's are better next week.  Also briefly discussed his delirium, pancreatitis, and anemia, but no major decisions except decision not to transfuse today as above.    [facility] 3/31 (fadi): Met again with patient's wife and brother.  Discussed care and prognosis.  They are now willing to proceed with trach and peg for which I will place consults.  They are still hesitant about blood transfusion unless absolutely necessary, but are willing to transfuse if he is actively bleeding, if hgb falls below 6, or if necessary for preparation for surgery.    [facility] 4/3 - updated over quant results and timing of PEG trach.          Key Medications:   Reviewed          Physical Examination:   Temp:  [98.8  F (37.1  C)-100.6  F (38.1  C)] 100.2  F (37.9  C)  Heart Rate:  [] 102  Resp:  [19-35]  21  BP: (131-176)/() 171/89  FiO2 (%):  [40 %] 40 %  SpO2:  [92 %-100 %] 99 %      Intake/Output Summary (Last 24 hours) at 04/04/17 0626  Last data filed at 04/04/17 0400   Gross per 24 hour   Intake          2749.41 ml   Output             3025 ml   Net          -275.59 ml           Wt Readings from Last 4 Encounters:   04/03/17 81 kg (178 lb 9.2 oz)   03/16/17 70.3 kg (155 lb)   01/17/17 71.3 kg (157 lb 1.6 oz)   11/30/16 72.6 kg (160 lb)     BP - Mean:  [] 123  Ventilation Mode: CMV/AC  FiO2 (%): 40 %  Rate Set (breaths/minute): 18 breaths/min  Tidal Volume Set (mL): 400 mL  PEEP (cm H2O): 5 cmH2O  Pressure Support (cm H2O): 12 cmH2O  Oxygen Concentration (%): 40 %  Resp: 21  No lab results found in last 7 days.  GEN: sedate intubated, opens eyes interactive   HEENT: head ncat, sclera anicteric, OP patent, trachea midline   PULM: unlabored, coarse lung sounds anteriorly, rhonchi bilaterally.  CV/COR: RRR S1/S2, No rub, murmur or gallop  ABD: softer, distended but improving, nontender, hypoactive bowel sounds, no masses  EXT:  peripheral edema present in R>L hand, warm x4 and well-perfused.   NEURO: sedate, follows commands bilateral lower extremities 4 extremities weak  SKIN: no obvious rash  LINES: clean, dry intact         Data:       Recent Labs  Lab 04/04/17  0650 04/03/17  0430 04/02/17  0400 04/01/17  0540 03/31/17  0530   * 145* 146* 147* 150*   POTASSIUM 3.6 3.6 3.8 3.6 3.7   CHLORIDE 114* 111* 111* 113* 115*   CO2 25 26 28 27 27   ANIONGAP 8 8 7 7 8   * 125* 159* 172* 123*   BUN 26 28 29 29 25   CR 1.50* 1.66* 1.66* 1.52* 1.42*   GFRESTIMATED 49* 43* 43* 48* 52*   GFRESTBLACK 59* 52* 52* 58* 63   LEONIE 8.4* 7.6* 7.7* 7.6* 7.6*   MAG 2.3 2.6* 2.5* 2.5* 2.4*   PHOS 2.7 2.6 3.1 2.9 3.0   PROTTOTAL 7.9  --   --   --  7.2   ALBUMIN 1.5*  --   --   --  1.4*   BILITOTAL 1.0  --   --   --  1.0   ALKPHOS 155*  --   --   --  176*   *  --   --   --  414*   ALT 37  --   --   --  78*        CBC    Recent Labs  Lab 04/04/17  0650 04/03/17  0430 04/02/17  0400 04/01/17  0540   WBC 15.8* 14.1* 16.0* 16.3*   RBC 2.44* 2.08* 1.86* 2.01*   HGB 7.7* 6.7* 6.1* 6.3*   HCT 24.0* 20.3* 18.2* 19.2*   MCV 98 98 98 96   MCH 31.6 32.2 32.8 31.3   MCHC 32.1 33.0 33.5 32.8   RDW 18.5* 17.1* 16.9* 15.8*   * 479* 530* 540*     INR  No lab results found in last 7 days.  Arterial Blood Gas  No lab results found in last 7 days.    All cultures:    Recent Labs  Lab 04/02/17  1025 04/01/17  1013 03/30/17  2050 03/29/17  1805   CULT No growth after 2 days Culture received and in progress.  Positive AFB results are called as soon as detected.  Final report to follow in 7 to 8 weeks.  Canceled, Test credited Duplicate request Culture received and in progress.  Positive AFB results are called as soon as detected.  Final report to follow in 7 to 8 weeks. Light growth Enterobacter cloacae complexLight growth Klebsiella pneumoniae*     Imaging:  CXR No results found for this or any previous visit (from the past 24 hour(s)).    Billing: This patient is critically ill: Yes. Total critical care time today 40 min.

## 2017-04-04 NOTE — OP NOTE
DATE OF PROCEDURE:  2017      SURGEON:  Jose Puga MD      ASSISTANT:  Deisi Ortez PA-C      PREOPERATIVE DIAGNOSIS:  Respiratory failure.      POSTOPERATIVE DIAGNOSIS:  Respiratory failure.      PROCEDURE:  Tracheostomy with Shiley #8 cuffed nonfenestrated tube.      ANESTHESIA:  General.      INDICATIONS:  Wyatt Mahajan is a 57-year-old gentleman with respiratory failure.  He has been requiring prolonged mechanical ventilation.      DESCRIPTION OF PROCEDURE:  The patient was brought to the operating room on an ICU bed.  Under general anesthesia, the patient's neck was extended.  Neck and upper chest were prepared and draped in the usual fashion using ChloraPrep.  A transverse incision was made.  Dissection was carried down the midline of the strap muscle.  The isthmus of the thyroid was divided.  The second ring of the trachea was clearly identified, and some sutures of 2-0 Prolene were placed along the second ring laterally on each side.  Longitudinal tracheotomy was made over the second ring and dilated.  The endotracheal tube was pulled above the tracheotomy, and Shiley #8 cuffed nonfenestrated tube was advanced without any difficulty.  The cuff was inflated and ventilation was re-established and was excellent.  Hemostasis was verified and was excellent.  Incision was closed with interrupted 3-0 nylon suture on the skin along the tracheostomy.  A dressing was applied.  The tracheostomy was secured around the patient's neck, and patient was returned to ICU in satisfactory condition.         JOSE PUGA MD             D: 2017 15:09   T: 2017 17:16   MT: [id number]      Name:     WYATT MAHAJAN   MRN:      [id number]        Account:        [id number]   :      1960           Procedure Date: 2017      Document: [id number]

## 2017-04-04 NOTE — PROVIDER NOTIFICATION
HTN, tachycardic, tachypneic on vent (40's), restless, on versed drip 6mg/hr, getting PRN dilaudid.  Discussed with .  Orders rec'd.

## 2017-04-04 NOTE — PROGRESS NOTES
CLINICAL NUTRITION SERVICES - REASSESSMENT NOTE      Recommendations Ordered by Registered Dietitian (RD): Increase to TF goal through new G-tube:     Peptamen 1.5 at 55 mL/hr to provide 1980 kcals, 90 gm pro, 1016 mL H20, 248 gm CHO, no fiber  ProStat 1 packet per day to provide 100 kcals, 15 gm pro  Total (TF + ProStat): 2080 kcals (30 kcal/kg), 105 gm pro (1.5 gm/kg)       EVALUATION OF PROGRESS TOWARD GOALS   Diet:  NPO    Nutrition Support: TF was increased 3/31 with pending goal of 55 mL/hr of Peptamen 1.5 however TF never advanced past 35 mL/hr as it was held 4/3 for trach/PEG. This procedure was postponed until today and TF was resumed at 35 mL/hr in the afternoon and off again today at 1 am for procedure. Pt has not received full TF regimen >1 week per EPIC, goal TF regimen is as follows:    Nutrition Support Enteral:  Type of Feeding Tube: ND  Enteral Frequency:  Continuous  Enteral Regimen: Peptamen 1.5 @ 55 mL/hr   Total Enteral Provisions: 1980 kcals, 90 gm pro, 1016 mL H20, 248 gm CHO, no fiber  ProStat 1 packet per day to provide 100 kcals, 15 gm pro  Total (TF + ProStat): 2080 kcals (30 kcal/kg), 105 gm pro (1.5 gm/kg)  Free Water Flush: 500 mL q 4 hrs    Intake:  Wt: 75.5 kg (5.2 kg up since admit)    Stooling: x4 in the past 24 hrs, C-diff negative  -Scheduled Senokot and Colace on hold    Labs reviewed:  -Na 147 (H)  -Cr 1.5 (H- trending down)  -Lytes WNL  -BGM: Ranging from 117-219 since last RD assessment (recieving medium SSI + Lantus 12 units in am)    Medications reviewed:  -Versed  -Certavite (recieving as pt has an unstaged PI on tip of tongue)    Dosing Weight 69.6 kg (admit weight)      ASSESSED NUTRITION NEEDS:  Estimated Energy Needs: 5874-5563 kcals (25-30 Kcal/Kg)  Justification: maintenance  Estimated Protein Needs: 105+ grams protein (1.5+ g pro/Kg)  Justification: hypercatabolism with critical illness    NEW FINDINGS:   G-tube placed today, trach pending.  Positive TB  quantiferon.    Previous Goals:   TF + ProStat will meet % estimated needs.  Evaluation: Not met    Previous Nutrition Diagnosis:   Inadequate enteral nutrition infusion related to low TF rate as evidenced by TF meeting < 30% estimated needs.  Evaluation: No change    CURRENT NUTRITION DIAGNOSIS  Inadequate enteral nutrition infusion related to TF on hold today pending procedures as evidenced by 0% of energy and protein needs.    INTERVENTIONS  Recommendations / Nutrition Prescription  Once able to resume EN post procedures, ncrease to TF goal through new G-tube:     Peptamen 1.5 at 55 mL/hr to provide 1980 kcals, 90 gm pro, 1016 mL H20, 248 gm CHO, no fiber  ProStat 1 packet per day to provide 100 kcals, 15 gm pro  Total (TF + ProStat): 2080 kcals (30 kcal/kg), 105 gm pro (1.5 gm/kg)    Implementation  Collaboration and Referral of Nutrition care- discussed POC with team during rounds    Goals  TF + ProStat will meet % estimated needs.  Pt to advance to goal rate within the next 48-72 hrs.    MONITORING AND EVALUATION:  Progress towards goals will be monitored and evaluated per protocol and Practice Guidelines      June Zapata, Dietetic Intern

## 2017-04-04 NOTE — ANESTHESIA POSTPROCEDURE EVALUATION
Patient: Wyatt Mahajan    Procedure(s):  TRACHEOSTOMY        - Wound Class: II-Clean Contaminated    Diagnosis:RESPIRATORY FAILURE   Diagnosis Additional Information: No value filed.    Anesthesia Type:  General, ETT    Note:  Anesthesia Post Evaluation    Patient location during evaluation: ICU  Patient participation: Unable to evaluate secondary to administered sedation  Cardiovascular status: acceptable  Respiratory status: acceptable  Hydration status: acceptable     Anesthetic complications: None          Last vitals:  Vitals:    04/04/17 1400 04/04/17 1500 04/04/17 1503   BP:  120/64    Pulse:      Resp: 22 21    Temp: 38.4  C (101.1  F) 38.3  C (100.9  F)    SpO2: 98% 97% 97%         Electronically Signed By: ARTEM CHAPIN MD  April 4, 2017  3:55 PM

## 2017-04-04 NOTE — PROGRESS NOTES
[facility] ICU RESPIRATORY NOTE  Date of Admission: 3/17/17  Date of Intubation (most recent): 3/17/17, Trach on 4/4/2017  Reason for Mechanical Ventilation: Respiratory failure   Number of Days on Mechanical Ventilation: 19  Met Criteria for Pressure Support Trial: No  Length of Pressure Support Trial: yes     Ventilation Mode: CMV/AC  FiO2 (%): 40 %  Rate Set (breaths/minute): 18 breaths/min  Tidal Volume Set (mL): 400 mL  PEEP (cm H2O): 5 cmH2O  Pressure Support (cm H2O): 12 cmH2O  Oxygen Concentration (%): 40 %  Resp: 22    Patient remains on full vent support. No PS trial this shift.  Will continue to follow.     4/4/2017  Mandy Pantoja, RRT

## 2017-04-04 NOTE — PLAN OF CARE
Problem: Restraint for Non-Violent/Non-Self-Destructive Behavior  Goal: Prevent/Manage Potential Problems  Maintain safety of patient and others during period of restraint.  Promote psychological and physical wellbeing.  Prevent injury to skin and involved body parts.  Promote nutrition, hydration, and elimination.   Outcome: No Change  Right wrist, Left wrist and soft restraints restraints continued 4/4/2017     Clinical Justification: Pulling lines, pulling tubes, and pulling equipment  Less Restrictive Alternative: Repositioning, Pain management, Alarm, De-escalation, Reorientation  Attending Physician Notified: Yes, Attending Physician's Name: Dr Puga   New orders placed Yes  Length of Order: 1 Day        Mimi Box

## 2017-04-04 NOTE — OR NURSING
EGD with PEG tube placed by MD and PA in ICU.  No specimens obtained.  Sedation and monitoring by MD and ICU RN

## 2017-04-04 NOTE — BRIEF OP NOTE
[facility] Brief Operative Note    Pre-operative diagnosis: RESPIRATORY FAILURE    Post-operative diagnosis same   Procedure: Procedure(s):  TRACHEOSTOMY        - Wound Class: II-Clean Contaminated, 8 Shiley, cuffed, nonfenestrated   Surgeon(s): Surgeon(s) and Role:     * Jose Puga MD - Primary     * Deisi Ortez PA-C - Assisting   Estimated blood loss: 1 cc   Specimens: * No specimens in log *   Findings: NA

## 2017-04-04 NOTE — PROGRESS NOTES
"[facility]  Infectious Disease Progress Note          Assessment and Plan:   Impression:   57 y.o male admitted about 2 weeks ago with concern for secondary bacterial pneumonia on the top of Influenza B.   Worsening respiratory status intubated.   Sputum Cultures positive for MSSA.   Patient is originally from [place]. Immigrated in 1990. The records in the [place] system start in 2004, no data available of his TB status, I do not see any PPD done. CXR from before have been negative.   Per wife when they first moved to [place] they were \" checked for Tb and all was negative\".   He was admitted this occasion with complaints of pleuritic chest pain. This was after he was diagnosed with Influenza B. His respiratory status quickly worsened. He was intubated and remains that way.  Blood cultures 2/2 positive for MSSA. Cultures from the sputum positive for MSSA, Enterobacter one strain more resistant than the other, Klebsiella. He has been on appropriate antibiotics since admission but has been febrile, still intubated.   Diarrhea- C diff neg  Positive TB quantiferon.  This, like pos PPD, would indicate previous exposure to TB.  Sputum AFB smear neg x 2.     Recommendations:     Isolation.  Await 3rd sputum for AFB.  If neg could d/c iso.  Cont  Ancef for MSSA bacteremia.   Ok to continue zosyn for GNR in the sputum.   If fevers persist would repeat sputum cx.             Interval History:   Stable on vent.  Fever to 102.  Repeat blood cxs neg.  C diff neg.  TB quant and sputum AFB smears as above.              Medications:       OLANZapine  20 mg Oral At Bedtime     fentaNYL   mcg Intravenous Once     midazolam  2-6 mg Intravenous Once     ceFAZolin  2 g Intravenous Pre-Op/Pre-procedure x 1 dose     ceFAZolin  1 g Intravenous See Admin Instructions     sennosides  5-10 mL Oral or Feeding Tube BID    And     docusate   mg Oral or Feeding Tube BID     insulin aspart  1-6 Units Subcutaneous " "Q4H     insulin glargine  12 Units Subcutaneous QAM AC     piperacillin-tazobactam  4.5 g Intravenous Q6H     pantoprazole  40 mg Per Feeding Tube Q12H     ceFAZolin  1 g Intravenous Q8H     heparin  5,000 Units Subcutaneous Q8H     multivitamins with minerals  15 mL Per Feeding Tube Daily     protein modular  1 packet Per Feeding Tube Daily     sodium chloride (PF)  10 mL Intracatheter Q7 Days     gabapentin  300 mg Oral or Feeding Tube Q8H ALEC     pneumococcal vaccine  0.5 mL Intramuscular Prior to discharge     sodium chloride (PF)  3 mL Intracatheter Q8H     chlorhexidine  15 mL Mouth/Throat Q12H                  Physical Exam:   Blood pressure 174/85, pulse 115, temperature 102.2  F (39  C), resp. rate (!) 32, height 1.778 m (5' 10\"), weight 75.5 kg (166 lb 7.2 oz), SpO2 99 %.  [unfilled]  Vital Signs with Ranges  Temp:  [99  F (37.2  C)-102.2  F (39  C)] 102.2  F (39  C)  Heart Rate:  [] 120  Resp:  [19-40] 32  BP: (131-190)/() 174/85  FiO2 (%):  [40 %] 40 %  SpO2:  [91 %-100 %] 99 %    Constitutional: Awake, alert, cooperative, no apparent distress   Lungs: Clear to auscultation bilaterally, no crackles or wheezing   Cardiovascular: Regular rate and rhythm, normal S1 and S2, and no murmur noted   Abdomen: Normal bowel sounds, soft, non-distended, non-tender   Skin: No rashes, no cyanosis, no edema   Other:           Data:   All microbiology laboratory data reviewed.  Recent Labs   Lab Test  04/04/17   0650  04/03/17   0430  04/02/17   0400   WBC  15.8*  14.1*  16.0*   HGB  7.7*  6.7*  6.1*   HCT  24.0*  20.3*  18.2*   MCV  98  98  98   PLT  503*  479*  530*     Recent Labs   Lab Test  04/04/17   0650  04/03/17   0430  04/02/17   0400   CR  1.50*  1.66*  1.66*     Recent Labs   Lab Test  09/14/09   1021   SED  9     "

## 2017-04-04 NOTE — PROGRESS NOTES
[facility] ICU RESPIRATORY NOTE  Date of Admission:  3/17/17  Date of Intubation (most recent): 3/17/17  Reason for Mechanical Ventilation: Respiratory failure   Number of Days on Mechanical Ventilation: 19  Met Criteria for Pressure Support Trial: No  Length of Pressure Support Trial: yes   Reason for Stopping Pressure Support Trial: 1hr and 20 minutes   Reason for No Pressure Support Trial: Per MD    Significant Events Today: Sputum collection for Acid fast.  Ventilation Mode: CMV/AC  FiO2 (%): 40 %  Rate Set (breaths/minute): 18 breaths/min  Tidal Volume Set (mL): 400 mL  PEEP (cm H2O): 5 cmH2O  Pressure Support (cm H2O): 12 cmH2O  Oxygen Concentration (%): 40 %  Resp: 19    ABG Results:  No lab results found in last 7 days.     Plan:  Pt remains full vent support and will trach and peg tomorrow.    Raymond Benavidez RRT  4/4/2017

## 2017-04-05 LAB
ABO + RH BLD: NORMAL
ABO + RH BLD: NORMAL
ALBUMIN SERPL-MCNC: 1.4 G/DL (ref 3.4–5)
ALBUMIN UR-MCNC: 30 MG/DL
ALP SERPL-CCNC: 127 U/L (ref 40–150)
ALT SERPL W P-5'-P-CCNC: 23 U/L (ref 0–70)
AMORPH CRY #/AREA URNS HPF: ABNORMAL /HPF
ANION GAP SERPL CALCULATED.3IONS-SCNC: 9 MMOL/L (ref 3–14)
APPEARANCE UR: CLEAR
AST SERPL W P-5'-P-CCNC: 202 U/L (ref 0–45)
BASOPHILS # BLD AUTO: 0 10E9/L (ref 0–0.2)
BASOPHILS NFR BLD AUTO: 0.2 %
BILIRUB SERPL-MCNC: 0.9 MG/DL (ref 0.2–1.3)
BILIRUB UR QL STRIP: NEGATIVE
BLD GP AB SCN SERPL QL: NORMAL
BLD PROD TYP BPU: NORMAL
BLD PROD TYP BPU: NORMAL
BLD UNIT ID BPU: 0
BLOOD BANK CMNT PATIENT-IMP: NORMAL
BLOOD PRODUCT CODE: NORMAL
BPU ID: NORMAL
BUN SERPL-MCNC: 26 MG/DL (ref 7–30)
CALCIUM SERPL-MCNC: 7.9 MG/DL (ref 8.5–10.1)
CHLORIDE SERPL-SCNC: 115 MMOL/L (ref 94–109)
CO2 SERPL-SCNC: 25 MMOL/L (ref 20–32)
COLOR UR AUTO: YELLOW
CREAT SERPL-MCNC: 1.63 MG/DL (ref 0.66–1.25)
DIFFERENTIAL METHOD BLD: ABNORMAL
EOSINOPHIL # BLD AUTO: 0.1 10E9/L (ref 0–0.7)
EOSINOPHIL NFR BLD AUTO: 0.8 %
ERYTHROCYTE [DISTWIDTH] IN BLOOD BY AUTOMATED COUNT: 18.9 % (ref 10–15)
ERYTHROCYTE [DISTWIDTH] IN BLOOD BY AUTOMATED COUNT: 19.3 % (ref 10–15)
GFR SERPL CREATININE-BSD FRML MDRD: 44 ML/MIN/1.7M2
GLUCOSE BLDC GLUCOMTR-MCNC: 159 MG/DL (ref 70–99)
GLUCOSE BLDC GLUCOMTR-MCNC: 191 MG/DL (ref 70–99)
GLUCOSE BLDC GLUCOMTR-MCNC: 227 MG/DL (ref 70–99)
GLUCOSE BLDC GLUCOMTR-MCNC: 265 MG/DL (ref 70–99)
GLUCOSE BLDC GLUCOMTR-MCNC: 281 MG/DL (ref 70–99)
GLUCOSE BLDC GLUCOMTR-MCNC: 284 MG/DL (ref 70–99)
GLUCOSE SERPL-MCNC: 208 MG/DL (ref 70–99)
GLUCOSE UR STRIP-MCNC: 150 MG/DL
GRAN CASTS #/AREA URNS LPF: 1 /LPF
HCT VFR BLD AUTO: 19.8 % (ref 40–53)
HCT VFR BLD AUTO: 20.5 % (ref 40–53)
HGB BLD-MCNC: 6.5 G/DL (ref 13.3–17.7)
HGB BLD-MCNC: 6.7 G/DL (ref 13.3–17.7)
HGB BLD-MCNC: 7.5 G/DL (ref 13.3–17.7)
HGB UR QL STRIP: ABNORMAL
IMM GRANULOCYTES # BLD: 0.1 10E9/L (ref 0–0.4)
IMM GRANULOCYTES NFR BLD: 0.4 %
KETONES UR STRIP-MCNC: NEGATIVE MG/DL
LACTATE BLD-SCNC: 0.8 MMOL/L (ref 0.7–2.1)
LEUKOCYTE ESTERASE UR QL STRIP: ABNORMAL
LYMPHOCYTES # BLD AUTO: 3.8 10E9/L (ref 0.8–5.3)
LYMPHOCYTES NFR BLD AUTO: 23.2 %
MAGNESIUM SERPL-MCNC: 2.2 MG/DL (ref 1.6–2.3)
MCH RBC QN AUTO: 32.1 PG (ref 26.5–33)
MCH RBC QN AUTO: 32.7 PG (ref 26.5–33)
MCHC RBC AUTO-ENTMCNC: 32.7 G/DL (ref 31.5–36.5)
MCHC RBC AUTO-ENTMCNC: 32.8 G/DL (ref 31.5–36.5)
MCV RBC AUTO: 100 FL (ref 78–100)
MCV RBC AUTO: 98 FL (ref 78–100)
MONOCYTES # BLD AUTO: 0.7 10E9/L (ref 0–1.3)
MONOCYTES NFR BLD AUTO: 4.3 %
MUCOUS THREADS #/AREA URNS LPF: PRESENT /LPF
NEUTROPHILS # BLD AUTO: 11.6 10E9/L (ref 1.6–8.3)
NEUTROPHILS NFR BLD AUTO: 71.1 %
NITRATE UR QL: NEGATIVE
NRBC # BLD AUTO: 0 10*3/UL
NRBC BLD AUTO-RTO: 0 /100
NUM BPU REQUESTED: 2
PH UR STRIP: 5.5 PH (ref 5–7)
PHOSPHATE SERPL-MCNC: 2.4 MG/DL (ref 2.5–4.5)
PLATELET # BLD AUTO: 368 10E9/L (ref 150–450)
PLATELET # BLD AUTO: 390 10E9/L (ref 150–450)
POTASSIUM SERPL-SCNC: 3.3 MMOL/L (ref 3.4–5.3)
POTASSIUM SERPL-SCNC: 4.1 MMOL/L (ref 3.4–5.3)
PROCALCITONIN SERPL-MCNC: 2.38 NG/ML
PROT SERPL-MCNC: 7.1 G/DL (ref 6.8–8.8)
RBC # BLD AUTO: 1.99 10E12/L (ref 4.4–5.9)
RBC # BLD AUTO: 2.09 10E12/L (ref 4.4–5.9)
RBC #/AREA URNS AUTO: 2 /HPF (ref 0–2)
SODIUM SERPL-SCNC: 149 MMOL/L (ref 133–144)
SP GR UR STRIP: 1.01 (ref 1–1.03)
SPECIMEN EXP DATE BLD: NORMAL
SQUAMOUS #/AREA URNS AUTO: <1 /HPF (ref 0–1)
TRANSFUSION STATUS PATIENT QL: NORMAL
TRANSFUSION STATUS PATIENT QL: NORMAL
URN SPEC COLLECT METH UR: ABNORMAL
UROBILINOGEN UR STRIP-MCNC: NORMAL MG/DL (ref 0–2)
WBC # BLD AUTO: 15.3 10E9/L (ref 4–11)
WBC # BLD AUTO: 16.3 10E9/L (ref 4–11)
WBC #/AREA URNS AUTO: 8 /HPF (ref 0–2)

## 2017-04-05 PROCEDURE — 94003 VENT MGMT INPAT SUBQ DAY: CPT

## 2017-04-05 PROCEDURE — 25000128 H RX IP 250 OP 636: Performed by: INTERNAL MEDICINE

## 2017-04-05 PROCEDURE — 20000003 ZZH R&B ICU

## 2017-04-05 PROCEDURE — 25000131 ZZH RX MED GY IP 250 OP 636 PS 637: Performed by: INTERNAL MEDICINE

## 2017-04-05 PROCEDURE — 25000132 ZZH RX MED GY IP 250 OP 250 PS 637: Performed by: INTERNAL MEDICINE

## 2017-04-05 PROCEDURE — 85018 HEMOGLOBIN: CPT | Performed by: INTERNAL MEDICINE

## 2017-04-05 PROCEDURE — 25000132 ZZH RX MED GY IP 250 OP 250 PS 637: Performed by: HOSPITALIST

## 2017-04-05 PROCEDURE — P9016 RBC LEUKOCYTES REDUCED: HCPCS | Performed by: INTERNAL MEDICINE

## 2017-04-05 PROCEDURE — 80053 COMPREHEN METABOLIC PANEL: CPT | Performed by: INTERNAL MEDICINE

## 2017-04-05 PROCEDURE — 81001 URINALYSIS AUTO W/SCOPE: CPT | Performed by: INTERNAL MEDICINE

## 2017-04-05 PROCEDURE — 83735 ASSAY OF MAGNESIUM: CPT | Performed by: INTERNAL MEDICINE

## 2017-04-05 PROCEDURE — 25000132 ZZH RX MED GY IP 250 OP 250 PS 637: Performed by: PHYSICIAN ASSISTANT

## 2017-04-05 PROCEDURE — 25000128 H RX IP 250 OP 636: Performed by: HOSPITALIST

## 2017-04-05 PROCEDURE — 84132 ASSAY OF SERUM POTASSIUM: CPT | Performed by: INTERNAL MEDICINE

## 2017-04-05 PROCEDURE — 00000146 ZZHCL STATISTIC GLUCOSE BY METER IP

## 2017-04-05 PROCEDURE — 83605 ASSAY OF LACTIC ACID: CPT | Performed by: INTERNAL MEDICINE

## 2017-04-05 PROCEDURE — 40000275 ZZH STATISTIC RCP TIME EA 10 MIN

## 2017-04-05 PROCEDURE — 99291 CRITICAL CARE FIRST HOUR: CPT | Performed by: INTERNAL MEDICINE

## 2017-04-05 PROCEDURE — 85025 COMPLETE CBC W/AUTO DIFF WBC: CPT | Performed by: INTERNAL MEDICINE

## 2017-04-05 PROCEDURE — 87086 URINE CULTURE/COLONY COUNT: CPT | Performed by: INTERNAL MEDICINE

## 2017-04-05 PROCEDURE — 36415 COLL VENOUS BLD VENIPUNCTURE: CPT | Performed by: INTERNAL MEDICINE

## 2017-04-05 PROCEDURE — 40000239 ZZH STATISTIC VAT ROUNDS

## 2017-04-05 PROCEDURE — 27210437 ZZH NUTRITION PRODUCT SEMIELEM INTERMED LITER

## 2017-04-05 PROCEDURE — 84100 ASSAY OF PHOSPHORUS: CPT | Performed by: INTERNAL MEDICINE

## 2017-04-05 PROCEDURE — 40000008 ZZH STATISTIC AIRWAY CARE

## 2017-04-05 PROCEDURE — 85027 COMPLETE CBC AUTOMATED: CPT | Performed by: INTERNAL MEDICINE

## 2017-04-05 PROCEDURE — 84145 PROCALCITONIN (PCT): CPT | Performed by: INTERNAL MEDICINE

## 2017-04-05 RX ADMIN — GABAPENTIN 300 MG: 250 SUSPENSION ORAL at 00:19

## 2017-04-05 RX ADMIN — Medication: at 08:03

## 2017-04-05 RX ADMIN — SENNOSIDES A AND B 5 ML: 415.36 LIQUID ORAL at 08:04

## 2017-04-05 RX ADMIN — ACETAMINOPHEN 650 MG: 325 TABLET, FILM COATED ORAL at 04:30

## 2017-04-05 RX ADMIN — PIPERACILLIN AND TAZOBACTAM 4.5 G: 4; .5 INJECTION, POWDER, FOR SOLUTION INTRAVENOUS at 13:39

## 2017-04-05 RX ADMIN — Medication 2 MG/HR: at 17:14

## 2017-04-05 RX ADMIN — PIPERACILLIN AND TAZOBACTAM 4.5 G: 4; .5 INJECTION, POWDER, FOR SOLUTION INTRAVENOUS at 17:59

## 2017-04-05 RX ADMIN — ACETAMINOPHEN 650 MG: 160 SUSPENSION ORAL at 20:23

## 2017-04-05 RX ADMIN — HEPARIN SODIUM 5000 UNITS: 10000 INJECTION, SOLUTION INTRAVENOUS; SUBCUTANEOUS at 20:12

## 2017-04-05 RX ADMIN — CEFAZOLIN SODIUM 1 G: 1 INJECTION, POWDER, FOR SOLUTION INTRAMUSCULAR; INTRAVENOUS at 05:10

## 2017-04-05 RX ADMIN — GABAPENTIN 300 MG: 250 SUSPENSION ORAL at 07:48

## 2017-04-05 RX ADMIN — PIPERACILLIN AND TAZOBACTAM 4.5 G: 4; .5 INJECTION, POWDER, FOR SOLUTION INTRAVENOUS at 00:17

## 2017-04-05 RX ADMIN — INSULIN GLARGINE 12 UNITS: 100 INJECTION, SOLUTION SUBCUTANEOUS at 07:52

## 2017-04-05 RX ADMIN — HEPARIN SODIUM 5000 UNITS: 10000 INJECTION, SOLUTION INTRAVENOUS; SUBCUTANEOUS at 11:54

## 2017-04-05 RX ADMIN — CHLORHEXIDINE GLUCONATE 15 ML: 1.2 RINSE ORAL at 07:48

## 2017-04-05 RX ADMIN — CEFAZOLIN SODIUM 1 G: 1 INJECTION, POWDER, FOR SOLUTION INTRAMUSCULAR; INTRAVENOUS at 14:39

## 2017-04-05 RX ADMIN — OLANZAPINE 20 MG: 10 TABLET, FILM COATED ORAL at 23:00

## 2017-04-05 RX ADMIN — INSULIN ASPART 3 UNITS: 100 INJECTION, SOLUTION INTRAVENOUS; SUBCUTANEOUS at 20:35

## 2017-04-05 RX ADMIN — PIPERACILLIN AND TAZOBACTAM 4.5 G: 4; .5 INJECTION, POWDER, FOR SOLUTION INTRAVENOUS at 05:55

## 2017-04-05 RX ADMIN — MULTIVITAMIN 15 ML: LIQUID ORAL at 08:04

## 2017-04-05 RX ADMIN — POTASSIUM CHLORIDE 40 MEQ: 1.5 POWDER, FOR SOLUTION ORAL at 06:50

## 2017-04-05 RX ADMIN — POTASSIUM CHLORIDE 20 MEQ: 1.5 POWDER, FOR SOLUTION ORAL at 09:46

## 2017-04-05 RX ADMIN — PANTOPRAZOLE SODIUM 40 MG: 40 TABLET, DELAYED RELEASE ORAL at 00:19

## 2017-04-05 RX ADMIN — CEFAZOLIN SODIUM 1 G: 1 INJECTION, POWDER, FOR SOLUTION INTRAMUSCULAR; INTRAVENOUS at 22:59

## 2017-04-05 RX ADMIN — SODIUM CHLORIDE: 9 INJECTION, SOLUTION INTRAVENOUS at 04:35

## 2017-04-05 RX ADMIN — GABAPENTIN 300 MG: 250 SUSPENSION ORAL at 20:12

## 2017-04-05 RX ADMIN — INSULIN ASPART 3 UNITS: 100 INJECTION, SOLUTION INTRAVENOUS; SUBCUTANEOUS at 15:57

## 2017-04-05 RX ADMIN — DOCUSATE SODIUM: 50 LIQUID ORAL at 08:04

## 2017-04-05 RX ADMIN — INSULIN ASPART 3 UNITS: 100 INJECTION, SOLUTION INTRAVENOUS; SUBCUTANEOUS at 12:05

## 2017-04-05 RX ADMIN — INSULIN ASPART 2 UNITS: 100 INJECTION, SOLUTION INTRAVENOUS; SUBCUTANEOUS at 07:51

## 2017-04-05 RX ADMIN — INSULIN ASPART 1 UNITS: 100 INJECTION, SOLUTION INTRAVENOUS; SUBCUTANEOUS at 00:25

## 2017-04-05 RX ADMIN — PANTOPRAZOLE SODIUM 40 MG: 40 TABLET, DELAYED RELEASE ORAL at 11:54

## 2017-04-05 RX ADMIN — CHLORHEXIDINE GLUCONATE 15 ML: 1.2 RINSE ORAL at 20:12

## 2017-04-05 RX ADMIN — INSULIN ASPART 2 UNITS: 100 INJECTION, SOLUTION INTRAVENOUS; SUBCUTANEOUS at 05:08

## 2017-04-05 RX ADMIN — HEPARIN SODIUM 5000 UNITS: 10000 INJECTION, SOLUTION INTRAVENOUS; SUBCUTANEOUS at 04:29

## 2017-04-05 NOTE — PLAN OF CARE
Problem: Goal Outcome Summary  Goal: Goal Outcome Summary  Outcome: No Change  Peak temperature 101.3 F, Tylenol prn. Patient remains on full vent support with Versed and Fentanyl. Restless at times. Localizes pain, opens eyes spontaneously. UO adequate.TF at goal. Potassium chloride replaced. 1U PRBC to be transfused. Will continue to monitor.

## 2017-04-05 NOTE — PROGRESS NOTES
THORACIC SURGERY    trachestomy ok no bleeding    EMANUEL CLINE MD [facility] ONCOLOGY THORACIC SURGERY  CELL:  [phone]  OFFICE: [phone]

## 2017-04-05 NOTE — PROGRESS NOTES
[facility] ICU RESPIRATORY NOTE  Date of Admission: 3/17/17  Date of Intubation (most recent): 3/17/17 Trach 4/4/17  Reason for Mechanical Ventilation:Respiratory failure  Number of Days on Mechanical Ventilation: 20  Reason for No Pressure Support Trial: resting for the night  Ventilation Mode: CMV/AC  FiO2 (%): 40 %  Rate Set (breaths/minute): 18 breaths/min  Tidal Volume Set (mL): 400 mL  PEEP (cm H2O): 5 cmH2O  Oxygen Concentration (%): 40 %  Resp: 21    Significant Events Today: none during this shift    ABG Results: no new ABG      Plan: pt remains full vent support. RT will assess for daily PS trial in the morning.  4/5/2017  Rubio Epstein

## 2017-04-05 NOTE — PLAN OF CARE
Problem: Individualization  Goal: Patient Preferences  Outcome: No Change  Restless on vent but less so now that trach/PEG are done.    Now on versed at 4mg/hr and fentanyl at 50mcg/hr.  LS coarse, thick ETT secretions.  Febrile, 38's.  APAP given x1 with little relief.  Still in airborne isolation, 3rd AFB still pending.  Sinus tachycardia, at times hypertensive, but again, less so after trach/PEG.  Had abd US to look at gallbladder.  TF resumed 6hrs after PEG, at 1800.  Resumed the previous rate of 35ml/hr.  FWF as previous, 500ml q 4hrs (pump set to give 125ml/hr).

## 2017-04-05 NOTE — PROGRESS NOTES
D: Met w/ Dtr and wife outside pt's room to further discuss post hosp LTACH referral  A: WIfe is in agreement [facility] is closest to her home and a preferred LTACH site.  Family wishes to speak with Terry Liaison on site at [facility].  This meeting is arranged for tomorrow 4/6 @ 10:00 AM when verified [facility]  will be on site.  Terry Liaison updated as to time of meeting w/ wife and  tomorrow.  ICU MD will keep family informed as to when pt will be medically ready for d/c. Estimated timing of d/c at earliest is later this week to early next week. Answered all of family questions/ [facility] LTACH Brochure w/ Separate Printed Address of site given to wife.   A: Family agreeable to [facility] LTACH Referral.  P: LTACH Liaison/ Family Meeting Scheduled w/ [facility]  4/6/17 @ 10:00, Care Transitions Team will con't to follow and assist w/ d/c planning.    Briana Limonn Care Transitions Nurse,  RN, BSN, [facility]  Cell Phone # [phone]

## 2017-04-05 NOTE — PROGRESS NOTES
Patient remains vented, tolerated 2.5 hour wean 18/5, 40%. Returned to CMV settings after patient respiratory rate increased. Vital signs stable except for low grade temp. Sedated with versed and fentanyl drips. Versed weaned mg per hour to 2 mg per hour. Eyes open, moves all extremities, does not follow and commands. Patient spouse at bedside all this shift.

## 2017-04-05 NOTE — PROGRESS NOTES
Critical Care Progress Note      04/05/2017    Name: Wyatt Mahajan MRN#: [id number]   Age: 57 year old YOB: 1960     [facility]  [facility] #19    MV DAY #19           Problem List:   Active Problems:    MSSA (methicillin susceptible Staphylococcus aureus) septicemia (H)    MSSA (methicillin susceptible Staphylococcus aureus) pneumonia (H)    Fever    Leukocytosis    Influenza B    Acute respiratory failure with hypoxia (H)  MSSA/enterobacter/klebsiella pna   MSSA bacteremia--resolved  VAP (GNR)  Influenza B  JOSE--stable  Hematuria---bell trauma?--improving  pancreatitis   Quanterferon positive         Summary/Hospital Course:   Wyatt Mahajan is a 57 year old male admitted on 3/17 to the floor, presenting with acute hypoxic respiratory failure due to influenza B and CAP, right chest wall pain and uncontrolled DM-II. Patient was subsequently transferred from the floor to the MICU for increased work of breathing, accessory muscle use and respiratory distress. Was intubated due to increased work of breathing, tachypnea and decreasing O2 sats.  His hospital course has been complicated by:  1. Vent-associated pneumonia for which his abx coverage was broadened to zoysn.  Ancef was restarted on 3/30 per ID request for MSSA bacteremia.  2.   MSSA bacteremia, likely from the pna which cleared after the second set of blood cx.  3.  Delirium which has persisted through propofol, precedex, valproate, seroquel and gabapentin.  4.  Pancreatitis, likely due to propofol, which is now improving with post-pyloric feeds.  5.  Mild transaminitis:  Drug induced vs crit illness induced  6.  Mild JOSE:  Usually worsens with attempted diuresis.  7.  Hematuria:  Believed 2/2 bell trauma, now resolved.  8.  Bradycardia:  Seems due to precedex, resolved when off precedex.  9.  A fib with rvr:  Resolved with amio loading.  10.  Anemia of critical illness, family has been hesitant to allow transfusions for hgb 7.  Will  re-address for hgb 6.    4/3:  Event over weekend noted, some agitation, head CT negative, transfused 1 unit PRBC with partial response,  PEG and trach delayed as quantiferon pending , ruling out for TB  - AFB smear neg x 2.   4/4 -emerges with sedation holiday, quantiferon positive 4.5, 3rd, AFB sputum collected, pending, still temp 39,  -PEG and trach completed, repeat US no evidence of cholecystitis, ?abnormality in right kidney        Assessment and plan :     Wyatt Mahajan IS a 57 year old male admitted on 3/17/2017 for respiratory failure, flu B, mssa pna and mssa bacteremia.   I have personally reviewed the daily labs, imaging studies, cultures and discussed the case with referring physician and consulting physicians.   My assessment and plan by system for this patient is as follows:    Neurology/Psychiatry:   1. Sedation: Off propofol given CK and Triglyceride lab values on 3/24, transitioned to precedex but patient thrashing and hypertensive/bradycardic. Versed drip begun 3/25 AM is providing adequate sedation. Prn dilaudid used.  More awake today, following commands - improved per family   - ideally wean off versed drip move to prns  - will start fentanyl drip - address pain/analgesia   2. Delirium:  3/31:  tranisition to olanzapine will uptitrate (valproate ineffective as well no response to quetiapine)   3. H/o Diabetic neuropathy 2/2 DM-II:     - gabapentin 300mg, TID.       Cardiovascular:   1. Hypertension: standing hydralazine started, consider clonidine patch  2. H/o HLD:  hold atorvastatin (LFT elevated)  3. H/o PAD:  hold ASA given prior hematuria and anemia   4. Hyperlactemia:  Resolved  5.  A fib with RVR:  In setting of critical illness. Resolved on amiodarone. Will dose prn metoprolol       Pulmonary/Ventilator Management:   1.  Respiratory failure, hypoxemic:  2/2 mssa pna, vap (atb for S aureus and E cloacae & K pneumoniae isolated from sputum) , and influenza B - repeat swab negative.     --Lung protective vent strategy- PCV plus assist ventilation s/p trach 4/4  - PS trials as tolerated       GI and Nutrition :   1.  TF  2.  PPI for PUD prophylaxis  3.  Constipation--improved on bowel reg.  4.  Elevated liver enzymes: newly elevated on 3/24, nl on 3/17, ? Med related vs ? RUQ U/S revealed a distended GB w/sludge vs related to pancreatitis. Subsequent LFT's have shown decreases of Alk phos and ALT levels since discontinuation of statin.  --serial LFT's,   - repeat ABD US (noted recent CT ABD w/o acute abnormality)   5.  Pancreatitis:  Due to critical illness vs propofol.  No further propofol.  -- s/p PEG  - PPI to bid    Renal/Fluids/Electrolytes:   1. JOSE-- cr stablilized , UOP stable  UA showed no casts. Urine eos < 1%   -- stable today, trend Creatinine.   2. Hypernatremia -  PO free water - slight increase, follow trend    :  1. ? Pyelo on abd US - clinically not consistent - check UA urein culture as above  2. Hematuria: bloodly urine AM of 3/23 probably due to trauma from the bell catheter. Resolved 3/30.    Infectious Disease:   1. Flu B:  Repeat flu swab negative thus DC Tamiflu -   2. MSSA pna/bacteremia and gm neg VAP: Nafcillin until 3/24, then ancef until 3/26, then zosyn and vanco 3/26 until  vanco stopped 3/29.  Ancef re-added to existing zosyn on 3/30 per ID.  Most recent bld cultures show no growth. TTE did not show valvular masses/vegetations. CT chest negative for occult abscess.   3/29 sputum cx still with GNR, but no further staph.  --ID consulted 3/30 for persistent + sputum cx.  3. ?Latent AFB - positive quantiferon, AFB neg x 2 (smear) awaiting 3rd   4. ? Pyelo on US - will check UA and urine culture , CT scan last week negative for abnormality   5. Sinusitis -on CT scan - NG tube removed, follow clinically       Endocrine:   1. Hyperglycemia:    -- on lantus and SSI 3/31 (from drip)     Hematology/Oncology:   1. Plt stable  2. Anemia Hgb -labile no overt bleeding .Hgb <7  this AM.  Will recheck and transfuse .  Hemodynamics OK. Discussed risks/benefits of transfusion with family, PRN transfusion   3. Leukocytosis-- 2/2 infection. Patient febrile on and off. Procalcitonin mild elevated  4.  R arm swelling  -- superficial thrombus only.  Warm compresses. R-evaluate US 4/7 to look for progression- earlier as indicated     IV/Access:   1. Venous access - peripheral and PICC    ICU Prophylaxis:   1. DVT:  hep SubQ 3/30  2. VAP: HOB 30 degrees, chlorhexidine rinse  3. Stress Ulcer: PPI  4. Restraints: Nonviolent soft two point restraints required and necessary for patient safety and continued cares and good effect as patient continues to pull at necessary lines, tubes despite education and distraction. Will readdress daily.   6. Feeding - s/p PEG - restart when ok by surgery  7. Family Update: wife and daughter at bedside  8. Disposition - critically ill    [facility] 3/27 (Fadi):  Held full care conference with wife, brother and daughter.  Discussed his course and complications.  Will conitnue with current txt for now.  All questions asked and answered.    [facility] 3/30 (Fadi):  Met with wife and brother.  Overall he has been making slow progress, but I doubt he will be ready for extubation soon, and has already been intubated 14 days.  I have recommended tracheostomy to continue with further cares.  Family has acknowledged this, but wishes to wait through the weekend.  They asked for a trial of extubation early next week prior to deciding on trach, I strongly advised against this unless his sbt's are better next week.  Also briefly discussed his delirium, pancreatitis, and anemia, but no major decisions except decision not to transfuse today as above.    [facility] 3/31 (fadi): Met again with patient's wife and brother.  Discussed care and prognosis.  They are now willing to proceed with trach and peg for which I will place consults.  They are still hesitant about blood transfusion unless  absolutely necessary, but are willing to transfuse if he is actively bleeding, if hgb falls below 6, or if necessary for preparation for surgery.    [facility] mtg 4/3 - updated over quant results and timing of PEG trach.          Key Medications:   Reviewed          Physical Examination:   Temp:  [100  F (37.8  C)-102.2  F (39  C)] 100.2  F (37.9  C)  Heart Rate:  [107-131] 110  Resp:  [9-39] 21  BP: (120-190)/(59-99) 141/74  FiO2 (%):  [40 %] 40 %  SpO2:  [91 %-100 %] 100 %        Intake/Output Summary (Last 24 hours) at 04/05/17 0815  Last data filed at 04/05/17 0800   Gross per 24 hour   Intake           3676.8 ml   Output             1840 ml   Net           1836.8 ml       Wt Readings from Last 4 Encounters:   04/05/17 77.5 kg (170 lb 13.7 oz)   03/16/17 70.3 kg (155 lb)   01/17/17 71.3 kg (157 lb 1.6 oz)   11/30/16 72.6 kg (160 lb)     BP - Mean:  [] 97  Ventilation Mode: CMV/AC  FiO2 (%): 40 %  Rate Set (breaths/minute): 18 breaths/min  Tidal Volume Set (mL): 400 mL  PEEP (cm H2O): 5 cmH2O  Oxygen Concentration (%): 40 %  Resp: 21  No lab results found in last 7 days.     GEN: mild distress at times opens eyes interactive   HEENT: head ncat, sclera anicteric, OP patent, trachea midline   PULM: unlabored, coarse lung sounds anteriorly, rhonchi bilaterally 9R>L ) but leans towards left   CV/COR: RRR S1/S2, No rub, murmur or gallop  ABD: soft, non distended,  nontender, hypoactive bowel sounds, no masses  EXT:  peripheral edema present in R>L hand, warm x4 and well-perfused.   NEURO: sedate, follows commands bilateral lower extremities 4 extremities weak  SKIN: no obvious rash  LINES: clean, dry intact         Data:       Recent Labs  Lab 04/05/17  0500 04/04/17  0650 04/03/17  0430 04/02/17  0400  03/31/17  0530   * 147* 145* 146*  < > 150*   POTASSIUM 3.3* 3.6 3.6 3.8  < > 3.7   CHLORIDE 115* 114* 111* 111*  < > 115*   CO2 25 25 26 28  < > 27   ANIONGAP 9 8 8 7  < > 8   * 143* 125* 159*  < > 123*    BUN 26 26 28 29  < > 25   CR 1.63* 1.50* 1.66* 1.66*  < > 1.42*   GFRESTIMATED 44* 49* 43* 43*  < > 52*   GFRESTBLACK 53* 59* 52* 52*  < > 63   LEONIE 7.9* 8.4* 7.6* 7.7*  < > 7.6*   MAG 2.2 2.3 2.6* 2.5*  < > 2.4*   PHOS 2.4* 2.7 2.6 3.1  < > 3.0   PROTTOTAL 7.1 7.9  --   --   --  7.2   ALBUMIN 1.4* 1.5*  --   --   --  1.4*   BILITOTAL 0.9 1.0  --   --   --  1.0   ALKPHOS 127 155*  --   --   --  176*   * 322*  --   --   --  414*   ALT 23 37  --   --   --  78*   < > = values in this interval not displayed.    CBC    Recent Labs  Lab 04/05/17  0500 04/04/17  0650 04/03/17  0430 04/02/17  0400   WBC 16.3* 15.8* 14.1* 16.0*   RBC 1.99* 2.44* 2.08* 1.86*   HGB 6.5* 7.7* 6.7* 6.1*   HCT 19.8* 24.0* 20.3* 18.2*    98 98 98   MCH 32.7 31.6 32.2 32.8   MCHC 32.8 32.1 33.0 33.5   RDW 18.9* 18.5* 17.1* 16.9*    503* 479* 530*     INR    Recent Labs  Lab 04/04/17  0650   INR 1.18*     Arterial Blood Gas  No lab results found in last 7 days.    All cultures:    Recent Labs  Lab 04/02/17  1025 04/01/17  1013 03/30/17  2050 03/29/17  1805   CULT No growth after 3 days Culture received and in progress.  Positive AFB results are called as soon as detected.  Final report to follow in 7 to 8 weeks.  Canceled, Test credited Duplicate request Culture received and in progress.  Positive AFB results are called as soon as detected.  Final report to follow in 7 to 8 weeks. Light growth Enterobacter cloacae complexLight growth Klebsiella pneumoniae*     Imaging:  CXR   Recent Results (from the past 24 hour(s))   US Abdomen Limited    Narrative    RIGHT UPPER QUADRANT ULTRASOUND 4/4/2017 3:39 PM    HISTORY: Reevaluate for acalculous cholecystitis.    COMPARISON: CT abdomen 3/27/2017    FINDINGS:    Gallbladder: Normal with no cholelithiasis, wall thickening or focal  tenderness.      Bile ducts: CHD is normal diameter. No intrahepatic biliary  dilatation.    Liver: Normal.     Pancreas: Normal.     Right kidney: No  hydronephrosis. Renal length is 12.5 cm. There is an  area of abnormal increased echogenicity along the upper lateral margin  of the kidney.      Impression    IMPRESSION:    1. No evidence cholecystitis.  2. Area of abnormal increased echogenicity in the right kidney could  be related to pyelonephritis.    GABRIELLA SCHNEIDER MD       Billing: This patient is critically ill: Yes. Total critical care time today 40 min.

## 2017-04-05 NOTE — PROGRESS NOTES
"[facility]  Infectious Disease Progress Note          Assessment and Plan:   Impression:   57 y.o male admitted about 2 weeks ago with concern for secondary bacterial pneumonia on the top of Influenza B.   Worsening respiratory status intubated.   Sputum Cultures positive for MSSA.   Patient is originally from [facility]. Immigrated in 1990. The records in the [place] system start in 2004, no data available of his TB status, I do not see any PPD done. CXR from before have been negative.   Per wife when they first moved to [place] they were \" checked for Tb and all was negative\".   He was admitted this occasion with complaints of pleuritic chest pain. This was after he was diagnosed with Influenza B. His respiratory status quickly worsened. He was intubated and remains that way.  Blood cultures 2/2 positive for MSSA. Cultures from the sputum positive for MSSA, Enterobacter one strain more resistant than the other, Klebsiella. He has been on appropriate antibiotics since admission but has been febrile, still intubated.   Diarrhea- C diff neg  Positive TB quantiferon.  This, like pos PPD, would indicate previous exposure to TB.  Sputum AFB smear neg x 2.     Recommendations:     Isolation.  Await 3rd sputum for AFB.  If neg could d/c iso.  Cont  Ancef for MSSA bacteremia.    continue zosyn for GNR in the sputum.   If fevers persist would repeat sputum cx.             Interval History:   Trach done yesterday.  Temp to 102.  Lower this am-100.2.  WBC 16k.  No new pos cxs.  No further AFB results.              Medications:       OLANZapine  20 mg Oral At Bedtime     sennosides  5-10 mL Oral or Feeding Tube BID    And     docusate   mg Oral or Feeding Tube BID     insulin aspart  1-6 Units Subcutaneous Q4H     insulin glargine  12 Units Subcutaneous QAM AC     piperacillin-tazobactam  4.5 g Intravenous Q6H     pantoprazole  40 mg Per Feeding Tube Q12H     ceFAZolin  1 g Intravenous Q8H     heparin  " "5,000 Units Subcutaneous Q8H     multivitamins with minerals  15 mL Per Feeding Tube Daily     protein modular  1 packet Per Feeding Tube Daily     sodium chloride (PF)  10 mL Intracatheter Q7 Days     gabapentin  300 mg Oral or Feeding Tube Q8H ALEC     pneumococcal vaccine  0.5 mL Intramuscular Prior to discharge     sodium chloride (PF)  3 mL Intracatheter Q8H     chlorhexidine  15 mL Mouth/Throat Q12H                  Physical Exam:   Blood pressure 150/78, pulse 115, temperature 100.2  F (37.9  C), resp. rate 14, height 1.778 m (5' 10\"), weight 77.5 kg (170 lb 13.7 oz), SpO2 100 %.  [unfilled]  Vital Signs with Ranges  Temp:  [100  F (37.8  C)-102.2  F (39  C)] 100.2  F (37.9  C)  Heart Rate:  [107-131] 116  Resp:  [10-39] 14  BP: (120-190)/() 150/78  FiO2 (%):  [40 %] 40 %  SpO2:  [91 %-100 %] 100 %    Constitutional: Awake, alert, cooperative, no apparent distress   Lungs: Clear to auscultation bilaterally, no crackles or wheezing   Cardiovascular: Regular rate and rhythm, normal S1 and S2, and no murmur noted   Abdomen: Normal bowel sounds, soft, non-distended, non-tender   Skin: No rashes, no cyanosis, no edema   Other:           Data:   All microbiology laboratory data reviewed.  Recent Labs   Lab Test  04/05/17   0500  04/04/17   0650  04/03/17   0430   WBC  16.3*  15.8*  14.1*   HGB  6.5*  7.7*  6.7*   HCT  19.8*  24.0*  20.3*   MCV  100  98  98   PLT  390  503*  479*     Recent Labs   Lab Test  04/05/17   0500  04/04/17   0650  04/03/17   0430   CR  1.63*  1.50*  1.66*     Recent Labs   Lab Test  09/14/09   1021   SED  9     "

## 2017-04-06 LAB
ANION GAP SERPL CALCULATED.3IONS-SCNC: 7 MMOL/L (ref 3–14)
BACTERIA SPEC CULT: NO GROWTH
BUN SERPL-MCNC: 23 MG/DL (ref 7–30)
CALCIUM SERPL-MCNC: 7.7 MG/DL (ref 8.5–10.1)
CHLORIDE SERPL-SCNC: 114 MMOL/L (ref 94–109)
CO2 SERPL-SCNC: 25 MMOL/L (ref 20–32)
CREAT SERPL-MCNC: 1.56 MG/DL (ref 0.66–1.25)
ERYTHROCYTE [DISTWIDTH] IN BLOOD BY AUTOMATED COUNT: 20 % (ref 10–15)
GFR SERPL CREATININE-BSD FRML MDRD: 46 ML/MIN/1.7M2
GLUCOSE BLDC GLUCOMTR-MCNC: 227 MG/DL (ref 70–99)
GLUCOSE BLDC GLUCOMTR-MCNC: 230 MG/DL (ref 70–99)
GLUCOSE BLDC GLUCOMTR-MCNC: 231 MG/DL (ref 70–99)
GLUCOSE BLDC GLUCOMTR-MCNC: 254 MG/DL (ref 70–99)
GLUCOSE BLDC GLUCOMTR-MCNC: 272 MG/DL (ref 70–99)
GLUCOSE BLDC GLUCOMTR-MCNC: 281 MG/DL (ref 70–99)
GLUCOSE SERPL-MCNC: 261 MG/DL (ref 70–99)
GRAM STN SPEC: NORMAL
HCT VFR BLD AUTO: 22.6 % (ref 40–53)
HGB BLD-MCNC: 7.3 G/DL (ref 13.3–17.7)
Lab: NORMAL
MAGNESIUM SERPL-MCNC: 2.1 MG/DL (ref 1.6–2.3)
MCH RBC QN AUTO: 31.6 PG (ref 26.5–33)
MCHC RBC AUTO-ENTMCNC: 32.3 G/DL (ref 31.5–36.5)
MCV RBC AUTO: 98 FL (ref 78–100)
MICRO REPORT STATUS: NORMAL
MICRO REPORT STATUS: NORMAL
PHOSPHATE SERPL-MCNC: 2.2 MG/DL (ref 2.5–4.5)
PLATELET # BLD AUTO: 330 10E9/L (ref 150–450)
POTASSIUM SERPL-SCNC: 3.8 MMOL/L (ref 3.4–5.3)
RBC # BLD AUTO: 2.31 10E12/L (ref 4.4–5.9)
SODIUM SERPL-SCNC: 146 MMOL/L (ref 133–144)
SPECIMEN SOURCE: NORMAL
SPECIMEN SOURCE: NORMAL
WBC # BLD AUTO: 12.3 10E9/L (ref 4–11)

## 2017-04-06 PROCEDURE — 25000128 H RX IP 250 OP 636: Performed by: INTERNAL MEDICINE

## 2017-04-06 PROCEDURE — 84100 ASSAY OF PHOSPHORUS: CPT | Performed by: INTERNAL MEDICINE

## 2017-04-06 PROCEDURE — 40000275 ZZH STATISTIC RCP TIME EA 10 MIN

## 2017-04-06 PROCEDURE — 99291 CRITICAL CARE FIRST HOUR: CPT | Performed by: INTERNAL MEDICINE

## 2017-04-06 PROCEDURE — 83735 ASSAY OF MAGNESIUM: CPT | Performed by: INTERNAL MEDICINE

## 2017-04-06 PROCEDURE — 94003 VENT MGMT INPAT SUBQ DAY: CPT

## 2017-04-06 PROCEDURE — 87205 SMEAR GRAM STAIN: CPT | Performed by: INTERNAL MEDICINE

## 2017-04-06 PROCEDURE — 87186 SC STD MICRODIL/AGAR DIL: CPT | Performed by: INTERNAL MEDICINE

## 2017-04-06 PROCEDURE — 00000146 ZZHCL STATISTIC GLUCOSE BY METER IP

## 2017-04-06 PROCEDURE — 20000003 ZZH R&B ICU

## 2017-04-06 PROCEDURE — 40000239 ZZH STATISTIC VAT ROUNDS

## 2017-04-06 PROCEDURE — 25000125 ZZHC RX 250: Performed by: INTERNAL MEDICINE

## 2017-04-06 PROCEDURE — 87077 CULTURE AEROBIC IDENTIFY: CPT | Performed by: INTERNAL MEDICINE

## 2017-04-06 PROCEDURE — 25000131 ZZH RX MED GY IP 250 OP 636 PS 637: Performed by: INTERNAL MEDICINE

## 2017-04-06 PROCEDURE — 85027 COMPLETE CBC AUTOMATED: CPT | Performed by: INTERNAL MEDICINE

## 2017-04-06 PROCEDURE — 25000128 H RX IP 250 OP 636: Performed by: HOSPITALIST

## 2017-04-06 PROCEDURE — 25000132 ZZH RX MED GY IP 250 OP 250 PS 637: Performed by: HOSPITALIST

## 2017-04-06 PROCEDURE — 40000008 ZZH STATISTIC AIRWAY CARE

## 2017-04-06 PROCEDURE — 27210437 ZZH NUTRITION PRODUCT SEMIELEM INTERMED LITER

## 2017-04-06 PROCEDURE — 87070 CULTURE OTHR SPECIMN AEROBIC: CPT | Performed by: INTERNAL MEDICINE

## 2017-04-06 PROCEDURE — 25000132 ZZH RX MED GY IP 250 OP 250 PS 637: Performed by: INTERNAL MEDICINE

## 2017-04-06 PROCEDURE — 80048 BASIC METABOLIC PNL TOTAL CA: CPT | Performed by: INTERNAL MEDICINE

## 2017-04-06 RX ORDER — ACETAMINOPHEN 160 MG
TABLET,DISINTEGRATING ORAL
Status: DISPENSED
Start: 2017-04-06 | End: 2017-04-06

## 2017-04-06 RX ORDER — FENTANYL CITRATE 50 UG/ML
25-50 INJECTION, SOLUTION INTRAMUSCULAR; INTRAVENOUS
Status: DISCONTINUED | OUTPATIENT
Start: 2017-04-06 | End: 2017-04-11 | Stop reason: HOSPADM

## 2017-04-06 RX ADMIN — GABAPENTIN 300 MG: 250 SUSPENSION ORAL at 08:12

## 2017-04-06 RX ADMIN — GABAPENTIN 300 MG: 250 SUSPENSION ORAL at 20:19

## 2017-04-06 RX ADMIN — PANTOPRAZOLE SODIUM 40 MG: 40 TABLET, DELAYED RELEASE ORAL at 00:11

## 2017-04-06 RX ADMIN — CEFAZOLIN SODIUM 1 G: 1 INJECTION, POWDER, FOR SOLUTION INTRAMUSCULAR; INTRAVENOUS at 14:54

## 2017-04-06 RX ADMIN — CEFAZOLIN SODIUM 1 G: 1 INJECTION, POWDER, FOR SOLUTION INTRAMUSCULAR; INTRAVENOUS at 05:49

## 2017-04-06 RX ADMIN — HEPARIN SODIUM 5000 UNITS: 10000 INJECTION, SOLUTION INTRAVENOUS; SUBCUTANEOUS at 03:50

## 2017-04-06 RX ADMIN — INSULIN GLARGINE 12 UNITS: 100 INJECTION, SOLUTION SUBCUTANEOUS at 08:00

## 2017-04-06 RX ADMIN — GABAPENTIN 300 MG: 250 SUSPENSION ORAL at 00:11

## 2017-04-06 RX ADMIN — CHLORHEXIDINE GLUCONATE 15 ML: 1.2 RINSE ORAL at 08:13

## 2017-04-06 RX ADMIN — INSULIN ASPART 2 UNITS: 100 INJECTION, SOLUTION INTRAVENOUS; SUBCUTANEOUS at 03:56

## 2017-04-06 RX ADMIN — FENTANYL CITRATE 50 MCG: 50 INJECTION, SOLUTION INTRAMUSCULAR; INTRAVENOUS at 21:59

## 2017-04-06 RX ADMIN — PIPERACILLIN AND TAZOBACTAM 4.5 G: 4; .5 INJECTION, POWDER, FOR SOLUTION INTRAVENOUS at 00:12

## 2017-04-06 RX ADMIN — PANTOPRAZOLE SODIUM 40 MG: 40 TABLET, DELAYED RELEASE ORAL at 12:00

## 2017-04-06 RX ADMIN — PIPERACILLIN AND TAZOBACTAM 4.5 G: 4; .5 INJECTION, POWDER, FOR SOLUTION INTRAVENOUS at 17:55

## 2017-04-06 RX ADMIN — MULTIVITAMIN 15 ML: LIQUID ORAL at 08:13

## 2017-04-06 RX ADMIN — HEPARIN SODIUM 5000 UNITS: 10000 INJECTION, SOLUTION INTRAVENOUS; SUBCUTANEOUS at 20:19

## 2017-04-06 RX ADMIN — OLANZAPINE 20 MG: 10 TABLET, FILM COATED ORAL at 21:59

## 2017-04-06 RX ADMIN — CEFAZOLIN SODIUM 1 G: 1 INJECTION, POWDER, FOR SOLUTION INTRAMUSCULAR; INTRAVENOUS at 21:59

## 2017-04-06 RX ADMIN — PIPERACILLIN AND TAZOBACTAM 4.5 G: 4; .5 INJECTION, POWDER, FOR SOLUTION INTRAVENOUS at 06:27

## 2017-04-06 RX ADMIN — ACETAMINOPHEN 650 MG: 160 SUSPENSION ORAL at 00:11

## 2017-04-06 RX ADMIN — INSULIN ASPART 3 UNITS: 100 INJECTION, SOLUTION INTRAVENOUS; SUBCUTANEOUS at 20:37

## 2017-04-06 RX ADMIN — INSULIN ASPART 3 UNITS: 100 INJECTION, SOLUTION INTRAVENOUS; SUBCUTANEOUS at 08:11

## 2017-04-06 RX ADMIN — HEPARIN SODIUM 5000 UNITS: 10000 INJECTION, SOLUTION INTRAVENOUS; SUBCUTANEOUS at 12:00

## 2017-04-06 RX ADMIN — PIPERACILLIN AND TAZOBACTAM 4.5 G: 4; .5 INJECTION, POWDER, FOR SOLUTION INTRAVENOUS at 12:00

## 2017-04-06 RX ADMIN — ACETAMINOPHEN 650 MG: 160 SUSPENSION ORAL at 22:17

## 2017-04-06 RX ADMIN — INSULIN GLARGINE 3 UNITS: 100 INJECTION, SOLUTION SUBCUTANEOUS at 09:12

## 2017-04-06 RX ADMIN — INSULIN ASPART 3 UNITS: 100 INJECTION, SOLUTION INTRAVENOUS; SUBCUTANEOUS at 00:19

## 2017-04-06 RX ADMIN — INSULIN ASPART 2 UNITS: 100 INJECTION, SOLUTION INTRAVENOUS; SUBCUTANEOUS at 12:06

## 2017-04-06 RX ADMIN — Medication 1 PACKET: at 08:13

## 2017-04-06 RX ADMIN — INSULIN ASPART 2 UNITS: 100 INJECTION, SOLUTION INTRAVENOUS; SUBCUTANEOUS at 14:57

## 2017-04-06 RX ADMIN — CHLORHEXIDINE GLUCONATE 15 ML: 1.2 RINSE ORAL at 20:19

## 2017-04-06 NOTE — PROGRESS NOTES
[facility] ICU RESPIRATORY NOTE  Date of Admission: 3/17/2017  Date of Intubation (most recent): 3/17/2017, Trach 4/4/2017  Reason for Mechanical Ventilation: Respiratory failure  Number of Days on Mechanical Ventilation: 20  Met Criteria for Pressure Support Trial:   Length of Pressure Support Trial:   Reason for Stopping Pressure Support Trial: rest for NOC     Significant Events Today: None  Ventilation Mode: CMV/AC  FiO2 (%): 40 %  Rate Set (breaths/minute): 18 breaths/min  Tidal Volume Set (mL): 400 mL  PEEP (cm H2O): 5 cmH2O  Pressure Support (cm H2O): 15 cmH2O  Oxygen Concentration (%): 40 %  Resp: 18     ABG Results: No ABG result for today     ETT appearance on chest x-ray: Trach site clean, intact and secure     Plan: Continue daily assessment and weaning.    4/6/2017  Nga Ferreira RT

## 2017-04-06 NOTE — PROGRESS NOTES
Critical Care Progress Note      04/06/2017    Name: Wyatt Mahajan MRN#: [id number]   Age: 57 year old YOB: 1960     Hsptl Day# 20  ICU DAY #20 trached 4/4    MV DAY #20           Problem List:   Active Problems:    MSSA (methicillin susceptible Staphylococcus aureus) septicemia (H)    MSSA (methicillin susceptible Staphylococcus aureus) pneumonia (H)    Fever    Leukocytosis    Influenza B    Acute respiratory failure with hypoxia (H)  MSSA/enterobacter/klebsiella pna   MSSA bacteremia--resolved  VAP (GNR)  Influenza B  JOSE--stable  Hematuria---bell trauma?--improving  pancreatitis   Quanterferon positive         Summary/Hospital Course:   Wyatt Mahajan is a 57 year old male admitted on 3/17 to the floor, presenting with acute hypoxic respiratory failure due to influenza B and CAP, right chest wall pain and uncontrolled DM-II. Patient was subsequently transferred from the floor to the MICU for increased work of breathing, accessory muscle use and respiratory distress. Was intubated due to increased work of breathing, tachypnea and decreasing O2 sats.  His hospital course has been complicated by:  1. Vent-associated pneumonia for which his abx coverage was broadened to zoysn.  Ancef was restarted on 3/30 per ID request for MSSA bacteremia.  2.   MSSA bacteremia, likely from the pna which cleared after the second set of blood cx.  3.  Delirium which has persisted through propofol, precedex, valproate, seroquel and gabapentin.  4.  Pancreatitis, likely due to propofol, which is now improving with post-pyloric feeds.  5.  Mild transaminitis:  Drug induced vs crit illness induced  6.  Mild JOSE:  Usually worsens with attempted diuresis.  7.  Hematuria:  Believed 2/2 bell trauma, now resolved.  8.  Bradycardia:  Seems due to precedex, resolved when off precedex.  9.  A fib with rvr:  Resolved with amio loading.  10.  Anemia of critical illness, family has been hesitant to allow transfusions for  hgb 7.  Will re-address for hgb 6.    4/3:  Event over weekend noted, some agitation, head CT negative, transfused 1 unit PRBC with partial response,  PEG and trach delayed as quantiferon pending , ruling out for TB  - AFB smear neg x 2.   4/4 -emerges with sedation holiday, quantiferon positive 4.5, 3rd, AFB sputum collected, pending, still temp 39,  -PEG and trach completed, repeat US no evidence of cholecystitis, ?abnormality in right kidney -  4/5 - weaning versed, temp trend lower afebrile at present. Stable on vent.  No further AFB results.        Assessment and plan :     Wyatt Mahajan IS a 57 year old male admitted on 3/17/2017 for respiratory failure, flu B, mssa pna and mssa bacteremia.   I have personally reviewed the daily labs, imaging studies, cultures and discussed the case with referring physician and consulting physicians.   My assessment and plan by system for this patient is as follows:    Neurology/Psychiatry:   1. Sedation: On versed, weaning drip.   2. Analgesia - on fentanyl drip   3. Delirium:  3/31:  tranisition to olanzapine will uptitrate (valproate ineffective as well no response to quetiapine)   4. H/o Diabetic neuropathy 2/2 DM-II:     - gabapentin 300mgTID.       Cardiovascular:   1. Hypertension: standing hydralazine started, consider clonidine patch  2. H/o HLD:  hold atorvastatin (LFT elevated)  3. H/o PAD:  hold ASA given prior hematuria and anemia   4. Hyperlactemia:  Resolved  5.  A fib with RVR:  In setting of critical illness. Improved. Required amiodarone, Will dose prn metoprolol       Pulmonary/Ventilator Management:   1.  Respiratory failure, hypoxemic:  2/2 mssa pna, vap (atb for S aureus and E cloacae & K pneumoniae isolated from sputum) , and influenza B - repeat swab negative.    --Lung protective vent strategy- PCV plus assist ventilation s/p trach 4/4  - PS trials as tolerated 12-15 /5 presently       GI and Nutrition :   1.  TF  2.  PPI for PUD prophylaxis  3.   Constipation--improved on bowel reg.  4.  Elevated liver enzymes: newly elevated on 3/24, nl on 3/17, ? Med related vs ? RUQ U/S revealed a distended GB w/sludge vs related to pancreatitis. Subsequent LFT's have shown decreases of Alk phos and ALT levels since discontinuation of statin.  --serial LFT's,   5. Pancreatitis:  Due to critical illness vs propofol.  No further propofol. Improved   6. s/p PEG  - PPI to bid    Renal/Fluids/Electrolytes:   1. JOSE-- cr stablilized , UOP stable  UA showed no casts. Urine eos < 1%   -- stable today, trend Creatinine.   2. Hypernatremia -  PO free water - slight increase, follow trend    :  1. ? Pyelo on abd US - clinically not consistent - await urine culture   2. Hematuria: bloodly urine AM of 3/23 probably due to trauma from the bell catheter. Resolved 3/30.     Infectious Disease:   1. Persistent fever - multiple source (sinusitis, thrombophlebitis, lines, etc) no decub per nursing - fever curve down after removal of NG tube - suspect culprit  2. Flu B:  Repeat flu swab negative thus DC Tamiflu -   3. MSSA pna/bacteremia and gm neg VAP: Nafcillin until 3/24, then ancef until 3/26, then zosyn and vanco 3/26 until  vanco stopped 3/29.  Ancef re-added to existing zosyn on 3/30 per ID.  - Most recent bld cultures show no growth. TTE did not show valvular masses/vegetations. CT chest negative for occult abscess.   4. 3/29 sputum cx still with GNR, but no further staph.  --ID consulted 3/30 for persistent + sputum cx.  5. ?Latent AFB - positive quantiferon, AFB neg x 2 (smear) awaiting 3rd   6. ? Pyelo on US - FUP urine culture , CT scan last week negative for abnormality   7. Sinusitis -on CT scan - NG tube removed, follow clinically -      Endocrine:   1. Hyperglycemia:    -- increase lantus to 15 and SSI 3/31     Hematology/Oncology:   1. Plt stable  2. Anemia Hgb -labile;  no overt bleeding  s/p 1 u PRBC with appropriate response. Discussed risks/benefits of transfusion  with family, PRN transfusion but family request discussion if considering   3. Leukocytosis-- 2/2 infection. Patient febrile on and off. Procalcitonin mild elevated improving   4.  R arm swelling  -- superficial thrombus only.  Warm compresses. R-evaluate US 4/7 to look for progression- earlier as indicated     IV/Access:   1. Venous access - peripheral and PICC    ICU Prophylaxis:   1. DVT:  hep SubQ 3/30  2. VAP: HOB 30 degrees, chlorhexidine rinse  3. Stress Ulcer: PPI  4. Restraints: Nonviolent soft two point restraints required and necessary for patient safety and continued cares and good effect as patient continues to pull at necessary lines, tubes despite education and distraction. Will readdress daily.   6. Feeding - tube feeds restarted   7. Family Update: wife and daughter at bedside  8. Disposition - critically ill    [facility] mtg 3/27 , 3/30, 3/31, 4/3:          Key Medications:   Reviewed          Physical Examination:   Temp:  [97.7  F (36.5  C)-101.1  F (38.4  C)] 97.9  F (36.6  C)  Heart Rate:  [] 81  Resp:  [13-35] 21  BP: (131-159)/() 159/155  FiO2 (%):  [40 %] 40 %  SpO2:  [97 %-100 %] 100 %      Intake/Output Summary (Last 24 hours) at 04/06/17 0809  Last data filed at 04/06/17 0700   Gross per 24 hour   Intake           5369.5 ml   Output             2855 ml   Net           2514.5 ml       Wt Readings from Last 4 Encounters:   04/06/17 81.2 kg (179 lb 0.2 oz)   03/16/17 70.3 kg (155 lb)   01/17/17 71.3 kg (157 lb 1.6 oz)   11/30/16 72.6 kg (160 lb)     BP - Mean:  [] 157  Ventilation Mode: CMV/AC  FiO2 (%): 40 %  Rate Set (breaths/minute): 18 breaths/min  Tidal Volume Set (mL): 400 mL  PEEP (cm H2O): 5 cmH2O  Pressure Support (cm H2O): 15 cmH2O  Oxygen Concentration (%): 40 %  Resp: 21  No lab results found in last 7 days.     GEN: mild distress at times opens eyes interactive   HEENT: head ncat, sclera anicteric, OP patent, trachea midline   PULM: unlabored, coarse lung sounds  anteriorly, rhonchi bilaterally 9R>L ) but leans towards left   CV/COR: RRR S1/S2, No rub, murmur or gallop  ABD: soft, non distended,  nontender, hypoactive bowel sounds, no masses  EXT:  peripheral edema present in R>L hand, warm x4 and well-perfused.   NEURO: sedate, follows commands bilateral lower extremities 4 extremities weak  SKIN: no obvious rash  LINES: clean, dry intact         Data:       Recent Labs  Lab 04/06/17 0457 04/05/17  1352 04/05/17  0500 04/04/17  0650 04/03/17  0430  03/31/17  0530   *  --  149* 147* 145*  < > 150*   POTASSIUM 3.8 4.1 3.3* 3.6 3.6  < > 3.7   CHLORIDE 114*  --  115* 114* 111*  < > 115*   CO2 25  --  25 25 26  < > 27   ANIONGAP 7  --  9 8 8  < > 8   *  --  208* 143* 125*  < > 123*   BUN 23  --  26 26 28  < > 25   CR 1.56*  --  1.63* 1.50* 1.66*  < > 1.42*   GFRESTIMATED 46*  --  44* 49* 43*  < > 52*   GFRESTBLACK 56*  --  53* 59* 52*  < > 63   LEONIE 7.7*  --  7.9* 8.4* 7.6*  < > 7.6*   MAG 2.1  --  2.2 2.3 2.6*  < > 2.4*   PHOS 2.2*  --  2.4* 2.7 2.6  < > 3.0   PROTTOTAL  --   --  7.1 7.9  --   --  7.2   ALBUMIN  --   --  1.4* 1.5*  --   --  1.4*   BILITOTAL  --   --  0.9 1.0  --   --  1.0   ALKPHOS  --   --  127 155*  --   --  176*   AST  --   --  202* 322*  --   --  414*   ALT  --   --  23 37  --   --  78*   < > = values in this interval not displayed.    CBC    Recent Labs  Lab 04/06/17 0457 04/05/17  1352 04/05/17  1035 04/05/17  0500 04/04/17  0650   WBC 12.3*  --  15.3* 16.3* 15.8*   RBC 2.31*  --  2.09* 1.99* 2.44*   HGB 7.3* 7.5* 6.7* 6.5* 7.7*   HCT 22.6*  --  20.5* 19.8* 24.0*   MCV 98  --  98 100 98   MCH 31.6  --  32.1 32.7 31.6   MCHC 32.3  --  32.7 32.8 32.1   RDW 20.0*  --  19.3* 18.9* 18.5*     --  368 390 503*     INR    Recent Labs  Lab 04/04/17  0650   INR 1.18*     Arterial Blood Gas  No lab results found in last 7 days.    All cultures:    Recent Labs  Lab 04/05/17  1103 04/02/17  1025 04/01/17  1013 03/30/17  2050   CULT Pending No  growth after 4 days Culture received and in progress.  Positive AFB results are called as soon as detected.  Final report to follow in 7 to 8 weeks.  Canceled, Test credited Duplicate request Culture received and in progress.  Positive AFB results are called as soon as detected.  Final report to follow in 7 to 8 weeks.     Imaging:  CXR   No results found for this or any previous visit (from the past 24 hour(s)).    Billing: This patient is critically ill: Yes. Total critical care time today 30 min.

## 2017-04-06 NOTE — PLAN OF CARE
Problem: Individualization  Goal: Patient Preferences  Outcome: No Change  Pt awakens to voice, opens eyes, but no follow.  Versed remains at 2mg/hr and Fentanyl at 25mcg/hr.  Daughter at bedside, assisted RN with full bath.  Pt calm and appeared restful t/o shift.  No BM.  Lungs coarse to clear.  Mod to large amount sputum orally and mod amounts per Trach depending upon postioning.  Continues to tolerate tube feeding well at goal and water flushes.  hgb 7.3 this a.m.  No active signs of blood loss.  PICC positional.

## 2017-04-06 NOTE — PROGRESS NOTES
[facility] ICU RESPIRATORY NOTE  Date of Admission: 3/17/2017  Date of Intubation (most recent): 3/17/2017, Trach 4/4/2017  Reason for Mechanical Ventilation: Respiratory failure  Number of Days on Mechanical Ventilation: 20  Met Criteria for Pressure Support Trial: Yes  Length of Pressure Support Trial: 15/5 about 1 hour  Reason for Stopping Pressure Support Trial: Tachypnea      Significant Events Today: None  Ventilation Mode: CMV/AC  FiO2 (%): 40 %  Rate Set (breaths/minute): 18 breaths/min  Tidal Volume Set (mL): 400 mL  PEEP (cm H2O): 5 cmH2O  Pressure Support (cm H2O): 15 cmH2O  Oxygen Concentration (%): 40 %  Resp: 18    ABG Results: No ABG result for today    ETT appearance on chest x-ray: Trach site clean, intact and secure    Plan:  Continue daily assessment and weaning.  4/5/2017  Nieves Bedolla

## 2017-04-06 NOTE — PROGRESS NOTES
"[facility]  Infectious Disease Progress Note          Assessment and Plan:   Impression:   57 y.o male admitted about 2 weeks ago with concern for secondary bacterial pneumonia on the top of Influenza B.   Worsening respiratory status intubated.   Sputum Cultures positive for MSSA.   Patient is originally from [facility]. Immigrated in 1990. The records in the [facility] system start in 2004, no data available of his TB status, I do not see any PPD done. CXR from before have been negative.   Per wife when they first moved to [place] they were \" checked for Tb and all was negative\".   He was admitted this occasion with complaints of pleuritic chest pain. This was after he was diagnosed with Influenza B. His respiratory status quickly worsened. He was intubated and remains that way.  Blood cultures 2/2 positive for MSSA. Cultures from the sputum positive for MSSA, Enterobacter one strain more resistant than the other, Klebsiella. He has been on appropriate antibiotics since admission but has been febrile, still intubated.   Diarrhea- C diff neg  Positive TB quantiferon.  This, like pos PPD, would indicate previous exposure to TB.  Sputum AFB smear neg x 2.     Recommendations:     Isolation.  Await 3rd sputum for AFB.  If neg could d/c iso.  Cont  Ancef for MSSA bacteremia.    continue zosyn for GNR in the sputum.   Will repeat bacterial sputum cx.             Interval History:   Temp lower past 24 hrs, Tmax 101.  Afebrile at present.  Stable on vent.  No further AFB results.              Medications:       hydrogen peroxide         OLANZapine  20 mg Oral At Bedtime     sennosides  5-10 mL Oral or Feeding Tube BID    And     docusate   mg Oral or Feeding Tube BID     insulin aspart  1-6 Units Subcutaneous Q4H     insulin glargine  12 Units Subcutaneous QAM AC     piperacillin-tazobactam  4.5 g Intravenous Q6H     pantoprazole  40 mg Per Feeding Tube Q12H     ceFAZolin  1 g Intravenous Q8H     " "heparin  5,000 Units Subcutaneous Q8H     multivitamins with minerals  15 mL Per Feeding Tube Daily     protein modular  1 packet Per Feeding Tube Daily     sodium chloride (PF)  10 mL Intracatheter Q7 Days     gabapentin  300 mg Oral or Feeding Tube Q8H ALEC     pneumococcal vaccine  0.5 mL Intramuscular Prior to discharge     sodium chloride (PF)  3 mL Intracatheter Q8H     chlorhexidine  15 mL Mouth/Throat Q12H                  Physical Exam:   Blood pressure 153/89, pulse 115, temperature 97.7  F (36.5  C), resp. rate 20, height 1.778 m (5' 10\"), weight 77.5 kg (170 lb 13.7 oz), SpO2 100 %.  [unfilled]  Vital Signs with Ranges  Temp:  [97.7  F (36.5  C)-101.1  F (38.4  C)] 97.7  F (36.5  C)  Heart Rate:  [] 89  Resp:  [9-35] 20  BP: (129-158)/(69-99) 153/89  FiO2 (%):  [40 %] 40 %  SpO2:  [97 %-100 %] 100 %    Constitutional: Awake, alert, cooperative, no apparent distress   Lungs: Clear to auscultation bilaterally, no crackles or wheezing   Cardiovascular: Regular rate and rhythm, normal S1 and S2, and no murmur noted   Abdomen: Normal bowel sounds, soft, non-distended, non-tender   Skin: No rashes, no cyanosis, no edema   Other:           Data:   All microbiology laboratory data reviewed.  Recent Labs   Lab Test  04/06/17   0457  04/05/17   1352  04/05/17   1035  04/05/17   0500   WBC  12.3*   --   15.3*  16.3*   HGB  7.3*  7.5*  6.7*  6.5*   HCT  22.6*   --   20.5*  19.8*   MCV  98   --   98  100   PLT  330   --   368  390     Recent Labs   Lab Test  04/06/17   0457  04/05/17   0500  04/04/17   0650   CR  1.56*  1.63*  1.50*     Recent Labs   Lab Test  09/14/09   1021   SED  9     "

## 2017-04-07 ENCOUNTER — APPOINTMENT (OUTPATIENT)
Dept: ULTRASOUND IMAGING | Facility: CLINIC | Age: 57
DRG: 870 | End: 2017-04-07
Attending: INTERNAL MEDICINE
Payer: COMMERCIAL

## 2017-04-07 LAB
ACID FAST STN SPEC QL: NORMAL
ACID FAST STN SPEC QL: NORMAL
ANION GAP SERPL CALCULATED.3IONS-SCNC: 7 MMOL/L (ref 3–14)
BUN SERPL-MCNC: 23 MG/DL (ref 7–30)
CALCIUM SERPL-MCNC: 7.8 MG/DL (ref 8.5–10.1)
CHLORIDE SERPL-SCNC: 114 MMOL/L (ref 94–109)
CO2 SERPL-SCNC: 25 MMOL/L (ref 20–32)
CREAT SERPL-MCNC: 1.4 MG/DL (ref 0.66–1.25)
ERYTHROCYTE [DISTWIDTH] IN BLOOD BY AUTOMATED COUNT: 20 % (ref 10–15)
GFR SERPL CREATININE-BSD FRML MDRD: 53 ML/MIN/1.7M2
GLUCOSE BLDC GLUCOMTR-MCNC: 210 MG/DL (ref 70–99)
GLUCOSE BLDC GLUCOMTR-MCNC: 225 MG/DL (ref 70–99)
GLUCOSE BLDC GLUCOMTR-MCNC: 244 MG/DL (ref 70–99)
GLUCOSE BLDC GLUCOMTR-MCNC: 255 MG/DL (ref 70–99)
GLUCOSE BLDC GLUCOMTR-MCNC: 267 MG/DL (ref 70–99)
GLUCOSE BLDC GLUCOMTR-MCNC: 273 MG/DL (ref 70–99)
GLUCOSE SERPL-MCNC: 209 MG/DL (ref 70–99)
HCT VFR BLD AUTO: 22.9 % (ref 40–53)
HGB BLD-MCNC: 7.3 G/DL (ref 13.3–17.7)
MAGNESIUM SERPL-MCNC: 2 MG/DL (ref 1.6–2.3)
MCH RBC QN AUTO: 31.2 PG (ref 26.5–33)
MCHC RBC AUTO-ENTMCNC: 31.9 G/DL (ref 31.5–36.5)
MCV RBC AUTO: 98 FL (ref 78–100)
MICRO REPORT STATUS: NORMAL
MICRO REPORT STATUS: NORMAL
PHOSPHATE SERPL-MCNC: 1.9 MG/DL (ref 2.5–4.5)
PLATELET # BLD AUTO: 312 10E9/L (ref 150–450)
POTASSIUM SERPL-SCNC: 3.8 MMOL/L (ref 3.4–5.3)
RBC # BLD AUTO: 2.34 10E12/L (ref 4.4–5.9)
SODIUM SERPL-SCNC: 146 MMOL/L (ref 133–144)
SPECIMEN SOURCE: NORMAL
SPECIMEN SOURCE: NORMAL
WBC # BLD AUTO: 11.5 10E9/L (ref 4–11)

## 2017-04-07 PROCEDURE — 25000128 H RX IP 250 OP 636: Performed by: HOSPITALIST

## 2017-04-07 PROCEDURE — 94003 VENT MGMT INPAT SUBQ DAY: CPT

## 2017-04-07 PROCEDURE — 83735 ASSAY OF MAGNESIUM: CPT | Performed by: INTERNAL MEDICINE

## 2017-04-07 PROCEDURE — 93970 EXTREMITY STUDY: CPT

## 2017-04-07 PROCEDURE — 85027 COMPLETE CBC AUTOMATED: CPT | Performed by: INTERNAL MEDICINE

## 2017-04-07 PROCEDURE — 40000008 ZZH STATISTIC AIRWAY CARE

## 2017-04-07 PROCEDURE — 25000125 ZZHC RX 250: Performed by: INTERNAL MEDICINE

## 2017-04-07 PROCEDURE — 27210437 ZZH NUTRITION PRODUCT SEMIELEM INTERMED LITER

## 2017-04-07 PROCEDURE — 99291 CRITICAL CARE FIRST HOUR: CPT | Performed by: INTERNAL MEDICINE

## 2017-04-07 PROCEDURE — 25000128 H RX IP 250 OP 636: Performed by: INTERNAL MEDICINE

## 2017-04-07 PROCEDURE — 25000132 ZZH RX MED GY IP 250 OP 250 PS 637: Performed by: HOSPITALIST

## 2017-04-07 PROCEDURE — 20000003 ZZH R&B ICU

## 2017-04-07 PROCEDURE — 25000132 ZZH RX MED GY IP 250 OP 250 PS 637: Performed by: INTERNAL MEDICINE

## 2017-04-07 PROCEDURE — 40000275 ZZH STATISTIC RCP TIME EA 10 MIN

## 2017-04-07 PROCEDURE — 93970 EXTREMITY STUDY: CPT | Mod: XS

## 2017-04-07 PROCEDURE — 25000131 ZZH RX MED GY IP 250 OP 636 PS 637: Performed by: INTERNAL MEDICINE

## 2017-04-07 PROCEDURE — 84100 ASSAY OF PHOSPHORUS: CPT | Performed by: INTERNAL MEDICINE

## 2017-04-07 PROCEDURE — 27210429 ZZH NUTRITION PRODUCT INTERMEDIATE LITER

## 2017-04-07 PROCEDURE — 00000146 ZZHCL STATISTIC GLUCOSE BY METER IP

## 2017-04-07 PROCEDURE — 80048 BASIC METABOLIC PNL TOTAL CA: CPT | Performed by: INTERNAL MEDICINE

## 2017-04-07 RX ORDER — LORAZEPAM 2 MG/ML
2 CONCENTRATE ORAL EVERY 4 HOURS
Status: DISCONTINUED | OUTPATIENT
Start: 2017-04-07 | End: 2017-04-11 | Stop reason: HOSPADM

## 2017-04-07 RX ADMIN — HEPARIN SODIUM 5000 UNITS: 10000 INJECTION, SOLUTION INTRAVENOUS; SUBCUTANEOUS at 21:15

## 2017-04-07 RX ADMIN — HEPARIN SODIUM 5000 UNITS: 10000 INJECTION, SOLUTION INTRAVENOUS; SUBCUTANEOUS at 13:01

## 2017-04-07 RX ADMIN — INSULIN ASPART 3 UNITS: 100 INJECTION, SOLUTION INTRAVENOUS; SUBCUTANEOUS at 13:16

## 2017-04-07 RX ADMIN — PIPERACILLIN AND TAZOBACTAM 4.5 G: 4; .5 INJECTION, POWDER, FOR SOLUTION INTRAVENOUS at 17:36

## 2017-04-07 RX ADMIN — Medication 1 MG/HR: at 05:52

## 2017-04-07 RX ADMIN — GABAPENTIN 300 MG: 250 SUSPENSION ORAL at 00:34

## 2017-04-07 RX ADMIN — INSULIN ASPART 3 UNITS: 100 INJECTION, SOLUTION INTRAVENOUS; SUBCUTANEOUS at 00:33

## 2017-04-07 RX ADMIN — INSULIN ASPART 3 UNITS: 100 INJECTION, SOLUTION INTRAVENOUS; SUBCUTANEOUS at 15:43

## 2017-04-07 RX ADMIN — MULTIVITAMIN 15 ML: LIQUID ORAL at 08:26

## 2017-04-07 RX ADMIN — Medication 2 MG: at 21:15

## 2017-04-07 RX ADMIN — INSULIN ASPART 2 UNITS: 100 INJECTION, SOLUTION INTRAVENOUS; SUBCUTANEOUS at 08:31

## 2017-04-07 RX ADMIN — GABAPENTIN 300 MG: 250 SUSPENSION ORAL at 08:25

## 2017-04-07 RX ADMIN — Medication 2 G: at 06:28

## 2017-04-07 RX ADMIN — INSULIN GLARGINE 15 UNITS: 100 INJECTION, SOLUTION SUBCUTANEOUS at 08:32

## 2017-04-07 RX ADMIN — ACETAMINOPHEN 650 MG: 160 SUSPENSION ORAL at 08:26

## 2017-04-07 RX ADMIN — Medication 2 MG: at 13:00

## 2017-04-07 RX ADMIN — PANTOPRAZOLE SODIUM 40 MG: 40 TABLET, DELAYED RELEASE ORAL at 13:01

## 2017-04-07 RX ADMIN — PIPERACILLIN AND TAZOBACTAM 4.5 G: 4; .5 INJECTION, POWDER, FOR SOLUTION INTRAVENOUS at 06:30

## 2017-04-07 RX ADMIN — GABAPENTIN 300 MG: 250 SUSPENSION ORAL at 21:22

## 2017-04-07 RX ADMIN — INSULIN GLARGINE 3 UNITS: 100 INJECTION, SOLUTION SUBCUTANEOUS at 11:51

## 2017-04-07 RX ADMIN — PIPERACILLIN AND TAZOBACTAM 4.5 G: 4; .5 INJECTION, POWDER, FOR SOLUTION INTRAVENOUS at 13:02

## 2017-04-07 RX ADMIN — HEPARIN SODIUM 5000 UNITS: 10000 INJECTION, SOLUTION INTRAVENOUS; SUBCUTANEOUS at 04:11

## 2017-04-07 RX ADMIN — CHLORHEXIDINE GLUCONATE 15 ML: 1.2 RINSE ORAL at 08:25

## 2017-04-07 RX ADMIN — PIPERACILLIN AND TAZOBACTAM 4.5 G: 4; .5 INJECTION, POWDER, FOR SOLUTION INTRAVENOUS at 00:34

## 2017-04-07 RX ADMIN — SENNOSIDES A AND B 10 ML: 415.36 LIQUID ORAL at 17:37

## 2017-04-07 RX ADMIN — INSULIN ASPART 2 UNITS: 100 INJECTION, SOLUTION INTRAVENOUS; SUBCUTANEOUS at 04:24

## 2017-04-07 RX ADMIN — FENTANYL CITRATE 50 MCG: 50 INJECTION, SOLUTION INTRAMUSCULAR; INTRAVENOUS at 01:50

## 2017-04-07 RX ADMIN — DOCUSATE SODIUM 100 MG: 50 LIQUID ORAL at 17:36

## 2017-04-07 RX ADMIN — Medication 2 MG: at 17:37

## 2017-04-07 RX ADMIN — CEFAZOLIN SODIUM 1 G: 1 INJECTION, POWDER, FOR SOLUTION INTRAMUSCULAR; INTRAVENOUS at 21:15

## 2017-04-07 RX ADMIN — CEFAZOLIN SODIUM 1 G: 1 INJECTION, POWDER, FOR SOLUTION INTRAMUSCULAR; INTRAVENOUS at 13:02

## 2017-04-07 RX ADMIN — CEFAZOLIN SODIUM 1 G: 1 INJECTION, POWDER, FOR SOLUTION INTRAMUSCULAR; INTRAVENOUS at 05:55

## 2017-04-07 RX ADMIN — INSULIN ASPART 3 UNITS: 100 INJECTION, SOLUTION INTRAVENOUS; SUBCUTANEOUS at 21:23

## 2017-04-07 RX ADMIN — CHLORHEXIDINE GLUCONATE 15 ML: 1.2 RINSE ORAL at 21:15

## 2017-04-07 RX ADMIN — ALTEPLASE 1 MG: 2.2 INJECTION, POWDER, LYOPHILIZED, FOR SOLUTION INTRAVENOUS at 13:01

## 2017-04-07 RX ADMIN — PANTOPRAZOLE SODIUM 40 MG: 40 TABLET, DELAYED RELEASE ORAL at 00:34

## 2017-04-07 RX ADMIN — Medication 1 PACKET: at 08:35

## 2017-04-07 RX ADMIN — OLANZAPINE 20 MG: 10 TABLET, FILM COATED ORAL at 21:15

## 2017-04-07 NOTE — PROVIDER NOTIFICATION
udpate to Tele hub with regard to Phos level of 1.9 this a.m.  No phos replacement available.  Request for replacement.

## 2017-04-07 NOTE — PLAN OF CARE
Problem: Individualization  Goal: Patient Preferences  Outcome: Improving  Late yesterday evening, pt was able to participate, to follow commands.  Versed drip had been running at 1mg/hr.  Wife at  Bedside and was able to be with pt to communicate with him.  Pt became restless with more stimulation.  Versed drip increased to 2mg/hr for the night as pt's family stated that they did not want him awake all night long.  Febrile at start of shift, tylenol given along with a cool wipedown. pts temp down to 37.1C this a.m.  Daughter at bedside during the night.  Family updated frequently t/o shift.  Replaced Mg+ this a.m. However Phos low.

## 2017-04-07 NOTE — PROGRESS NOTES
CLINICAL NUTRITION SERVICES - REASSESSMENT NOTE      Recommendations Ordered by Registered Dietitian (RD):   Change TF formula to Promote with Fiber at 55 mL/hr now and advance by 15 mL/hr q 8 hrs to the following goal regimen:    Promote with Fiber at 85 mL/hr to provide: 2040 kcals (29 kcal/kg), 129 gm pro (1.9 gm/kg), 1693 mL H20, 282 gm CHO, and 29 gm fiber    D/C Prostat.   Malnutrition: Non-Severe malnutrition- In Context of:  Acute illness or injury       EVALUATION OF PROGRESS TOWARD GOALS   Diet:  NPO, trach in place    Nutrition Support:      Nutrition Support Enteral:  Type of Feeding Tube: G-tube  Enteral Frequency:  Continuous  Enteral Regimen: Peptamen 1.5 at 55 mL/hr + 1 pckt Prostat/day  Total Enteral Provisions: 2080 kcals (30 kcal/kg), 105 g PRO (1.5 g PRO/kg), 1016 mL free water, 258 g CHO, and no fiber  Free Water Flush: 500 mL q 4 hrs    Intake/Tolerance:  Last BM: 4/4    Na: 146 (H)  Phos: 1.9 (L- not yet replaced)    BGM: 209-281 in the past 24 hrs- Med SSI + 15 units Lantus in am    Dosing Weight 69.6 kg (admit weight)      ASSESSED NUTRITION NEEDS (Updated PRO needs with noted muscle wasting):  Estimated Energy Needs: 7825-5677 kcals (25-30 Kcal/Kg)  Justification: maintenance  Estimated Protein Needs: 105-139 grams protein (1.5-2.0 g pro/Kg)  Justification: hypercatabolism with critical illness    NEW FINDINGS:   Per discussion with wife, pt has lost a lot of strength and muscle mass. Noted muscle wasting in shoulder, clavicle, and temporal region.    4/4: G-tube placed, trach placed    Continues on Certavite- for previous mucosal PI on tip of tongue.    Hopes to d/c early next week     Previous Goals:   TF + ProStat will meet % estimated needs.  Evaluation: Met    Pt to advance to goal rate within the next 48-72 hrs.  Evaluation: Met    Previous Nutrition Diagnosis:   Inadequate enteral nutrition infusion related to TF on hold today pending procedures as evidenced by 0% of energy and  protein needs.  Evaluation: Completed      MALNUTRITION  % Weight Loss:  None noted  % Intake:  No decreased intake noted  Subcutaneous Fat Loss:  None observed- per wife, pt has always had low fat mass  Muscle Loss:  Moderate temporal, clavicle, and shoulder wasting noted- wife reports this is not his baseline  Fluid Retention:  Mild +2 generalized edema    Malnutrition Diagnosis: Non-Severe malnutrition  In Context of:  Acute illness or injury    CURRENT NUTRITION DIAGNOSIS  Predicted suboptimal nutrient intake (PRO) related to muscle wasting noted in clavicle, shoulder, and temporal regions and TF currently meeting lower end of assessed PRO needs.    INTERVENTIONS  Recommendations / Nutrition Prescription  Change TF formula to Promote with Fiber at 55 mL/hr and advance by 15 mL/hr q 8 hrs to the following goal regimen:    Promote with Fiber at 85 mL/hr to provide: 2040 kcals (29 kcal/kg), 129 gm pro (1.9 gm/kg), 1693 mL H20, 282 gm CHO, and 29 gm fiber    D/C Prostat.    Implementation  EN Composition- formula change as above  Collaboration and Referral of Nutrition care- discussed POC with team during rounds, MD gave the OK to switch TF formula    Goals  Promote with Fiber at 85 mL/hr to provide % of nutritional needs.      MONITORING AND EVALUATION:  Progress towards goals will be monitored and evaluated per protocol and Practice Guidelines      June Zapata, Dietetic Intern

## 2017-04-07 NOTE — PROGRESS NOTES
[facility] ICU RESPIRATORY NOTE  Date of Admission: 3/17/2017  Date of Intubation (most recent): 3/17/2017, Trach 4/4/2017  Reason for Mechanical Ventilation: Respiratory failure  Number of Days on Mechanical Ventilation: 21  Met Criteria for Pressure Support Trial:   Length of Pressure Support Trial:   Reason for Stopping Pressure Support Trial:       Significant Events Today: None  Ventilation Mode: CMV/AC  FiO2 (%): 40 %  Rate Set (breaths/minute): 18 breaths/min  Tidal Volume Set (mL): 400 mL  PEEP (cm H2O): 5 cmH2O  Pressure Support (cm H2O): 16 cmH2O  Oxygen Concentration (%): 40 %  Resp: 24        ABG Results: No ABG result for today      ETT appearance on chest x-ray: Trach site clean, intact and secure      Plan: Continue daily assessment and weaning.  4/7/2017  Cee Baugh

## 2017-04-07 NOTE — PROGRESS NOTES
Pressure support trial failed after 3 minutes, Pt was tachypnic and agitated, will follow up.4/7/2017  Lilly Mauro

## 2017-04-07 NOTE — PROGRESS NOTES
Critical Care Progress Note      04/07/2017    Name: Wyatt Mahajan MRN#: [id number]   Age: 57 year old YOB: 1960     Hsptl Day# 21  ICU DAY #21 trached 4/4    MV DAY #21           Problem List:   Active Problems:    MSSA (methicillin susceptible Staphylococcus aureus) septicemia (H)    MSSA (methicillin susceptible Staphylococcus aureus) pneumonia (H)    Fever    Leukocytosis    Influenza B    Acute respiratory failure with hypoxia (H)  MSSA/enterobacter/klebsiella pna   MSSA bacteremia--resolved  VAP (GNR)  Influenza B  JOSE--stable  Hematuria---bell trauma?--improving  pancreatitis   Quanterferon positive         Summary/Hospital Course:   Wyatt Mahajan is a 57 year old male admitted on 3/17 to the floor, presenting with acute hypoxic respiratory failure due to influenza B and CAP, right chest wall pain and uncontrolled DM-II. Patient was subsequently transferred from the floor to the MICU for increased work of breathing, accessory muscle use and respiratory distress. Was intubated due to increased work of breathing, tachypnea and decreasing O2 sats.  His hospital course has been complicated by:  1. Vent-associated pneumonia for which his abx coverage was broadened to zoysn.  Ancef was restarted on 3/30 per ID request for MSSA bacteremia.  2.   MSSA bacteremia, likely from the pna which cleared after the second set of blood cx.  3.  Delirium which has persisted through propofol, precedex, valproate, seroquel and gabapentin.  4.  Pancreatitis, likely due to propofol, which is now improving with post-pyloric feeds.  5.  Mild transaminitis:  Drug induced vs crit illness induced  6.  Mild JOSE:  Usually worsens with attempted diuresis.  7.  Hematuria:  Believed 2/2 bell trauma, now resolved.  8.  Bradycardia:  Seems due to precedex, resolved when off precedex.  9.  A fib with rvr:  Resolved with amio loading.    10.  Anemia of critical illness, family has been hesitant to allow transfusions for  hgb 7.   4/3: Event over weekend noted, some agitation, head CT negative, transfused 1 unit PRBC with partial response,  PEG and trach delayed as quantiferon pending , ruling out for TB  - AFB smear neg x 2.   4/4 -emerges with sedation holiday, quantiferon positive 4.5, 3rd, AFB sputum collected, pending, still temp 39,  -PEG and trach completed, repeat US no evidence of cholecystitis, ?abnormality in right kidney -  4/5 - weaning versed, temp trend lower afebrile at present. Stable on vent.  No further AFB results.  4/6 - waking up, fever curve slowly trending down, sputum and urine cultures pending,   4/7 - dc fentanyl and versed, 3rd AFB smear negative reported this AM.       Assessment and plan :     Wyatt Mahajan IS a 57 year old male admitted on 3/17/2017 for respiratory failure, flu B, mssa pna and mssa bacteremia.   I have personally reviewed the daily labs, imaging studies, cultures and discussed the case with referring physician and consulting physicians.   My assessment and plan by system for this patient is as follows:    Neurology/Psychiatry:   1. Sedation: on versed, weaning off drip. Will schedule ativan every 4 hrs with plan to wean  2. Analgesia - on fentanyl drip - plan to wean off. Has PRN fentanyl   3. Delirium:  3/31: transition to olanzapine (valproate ineffective as well no response to quetiapine)   4. H/o Diabetic neuropathy 2/2 DM-II:     - gabapentin 300mgTID.       Cardiovascular:   1. Hypertension: standing hydralazine started, follow for now, BP labile when agitated  2.  A fib with RVR:  In setting of critical illness. Improved. Required amiodarone, Will dose prn metoprolol H/o HLD:  Holding atorvastatin (LFT elevated)  3. H/o PAD:  hold ASA given prior hematuria and anemia   4. Hyperlactemia:  Resolved      Pulmonary/Ventilator Management:   1.  Respiratory failure, hypoxemic:  2/2 mssa pna, vap (atb for S aureus and E cloacae & K pneumoniae isolated from sputum) , and influenza B  - repeat swab negative.    --Lung protective vent strategy- PCV plus assist ventilation s/p trach 4/4  - PS trials as tolerated 12-15 /5 presently       GI and Nutrition :   1.  TF  2.  PPI for PUD prophylaxis  3.  Constipation--improved on bowel reg.  4.  Elevated liver enzymes: newly elevated on 3/24, nl on 3/17, ? Med related vs ? RUQ U/S revealed a distended GB w/sludge vs related to pancreatitis. Subsequent LFT's have shown decreases of Alk phos and ALT levels since discontinuation of statin.  --serial LFT's,   5. Pancreatitis:  Due to critical illness vs propofol.  No further propofol. Improved   6. s/p PEG  - PPI to bid    Renal/Fluids/Electrolytes:   1. JOSE-- cr stablilized , UOP stable  UA showed no casts. Urine eos < 1%   -- stable today, trend Creatinine. Urine output stable   2. Hypernatremia -  PO free water - slight increase, follow trend    :  1. ? Pyelo on abd US - clinically not consistent - await urine culture   2. Hematuria: bloodly urine AM of 3/23 probably due to trauma from the bell catheter. Resolved 3/30.     Infectious Disease:   1. Persistent fever - multiple source (sinusitis, thrombophlebitis, lines, etc) no decub per nursing - fever curve down after removal of NG tube - suspect culprit  2. Flu B:  Repeat flu swab negative thus DC Tamiflu -   3. MSSA pna/bacteremia and gm neg VAP: Nafcillin until 3/24, then ancef until 3/26, then zosyn and vanco 3/26 until  vanco stopped 3/29.  Ancef re-added to existing zosyn on 3/30 per ID.  - Bld cultures cleared  TTE did not show valvular masses/vegetations. CT chest negative for occult abscess.   4. 3/29 sputum cx still with GNR, but no further staph.  --ID consulted 3/30 for persistent + sputum cx. Repeat culture NGTD   5. ?Latent AFB - positive quantiferon, AFB neg x3 (reported 4/7) - DC airborne precautions   6. ? Pyelo on US - FUP urine culture , CT scan last week negative for abnormality urine culture negative   7. Sinusitis -on CT scan - NG  tube removed, follow clinically - fever and wbc count downtrending since removal       Endocrine:   1. Hyperglycemia:    -- increase lantus to 15, sugars still elevated - will increase to 18      Hematology/Oncology:   1. Anemia Hgb -labile;  no overt bleeding  s/p 1 u PRBC 4/5 with appropriate response.   2. Leukocytosis-- 2/2 infection. Patient febrile on and off. Procalcitonin mild elevated improving   3. R arm swelling  -- superficial thrombus only.   Warm compresses. R-evaluate US 4/7 to look for progression- earlier as indicated - will repeat upper ext and low ext dopplers      IV/Access:   1. Venous access - peripheral and PICC    ICU Prophylaxis:   1. DVT:  hep SubQ 3/30  2. VAP: HOB 30 degrees, chlorhexidine rinse  3. Stress Ulcer: PPI  4. Restraints: Nonviolent soft two point restraints required and necessary for patient safety and continued cares and good effect as patient continues to pull at necessary lines, tubes despite education and distraction. Will readdress daily.   6. Feeding - tube feeds restarted   7. Family Update: wife and daughter at bedside  8. Disposition - critically ill    [facility] 3/27 , 3/30, 3/31, 4/3:          Key Medications:   Reviewed          Physical Examination:   Temp:  [98.4  F (36.9  C)-100.8  F (38.2  C)] 98.8  F (37.1  C)  Heart Rate:  [] 87  Resp:  [18-38] 23  BP: (147-170)/() 170/90  FiO2 (%):  [40 %] 40 %  SpO2:  [92 %-100 %] 100 %      Intake/Output Summary (Last 24 hours) at 04/07/17 1030  Last data filed at 04/07/17 0600   Gross per 24 hour   Intake          3770.26 ml   Output             3550 ml   Net           220.26 ml       Wt Readings from Last 4 Encounters:   04/07/17 81.9 kg (180 lb 8.9 oz)   03/16/17 70.3 kg (155 lb)   01/17/17 71.3 kg (157 lb 1.6 oz)   11/30/16 72.6 kg (160 lb)     BP - Mean:  [100-136] 128  Ventilation Mode: CMV/AC  FiO2 (%): 40 %  Rate Set (breaths/minute): 18 breaths/min  Tidal Volume Set (mL): 400 mL  PEEP (cm H2O): 5  cmH2O  Pressure Support (cm H2O): 16 cmH2O  Oxygen Concentration (%): 40 %  Resp: 23  No lab results found in last 7 days.     GEN: mild distress at times opens eyes interactive   HEENT: head ncat, sclera anicteric, OP patent, trachea midline   PULM: unlabored, coarse lung sounds anteriorly, rhonchi bilaterally improved    CV/COR: RRR S1/S2, No rub, murmur or gallop  ABD: soft, non distended,  nontender, hypoactive bowel sounds, no masses  EXT:  peripheral edema present in R>L hand, warm x4 and well-perfused.   NEURO:  follows commands more interactive bilateral lower extremities 4 extremities weak  SKIN: no obvious rash  LINES: clean, dry intact         Data:       Recent Labs  Lab 04/07/17 0425 04/06/17 0457 04/05/17  1352 04/05/17  0500 04/04/17  0650   * 146*  --  149* 147*   POTASSIUM 3.8 3.8 4.1 3.3* 3.6   CHLORIDE 114* 114*  --  115* 114*   CO2 25 25  --  25 25   ANIONGAP 7 7  --  9 8   * 261*  --  208* 143*   BUN 23 23  --  26 26   CR 1.40* 1.56*  --  1.63* 1.50*   GFRESTIMATED 53* 46*  --  44* 49*   GFRESTBLACK 64 56*  --  53* 59*   LEONIE 7.8* 7.7*  --  7.9* 8.4*   MAG 2.0 2.1  --  2.2 2.3   PHOS 1.9* 2.2*  --  2.4* 2.7   PROTTOTAL  --   --   --  7.1 7.9   ALBUMIN  --   --   --  1.4* 1.5*   BILITOTAL  --   --   --  0.9 1.0   ALKPHOS  --   --   --  127 155*   AST  --   --   --  202* 322*   ALT  --   --   --  23 37       CBC    Recent Labs  Lab 04/07/17 0425 04/06/17 0457 04/05/17  1352 04/05/17  1035 04/05/17  0500   WBC 11.5* 12.3*  --  15.3* 16.3*   RBC 2.34* 2.31*  --  2.09* 1.99*   HGB 7.3* 7.3* 7.5* 6.7* 6.5*   HCT 22.9* 22.6*  --  20.5* 19.8*   MCV 98 98  --  98 100   MCH 31.2 31.6  --  32.1 32.7   MCHC 31.9 32.3  --  32.7 32.8   RDW 20.0* 20.0*  --  19.3* 18.9*    330  --  368 390     INR    Recent Labs  Lab 04/04/17  0650   INR 1.18*     Arterial Blood Gas  No lab results found in last 7 days.    All cultures:    Recent Labs  Lab 04/06/17  1845 04/05/17  1103 04/02/17  1025  04/01/17  1013   CULT Pending No growth No growth after 5 days Culture received and in progress.  Positive AFB results are called as soon as detected.  Final report to follow in 7 to 8 weeks.  Canceled, Test credited Duplicate request     Imaging:  CXR   No results found for this or any previous visit (from the past 24 hour(s)).    Billing: This patient is critically ill: Yes. Total critical care time today 30 min.

## 2017-04-07 NOTE — PROGRESS NOTES
[facility] ICU RESPIRATORY NOTE  Date of Admission: 3/17/2017  Date of Intubation (most recent): 3/17/2017, Trach 4/4/2017  Reason for Mechanical Ventilation: Respiratory failure  Number of Days on Mechanical Ventilation: 21  Met Criteria for Pressure Support Trial: Yes  Reason for Stopping Pressure Support Trial: rest for NOC  ABG Results: No ABG ordered during evening shift   ETT appearance on chest x-ray: Trach secured.    Plan: Pt continues on full ventilator support and PS daily, track site clean.    Cruz Mahajan RT.

## 2017-04-07 NOTE — PROGRESS NOTES
"[facility]  Infectious Disease Progress Note          Assessment and Plan:   Impression:   57 y.o male admitted about 2 weeks ago with concern for secondary bacterial pneumonia on the top of Influenza B.   Worsening respiratory status intubated.   Sputum Cultures positive for MSSA.   Patient is originally from [facility]. Immigrated in 1990. The records in the [place] system start in 2004, no data available of his TB status, I do not see any PPD done. CXR from before have been negative.   Per wife when they first moved to [place] they were \" checked for Tb and all was negative\".   He was admitted this occasion with complaints of pleuritic chest pain. This was after he was diagnosed with Influenza B. His respiratory status quickly worsened. He was intubated and remains that way.  Blood cultures 2/2 positive for MSSA. Cultures from the sputum positive for MSSA, Enterobacter one strain more resistant than the other, Klebsiella. He has been on appropriate antibiotics since admission but has been febrile, still intubated.   Diarrhea- C diff neg  Positive TB quantiferon.  This, like pos PPD, would indicate previous exposure to TB.  Sputum AFB smear neg x 2.     Recommendations:     Isolation.  Taking a long time to get 3rd AFB smear result.  Will try calling [place] to see if we can expedite.   If neg could d/c iso.  Cont  Ancef for MSSA bacteremia.    continue zosyn for GNR in the sputum.   Will repeat bacterial sputum cx.  No growth thus far.  If issues on the w/e please call Dr. Jain.             Interval History:   Temp lower past 24 hrs, Tmax 100.8.  Afebrile at present.  Stable on vent.  No further AFB results.  Repeat routine sputum cx neg thus far.              Medications:       insulin glargine  15 Units Subcutaneous QAM AC     OLANZapine  20 mg Oral At Bedtime     sennosides  5-10 mL Oral or Feeding Tube BID    And     docusate   mg Oral or Feeding Tube BID     insulin aspart  1-6 Units " "Subcutaneous Q4H     piperacillin-tazobactam  4.5 g Intravenous Q6H     pantoprazole  40 mg Per Feeding Tube Q12H     ceFAZolin  1 g Intravenous Q8H     heparin  5,000 Units Subcutaneous Q8H     multivitamins with minerals  15 mL Per Feeding Tube Daily     protein modular  1 packet Per Feeding Tube Daily     sodium chloride (PF)  10 mL Intracatheter Q7 Days     gabapentin  300 mg Oral or Feeding Tube Q8H ALEC     pneumococcal vaccine  0.5 mL Intramuscular Prior to discharge     sodium chloride (PF)  3 mL Intracatheter Q8H     chlorhexidine  15 mL Mouth/Throat Q12H                  Physical Exam:   Blood pressure 170/90, pulse 115, temperature 98.8  F (37.1  C), resp. rate 23, height 1.778 m (5' 10\"), weight 81.9 kg (180 lb 8.9 oz), SpO2 100 %.  [unfilled]  Vital Signs with Ranges  Temp:  [98.4  F (36.9  C)-100.8  F (38.2  C)] 98.8  F (37.1  C)  Heart Rate:  [] 87  Resp:  [18-38] 23  BP: (147-170)/() 170/90  FiO2 (%):  [40 %] 40 %  SpO2:  [92 %-100 %] 100 %    Constitutional: Awake, alert, cooperative, no apparent distress   Lungs: Clear to auscultation bilaterally, no crackles or wheezing   Cardiovascular: Regular rate and rhythm, normal S1 and S2, and no murmur noted   Abdomen: Normal bowel sounds, soft, non-distended, non-tender   Skin: No rashes, no cyanosis, no edema   Other:           Data:   All microbiology laboratory data reviewed.  Recent Labs   Lab Test  04/07/17 0425 04/06/17   0457  04/05/17   1352  04/05/17   1035   WBC  11.5*  12.3*   --   15.3*   HGB  7.3*  7.3*  7.5*  6.7*   HCT  22.9*  22.6*   --   20.5*   MCV  98  98   --   98   PLT  312  330   --   368     Recent Labs   Lab Test  04/07/17   0425  04/06/17   0457  04/05/17   0500   CR  1.40*  1.56*  1.63*     Recent Labs   Lab Test  09/14/09   1021   SED  9     "

## 2017-04-08 ENCOUNTER — APPOINTMENT (OUTPATIENT)
Dept: GENERAL RADIOLOGY | Facility: CLINIC | Age: 57
DRG: 870 | End: 2017-04-08
Attending: INTERNAL MEDICINE
Payer: COMMERCIAL

## 2017-04-08 LAB
ALBUMIN SERPL-MCNC: 1.4 G/DL (ref 3.4–5)
ALP SERPL-CCNC: 257 U/L (ref 40–150)
ALT SERPL W P-5'-P-CCNC: 22 U/L (ref 0–70)
ANION GAP SERPL CALCULATED.3IONS-SCNC: 10 MMOL/L (ref 3–14)
AST SERPL W P-5'-P-CCNC: 92 U/L (ref 0–45)
BACTERIA SPEC CULT: NO GROWTH
BASOPHILS # BLD AUTO: 0 10E9/L (ref 0–0.2)
BASOPHILS NFR BLD AUTO: 0.3 %
BILIRUB SERPL-MCNC: 0.9 MG/DL (ref 0.2–1.3)
BUN SERPL-MCNC: 22 MG/DL (ref 7–30)
CALCIUM SERPL-MCNC: 7.8 MG/DL (ref 8.5–10.1)
CHLORIDE SERPL-SCNC: 112 MMOL/L (ref 94–109)
CO2 SERPL-SCNC: 24 MMOL/L (ref 20–32)
CREAT SERPL-MCNC: 1.39 MG/DL (ref 0.66–1.25)
DIFFERENTIAL METHOD BLD: ABNORMAL
EOSINOPHIL # BLD AUTO: 0.3 10E9/L (ref 0–0.7)
EOSINOPHIL NFR BLD AUTO: 2.3 %
ERYTHROCYTE [DISTWIDTH] IN BLOOD BY AUTOMATED COUNT: 20 % (ref 10–15)
GFR SERPL CREATININE-BSD FRML MDRD: 53 ML/MIN/1.7M2
GLUCOSE BLDC GLUCOMTR-MCNC: 255 MG/DL (ref 70–99)
GLUCOSE BLDC GLUCOMTR-MCNC: 284 MG/DL (ref 70–99)
GLUCOSE BLDC GLUCOMTR-MCNC: 344 MG/DL (ref 70–99)
GLUCOSE BLDC GLUCOMTR-MCNC: 347 MG/DL (ref 70–99)
GLUCOSE BLDC GLUCOMTR-MCNC: 354 MG/DL (ref 70–99)
GLUCOSE SERPL-MCNC: 291 MG/DL (ref 70–99)
HCT VFR BLD AUTO: 24.8 % (ref 40–53)
HGB BLD-MCNC: 7.9 G/DL (ref 13.3–17.7)
IMM GRANULOCYTES # BLD: 0 10E9/L (ref 0–0.4)
IMM GRANULOCYTES NFR BLD: 0.3 %
LYMPHOCYTES # BLD AUTO: 2.9 10E9/L (ref 0.8–5.3)
LYMPHOCYTES NFR BLD AUTO: 22.6 %
Lab: NORMAL
MAGNESIUM SERPL-MCNC: 2.1 MG/DL (ref 1.6–2.3)
MCH RBC QN AUTO: 30.9 PG (ref 26.5–33)
MCHC RBC AUTO-ENTMCNC: 31.9 G/DL (ref 31.5–36.5)
MCV RBC AUTO: 97 FL (ref 78–100)
MICRO REPORT STATUS: NORMAL
MONOCYTES # BLD AUTO: 0.7 10E9/L (ref 0–1.3)
MONOCYTES NFR BLD AUTO: 5.5 %
NEUTROPHILS # BLD AUTO: 8.8 10E9/L (ref 1.6–8.3)
NEUTROPHILS NFR BLD AUTO: 69 %
NRBC # BLD AUTO: 0 10*3/UL
NRBC BLD AUTO-RTO: 0 /100
PHOSPHATE SERPL-MCNC: 1.7 MG/DL (ref 2.5–4.5)
PLATELET # BLD AUTO: 327 10E9/L (ref 150–450)
POTASSIUM SERPL-SCNC: 3.7 MMOL/L (ref 3.4–5.3)
PROT SERPL-MCNC: 7.5 G/DL (ref 6.8–8.8)
RBC # BLD AUTO: 2.56 10E12/L (ref 4.4–5.9)
SODIUM SERPL-SCNC: 146 MMOL/L (ref 133–144)
SPECIMEN SOURCE: NORMAL
WBC # BLD AUTO: 12.8 10E9/L (ref 4–11)

## 2017-04-08 PROCEDURE — 99291 CRITICAL CARE FIRST HOUR: CPT | Performed by: INTERNAL MEDICINE

## 2017-04-08 PROCEDURE — 40000275 ZZH STATISTIC RCP TIME EA 10 MIN

## 2017-04-08 PROCEDURE — 25000132 ZZH RX MED GY IP 250 OP 250 PS 637: Performed by: HOSPITALIST

## 2017-04-08 PROCEDURE — 25000128 H RX IP 250 OP 636: Performed by: INTERNAL MEDICINE

## 2017-04-08 PROCEDURE — 40000257 ZZH STATISTIC CONSULT NO CHARGE VASC ACCESS

## 2017-04-08 PROCEDURE — 25000132 ZZH RX MED GY IP 250 OP 250 PS 637: Performed by: INTERNAL MEDICINE

## 2017-04-08 PROCEDURE — 85025 COMPLETE CBC W/AUTO DIFF WBC: CPT | Performed by: INTERNAL MEDICINE

## 2017-04-08 PROCEDURE — 25000125 ZZHC RX 250: Performed by: INTERNAL MEDICINE

## 2017-04-08 PROCEDURE — 25000131 ZZH RX MED GY IP 250 OP 636 PS 637: Performed by: INTERNAL MEDICINE

## 2017-04-08 PROCEDURE — 27210429 ZZH NUTRITION PRODUCT INTERMEDIATE LITER

## 2017-04-08 PROCEDURE — 94003 VENT MGMT INPAT SUBQ DAY: CPT

## 2017-04-08 PROCEDURE — 83735 ASSAY OF MAGNESIUM: CPT | Performed by: INTERNAL MEDICINE

## 2017-04-08 PROCEDURE — 84100 ASSAY OF PHOSPHORUS: CPT | Performed by: INTERNAL MEDICINE

## 2017-04-08 PROCEDURE — 71010 XR CHEST PORT 1 VW: CPT

## 2017-04-08 PROCEDURE — 20000003 ZZH R&B ICU

## 2017-04-08 PROCEDURE — 40000239 ZZH STATISTIC VAT ROUNDS

## 2017-04-08 PROCEDURE — 25000128 H RX IP 250 OP 636: Performed by: HOSPITALIST

## 2017-04-08 PROCEDURE — 00000146 ZZHCL STATISTIC GLUCOSE BY METER IP

## 2017-04-08 PROCEDURE — 80053 COMPREHEN METABOLIC PANEL: CPT | Performed by: INTERNAL MEDICINE

## 2017-04-08 RX ORDER — MEROPENEM 1 G/1
1 INJECTION, POWDER, FOR SOLUTION INTRAVENOUS EVERY 8 HOURS
Status: DISCONTINUED | OUTPATIENT
Start: 2017-04-08 | End: 2017-04-11 | Stop reason: HOSPADM

## 2017-04-08 RX ORDER — FUROSEMIDE 10 MG/ML
20 INJECTION INTRAMUSCULAR; INTRAVENOUS ONCE
Status: COMPLETED | OUTPATIENT
Start: 2017-04-08 | End: 2017-04-08

## 2017-04-08 RX ADMIN — MEROPENEM 1 G: 1 INJECTION, POWDER, FOR SOLUTION INTRAVENOUS at 10:56

## 2017-04-08 RX ADMIN — ACETAMINOPHEN 650 MG: 160 SUSPENSION ORAL at 05:14

## 2017-04-08 RX ADMIN — CHLORHEXIDINE GLUCONATE 15 ML: 1.2 RINSE ORAL at 08:26

## 2017-04-08 RX ADMIN — HEPARIN SODIUM 5000 UNITS: 10000 INJECTION, SOLUTION INTRAVENOUS; SUBCUTANEOUS at 05:06

## 2017-04-08 RX ADMIN — CEFAZOLIN SODIUM 1 G: 1 INJECTION, POWDER, FOR SOLUTION INTRAMUSCULAR; INTRAVENOUS at 21:03

## 2017-04-08 RX ADMIN — ACETAMINOPHEN 650 MG: 160 SUSPENSION ORAL at 16:25

## 2017-04-08 RX ADMIN — HEPARIN SODIUM 5000 UNITS: 10000 INJECTION, SOLUTION INTRAVENOUS; SUBCUTANEOUS at 12:21

## 2017-04-08 RX ADMIN — Medication 2 MG: at 16:25

## 2017-04-08 RX ADMIN — BISACODYL 10 MG: 10 SUPPOSITORY RECTAL at 16:35

## 2017-04-08 RX ADMIN — INSULIN ASPART 3 UNITS: 100 INJECTION, SOLUTION INTRAVENOUS; SUBCUTANEOUS at 05:06

## 2017-04-08 RX ADMIN — INSULIN ASPART 3 UNITS: 100 INJECTION, SOLUTION INTRAVENOUS; SUBCUTANEOUS at 01:08

## 2017-04-08 RX ADMIN — HEPARIN SODIUM 5000 UNITS: 10000 INJECTION, SOLUTION INTRAVENOUS; SUBCUTANEOUS at 21:04

## 2017-04-08 RX ADMIN — DOCUSATE SODIUM 100 MG: 50 LIQUID ORAL at 08:51

## 2017-04-08 RX ADMIN — CEFAZOLIN SODIUM 1 G: 1 INJECTION, POWDER, FOR SOLUTION INTRAMUSCULAR; INTRAVENOUS at 05:24

## 2017-04-08 RX ADMIN — Medication 2 MG: at 21:04

## 2017-04-08 RX ADMIN — PANTOPRAZOLE SODIUM 40 MG: 40 TABLET, DELAYED RELEASE ORAL at 12:31

## 2017-04-08 RX ADMIN — FENTANYL CITRATE 50 MCG: 50 INJECTION, SOLUTION INTRAMUSCULAR; INTRAVENOUS at 02:41

## 2017-04-08 RX ADMIN — CHLORHEXIDINE GLUCONATE 15 ML: 1.2 RINSE ORAL at 21:04

## 2017-04-08 RX ADMIN — OLANZAPINE 20 MG: 10 TABLET, FILM COATED ORAL at 21:03

## 2017-04-08 RX ADMIN — CEFAZOLIN SODIUM 1 G: 1 INJECTION, POWDER, FOR SOLUTION INTRAMUSCULAR; INTRAVENOUS at 16:22

## 2017-04-08 RX ADMIN — GABAPENTIN 300 MG: 250 SUSPENSION ORAL at 01:10

## 2017-04-08 RX ADMIN — Medication 2 MG: at 02:12

## 2017-04-08 RX ADMIN — PIPERACILLIN AND TAZOBACTAM 4.5 G: 4; .5 INJECTION, POWDER, FOR SOLUTION INTRAVENOUS at 01:18

## 2017-04-08 RX ADMIN — Medication 2 MG: at 06:37

## 2017-04-08 RX ADMIN — MIDAZOLAM HYDROCHLORIDE 2 MG: 1 INJECTION, SOLUTION INTRAMUSCULAR; INTRAVENOUS at 02:30

## 2017-04-08 RX ADMIN — INSULIN GLARGINE 18 UNITS: 100 INJECTION, SOLUTION SUBCUTANEOUS at 08:14

## 2017-04-08 RX ADMIN — Medication 2 MG: at 12:30

## 2017-04-08 RX ADMIN — MEROPENEM 1 G: 1 INJECTION, POWDER, FOR SOLUTION INTRAVENOUS at 18:16

## 2017-04-08 RX ADMIN — INSULIN ASPART 5 UNITS: 100 INJECTION, SOLUTION INTRAVENOUS; SUBCUTANEOUS at 08:16

## 2017-04-08 RX ADMIN — MULTIVITAMIN 15 ML: LIQUID ORAL at 08:51

## 2017-04-08 RX ADMIN — HYDRALAZINE HYDROCHLORIDE 20 MG: 20 INJECTION INTRAMUSCULAR; INTRAVENOUS at 02:13

## 2017-04-08 RX ADMIN — GABAPENTIN 300 MG: 250 SUSPENSION ORAL at 21:07

## 2017-04-08 RX ADMIN — PIPERACILLIN AND TAZOBACTAM 4.5 G: 4; .5 INJECTION, POWDER, FOR SOLUTION INTRAVENOUS at 06:37

## 2017-04-08 RX ADMIN — GABAPENTIN 300 MG: 250 SUSPENSION ORAL at 08:51

## 2017-04-08 RX ADMIN — POTASSIUM PHOSPHATE, MONOBASIC AND POTASSIUM PHOSPHATE, DIBASIC 20 MMOL: 224; 236 INJECTION, SOLUTION INTRAVENOUS at 05:47

## 2017-04-08 RX ADMIN — PANTOPRAZOLE SODIUM 40 MG: 40 TABLET, DELAYED RELEASE ORAL at 01:18

## 2017-04-08 RX ADMIN — FUROSEMIDE 20 MG: 10 INJECTION, SOLUTION INTRAVENOUS at 12:18

## 2017-04-08 NOTE — PLAN OF CARE
Problem: Individualization  Goal: Patient Preferences  Outcome: No Change  Patient weaned on vent 40% 16/5 for 2hrs.  Remains SR.  Abdomen distended-stool softeners given early per family request.  Patient mildly febrile-on abx.  Frequent trach suctioning.  Course lung sounds.  Family at bedside and supportive of patient.

## 2017-04-08 NOTE — PROGRESS NOTES
Date of Admission: 3/17/2017  Date of Intubation (most recent): 3/17/2017, Trach 4/4/2017  Reason for Mechanical Ventilation: Respiratory failure  Number of Days on Mechanical Ventilation: 25  Met Criteria for Pressure Support Trial: yes  Length of Pressure Support Trial: None  Reason for Stopping Pressure Support Trial: On aggressive dieretics.      Ventilation Mode: CMV/AC  FiO2 (%): 40 %  Rate Set (breaths/minute): 18 breaths/min  Tidal Volume Set (mL): 400 mL  PEEP (cm H2O): 5 cmH2O  Pressure Support (cm H2O): 16 cmH2O  Oxygen Concentration (%): 40 %  Resp: 33     Significant Events Today: None      ABG Results:No new results     ETT appearance on chest x-ray:No new results     Plan: Will cont daily assessment and weaning.  4/8/2017  .4/8/2017  Lilly Mauro

## 2017-04-08 NOTE — PROVIDER NOTIFICATION
Pt calm and cooperative, following commands appropriately, family at bedside. Restraints discontinued without obvious complications family updated with changes

## 2017-04-08 NOTE — PROGRESS NOTES
Critical Care Progress Note      04/08/2017    Name: Wyatt Mahajan MRN#: [id number]   Age: 57 year old YOB: 1960     Hsptl Day# 22  ICU DAY #22 trached 4/4    MV DAY #22           Problem List:   Active Problems:    MSSA (methicillin susceptible Staphylococcus aureus) septicemia (H)    MSSA (methicillin susceptible Staphylococcus aureus) pneumonia (H)    Fever    Leukocytosis    Influenza B    Acute respiratory failure with hypoxia (H)  MSSA/enterobacter/klebsiella pna   MSSA bacteremia--resolved  VAP (GNR)  Influenza B  JOSE--stable  Hematuria---bell trauma?--improving  pancreatitis   Quanterferon positive         Summary/Hospital Course:   Wyatt Mahajan is a 57 year old male admitted on 3/17 to the floor, presenting with acute hypoxic respiratory failure due to influenza B and CAP, right chest wall pain and uncontrolled DM-II. Patient was subsequently transferred from the floor to the MICU for increased work of breathing, accessory muscle use and respiratory distress. Was intubated due to increased work of breathing, tachypnea and decreasing O2 sats.  His hospital course has been complicated by:  1. Vent-associated pneumonia for which his abx coverage was broadened to zoysn.  Ancef was restarted on 3/30 per ID request for MSSA bacteremia.  2.   MSSA bacteremia, likely from the pna which cleared after the second set of blood cx.  3.  Delirium which has persisted through propofol, precedex, valproate, seroquel and gabapentin.  4.  Pancreatitis, likely due to propofol, which is now improving with post-pyloric feeds.  5.  Mild transaminitis:  Drug induced vs crit illness induced  6.  Mild JOSE:  Usually worsens with attempted diuresis.  7.  Hematuria:  Believed 2/2 bell trauma, now resolved.  8.  Bradycardia:  Seems due to precedex, resolved when off precedex.  9.  A fib with rvr:  Resolved with amio loading.  10.  Anemia of critical illness, family has been hesitant to allow transfusions for  hgb 7.     4/3: Event over weekend noted, some agitation, head CT negative, transfused 1 unit PRBC with partial response,  PEG and trach delayed as quantiferon pending , ruling out for TB  - AFB smear neg x 2.   4/4 -emerges with sedation holiday, quantiferon positive 4.5, 3rd, AFB sputum collected, pending, still temp 39,  -PEG and trach completed, repeat US no evidence of cholecystitis, ?abnormality in right kidney -  4/5 - weaning versed, temp trend lower afebrile at present. Stable on vent.  No further AFB results.  4/6 - waking up, fever curve slowly trending down, sputum and urine cultures pending,   4/7 - dc fentanyl and versed, 3rd AFB smear negative reported this AM. - early AM rr increased 40 , 100% 20 versed m fentanyl , SBP - ? Flash episode - improved with meds -  Endtrmjq27/5 ~ 4hr        Assessment and plan :     Wyatt Mahajan IS a 57 year old male admitted on 3/17/2017 for respiratory failure, flu B, mssa pna and mssa bacteremia.   I have personally reviewed the daily labs, imaging studies, cultures and discussed the case with referring physician and consulting physicians.   My assessment and plan by system for this patient is as follows:      Neurology/Psychiatry:   1. Sedation: schedule ativan plus versed prn   2. Analgesia - PRN fentanyl   3. Delirium:  3/31: olanzapine (valproate ineffective as well no response to quetiapine)   4. H/o Diabetic neuropathy 2/2 DM-II:     - gabapentin 300mgTID.       Cardiovascular:   1. Hypertension: scheduled hydralazine  BP labile when agitated, 19L+ start low dose lasix titrate as tolerated  2.  A fib with RVR:  In setting of critical illness. Improved. Will dose prn metoprolol   3. H/o HLD:  Holding atorvastatin (LFT elevated)  4. H/o PAD:  hold ASA given prior hematuria and anemia   5. Hyperlactemia:  Resolved        Pulmonary/Ventilator Management:   1.  Respiratory failure, hypoxemic:  2/2 mssa pna, vap (atb for S aureus and E cloacae & K  pneumoniae isolated from sputum) , and influenza B - repeat swab negative.    2. Episode of resp distress overnight - ? Flash   - check CXR   - start diuresis   -Lung protective vent strategy-  s/p trach 4/4  - PS trials as tolerated 12-15 /5 presently       GI and Nutrition :   1.  TF  2.  PPI for PUD prophylaxis  3.  Constipation--improved on bowel reg.last bowel movement 4/7   4.  Elevated liver enzymes: newly elevated on 3/24, nl on 3/17, ? Med related vs ? RUQ U/S revealed a distended GB w/sludge vs related to pancreatitis. Subsequent LFT's have shown decreases of Alk phos and ALT levels since discontinuation of statin.  --serial LFT's,   5. Pancreatitis:  Due to critical illness vs propofol.  No further propofol. Improved   6. s/p PEG  - PPI to bid      Renal/Fluids/Electrolytes:   1. JOSE-- cr stablilized , UOP stable    -- stable today, trend Creatinine. Urine output stable   2. Hypernatremia -  PO free water - slight increase, follow trend  3. Volume up - 19L start gentle diuresis, supplement with intermittent albumin   4. Hematuria: bloodly urine AM of 3/23 probably due to trauma from the bell catheter. Resolved 3/30.       Infectious Disease:   1. Persistent fever - multiple source (sinusitis, thrombophlebitis, lines, etc) no decub per nursing - fever curve down after removal of NG tube - suspect culprit  2. Flu B:  Repeat flu swab negative thus DC Tamiflu -   3. MSSA pna/bacteremia and gm neg VAP: Nafcillin until 3/24, then ancef until 3/26, then zosyn and vanco 3/26 until  vanco stopped 3/29.  Ancef re-added to existing zosyn on 3/30 per ID.  - Bld cultures cleared  TTE did not show valvular masses/vegetations. CT chest negative for occult abscess.   4.Continued GNR on sputum - 4/7  ID consulted 3/30 for persistent + sputum cx.  - will broaden to meropenem, dc zosyn,   5. ?Latent AFB - positive quantiferon, AFB neg x3 (reported 4/7) - DC airborne precautions   6. ? Pyelo on US - FUP urine culture , CT  scan last week negative for abnormality urine culture negative   7. Sinusitis -on CT scan - NG tube removed, follow clinically - fever and wbc count downtrending since removal       Endocrine:   1. Hyperglycemia:    -- increase lantus to 18, sugars still elevated -though insetting phos replacement in dextrose solution    Hematology/Oncology:   1. Anemia Hgb -labile;  no overt bleeding  s/p 1 u PRBC 4/5 with appropriate response.   2. Leukocytosis-- 2/2 infection. Patient febrile on and off. Procalcitonin mild elevated improving   3. R arm swelling  -- superficial thrombus only.   Warm compresses. Repeat dopplers negative      IV/Access:   1. Venous access - peripheral and PICC    ICU Prophylaxis:   1. DVT:  hep SubQ 3/30  2. VAP: HOB 30 degrees, chlorhexidine rinse  3. Stress Ulcer: PPI  4. Restraints: Nonviolent soft two point restraints required and necessary for patient safety and continued cares and good effect as patient continues to pull at necessary lines, tubes despite education and distraction. Will readdress daily.   6. Feeding - tube feeds restarted   7. Family Update: wife and daughter at bedside  8. Disposition - critically ill    [facility] 3/27 , 3/30, 3/31, 4/3, 4/7            Goals Next 24 hr :   1. Broaden to meropenem  2 FUP sputum culture  3. Gentle diuresis  4. Increase SS insulin intensity   5. CXR              Key Medications:   Reviewed          Physical Examination:   Temp:  [99.3  F (37.4  C)-100.9  F (38.3  C)] 99.5  F (37.5  C)  Heart Rate:  [] 101  Resp:  [21-34] 28  BP: (133-179)/(70-96) 147/79  FiO2 (%):  [40 %] 40 %  SpO2:  [99 %-100 %] 100 %        Intake/Output Summary (Last 24 hours) at 04/08/17 1051  Last data filed at 04/08/17 1000   Gross per 24 hour   Intake             4025 ml   Output             3140 ml   Net              885 ml       Wt Readings from Last 4 Encounters:   04/08/17 81.6 kg (179 lb 14.3 oz)   03/16/17 70.3 kg (155 lb)   01/17/17 71.3 kg (157 lb 1.6 oz)    11/30/16 72.6 kg (160 lb)     BP - Mean:  [] 105  Ventilation Mode: CMV/AC  FiO2 (%): 40 %  Rate Set (breaths/minute): 18 breaths/min  Tidal Volume Set (mL): 400 mL  PEEP (cm H2O): 5 cmH2O  Pressure Support (cm H2O): 16 cmH2O  Oxygen Concentration (%): 40 %  Resp: 28  No lab results found in last 7 days.     GEN: mild distress at times opens eyes interactive stable  HEENT: head ncat, sclera anicteric, OP patent, trachea midline   PULM: unlabored, coarse lung sounds anteriorly, synchronous with vent   CV/COR: RRR S1/S2, No rub, murmur or gallop  ABD: soft, non distended,  nontender, hypoactive bowel sounds, no masses   EXT:  peripheral edema present in R>L hand, warm x4 and well-perfused.   NEURO:  follows commands more interactive bilateral lower extremities 4 extremities weak  SKIN: no obvious rash  LINES: clean, dry intact         Data:       Recent Labs  Lab 04/08/17  0450 04/07/17  0425 04/06/17  0457 04/05/17  1352 04/05/17  0500 04/04/17  0650   * 146* 146*  --  149* 147*   POTASSIUM 3.7 3.8 3.8 4.1 3.3* 3.6   CHLORIDE 112* 114* 114*  --  115* 114*   CO2 24 25 25  --  25 25   ANIONGAP 10 7 7  --  9 8   * 209* 261*  --  208* 143*   BUN 22 23 23  --  26 26   CR 1.39* 1.40* 1.56*  --  1.63* 1.50*   GFRESTIMATED 53* 53* 46*  --  44* 49*   GFRESTBLACK 64 64 56*  --  53* 59*   LEONIE 7.8* 7.8* 7.7*  --  7.9* 8.4*   MAG 2.1 2.0 2.1  --  2.2 2.3   PHOS 1.7* 1.9* 2.2*  --  2.4* 2.7   PROTTOTAL 7.5  --   --   --  7.1 7.9   ALBUMIN 1.4*  --   --   --  1.4* 1.5*   BILITOTAL 0.9  --   --   --  0.9 1.0   ALKPHOS 257*  --   --   --  127 155*   AST 92*  --   --   --  202* 322*   ALT 22  --   --   --  23 37       CBC    Recent Labs  Lab 04/08/17  0450 04/07/17  0425 04/06/17  0457 04/05/17  1352 04/05/17  1035   WBC 12.8* 11.5* 12.3*  --  15.3*   RBC 2.56* 2.34* 2.31*  --  2.09*   HGB 7.9* 7.3* 7.3* 7.5* 6.7*   HCT 24.8* 22.9* 22.6*  --  20.5*   MCV 97 98 98  --  98   MCH 30.9 31.2 31.6  --  32.1   MCHC 31.9  31.9 32.3  --  32.7   RDW 20.0* 20.0* 20.0*  --  19.3*    312 330  --  368     INR    Recent Labs  Lab 04/04/17  0650   INR 1.18*     Arterial Blood Gas  No lab results found in last 7 days.    All cultures:    Recent Labs  Lab 04/06/17  1845 04/05/17  1103 04/04/17  1010 04/03/17  1545 04/02/17  1025   CULT Light growth Normal floraModerate growth Non lactose fermenting gram negative rodsCulture in progress* No growth Culture received and in progress.  Positive AFB results are called as soon as detected.  Final report to follow in 7 to 8 weeks. Culture received and in progress.  Positive AFB results are called as soon as detected.  Final report to follow in 7 to 8 weeks. No growth     Imaging:  CXR   Recent Results (from the past 24 hour(s))   US Upper Extremity Venous Duplex Bilateral Port    Narrative    US UPPER EXTREMITY VENOUS DUPLEX BILATERAL PORTABLE  4/7/2017 2:57 PM     HISTORY:  Evaluate for DVT, known superficial clot with PICC in place    FINDINGS: Duplex ultrasound with waveform spectral analysis and color  flow imaging was performed of the right and left upper extremities.     Right: PICC line is identified within the basilic vein. The right  internal jugular, subclavian, brachial, cephalic, and basilic veins  are patent without evidence of DVT. At the level of the wrist however  there is small amount of thrombus identified in a superficial branch  of the cephalic vein, unchanged.    Left: The left internal jugular, subclavian, brachial, cephalic, and  basilic veins are patent without evidence of DVT.       Impression    IMPRESSION:   1. Superficial thrombophlebitis within a branch of the cephalic vein  at the distal forearm and wrist level, unchanged.   2. The remaining veins of the right upper extremity are patent. The  veins of the left upper extremity are patent. No evidence of deep vein  thrombosis.    FRANKY KRAUSE, DO   US Lower Extremity Venous Duplex Bilateral    Narrative     VENOUS ULTRASOUND BOTH LEGS  4/7/2017 2:57 PM     HISTORY: look for dvt pain in calves    COMPARISON: None.    FINDINGS:  Examination of the deep veins with graded compression and  color flow Doppler with spectral wave form analysis shows no evidence  of thrombus in the common femoral vein, femoral vein, popliteal vein  or calf veins.  Subcutaneous edema is noted in the subcutaneous soft  tissues bilaterally. There are bilateral groin lymph nodes with normal  fatty jamel.      Impression    IMPRESSION:   1. No evidence of deep venous thrombosis in either lower extremity.  2. Bilateral lower extremity edema.    FRANKY KRAUSE DO       Billing: This patient is critically ill: Yes. Total critical care time today 30 min.

## 2017-04-08 NOTE — PROGRESS NOTES
[facility] ICU RESPIRATORY NOTE  Date of Admission: 3/17/2017  Date of Intubation (most recent): 3/17/2017, Trach 4/4/2017  Reason for Mechanical Ventilation: Respiratory failure  Number of Days on Mechanical Ventilation: 22  Met Criteria for Pressure Support Trial: yes  Length of Pressure Support Trial: PS 16/5 for 3hrs yesterday  Reason for Stopping Pressure Support Trial: tachypnea and agitation    Ventilation Mode: CMV/AC  FiO2 (%): 40 %  Rate Set (breaths/minute): 18 breaths/min  Tidal Volume Set (mL): 400 mL  PEEP (cm H2O): 5 cmH2O  Pressure Support (cm H2O): 16 cmH2O  Oxygen Concentration (%): 40 %  Resp: 33    Significant Events Today: None overnight    ABG Results:No new results    ETT appearance on chest x-ray:No new results    Plan:  Will cont daily assessment and weaning.  4/8/2017  Emelia Hubbard RRT

## 2017-04-09 LAB
ALBUMIN SERPL-MCNC: 1.4 G/DL (ref 3.4–5)
ALP SERPL-CCNC: 265 U/L (ref 40–150)
ALT SERPL W P-5'-P-CCNC: 24 U/L (ref 0–70)
ANION GAP SERPL CALCULATED.3IONS-SCNC: 7 MMOL/L (ref 3–14)
AST SERPL W P-5'-P-CCNC: 80 U/L (ref 0–45)
BASOPHILS # BLD AUTO: 0.1 10E9/L (ref 0–0.2)
BASOPHILS NFR BLD AUTO: 0.5 %
BILIRUB SERPL-MCNC: 1 MG/DL (ref 0.2–1.3)
BUN SERPL-MCNC: 23 MG/DL (ref 7–30)
CALCIUM SERPL-MCNC: 8.1 MG/DL (ref 8.5–10.1)
CHLORIDE SERPL-SCNC: 107 MMOL/L (ref 94–109)
CO2 SERPL-SCNC: 26 MMOL/L (ref 20–32)
CREAT SERPL-MCNC: 1.37 MG/DL (ref 0.66–1.25)
DIFFERENTIAL METHOD BLD: ABNORMAL
EOSINOPHIL # BLD AUTO: 0.4 10E9/L (ref 0–0.7)
EOSINOPHIL NFR BLD AUTO: 2.9 %
ERYTHROCYTE [DISTWIDTH] IN BLOOD BY AUTOMATED COUNT: 20.5 % (ref 10–15)
GFR SERPL CREATININE-BSD FRML MDRD: 54 ML/MIN/1.7M2
GLUCOSE BLDC GLUCOMTR-MCNC: 152 MG/DL (ref 70–99)
GLUCOSE BLDC GLUCOMTR-MCNC: 168 MG/DL (ref 70–99)
GLUCOSE BLDC GLUCOMTR-MCNC: 170 MG/DL (ref 70–99)
GLUCOSE BLDC GLUCOMTR-MCNC: 173 MG/DL (ref 70–99)
GLUCOSE BLDC GLUCOMTR-MCNC: 181 MG/DL (ref 70–99)
GLUCOSE BLDC GLUCOMTR-MCNC: 182 MG/DL (ref 70–99)
GLUCOSE BLDC GLUCOMTR-MCNC: 216 MG/DL (ref 70–99)
GLUCOSE SERPL-MCNC: 171 MG/DL (ref 70–99)
HCT VFR BLD AUTO: 23.5 % (ref 40–53)
HGB BLD-MCNC: 7.7 G/DL (ref 13.3–17.7)
IMM GRANULOCYTES # BLD: 0.1 10E9/L (ref 0–0.4)
IMM GRANULOCYTES NFR BLD: 0.4 %
LYMPHOCYTES # BLD AUTO: 3.3 10E9/L (ref 0.8–5.3)
LYMPHOCYTES NFR BLD AUTO: 24.1 %
MAGNESIUM SERPL-MCNC: 1.9 MG/DL (ref 1.6–2.3)
MCH RBC QN AUTO: 31.7 PG (ref 26.5–33)
MCHC RBC AUTO-ENTMCNC: 32.8 G/DL (ref 31.5–36.5)
MCV RBC AUTO: 97 FL (ref 78–100)
MONOCYTES # BLD AUTO: 0.4 10E9/L (ref 0–1.3)
MONOCYTES NFR BLD AUTO: 3 %
NEUTROPHILS # BLD AUTO: 9.4 10E9/L (ref 1.6–8.3)
NEUTROPHILS NFR BLD AUTO: 69.1 %
NRBC # BLD AUTO: 0 10*3/UL
NRBC BLD AUTO-RTO: 0 /100
PHOSPHATE SERPL-MCNC: 2.2 MG/DL (ref 2.5–4.5)
PLATELET # BLD AUTO: 318 10E9/L (ref 150–450)
POTASSIUM SERPL-SCNC: 3.7 MMOL/L (ref 3.4–5.3)
PROCALCITONIN SERPL-MCNC: 0.85 NG/ML
PROT SERPL-MCNC: 7.6 G/DL (ref 6.8–8.8)
RBC # BLD AUTO: 2.43 10E12/L (ref 4.4–5.9)
SODIUM SERPL-SCNC: 140 MMOL/L (ref 133–144)
WBC # BLD AUTO: 13.6 10E9/L (ref 4–11)

## 2017-04-09 PROCEDURE — 40000275 ZZH STATISTIC RCP TIME EA 10 MIN

## 2017-04-09 PROCEDURE — 25000125 ZZHC RX 250: Performed by: INTERNAL MEDICINE

## 2017-04-09 PROCEDURE — 25000132 ZZH RX MED GY IP 250 OP 250 PS 637: Performed by: HOSPITALIST

## 2017-04-09 PROCEDURE — 80053 COMPREHEN METABOLIC PANEL: CPT | Performed by: INTERNAL MEDICINE

## 2017-04-09 PROCEDURE — 25000132 ZZH RX MED GY IP 250 OP 250 PS 637: Performed by: INTERNAL MEDICINE

## 2017-04-09 PROCEDURE — 00000146 ZZHCL STATISTIC GLUCOSE BY METER IP

## 2017-04-09 PROCEDURE — 20000003 ZZH R&B ICU

## 2017-04-09 PROCEDURE — 25000128 H RX IP 250 OP 636: Performed by: INTERNAL MEDICINE

## 2017-04-09 PROCEDURE — 94003 VENT MGMT INPAT SUBQ DAY: CPT

## 2017-04-09 PROCEDURE — 84100 ASSAY OF PHOSPHORUS: CPT | Performed by: INTERNAL MEDICINE

## 2017-04-09 PROCEDURE — 25000131 ZZH RX MED GY IP 250 OP 636 PS 637: Performed by: INTERNAL MEDICINE

## 2017-04-09 PROCEDURE — 84145 PROCALCITONIN (PCT): CPT | Performed by: INTERNAL MEDICINE

## 2017-04-09 PROCEDURE — 40000239 ZZH STATISTIC VAT ROUNDS

## 2017-04-09 PROCEDURE — 99291 CRITICAL CARE FIRST HOUR: CPT | Performed by: INTERNAL MEDICINE

## 2017-04-09 PROCEDURE — 85025 COMPLETE CBC W/AUTO DIFF WBC: CPT | Performed by: INTERNAL MEDICINE

## 2017-04-09 PROCEDURE — 83735 ASSAY OF MAGNESIUM: CPT | Performed by: INTERNAL MEDICINE

## 2017-04-09 RX ADMIN — CHLORHEXIDINE GLUCONATE 15 ML: 1.2 RINSE ORAL at 19:52

## 2017-04-09 RX ADMIN — INSULIN GLARGINE 18 UNITS: 100 INJECTION, SOLUTION SUBCUTANEOUS at 08:00

## 2017-04-09 RX ADMIN — CEFAZOLIN SODIUM 1 G: 1 INJECTION, POWDER, FOR SOLUTION INTRAMUSCULAR; INTRAVENOUS at 05:05

## 2017-04-09 RX ADMIN — MIDAZOLAM HYDROCHLORIDE 2 MG: 1 INJECTION, SOLUTION INTRAMUSCULAR; INTRAVENOUS at 05:21

## 2017-04-09 RX ADMIN — MULTIVITAMIN 15 ML: LIQUID ORAL at 08:05

## 2017-04-09 RX ADMIN — OLANZAPINE 20 MG: 10 TABLET, FILM COATED ORAL at 21:29

## 2017-04-09 RX ADMIN — HEPARIN SODIUM 5000 UNITS: 10000 INJECTION, SOLUTION INTRAVENOUS; SUBCUTANEOUS at 05:05

## 2017-04-09 RX ADMIN — ACETAMINOPHEN 650 MG: 160 SUSPENSION ORAL at 19:52

## 2017-04-09 RX ADMIN — CEFAZOLIN SODIUM 1 G: 1 INJECTION, POWDER, FOR SOLUTION INTRAMUSCULAR; INTRAVENOUS at 21:30

## 2017-04-09 RX ADMIN — Medication 1 PACKET: at 14:09

## 2017-04-09 RX ADMIN — Medication 2 MG: at 00:02

## 2017-04-09 RX ADMIN — Medication 2 G: at 06:50

## 2017-04-09 RX ADMIN — Medication 2 MG: at 09:35

## 2017-04-09 RX ADMIN — GABAPENTIN 300 MG: 250 SUSPENSION ORAL at 00:02

## 2017-04-09 RX ADMIN — GABAPENTIN 300 MG: 250 SUSPENSION ORAL at 23:48

## 2017-04-09 RX ADMIN — Medication 2 MG: at 21:30

## 2017-04-09 RX ADMIN — POTASSIUM PHOSPHATE, MONOBASIC AND POTASSIUM PHOSPHATE, DIBASIC 15 MMOL: 224; 236 INJECTION, SOLUTION INTRAVENOUS at 07:51

## 2017-04-09 RX ADMIN — CHLORHEXIDINE GLUCONATE 15 ML: 1.2 RINSE ORAL at 08:17

## 2017-04-09 RX ADMIN — ACETAMINOPHEN 650 MG: 160 SUSPENSION ORAL at 14:09

## 2017-04-09 RX ADMIN — GABAPENTIN 300 MG: 250 SUSPENSION ORAL at 19:53

## 2017-04-09 RX ADMIN — GABAPENTIN 300 MG: 250 SUSPENSION ORAL at 08:05

## 2017-04-09 RX ADMIN — ACETAMINOPHEN 650 MG: 160 SUSPENSION ORAL at 08:05

## 2017-04-09 RX ADMIN — Medication 1 PACKET: at 21:30

## 2017-04-09 RX ADMIN — HEPARIN SODIUM 5000 UNITS: 10000 INJECTION, SOLUTION INTRAVENOUS; SUBCUTANEOUS at 19:53

## 2017-04-09 RX ADMIN — CEFAZOLIN SODIUM 1 G: 1 INJECTION, POWDER, FOR SOLUTION INTRAMUSCULAR; INTRAVENOUS at 14:06

## 2017-04-09 RX ADMIN — Medication 1 PACKET: at 18:44

## 2017-04-09 RX ADMIN — Medication 2 MG: at 05:05

## 2017-04-09 RX ADMIN — OXYCODONE HYDROCHLORIDE 10 MG: 5 TABLET ORAL at 05:21

## 2017-04-09 RX ADMIN — MEROPENEM 1 G: 1 INJECTION, POWDER, FOR SOLUTION INTRAVENOUS at 02:23

## 2017-04-09 RX ADMIN — Medication 2 MG: at 14:10

## 2017-04-09 RX ADMIN — MEROPENEM 1 G: 1 INJECTION, POWDER, FOR SOLUTION INTRAVENOUS at 10:33

## 2017-04-09 RX ADMIN — BISACODYL 10 MG: 10 SUPPOSITORY RECTAL at 14:38

## 2017-04-09 RX ADMIN — MEROPENEM 1 G: 1 INJECTION, POWDER, FOR SOLUTION INTRAVENOUS at 19:53

## 2017-04-09 RX ADMIN — HEPARIN SODIUM 5000 UNITS: 10000 INJECTION, SOLUTION INTRAVENOUS; SUBCUTANEOUS at 14:01

## 2017-04-09 RX ADMIN — Medication 2 MG: at 18:44

## 2017-04-09 RX ADMIN — PANTOPRAZOLE SODIUM 40 MG: 40 TABLET, DELAYED RELEASE ORAL at 00:01

## 2017-04-09 NOTE — DISCHARGE SUMMARY
Physician Discharge Summary     Patient ID:  Wyatt LAWSON [facility]  [id number]  57 year old  1960    Admit date: 3/17/2017    Discharge date and time: 4/11/17     Admitting Physician: Manny Novak MD     Discharge Physician: Dominick Atkins MD    Admission Diagnoses:   Influenza B [J10.1]  Pneumonia of both lungs due to infectious organism, unspecified part of lung [J18.9]    Discharge Diagnoses:   1. MSSA septicemia  2. MSSA pneumonia  3. Influenza B Infection  4. Acute Respiratory Failure with Hypoxemia  5. Healthcare acquired Pneumonia  6. Enterobacter pneumonia  7. Acute kidney injury  8. Elevated lipase with no evidence of pancreatitis,likely drug induced  9. S/P tracheostomy  10. S/P PEG  11. Critical illness myopathy  12. Elevated transaminases, med induced  13. Atrial fibrillation with rapid ventricular response  14. Anemia of critical illness  15. Hematuria, traumatic, resolved  16. Delirium   17. 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 Sinusitis  19. Persistent fevers       Admission Condition: critical    Discharged Condition: stable    Indication for Admission: MSSA septicemia    Hospital Course:   57 year old male admitted on 3/17 to the floor, presenting with acute hypoxic respiratory failure due to influenza B and CAP, right chest wall pain and uncontrolled DM-II. Patient was subsequently transferred from the floor to the MICU for increased work of breathing, accessory muscle use and respiratory distress. Was intubated due to increased work of breathing, tachypnea and decreasing O2 sats. Initial ICU work-up demonstrated MSSA pneumonia and bacteremia with significant vent dependence this initial week in the ICU. His ICU course has been protracted and complicated since:    1. Vent-associated pneumonia for which his abx coverage was broadened to zoysn and eventually meropenem as sputum returned with ESBL enterobacter . Ancef was restarted on 3/30 per ID request for MSSA bacteremia.  2.  MSSA bacteremia, likely from the pna which cleared after the second set of blood cx.  3. Delirium which has persisted through propofol, precedex, valproate, seroquel and gabapentin.  4. Elevated lipase, likely due to propofol, which improved with post-pyloric feeds and cessation of propofol.  5. Mildly elevated transaminases: Drug induced vs crit illness induced  6. Mild JOSE: Usually worsens with attempted diuresis.  7. Hematuria: Believed 2/2 bell trauma, now resolved.  8. A fib with rvr: Resolved with amio loading.  Prn metoprolol  10. Anemia of critical illness, has required intermittent transfusions, no clinical evidence of active bleed.    11. Myopathy     Overall he has slowly been recovering the past week with PEG and Trach performed 4/4     Neurology/Psychiatry:   1. Delirium - exacerbated by intolerance of propofol and precedex. Eventually responsive to versed and fentanyl drips which have been weaned off. He is on scheduled ativan with good effect. Alternate therapies with quetiapine  and valproate ineffective. Have been titrating olanzapine as an adjuvant.     2. Pain - abd, fentanyl, oxycodone  3. H/o Diabetic neuropathy 2/2 DM-II and PAD: on gabapentin 300mgTID.  4. Critical illness myopathy - working with PT         Cardiovascular:   1. Hypertension: scheduled hydralazine BP labile when agitated, 21+ start low dose lasix titrate as tolerated with   2. A fib with RVR: In setting of critical illness. Improved. Prn metoprolol. Was previously amiodarone loaded  3. H/o HLD: Was on statin in outpatient setting, holding atorvastatin secondary to elevated LFTS (and mild rhabdomyolysis when first admitted)  4. H/o PAD: hold ASA given prior hematuria and anemia.   5. H/o CAD: ECHO this admission with EF 55-60% , valves normal       Pulmonary/Ventilator Management:   1. Acute to chronic respiratory failure secondary to complicated mssa pna and septicemia. Initially was able to be titrated to 40% and 5 PEEP,  however SBT limited secondary to various complications (hematuria, pancreatitis, etc) as well significant volume accumulation.  Ultimately developed secondary bronchitis with multiple bacteria isolated from sputum S aureus and E cloacae & K pneumoniae) for which antibiotics broadened.  Tracheostomy performed 4/4 with quick weaning of sedation. Since has been performing conditioning trials 12-15 PS with 5 PEEP as tolerated.     2. Pulmonary edema - initial attempts at diuresis limited due to kidney injury. Restarted 4/8.            GI and Nutrition :   1. Ileus/Constipation, secondary to critical illness, pancreatitis and with underlying DM ? Paresis. Bowels slowly waking up with discontinuation of opiode infusion.   2. Elevated liver enzymes: initially noted 3/24, imaging with distended GB and  sludge on US though no evidence of cholecystitis subsequently found to have pancreatitis. AP remains elevated, otherwise normalization of LFTS and stable imaging.Statins discontinued. AST and ALT since normalized with persistent mild elevation of Alk phosphate.  3. Pancreatitis (elevated lipase): As noted above due to critical illness vs propofol as elevated TAGS and evidence of mild rhabo. Serologically resolved with removal of propofol, however subsequent ileus and 3rd spacing. Now much improved, though lipase still elevated.  4.  Malnutrition - secondary to critical illness thus s/p PEG. Albumin normal on admission 1.4 past few weeks.        Renal/Fluids/Electrolytes:   1. JOSE, non-oliguric in setting of multiple insults with critical illness. Cr prior to admission 0.6 , this last week ranging 1.4-1.6, with good urine output.  He volume status is markedly up from admission , 20+ liters with enteral feeds and free water accounting for most of input in the last weeks. Initial attempts at diuresis limited given labile kidney function which is slowly improving. PRN diuresis initiated 4/8 with 20 IV lasix with ~ 3L response.    2. Electrolyte abnormalities - issues have been with hypernatremia as well hypophosphatemia. Adjusting free water flushes   3. Volume up - 21L though recently 2/2 to enteral feeds, as need diuresis supplement with intermittent albumin -   4. Hematuria: bloodly urine AM of 3/23 thought secondary to trauma from the bell catheter and ASA use. Pulm renal syndrome considered and AntiGBM and ANCAs negative as well possible AIN from nafcillin however urine eos negative. Resolved 3/30.         Infectious Disease:   1. MSSA pneumonia and septicemia - in setting of influenza B infection. Significant consolidation in RLL -noted on imaging. ID consulted was initially placed on nafcillin but with hematuria. Bld cultures cleared quickly.  TTE did not show valvular masses/vegetations. CT chest negative for occult abscess.   - abx course Nafcillin until 3/24, then ancef until 3/26, then zosyn and vanco 3/26 until vanco stopped 3/29. Ancef re-added to existing zosyn on 3/30 per ID. Zosyn dc'd 4/8 .... Meropenem started  2. Recurrent fevers - multiple sources (sinusitis, thrombophlebitis, lines, etc). Found to have ESBL enterobacter 4/8 as well fever curve down trend after removal of NG tube.  procal downtrending   3. Flu B: Repeat flu swab negative , completed Tamiflu course   4. Latent TB - discovered as part of work-up for recurrent fevers. Quantiferon positive 4.5, thus 3 AFB smears negative. as of 4/6 for which airborne precautions discontinued.   5. Sinusitis -on Head CT scan - NG tube removed, follow clinically - fever and wbc count downtrending since removal on abx as above       Endocrine:   1. Diabetes Mellitus with stress induced hyperglycemia- exacerbated by exogenous load - (feedings, phosph). Lantus titrated to 8, resistant Sliding scale for additional coverage     Hematology/Oncology:   1. Anemia Hgb -labile; no overt bleeding s/p 1 u PRBC 4/5 with appropriate response.   2. Persistent Leukocytosis-- waxing and  waning.  Patient febrile on and off. Procalcitonin downtrending   3. R arm swelling -- superficial thrombus only found on US 3/30, Local measures applied (warm compresses). Repeat dopplers negative 4/7 of both upper and lower extremities for DVT, superficial thrombus remains.      IV/Access:   1. Venous access - peripheral and PICC     ICU Prophylaxis:   1. DVT: hep SubQ 3/30  2. VAP: HOB 30 degrees, chlorhexidine rinse  3. Stress Ulcer: PPI  4. Restraints: Nonviolent soft two point restraints required and necessary for patient safety and continued cares and good effect as patient continues to pull at necessary lines, tubes despite education and distraction. Will readdress daily.   6. Feeding - tube feeds decreased - need adjust formula for hyperglycemia   7. Family Update:Family very attentive and inquisitive. Appreciate detailed clinical updates.   8. Disposition - critically ill      Consults: pulmonary/intensive care, ID, nephrology, general surgery and thoracic surgery      Procedures:   Central line placement  PICC line placement   Tracheostomy  PEG    Wounds:    Pressure Injury 03/27/17 Medial Other (Comment) tongue left tip       Wound 04/03/17 Right Ear Shear injury small 0.5cm wound        Incision/Surgical Site 04/04/17 Bilateral Neck        Selected Imaging:  CXR: 4/8 IMPRESSION: The previously seen endotracheal tube has been replaced by  a tracheostomy. A previously seen nasogastric tube has been removed.  There has been placement of a right arm PICC line in the superior vena  cava just above the right atrium.     Again seen are mild infiltrates of the right mid to lower lung and to  a lesser degree the left lung. These do not appear significantly  changed. No other abnormality is noted. Other than for the support  device changes, I see no other change since the previous examination    Venous Dopplers:  4/7 (compared to prior study 3/30)    1. Superficial thrombophlebitis within a branch of the cephalic  vein  at the distal forearm and wrist level, unchanged.   2. The remaining veins of the right upper extremity are patent. The  veins of the left upper extremity are patent. No evidence of deep vein  thrombosis.  1. No evidence of deep venous thrombosis in either lower extremity.  2. Bilateral lower extremity edema.    CT Head: 4/1  Images are degraded by patient motion artifact. Cerebral  ventricles and cortical sulci appear within normal limits. No definite  intra or extra-axial hemorrhage is identified. There is no definite CT  evidence of acute stroke. There is no shift of the midline. Sphenoid  and ethmoid sinus opacities are noted. Right maxillary sinus mucosal  thickening is also noted. Evaluation of the calvarium is limited by  patient motion artifact.    CT Abdomen/Pelvis 3/27  Small right pleural effusion with extensive consolidation at the right  lung base, worsened since the prior exam. Probable tiny left pleural  effusion with atelectasis. Additional patchy infiltrates through the  lung bases may have improved.     The upper abdominal organs are grossly normal given limitations of  artifact and lack of intravenous contrast. Nasogastric tube in the  stomach. Levine catheter within a decompressed urinary bladder. The  colon is normal. The appendix is normal. No bowel obstruction. There  may be a tiny amount of fluid in the small bowel mesentery.      Discharge Exam:  General appearance: alert, no distress and slowed mentation  Throat: lips, mucosa, and tongue normal; teeth and gums normal. Tracheostomy in place.  Lungs: diminished breath sounds bibasilar  Abdomen: abnormal findings:  distended and PEG in place. Non-tender  Extremities: edema lower extremities bilaterally. Much improved.    Disposition: SNF      Activity: activity as tolerated  Diet: Per dietary. Promote with fiber.   Wound Care: keep wound clean and dry    Signed:  Dominick Atkins MD

## 2017-04-09 NOTE — PLAN OF CARE
Problem: Goal Outcome Summary  Goal: Goal Outcome Summary  Outcome: No Change  0730-1930  Neuro: Pt most awake when administration of Ativan delayed and shook head no to pain, squeezed hands and wiggled toes but with Ativan is more lethargic, squeezes L hand and wiggles R toes. However without Ativan, pt coughs frequently.  Lungs: RH consistantly that does not clear with suctioning.  Weaning held due to diuresis.  GI:Abdomen firm, distended.  Residual increasing so at 1600 135cc.  Only few smears for stool.  I: Duccalox supp given  E: No results.  BG cont in 300's despite high sliding insulin scale.  I: Dr Yang notified and tube feeding decreased to 35cc/hr. Recheck at 2000 and if still elevated, restart insulin gtt.  U/O: good diuresis.  Up in chair 3.5 hrs.   here [id number].  Wife updated by Dr Beckman with  present.

## 2017-04-09 NOTE — PROGRESS NOTES
Date of Admission: 3/17/17  Date of Intubation (most recent): 3/17/17  Reason for Mechanical Ventilation: Hypoxic respiratory failure   Number of Days on Mechanical Ventilation: 25 days   Met Criteria for Pressure Support Trial: YES  Reason for No Pressure Support Trial: NO     Ventilation Mode: CMV/AC  FiO2 (%): 40 %  Rate Set (breaths/minute): 18 breaths/min  Tidal Volume Set (mL): 400 mL  PEEP (cm H2O): 5 cmH2O  Oxygen Concentration (%): 40 %  Resp: 25     ABG Results:  No lab results found in last 7 days.     Plan: Pt remain full vent support and assess for weaning.     Belgica Ramos  4/9/2017

## 2017-04-09 NOTE — PLAN OF CARE
Problem: Goal Outcome Summary  Goal: Goal Outcome Summary  Outcome: No Change  VSS.  PRN versed given x 1 as patient increasingly agitated.  Large BM.   Continue current plan of care.

## 2017-04-09 NOTE — PROGRESS NOTES
Critical Care Progress Note      04/09/2017    Name: Wyatt Mahajan MRN#: [id number]   Age: 57 year old YOB: 1960     Hsptl Day# 23  ICU DAY #22 trached 4/4    MV DAY #22           Problem List:   Active Problems:    MSSA (methicillin susceptible Staphylococcus aureus) septicemia (H)    MSSA (methicillin susceptible Staphylococcus aureus) pneumonia (H)    Fever    Leukocytosis    Influenza B    Acute respiratory failure with hypoxia (H)  MSSA/enterobacter/klebsiella pna   MSSA bacteremia--resolved  VAP (GNR)  Influenza B  JOSE--stable  Hematuria---bell trauma?--improving  pancreatitis   Quanterferon positive         Summary/Hospital Course:   Wyatt Mahajan is a 57 year old male admitted on 3/17 to the floor, presenting with acute hypoxic respiratory failure due to influenza B and CAP, right chest wall pain and uncontrolled DM-II. Patient was subsequently transferred from the floor to the MICU for increased work of breathing, accessory muscle use and respiratory distress. Was intubated due to increased work of breathing, tachypnea and decreasing O2 sats.  His hospital course has been complicated by:  1. Vent-associated pneumonia for which his abx coverage was broadened to zoysn.  Ancef was restarted on 3/30 per ID request for MSSA bacteremia.  2.   MSSA bacteremia, likely from the pna which cleared after the second set of blood cx.  3.  Delirium which has persisted through propofol, precedex, valproate, seroquel and gabapentin.  4.  Pancreatitis, likely due to propofol, which is now improving with post-pyloric feeds.  5.  Mild transaminitis:  Drug induced vs crit illness induced  6.  Mild JOSE:  Usually worsens with attempted diuresis.  7.  Hematuria:  Believed 2/2 bell trauma, now resolved.  8.  Bradycardia:  Seems due to precedex, resolved when off precedex.  9.  A fib with rvr:  Resolved with amio loading.  10.  Anemia of critical illness, family has been hesitant to allow transfusions for  hgb 7.     4/3: Event over weekend noted, some agitation, head CT negative, transfused 1 unit PRBC with partial response,  PEG and trach delayed as quantiferon pending , ruling out for TB  - AFB smear neg x 2.   4/4 -emerges with sedation holiday, quantiferon positive 4.5, 3rd, AFB sputum collected, pending, still temp 39,  -PEG and trach completed, repeat US no evidence of cholecystitis, ?abnormality in right kidney -  4/5 - weaning versed, temp trend lower afebrile at present. Stable on vent.  No further AFB results.  4/6 - waking up, fever curve slowly trending down, sputum and urine cultures pending,   4/7 - dc fentanyl and versed, 3rd AFB smear negative reported this AM. - early AM rr increased 40 , 100% 20 versed m fentanyl , SBP - ? Flash episode - improved with meds -  [id number]/5 ~ 4hr  4/8 - abx broadened to meropenem as sputum with ESBL enterobacter, sugars still labile thought related to phosphate bump (in d5) and feeds - feeds slowed down with improvement in glucose, diuresis started with 3.6L out to 20 IV lasix, agitation increased when ativan wears off        Assessment and plan :     Wyatt Mahajan IS a 57 year old male admitted on 3/17/2017 for respiratory failure, flu B, mssa pna and mssa bacteremia.   I have personally reviewed the daily labs, imaging studies, cultures and discussed the case with referring physician and consulting physicians.   My assessment and plan by system for this patient is as follows:      Neurology/Psychiatry:   1. Sedation: scheduled ativan plus versed prn   2. Analgesia - PRN fentanyl, oxycodone  3. Delirium:  3/31: olanzapine (valproate ineffective as well no response to quetiapine)   4. H/o Diabetic neuropathy 2/2 DM-II:     - gabapentin 300mgTID.       Cardiovascular:   1. Hypertension: scheduled hydralazine  BP labile when agitated, 21+ start low dose lasix titrate as tolerated with   2.  A fib with RVR:  In setting of critical illness. Improved. Prn  metoprolol   3. H/o HLD:  Holding atorvastatin (LFT elevated)  4. H/o PAD:  hold ASA given prior hematuria and anemia.          Pulmonary/Ventilator Management:   1.  Respiratory failure, hypoxemic:  2/2 complicated mssa pna, vap (atb for S aureus and E cloacae & K pneumoniae isolated from sputum) , and influenza B - repeat swab negative.  Now ESBL in sputum - changed to meropenem   2. Episode of resp distress overnight - ? Flash episodes, agitation - CXR stable to slightly improved   - continue CMV Lung protective vent strategy-  s/p trach 4/4  - PS trials as tolerated 12-15 /5 presently        GI and Nutrition :   1.  TF- noted lizzie sugars - will have   2.  PPI for PUD prophylaxis  3.  Ileus/Constipation--improved on bowel reg.last bowel movement 4/8   4.  Elevated liver enzymes: noted 3/24, nl on 3/17, distended GB w/sludge on US vs med induced pancreatitis. AP remains elevated, otherwise normalization of LFTS and stable imaging.  --serial LFT's,   5. Pancreatitis:  Due to critical illness vs propofol.  No further propofol. Improved   6.  s/p PEG      Renal/Fluids/Electrolytes:   1. JOSE-- cr stablilized , UOP stable    -- stable today, trend Creatinine. Urine output stable   2. Hypernatremia -  PO free water - improved will cut back on free water flushes to ~60ml /hr (240ml q4 hr)  3. Volume up - 21L though recently 2/2 to enteral feeds, as need diuresis  supplement with intermittent albumin -   4. Hematuria: bloodly urine AM of 3/23 probably due to trauma from the bell catheter. Resolved 3/30.       Infectious Disease:   1. Persistent fever - multiple source (sinusitis, thrombophlebitis, lines, etc) no decub per nursing - fever curve down after removal of NG tube - though now with ESBL in sputum - meropenem started 4/8 - procal downtrending   2. Flu B:  Repeat flu swab negative , completed Tamiflu course   3. MSSA pna/bacteremia and gm neg VAP: Bld cultures cleared  TTE did not show valvular  masses/vegetations. CT chest negative for occult abscess. - abx course Nafcillin until 3/24, then ancef until 3/26, then zosyn and vanco 3/26 until  vanco stopped 3/29.  Ancef re-added to existing zosyn on 3/30 per ID. Zosyn dc'd 4/8   4. ?Latent AFB - positive quantiferon, AFB neg x3 (reported 4/7) - DC airborne precautions   5. ? Renal abnormality on US - FUP urine culture , CT scan last week negative for abnormality urine culture negative   7. Sinusitis -on CT scan - NG tube removed, follow clinically - fever and wbc count downtrending since removal on abx as above      Endocrine:   1. Hyperglycemia- exacerbated exogenous load - (feedings, phosph)  -- lantus 18, resistant Sliding scale     Hematology/Oncology:   1. Anemia Hgb -labile;  no overt bleeding  s/p 1 u PRBC 4/5 with appropriate response.   2. Leukocytosis-- 2/2 infection. Patient febrile on and off. Procalcitonin downtrending   3. R arm swelling  -- superficial thrombus only.   Warm compresses. Repeat dopplers negative      IV/Access:   1. Venous access - peripheral and PICC    ICU Prophylaxis:   1. DVT:  hep SubQ 3/30  2. VAP: HOB 30 degrees, chlorhexidine rinse  3. Stress Ulcer: PPI  4. Restraints: Nonviolent soft two point restraints required and necessary for patient safety and continued cares and good effect as patient continues to pull at necessary lines, tubes despite education and distraction. Will readdress daily.   6. Feeding - tube feeds decreased - need adjust formula for hyperglycemia   7. Family Update: wife and daughter at bedside  8. Disposition - critically ill    [facility] mt 3/27 , 3/30, 3/31, 4/3, 4/7            Goals Next 24 hr :   1. Adjust tube feedings - d/w Nutrition  2. Decrease free water flushes   3. PS as tolerated  4. Follow I's and Os          Key Medications:   Reviewed          Physical Examination:   Temp:  [99  F (37.2  C)-101.1  F (38.4  C)] 101.1  F (38.4  C)  Heart Rate:  [] 118  Resp:  [18-36] 24  BP:  (137-166)/(76-95) 166/89  FiO2 (%):  [40 %] 40 %  SpO2:  [96 %-100 %] 99 %      Intake/Output Summary (Last 24 hours) at 04/09/17 1034  Last data filed at 04/09/17 0600   Gross per 24 hour   Intake             4270 ml   Output             2955 ml   Net             1315 ml     -3200 cc of enteral feeds - made 3.6L urine with 20 IV lasix      Wt Readings from Last 4 Encounters:   04/08/17 81.6 kg (179 lb 14.3 oz)   03/16/17 70.3 kg (155 lb)   01/17/17 71.3 kg (157 lb 1.6 oz)   11/30/16 72.6 kg (160 lb)     BP - Mean:  [] 108  Ventilation Mode: CMV/AC  FiO2 (%): 40 %  Rate Set (breaths/minute): 18 breaths/min  Tidal Volume Set (mL): 400 mL  PEEP (cm H2O): 5 cmH2O  Oxygen Concentration (%): 40 %  Resp: 24  No lab results found in last 7 days.     GEN: awake, mild distress at times  HEENT: head ncat, sclera anicteric, OP patent, trachea midline   PULM: unlabored, coarse lung sounds anteriorly, synchronous with vent   CV/COR: RRR S1/S2, No rub, murmur or gallop  ABD: soft, distended but improved  nontender, hypoactive bowel sounds, no masses   EXT:  Decreased eripheral edema present in R>L hand, warm x4 and well-perfused.   NEURO:  follows commands interactive bilateral lower extremities 4 extremities weak  SKIN: no obvious rash  LINES: clean, dry intact         Data:       Recent Labs  Lab 04/09/17  0530 04/08/17  0450 04/07/17  0425 04/06/17  0457  04/05/17  0500 04/04/17  0650    146* 146* 146*  --  149* 147*   POTASSIUM 3.7 3.7 3.8 3.8  < > 3.3* 3.6   CHLORIDE 107 112* 114* 114*  --  115* 114*   CO2 26 24 25 25  --  25 25   ANIONGAP 7 10 7 7  --  9 8   * 291* 209* 261*  --  208* 143*   BUN 23 22 23 23  --  26 26   CR 1.37* 1.39* 1.40* 1.56*  --  1.63* 1.50*   GFRESTIMATED 54* 53* 53* 46*  --  44* 49*   GFRESTBLACK 65 64 64 56*  --  53* 59*   LEONIE 8.1* 7.8* 7.8* 7.7*  --  7.9* 8.4*   MAG 1.9 2.1 2.0 2.1  --  2.2 2.3   PHOS 2.2* 1.7* 1.9* 2.2*  --  2.4* 2.7   PROTTOTAL 7.6 7.5  --   --   --  7.1 7.9    ALBUMIN 1.4* 1.4*  --   --   --  1.4* 1.5*   BILITOTAL 1.0 0.9  --   --   --  0.9 1.0   ALKPHOS 265* 257*  --   --   --  127 155*   AST 80* 92*  --   --   --  202* 322*   ALT 24 22  --   --   --  23 37   < > = values in this interval not displayed.    CBC    Recent Labs  Lab 04/09/17  0530 04/08/17  0450 04/07/17  0425 04/06/17  0457   WBC 13.6* 12.8* 11.5* 12.3*   RBC 2.43* 2.56* 2.34* 2.31*   HGB 7.7* 7.9* 7.3* 7.3*   HCT 23.5* 24.8* 22.9* 22.6*   MCV 97 97 98 98   MCH 31.7 30.9 31.2 31.6   MCHC 32.8 31.9 31.9 32.3   RDW 20.5* 20.0* 20.0* 20.0*    327 312 330     INR    Recent Labs  Lab 04/04/17  0650   INR 1.18*     Arterial Blood Gas  No lab results found in last 7 days.    All cultures:    Recent Labs  Lab 04/06/17  1845 04/05/17  1103 04/04/17  1010 04/03/17  1545 04/02/17  1025   CULT Light growth Normal floraModerate growth Enterobacter cloacae complex Piperacillin/Tazobactam susceptibilities in progress 4/9/17* No growth Culture received and in progress.  Positive AFB results are called as soon as detected.  Final report to follow in 7 to 8 weeks. Culture received and in progress.  Positive AFB results are called as soon as detected.  Final report to follow in 7 to 8 weeks. No growth     Imaging:  CXR   Recent Results (from the past 24 hour(s))   XR Chest Port 1 View    Narrative    PORTABLE CHEST ONE VIEW   4/8/2017 3:49 PM    HISTORY: Desaturation.    COMPARISON: 3/27/2017      Impression    IMPRESSION: The previously seen endotracheal tube has been replaced by  a tracheostomy. A previously seen nasogastric tube has been removed.  There has been placement of a right arm PICC line in the superior vena  cava just above the right atrium.    Again seen are mild infiltrates of the right mid to lower lung and to  a lesser degree the left lung. These do not appear significantly  changed. No other abnormality is noted. Other than for the support  device changes, I see no other change since the previous  examination.     JENNIFER WHEELER MD       Billing: This patient is critically ill: Yes. Total critical care time today 35 min.

## 2017-04-09 NOTE — PROGRESS NOTES
[facility] ICU RESPIRATORY NOTE  Date of Admission: 3/17/17  Date of Intubation (most recent): 3/17/17  Reason for Mechanical Ventilation: Hypoxic respiratory failure   Number of Days on Mechanical Ventilation: 25 days   Met Criteria for Pressure Support Trial: No  Reason for No Pressure Support Trial: On aggressive diuretics     Ventilation Mode: CMV/AC  FiO2 (%): 40 %  Rate Set (breaths/minute): 18 breaths/min  Tidal Volume Set (mL): 400 mL  PEEP (cm H2O): 5 cmH2O  Oxygen Concentration (%): 40 %  Resp: 25    ABG Results:  No lab results found in last 7 days.    Plan:  Pt remain full vent support and assess for weaning.    Raymond Benavidez RRT  4/9/2017

## 2017-04-09 NOTE — CONSULTS
Received MD consult asking to change TF to a low-glucose formula.    Pt is currently on Peptamen 1.5, goal is 55 mL/hr = 1980 kcals, 90 gm pro, 1016 mL H20, 248 gm CHO, no fiber (was also supposed to get ProStat but never ordered).  BS were in the 300s so MD decreased the rate to 35 mL/hr last night. BS now 152-173.  Pt is on high SSI and Lantus,18 U.    Will change TF formula to TwoCal HN at goal of 35 mL/hr to provide 1680 annette, 71 gm pro, 184 gm CHO, 4 gm fiber, 600 mL H2O.  Will add Beneprotein, 1 packet 6x/day.  Total: 1830 annette (26 annette/kg), 107 gm pro (1.5 gm/kg).    Izabella Contreras, RD  Pager [phone] (M-F)            [phone] (W/E & Hol)

## 2017-04-10 ENCOUNTER — APPOINTMENT (OUTPATIENT)
Dept: CT IMAGING | Facility: CLINIC | Age: 57
DRG: 870 | End: 2017-04-10
Attending: INTERNAL MEDICINE
Payer: COMMERCIAL

## 2017-04-10 LAB
ALBUMIN SERPL-MCNC: 1.4 G/DL (ref 3.4–5)
ALP SERPL-CCNC: 366 U/L (ref 40–150)
ALT SERPL W P-5'-P-CCNC: 24 U/L (ref 0–70)
ANION GAP SERPL CALCULATED.3IONS-SCNC: 8 MMOL/L (ref 3–14)
AST SERPL W P-5'-P-CCNC: 76 U/L (ref 0–45)
BACTERIA SPEC CULT: ABNORMAL
BASOPHILS # BLD AUTO: 0.1 10E9/L (ref 0–0.2)
BASOPHILS NFR BLD AUTO: 0.6 %
BILIRUB SERPL-MCNC: 1 MG/DL (ref 0.2–1.3)
BUN SERPL-MCNC: 24 MG/DL (ref 7–30)
CALCIUM SERPL-MCNC: 8.1 MG/DL (ref 8.5–10.1)
CHLORIDE SERPL-SCNC: 105 MMOL/L (ref 94–109)
CO2 SERPL-SCNC: 26 MMOL/L (ref 20–32)
CREAT SERPL-MCNC: 1.2 MG/DL (ref 0.66–1.25)
DIFFERENTIAL METHOD BLD: ABNORMAL
EOSINOPHIL # BLD AUTO: 0.4 10E9/L (ref 0–0.7)
EOSINOPHIL NFR BLD AUTO: 3.8 %
ERYTHROCYTE [DISTWIDTH] IN BLOOD BY AUTOMATED COUNT: 20.6 % (ref 10–15)
GFR SERPL CREATININE-BSD FRML MDRD: 63 ML/MIN/1.7M2
GLUCOSE BLDC GLUCOMTR-MCNC: 165 MG/DL (ref 70–99)
GLUCOSE BLDC GLUCOMTR-MCNC: 173 MG/DL (ref 70–99)
GLUCOSE BLDC GLUCOMTR-MCNC: 177 MG/DL (ref 70–99)
GLUCOSE BLDC GLUCOMTR-MCNC: 179 MG/DL (ref 70–99)
GLUCOSE BLDC GLUCOMTR-MCNC: 191 MG/DL (ref 70–99)
GLUCOSE SERPL-MCNC: 163 MG/DL (ref 70–99)
HCT VFR BLD AUTO: 24 % (ref 40–53)
HGB BLD-MCNC: 7.6 G/DL (ref 13.3–17.7)
IMM GRANULOCYTES # BLD: 0.1 10E9/L (ref 0–0.4)
IMM GRANULOCYTES NFR BLD: 0.5 %
LACTATE BLD-SCNC: 1 MMOL/L (ref 0.7–2.1)
LIPASE SERPL-CCNC: 2182 U/L (ref 73–393)
LYMPHOCYTES # BLD AUTO: 3 10E9/L (ref 0.8–5.3)
LYMPHOCYTES NFR BLD AUTO: 27.7 %
MAGNESIUM SERPL-MCNC: 2.1 MG/DL (ref 1.6–2.3)
MCH RBC QN AUTO: 30.8 PG (ref 26.5–33)
MCHC RBC AUTO-ENTMCNC: 31.7 G/DL (ref 31.5–36.5)
MCV RBC AUTO: 97 FL (ref 78–100)
MICRO REPORT STATUS: ABNORMAL
MICROORGANISM SPEC CULT: ABNORMAL
MONOCYTES # BLD AUTO: 0.3 10E9/L (ref 0–1.3)
MONOCYTES NFR BLD AUTO: 2.9 %
NEUTROPHILS # BLD AUTO: 6.9 10E9/L (ref 1.6–8.3)
NEUTROPHILS NFR BLD AUTO: 64.5 %
NRBC # BLD AUTO: 0 10*3/UL
NRBC BLD AUTO-RTO: 0 /100
PHOSPHATE SERPL-MCNC: 3 MG/DL (ref 2.5–4.5)
PLATELET # BLD AUTO: 321 10E9/L (ref 150–450)
POTASSIUM SERPL-SCNC: 4.1 MMOL/L (ref 3.4–5.3)
PROT SERPL-MCNC: 7.5 G/DL (ref 6.8–8.8)
RBC # BLD AUTO: 2.47 10E12/L (ref 4.4–5.9)
SODIUM SERPL-SCNC: 139 MMOL/L (ref 133–144)
SPECIMEN SOURCE: ABNORMAL
WBC # BLD AUTO: 10.7 10E9/L (ref 4–11)

## 2017-04-10 PROCEDURE — 94003 VENT MGMT INPAT SUBQ DAY: CPT

## 2017-04-10 PROCEDURE — 85025 COMPLETE CBC W/AUTO DIFF WBC: CPT | Performed by: INTERNAL MEDICINE

## 2017-04-10 PROCEDURE — 25000125 ZZHC RX 250: Performed by: INTERNAL MEDICINE

## 2017-04-10 PROCEDURE — 40000239 ZZH STATISTIC VAT ROUNDS

## 2017-04-10 PROCEDURE — 25000128 H RX IP 250 OP 636: Performed by: INTERNAL MEDICINE

## 2017-04-10 PROCEDURE — 40000275 ZZH STATISTIC RCP TIME EA 10 MIN

## 2017-04-10 PROCEDURE — 83735 ASSAY OF MAGNESIUM: CPT | Performed by: INTERNAL MEDICINE

## 2017-04-10 PROCEDURE — 25000132 ZZH RX MED GY IP 250 OP 250 PS 637: Performed by: HOSPITALIST

## 2017-04-10 PROCEDURE — 80053 COMPREHEN METABOLIC PANEL: CPT | Performed by: INTERNAL MEDICINE

## 2017-04-10 PROCEDURE — 99291 CRITICAL CARE FIRST HOUR: CPT | Performed by: INTERNAL MEDICINE

## 2017-04-10 PROCEDURE — 83690 ASSAY OF LIPASE: CPT | Performed by: INTERNAL MEDICINE

## 2017-04-10 PROCEDURE — 25000125 ZZHC RX 250: Performed by: HOSPITALIST

## 2017-04-10 PROCEDURE — 40000281 ZZH STATISTIC TRANSPORT TIME EA 15 MIN

## 2017-04-10 PROCEDURE — 20000003 ZZH R&B ICU

## 2017-04-10 PROCEDURE — 83605 ASSAY OF LACTIC ACID: CPT | Performed by: INTERNAL MEDICINE

## 2017-04-10 PROCEDURE — 25500064 ZZH RX 255 OP 636: Performed by: HOSPITALIST

## 2017-04-10 PROCEDURE — 25000132 ZZH RX MED GY IP 250 OP 250 PS 637: Performed by: INTERNAL MEDICINE

## 2017-04-10 PROCEDURE — 40000008 ZZH STATISTIC AIRWAY CARE

## 2017-04-10 PROCEDURE — 84100 ASSAY OF PHOSPHORUS: CPT | Performed by: INTERNAL MEDICINE

## 2017-04-10 PROCEDURE — 00000146 ZZHCL STATISTIC GLUCOSE BY METER IP

## 2017-04-10 PROCEDURE — 25000131 ZZH RX MED GY IP 250 OP 636 PS 637: Performed by: INTERNAL MEDICINE

## 2017-04-10 PROCEDURE — 74177 CT ABD & PELVIS W/CONTRAST: CPT

## 2017-04-10 PROCEDURE — 27210429 ZZH NUTRITION PRODUCT INTERMEDIATE LITER

## 2017-04-10 RX ORDER — IOPAMIDOL 755 MG/ML
90 INJECTION, SOLUTION INTRAVASCULAR ONCE
Status: COMPLETED | OUTPATIENT
Start: 2017-04-10 | End: 2017-04-10

## 2017-04-10 RX ADMIN — Medication 1 PACKET: at 12:23

## 2017-04-10 RX ADMIN — HEPARIN SODIUM 5000 UNITS: 10000 INJECTION, SOLUTION INTRAVENOUS; SUBCUTANEOUS at 21:00

## 2017-04-10 RX ADMIN — CHLORHEXIDINE GLUCONATE 15 ML: 1.2 RINSE ORAL at 09:36

## 2017-04-10 RX ADMIN — Medication 2 MG: at 02:30

## 2017-04-10 RX ADMIN — OLANZAPINE 20 MG: 10 TABLET, FILM COATED ORAL at 22:17

## 2017-04-10 RX ADMIN — MEROPENEM 1 G: 1 INJECTION, POWDER, FOR SOLUTION INTRAVENOUS at 18:43

## 2017-04-10 RX ADMIN — BISACODYL 10 MG: 10 SUPPOSITORY RECTAL at 12:53

## 2017-04-10 RX ADMIN — CEFAZOLIN SODIUM 1 G: 1 INJECTION, POWDER, FOR SOLUTION INTRAMUSCULAR; INTRAVENOUS at 14:35

## 2017-04-10 RX ADMIN — Medication 2 MG: at 22:17

## 2017-04-10 RX ADMIN — Medication 1 PACKET: at 18:48

## 2017-04-10 RX ADMIN — MEROPENEM 1 G: 1 INJECTION, POWDER, FOR SOLUTION INTRAVENOUS at 02:31

## 2017-04-10 RX ADMIN — SENNOSIDES A AND B 5 ML: 415.36 LIQUID ORAL at 21:00

## 2017-04-10 RX ADMIN — Medication 2 MG: at 14:41

## 2017-04-10 RX ADMIN — Medication 1 PACKET: at 10:35

## 2017-04-10 RX ADMIN — IOPAMIDOL 90 ML: 755 INJECTION, SOLUTION INTRAVENOUS at 17:11

## 2017-04-10 RX ADMIN — GABAPENTIN 300 MG: 250 SUSPENSION ORAL at 23:34

## 2017-04-10 RX ADMIN — MEROPENEM 1 G: 1 INJECTION, POWDER, FOR SOLUTION INTRAVENOUS at 09:51

## 2017-04-10 RX ADMIN — HEPARIN SODIUM 5000 UNITS: 10000 INJECTION, SOLUTION INTRAVENOUS; SUBCUTANEOUS at 12:47

## 2017-04-10 RX ADMIN — SODIUM CHLORIDE: 9 INJECTION, SOLUTION INTRAVENOUS at 02:44

## 2017-04-10 RX ADMIN — MULTIVITAMIN 15 ML: LIQUID ORAL at 09:44

## 2017-04-10 RX ADMIN — SODIUM CHLORIDE 67 ML: 9 INJECTION, SOLUTION INTRAVENOUS at 17:12

## 2017-04-10 RX ADMIN — ACETAMINOPHEN 650 MG: 160 SUSPENSION ORAL at 05:09

## 2017-04-10 RX ADMIN — CEFAZOLIN SODIUM 1 G: 1 INJECTION, POWDER, FOR SOLUTION INTRAMUSCULAR; INTRAVENOUS at 05:09

## 2017-04-10 RX ADMIN — Medication 2 MG: at 09:45

## 2017-04-10 RX ADMIN — Medication 2 MG: at 18:47

## 2017-04-10 RX ADMIN — DOCUSATE SODIUM 100 MG: 50 LIQUID ORAL at 21:03

## 2017-04-10 RX ADMIN — GABAPENTIN 300 MG: 250 SUSPENSION ORAL at 09:44

## 2017-04-10 RX ADMIN — ACETAMINOPHEN 650 MG: 325 TABLET, FILM COATED ORAL at 21:00

## 2017-04-10 RX ADMIN — CHLORHEXIDINE GLUCONATE 15 ML: 1.2 RINSE ORAL at 21:00

## 2017-04-10 RX ADMIN — PANTOPRAZOLE SODIUM 40 MG: 40 TABLET, DELAYED RELEASE ORAL at 09:44

## 2017-04-10 RX ADMIN — Medication 1 PACKET: at 22:17

## 2017-04-10 RX ADMIN — INSULIN GLARGINE 18 UNITS: 100 INJECTION, SOLUTION SUBCUTANEOUS at 09:45

## 2017-04-10 RX ADMIN — Medication 2 MG: at 05:09

## 2017-04-10 RX ADMIN — ACETAMINOPHEN 650 MG: 160 SUSPENSION ORAL at 13:08

## 2017-04-10 RX ADMIN — HEPARIN SODIUM 5000 UNITS: 10000 INJECTION, SOLUTION INTRAVENOUS; SUBCUTANEOUS at 04:04

## 2017-04-10 RX ADMIN — CEFAZOLIN SODIUM 1 G: 1 INJECTION, POWDER, FOR SOLUTION INTRAMUSCULAR; INTRAVENOUS at 22:16

## 2017-04-10 RX ADMIN — LIDOCAINE HYDROCHLORIDE 10 ML: 20 JELLY TOPICAL at 17:00

## 2017-04-10 RX ADMIN — Medication 1 PACKET: at 05:10

## 2017-04-10 RX ADMIN — OXYCODONE HYDROCHLORIDE 5 MG: 5 TABLET ORAL at 21:23

## 2017-04-10 RX ADMIN — MIDAZOLAM HYDROCHLORIDE 2 MG: 1 INJECTION, SOLUTION INTRAMUSCULAR; INTRAVENOUS at 23:34

## 2017-04-10 RX ADMIN — GABAPENTIN 300 MG: 250 SUSPENSION ORAL at 21:06

## 2017-04-10 RX ADMIN — MIDAZOLAM HYDROCHLORIDE 2 MG: 1 INJECTION, SOLUTION INTRAMUSCULAR; INTRAVENOUS at 17:21

## 2017-04-10 NOTE — CONSULTS
GASTROENTEROLOGY CONSULTATION      CHIEF COMPLAINT:  Elevated lipase.      HISTORY OF PRESENT ILLNESS:  Mr. Wyatt Mahajan is a 57-year-old male we were asked to see by the ICU physician, Dr. Garcia, for further evaluation of an elevated lipase.  The patient presented to the hospital on 03/17 with acute hypoxic respiratory failure due to Influenza P .  He had right chest wall pain at that time and uncontrolled diabetes mellitus 2.   He developed acute respiratory distress and was intubated and transferred to the ICU.  Since then he has had a very complicated course in the ICU, including event associated pneumonia and MSSA bacteremia, delirium, elevated lipase, and possible pancreatitis thought to be secondary to propofol, mild transaminase elevations, mild JOSE, hematuria bradycardia, atrial fibrillation with rapid ventricular response and anemia of critical illness.      GI was consulted because the patient has had a persistently elevated lipase since about the middle of his hospitalization, his lipase was initially normal, but then was slightly elevated during hospitalization and has remained elevated despite stopping propofol.  He is unable to give any history of whether there is abdominal pain.  ICU has also noted increasing abdominal distention which may have correlated to the increasing in his tube feedings.      PAST MEDICAL HISTORY:  Significant for diabetes type 2, cranial nerve palsy, mixed hyperlipidemia, peripheral vascular disease and tobacco abuse.  These were all problems before the hospitalization.      PAST SURGICAL HISTORY:  Included colonoscopies in 2016.  No other surgeries.      PRIOR TO ADMISSION MEDICATIONS:  Included aspirin, gabapentin Linzess, atorvastatin, and azithromycin.      SOCIAL HISTORY:  He lives with his wife.  He does smoke cigarettes occasionally.  He has a 7-pack-year history and denies alcohol.      FAMILY HISTORY:  Significant for diabetes in his mother, father and sister.   No history of cerebrovascular disease.  No history of colon cancer.      REVIEW OF SYSTEMS:  Unobtainable from the patient.      PHYSICAL EXAMINATION:   GENERAL:  He is a well-nourished gentleman.  Appears confused and unable to answer any questions.   VITAL SIGNS:  His temperature is 99.9, his pulse is 104, and blood pressure is 142/73.   HEENT:  Head is atraumatic, normocephalic.  Sclerae were nonicteric.   HEART:  S1, S2.   LUNGS:  Clear except for some dullness in the right base, although it was on a ventilator.   ABDOMEN:  Protuberant with hypoactive bowel sounds, but appears nontender.   EXTREMITIES:  Right and left lower extremity were significant for 1+ edema.   NEUROLOGIC:  He appears awake but is really unable to follow meaningful commands.   PSYCHIATRIC:   Unobtainable.      LABORATORY DATA:  His lipase is elevated at 2182, which is slightly improved from previous value of 2700.  His electrolytes are within normal limits.  His AST is slightly elevated at 76; his alkaline phosphatase is slightly elevated at 366.  White blood cell count 10.7, hemoglobin 7.6, MCV is 97.      ASSESSMENT AND PLAN:   1.  Elevated lipase.  It appeared acutely elevated on 03/27 and repeat value was less elevated on 03/30 and then a repeat on 04/10 was 2182, still elevated but less so.   I am unclear if this patient has an isolated elevation of his lipase, or if he actually has acute pancreatitis.  I think he needs a CT of the abdomen for further evaluation.  The etiology of pancreatitis is extensive, especially in a gentleman who has had multiple infections and influenza.  Pancreatitis could be related to bacteria, viruses. medications, gallstones or even hypertriglyceridemia.  We suggest beginning with a CT scan.  We also will check his triglycerides and be able to further delineate his care from there.      2.  Protuberant abdomen.  The patient was on Linzess initially coming in to the hospital so I imagine he has a dysmotility  syndrome or chronic constipation.  At this time, they have also acutely increased his tube feedings, which may have caused some abdominal distention.  We suggest asking his wife if he had a good response to the Linzess; that could actually be resumed through his tube feedings.  At this time, they have slowed down his tube feeding somewhat to assess whether that caused acute abdominal distention.  Other etiologies of acute abdominal distention include ascites, pancreatic pseudocyst or general ileus.  We will continue to follow this patient and will await the results of the CT scan.      3.   Increased AST.  This certainly can be from his medications.  It is often seen with Olanzapine.  Olanzapine has also got a less than 1% risk of pancreatitis.      Thank you for asking us to participate in his care.  We will certainly follow along and try to help delineate further care.         ELHAM SIFUENTES MD             D: 04/10/2017 14:23   T: 04/10/2017 15:38   MT: [id number]      Name:     AMAURY PARKER   MRN:      [id number]        Account:       [id number]   :      1960           Consult Date:  04/10/2017      Document: [id number]

## 2017-04-10 NOTE — PROGRESS NOTES
[facility] ICU RESPIRATORY NOTE  Date of Admission: 3/17/17  Date of Intubation (most recent): 3/17/17  Reason for Mechanical Ventilation: Hypoxic respiratory failure 2/2 influenza B  Number of Days on Mechanical Ventilation: 25  Met Criteria for Pressure Support Trial: Yes   Length of Pressure Support Trial: Failed PS trial due to increased RR.    Ventilation Mode: CMV/AC  FiO2 (%): 40 %  Rate Set (breaths/minute): 18 breaths/min  Tidal Volume Set (mL): 400 mL  PEEP (cm H2O): 5 cmH2O  Oxygen Concentration (%): 40 %  Resp: 20    ABG Results: No lab results found in last 7 days.      Plan: Cont full vent support.    4/10/2017  Mandy Pantoja, RRT

## 2017-04-10 NOTE — PLAN OF CARE
Problem: Goal Outcome Summary  Goal: Goal Outcome Summary  Outcome: Improving  0730-1930  0830 placed in contact isolation per Dr Beckman  Neuro: shakes head no to hurting or pain. Squeezes hands and wiggles toes to command, sometimes requiring Mal[place]ian language from wife. Falls asleep post Ativan  Lungs: coarse to diminished  GI: had large amt stool post suppository. Abdomen softer, less distended this stanley

## 2017-04-10 NOTE — PROGRESS NOTES
Critical Care Progress Note      04/10/2017    Name: Wyatt Mahajan MRN#: [id number]   Age: 57 year old YOB: 1960     Hsptl Day# 24  ICU DAY #23 trached 5/5    MV DAY #23           Problem List:   Active Problems:    MSSA (methicillin susceptible Staphylococcus aureus) septicemia (H)    MSSA (methicillin susceptible Staphylococcus aureus) pneumonia (H)    Fever    Leukocytosis    Influenza B    Acute respiratory failure with hypoxia (H)  MSSA/enterobacter/klebsiella pna   MSSA bacteremia--resolved  VAP (GNR)  Influenza B  JOSE--stable  Hematuria---bell trauma?--improving  pancreatitis   Quanterferon positive         Summary/Hospital Course:   Wyatt Mahajan is a 57 year old male admitted on 3/17 to the floor, presenting with acute hypoxic respiratory failure due to influenza B and CAP, right chest wall pain and uncontrolled DM-II. Patient was subsequently transferred from the floor to the MICU for increased work of breathing, accessory muscle use and respiratory distress. Was intubated due to increased work of breathing, tachypnea and decreasing O2 sats.  His hospital course has been complicated by:  1. Vent-associated pneumonia for which his abx coverage was broadened to zoysn.  Ancef was restarted on 3/30 per ID request for MSSA bacteremia.  2.   MSSA bacteremia, likely from the pna which cleared after the second set of blood cx.  3.  Delirium which has persisted through propofol, precedex, valproate, seroquel and gabapentin.  4.  Pancreatitis, likely due to propofol, which is now improving with post-pyloric feeds.  5.  Mild transaminitis:  Drug induced vs crit illness induced  6.  Mild JOSE:  Usually worsens with attempted diuresis.  7.  Hematuria:  Believed 2/2 bell trauma, now resolved.  8.  Bradycardia:  Seems due to precedex, resolved when off precedex.  9.  A fib with rvr:  Resolved with amio loading.  10.  Anemia of critical illness, family has been hesitant to allow transfusions for  hgb 7.     4/3: Event over weekend noted, some agitation, head CT negative, transfused 1 unit PRBC with partial response,  PEG and trach delayed as quantiferon pending , ruling out for TB  - AFB smear neg x 2.   4/4 -emerges with sedation holiday, quantiferon positive 4.5, 3rd, AFB sputum collected, pending, still temp 39,  -PEG and trach completed, repeat US no evidence of cholecystitis, ?abnormality in right kidney -  4/5 - weaning versed, temp trend lower afebrile at present. Stable on vent.  No further AFB results.  4/6 - waking up, fever curve slowly trending down, sputum and urine cultures pending,   4/7 - dc fentanyl and versed, 3rd AFB smear negative reported this AM. - early AM rr increased 40 , 100% 20 versed m fentanyl , SBP - ? Flash episode - improved with meds -  Pmnokyhc60/5 ~ 4hr  4/8 - abx broadened to meropenem as sputum with ESBL enterobacter, sugars still labile thought related to phosphate bump (in d5) and feeds - feeds slowed down with improvement in glucose, diuresis started with 3.6L out to 20 IV lasix, agitation increased when ativan wears off        Assessment and plan :     Wyatt Mahajan IS a 57 year old male admitted on 3/17/2017 for respiratory failure, flu B, mssa pna and mssa bacteremia.   I have personally reviewed the daily labs, imaging studies, cultures and discussed the case with referring physician and consulting physicians.   My assessment and plan by system for this patient is as follows:      Neurology/Psychiatry:   1. Sedation: scheduled ativan plus versed prn   2. Analgesia - PRN fentanyl, oxycodone  3. Delirium:  3/31: olanzapine (valproate ineffective as well no response to quetiapine)   4. H/o Diabetic neuropathy 2/2 DM-II:     - gabapentin 300mgTID.       Cardiovascular:   1. Hypertension: scheduled hydralazine  BP labile when agitated, 21+ start low dose lasix titrate as tolerated with   2.  A fib with RVR:  In setting of critical illness. Improved. Prn  metoprolol   3. H/o HLD:  Holding atorvastatin (LFT elevated)  4. H/o PAD:  hold ASA given prior hematuria and anemia.          Pulmonary/Ventilator Management:   1.  Respiratory failure, hypoxemic:  2/2 complicated mssa pna, vap (atb for S aureus and E cloacae & K pneumoniae isolated from sputum) , and influenza B - repeat swab negative.  Now ESBL in sputum - changed to meropenem   2. Episode of resp distress overnight - ? Flash episodes, agitation - CXR stable to slightly improved   - continue CMV Lung protective vent strategy-  s/p trach 4/4  - PS trials as tolerated 12-15 /5 presently        GI and Nutrition :   1.  TF- noted lizzie sugars - will have   2.  PPI for PUD prophylaxis  3.  Ileus/Constipation--improved on bowel reg.last bowel movement 4/8   4.  Elevated liver enzymes: noted 3/24, nl on 3/17, distended GB w/sludge on US vs med induced pancreatitis. AP remains elevated, otherwise normalization of LFTS and stable imaging.  --serial LFT's,   5. Pancreatitis:  Due to critical illness vs propofol.  No further propofol. Improved, but lipase still very high. Consult GI for further eval. Abdominal u/s vs. CT (recent JOSE, improving.)  6.  s/p PEG      Renal/Fluids/Electrolytes:   1. JOSE-- cr stablilized , UOP stable    -- stable today, trend Creatinine. Urine output stable   2. Hypernatremia -  PO free water - improved will cut back on free water flushes to ~60ml /hr (240ml q4 hr)  3. Volume up - 21L though recently 2/2 to enteral feeds, as need diuresis  supplement with intermittent albumin -   4. Hematuria: bloodly urine AM of 3/23 probably due to trauma from the bell catheter. Resolved 3/30.       Infectious Disease:   1. Persistent fever - multiple source (sinusitis, thrombophlebitis, lines, etc) no decub per nursing - fever curve down after removal of NG tube - though now with ESBL in sputum - meropenem started 4/8 - procal downtrending   2. Flu B:  Repeat flu swab negative , completed Tamiflu course   3.  MSSA pna/bacteremia and gm neg VAP: Bld cultures cleared  TTE did not show valvular masses/vegetations. CT chest negative for occult abscess. - abx course Nafcillin until 3/24, then ancef until 3/26, then zosyn and vanco 3/26 until  vanco stopped 3/29.  Ancef re-added to existing zosyn on 3/30 per ID. Zosyn dc'd 4/8   4. ?Latent AFB - positive quantiferon, AFB neg x3 (reported 4/7) - DC airborne precautions   5. ? Renal abnormality on US - FUP urine culture , CT scan last week negative for abnormality urine culture negative   7. Sinusitis -on CT scan - NG tube removed, follow clinically - fever and wbc count downtrending since removal on abx as above      Endocrine:   1. Hyperglycemia- exacerbated exogenous load - (feedings, phosph)  -- lantus 18, resistant Sliding scale     Hematology/Oncology:   1. Anemia Hgb -labile;  no overt bleeding  s/p 1 u PRBC 4/5 with appropriate response.   2. Leukocytosis-- 2/2 infection. Patient febrile on and off. Procalcitonin downtrending   3. R arm swelling  -- superficial thrombus only.   Warm compresses. Repeat dopplers negative      IV/Access:   1. Venous access - peripheral and PICC    ICU Prophylaxis:   1. DVT:  hep SubQ 3/30  2. VAP: HOB 30 degrees, chlorhexidine rinse  3. Stress Ulcer: PPI  4. Restraints: Nonviolent soft two point restraints required and necessary for patient safety and continued cares and good effect as patient continues to pull at necessary lines, tubes despite education and distraction. Will readdress daily.   6. Feeding - tube feeds decreased - need adjust formula for hyperglycemia   7. Family Update: wife and son at bedside  8. Disposition - critically ill    Fam mtg 3/27 , 3/30, 3/31, 4/3, 4/7            Goals Next 24 hr :   1. GI consultation for pancreatitis  2. Plan transfer  3. PS as tolerated  4. Follow I's and Os          Key Medications:   Reviewed          Physical Examination:   Temp:  [98.1  F (36.7  C)-100.4  F (38  C)] 99.9  F (37.7   C)  Heart Rate:  [] 104  Resp:  [0-39] 20  BP: (137-159)/() 142/73  FiO2 (%):  [40 %] 40 %  SpO2:  [94 %-100 %] 100 %      Intake/Output Summary (Last 24 hours) at 04/10/17 1220  Last data filed at 04/10/17 0600   Gross per 24 hour   Intake          2502.33 ml   Output             2030 ml   Net           472.33 ml           Wt Readings from Last 4 Encounters:   04/08/17 81.6 kg (179 lb 14.3 oz)   03/16/17 70.3 kg (155 lb)   01/17/17 71.3 kg (157 lb 1.6 oz)   11/30/16 72.6 kg (160 lb)     BP - Mean:  [] 100  Ventilation Mode: CMV/AC  FiO2 (%): 40 %  Rate Set (breaths/minute): 18 breaths/min  Tidal Volume Set (mL): 400 mL  PEEP (cm H2O): 5 cmH2O  Oxygen Concentration (%): 40 %  Resp: 20  No lab results found in last 7 days.     GEN: awake, mild distress at times  HEENT: head ncat, sclera anicteric, OP patent, trachea midline   PULM: unlabored, coarse lung sounds anteriorly, synchronous with vent   CV/COR: RRR S1/S2, No rub, murmur or gallop  ABD: soft, distended but improved  nontender, hypoactive bowel sounds, no masses   EXT:  Decreased eripheral edema present in R>L hand, warm x4 and well-perfused.   NEURO:  follows commands interactive bilateral lower extremities 4 extremities weak  SKIN: no obvious rash  LINES: clean, dry intact         Data:       Recent Labs  Lab 04/10/17  0405 04/09/17  0530 04/08/17  0450 04/07/17  0425  04/05/17  0500    140 146* 146*  < > 149*   POTASSIUM 4.1 3.7 3.7 3.8  < > 3.3*   CHLORIDE 105 107 112* 114*  < > 115*   CO2 26 26 24 25  < > 25   ANIONGAP 8 7 10 7  < > 9   * 171* 291* 209*  < > 208*   BUN 24 23 22 23  < > 26   CR 1.20 1.37* 1.39* 1.40*  < > 1.63*   GFRESTIMATED 63 54* 53* 53*  < > 44*   GFRESTBLACK 76 65 64 64  < > 53*   LEONIE 8.1* 8.1* 7.8* 7.8*  < > 7.9*   MAG 2.1 1.9 2.1 2.0  < > 2.2   PHOS 3.0 2.2* 1.7* 1.9*  < > 2.4*   PROTTOTAL 7.5 7.6 7.5  --   --  7.1   ALBUMIN 1.4* 1.4* 1.4*  --   --  1.4*   BILITOTAL 1.0 1.0 0.9  --   --  0.9    ALKPHOS 366* 265* 257*  --   --  127   AST 76* 80* 92*  --   --  202*   ALT 24 24 22  --   --  23   < > = values in this interval not displayed.    CBC    Recent Labs  Lab 04/10/17  0405 04/09/17  0530 04/08/17  0450 04/07/17  0425   WBC 10.7 13.6* 12.8* 11.5*   RBC 2.47* 2.43* 2.56* 2.34*   HGB 7.6* 7.7* 7.9* 7.3*   HCT 24.0* 23.5* 24.8* 22.9*   MCV 97 97 97 98   MCH 30.8 31.7 30.9 31.2   MCHC 31.7 32.8 31.9 31.9   RDW 20.6* 20.5* 20.0* 20.0*    318 327 312     INR    Recent Labs  Lab 04/04/17  0650   INR 1.18*     Arterial Blood Gas  No lab results found in last 7 days.    All cultures:    Recent Labs  Lab 04/06/17  1845 04/05/17  1103 04/04/17  1010 04/03/17  1545   CULT Light growth Normal floraModerate growth Enterobacter cloacae complex* No growth Culture received and in progress.  Positive AFB results are called as soon as detected.  Final report to follow in 7 to 8 weeks. Culture received and in progress.  Positive AFB results are called as soon as detected.  Final report to follow in 7 to 8 weeks.     Imaging:  CXR   No results found for this or any previous visit (from the past 24 hour(s)).    Billing: This patient is critically ill: Yes. Total critical care time today 35 min.

## 2017-04-10 NOTE — PROGRESS NOTES
[facility] ICU RESPIRATORY NOTE  Date of Admission: 3/17/17  Date of Intubation (most recent):  3/17/17  Reason for Mechanical Ventilation: Hypoxic respiratory failure 2/2 influenza B  Number of Days on Mechanical Ventilation: 26 days   Met Criteria for Pressure Support Trial: Yes   Length of Pressure Support Trial: Failed PS   Reason for No Pressure Support Trial: No    Ventilation Mode: CMV/AC  FiO2 (%): 40 %  Rate Set (breaths/minute): 18 breaths/min  Tidal Volume Set (mL): 400 mL  PEEP (cm H2O): 5 cmH2O  Oxygen Concentration (%): 40 %  Resp: 24    ABG Results:  No lab results found in last 7 days.    Plan:  Will remains full ventilatory support and assess for daily.    Raymond Benavidez RRT  4/10/2017

## 2017-04-10 NOTE — PLAN OF CARE
Problem: Goal Outcome Summary  Goal: Goal Outcome Summary  Outcome: No Change  Resumed care from 5518-2060. Peak temperature 100.4 F, Tylenol prn. Patient remains on full vent support without sedation, pain control. Scheduled Ativan. Localizes pain, opens eyes spontaneously. UO adequate.TF at goal. Will continue to monitor.

## 2017-04-10 NOTE — PLAN OF CARE
Problem: Goal Outcome Summary  Goal: Goal Outcome Summary  Outcome: No Change  [id number]  Neuro: did mouth he is in hospital X 1.  Follows commands to squeeze hands and wiggle toes in [place]. Asks for bedpan. More awake when Ativan due.  Up in chair X 2 hrs  Lungs: cont creamy pale yellow white secretions.  GI : Cont distended. Residuals only 5cc. No stool from Duculax supp.  Dr Garcia talked to wife through  here 9-1100.  Dr Herring discussed through dtr pt's condition.  Nurse spent time with wife discussing pt's condition.  Wife has not slept since Sat noct.

## 2017-04-10 NOTE — PROGRESS NOTES
[facility]  Infectious Disease Progress Note          Assessment and Plan:   Impression:   57 y.o male admitted with concern for secondary bacterial pneumonia on the top of Influenza B.   Sputum Cultures positive for MSSA.   Patient is originally from [facility]. Immigrated in 1990.   He was admitted this occasion with complaints of pleuritic chest pain. This was after he was diagnosed with Influenza B. His respiratory status quickly worsened. He was intubated and remains that way.  Blood cultures 2/2 positive for MSSA. Cultures from the sputum positive for MSSA, Enterobacter one strain more resistant than the other, Klebsiella. He has been on appropriate antibiotics since admission but has been febrile, still intubated.   Diarrhea- C diff neg  Positive TB quantiferon.  This, like pos PPD, would indicate previous exposure to TB.  Sputum AFB smear neg x 3, out of Isolation      Recommendations:   Cont  Ancef for MSSA bacteremia. Day 18/28  On Meropenem for Enterobacter in the sputum day 3              Interval History:   Temp lower past 24 hrs, Tmax 100.4.  Afebrile at present.  Stable on vent.  Repeat routine sputum cx positive for Enterobacter now Intermediately S to Zosyn.               Medications:       pantoprazole  40 mg Per Feeding Tube Daily     protein modular  1 packet Per Feeding Tube 6x Daily     meropenem  1 g Intravenous Q8H     insulin aspart  1-12 Units Subcutaneous Q4H     insulin glargine  18 Units Subcutaneous QAM AC     LORazepam  2 mg Oral Q4H     OLANZapine  20 mg Oral At Bedtime     sennosides  5-10 mL Oral or Feeding Tube BID    And     docusate   mg Oral or Feeding Tube BID     ceFAZolin  1 g Intravenous Q8H     heparin  5,000 Units Subcutaneous Q8H     multivitamins with minerals  15 mL Per Feeding Tube Daily     sodium chloride (PF)  10 mL Intracatheter Q7 Days     gabapentin  300 mg Oral or Feeding Tube Q8H ALEC     pneumococcal vaccine  0.5 mL Intramuscular  "Prior to discharge     sodium chloride (PF)  3 mL Intracatheter Q8H     chlorhexidine  15 mL Mouth/Throat Q12H                  Physical Exam:   Blood pressure 162/89, pulse 115, temperature 99  F (37.2  C), resp. rate 19, height 1.778 m (5' 10\"), weight 81.6 kg (179 lb 14.3 oz), SpO2 100 %.  [unfilled]  Vital Signs with Ranges  Temp:  [98.1  F (36.7  C)-100.4  F (38  C)] 99  F (37.2  C)  Heart Rate:  [] 94  Resp:  [0-39] 19  BP: (137-162)/() 162/89  FiO2 (%):  [40 %] 40 %  SpO2:  [94 %-100 %] 100 %    Constitutional: Awake, communicating with family mouthing words    Lungs: Clear to auscultation bilaterally, no crackles or wheezing   Cardiovascular: Regular rate and rhythm, normal S1 and S2, and no murmur noted   Abdomen: Normal bowel sounds, soft, non-distended, non-tender   Skin: No rashes, no cyanosis, no edema   Other:           Data:   All microbiology laboratory data reviewed.  Recent Labs   Lab Test  04/10/17   0405 04/09/17   0530  04/08/17   0450   WBC  10.7  13.6*  12.8*   HGB  7.6*  7.7*  7.9*   HCT  24.0*  23.5*  24.8*   MCV  97  97  97   PLT  321  318  327     Recent Labs   Lab Test  04/10/17   0405  04/09/17   0530  04/08/17   0450   CR  1.20  1.37*  1.39*     Recent Labs   Lab Test  09/14/09   1021   SED  9     "

## 2017-04-11 VITALS
RESPIRATION RATE: 24 BRPM | TEMPERATURE: 99.9 F | SYSTOLIC BLOOD PRESSURE: 152 MMHG | BODY MASS INDEX: 25.53 KG/M2 | HEIGHT: 70 IN | WEIGHT: 178.35 LBS | HEART RATE: 115 BPM | OXYGEN SATURATION: 100 % | DIASTOLIC BLOOD PRESSURE: 82 MMHG

## 2017-04-11 LAB
ANION GAP SERPL CALCULATED.3IONS-SCNC: 6 MMOL/L (ref 3–14)
BUN SERPL-MCNC: 24 MG/DL (ref 7–30)
CALCIUM SERPL-MCNC: 8 MG/DL (ref 8.5–10.1)
CHLORIDE SERPL-SCNC: 108 MMOL/L (ref 94–109)
CO2 SERPL-SCNC: 26 MMOL/L (ref 20–32)
CREAT SERPL-MCNC: 1.13 MG/DL (ref 0.66–1.25)
ERYTHROCYTE [DISTWIDTH] IN BLOOD BY AUTOMATED COUNT: 20.8 % (ref 10–15)
GFR SERPL CREATININE-BSD FRML MDRD: 67 ML/MIN/1.7M2
GLUCOSE BLDC GLUCOMTR-MCNC: 155 MG/DL (ref 70–99)
GLUCOSE BLDC GLUCOMTR-MCNC: 156 MG/DL (ref 70–99)
GLUCOSE BLDC GLUCOMTR-MCNC: 164 MG/DL (ref 70–99)
GLUCOSE BLDC GLUCOMTR-MCNC: 179 MG/DL (ref 70–99)
GLUCOSE BLDC GLUCOMTR-MCNC: 270 MG/DL (ref 70–99)
GLUCOSE SERPL-MCNC: 171 MG/DL (ref 70–99)
HCT VFR BLD AUTO: 23.2 % (ref 40–53)
HGB BLD-MCNC: 7.5 G/DL (ref 13.3–17.7)
MAGNESIUM SERPL-MCNC: 2.1 MG/DL (ref 1.6–2.3)
MCH RBC QN AUTO: 31.6 PG (ref 26.5–33)
MCHC RBC AUTO-ENTMCNC: 32.3 G/DL (ref 31.5–36.5)
MCV RBC AUTO: 98 FL (ref 78–100)
PHOSPHATE SERPL-MCNC: 2.6 MG/DL (ref 2.5–4.5)
PLATELET # BLD AUTO: 302 10E9/L (ref 150–450)
POTASSIUM SERPL-SCNC: 5 MMOL/L (ref 3.4–5.3)
RBC # BLD AUTO: 2.37 10E12/L (ref 4.4–5.9)
SODIUM SERPL-SCNC: 140 MMOL/L (ref 133–144)
TRIGL SERPL-MCNC: 163 MG/DL
WBC # BLD AUTO: 10.4 10E9/L (ref 4–11)

## 2017-04-11 PROCEDURE — 99238 HOSP IP/OBS DSCHRG MGMT 30/<: CPT | Performed by: INTERNAL MEDICINE

## 2017-04-11 PROCEDURE — 84100 ASSAY OF PHOSPHORUS: CPT | Performed by: PHYSICIAN ASSISTANT

## 2017-04-11 PROCEDURE — 80048 BASIC METABOLIC PNL TOTAL CA: CPT | Performed by: PHYSICIAN ASSISTANT

## 2017-04-11 PROCEDURE — 83735 ASSAY OF MAGNESIUM: CPT | Performed by: PHYSICIAN ASSISTANT

## 2017-04-11 PROCEDURE — 25000132 ZZH RX MED GY IP 250 OP 250 PS 637: Performed by: INTERNAL MEDICINE

## 2017-04-11 PROCEDURE — 25000132 ZZH RX MED GY IP 250 OP 250 PS 637: Performed by: HOSPITALIST

## 2017-04-11 PROCEDURE — 25000131 ZZH RX MED GY IP 250 OP 636 PS 637: Performed by: INTERNAL MEDICINE

## 2017-04-11 PROCEDURE — 40000275 ZZH STATISTIC RCP TIME EA 10 MIN

## 2017-04-11 PROCEDURE — 00000146 ZZHCL STATISTIC GLUCOSE BY METER IP

## 2017-04-11 PROCEDURE — 85027 COMPLETE CBC AUTOMATED: CPT | Performed by: PHYSICIAN ASSISTANT

## 2017-04-11 PROCEDURE — 84478 ASSAY OF TRIGLYCERIDES: CPT | Performed by: PHYSICIAN ASSISTANT

## 2017-04-11 PROCEDURE — 40000239 ZZH STATISTIC VAT ROUNDS

## 2017-04-11 PROCEDURE — 94003 VENT MGMT INPAT SUBQ DAY: CPT

## 2017-04-11 PROCEDURE — 25000128 H RX IP 250 OP 636: Performed by: INTERNAL MEDICINE

## 2017-04-11 PROCEDURE — 40000008 ZZH STATISTIC AIRWAY CARE

## 2017-04-11 RX ORDER — BISACODYL 10 MG
10 SUPPOSITORY, RECTAL RECTAL DAILY PRN
Qty: 30 SUPPOSITORY | DISCHARGE
Start: 2017-04-11 | End: 2017-07-10

## 2017-04-11 RX ORDER — ATORVASTATIN CALCIUM 40 MG/1
40 TABLET, FILM COATED ORAL DAILY
Qty: 90 TABLET | Refills: 3 | DISCHARGE
Start: 2017-04-11 | End: 2017-07-10

## 2017-04-11 RX ORDER — HEPARIN SODIUM 5000 [USP'U]/.5ML
5000 INJECTION, SOLUTION INTRAVENOUS; SUBCUTANEOUS EVERY 8 HOURS
DISCHARGE
Start: 2017-04-11 | End: 2017-05-09

## 2017-04-11 RX ORDER — CEFAZOLIN SODIUM 1 G/3ML
1 INJECTION, POWDER, FOR SOLUTION INTRAMUSCULAR; INTRAVENOUS EVERY 8 HOURS
Qty: 30 EACH | DISCHARGE
Start: 2017-04-11 | End: 2017-05-25

## 2017-04-11 RX ORDER — GABAPENTIN 250 MG/5ML
300 SOLUTION ORAL EVERY 8 HOURS
Qty: 450 ML | DISCHARGE
Start: 2017-04-11 | End: 2017-07-10

## 2017-04-11 RX ORDER — LORAZEPAM 2 MG/ML
2 CONCENTRATE ORAL EVERY 4 HOURS
Qty: 30 ML | Status: SHIPPED | DISCHARGE
Start: 2017-04-11 | End: 2017-05-09

## 2017-04-11 RX ORDER — OLANZAPINE 20 MG/1
20 TABLET ORAL AT BEDTIME
DISCHARGE
Start: 2017-04-11 | End: 2017-05-09

## 2017-04-11 RX ORDER — FENTANYL CITRATE 50 UG/ML
25-50 INJECTION, SOLUTION INTRAMUSCULAR; INTRAVENOUS
Status: SHIPPED | DISCHARGE
Start: 2017-04-11 | End: 2017-05-09

## 2017-04-11 RX ORDER — MEROPENEM 1 G/1
1 INJECTION, POWDER, FOR SOLUTION INTRAVENOUS EVERY 8 HOURS
Qty: 30 EACH | DISCHARGE
Start: 2017-04-11 | End: 2017-04-21

## 2017-04-11 RX ORDER — ONDANSETRON 4 MG/1
4 TABLET, ORALLY DISINTEGRATING ORAL EVERY 6 HOURS PRN
Qty: 120 TABLET | DISCHARGE
Start: 2017-04-11 | End: 2017-05-09

## 2017-04-11 RX ORDER — OXYCODONE HYDROCHLORIDE 5 MG/1
5-10 TABLET ORAL
Refills: 0 | Status: SHIPPED | DISCHARGE
Start: 2017-04-11 | End: 2017-05-09

## 2017-04-11 RX ORDER — ALBUTEROL SULFATE 0.83 MG/ML
3 SOLUTION RESPIRATORY (INHALATION)
Qty: 360 ML | DISCHARGE
Start: 2017-04-11 | End: 2017-07-10

## 2017-04-11 RX ADMIN — Medication 1 PACKET: at 07:47

## 2017-04-11 RX ADMIN — INSULIN GLARGINE 18 UNITS: 100 INJECTION, SOLUTION SUBCUTANEOUS at 07:44

## 2017-04-11 RX ADMIN — Medication 2 MG: at 10:30

## 2017-04-11 RX ADMIN — CEFAZOLIN SODIUM 1 G: 1 INJECTION, POWDER, FOR SOLUTION INTRAMUSCULAR; INTRAVENOUS at 05:05

## 2017-04-11 RX ADMIN — CHLORHEXIDINE GLUCONATE 15 ML: 1.2 RINSE ORAL at 07:43

## 2017-04-11 RX ADMIN — HEPARIN SODIUM 5000 UNITS: 10000 INJECTION, SOLUTION INTRAVENOUS; SUBCUTANEOUS at 04:46

## 2017-04-11 RX ADMIN — MIDAZOLAM HYDROCHLORIDE 2 MG: 1 INJECTION, SOLUTION INTRAMUSCULAR; INTRAVENOUS at 05:29

## 2017-04-11 RX ADMIN — MEROPENEM 1 G: 1 INJECTION, POWDER, FOR SOLUTION INTRAVENOUS at 10:30

## 2017-04-11 RX ADMIN — PANTOPRAZOLE SODIUM 40 MG: 40 TABLET, DELAYED RELEASE ORAL at 08:51

## 2017-04-11 RX ADMIN — SENNOSIDES A AND B 10 ML: 415.36 LIQUID ORAL at 08:51

## 2017-04-11 RX ADMIN — GABAPENTIN 300 MG: 250 SUSPENSION ORAL at 08:53

## 2017-04-11 RX ADMIN — MULTIVITAMIN 15 ML: LIQUID ORAL at 08:52

## 2017-04-11 RX ADMIN — Medication 2 MG: at 02:34

## 2017-04-11 RX ADMIN — HEPARIN SODIUM 5000 UNITS: 10000 INJECTION, SOLUTION INTRAVENOUS; SUBCUTANEOUS at 12:23

## 2017-04-11 RX ADMIN — MIDAZOLAM HYDROCHLORIDE 2 MG: 1 INJECTION, SOLUTION INTRAMUSCULAR; INTRAVENOUS at 13:07

## 2017-04-11 RX ADMIN — DOCUSATE SODIUM 100 MG: 50 LIQUID ORAL at 08:51

## 2017-04-11 RX ADMIN — Medication 1 PACKET: at 10:32

## 2017-04-11 RX ADMIN — Medication 2 MG: at 05:05

## 2017-04-11 RX ADMIN — MEROPENEM 1 G: 1 INJECTION, POWDER, FOR SOLUTION INTRAVENOUS at 02:34

## 2017-04-11 RX ADMIN — MIDAZOLAM HYDROCHLORIDE 2 MG: 1 INJECTION, SOLUTION INTRAMUSCULAR; INTRAVENOUS at 02:45

## 2017-04-11 NOTE — PLAN OF CARE
Problem: Goal Outcome Summary  Goal: Goal Outcome Summary  Outcome: Therapy, progress towards functional goals is fair  Neuro: Patient followed commands with both hands and feet, did better when family asked him in [place]. Purposeful movement with arms noted. Appeared to nod appropriately with family.  Resp: Stable on current vent settings; moderate amt of secretions pink and pale yellow  CV: Stable; htn and tachycardia noted when he got back from CT; prn Versed effective  GI: Tolerating TF  : Good UO  Family updated on plan of care at bedside.

## 2017-04-11 NOTE — PROGRESS NOTES
GI NOTE    Chart reviewed.    CT with no evidence of pancreatitis. Evidence of colonic stool    Suggest  1. No treatment of needed for pancreas  2. Continue treatem of decreased colon motility with miralax and stimulant laxative . Can consider restarting linzess as outpatient.    Cindy Rowe MD  [facility]  Office

## 2017-04-11 NOTE — PROGRESS NOTES
Date of Admission: 3/17/17  Date of Intubation (most recent): 3/17/17  Reason for Mechanical Ventilation: Hypoxic respiratory failure 2/2 influenza B  Number of Days on Mechanical Ventilation: 25  Met Criteria for Pressure Support Trial: Yes   Length of Pressure Support Trial: Failed PS trial due to increased RR.     Ventilation Mode: CMV/AC  FiO2 (%): 40 %  Rate Set (breaths/minute): 18 breaths/min  Tidal Volume Set (mL): 400 mL  PEEP (cm H2O): 5 cmH2O  Oxygen Concentration (%): 40 %  Resp: 20    Plan, To remain on full ventilatory support.4/10/2017  Lilly Mauro

## 2017-04-11 NOTE — PROGRESS NOTES
[facility] ICU RESPIRATORY NOTE  Date of Admission: 3/17/17  Date of Intubation (most recent): 3/17/17  Reason for Mechanical Ventilation: Hypoxic respiratory failure 2/2 influenza B  Number of Days on Mechanical Ventilation: 26  Met Criteria for Pressure Support Trial: yes  Length of Pressure Support Trial:   Reason for Stopping Pressure Support Trial:  Reason for No Pressure Support Trial: per MD    Significant Events Today: None    ABG Results:No lab results found in last 7 days.  Ventilation Mode: CMV/AC  FiO2 (%): 40 %  Rate Set (breaths/minute): 18 breaths/min  Tidal Volume Set (mL): 400 mL  PEEP (cm H2O): 5 cmH2O  Oxygen Concentration (%): 40 %  Resp: 11      ETT appearance on chest x-ray: ETT in good position     Plan: continue full ventilatory support and ps trials as pt tolerates.  4/11/2017  Sushma Velarde RT

## 2017-04-11 NOTE — DISCHARGE INSTRUCTIONS
TwoCal HN at goal of 35 mL/hr to provide 1680 annette, 71 gm pro, 184 gm CHO, 4 gm fiber, 600 mL H2O.  Beneprotein, 1 packet 6x/day.

## 2017-04-11 NOTE — PROGRESS NOTES
[facility]  Infectious Disease Progress Note          Assessment and Plan:   Impression:   57 y.o male admitted with concern for secondary bacterial pneumonia on the top of Influenza B.   Sputum Cultures positive for MSSA.   Patient is originally from [facility]. Immigrated in 1990.   He was admitted this occasion with complaints of pleuritic chest pain. This was after he was diagnosed with Influenza B. His respiratory status quickly worsened. He was intubated and remains that way.  Blood cultures 2/2 positive for MSSA. Cultures from the sputum positive for MSSA, Enterobacter one strain more resistant than the other, Klebsiella. He has been on appropriate antibiotics since admission but has been febrile, still intubated.   Diarrhea- C diff neg  Positive TB quantiferon.  This, like pos PPD, would indicate previous exposure to TB.  Sputum AFB smear neg x 3, out of Isolation      Recommendations:   Cont  Ancef for MSSA bacteremia. Day 19/28  On Meropenem for Enterobacter in the sputum, intermediate to zosyn now, previous cultures had a sensitive to zosyn, enterobacter day 4              Interval History:   Temp lower past 24 hrs, Tmax 100.4.  Afebrile at present.  Stable on vent.  Repeat routine sputum cx positive for Enterobacter now Intermediately S to Zosyn.               Medications:       pantoprazole  40 mg Per Feeding Tube Daily     protein modular  1 packet Per Feeding Tube 6x Daily     meropenem  1 g Intravenous Q8H     insulin aspart  1-12 Units Subcutaneous Q4H     insulin glargine  18 Units Subcutaneous QAM AC     LORazepam  2 mg Oral Q4H     OLANZapine  20 mg Oral At Bedtime     sennosides  5-10 mL Oral or Feeding Tube BID    And     docusate   mg Oral or Feeding Tube BID     ceFAZolin  1 g Intravenous Q8H     heparin  5,000 Units Subcutaneous Q8H     multivitamins with minerals  15 mL Per Feeding Tube Daily     sodium chloride (PF)  10 mL Intracatheter Q7 Days     gabapentin   "300 mg Oral or Feeding Tube Q8H [facility]     pneumococcal vaccine  0.5 mL Intramuscular Prior to discharge     sodium chloride (PF)  3 mL Intracatheter Q8H     chlorhexidine  15 mL Mouth/Throat Q12H                  Physical Exam:   Blood pressure 161/89, pulse 115, temperature 100  F (37.8  C), resp. rate 20, height 1.778 m (5' 10\"), weight 80.9 kg (178 lb 5.6 oz), SpO2 100 %.  [unfilled]  Vital Signs with Ranges  Temp:  [97.7  F (36.5  C)-100.6  F (38.1  C)] 100  F (37.8  C)  Heart Rate:  [] 103  Resp:  [0-29] 20  BP: (138-173)/(78-97) 161/89  FiO2 (%):  [40 %] 40 %  SpO2:  [99 %-100 %] 100 %    Constitutional: Awake, communicating with family mouthing words, vented thru trach   Lungs: Clear to auscultation bilaterally, no crackles or wheezing   Cardiovascular: Regular rate and rhythm, normal S1 and S2, and no murmur noted   Abdomen: More distended today   Skin: No rashes, no cyanosis, no edema   Other:           Data:   All microbiology laboratory data reviewed.  Recent Labs   Lab Test  04/11/17   0430  04/10/17   0405  04/09/17   0530   WBC  10.4  10.7  13.6*   HGB  7.5*  7.6*  7.7*   HCT  23.2*  24.0*  23.5*   MCV  98  97  97   PLT  302  321  318     Recent Labs   Lab Test  04/11/17   0430  04/10/17   0405  04/09/17   0530   CR  1.13  1.20  1.37*     Recent Labs   Lab Test  09/14/09   1021   SED  9     "

## 2017-04-11 NOTE — PROGRESS NOTES
Social Work Progress Note  Pt chart reviewed. Pt discussed in interdisciplinary rounds.     Intervention: Per CC Nuria SHAFER, pt will d/c today to [facility]. Per CC, pt will need a vent-full support during transport. Per CC, pt has no IV drips, but does have a G-tube, and a fully. Sw arranged for a 1300 ALS ride [place]. PCS form completed and fax to [facility] billing. PCS form placed in chart by CC.     Team members notified: CC, bedside RN, and [facility].    Plan:  Anticipated Discharge Placement: [facility] ([facility]  [address] [place] [address]  (P: [phone], F: [phone])  Barriers: None identified at this time.   Follow-up plan: No further plans to follow. Sw available should a need arise.     KIMBERLYN Rojo, [facility]  [phone]

## 2017-04-11 NOTE — PROGRESS NOTES
Patient placed on EMS vent for transport to [facility] at 1325.      Klarissa Anthony, RRT  4/11/2017

## 2017-04-11 NOTE — PLAN OF CARE
Problem: Goal Outcome Summary  Goal: Goal Outcome Summary  Outcome: Adequate for Discharge Date Met:  04/11/17  Lethargic, arouses to voice. Calm, receiving scheduled ativan and PRN versed given for transport. Following commands intermittently w/ family interpreting. PERRLA. Moves x4. Pt denies pain. LS coarse, thick secretions per trach. Remains on CMV. Abdomen firm, distended, BS active. Stool softeners given. Adequate urine output per bell. g-tube clamped. PICC saline locked. Pt's daughter and wife updated extensively at bedside, participating in cares, supportive. Report called to SARA Fletcher at [facility]. Pt transfer to [facility].   Mimi Amos RN

## 2017-04-11 NOTE — PROGRESS NOTES
Pt will go to [facility] today 1300 to room 223. [facility] faxing orders and appropriate forms. See Social Workers notes.

## 2017-05-05 ENCOUNTER — TELEPHONE (OUTPATIENT)
Dept: FAMILY MEDICINE | Facility: CLINIC | Age: 57
End: 2017-05-05

## 2017-05-05 NOTE — TELEPHONE ENCOUNTER
"Reason for Call:  Other Informing Dr. Bedolla of post hospital home care    Detailed comments: Pt's care coordinator called in regards to pt leaving the hospital and is now getting home care assistance. Her name is Cris. She wanted the, \"OK per\" Dr. Bedolla and gave both a phone and fax number for any information. Cris's secured voicemail: [phone], and fax number is: [phone].    Phone Number Patient can be reached at: Other phone number:  CrisMarinas (home care coordinator): [phone]    Best Time:     Can we leave a detailed message on this number? YES    Call taken on 5/5/2017 at 10:47 AM by Ifeoma Jimenez      "

## 2017-05-05 NOTE — TELEPHONE ENCOUNTER
Detailed message left for Cris with confirmation noted below.  Mirella Agrawal RN  [employer]

## 2017-05-09 ENCOUNTER — OFFICE VISIT (OUTPATIENT)
Dept: FAMILY MEDICINE | Facility: CLINIC | Age: 57
End: 2017-05-09
Payer: COMMERCIAL

## 2017-05-09 VITALS
HEIGHT: 70 IN | WEIGHT: 137 LBS | SYSTOLIC BLOOD PRESSURE: 112 MMHG | DIASTOLIC BLOOD PRESSURE: 68 MMHG | BODY MASS INDEX: 19.61 KG/M2 | OXYGEN SATURATION: 99 % | TEMPERATURE: 97.8 F | HEART RATE: 90 BPM

## 2017-05-09 DIAGNOSIS — M62.830 BACK MUSCLE SPASM: ICD-10-CM

## 2017-05-09 DIAGNOSIS — Z93.1 FEEDING BY G-TUBE (H): Primary | ICD-10-CM

## 2017-05-09 DIAGNOSIS — R53.81 PHYSICAL DECONDITIONING: ICD-10-CM

## 2017-05-09 PROCEDURE — 99214 OFFICE O/P EST MOD 30 MIN: CPT | Performed by: FAMILY MEDICINE

## 2017-05-09 RX ORDER — LORAZEPAM 1 MG/1
1 TABLET ORAL EVERY 8 HOURS PRN
Qty: 30 TABLET | Refills: 0 | Status: SHIPPED | OUTPATIENT
Start: 2017-05-09 | End: 2017-07-10

## 2017-05-09 NOTE — MR AVS SNAPSHOT
After Visit Summary   5/9/2017    Wyatt LAWSON Keyshawn    MRN: [id number]           Patient Information     Date Of Birth          1960        Visit Information        Provider Department      5/9/2017 10:45 AM Shaun Bedolla MD; [employer] [facility] [place]        Today's Diagnoses     Feeding by G-tube (H)    -  1    Physical deconditioning        Back muscle spasm           Follow-ups after your visit        Additional Services     GASTROENTEROLOGY ADULT REF CONSULT ONLY       Preferred Location: [facility] [phone]      Please be aware that coverage of these services is subject to the terms and limitations of your health insurance plan.  Call member services at your health plan with any benefit or coverage questions.  Any procedures must be performed at a [facility] facility OR coordinated by your clinic's referral office.    Please bring the following with you to your appointment:    (1) Any X-Rays, CTs or MRIs which have been performed.  Contact the facility where they were done to arrange for  prior to your scheduled appointment.    (2) List of current medications   (3) This referral request   (4) Any documents/labs given to you for this referral                  Future tests that were ordered for you today     Open Future Orders        Priority Expected Expires Ordered    CBC with platelets Routine  5/9/2018 5/9/2017    Hemoglobin A1c Routine  5/9/2018 5/9/2017    Albumin Random Urine Quantitative Routine  5/9/2018 5/9/2017    Comprehensive metabolic panel (BMP + Alb, Alk Phos, ALT, AST, Total. Bili, TP) Routine  5/9/2018 5/9/2017            Who to contact     If you have questions or need follow up information about today's clinic visit or your schedule please contact [facility] [place] directly at [phone].  Normal or non-critical lab and imaging results will be communicated to you by MyChart, letter or phone within 4  "business days after the clinic has received the results. If you do not hear from us within 7 days, please contact the clinic through [employer] or phone. If you have a critical or abnormal lab result, we will notify you by phone as soon as possible.  Submit refill requests through [employer] or call your pharmacy and they will forward the refill request to us. Please allow 3 business days for your refill to be completed.          Additional Information About Your Visit        Re[employer]ish Information     [employer] gives you secure access to your electronic health record. If you see a primary care provider, you can also send messages to your care team and make appointments. If you have questions, please call your primary care clinic.  If you do not have a primary care provider, please call [phone] and they will assist you.        Care EveryWhere ID     This is your Care EveryWhere ID. This could be used by other organizations to access your [place] medical records  [id number]        Your Vitals Were     Pulse Temperature Height Pulse Oximetry BMI (Body Mass Index)       90 97.8  F (36.6  C) (Tympanic) 5' 10\" (1.778 m) 99% 19.66 kg/m2        Blood Pressure from Last 3 Encounters:   05/09/17 112/68   04/11/17 152/82   03/16/17 129/62    Weight from Last 3 Encounters:   05/09/17 137 lb (62.1 kg)   04/11/17 178 lb 5.6 oz (80.9 kg)   03/16/17 155 lb (70.3 kg)              We Performed the Following     GASTROENTEROLOGY ADULT REF CONSULT ONLY          Today's Medication Changes          These changes are accurate as of: 5/9/17 11:52 AM.  If you have any questions, ask your nurse or doctor.               Start taking these medicines.        Dose/Directions    LORazepam 1 MG tablet   Commonly known as:  ATIVAN   Used for:  Back muscle spasm   Started by:  Shaun Bedolla MD        Dose:  1 mg   Take 1 tablet (1 mg) by mouth every 8 hours as needed for anxiety   Quantity:  30 tablet   Refills:  0         Stop taking these medicines " if you haven't already. Please contact your care team if you have questions.     pantoprazole Susp suspension   Commonly known as:  PROTONIX   Stopped by:  Shaun Bedolla MD                Where to get your medicines      Some of these will need a paper prescription and others can be bought over the counter.  Ask your nurse if you have questions.     Bring a paper prescription for each of these medications     LORazepam 1 MG tablet                Primary Care Provider Office Phone # Fax #    Shaun Bedolla -[phone]       [address] [address]        Thank you!     Thank you for choosing [facility]  for your care. Our goal is always to provide you with excellent care. Hearing back from our patients is one way we can continue to improve our services. Please take a few minutes to complete the written survey that you may receive in the mail after your visit with us. Thank you!             Your Updated Medication List - Protect others around you: Learn how to safely use, store and throw away your medicines at www.disposemymeds.org.          This list is accurate as of: 5/9/17 11:52 AM.  Always use your most recent med list.                   Brand Name Dispense Instructions for use    albuterol (2.5 MG/3ML) 0.083% neb solution     360 mL    Take 1 vial (2.5 mg) by nebulization every 2 hours as needed for wheezing or shortness of breath / dyspnea       aspirin 81 MG tablet     100    1 tab per day       atorvastatin 40 MG tablet    LIPITOR    90 tablet    Take 1 tablet (40 mg) by mouth daily       bisacodyl 10 MG Suppository    DULCOLAX    30 suppository    Place 1 suppository (10 mg) rectally daily as needed for constipation       blood glucose monitoring lancets     3 Box    Use as direct       blood glucose monitoring meter device kit    no brand specified    1 kit    1 Device See Admin Instructions. per patient health plan       blood  glucose monitoring test strip    ONE TOUCH ULTRA    3 Box    Use QID or as directed       ceFAZolin 1 GM vial    ANCEF    30 each    Inject 1 g into the vein every 8 hours       gabapentin 250 MG/5ML solution    NEURONTIN    450 mL    6 mLs (300 mg) by Oral or Feeding Tube route every 8 hours       hypromellose-dextran Soln ophthalmic solution      Apply 2-3 drops to eye every hour as needed for dry eyes       insulin glargine 100 UNIT/ML injection    LANTUS     Inject 18 Units Subcutaneous every morning (before breakfast)       insulin pen needle 31G X 8 MM    B-D U/F    100 each    USE ONCE DAILY WITH LANTUS SOLOSTAR PEN       LORazepam 1 MG tablet    ATIVAN    30 tablet    Take 1 tablet (1 mg) by mouth every 8 hours as needed for anxiety

## 2017-05-09 NOTE — PROGRESS NOTES
SUBJECTIVE:                                                    Wyatt Mahajan is a 57 year old male who presents to clinic today for the following health issues:          Hospital Follow-up Visit:    Hospital/Nursing Home/IP Rehab Facility: [facility]  Date of Admission: 3/17/17  Date of Discharge: 4/11/17  Reason(s) for Admission: pneumonia            Problems taking medications regularly:  None       Medication changes since discharge: None       Problems adhering to non-medication therapy:  None    Summary of hospitalization:  [facility] discharge summary reviewed  Diagnostic Tests/Treatments reviewed.  Follow up needed: none  Other Healthcare Providers Involved in Patient s Care:         None  Update since discharge: stable.     Post Discharge Medication Reconciliation: discharge medications reconciled, continue medications without change.  Plan of care communicated with patient and family     Coding guidelines for this visit:  Type of Medical   Decision Making Face-to-Face Visit       within 7 Days of discharge Face-to-Face Visit        within 14 days of discharge   Moderate Complexity [id number]   High Complexity [id number]                Problem list and histories reviewed & adjusted, as indicated.  Additional history: as documented    Patient Active Problem List   Diagnosis     External hemorrhoids     Cranial nerve III palsy     Hyperlipidemia LDL goal <100     Type 2 diabetes mellitus with diabetic neuropathy (HCC)     Low back pain     Lumbar radiculopathy     PVD (peripheral vascular disease) with claudication (H)     Ischemic vascular disease     Acute respiratory failure with hypoxia (H)     MSSA (methicillin susceptible Staphylococcus aureus) septicemia (H)     MSSA (methicillin susceptible Staphylococcus aureus) pneumonia (H)     Fever     Leukocytosis     Influenza B     Past Surgical History:   Procedure Laterality Date     COLONOSCOPY  10/27/16     COLONOSCOPY N/A  10/31/2016    Procedure: COLONOSCOPY;  Surgeon: Pollo Lopez MD;  Location:  GI     HC UGI ENDOSCOPY W PLACEMENT GASTROSTOMY TUBE PERCUT N/A 4/4/2017    Procedure: COMBINED ESOPHAGOSCOPY, GASTROSCOPY, DUODENOSCOPY (EGD), PLACE PERCUTANEOUS ENDOSCOPIC GASTROSTOMY TUBE;  Surgeon: Marcial Obregon MD;  Location:  GI     NO HISTORY OF SURGERY       TRACHEOSTOMY N/A 4/4/2017    Procedure: TRACHEOSTOMY;  Surgeon: Jose Puga MD;  Location:  OR       Social History   Substance Use Topics     Smoking status: Former Smoker     Packs/day: 0.50     Years: 15.00     Types: Cigarettes     Smokeless tobacco: Never Used      Comment: 10 cig daily     Alcohol use No     Family History   Problem Relation Age of Onset     DIABETES Mother      DIABETES Father      DIABETES Sister      DIABETES Brother      Hypertension No family hx of      CEREBROVASCULAR DISEASE No family hx of      Prostate Cancer No family hx of      Cancer - colorectal No family hx of      Breast Cancer No family hx of          Current Outpatient Prescriptions   Medication Sig Dispense Refill     LORazepam (ATIVAN) 1 MG tablet Take 1 tablet (1 mg) by mouth every 8 hours as needed for anxiety 30 tablet 0     albuterol (2.5 MG/3ML) 0.083% neb solution Take 1 vial (2.5 mg) by nebulization every 2 hours as needed for wheezing or shortness of breath / dyspnea 360 mL      insulin glargine (LANTUS) 100 UNIT/ML injection Inject 18 Units Subcutaneous every morning (before breakfast)       atorvastatin (LIPITOR) 40 MG tablet Take 1 tablet (40 mg) by mouth daily 90 tablet 3     ceFAZolin (ANCEF) 1 GM vial Inject 1 g into the vein every 8 hours 30 each      bisacodyl (DULCOLAX) 10 MG Suppository Place 1 suppository (10 mg) rectally daily as needed for constipation 30 suppository      hypromellose-dextran (ARTIFICAL TEARS) SOLN ophthalmic solution Apply 2-3 drops to eye every hour as needed for dry eyes       gabapentin (NEURONTIN) 250 MG/5ML  solution 6 mLs (300 mg) by Oral or Feeding Tube route every 8 hours 450 mL      blood glucose monitoring (ONE TOUCH ULTRASOFT) lancets Use as direct 3 Box 1     blood glucose monitoring (ONE TOUCH ULTRA) test strip Use QID or as directed 3 Box 1     insulin pen needle (B-D U/F) 31G X 8 MM USE ONCE DAILY WITH LANTUS SOLOSTAR  each 1     Blood Glucose Monitoring Suppl (BLOOD GLUCOSE METER) KIT 1 Device See Admin Instructions. per patient health plan 1 kit 0     ASPIRIN 81 MG OR TABS 1 tab per day 100 3     Allergies   Allergen Reactions     No Known Drug Allergies      Recent Labs   Lab Test  04/11/17   0430  04/10/17   0405  04/09/17   0530  04/08/17   0450   03/24/17   0430   03/17/17   0635   01/17/17   0838  12/09/16   1018   05/10/16   0859  08/07/15   1028   10/08/13   1455   07/22/11   1320   A1C   --    --    --    --    --    --    --   8.1*   --    --    --    --   11.0*  10.0*   < >  9.4*   < >  7.1*   LDL   --    --    --    --    --    --    --    --    --   84  101*   --   157*  151*   < >  165*   < >  114   HDL   --    --    --    --    --    --    --    --    --   37*  36*   --   42  37*   < >   --    < >  38*   TRIG  163*   --    --    --    --   677*   --    --    --   55  116   --   79  98   < >   --    < >  56   ALT   --   24  24  22   < >  119*   --   35   < >  7  9   --   23  26   < >   --    < >  25   CR  1.13  1.20  1.37*  1.39*   < >  1.77*   < >   --    < >   --    --    < >  0.69  0.71   < >   --    < >  0.83   GFRESTIMATED  67  63  54*  53*   < >  40*   < >   --    < >   --    --    < >  >90  Non  GFR Calc    >90  Non  GFR Calc     < >   --    < >  >90   GFRESTBLACK  81  76  65  64   < >  49*   < >   --    < >   --    --    < >  >90   GFR Calc    >90   GFR Calc     < >   --    < >  >90   POTASSIUM  5.0  4.1  3.7  3.7   < >  3.6   < >   --    < >   --    --    < >  3.7  4.0   < >   --    < >  4.4   TSH   --    --    --     "--    --    --    --    --    --    --    --    --    --    --    --   0.64   --   0.59    < > = values in this interval not displayed.      BP Readings from Last 3 Encounters:   05/09/17 112/68   04/11/17 152/82   03/16/17 129/62    Wt Readings from Last 3 Encounters:   05/09/17 137 lb (62.1 kg)   04/11/17 178 lb 5.6 oz (80.9 kg)   03/16/17 155 lb (70.3 kg)                    Reviewed and updated as needed this visit by clinical staff       Reviewed and updated as needed this visit by Provider         ROS:  Constitutional, HEENT, cardiovascular, pulmonary, gi and gu systems are negative, except as otherwise noted.    OBJECTIVE:                                                    /68 (BP Location: Right arm, Patient Position: Chair, Cuff Size: Adult Regular)  Pulse 90  Temp 97.8  F (36.6  C) (Tympanic)  Ht 5' 10\" (1.778 m)  Wt 137 lb (62.1 kg)  SpO2 99%  BMI 19.66 kg/m2  Body mass index is 19.66 kg/(m^2).  GENERAL: healthy, alert and no distress  NECK: no adenopathy, no asymmetry, masses, or scars and thyroid normal to palpation  RESP: lungs clear to auscultation - no rales, rhonchi or wheezes  CV: regular rate and rhythm, normal S1 S2, no S3 or S4, no murmur, click or rub, no peripheral edema and peripheral pulses strong  ABDOMEN: soft, nontender, no hepatosplenomegaly, no masses and bowel sounds normal  MS: no gross musculoskeletal defects noted, no edema         ASSESSMENT/PLAN:                                                    Wyatt was seen today for hospital f/u.    Diagnoses and all orders for this visit:    Feeding by G-tube (H)  -     GASTROENTEROLOGY ADULT REF CONSULT ONLY  -     CBC with platelets; Future  -     Hemoglobin A1c; Future  -     Albumin Random Urine Quantitative; Future  -     Comprehensive metabolic panel (BMP + Alb, Alk Phos, ALT, AST, Total. Bili, TP); Future    Physical deconditioning  -     GASTROENTEROLOGY ADULT REF CONSULT ONLY  -     CBC with platelets; Future  -     " Hemoglobin A1c; Future  -     Albumin Random Urine Quantitative; Future  -     Comprehensive metabolic panel (BMP + Alb, Alk Phos, ALT, AST, Total. Bili, TP); Future    Back muscle spasm  -     LORazepam (ATIVAN) 1 MG tablet; Take 1 tablet (1 mg) by mouth every 8 hours as needed for anxiety  -     CBC with platelets; Future  -     Hemoglobin A1c; Future  -     Albumin Random Urine Quantitative; Future  -     Comprehensive metabolic panel (BMP + Alb, Alk Phos, ALT, AST, Total. Bili, TP); Future      Had influenza B and MSSA sepsis, improving, still wt is not at his baseline, currently intake is only per os, not using feeding tube, will have him to see GI for further discussion about feeding tube removal       Shaun Bedolla MD  [facility]

## 2017-05-09 NOTE — NURSING NOTE
"Chief Complaint   Patient presents with     Hospital F/U       Initial /68 (BP Location: Right arm, Patient Position: Chair, Cuff Size: Adult Regular)  Pulse 90  Temp 97.8  F (36.6  C) (Tympanic)  Ht 5' 10\" (1.778 m)  Wt 137 lb (62.1 kg)  SpO2 99%  BMI 19.66 kg/m2 Estimated body mass index is 19.66 kg/(m^2) as calculated from the following:    Height as of this encounter: 5' 10\" (1.778 m).    Weight as of this encounter: 137 lb (62.1 kg).  Medication Reconciliation: complete     Lizzie Carpio CMA      "

## 2017-05-10 ENCOUNTER — TELEPHONE (OUTPATIENT)
Dept: FAMILY MEDICINE | Facility: CLINIC | Age: 57
End: 2017-05-10

## 2017-05-10 NOTE — TELEPHONE ENCOUNTER
Reason for Call:  Other Medication list    Detailed comments: Home care nurse Grace called to request current medication list to be faxed to [phone]    Phone Number [phone]    Best Time: anytime    Can we leave a detailed message on this number? YES    Call taken on 5/10/2017 at 4:05 PM by Sana Renteria

## 2017-05-22 ENCOUNTER — TELEPHONE (OUTPATIENT)
Dept: PHARMACY | Facility: OTHER | Age: 57
End: 2017-05-22

## 2017-05-22 ENCOUNTER — OFFICE VISIT (OUTPATIENT)
Dept: FAMILY MEDICINE | Facility: CLINIC | Age: 57
End: 2017-05-22
Payer: COMMERCIAL

## 2017-05-22 VITALS
HEART RATE: 105 BPM | WEIGHT: 142 LBS | DIASTOLIC BLOOD PRESSURE: 84 MMHG | BODY MASS INDEX: 20.37 KG/M2 | OXYGEN SATURATION: 99 % | SYSTOLIC BLOOD PRESSURE: 138 MMHG | TEMPERATURE: 97.2 F

## 2017-05-22 DIAGNOSIS — R63.0 POOR APPETITE: ICD-10-CM

## 2017-05-22 DIAGNOSIS — R53.81 PHYSICAL DECONDITIONING: ICD-10-CM

## 2017-05-22 DIAGNOSIS — M62.830 BACK MUSCLE SPASM: Primary | ICD-10-CM

## 2017-05-22 LAB
ALBUMIN SERPL-MCNC: 3 G/DL (ref 3.4–5)
ALP SERPL-CCNC: 116 U/L (ref 40–150)
ALT SERPL W P-5'-P-CCNC: 28 U/L (ref 0–70)
ANION GAP SERPL CALCULATED.3IONS-SCNC: 9 MMOL/L (ref 3–14)
AST SERPL W P-5'-P-CCNC: 11 U/L (ref 0–45)
BILIRUB SERPL-MCNC: 0.3 MG/DL (ref 0.2–1.3)
BUN SERPL-MCNC: 15 MG/DL (ref 7–30)
CALCIUM SERPL-MCNC: 9.2 MG/DL (ref 8.5–10.1)
CHLORIDE SERPL-SCNC: 100 MMOL/L (ref 94–109)
CO2 SERPL-SCNC: 26 MMOL/L (ref 20–32)
CREAT SERPL-MCNC: 0.78 MG/DL (ref 0.66–1.25)
ERYTHROCYTE [DISTWIDTH] IN BLOOD BY AUTOMATED COUNT: 14.1 % (ref 10–15)
GFR SERPL CREATININE-BSD FRML MDRD: ABNORMAL ML/MIN/1.7M2
GLUCOSE SERPL-MCNC: 238 MG/DL (ref 70–99)
HCT VFR BLD AUTO: 35.5 % (ref 40–53)
HGB BLD-MCNC: 12 G/DL (ref 13.3–17.7)
MCH RBC QN AUTO: 31.7 PG (ref 26.5–33)
MCHC RBC AUTO-ENTMCNC: 33.8 G/DL (ref 31.5–36.5)
MCV RBC AUTO: 94 FL (ref 78–100)
PLATELET # BLD AUTO: 285 10E9/L (ref 150–450)
POTASSIUM SERPL-SCNC: 4 MMOL/L (ref 3.4–5.3)
PROT SERPL-MCNC: 8.4 G/DL (ref 6.8–8.8)
RBC # BLD AUTO: 3.78 10E12/L (ref 4.4–5.9)
SODIUM SERPL-SCNC: 135 MMOL/L (ref 133–144)
WBC # BLD AUTO: 8.3 10E9/L (ref 4–11)

## 2017-05-22 PROCEDURE — 85027 COMPLETE CBC AUTOMATED: CPT | Performed by: FAMILY MEDICINE

## 2017-05-22 PROCEDURE — 36415 COLL VENOUS BLD VENIPUNCTURE: CPT | Performed by: FAMILY MEDICINE

## 2017-05-22 PROCEDURE — 80053 COMPREHEN METABOLIC PANEL: CPT | Performed by: FAMILY MEDICINE

## 2017-05-22 PROCEDURE — 99214 OFFICE O/P EST MOD 30 MIN: CPT | Performed by: FAMILY MEDICINE

## 2017-05-22 RX ORDER — TRAMADOL HYDROCHLORIDE 50 MG/1
50 TABLET ORAL EVERY 8 HOURS PRN
Qty: 60 TABLET | Refills: 0 | Status: SHIPPED | OUTPATIENT
Start: 2017-05-22 | End: 2017-05-25

## 2017-05-22 RX ORDER — MIRTAZAPINE 30 MG/1
30 TABLET, FILM COATED ORAL AT BEDTIME
Qty: 30 TABLET | Refills: 1 | Status: SHIPPED | OUTPATIENT
Start: 2017-05-22 | End: 2017-07-10

## 2017-05-22 NOTE — LETTER
[facility] PHARM D PROJECT  [address] [place] [address]      May 25, 2017      Wyatt LAWSON Keyshawn  [address] [place] [address]        Dear Wyatt,      Our records show that you are due for a Medication Therapy Management (MTM) appointment.   Your medications play an important role in your health.  I would like to meet with you to review how your medications are working for you and to answer any questions you may have.       I am available in the clinic on Tuesday and Thursday.  To make an appointment, please call the clinic at [phone].       I hope to see you soon!   Sincerely,            Danika Duran, PharmD  MTM Practitioner

## 2017-05-22 NOTE — NURSING NOTE
"Chief Complaint   Patient presents with     Back Pain       Initial /84 (BP Location: Left arm, Patient Position: Chair, Cuff Size: Adult Regular)  Pulse 105  Temp 97.2  F (36.2  C) (Tympanic)  Wt 142 lb (64.4 kg)  SpO2 99%  BMI 20.37 kg/m2 Estimated body mass index is 20.37 kg/(m^2) as calculated from the following:    Height as of 5/9/17: 5' 10\" (1.778 m).    Weight as of this encounter: 142 lb (64.4 kg).  Medication Reconciliation: complete     Lizzie Carpio CMA      "

## 2017-05-22 NOTE — PROGRESS NOTES
SUBJECTIVE:                                                    Wyatt Mahajan is a 57 year old male who presents to clinic today for the following health issues:      Back Pain      Duration: x one month        Specific cause: none    Description:   Location of pain: low back both  Character of pain: sharp and dull ache  Pain radiation:both legs  New numbness or weakness in legs, not attributed to pain:  no     Intensity:     History:   Pain interferes with job:   History of back problems: no prior back problems  Any previous MRI or X-rays: None  Sees a specialist for back pain:  No  Therapies tried without relief: NSAIDs    Alleviating factors:   Improved by: none      Precipitating factors:  Worsened by: Lifting, Bending, Standing, Sitting and Lying Flat    Functional and Psychosocial Screen (Kashif STarT Back):      Not performed today       Accompanying Signs & Symptoms:  Risk of Fracture:  None  Risk of Cauda Equina:  None  Risk of Infection:  None  Risk of Cancer:  None  Risk of Ankylosing Spondylitis:  Onset at age <35, male, AND morning back stiffness. no                    Problem list and histories reviewed & adjusted, as indicated.  Additional history: as documented    Patient Active Problem List   Diagnosis     External hemorrhoids     Cranial nerve III palsy     Hyperlipidemia LDL goal <100     Type 2 diabetes mellitus with diabetic neuropathy (HCC)     Low back pain     Lumbar radiculopathy     PVD (peripheral vascular disease) with claudication (H)     Ischemic vascular disease     Acute respiratory failure with hypoxia (H)     MSSA (methicillin susceptible Staphylococcus aureus) septicemia (H)     MSSA (methicillin susceptible Staphylococcus aureus) pneumonia (H)     Fever     Leukocytosis     Influenza B     Past Surgical History:   Procedure Laterality Date     COLONOSCOPY  10/27/16     COLONOSCOPY N/A 10/31/2016    Procedure: COLONOSCOPY;  Surgeon: Pollo Lopez MD;  Location: [facility]      HC UGI ENDOSCOPY W PLACEMENT GASTROSTOMY TUBE PERCUT N/A 4/4/2017    Procedure: COMBINED ESOPHAGOSCOPY, GASTROSCOPY, DUODENOSCOPY (EGD), PLACE PERCUTANEOUS ENDOSCOPIC GASTROSTOMY TUBE;  Surgeon: Marcial Obregon MD;  Location:  GI     NO HISTORY OF SURGERY       TRACHEOSTOMY N/A 4/4/2017    Procedure: TRACHEOSTOMY;  Surgeon: Jose Puga MD;  Location:  OR       Social History   Substance Use Topics     Smoking status: Former Smoker     Packs/day: 0.50     Years: 15.00     Types: Cigarettes     Smokeless tobacco: Never Used      Comment: 10 cig daily     Alcohol use No     Family History   Problem Relation Age of Onset     DIABETES Mother      DIABETES Father      DIABETES Sister      DIABETES Brother      Hypertension No family hx of      CEREBROVASCULAR DISEASE No family hx of      Prostate Cancer No family hx of      Cancer - colorectal No family hx of      Breast Cancer No family hx of          Current Outpatient Prescriptions   Medication Sig Dispense Refill     traMADol (ULTRAM) 50 MG tablet Take 1 tablet (50 mg) by mouth every 8 hours as needed for pain Maximum 3 tablet(s) per day 60 tablet 0     mirtazapine (REMERON) 30 MG tablet Take 1 tablet (30 mg) by mouth At Bedtime 30 tablet 1     LORazepam (ATIVAN) 1 MG tablet Take 1 tablet (1 mg) by mouth every 8 hours as needed for anxiety 30 tablet 0     albuterol (2.5 MG/3ML) 0.083% neb solution Take 1 vial (2.5 mg) by nebulization every 2 hours as needed for wheezing or shortness of breath / dyspnea 360 mL      insulin glargine (LANTUS) 100 UNIT/ML injection Inject 18 Units Subcutaneous every morning (before breakfast)       atorvastatin (LIPITOR) 40 MG tablet Take 1 tablet (40 mg) by mouth daily 90 tablet 3     ceFAZolin (ANCEF) 1 GM vial Inject 1 g into the vein every 8 hours 30 each      bisacodyl (DULCOLAX) 10 MG Suppository Place 1 suppository (10 mg) rectally daily as needed for constipation 30 suppository      hypromellose-dextran  (ARTIFICAL TEARS) SOLN ophthalmic solution Apply 2-3 drops to eye every hour as needed for dry eyes       gabapentin (NEURONTIN) 250 MG/5ML solution 6 mLs (300 mg) by Oral or Feeding Tube route every 8 hours 450 mL      blood glucose monitoring (ONE TOUCH ULTRASOFT) lancets Use as direct 3 Box 1     blood glucose monitoring (ONE TOUCH ULTRA) test strip Use QID or as directed 3 Box 1     insulin pen needle (B-D U/F) 31G X 8 MM USE ONCE DAILY WITH LANTUS SOLOSTAR  each 1     Blood Glucose Monitoring Suppl (BLOOD GLUCOSE METER) KIT 1 Device See Admin Instructions. per patient health plan 1 kit 0     ASPIRIN 81 MG OR TABS 1 tab per day 100 3     Allergies   Allergen Reactions     No Known Drug Allergies      Recent Labs   Lab Test  04/11/17   0430  04/10/17   0405  04/09/17   0530  04/08/17   0450   03/24/17   0430   03/17/17   0635   01/17/17   0838  12/09/16   1018   05/10/16   0859  08/07/15   1028   10/08/13   1455   07/22/11   1320   A1C   --    --    --    --    --    --    --   8.1*   --    --    --    --   11.0*  10.0*   < >  9.4*   < >  7.1*   LDL   --    --    --    --    --    --    --    --    --   84  101*   --   157*  151*   < >  165*   < >  114   HDL   --    --    --    --    --    --    --    --    --   37*  36*   --   42  37*   < >   --    < >  38*   TRIG  163*   --    --    --    --   677*   --    --    --   55  116   --   79  98   < >   --    < >  56   ALT   --   24  24  22   < >  119*   --   35   < >  7  9   --   23  26   < >   --    < >  25   CR  1.13  1.20  1.37*  1.39*   < >  1.77*   < >   --    < >   --    --    < >  0.69  0.71   < >   --    < >  0.83   GFRESTIMATED  67  63  54*  53*   < >  40*   < >   --    < >   --    --    < >  >90  Non  GFR Calc    >90  Non  GFR Calc     < >   --    < >  >90   GFRZAIN  81  76  65  64   < >  49*   < >   --    < >   --    --    < >  >90   GFR Calc    >90   GFR Calc     < >   --    < >   >90   POTASSIUM  5.0  4.1  3.7  3.7   < >  3.6   < >   --    < >   --    --    < >  3.7  4.0   < >   --    < >  4.4   TSH   --    --    --    --    --    --    --    --    --    --    --    --    --    --    --   0.64   --   0.59    < > = values in this interval not displayed.      BP Readings from Last 3 Encounters:   05/22/17 138/84   05/09/17 112/68   04/11/17 152/82    Wt Readings from Last 3 Encounters:   05/22/17 142 lb (64.4 kg)   05/09/17 137 lb (62.1 kg)   04/11/17 178 lb 5.6 oz (80.9 kg)                    Reviewed and updated as needed this visit by clinical staff       Reviewed and updated as needed this visit by Provider         ROS:  Constitutional, HEENT, cardiovascular, pulmonary, gi and gu systems are negative, except as otherwise noted.    OBJECTIVE:                                                    /84 (BP Location: Left arm, Patient Position: Chair, Cuff Size: Adult Regular)  Pulse 105  Temp 97.2  F (36.2  C) (Tympanic)  Wt 142 lb (64.4 kg)  SpO2 99%  BMI 20.37 kg/m2  Body mass index is 20.37 kg/(m^2).  GENERAL: healthy, alert and no distress  NECK: no adenopathy, no asymmetry, masses, or scars and thyroid normal to palpation  RESP: lungs clear to auscultation - no rales, rhonchi or wheezes  CV: regular rate and rhythm, normal S1 S2, no S3 or S4, no murmur, click or rub, no peripheral edema and peripheral pulses strong  ABDOMEN: soft, nontender, no hepatosplenomegaly, no masses and bowel sounds normal  MS: no gross musculoskeletal defects noted, no edema         ASSESSMENT/PLAN:                                                    ASSESSMENT / PLAN:  (M62.970) Back muscle spasm  (primary encounter diagnosis)  Comment: not improving with muscle relaxant, will have him to see PT, if no improvement with PT, will consider MR or referral to sports medicine   Plan: traMADol (ULTRAM) 50 MG tablet, [facility] PT, HAND,         AND CHIROPRACTIC REFERRAL            (R63.0) Poor appetite  Comment: has  poor appetite with physical deconditioning after recent admission, will have him to try remeron and also will check lab results   Plan: mirtazapine (REMERON) 30 MG tablet, CBC with         platelets, Comprehensive metabolic panel (BMP +        Alb, Alk Phos, ALT, AST, Total. Bili, TP)            (R53.81) Physical deconditioning  Comment: mentioned above   Plan: [facility] PT, HAND, AND CHIROPRACTIC REFERRAL,         mirtazapine (REMERON) 30 MG tablet, CBC with         platelets, Comprehensive metabolic panel (BMP +        Alb, Alk Phos, ALT, AST, Total. Bili, TP)              FUTURE APPOINTMENTS:       - Follow-up visit in 2 months     Shaun Bedolla MD  [facility]

## 2017-05-22 NOTE — TELEPHONE ENCOUNTER
MTM referral from: [facility] visit (referral by provider)    MTM referral outreach attempt #1 on May 22, 2017 at 3:58 PM      Outcome: No Answer    Jeannette Mata MTM Coordinator

## 2017-05-22 NOTE — LETTER
[facility] PHARM D PROJECT  [address] [place] [address]      May 25, 2017      Wyatt LAWSON [place]  [address] [place] [address]        Dear Dr. Graeme Schneider has recommended you schedule a Medication Therapy Management (MTM) appointment. MTM is designed to help you get the most of out of your medicines.     During an MTM appointment a specially trained pharmacist will review all of your medicines, both prescription and over-the-counter. They will make sure your medicines are the best choice for you and are safe and convenient for you.  MTM pharmacists work together with you and your doctor to help you understand your medicines, solve any problems related to your medicines and help you get the best results from taking your medicines.     At [facility], we strongly believe in a team approach to health care. We want to help you understand your medicines and health conditions. To learn more about how you might benefit from MTM services, watch the patient video at [email or web address].     To make an appointment, please call  the MTM scheduling line at [phone] (toll-free at [phone]).    We look forward to hearing from you!        Danika Duran , Pharm D  [phone] (phone)  [phone] (pager)  Medication Therapy Management Pharmacist

## 2017-05-23 NOTE — TELEPHONE ENCOUNTER
MTM referral from: [facility] visit (referral by provider)    MTM referral outreach attempt #2 on May 23, 2017 at 3:15 PM      Outcome: Patient not reachable after several attempts, will route to MTM Pharmacist/Provider as an FYI. Thank you for the referral.    Jeannette Mata, MTM Coordinator

## 2017-05-24 ENCOUNTER — THERAPY VISIT (OUTPATIENT)
Dept: PHYSICAL THERAPY | Facility: CLINIC | Age: 57
End: 2017-05-24
Payer: COMMERCIAL

## 2017-05-24 DIAGNOSIS — R53.81 PHYSICAL DECONDITIONING: ICD-10-CM

## 2017-05-24 DIAGNOSIS — M54.50 LUMBAGO: Primary | ICD-10-CM

## 2017-05-24 PROCEDURE — G8978 MOBILITY CURRENT STATUS: HCPCS | Mod: GP | Performed by: PHYSICAL THERAPIST

## 2017-05-24 PROCEDURE — 97010 HOT OR COLD PACKS THERAPY: CPT | Mod: GP | Performed by: PHYSICAL THERAPIST

## 2017-05-24 PROCEDURE — G0283 ELEC STIM OTHER THAN WOUND: HCPCS | Mod: GP | Performed by: PHYSICAL THERAPIST

## 2017-05-24 PROCEDURE — 97161 PT EVAL LOW COMPLEX 20 MIN: CPT | Mod: GP | Performed by: PHYSICAL THERAPIST

## 2017-05-24 PROCEDURE — G8979 MOBILITY GOAL STATUS: HCPCS | Mod: GP | Performed by: PHYSICAL THERAPIST

## 2017-05-24 PROCEDURE — 97110 THERAPEUTIC EXERCISES: CPT | Mod: GP | Performed by: PHYSICAL THERAPIST

## 2017-05-24 NOTE — PROGRESS NOTES
Subjective:    Patient is a 57 year old male presenting with rehab back hpi.   Wyatt Mahajan is a 57 year old male with a lumbar condition.  Condition occurred with:  Insidious onset (hospitalization).  Condition occurred: in the community.  This is a new condition  Patient is referred with c/o B LBP. He was hospitalized in mid March 2017 for pneumonia and ended up in a coma for one month. He was then transferred to a rehab facility where he spent another month, being discharged in mid May 2017. While hospitalized, he developed acute LBP. No c/o L/E sxs. Current LBP is constant and worse when sitting more than 30 minutes, bending and walking more than 1-2 blocks. He has lost 20 lbs and has become very weak and deconditioned from the ordeal..    Patient reports pain:  Lumbar spine right, lumbar spine left and central lumbar spine.  Radiates to:  No radiation.  Pain is described as aching and is constant and reported as 8/10.  Associated symptoms:  Loss of strength, loss of motion/stiffness and fatigue.   Symptoms are exacerbated by bending, lifting and sitting and relieved by rest.            Past medical history: pneumonia.          Patient is currently not working due to present treatment problem.      Barriers include:  None as reported by the patient.    Red flags:  None as reported by the patient.                        Objective:    System    Physical Exam      Judit Lumbar Evaluation    Posture:  Sitting: poor  Standing: fair  Lordosis: Reduced  Lateral Shift: no  Correction of Posture: no effect    Movement Loss:  Flexion (Flex): mod and pain  Extension (EXT): mod and pain  Side [place] R (SG R): min  Side Glide L (SG L): min  Test Movements:      KAYLEY: During: increases  After: no worse  Mechanical Response: no effect  Repeat KAYLEY: During: decreases  After: better  Mechanical Response: IncROM        Static Tests:          Lying Prone in Extension: initially better, then increased B LBP,  worse    Conclusion: dysfunction and derangement  Principle of Treatment:    Flexion: KAYLEY x 10/ 3 x daily    Lateral: supine hip roll x 10/ 3 x daily  Other: strengthening, conditioning                                       ROS    Assessment/Plan:      Patient is a 57 year old male with lumbar complaints.    Patient has the following significant findings with corresponding treatment plan.                Diagnosis 1:  LBP  Pain -  hot/cold therapy, electric stimulation, self management, education, directional preference exercise and home program  Decreased ROM/flexibility - manual therapy, therapeutic exercise and home program  Decreased strength - therapeutic exercise, therapeutic activities and home program  Decreased proprioception - neuro re-education, therapeutic activities and home program  Impaired muscle performance - neuro re-education and home program  Decreased function - therapeutic activities and home program      Therapy Evaluation Codes:   1) History comprised of:   Personal factors that impact the plan of care:      Social history/culture and None.    Comorbidity factors that impact the plan of care are:      Weakness.     Medications impacting care: None.  2) Examination of Body Systems comprised of:   Body structures and functions that impact the plan of care:      Lumbar spine.   Activity limitations that impact the plan of care are:      Bending, Dressing, Sitting, Walking and Working.  3) Clinical presentation characteristics are:   Stable/Uncomplicated.  4) Decision-Making    Low complexity using standardized patient assessment instrument and/or measureable assessment of functional outcome.  Cumulative Therapy Evaluation is: Low complexity.    Previous and current functional limitations:  (See Goal Flow Sheet for this information)    Short term and Long term goals: (See Goal Flow Sheet for this information)     Communication ability:  Patient appears to be able to clearly communicate and  understand verbal and written communication and follow directions correctly.  Treatment Explanation - The following has been discussed with the patient:   RX ordered/plan of care  Anticipated outcomes  Possible risks and side effects  This patient would benefit from PT intervention to resume normal activities.   Rehab potential is good.    Frequency:  2 X week, once daily  Duration:  for 4 weeks  Discharge Plan:  Achieve all LTG.  Independent in home treatment program.  Reach maximal therapeutic benefit.    Please refer to the daily flowsheet for treatment today, total treatment time and time spent performing 1:1 timed codes.

## 2017-05-24 NOTE — MR AVS SNAPSHOT
After Visit Summary   5/24/2017    Wyatt [facility]SON [facility]    MRN: [id number]           Patient Information     Date Of Birth          1960        Visit Information        Provider Department      5/24/2017 1:10 PM Guero Vazquez PT [facility] PhysicalTherapy        Today's Diagnoses     Lumbago    -  1    Physical deconditioning           Follow-ups after your visit        Your next 10 appointments already scheduled     May 25, 2017  1:20 PM CDT   Office Visit with Shaun Bedolla MD   [facility] ([facility])    [address]  [facility] [id number] [phone]           Bring a current list of meds and any records pertaining to this visit.  For Physicals, please bring immunization records and any forms needing to be filled out.  Please arrive 10 minutes early to complete paperwork.            May 26, 2017  2:30 PM CDT   [facility] Spine with Guero Vazquez PT   [facility] PhysicalTherapy ([facility])    [address]  [place] [address] [phone]              Who to contact     If you have questions or need follow up information about today's clinic visit or your schedule please contact [facility] PHYSICALTHERAPY directly at [phone].  Normal or non-critical lab and imaging results will be communicated to you by MyChart, letter or phone within 4 business days after the clinic has received the results. If you do not hear from us within 7 days, please contact the clinic through MyChart or phone. If you have a critical or abnormal lab result, we will notify you by phone as soon as possible.  Submit refill requests through [employer] or call your pharmacy and they will forward the refill request to us. Please allow 3 business days for your refill to be completed.          Additional Information About Your Visit         [facility] [address] Information     [facility] gives you secure access to your electronic health record. If you see a primary care provider, you can also send messages to your care team and make appointments. If you have questions, please call your primary care clinic.  If you do not have a primary care provider, please call [phone] and they will assist you.        Care EveryWhere ID     This is your Care EveryWhere ID. This could be used by other organizations to access your [facility] medical records  [id number]         Blood Pressure from Last 3 Encounters:   05/22/17 138/84   05/09/17 112/68   04/11/17 152/82    Weight from Last 3 Encounters:   05/22/17 64.4 kg (142 lb)   05/09/17 62.1 kg (137 lb)   04/11/17 80.9 kg (178 lb 5.6 oz)              We Performed the Following     ELECTRIC STIMULATION THERAPY     HC PT EVAL, LOW COMPLEXITY     HOT OR COLD PACKS THERAPY     [facility]LE[facility] INITIAL EVAL REPORT     THERAPEUTIC EXERCISES        Primary Care Provider Office Phone # Fax #    Shaun JUAN Bedolla [phone]       [address] [address]        Thank you!     Thank you for choosing [facility]  for your care. Our goal is always to provide you with excellent care. Hearing back from our patients is one way we can continue to improve our services. Please take a few minutes to complete the written survey that you may receive in the mail after your visit with us. Thank you!             Your Updated Medication List - Protect others around you: Learn how to safely use, store and throw away your medicines at www.disposemymeds.org.          This list is accurate as of: 5/24/17  2:32 PM.  Always use your most recent med list.                   Brand Name Dispense Instructions for use    albuterol (2.5 MG/3ML) 0.083% neb solution     360 mL    Take 1 vial (2.5 mg) by nebulization every 2 hours as needed for wheezing or shortness of  breath / dyspnea       aspirin 81 MG tablet     100    1 tab per day       atorvastatin 40 MG tablet    LIPITOR    90 tablet    Take 1 tablet (40 mg) by mouth daily       bisacodyl 10 MG Suppository    DULCOLAX    30 suppository    Place 1 suppository (10 mg) rectally daily as needed for constipation       blood glucose monitoring lancets     3 Box    Use as direct       blood glucose monitoring meter device kit    no brand specified    1 kit    1 Device See Admin Instructions. per patient health plan       blood glucose monitoring test strip    ONE TOUCH ULTRA    3 Box    Use QID or as directed       ceFAZolin 1 GM vial    ANCEF    30 each    Inject 1 g into the vein every 8 hours       gabapentin 250 MG/5ML solution    NEURONTIN    450 mL    6 mLs (300 mg) by Oral or Feeding Tube route every 8 hours       hypromellose-dextran Soln ophthalmic solution      Apply 2-3 drops to eye every hour as needed for dry eyes       insulin glargine 100 UNIT/ML injection    LANTUS     Inject 18 Units Subcutaneous every morning (before breakfast)       insulin pen needle 31G X 8 MM    B-D U/F    100 each    USE ONCE DAILY WITH LANTUS SOLOSTAR PEN       LORazepam 1 MG tablet    ATIVAN    30 tablet    Take 1 tablet (1 mg) by mouth every 8 hours as needed for anxiety       mirtazapine 30 MG tablet    REMERON    30 tablet    Take 1 tablet (30 mg) by mouth At Bedtime       traMADol 50 MG tablet    ULTRAM    60 tablet    Take 1 tablet (50 mg) by mouth every 8 hours as needed for pain Maximum 3 tablet(s) per day

## 2017-05-24 NOTE — PROGRESS NOTES
Subjective:    Patient is a 57 year old male presenting with rehab left ankle/foot hpi.                                              Current occupation is .    Primary job tasks include:  Prolonged standing.                                Objective:    System    Physical Exam    General     ROS    Assessment/Plan:

## 2017-05-25 ENCOUNTER — OFFICE VISIT (OUTPATIENT)
Dept: FAMILY MEDICINE | Facility: CLINIC | Age: 57
End: 2017-05-25
Payer: COMMERCIAL

## 2017-05-25 VITALS
DIASTOLIC BLOOD PRESSURE: 72 MMHG | BODY MASS INDEX: 20.33 KG/M2 | HEART RATE: 94 BPM | OXYGEN SATURATION: 98 % | WEIGHT: 142 LBS | SYSTOLIC BLOOD PRESSURE: 122 MMHG | TEMPERATURE: 97.2 F | HEIGHT: 70 IN

## 2017-05-25 DIAGNOSIS — M54.50 ACUTE BILATERAL LOW BACK PAIN WITHOUT SCIATICA: Primary | ICD-10-CM

## 2017-05-25 DIAGNOSIS — B02.9 HERPES ZOSTER WITHOUT COMPLICATION: ICD-10-CM

## 2017-05-25 PROCEDURE — 99214 OFFICE O/P EST MOD 30 MIN: CPT | Performed by: FAMILY MEDICINE

## 2017-05-25 RX ORDER — HYDROCODONE BITARTRATE AND ACETAMINOPHEN 5; 325 MG/1; MG/1
1 TABLET ORAL EVERY 8 HOURS PRN
Qty: 20 TABLET | Refills: 0 | Status: SHIPPED | OUTPATIENT
Start: 2017-05-25 | End: 2017-07-10

## 2017-05-25 RX ORDER — ACYCLOVIR 400 MG/1
400 TABLET ORAL 3 TIMES DAILY
Qty: 30 TABLET | Refills: 0 | Status: ON HOLD | OUTPATIENT
Start: 2017-05-25 | End: 2020-07-17

## 2017-05-25 NOTE — NURSING NOTE
"Chief Complaint   Patient presents with     Recheck Medication       Initial /72 (BP Location: Left arm, Patient Position: Chair, Cuff Size: Adult Regular)  Pulse 94  Temp 97.2  F (36.2  C) (Tympanic)  Ht 5' 10\" (1.778 m)  Wt 142 lb (64.4 kg)  SpO2 98%  BMI 20.37 kg/m2 Estimated body mass index is 20.37 kg/(m^2) as calculated from the following:    Height as of this encounter: 5' 10\" (1.778 m).    Weight as of this encounter: 142 lb (64.4 kg).  Medication Reconciliation: complete     Lizzie Carpio CMA      "

## 2017-05-25 NOTE — MR AVS SNAPSHOT
After Visit Summary   5/25/2017    Wyatt LAWSON Keyshawn    MRN: [id number]           Patient Information     Date Of Birth          1960        Visit Information        Provider Department      5/25/2017 1:20 PM Shaun Bedolla MD [facility]        Today's Diagnoses     Acute bilateral low back pain without sciatica    -  1    Herpes zoster without complication           Follow-ups after your visit        Your next 10 appointments already scheduled     May 26, 2017  2:30 PM CDT   [facility] with Guero Vazquez PT   [facility] ([facility] [place])    [address] [place] [address]  [phone]  [place] [address] [phone]              Who to contact     If you have questions or need follow up information about today's clinic visit or your schedule please contact [facility] [place] directly at [phone].  Normal or non-critical lab and imaging results will be communicated to you by MyChart, letter or phone within 4 business days after the clinic has received the results. If you do not hear from us within 7 days, please contact the clinic through [employer] or phone. If you have a critical or abnormal lab result, we will notify you by phone as soon as possible.  Submit refill requests through [employer] or call your pharmacy and they will forward the refill request to us. Please allow 3 business days for your refill to be completed.          Additional Information About Your Visit        MyChart Information     [employer] gives you secure access to your electronic health record. If you see a primary care provider, you can also send messages to your care team and make appointments. If you have questions, please call your primary care clinic.  If you do not have a primary care provider, please call [phone] and they will assist you.        Care EveryWhere ID     This is your Care EveryWhere ID. This could be used by other organizations  "to access your [place] medical records  [id number]        Your Vitals Were     Pulse Temperature Height Pulse Oximetry BMI (Body Mass Index)       94 97.2  F (36.2  C) (Tympanic) 5' 10\" (1.778 m) 98% 20.37 kg/m2        Blood Pressure from Last 3 Encounters:   05/25/17 122/72   05/22/17 138/84   05/09/17 112/68    Weight from Last 3 Encounters:   05/25/17 142 lb (64.4 kg)   05/22/17 142 lb (64.4 kg)   05/09/17 137 lb (62.1 kg)              Today, you had the following     No orders found for display         Today's Medication Changes          These changes are accurate as of: 5/25/17  1:29 PM.  If you have any questions, ask your nurse or doctor.               Start taking these medicines.        Dose/Directions    acyclovir 400 MG tablet   Commonly known as:  ZOVIRAX   Used for:  Herpes zoster without complication   Started by:  Shaun Bedolla MD        Dose:  400 mg   Take 1 tablet (400 mg) by mouth 3 times daily   Quantity:  30 tablet   Refills:  0       HYDROcodone-acetaminophen 5-325 MG per tablet   Commonly known as:  NORCO   Used for:  Acute bilateral low back pain without sciatica   Started by:  Shaun Bedolla MD        Dose:  1 tablet   Take 1 tablet by mouth every 8 hours as needed for pain maximum 3 tablet(s) per day   Quantity:  20 tablet   Refills:  0         Stop taking these medicines if you haven't already. Please contact your care team if you have questions.     traMADol 50 MG tablet   Commonly known as:  ULTRAM   Stopped by:  Shaun Bedolla MD                Where to get your medicines      These medications were sent to [id number] - [place], [place] - [address] AT [facility] [place] [facility] [address]  [address], [place] [address]    Hours:  24-hours Phone:  [phone]     acyclovir 400 MG tablet         Some of these will need a paper prescription and others can be bought over the counter.  Ask your nurse if you have questions.     Bring a paper prescription for each of " these medications     HYDROcodone-acetaminophen 5-325 MG per tablet                Primary Care Provider Office Phone # Fax #    Shaun Bedolla [phone]       [address] [address]        Thank you!     Thank you for choosing [facility]  [place]. Our goal is always to provide you with excellent care. Hearing back from our patients is one way we can continue to improve our services. Please take a few minutes to complete the written survey that you may receive in the mail after your visit with us. Thank you!             Your Updated Medication List - Protect others around you: Learn how to safely use, store and throw away your medicines at www.disposemymeds.org.          This list is accurate as of: 5/25/17  1:29 PM.  Always use your most recent med list.                   Brand Name Dispense Instructions for use    acyclovir 400 MG tablet    ZOVIRAX    30 tablet    Take 1 tablet (400 mg) by mouth 3 times daily       albuterol (2.5 MG/3ML) 0.083% neb solution     360 mL    Take 1 vial (2.5 mg) by nebulization every 2 hours as needed for wheezing or shortness of breath / dyspnea       aspirin 81 MG tablet     100    1 tab per day       atorvastatin 40 MG tablet    LIPITOR    90 tablet    Take 1 tablet (40 mg) by mouth daily       bisacodyl 10 MG Suppository    DULCOLAX    30 suppository    Place 1 suppository (10 mg) rectally daily as needed for constipation       blood glucose monitoring lancets     3 Box    Use as direct       blood glucose monitoring meter device kit    no brand specified    1 kit    1 Device See Admin Instructions. per patient health plan       blood glucose monitoring test strip    ONE TOUCH ULTRA    3 Box    Use QID or as directed       gabapentin 250 MG/5ML solution    NEURONTIN    450 mL    6 mLs (300 mg) by Oral or Feeding Tube route every 8 hours       HYDROcodone-acetaminophen 5-325 MG per tablet     NORCO    20 tablet    Take 1 tablet by mouth every 8 hours as needed for pain maximum 3 tablet(s) per day       hypromellose-dextran Soln ophthalmic solution      Apply 2-3 drops to eye every hour as needed for dry eyes       insulin glargine 100 UNIT/ML injection    LANTUS     Inject 18 Units Subcutaneous every morning (before breakfast)       insulin pen needle 31G X 8 MM    B-D U/F    100 each    USE ONCE DAILY WITH LANTUS SOLOSTAR PEN       LORazepam 1 MG tablet    ATIVAN    30 tablet    Take 1 tablet (1 mg) by mouth every 8 hours as needed for anxiety       mirtazapine 30 MG tablet    REMERON    30 tablet    Take 1 tablet (30 mg) by mouth At Bedtime

## 2017-05-25 NOTE — PROGRESS NOTES
SUBJECTIVE:                                                    Wyatt Mahajan is a 57 year old male who presents to clinic today for the following health issues:      Medication Followup of tramadol    Taking Medication as prescribed: NO-had to quit due to the medication making him vomit    Side Effects:  vomiting    Medication Helping Symptoms:  yes       Problem list and histories reviewed & adjusted, as indicated.  Additional history: as documented    Patient Active Problem List   Diagnosis     External hemorrhoids     Cranial nerve III palsy     Hyperlipidemia LDL goal <100     Type 2 diabetes mellitus with diabetic neuropathy (HCC)     Low back pain     Lumbar radiculopathy     PVD (peripheral vascular disease) with claudication (H)     Ischemic vascular disease     Acute respiratory failure with hypoxia (H)     MSSA (methicillin susceptible Staphylococcus aureus) septicemia (H)     MSSA (methicillin susceptible Staphylococcus aureus) pneumonia (H)     Fever     Leukocytosis     Influenza B     Lumbago     Physical deconditioning     Past Surgical History:   Procedure Laterality Date     COLONOSCOPY  10/27/16     COLONOSCOPY N/A 10/31/2016    Procedure: COLONOSCOPY;  Surgeon: Pollo Lopez MD;  Location:  GI     HC UGI ENDOSCOPY W PLACEMENT GASTROSTOMY TUBE PERCUT N/A 4/4/2017    Procedure: COMBINED ESOPHAGOSCOPY, GASTROSCOPY, DUODENOSCOPY (EGD), PLACE PERCUTANEOUS ENDOSCOPIC GASTROSTOMY TUBE;  Surgeon: Marcial Obregon MD;  Location:  GI     NO HISTORY OF SURGERY       TRACHEOSTOMY N/A 4/4/2017    Procedure: TRACHEOSTOMY;  Surgeon: Jose Puga MD;  Location:  OR       Social History   Substance Use Topics     Smoking status: Former Smoker     Packs/day: 0.50     Years: 15.00     Types: Cigarettes     Smokeless tobacco: Never Used      Comment: 10 cig daily     Alcohol use No     Family History   Problem Relation Age of Onset     DIABETES Mother      DIABETES Father       DIABETES Sister      DIABETES Brother      Hypertension No family hx of      CEREBROVASCULAR DISEASE No family hx of      Prostate Cancer No family hx of      Cancer - colorectal No family hx of      Breast Cancer No family hx of          Current Outpatient Prescriptions   Medication Sig Dispense Refill     HYDROcodone-acetaminophen (NORCO) 5-325 MG per tablet Take 1 tablet by mouth every 8 hours as needed for pain maximum 3 tablet(s) per day 20 tablet 0     acyclovir (ZOVIRAX) 400 MG tablet Take 1 tablet (400 mg) by mouth 3 times daily 30 tablet 0     mirtazapine (REMERON) 30 MG tablet Take 1 tablet (30 mg) by mouth At Bedtime 30 tablet 1     LORazepam (ATIVAN) 1 MG tablet Take 1 tablet (1 mg) by mouth every 8 hours as needed for anxiety 30 tablet 0     albuterol (2.5 MG/3ML) 0.083% neb solution Take 1 vial (2.5 mg) by nebulization every 2 hours as needed for wheezing or shortness of breath / dyspnea 360 mL      insulin glargine (LANTUS) 100 UNIT/ML injection Inject 18 Units Subcutaneous every morning (before breakfast)       atorvastatin (LIPITOR) 40 MG tablet Take 1 tablet (40 mg) by mouth daily 90 tablet 3     bisacodyl (DULCOLAX) 10 MG Suppository Place 1 suppository (10 mg) rectally daily as needed for constipation 30 suppository      hypromellose-dextran (ARTIFICAL TEARS) SOLN ophthalmic solution Apply 2-3 drops to eye every hour as needed for dry eyes       gabapentin (NEURONTIN) 250 MG/5ML solution 6 mLs (300 mg) by Oral or Feeding Tube route every 8 hours 450 mL      blood glucose monitoring (ONE TOUCH ULTRASOFT) lancets Use as direct 3 Box 1     blood glucose monitoring (ONE TOUCH ULTRA) test strip Use QID or as directed 3 Box 1     insulin pen needle (B-D U/F) 31G X 8 MM USE ONCE DAILY WITH LANTUS SOLOSTAR  each 1     Blood Glucose Monitoring Suppl (BLOOD GLUCOSE METER) KIT 1 Device See Admin Instructions. per patient health plan 1 kit 0     ASPIRIN 81 MG OR TABS 1 tab per day 100 3     Allergies    Allergen Reactions     No Known Drug Allergies      Recent Labs   Lab Test  05/22/17   1355  04/11/17   0430  04/10/17   0405  04/09/17   0530   03/24/17   0430   03/17/17   0635   01/17/17   0838  12/09/16   1018   05/10/16   0859  08/07/15   1028   10/08/13   1455   07/22/11   1320   A1C   --    --    --    --    --    --    --   8.1*   --    --    --    --   11.0*  10.0*   < >  9.4*   < >  7.1*   LDL   --    --    --    --    --    --    --    --    --   84  101*   --   157*  151*   < >  165*   < >  114   HDL   --    --    --    --    --    --    --    --    --   37*  36*   --   42  37*   < >   --    < >  38*   TRIG   --   163*   --    --    --   677*   --    --    --   55  116   --   79  98   < >   --    < >  56   ALT  28   --   24  24   < >  119*   --   35   < >  7  9   --   23  26   < >   --    < >  25   CR  0.78  1.13  1.20  1.37*   < >  1.77*   < >   --    < >   --    --    < >  0.69  0.71   < >   --    < >  0.83   GFRESTIMATED  >90  Non  GFR Calc    67  63  54*   < >  40*   < >   --    < >   --    --    < >  >90  Non  GFR Calc    >90  Non  GFR Calc     < >   --    < >  >90   GFRESTBLACK  >90   GFR Calc    81  76  65   < >  49*   < >   --    < >   --    --    < >  >90   GFR Calc    >90   GFR Calc     < >   --    < >  >90   POTASSIUM  4.0  5.0  4.1  3.7   < >  3.6   < >   --    < >   --    --    < >  3.7  4.0   < >   --    < >  4.4   TSH   --    --    --    --    --    --    --    --    --    --    --    --    --    --    --   0.64   --   0.59    < > = values in this interval not displayed.      BP Readings from Last 3 Encounters:   05/25/17 122/72   05/22/17 138/84   05/09/17 112/68    Wt Readings from Last 3 Encounters:   05/25/17 142 lb (64.4 kg)   05/22/17 142 lb (64.4 kg)   05/09/17 137 lb (62.1 kg)                    Reviewed and updated as needed this visit by clinical staff       Reviewed and updated as  "needed this visit by Provider         ROS:  Constitutional, HEENT, cardiovascular, pulmonary, gi and gu systems are negative, except as otherwise noted.    OBJECTIVE:                                                    /72 (BP Location: Left arm, Patient Position: Chair, Cuff Size: Adult Regular)  Pulse 94  Temp 97.2  F (36.2  C) (Tympanic)  Ht 5' 10\" (1.778 m)  Wt 142 lb (64.4 kg)  SpO2 98%  BMI 20.37 kg/m2  Body mass index is 20.37 kg/(m^2).  GENERAL: healthy, alert and no distress  NECK: no adenopathy, no asymmetry, masses, or scars and thyroid normal to palpation  RESP: lungs clear to auscultation - no rales, rhonchi or wheezes  CV: regular rate and rhythm, normal S1 S2, no S3 or S4, no murmur, click or rub, no peripheral edema and peripheral pulses strong  ABDOMEN: soft, nontender, no hepatosplenomegaly, no masses and bowel sounds normal  MS: no gross musculoskeletal defects noted, no edema         ASSESSMENT/PLAN:                                                    ASSESSMENT / PLAN:  (M54.5) Acute bilateral low back pain without sciatica  (primary encounter diagnosis)  Comment: was on tramadol, helping pain but making him N/V with abd pain, PT started and pain gradually improving  Will have him to switch pain med to norco   Plan: HYDROcodone-acetaminophen (NORCO) 5-325 MG per         tablet            (B02.9) Herpes zoster without complication  Comment: on left buttock, burning and tender, will have him to try acyclovir   Plan: acyclovir (ZOVIRAX) 400 MG tablet            Shaun Bedolla MD  [facility]  "

## 2017-05-26 ENCOUNTER — THERAPY VISIT (OUTPATIENT)
Dept: PHYSICAL THERAPY | Facility: CLINIC | Age: 57
End: 2017-05-26
Payer: COMMERCIAL

## 2017-05-26 DIAGNOSIS — M54.50 ACUTE BILATERAL LOW BACK PAIN WITHOUT SCIATICA: ICD-10-CM

## 2017-05-26 DIAGNOSIS — R53.81 PHYSICAL DECONDITIONING: ICD-10-CM

## 2017-05-26 LAB
MICRO REPORT STATUS: NORMAL
MYCOBACTERIUM SPEC CULT: NORMAL
SPECIMEN SOURCE: NORMAL

## 2017-05-26 PROCEDURE — 97010 HOT OR COLD PACKS THERAPY: CPT | Mod: GP | Performed by: PHYSICAL THERAPIST

## 2017-05-26 PROCEDURE — 97110 THERAPEUTIC EXERCISES: CPT | Mod: GP | Performed by: PHYSICAL THERAPIST

## 2017-05-26 PROCEDURE — G0283 ELEC STIM OTHER THAN WOUND: HCPCS | Mod: GP | Performed by: PHYSICAL THERAPIST

## 2017-05-28 LAB
MICRO REPORT STATUS: NORMAL
MYCOBACTERIUM SPEC CULT: NORMAL
SPECIMEN SOURCE: NORMAL

## 2017-05-31 ENCOUNTER — THERAPY VISIT (OUTPATIENT)
Dept: PHYSICAL THERAPY | Facility: CLINIC | Age: 57
End: 2017-05-31
Payer: COMMERCIAL

## 2017-05-31 DIAGNOSIS — E11.42 DIABETIC POLYNEUROPATHY ASSOCIATED WITH TYPE 2 DIABETES MELLITUS (H): ICD-10-CM

## 2017-05-31 DIAGNOSIS — R53.81 PHYSICAL DECONDITIONING: ICD-10-CM

## 2017-05-31 DIAGNOSIS — M54.50 ACUTE BILATERAL LOW BACK PAIN WITHOUT SCIATICA: ICD-10-CM

## 2017-05-31 DIAGNOSIS — M54.16 LUMBAR RADICULOPATHY: ICD-10-CM

## 2017-05-31 DIAGNOSIS — E11.9 TYPE 2 DIABETES, HBA1C GOAL < 8% (H): ICD-10-CM

## 2017-05-31 LAB
MICRO REPORT STATUS: NORMAL
MICRO REPORT STATUS: NORMAL
MYCOBACTERIUM SPEC CULT: NORMAL
MYCOBACTERIUM SPEC CULT: NORMAL
SPECIMEN SOURCE: NORMAL
SPECIMEN SOURCE: NORMAL

## 2017-05-31 PROCEDURE — 97110 THERAPEUTIC EXERCISES: CPT | Mod: GP | Performed by: PHYSICAL THERAPIST

## 2017-05-31 PROCEDURE — 97112 NEUROMUSCULAR REEDUCATION: CPT | Mod: GP | Performed by: PHYSICAL THERAPIST

## 2017-05-31 NOTE — TELEPHONE ENCOUNTER
Reason for Call:  Medication or medication refill:    Do you use a [facility]?  Name of the pharmacy and phone number for the current request:  [facility] - [phone]    Name of the medication requested: insulin pen needle (B-D U/F) 31G X 8 MM & Test strips    Other request: NA    Can we leave a detailed message on this number? YES    Phone number patient can be reached at: Home number on file [phone] (home)    Best Time: any    Call taken on 5/31/2017 at 11:45 AM by Coby Oliveira

## 2017-05-31 NOTE — TELEPHONE ENCOUNTER
Prescription approved per [facility], [facility] or [facility] refill protocol.  Mirella Agrawal RN  Triage [employer]

## 2017-05-31 NOTE — TELEPHONE ENCOUNTER
Pen Needles   Last Written Prescription Date: 5/10/2016  Last Fill Quantity: 100,  # refills: 1   Last Office Visit with [facility], [facility] or [facility] prescribing provider: 5/25/2017  Test Strips      Last Written Prescription Date: 5/10/2016  Last Fill Quantity: 3 boxes,  # refills: 1   Last Office Visit with [facility], [facility] or [facility] prescribing provider: 5/25/2017

## 2017-06-01 NOTE — TELEPHONE ENCOUNTER
Duplicate RX scripts filled 5-31-17.    Lizzie Carpio CMA

## 2017-06-02 DIAGNOSIS — Z53.9 DIAGNOSIS NOT YET DEFINED: Primary | ICD-10-CM

## 2017-06-02 PROCEDURE — G0180 MD CERTIFICATION HHA PATIENT: HCPCS | Performed by: FAMILY MEDICINE

## 2017-06-02 RX ORDER — PEN NEEDLE, DIABETIC 31 GX5/16"
NEEDLE, DISPOSABLE MISCELLANEOUS
Qty: 100 EACH | Refills: 0 | OUTPATIENT
Start: 2017-06-02

## 2017-06-06 ENCOUNTER — THERAPY VISIT (OUTPATIENT)
Dept: PHYSICAL THERAPY | Facility: CLINIC | Age: 57
End: 2017-06-06
Payer: COMMERCIAL

## 2017-06-06 DIAGNOSIS — R53.81 PHYSICAL DECONDITIONING: ICD-10-CM

## 2017-06-06 DIAGNOSIS — M54.50 ACUTE BILATERAL LOW BACK PAIN WITHOUT SCIATICA: ICD-10-CM

## 2017-06-06 PROCEDURE — 97110 THERAPEUTIC EXERCISES: CPT | Mod: GP | Performed by: PHYSICAL THERAPIST

## 2017-06-06 PROCEDURE — 97010 HOT OR COLD PACKS THERAPY: CPT | Mod: GP | Performed by: PHYSICAL THERAPIST

## 2017-06-06 PROCEDURE — G0283 ELEC STIM OTHER THAN WOUND: HCPCS | Mod: GP | Performed by: PHYSICAL THERAPIST

## 2017-06-26 DIAGNOSIS — M54.16 LUMBAR RADICULOPATHY: ICD-10-CM

## 2017-06-26 DIAGNOSIS — E11.42 DIABETIC POLYNEUROPATHY ASSOCIATED WITH TYPE 2 DIABETES MELLITUS (H): ICD-10-CM

## 2017-06-26 NOTE — TELEPHONE ENCOUNTER
Reason for Call:  Medication or medication refill:insulin glargine (LANTUS) 100 UNIT/ML injection    Do you use a [facility]?  Name of the pharmacy and phone number for the current request:  [facility] [place].    Name of the medication requested: insulin glargine (LANTUS) 100 UNIT/ML injection    Other request: anytime    Can we leave a detailed message on this number? YES    Phone number patient can be reached at: Home number on file [phone] (home)    Best Time: anytime    Call taken on 6/26/2017 at 2:58 PM by Sana Renteria

## 2017-06-27 NOTE — TELEPHONE ENCOUNTER
Routing refill request to provider for review/approval because:  See note below.  Leela Traylor RN

## 2017-06-27 NOTE — TELEPHONE ENCOUNTER
Lantus insulin         Last Written Prescription Date: unsure no date on his med list?  Last Fill Quantity: , # refills:   Last Office Visit with [facility], [facility] or [facility] prescribing provider:  5/25/17   Next 5 appointments (look out 90 days)     Jul 10, 2017  9:45 AM CDT   Return Visit with Vish Mahajan MD   [facility] [place] ([facility])    [address] [place] [address] [phone]                   BP Readings from Last 3 Encounters:   05/25/17 122/72   05/22/17 138/84   05/09/17 112/68     Lab Results   Component Value Date    MICROL 32 05/10/2016     Lab Results   Component Value Date    UMALCR 20.97 05/10/2016     Creatinine   Date Value Ref Range Status   05/22/2017 0.78 0.66 - 1.25 mg/dL Final   ]  GFR Estimate   Date Value Ref Range Status   05/22/2017 >90  Non  GFR Calc   >60 mL/min/1.7m2 Final   04/11/2017 67 >60 mL/min/1.7m2 Final     Comment:     Non  GFR Calc   04/10/2017 63 >60 mL/min/1.7m2 Final     Comment:     Non  GFR Calc     GFR Estimate If Black   Date Value Ref Range Status   05/22/2017 >90   GFR Calc   >60 mL/min/1.7m2 Final   04/11/2017 81 >60 mL/min/1.7m2 Final     Comment:      GFR Calc   04/10/2017 76 >60 mL/min/1.7m2 Final     Comment:      GFR Calc     Lab Results   Component Value Date    CHOL 132 01/17/2017     Lab Results   Component Value Date    HDL 37 01/17/2017     Lab Results   Component Value Date    LDL 84 01/17/2017     Lab Results   Component Value Date    TRIG 163 04/11/2017     Lab Results   Component Value Date    CHOLHDLRATIO 5.6 08/07/2015     Lab Results   Component Value Date    AST 11 05/22/2017     Lab Results   Component Value Date    ALT 28 05/22/2017     Lab Results   Component Value Date    A1C 8.1 03/17/2017    A1C 11.0 05/10/2016    A1C 10.0 08/07/2015    A1C 10.6 02/16/2015    A1C 10.6 06/03/2014      Potassium   Date Value Ref Range Status   05/22/2017 4.0 3.4 - 5.3 mmol/L Final     Lizzie Carpio CMA

## 2017-06-29 NOTE — TELEPHONE ENCOUNTER
Dylan         Last Written Prescription Date: 4/11/17  Last Fill Quantity: unsure class transitional, # refills:   [place] [facility] [place] [facility] or [facility] prescribing provider:  5/25/17   Next 5 appointments (look out 90 days)     Jul 10, 2017  9:45 AM CDT   Return Visit with Vish Mahajan MD   [facility] [place] ([facility])    [address] [place] [address] [phone]                   BP Readings from Last 3 Encounters:   05/25/17 122/72   05/22/17 138/84   05/09/17 112/68     Lab Results   Component Value Date    MICROL 32 05/10/2016     Lab Results   Component Value Date    UMALCR 20.97 05/10/2016     Creatinine   Date Value Ref Range Status   05/22/2017 0.78 0.66 - 1.25 mg/dL Final   ]  GFR Estimate   Date Value Ref Range Status   05/22/2017 >90  Non  GFR Calc   >60 mL/min/1.7m2 Final   04/11/2017 67 >60 mL/min/1.7m2 Final     Comment:     Non  GFR Calc   04/10/2017 63 >60 mL/min/1.7m2 Final     Comment:     Non  GFR Calc     GFR Estimate If Black   Date Value Ref Range Status   05/22/2017 >90   GFR Calc   >60 mL/min/1.7m2 Final   04/11/2017 81 >60 mL/min/1.7m2 Final     Comment:      GFR Calc   04/10/2017 76 >60 mL/min/1.7m2 Final     Comment:      GFR Calc     Lab Results   Component Value Date    CHOL 132 01/17/2017     Lab Results   Component Value Date    HDL 37 01/17/2017     Lab Results   Component Value Date    LDL 84 01/17/2017     Lab Results   Component Value Date    TRIG 163 04/11/2017     Lab Results   Component Value Date    CHOLHDLRATIO 5.6 08/07/2015     Lab Results   Component Value Date    AST 11 05/22/2017     Lab Results   Component Value Date    ALT 28 05/22/2017     Lab Results   Component Value Date    A1C 8.1 03/17/2017    A1C 11.0 05/10/2016    A1C 10.0 08/07/2015    A1C 10.6 02/16/2015    A1C 10.6 06/03/2014      Potassium   Date Value Ref Range Status   05/22/2017 4.0 3.4 - 5.3 mmol/L Final     Lizzie Carpio CMA

## 2017-06-30 DIAGNOSIS — E11.40 TYPE 2 DIABETES MELLITUS WITH DIABETIC NEUROPATHY, WITH LONG-TERM CURRENT USE OF INSULIN (H): Chronic | ICD-10-CM

## 2017-06-30 DIAGNOSIS — Z79.4 TYPE 2 DIABETES MELLITUS WITH DIABETIC NEUROPATHY, WITH LONG-TERM CURRENT USE OF INSULIN (H): Chronic | ICD-10-CM

## 2017-06-30 RX ORDER — INSULIN GLARGINE 100 [IU]/ML
INJECTION, SOLUTION SUBCUTANEOUS
Qty: 15 ML | Refills: 0 | Status: SHIPPED | OUTPATIENT
Start: 2017-06-30 | End: 2017-08-27

## 2017-06-30 NOTE — TELEPHONE ENCOUNTER
Per Dr. Bedolla verbal order, OK to refill.   Medication is being filled for 1 time refill only due to:  per 5/22/17 appointment, patient was to follow up in 2 months. (7/22/17)  Leela Traylor RN

## 2017-07-03 RX ORDER — INSULIN GLARGINE 100 [IU]/ML
INJECTION, SOLUTION SUBCUTANEOUS
Qty: 10 ML | Refills: 0 | OUTPATIENT
Start: 2017-07-03

## 2017-07-03 NOTE — TELEPHONE ENCOUNTER
Duplicate- sent 06/30/17- info sent to pharmacy.  Angela Fabian,RN  [facility]  [phone]

## 2017-07-07 ENCOUNTER — TELEPHONE (OUTPATIENT)
Dept: FAMILY MEDICINE | Facility: CLINIC | Age: 57
End: 2017-07-07

## 2017-07-07 NOTE — TELEPHONE ENCOUNTER
Patient is having dental pain. He is requesting narcotics.   I advised him to seek dental care.  He can't get in for 10 days.  I advised emergency dental   I also advised him to take ibuprofen with food every 6-8 hours  Misa Campos RN- Triage [employer]

## 2017-07-10 ENCOUNTER — OFFICE VISIT (OUTPATIENT)
Dept: CARDIOLOGY | Facility: CLINIC | Age: 57
End: 2017-07-10
Attending: INTERNAL MEDICINE
Payer: COMMERCIAL

## 2017-07-10 VITALS
HEIGHT: 70 IN | DIASTOLIC BLOOD PRESSURE: 68 MMHG | WEIGHT: 150 LBS | HEART RATE: 88 BPM | BODY MASS INDEX: 21.47 KG/M2 | SYSTOLIC BLOOD PRESSURE: 136 MMHG

## 2017-07-10 DIAGNOSIS — I73.9 PAD (PERIPHERAL ARTERY DISEASE) (H): Primary | ICD-10-CM

## 2017-07-10 DIAGNOSIS — I99.8 ISCHEMIC VASCULAR DISEASE: ICD-10-CM

## 2017-07-10 DIAGNOSIS — E78.5 HYPERLIPIDEMIA LDL GOAL <100: ICD-10-CM

## 2017-07-10 PROCEDURE — 99214 OFFICE O/P EST MOD 30 MIN: CPT | Performed by: INTERNAL MEDICINE

## 2017-07-10 RX ORDER — ATORVASTATIN CALCIUM 40 MG/1
40 TABLET, FILM COATED ORAL DAILY
Qty: 90 TABLET | Refills: 3 | Status: SHIPPED | OUTPATIENT
Start: 2017-07-10 | End: 2020-04-02

## 2017-07-10 NOTE — PROGRESS NOTES
HPI and Plan:   I had the pleasure of seeing Mr. Mahajan at the [facility] in [place] this morning. He is a very pleasant 57-year-old gentleman with known peripheral vascular disease, poorly controlled diabetes, hyperlipidemia and previous nicotine dependence (quit smoking recently) who is here for followup.  In 2010, he was evaluated at [facility].  He underwent peripheral angiogram at that time which demonstrated no significant aortoiliac disease.  His right common femoral artery was patent.  The right superficial femoral artery was 100% occluded in its mid segment with flow reconstitution in the distal right SFA above the knee.  His left lower extremity was not assessed at the time.  He was offered to undergo right fem-pop bypass but he elected to continue with a walking program.      He has not seen a vascular specialist until the summer of 2016 when I first saw him.  At that time he complained of worsening right lower extremity claudication.  He also complained of mild left lower extremity claudication.  I recommended a repeat peripheral angiogram with an eye towards revascularization of his right SFA disease.  However, he fell ill around the time of his angiogram and did not come back to see us until 11/2016.  When I saw him in early 11/2016, his right lower extremity claudication remained stable.  However, he endorsed a rather severe left lower extremity claudication.  We performed a CT angiogram which demonstrated moderate severe right common iliac artery stenosis, occluded right SFA proximally with flow reconstitution distally and a short segment occlusion in his left popliteal.  Given the CTA findings, the patient proceeded to have peripheral angiogram.  This demonstrated a high-grade right common iliac artery stenosis as well as a short segment occlusion of his left popliteal.  He had a single vessel runoff to the left foot.  His right SFA was occluded proximally  with flow reconstitution distally and a three-vessel runoff to the right foot with proximal disease in his tibial arteries.  We then performed an angioplasty with drug-coated balloons of his left popliteal and distal SFA.  We also performed a right common iliac artery angioplasty and stenting.      Since the aforementioned procedure, the patient's left lower extremity claudication resolved.  ELOISA post-procedure demonstrated mild abnormality in the left of 0.85 and moderate arterial insufficiency in the right of 0.70.  Ultrasound was also performed which showed patent right common iliac artery stent and patent left popliteal and distal SFA.    The patient was unfortunately hospitalized this past march with pneumonia and sepsis. He required prolonged ICU stay and was ultimately discharged to rehab facility with tracheostomy. The trach was removed and he has been at home for two months now. He is active and walks up to 1 hour on daily basis. He denies cardiopulmonary symptoms and his lower extremity claudication has essentially resolved although he still has symptoms on the right.      IMPRESSION AND PLAN:   1.  Severe peripheral arterial disease.   2.  History of bilateral lower extremity claudication, s/p PTA of the left popliteal artery and PTA/stenting of the right common iliac artery.   3.  Diabetes, poorly controlled.   4.  Hyperlipidemia, poorly controlled.   5.  Nicotine dependence.      He remains stable from PAD standpoint. He no longer has claudication in the left since the procedure.  His claudication on the right has also improved significantly after we stented his right common iliac artery.  Overall, he is quite pleased with the outcome of the procedure. I recommended that he continue Aspirin. He discontinued Lipitor but I convinced him to resume it. His diabetes appears to be still poorly controlled.  I again recommended to follow up with an endocrinologist to get this under better control.     He became  quite ill recently with sepsis due to pneumonia but fortunately he recovered. Luckily, he is no longer smoking.     We will see him in approximately 6 months for followup or sooner if he develops any worsening vascular symptoms.  I appreciate the opportunity to participate in this patient's care.        BEV PARKER MD          D: 2017 15:16   T: 2017 08:25   MT: KARISSA      Name:     AMAURY PARKER   MRN:      [id number]        Account:      [id number]   :      1960           Service Date: 2017      Document: [id number]      Orders Placed This Encounter   Procedures     US Lower Extremity Arterial Duplex Left     US Aorta/Ivc/Iliac Duplex Complete     Follow-Up with Cardiac Advanced Practice Provider       Orders Placed This Encounter   Medications     atorvastatin (LIPITOR) 40 MG tablet     Sig: Take 1 tablet (40 mg) by mouth daily     Dispense:  90 tablet     Refill:  3       Medications Discontinued During This Encounter   Medication Reason     HYDROcodone-acetaminophen (NORCO) 5-325 MG per tablet Therapy completed     gabapentin (NEURONTIN) 250 MG/5ML solution Stopped by Patient     albuterol (2.5 MG/3ML) 0.083% neb solution Stopped by Patient     mirtazapine (REMERON) 30 MG tablet Stopped by Patient     LORazepam (ATIVAN) 1 MG tablet Stopped by Patient     bisacodyl (DULCOLAX) 10 MG Suppository Therapy completed     hypromellose-dextran (ARTIFICAL TEARS) SOLN ophthalmic solution Stopped by Patient     atorvastatin (LIPITOR) 40 MG tablet Reorder         Encounter Diagnoses   Name Primary?     PAD (peripheral artery disease) (H) Yes     Ischemic vascular disease      Hyperlipidemia LDL goal <100        CURRENT MEDICATIONS:  Current Outpatient Prescriptions   Medication Sig Dispense Refill     atorvastatin (LIPITOR) 40 MG tablet Take 1 tablet (40 mg) by mouth daily 90 tablet 3     LANTUS SOLOSTAR 100 UNIT/ML soln INJECT 23 UNITS SUBCUTANEOUSLY TWICE DAILY 15 mL 0     ASPIRIN 81 MG OR TABS  1 tab per day 100 3     insulin pen needle (B-D U/F) 31G X 8 MM USE ONCE DAILY WITH LANTUS SOLOSTAR  each 1     blood glucose monitoring (ONE TOUCH ULTRA) test strip Use QID or as directed 3 Box 1     acyclovir (ZOVIRAX) 400 MG tablet Take 1 tablet (400 mg) by mouth 3 times daily 30 tablet 0     [DISCONTINUED] atorvastatin (LIPITOR) 40 MG tablet Take 1 tablet (40 mg) by mouth daily (Patient not taking: Reported on 7/10/2017) 90 tablet 3     blood glucose monitoring (ONE TOUCH ULTRASOFT) lancets Use as direct 3 Box 1     Blood Glucose Monitoring Suppl (BLOOD GLUCOSE METER) KIT 1 Device See Admin Instructions. 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    eval by optho, considered be related to microvascular problem ie DM     DM neuro manif type II 6/23/2014     Influenza B 4/1/2017     Mixed hyperlipidemia 3/04     MSSA (methicillin susceptible Staphylococcus aureus) pneumonia (H) 4/1/2017     MSSA (methicillin susceptible Staphylococcus aureus) septicemia (H) 4/1/2017     PVD (peripheral vascular disease) with claudication (H) 10/12/2015     Tobacco use disorder QUIT 9/08    smokes for stress       PAST SURGICAL HISTORY:  Past Surgical History:   Procedure Laterality Date     COLONOSCOPY  10/27/16     COLONOSCOPY N/A 10/31/2016    Procedure: COLONOSCOPY;  Surgeon: Pollo Lopez MD;  Location:  GI     HC UGI ENDOSCOPY W PLACEMENT GASTROSTOMY TUBE PERCUT N/A 4/4/2017    Procedure: COMBINED ESOPHAGOSCOPY, GASTROSCOPY, DUODENOSCOPY (EGD), PLACE PERCUTANEOUS ENDOSCOPIC GASTROSTOMY TUBE;  Surgeon: Marcial Obregon MD;  Location:  GI     NO HISTORY OF SURGERY       TRACHEOSTOMY N/A 4/4/2017    Procedure: TRACHEOSTOMY;  Surgeon: Jose Puga MD;  Location:  OR       FAMILY HISTORY:  Family History   Problem Relation  "Age of Onset     DIABETES Mother      DIABETES Father      DIABETES Sister      DIABETES Brother      Hypertension No family hx of      CEREBROVASCULAR DISEASE No family hx of      Prostate Cancer No family hx of      Cancer - colorectal No family hx of      Breast Cancer No family hx of        SOCIAL HISTORY:  Social History     Social History     Marital status:      Spouse name: Mary     Number of children: 7     Years of education: 16     Occupational History      store [employer]     Social History Main Topics     Smoking status: Former Smoker     Packs/day: 0.50     Years: 15.00     Types: Cigarettes     Smokeless tobacco: Never Used      Comment: quit 3/2017     Alcohol use No     Drug use: No     Sexual activity: Yes     Partners: Female     Other Topics Concern      Service No     Blood Transfusions No     Caffeine Concern No     1 coffee,pop 1 time weekly     Occupational Exposure No     Hobby Hazards No     Sleep Concern No     Stress Concern No     Weight Concern Yes     Special Diet No     Back Care No     Exercise No     Bike Helmet No     No Bike     Seat Belt Yes     Self-Exams No     Parent/Sibling W/ Cabg, Mi Or Angioplasty Before 65f 55m? No     Social History Narrative    moved here from [facility], moved here 1990               Review of Systems:  Skin:  Negative       Eyes:  Positive for glasses    ENT:  Positive for  (mouth-teeth pain x 10 days)    Respiratory:  Negative       Cardiovascular:  Negative      Gastroenterology: Negative      Genitourinary:  not assessed      Musculoskeletal:  Negative      Neurologic:  Negative      Psychiatric:  Negative      Heme/Lymph/Imm:  Negative      Endocrine:  Positive for diabetes      Physical Exam:  Vitals: /68  Pulse 88  Ht 1.778 m (5' 10\")  Wt 68 kg (150 lb)  BMI 21.52 kg/m2    Constitutional:  cooperative;in no acute distress        Skin:  warm and dry to the touch        Head:           Eyes:       "     ENT:  no pallor or cyanosis        Neck:  carotid pulses are full and equal bilaterally;JVP normal        Chest:  clear to auscultation          Cardiac: regular rhythm;normal S1 and S2;no murmurs, gallops or rubs detected                  Abdomen:  abdomen soft;no masses;non-tender        Vascular:     1+         0 0 2+         1+            Extremities and Back:  no edema              Neurological:  no gross motor deficits              CC  Vish Mahajan MD   [facility]  [address]  [place] [address]

## 2017-07-10 NOTE — MR AVS SNAPSHOT
After Visit Summary   7/10/2017    Wyatt LAWSON Keyshawn    MRN: [id number]           Patient Information     Date Of Birth          1960        Visit Information        Provider Department      7/10/2017 9:45 AM Vish Mahajan MD [facility]        Today's Diagnoses     PAD (peripheral artery disease) (H)    -  1    Ischemic vascular disease        Hyperlipidemia LDL goal <100           Follow-ups after your visit        Additional Services     Follow-Up with Cardiac Advanced Practice Provider                 Future tests that were ordered for you today     Open Future Orders        Priority Expected Expires Ordered    US Lower Extremity Arterial Duplex Left Routine 1/17/2018 7/10/2018 7/10/2017    US Aorta/Ivc/Iliac Duplex Complete Routine 1/17/2018 7/10/2018 7/10/2017    Follow-Up with Cardiac Advanced Practice Provider Routine 1/6/2018 7/10/2018 7/10/2017            Who to contact     If you have questions or need follow up information about today's clinic visit or your schedule please contact [facility] directly at [phone].  Normal or non-critical lab and imaging results will be communicated to you by [email or web address], letter or phone within 4 business days after the clinic has received the results. If you do not hear from us within 7 days, please contact the clinic through [email or web address] or phone. If you have a critical or abnormal lab result, we will notify you by phone as soon as possible.  Submit refill requests through [employer] or call your pharmacy and they will forward the refill request to us. Please allow 3 business days for your refill to be completed.          Additional Information About Your Visit        MyChart Information     [employer] gives you secure access to your electronic health record. If you see a primary care provider, you can also send messages to your care team and make appointments. If you have  "questions, please call your primary care clinic.  If you do not have a primary care provider, please call [phone] and they will assist you.        Care EveryWhere ID     This is your Care EveryWhere ID. This could be used by other organizations to access your [place] medical records  [id number]        Your Vitals Were     Pulse Height BMI (Body Mass Index)             88 1.778 m (5' 10\") 21.52 kg/m2          Blood Pressure from Last 3 Encounters:   07/10/17 136/68   05/25/17 122/72   05/22/17 138/84    Weight from Last 3 Encounters:   07/10/17 68 kg (150 lb)   05/25/17 64.4 kg (142 lb)   05/22/17 64.4 kg (142 lb)              We Performed the Following     Follow-Up with Cardiologist          Where to get your medicines      These medications were sent to [id number] - [place], [place] - [address] AT [place] [address]  [address] [address] [facility] [address]    Hours:  24-hours Phone:  [phone]     atorvastatin 40 MG tablet          Primary Care Provider Office Phone # Fax #    Shaun Bedolla [phone]       [address] [address]        Equal Access to Services     JAKOB PEREZ AH: Hadii aad ku hadasho Soomaali, waaxda luqadaha, qaybta kaalmada adeegyada, abhilash rivas. So [facility] [phone].    ATENCIÓN: Si habla español, tiene a champion disposición servicios gratuitos de asistencia lingüística. Llame al [phone].    We comply with applicable federal civil rights laws and [place] laws. We do not discriminate on the basis of race, color, national origin, age, disability sex, sexual orientation or gender identity.            Thank you!     Thank you for choosing [facility] [place]  for your care. Our goal is always to provide you with excellent care. Hearing back from our patients is one way we can continue to improve our services. Please take a " few minutes to complete the written survey that you may receive in the mail after your visit with us. Thank you!             Your Updated Medication List - Protect others around you: Learn how to safely use, store and throw away your medicines at www.disposemymeds.org.          This list is accurate as of: 7/10/17  2:20 PM.  Always use your most recent med list.                   Brand Name Dispense Instructions for use Diagnosis    acyclovir 400 MG tablet    ZOVIRAX    30 tablet    Take 1 tablet (400 mg) by mouth 3 times daily    Herpes zoster without complication       aspirin 81 MG tablet     100    1 tab per day    Type II or unspecified type diabetes mellitus without mention of complication, not stated as uncontrolled       atorvastatin 40 MG tablet    LIPITOR    90 tablet    Take 1 tablet (40 mg) by mouth daily    Hyperlipidemia LDL goal <100       blood glucose monitoring lancets     3 Box    Use as direct    Type 2 diabetes, HbA1C goal < 8% (H)       blood glucose monitoring meter device kit    no brand specified    1 kit    1 Device See Admin Instructions. per patient health plan    Type II or unspecified type diabetes mellitus without mention of complication, not stated as uncontrolled       blood glucose monitoring test strip    ONE TOUCH ULTRA    3 Box    Use QID or as directed    Diabetic polyneuropathy associated with type 2 diabetes mellitus (H)       insulin pen needle 31G X 8 MM    B-D U/F    100 each    USE ONCE DAILY WITH LANTUS SOLOSTAR PEN    Lumbar radiculopathy, Diabetic polyneuropathy associated with type 2 diabetes mellitus (H)       LANTUS SOLOSTAR 100 UNIT/ML injection   Generic drug:  insulin glargine     15 mL    INJECT 23 UNITS SUBCUTANEOUSLY TWICE DAILY    Type 2 diabetes mellitus with diabetic neuropathy, with long-term current use of insulin (H)

## 2017-07-10 NOTE — LETTER
7/10/2017    Shaun Bedolla MD  [facility]   [address] [address]    RE: Wyatt Mahajan       Dear Colleague,    I had the pleasure of seeing Wyatt Mahajan in the [facility].    HPI and Plan:   I had the pleasure of seeing Mr. Mahajan at the [facility] in [place] this morning. He is a very pleasant 57-year-old gentleman with known peripheral vascular disease, poorly controlled diabetes, hyperlipidemia and previous nicotine dependence (quit smoking recently) who is here for followup.  In 2010, he was evaluated at [facility].  He underwent peripheral angiogram at that time which demonstrated no significant aortoiliac disease.  His right common femoral artery was patent.  The right superficial femoral artery was 100% occluded in its mid segment with flow reconstitution in the distal right SFA above the knee.  His left lower extremity was not assessed at the time.  He was offered to undergo right fem-pop bypass but he elected to continue with a walking program.      He has not seen a vascular specialist until the summer of 2016 when I first saw him.  At that time he complained of worsening right lower extremity claudication.  He also complained of mild left lower extremity claudication.  I recommended a repeat peripheral angiogram with an eye towards revascularization of his right SFA disease.  However, he fell ill around the time of his angiogram and did not come back to see us until 11/2016.  When I saw him in early 11/2016, his right lower extremity claudication remained stable.  However, he endorsed a rather severe left lower extremity claudication.  We performed a CT angiogram which demonstrated moderate severe right common iliac artery stenosis, occluded right SFA proximally with flow reconstitution distally and a short segment occlusion in his left popliteal.  Given the CTA findings, the patient  proceeded to have peripheral angiogram.  This demonstrated a high-grade right common iliac artery stenosis as well as a short segment occlusion of his left popliteal.  He had a single vessel runoff to the left foot.  His right SFA was occluded proximally with flow reconstitution distally and a three-vessel runoff to the right foot with proximal disease in his tibial arteries.  We then performed an angioplasty with drug-coated balloons of his left popliteal and distal SFA.  We also performed a right common iliac artery angioplasty and stenting.      Since the aforementioned procedure, the patient's left lower extremity claudication resolved.  ELOISA post-procedure demonstrated mild abnormality in the left of 0.85 and moderate arterial insufficiency in the right of 0.70.  Ultrasound was also performed which showed patent right common iliac artery stent and patent left popliteal and distal SFA.    The patient was unfortunately hospitalized this past march with pneumonia and sepsis. He required prolonged ICU stay and was ultimately discharged to rehab facility with tracheostomy. The trach was removed and he has been at home for two months now. He is active and walks up to 1 hour on daily basis. He denies cardiopulmonary symptoms and his lower extremity claudication has essentially resolved although he still has symptoms on the right.      IMPRESSION AND PLAN:   1.  Severe peripheral arterial disease.   2.  History of bilateral lower extremity claudication, s/p PTA of the left popliteal artery and PTA/stenting of the right common iliac artery.   3.  Diabetes, poorly controlled.   4.  Hyperlipidemia, poorly controlled.   5.  Nicotine dependence.      He remains stable from PAD standpoint. He no longer has claudication in the left since the procedure.  His claudication on the right has also improved significantly after we stented his right common iliac artery.  Overall, he is quite pleased with the outcome of the procedure. I  recommended that he continue Aspirin. He discontinued Lipitor but I convinced him to resume it. His diabetes appears to be still poorly controlled.  I again recommended to follow up with an endocrinologist to get this under better control.     He became quite ill recently with sepsis due to pneumonia but fortunately he recovered. Luckily, he is no longer smoking.     We will see him in approximately 6 months for followup or sooner if he develops any worsening vascular symptoms.  I appreciate the opportunity to participate in this patient's care.        BEV PARKER MD          D: 2017 15:16   T: 2017 08:25   MT: KARISSA      Name:     AMAURY PARKER   MRN:      [id number]        Account:      [id number]   :      1960           Service Date: 2017      Document: [id number]      Orders Placed This Encounter   Procedures     US Lower Extremity Arterial Duplex Left     US Aorta/Ivc/Iliac Duplex Complete     Follow-Up with Cardiac Advanced Practice Provider       Orders Placed This Encounter   Medications     atorvastatin (LIPITOR) 40 MG tablet     Sig: Take 1 tablet (40 mg) by mouth daily     Dispense:  90 tablet     Refill:  3       Medications Discontinued During This Encounter   Medication Reason     HYDROcodone-acetaminophen (NORCO) 5-325 MG per tablet Therapy completed     gabapentin (NEURONTIN) 250 MG/5ML solution Stopped by Patient     albuterol (2.5 MG/3ML) 0.083% neb solution Stopped by Patient     mirtazapine (REMERON) 30 MG tablet Stopped by Patient     LORazepam (ATIVAN) 1 MG tablet Stopped by Patient     bisacodyl (DULCOLAX) 10 MG Suppository Therapy completed     hypromellose-dextran (ARTIFICAL TEARS) SOLN ophthalmic solution Stopped by Patient     atorvastatin (LIPITOR) 40 MG tablet Reorder         Encounter Diagnoses   Name Primary?     PAD (peripheral artery disease) (H) Yes     Ischemic vascular disease      Hyperlipidemia LDL goal <100        CURRENT MEDICATIONS:  Current  Outpatient Prescriptions   Medication Sig Dispense Refill     atorvastatin (LIPITOR) 40 MG tablet Take 1 tablet (40 mg) by mouth daily 90 tablet 3     LANTUS SOLOSTAR 100 UNIT/ML soln INJECT 23 UNITS SUBCUTANEOUSLY TWICE DAILY 15 mL 0     ASPIRIN 81 MG OR TABS 1 tab per day 100 3     insulin pen needle (B-D U/F) 31G X 8 MM USE ONCE DAILY WITH LANTUS SOLOSTAR  each 1     blood glucose monitoring (ONE TOUCH ULTRA) test strip Use QID or as directed 3 Box 1     acyclovir (ZOVIRAX) 400 MG tablet Take 1 tablet (400 mg) by mouth 3 times daily 30 tablet 0     [DISCONTINUED] atorvastatin (LIPITOR) 40 MG tablet Take 1 tablet (40 mg) by mouth daily (Patient not taking: Reported on 7/10/2017) 90 tablet 3     blood glucose monitoring (ONE TOUCH ULTRASOFT) lancets Use as direct 3 Box 1     Blood Glucose Monitoring Suppl (BLOOD GLUCOSE METER) KIT 1 Device See Admin Instructions. 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    eval by optho, considered be related to microvascular problem ie DM     DM neuro manif type II 6/23/2014     Influenza B 4/1/2017     Mixed hyperlipidemia 3/04     MSSA (methicillin susceptible Staphylococcus aureus) pneumonia (H) 4/1/2017     MSSA (methicillin susceptible Staphylococcus aureus) septicemia (H) 4/1/2017     PVD (peripheral vascular disease) with claudication (H) 10/12/2015     Tobacco use disorder QUIT 9/08    smokes for stress       PAST SURGICAL HISTORY:  Past Surgical History:   Procedure Laterality Date     COLONOSCOPY  10/27/16     COLONOSCOPY N/A 10/31/2016    Procedure: COLONOSCOPY;  Surgeon: Pollo Lopez MD;  Location:  GI      UGI ENDOSCOPY W PLACEMENT GASTROSTOMY TUBE PERCUT N/A 4/4/2017    Procedure: COMBINED ESOPHAGOSCOPY, GASTROSCOPY, DUODENOSCOPY (EGD), PLACE PERCUTANEOUS ENDOSCOPIC  GASTROSTOMY TUBE;  Surgeon: Marcial Obregon MD;  Location:  GI     NO HISTORY OF SURGERY       TRACHEOSTOMY N/A 4/4/2017    Procedure: TRACHEOSTOMY;  Surgeon: Jose Puga MD;  Location:  OR       FAMILY HISTORY:  Family History   Problem Relation Age of Onset     DIABETES Mother      DIABETES Father      DIABETES Sister      DIABETES Brother      Hypertension No family hx of      CEREBROVASCULAR DISEASE No family hx of      Prostate Cancer No family hx of      Cancer - colorectal No family hx of      Breast Cancer No family hx of        SOCIAL HISTORY:  Social History     Social History     Marital status:      Spouse name: Mary     Number of children: 7     Years of education: 16     Occupational History      store [employer]     Social History Main Topics     Smoking status: Former Smoker     Packs/day: 0.50     Years: 15.00     Types: Cigarettes     Smokeless tobacco: Never Used      Comment: quit 3/2017     Alcohol use No     Drug use: No     Sexual activity: Yes     Partners: Female     Other Topics Concern      Service No     Blood Transfusions No     Caffeine Concern No     1 coffee,pop 1 time weekly     Occupational Exposure No     Hobby Hazards No     Sleep Concern No     Stress Concern No     Weight Concern Yes     Special Diet No     Back Care No     Exercise No     Bike Helmet No     No Bike     Seat Belt Yes     Self-Exams No     Parent/Sibling W/ Cabg, Mi Or Angioplasty Before 65f 55m? No     Social History Narrative    moved here from [place], moved here 1990               Review of Systems:  Skin:  Negative       Eyes:  Positive for glasses    ENT:  Positive for  (mouth-teeth pain x 10 days)    Respiratory:  Negative       Cardiovascular:  Negative      Gastroenterology: Negative      Genitourinary:  not assessed      Musculoskeletal:  Negative      Neurologic:  Negative      Psychiatric:  Negative      Heme/Lymph/Imm:  Negative     "  Endocrine:  Positive for diabetes      Physical Exam:  Vitals: /68  Pulse 88  Ht 1.778 m (5' 10\")  Wt 68 kg (150 lb)  BMI 21.52 kg/m2    Constitutional:  cooperative;in no acute distress        Skin:  warm and dry to the touch        Head:           Eyes:           ENT:  no pallor or cyanosis        Neck:  carotid pulses are full and equal bilaterally;JVP normal        Chest:  clear to auscultation          Cardiac: regular rhythm;normal S1 and S2;no murmurs, gallops or rubs detected                  Abdomen:  abdomen soft;no masses;non-tender        Vascular:     1+         0 0 2+         1+            Extremities and Back:  no edema              Neurological:  no gross motor deficits        CC  Vish Mahajan MD   [facility]  [address]  [place] [address]    Thank you for allowing me to participate in the care of your patient.    Sincerely,     Vish Mahajan MD    [facility]  "

## 2017-08-27 DIAGNOSIS — E11.40 TYPE 2 DIABETES MELLITUS WITH DIABETIC NEUROPATHY, WITH LONG-TERM CURRENT USE OF INSULIN (H): Chronic | ICD-10-CM

## 2017-08-27 DIAGNOSIS — Z79.4 TYPE 2 DIABETES MELLITUS WITH DIABETIC NEUROPATHY, WITH LONG-TERM CURRENT USE OF INSULIN (H): Chronic | ICD-10-CM

## 2017-08-28 RX ORDER — INSULIN GLARGINE 100 [IU]/ML
INJECTION, SOLUTION SUBCUTANEOUS
Qty: 15 ML | Refills: 0 | Status: SHIPPED | OUTPATIENT
Start: 2017-08-28 | End: 2017-11-17

## 2017-08-28 NOTE — TELEPHONE ENCOUNTER
Dylan Hooker          Last Written Prescription Date: 6/30/17  Last Fill Quantity: 15 ml, # refills: 0  Last Office Visit with [facility], [facility] or [facility] prescribing provider:  5/25/17        BP Readings from Last 3 Encounters:   07/10/17 136/68   05/25/17 122/72   05/22/17 138/84     Lab Results   Component Value Date    MICROL 32 05/10/2016     Lab Results   Component Value Date    UMALCR 20.97 05/10/2016     Creatinine   Date Value Ref Range Status   05/22/2017 0.78 0.66 - 1.25 mg/dL Final   ]  GFR Estimate   Date Value Ref Range Status   05/22/2017 >90  Non  GFR Calc   >60 mL/min/1.7m2 Final   04/11/2017 67 >60 mL/min/1.7m2 Final     Comment:     Non  GFR Calc   04/10/2017 63 >60 mL/min/1.7m2 Final     Comment:     Non  GFR Calc     GFR Estimate If Black   Date Value Ref Range Status   05/22/2017 >90   GFR Calc   >60 mL/min/1.7m2 Final   04/11/2017 81 >60 mL/min/1.7m2 Final     Comment:      GFR Calc   04/10/2017 76 >60 mL/min/1.7m2 Final     Comment:      GFR Calc     Lab Results   Component Value Date    CHOL 132 01/17/2017     Lab Results   Component Value Date    HDL 37 01/17/2017     Lab Results   Component Value Date    LDL 84 01/17/2017     Lab Results   Component Value Date    TRIG 163 04/11/2017     Lab Results   Component Value Date    CHOLHDLRATIO 5.6 08/07/2015     Lab Results   Component Value Date    AST 11 05/22/2017     Lab Results   Component Value Date    ALT 28 05/22/2017     Lab Results   Component Value Date    A1C 8.1 03/17/2017    A1C 11.0 05/10/2016    A1C 10.0 08/07/2015    A1C 10.6 02/16/2015    A1C 10.6 06/03/2014     Potassium   Date Value Ref Range Status   05/22/2017 4.0 3.4 - 5.3 mmol/L Final

## 2017-08-28 NOTE — TELEPHONE ENCOUNTER
Routing refill request to provider for review/approval because:  Alem given x1 and patient did not follow up, please advise  Patient needs to be seen because:  Advised 2 month follow up  Mirella Agrawal RN - Triage  [facility]

## 2017-11-17 DIAGNOSIS — E11.40 TYPE 2 DIABETES MELLITUS WITH DIABETIC NEUROPATHY, WITH LONG-TERM CURRENT USE OF INSULIN (H): Chronic | ICD-10-CM

## 2017-11-17 DIAGNOSIS — Z79.4 TYPE 2 DIABETES MELLITUS WITH DIABETIC NEUROPATHY, WITH LONG-TERM CURRENT USE OF INSULIN (H): Chronic | ICD-10-CM

## 2017-11-20 RX ORDER — INSULIN GLARGINE 100 [IU]/ML
INJECTION, SOLUTION SUBCUTANEOUS
Qty: 15 ML | Refills: 0 | Status: SHIPPED | OUTPATIENT
Start: 2017-11-20 | End: 2017-12-17

## 2017-11-20 NOTE — TELEPHONE ENCOUNTER
Due for 6 month diabetes check.  Angela Fabian,RN  [facility]  [phone]      BP Readings from Last 3 Encounters:   07/10/17 136/68   05/25/17 122/72   05/22/17 138/84     Lab Results   Component Value Date    MICROL 32 05/10/2016     Lab Results   Component Value Date    UMALCR 20.97 05/10/2016     Creatinine   Date Value Ref Range Status   05/22/2017 0.78 0.66 - 1.25 mg/dL Final   ]  GFR Estimate   Date Value Ref Range Status   05/22/2017 >90  Non  GFR Calc   >60 mL/min/1.7m2 Final   04/11/2017 67 >60 mL/min/1.7m2 Final     Comment:     Non  GFR Calc   04/10/2017 63 >60 mL/min/1.7m2 Final     Comment:     Non  GFR Calc     GFR Estimate If Black   Date Value Ref Range Status   05/22/2017 >90   GFR Calc   >60 mL/min/1.7m2 Final   04/11/2017 81 >60 mL/min/1.7m2 Final     Comment:      GFR Calc   04/10/2017 76 >60 mL/min/1.7m2 Final     Comment:      GFR Calc     Lab Results   Component Value Date    CHOL 132 01/17/2017     Lab Results   Component Value Date    HDL 37 01/17/2017     Lab Results   Component Value Date    LDL 84 01/17/2017     Lab Results   Component Value Date    TRIG 163 04/11/2017     Lab Results   Component Value Date    CHOLHDLRATIO 5.6 08/07/2015     Lab Results   Component Value Date    AST 11 05/22/2017     Lab Results   Component Value Date    ALT 28 05/22/2017     Lab Results   Component Value Date    A1C 8.1 03/17/2017    A1C 11.0 05/10/2016    A1C 10.0 08/07/2015    A1C 10.6 02/16/2015    A1C 10.6 06/03/2014     Potassium   Date Value Ref Range Status   05/22/2017 4.0 3.4 - 5.3 mmol/L Final

## 2017-12-17 DIAGNOSIS — E11.40 TYPE 2 DIABETES MELLITUS WITH DIABETIC NEUROPATHY, WITH LONG-TERM CURRENT USE OF INSULIN (H): Chronic | ICD-10-CM

## 2017-12-17 DIAGNOSIS — Z79.4 TYPE 2 DIABETES MELLITUS WITH DIABETIC NEUROPATHY, WITH LONG-TERM CURRENT USE OF INSULIN (H): Chronic | ICD-10-CM

## 2017-12-19 RX ORDER — INSULIN GLARGINE 100 [IU]/ML
INJECTION, SOLUTION SUBCUTANEOUS
Qty: 15 ML | Refills: 0 | Status: SHIPPED | OUTPATIENT
Start: 2017-12-19 | End: 2018-01-17

## 2017-12-19 NOTE — TELEPHONE ENCOUNTER
Routing refill request to provider for review/approval because:  Labs not current:  And out of range    Angela Fabian RN  [facility]  [phone]

## 2017-12-19 NOTE — TELEPHONE ENCOUNTER
Last Written Prescription Date:  11/20/17  Last Fill Quantity: 15,  # refills: 0   [facility] Visit [facility] or [facility] prescribing provider:  5/25/17   Future Office Visit:     Requested Prescriptions   Pending Prescriptions Disp Refills     LANTUS SOLOSTAR 100 UNIT/ML soln [Pharmacy Med Name: LANTUS SOLOSTAR PEN INJ 5 X 3ML] 15 mL 0     Sig: INJECT 23 UNITS SUBCUTANEOUSLY TWICE DAILY    Long Acting Insulin Protocol Failed    12/17/2017  3:21 PM       Failed - Microalbumin on file in past 12 months    Recent Labs   Lab Test  05/10/16   0904   MICROL  32   UMALCR  20.97*            Failed - HgbA1C in past 3 or 6 months    Recent Labs   Lab Test  03/17/17   0635   A1C  8.1*            Failed - Recent visit with authorizing provider's specialty in past 6 months    Patient had office visit in the last 6 months or has a visit in the next 30 days with authorizing provider.  See chart review.            Passed - Blood pressure less than 140/90 in past 6 months    BP Readings from Last 3 Encounters:   07/10/17 136/68   05/25/17 122/72   05/22/17 138/84                Passed - LDL on file in past 12 months    Recent Labs   Lab Test  01/17/17   0838   LDL  84            Passed - Serum creatinine on file in past 12 months    Recent Labs   Lab Test  05/22/17   1355   CR  0.78            Passed - Patient is age 18 or older

## 2018-01-16 ENCOUNTER — OFFICE VISIT (OUTPATIENT)
Dept: FAMILY MEDICINE | Facility: CLINIC | Age: 58
End: 2018-01-16
Payer: COMMERCIAL

## 2018-01-16 VITALS
HEART RATE: 84 BPM | TEMPERATURE: 97.9 F | RESPIRATION RATE: 14 BRPM | WEIGHT: 159 LBS | BODY MASS INDEX: 22.76 KG/M2 | SYSTOLIC BLOOD PRESSURE: 135 MMHG | HEIGHT: 70 IN | OXYGEN SATURATION: 100 % | DIASTOLIC BLOOD PRESSURE: 78 MMHG

## 2018-01-16 DIAGNOSIS — E13.49 OTHER DIABETIC NEUROLOGICAL COMPLICATION ASSOCIATED WITH OTHER SPECIFIED DIABETES MELLITUS (H): ICD-10-CM

## 2018-01-16 DIAGNOSIS — Z79.4 TYPE 2 DIABETES MELLITUS WITH DIABETIC NEUROPATHY, WITH LONG-TERM CURRENT USE OF INSULIN (H): Chronic | ICD-10-CM

## 2018-01-16 DIAGNOSIS — E11.40 TYPE 2 DIABETES MELLITUS WITH DIABETIC NEUROPATHY, WITH LONG-TERM CURRENT USE OF INSULIN (H): Chronic | ICD-10-CM

## 2018-01-16 DIAGNOSIS — Z23 NEED FOR PROPHYLACTIC VACCINATION AND INOCULATION AGAINST INFLUENZA: ICD-10-CM

## 2018-01-16 DIAGNOSIS — E11.42 DIABETIC POLYNEUROPATHY ASSOCIATED WITH TYPE 2 DIABETES MELLITUS (H): ICD-10-CM

## 2018-01-16 DIAGNOSIS — Z23 NEED FOR PROPHYLACTIC VACCINATION WITH TETANUS-DIPHTHERIA (TD): ICD-10-CM

## 2018-01-16 DIAGNOSIS — G43.809 OTHER MIGRAINE WITHOUT STATUS MIGRAINOSUS, NOT INTRACTABLE: Primary | ICD-10-CM

## 2018-01-16 DIAGNOSIS — Z12.11 SCREEN FOR COLON CANCER: ICD-10-CM

## 2018-01-16 DIAGNOSIS — Z13.89 SCREENING FOR DIABETIC PERIPHERAL NEUROPATHY: ICD-10-CM

## 2018-01-16 PROCEDURE — 99214 OFFICE O/P EST MOD 30 MIN: CPT | Performed by: FAMILY MEDICINE

## 2018-01-16 PROCEDURE — 99207 C FOOT EXAM  NO CHARGE: CPT | Performed by: FAMILY MEDICINE

## 2018-01-16 RX ORDER — SUMATRIPTAN 25 MG/1
25-50 TABLET, FILM COATED ORAL
Qty: 9 TABLET | Refills: 3 | Status: ON HOLD | OUTPATIENT
Start: 2018-01-16 | End: 2020-07-17

## 2018-01-16 RX ORDER — GABAPENTIN 300 MG/1
CAPSULE ORAL
Qty: 90 CAPSULE | Refills: 0 | Status: SHIPPED | OUTPATIENT
Start: 2018-01-16 | End: 2018-04-01

## 2018-01-16 NOTE — MR AVS SNAPSHOT
After Visit Summary   1/16/2018    Wyatt LAWSON Keyshawn    MRN: [id number]           Patient Information     Date Of Birth          1960        Visit Information        Provider Department      1/16/2018 1:00 PM Shaun Bedolla MD [facility]        Today's Diagnoses     Other migraine without status migrainosus, not intractable    -  1    Screen for colon cancer        Screening for diabetic peripheral neuropathy        Need for prophylactic vaccination and inoculation against influenza        Need for prophylactic vaccination with tetanus-diphtheria (TD)        Diabetic polyneuropathy associated with type 2 diabetes mellitus (H)        Other diabetic neurological complication associated with other specified diabetes mellitus (H)           Follow-ups after your visit        Follow-up notes from your care team     Return in about 1 month (around 2/16/2018).      Your next 10 appointments already scheduled     Jan 17, 2018  8:15 AM CST   LAB with EC LAB   [facility] ([facility])    [address] [place] [id number] [phone]           OUTSIDE LABS: Please include name of facility and Physician that is requesting outside labs be drawn.  Please indicate if labs are fasting or non-fasting on appt notes.  Be as specific as you can on which labs are being drawn.              Future tests that were ordered for you today     Open Future Orders        Priority Expected Expires Ordered    Fecal colorectal cancer screen (FIT) Routine 2/6/2018 4/10/2018 1/16/2018    Hemoglobin A1c Routine  1/16/2019 1/16/2018    Basic metabolic panel  (Ca, Cl, CO2, Creat, Gluc, K, Na, BUN) Routine  1/16/2019 1/16/2018    ALT Routine  1/16/2019 1/16/2018    Albumin Random Urine Quantitative with Creat Ratio Routine  1/16/2019 1/16/2018    Lipid panel reflex to direct LDL Fasting Routine  1/16/2019 1/16/2018            Who to contact     If you have  "questions or need follow up information about today's clinic visit or your schedule please contact [facility] [place] directly at [phone].  Normal or non-critical lab and imaging results will be communicated to you by MyChart, letter or phone within 4 business days after the clinic has received the results. If you do not hear from us within 7 days, please contact the clinic through [email or web address] or phone. If you have a critical or abnormal lab result, we will notify you by phone as soon as possible.  Submit refill requests through [employer] or call your pharmacy and they will forward the refill request to us. Please allow 3 business days for your refill to be completed.          Additional Information About Your Visit        [email or web address] [employer] [place] Information     [employer] gives you secure access to your electronic health record. If you see a primary care provider, you can also send messages to your care team and make appointments. If you have questions, please call your primary care clinic.  If you do not have a primary care provider, please call [phone] and they will assist you.        Care EveryWhere ID     This is your Care EveryWhere ID. This could be used by other organizations to access your [place] medical records  [id number]        Your Vitals Were     Pulse Temperature Respirations Height Pulse Oximetry BMI (Body Mass Index)    84 97.9  F (36.6  C) (Tympanic) 14 5' 10\" (1.778 m) 100% 22.81 kg/m2       Blood Pressure from Last 3 Encounters:   01/16/18 135/78   07/10/17 136/68   05/25/17 122/72    Weight from Last 3 Encounters:   01/16/18 159 lb (72.1 kg)   07/10/17 150 lb (68 kg)   05/25/17 142 lb (64.4 kg)              We Performed the Following     FOOT EXAM  NO CHARGE [80983.114]          Today's Medication Changes          These changes are accurate as of: 1/16/18  2:14 PM.  If you have any questions, ask your nurse or doctor.               Start taking these medicines.        Dose/Directions    " gabapentin 300 MG capsule   Commonly known as:  NEURONTIN   Used for:  Other diabetic neurological complication associated with other specified diabetes mellitus (H)   Started by:  Shaun Bedolla MD        Take 1 tablet (300 mg) every night for 1-3 days, then 1 tablet twice daily for 1-3 days, then 1 tablet three times daily   Quantity:  90 capsule   Refills:  0       SUMAtriptan 25 MG tablet   Commonly known as:  IMITREX   Used for:  Other migraine without status migrainosus, not intractable   Started by:  Shaun Bedolla MD        Dose:  25-50 mg   Take 1-2 tablets (25-50 mg) by mouth at onset of headache for migraine May repeat in 2 hours. Max 8 tablets/24 hours.   Quantity:  9 tablet   Refills:  3            Where to get your medicines      These medications were sent to [id number] - [place], [place] - [address] AT [facility] [address]  [address] [facility] [address]     Phone:  [phone]     blood glucose monitoring test strip    gabapentin 300 MG capsule    SUMAtriptan 25 MG tablet                Primary Care Provider Office Phone # Fax #    Shaun Bedolla [phone]       [address] [facility] [address]        Equal Access to Services     JAKOB PEREZ AH: Hadii aad ku hadasho Soomaali, waaxda luqadaha, qaybta kaalmada adeegyada, waxay idiin hayaan connie overton laalyssa rivas. So [facility] [phone].    ATENCIÓN: Si habla español, tiene a champion disposición servicios gratuitos de asistencia lingüística. ame al [phone].    We comply with applicable federal civil rights laws and [place] laws. We do not discriminate on the basis of race, color, national origin, age, disability, sex, sexual orientation, or gender identity.            Thank you!     [facility] you for [facility]  for your care. Our goal is always to provide you with excellent care. Hearing back from our patients is one way we can continue to improve our  services. Please take a few minutes to complete the written survey that you may receive in the mail after your visit with us. Thank you!             Your Updated Medication List - Protect others around you: Learn how to safely use, store and throw away your medicines at www.disposemymeds.org.          This list is accurate as of: 1/16/18  2:14 PM.  Always use your most recent med list.                   Brand Name Dispense Instructions for use Diagnosis    acyclovir 400 MG tablet    ZOVIRAX    30 tablet    Take 1 tablet (400 mg) by mouth 3 times daily    Herpes zoster without complication       aspirin 81 MG tablet     100    1 tab per day    Type II or unspecified type diabetes mellitus without mention of complication, not stated as uncontrolled       atorvastatin 40 MG tablet    LIPITOR    90 tablet    Take 1 tablet (40 mg) by mouth daily    Hyperlipidemia LDL goal <100       blood glucose monitoring lancets     3 Box    Use as direct    Type 2 diabetes, HbA1C goal < 8% (H)       blood glucose monitoring meter device kit    no brand specified    1 kit    1 Device See Admin Instructions. per patient health plan    Type II or unspecified type diabetes mellitus without mention of complication, not stated as uncontrolled       blood glucose monitoring test strip    ONETOUCH ULTRA    3 Box    Use QID or as directed    Diabetic polyneuropathy associated with type 2 diabetes mellitus (H)       gabapentin 300 MG capsule    NEURONTIN    90 capsule    Take 1 tablet (300 mg) every night for 1-3 days, then 1 tablet twice daily for 1-3 days, then 1 tablet three times daily    Other diabetic neurological complication associated with other specified diabetes mellitus (H)       insulin pen needle 31G X 8 MM    B-D U/F    100 each    USE ONCE DAILY WITH LANTUS SOLOSTAR PEN    Lumbar radiculopathy, Diabetic polyneuropathy associated with type 2 diabetes mellitus (H)       LANTUS SOLOSTAR 100 UNIT/ML injection   Generic drug:   insulin glargine     15 mL    INJECT 23 UNITS SUBCUTANEOUSLY TWICE DAILY    Type 2 diabetes mellitus with diabetic neuropathy, with long-term current use of insulin (H)       SUMAtriptan 25 MG tablet    IMITREX    9 tablet    Take 1-2 tablets (25-50 mg) by mouth at onset of headache for migraine May repeat in 2 hours. Max 8 tablets/24 hours.    Other migraine without status migrainosus, not intractable

## 2018-01-16 NOTE — PROGRESS NOTES
SUBJECTIVE:   Wyatt Mahajan is a 57 year old male who presents to clinic today for the following health issues:      Headache       Duration: 10 days     Description (location/character/radiation): headache on left side after getting new glasses     Intensity:  mild    Accompanying signs and symptoms: blurry vision    History (similar episodes/previous evaluation): None    Precipitating or alleviating factors: None    Therapies tried and outcome: Aspirin, Advil        Problem list and histories reviewed & adjusted, as indicated.  Additional history: as documented    Patient Active Problem List   Diagnosis     External hemorrhoids     Cranial nerve III palsy     Hyperlipidemia LDL goal <100     Type 2 diabetes mellitus with diabetic neuropathy (HCC)     Low back pain     Lumbar radiculopathy     PVD (peripheral vascular disease) with claudication (H)     Ischemic vascular disease     Acute respiratory failure with hypoxia (H)     MSSA (methicillin susceptible Staphylococcus aureus) septicemia (H)     MSSA (methicillin susceptible Staphylococcus aureus) pneumonia (H)     Fever     Leukocytosis     Influenza B     Lumbago     Physical deconditioning     Past Surgical History:   Procedure Laterality Date     COLONOSCOPY  10/27/16     COLONOSCOPY N/A 10/31/2016    Procedure: COLONOSCOPY;  Surgeon: Pollo Lopez MD;  Location:  GI      UGI ENDOSCOPY W PLACEMENT GASTROSTOMY TUBE PERCUT N/A 4/4/2017    Procedure: COMBINED ESOPHAGOSCOPY, GASTROSCOPY, DUODENOSCOPY (EGD), PLACE PERCUTANEOUS ENDOSCOPIC GASTROSTOMY TUBE;  Surgeon: Marcial Obregon MD;  Location: [facility]     NO HISTORY OF SURGERY       TRACHEOSTOMY N/A 4/4/2017    Procedure: TRACHEOSTOMY;  Surgeon: Jose Puga MD;  Location:  OR       Social History   Substance Use Topics     Smoking status: Former Smoker     Packs/day: 0.50     Years: 15.00     Types: Cigarettes     Smokeless tobacco: Never Used      Comment: quit 3/2017      Alcohol use No     Family History   Problem Relation Age of Onset     DIABETES Mother      DIABETES Father      DIABETES Sister      DIABETES Brother      Hypertension No family hx of      CEREBROVASCULAR DISEASE No family hx of      Prostate Cancer No family hx of      Cancer - colorectal No family hx of      Breast Cancer No family hx of          Current Outpatient Prescriptions   Medication Sig Dispense Refill     blood glucose monitoring (ONETOUCH ULTRA) test strip Use QID or as directed 3 Box 1     SUMAtriptan (IMITREX) 25 MG tablet Take 1-2 tablets (25-50 mg) by mouth at onset of headache for migraine May repeat in 2 hours. Max 8 tablets/24 hours. 9 tablet 3     gabapentin (NEURONTIN) 300 MG capsule Take 1 tablet (300 mg) every night for 1-3 days, then 1 tablet twice daily for 1-3 days, then 1 tablet three times daily 90 capsule 0     LANTUS SOLOSTAR 100 UNIT/ML soln INJECT 23 UNITS SUBCUTANEOUSLY TWICE DAILY 15 mL 0     atorvastatin (LIPITOR) 40 MG tablet Take 1 tablet (40 mg) by mouth daily 90 tablet 3     insulin pen needle (B-D U/F) 31G X 8 MM USE ONCE DAILY WITH LANTUS SOLOSTAR  each 1     blood glucose monitoring (ONE TOUCH ULTRASOFT) lancets Use as direct 3 Box 1     Blood Glucose Monitoring Suppl (BLOOD GLUCOSE METER) KIT 1 Device See Admin Instructions. per patient health plan 1 kit 0     ASPIRIN 81 MG OR TABS 1 tab per day 100 3     acyclovir (ZOVIRAX) 400 MG tablet Take 1 tablet (400 mg) by mouth 3 times daily (Patient not taking: Reported on 1/16/2018) 30 tablet 0     Allergies   Allergen Reactions     No Known Drug Allergies      Recent Labs   Lab Test  05/22/17   1355  04/11/17   0430  04/10/17   0405  04/09/17   0530   03/24/17   0430   03/17/17   0635   01/17/17   0838  12/09/16   1018   05/10/16   0859  08/07/15   1028   10/08/13   1455   07/22/11   1320   A1C   --    --    --    --    --    --    --   8.1*   --    --    --    --   11.0*  10.0*   < >  9.4*   < >  7.1*   LDL   --    --    " --    --    --    --    --    --    --   84  101*   --   157*  151*   < >  165*   < >  114   HDL   --    --    --    --    --    --    --    --    --   37*  36*   --   42  37*   < >   --    < >  38*   TRIG   --   163*   --    --    --   677*   --    --    --   55  116   --   79  98   < >   --    < >  56   ALT  28   --   24  24   < >  119*   --   35   < >  7  9   --   23  26   < >   --    < >  25   CR  0.78  1.13  1.20  1.37*   < >  1.77*   < >   --    < >   --    --    < >  0.69  0.71   < >   --    < >  0.83   GFRESTIMATED  >90  Non  GFR Calc    67  63  54*   < >  40*   < >   --    < >   --    --    < >  >90  Non  GFR Calc    >90  Non  GFR Calc     < >   --    < >  >90   GFRESTBLACK  >90   GFR Calc    81  76  65   < >  49*   < >   --    < >   --    --    < >  >90   GFR Calc    >90   GFR Calc     < >   --    < >  >90   POTASSIUM  4.0  5.0  4.1  3.7   < >  3.6   < >   --    < >   --    --    < >  3.7  4.0   < >   --    < >  4.4   TSH   --    --    --    --    --    --    --    --    --    --    --    --    --    --    --   0.64   --   0.59    < > = values in this interval not displayed.      BP Readings from Last 3 Encounters:   01/16/18 135/78   07/10/17 136/68   05/25/17 122/72    Wt Readings from Last 3 Encounters:   01/16/18 159 lb (72.1 kg)   07/10/17 150 lb (68 kg)   05/25/17 142 lb (64.4 kg)          Reviewed and updated as needed this visit by clinical staff     Reviewed and updated as needed this visit by Provider         ROS:  Constitutional, HEENT, cardiovascular, pulmonary, gi and gu systems are negative, except as otherwise noted.      OBJECTIVE:   /78 (Cuff Size: Adult Regular)  Pulse 84  Temp 97.9  F (36.6  C) (Tympanic)  Resp 14  Ht 5' 10\" (1.778 m)  Wt 159 lb (72.1 kg)  SpO2 100%  BMI 22.81 kg/m2  Body mass index is 22.81 kg/(m^2).  GENERAL: healthy, alert and no distress  NECK: no adenopathy, " no asymmetry, masses, or scars and thyroid normal to palpation  RESP: lungs clear to auscultation - no rales, rhonchi or wheezes  CV: regular rate and rhythm, normal S1 S2, no S3 or S4, no murmur, click or rub, no peripheral edema and peripheral pulses strong  ABDOMEN: soft, nontender, no hepatosplenomegaly, no masses and bowel sounds normal  MS: no gross musculoskeletal defects noted, no edema        ASSESSMENT/PLAN:   ASSESSMENT / PLAN:  (G43.809) Other migraine without status migrainosus, not intractable  (primary encounter diagnosis)  Comment: HA with blurry vision left side, has sx of aura, has no other focal neurologic sign, aggravated after having new eye glasses, encouraged him to see eye clinic for f/u   Will have him to try imitrex  Plan: SUMAtriptan (IMITREX) 25 MG tablet        RTC in 1 month     (Z12.11) Screen for colon cancer  Plan: Fecal colorectal cancer screen (FIT)            (Z13.89) Screening for diabetic peripheral neuropathy  Plan: FOOT EXAM  NO CHARGE [54529.114]            (E11.42) Diabetic polyneuropathy associated with type 2 diabetes mellitus (H)  Comment: has been fluctuating, not focusing on life style modification, encouraged him to keep working on life style modification   Will review the lab and update pt   Plan: blood glucose monitoring (ONETOUCH ULTRA) test         strip, Hemoglobin A1c, Basic metabolic panel          (Ca, Cl, CO2, Creat, Gluc, K, Na, BUN), ALT,         Albumin Random Urine Quantitative with Creat         Ratio, Lipid panel reflex to direct LDL Fasting            (E13.49) Other diabetic neurological complication associated with other specified diabetes mellitus (H)  Comment: mentioned above   Plan: gabapentin (NEURONTIN) 300 MG capsule,         Hemoglobin A1c, Basic metabolic panel  (Ca, Cl,        CO2, Creat, Gluc, K, Na, BUN), ALT, Albumin         Random Urine Quantitative with Creat Ratio,         Lipid panel reflex to direct LDL Fasting                FUTURE  APPOINTMENTS:       - Follow-up visit in 1 months for DM and TINOCO    Shaun Bedolla MD  [facility]

## 2018-01-16 NOTE — NURSING NOTE
"Chief Complaint   Patient presents with     Headache       Initial /78 (Cuff Size: Adult Regular)  Pulse 84  Temp 97.9  F (36.6  C) (Tympanic)  Resp 14  Ht 5' 10\" (1.778 m)  Wt 159 lb (72.1 kg)  SpO2 100%  BMI 22.81 kg/m2 Estimated body mass index is 22.81 kg/(m^2) as calculated from the following:    Height as of this encounter: 5' 10\" (1.778 m).    Weight as of this encounter: 159 lb (72.1 kg).  Medication Reconciliation: complete   Sruthi Diaz, CMA  "

## 2018-01-17 DIAGNOSIS — E11.42 DIABETIC POLYNEUROPATHY ASSOCIATED WITH TYPE 2 DIABETES MELLITUS (H): ICD-10-CM

## 2018-01-17 DIAGNOSIS — E13.49 OTHER DIABETIC NEUROLOGICAL COMPLICATION ASSOCIATED WITH OTHER SPECIFIED DIABETES MELLITUS (H): ICD-10-CM

## 2018-01-17 LAB
ALT SERPL W P-5'-P-CCNC: 25 U/L (ref 0–70)
ANION GAP SERPL CALCULATED.3IONS-SCNC: 8 MMOL/L (ref 3–14)
BUN SERPL-MCNC: 13 MG/DL (ref 7–30)
CALCIUM SERPL-MCNC: 8.8 MG/DL (ref 8.5–10.1)
CHLORIDE SERPL-SCNC: 105 MMOL/L (ref 94–109)
CHOLEST SERPL-MCNC: 171 MG/DL
CO2 SERPL-SCNC: 25 MMOL/L (ref 20–32)
CREAT SERPL-MCNC: 0.77 MG/DL (ref 0.66–1.25)
CREAT UR-MCNC: 207 MG/DL
GFR SERPL CREATININE-BSD FRML MDRD: >90 ML/MIN/1.7M2
GLUCOSE SERPL-MCNC: 186 MG/DL (ref 70–99)
HBA1C MFR BLD: 10.6 % (ref 4.3–6)
HDLC SERPL-MCNC: 49 MG/DL
LDLC SERPL CALC-MCNC: 103 MG/DL
MICROALBUMIN UR-MCNC: 91 MG/L
MICROALBUMIN/CREAT UR: 44.11 MG/G CR (ref 0–17)
NONHDLC SERPL-MCNC: 122 MG/DL
POTASSIUM SERPL-SCNC: 4.2 MMOL/L (ref 3.4–5.3)
SODIUM SERPL-SCNC: 138 MMOL/L (ref 133–144)
TRIGL SERPL-MCNC: 96 MG/DL

## 2018-01-17 PROCEDURE — 83036 HEMOGLOBIN GLYCOSYLATED A1C: CPT | Performed by: FAMILY MEDICINE

## 2018-01-17 PROCEDURE — 80048 BASIC METABOLIC PNL TOTAL CA: CPT | Performed by: FAMILY MEDICINE

## 2018-01-17 PROCEDURE — 80061 LIPID PANEL: CPT | Performed by: FAMILY MEDICINE

## 2018-01-17 PROCEDURE — 36415 COLL VENOUS BLD VENIPUNCTURE: CPT | Performed by: FAMILY MEDICINE

## 2018-01-17 PROCEDURE — 82043 UR ALBUMIN QUANTITATIVE: CPT | Performed by: FAMILY MEDICINE

## 2018-01-17 PROCEDURE — 84460 ALANINE AMINO (ALT) (SGPT): CPT | Performed by: FAMILY MEDICINE

## 2018-02-18 DIAGNOSIS — Z79.4 TYPE 2 DIABETES MELLITUS WITH DIABETIC NEUROPATHY, WITH LONG-TERM CURRENT USE OF INSULIN (H): Chronic | ICD-10-CM

## 2018-02-18 DIAGNOSIS — E11.40 TYPE 2 DIABETES MELLITUS WITH DIABETIC NEUROPATHY, WITH LONG-TERM CURRENT USE OF INSULIN (H): Chronic | ICD-10-CM

## 2018-02-19 RX ORDER — INSULIN GLARGINE 100 [IU]/ML
INJECTION, SOLUTION SUBCUTANEOUS
Qty: 15 ML | Refills: 3 | Status: SHIPPED | OUTPATIENT
Start: 2018-02-19 | End: 2018-04-10

## 2018-02-19 NOTE — TELEPHONE ENCOUNTER
"Routing refill request to provider for review/approval because:  Labs out of range:  a1c for refill protocol  Angela Fabian,RN  [facility]  [phone]          Last Written Prescription Date:  01/17/18  Last Fill Quantity: 15 ml,  # refills: 0   Last office visit: 1/16/2018 with prescribing provider:  wesley   Future Office Visit:      Requested Prescriptions   Pending Prescriptions Disp Refills     LANTUS SOLOSTAR 100 UNIT/ML soln [Pharmacy Med Name: LANTUS SOLOSTAR PEN INJ 5 X 3ML] 15 mL 0     Sig: INJECT 23 UNITS SUBCUTANEOUSLY TWICE DAILY    Long Acting Insulin Protocol Passed    2/18/2018  9:13 AM       Passed - Blood pressure less than 140/90 in past 6 months    BP Readings from Last 3 Encounters:   01/16/18 135/78   07/10/17 136/68   05/25/17 122/72                Passed - LDL on file in past 12 months    Recent Labs   Lab Test  01/17/18   0829   LDL  103*            Passed - Microalbumin on file in past 12 months    Recent Labs   Lab Test  01/17/18   1001   MICROL  91   UMALCR  44.11*            Passed - Serum creatinine on file in past 12 months    Recent Labs   Lab Test  01/17/18   0829   CR  0.77            Passed - HgbA1C in past 3 or 6 months    Recent Labs   Lab Test  01/17/18   0829   A1C  10.6*            Passed - Patient is age 18 or older       Passed - Recent visit with authorizing provider's specialty in past 6 months    Patient had office visit in the last 6 months or has a visit in the next 30 days with authorizing provider.  See \"Patient Info\" tab in inbasket, or \"Choose Columns\" in Meds & Orders section of the refill encounter.              "

## 2018-04-01 DIAGNOSIS — E13.49 OTHER DIABETIC NEUROLOGICAL COMPLICATION ASSOCIATED WITH OTHER SPECIFIED DIABETES MELLITUS (H): ICD-10-CM

## 2018-04-02 RX ORDER — GABAPENTIN 300 MG/1
CAPSULE ORAL
Qty: 90 CAPSULE | Refills: 0 | Status: SHIPPED | OUTPATIENT
Start: 2018-04-02 | End: 2018-04-30

## 2018-04-02 NOTE — TELEPHONE ENCOUNTER
gabapentin      Last Written Prescription Date:  1/16/18  Last Fill Quantity: 90,   # refills: 0  Last Office Visit: 1/16/18  Future Office visit:       Routing refill request to provider for review/approval because:  Drug not on the G, P or [facility] refill protocol or controlled substance    Brittany Martinez RN   [facility] - Triage

## 2018-04-10 ENCOUNTER — TELEPHONE (OUTPATIENT)
Dept: FAMILY MEDICINE | Facility: CLINIC | Age: 58
End: 2018-04-10

## 2018-04-10 DIAGNOSIS — E11.40 TYPE 2 DIABETES MELLITUS WITH DIABETIC NEUROPATHY, WITH LONG-TERM CURRENT USE OF INSULIN (H): Primary | Chronic | ICD-10-CM

## 2018-04-10 DIAGNOSIS — Z79.4 TYPE 2 DIABETES MELLITUS WITH DIABETIC NEUROPATHY, WITH LONG-TERM CURRENT USE OF INSULIN (H): Primary | Chronic | ICD-10-CM

## 2018-04-10 RX ORDER — INSULIN GLARGINE 100 [IU]/ML
23 INJECTION, SOLUTION SUBCUTANEOUS 2 TIMES DAILY
Qty: 30 ML | Refills: 1 | Status: SHIPPED | OUTPATIENT
Start: 2018-04-10 | End: 2018-06-12

## 2018-04-10 NOTE — TELEPHONE ENCOUNTER
Received message from pharmacy. Plan does not cover Lantus Solostar. Please change medication to Basaglar Kwikpen U-100 or we can initiate a PA for Lantus. Sruthi Diaz, CMA

## 2018-04-30 DIAGNOSIS — E13.49 OTHER DIABETIC NEUROLOGICAL COMPLICATION ASSOCIATED WITH OTHER SPECIFIED DIABETES MELLITUS (H): ICD-10-CM

## 2018-04-30 NOTE — TELEPHONE ENCOUNTER
gabapentin (NEURONTIN) 300 MG capsule 90 capsule 0 4/2/2018  No   Sig: SEE NOTES   Class: E-Prescribe   Notes to Pharmacy: TAKE 1 CAPSULE BY MOUTH EVERY NIGHT X 1 TO 3 DAYS, THEN 1 CAPSULE BY MOUTH TWICE DAILY X 1 TO 3 DAYS, THEN 1 CAPSULE BY MOUTH THREE TIMES DAILY     Last Office Visit: 1/16/2018  Future Office visit:       Routing refill request to provider for review/approval because:  Drug not on the [facility], [facility] or  Health refill protocol or controlled substance    Emelia Sepulveda RN, BSN  [facility]

## 2018-05-01 RX ORDER — GABAPENTIN 300 MG/1
CAPSULE ORAL
Qty: 90 CAPSULE | Refills: 0 | Status: ON HOLD | OUTPATIENT
Start: 2018-05-01 | End: 2020-07-17

## 2018-06-12 ENCOUNTER — TELEPHONE (OUTPATIENT)
Dept: FAMILY MEDICINE | Facility: CLINIC | Age: 58
End: 2018-06-12

## 2018-06-12 DIAGNOSIS — E11.40 TYPE 2 DIABETES MELLITUS WITH DIABETIC NEUROPATHY, WITH LONG-TERM CURRENT USE OF INSULIN (H): Chronic | ICD-10-CM

## 2018-06-12 DIAGNOSIS — Z79.4 TYPE 2 DIABETES MELLITUS WITH DIABETIC NEUROPATHY, WITH LONG-TERM CURRENT USE OF INSULIN (H): Chronic | ICD-10-CM

## 2018-06-12 RX ORDER — INSULIN GLARGINE 100 [IU]/ML
23 INJECTION, SOLUTION SUBCUTANEOUS 2 TIMES DAILY
Qty: 30 ML | Refills: 1 | Status: SHIPPED | OUTPATIENT
Start: 2018-06-12 | End: 2018-12-14

## 2018-06-12 NOTE — TELEPHONE ENCOUNTER
Lantus Solostar Pen INj is not covered. The preferred alternative is Basaglar Kwikpen. [facility] advise. Sruthi Diaz, CMA

## 2018-08-01 ENCOUNTER — TRANSFERRED RECORDS (OUTPATIENT)
Dept: HEALTH INFORMATION MANAGEMENT | Facility: CLINIC | Age: 58
End: 2018-08-01

## 2018-08-10 DIAGNOSIS — E11.42 DIABETIC POLYNEUROPATHY ASSOCIATED WITH TYPE 2 DIABETES MELLITUS (H): ICD-10-CM

## 2018-08-10 DIAGNOSIS — M54.16 LUMBAR RADICULOPATHY: ICD-10-CM

## 2018-08-13 RX ORDER — PEN NEEDLE, DIABETIC 31 GX5/16"
NEEDLE, DISPOSABLE MISCELLANEOUS
Qty: 100 EACH | Refills: 0 | Status: SHIPPED | OUTPATIENT
Start: 2018-08-13 | End: 2020-04-02

## 2018-08-13 NOTE — TELEPHONE ENCOUNTER
Prescription approved per [facility], [facility] or [facility] refill protocol.  Mirella Agrawal RN - Triage  [facility]

## 2018-08-13 NOTE — TELEPHONE ENCOUNTER
"Requested Prescriptions   Pending Prescriptions Disp Refills     B-D U/F 31G X 8 MM insulin pen needle [Pharmacy Med Name: B-D PEN NDL SHRT [id number](5/16) SHANIQUE]  Last Written Prescription Date:  6-  Last Fill Quantity: 100 each,  # refills: 1   Last office visit: 1/16/2018 with prescribing provider:     Future Office Visit:   Next 5 appointments (look out 90 days)     Aug 14, 2018 11:20 AM CDT   Office Visit with Alvin Eagle MD   [facility] ([address] [address]   [phone]                  100 each 0     Sig: USE ONCE DAILY WITH LANTUS SOLOSTAR PEN    Diabetic Supplies Protocol Passed    8/10/2018  9:01 PM       Passed - Patient is 18 years of age or older       Passed - Recent (6 mo) or future (30 days) visit within the authorizing provider's specialty    Patient had office visit in the last 6 months or has a visit in the next 30 days with authorizing provider.  See \"Patient Info\" tab in inbasket, or \"Choose Columns\" in Meds & Orders section of the refill encounter.                    ONETOUCH ULTRA test strip [Pharmacy Med Name: ONE TOUCH ULTRA BLUE TEST ST(NEW)50]  Last Written Prescription Date:  1-16-*2018  Last Fill Quantity: 3 box,  # refills: 1   Last office visit: 1/16/2018 with prescribing provider:     Future Office Visit:   Next 5 appointments (look out 90 days)     Aug 14, 2018 11:20 AM CDT   Office Visit with Alvin Eagle MD   [facility] ([address] [address]   [phone]                  150 strip 0     Sig: USE FOUR TIMES DAILY OR AS DIRECTED    Diabetic Supplies Protocol Passed    8/10/2018  9:01 PM       Passed - Patient is 18 years of age or older       Passed - Recent (6 mo) or future (30 days) visit within the authorizing provider's specialty    Patient had office visit in the last 6 months or has a visit in the next " "30 days with authorizing provider.  See \"Patient Info\" tab in inbasket, or \"Choose Columns\" in Meds & Orders section of the refill encounter.              "

## 2018-08-14 ENCOUNTER — OFFICE VISIT (OUTPATIENT)
Dept: FAMILY MEDICINE | Facility: CLINIC | Age: 58
End: 2018-08-14
Payer: COMMERCIAL

## 2018-08-14 VITALS
WEIGHT: 156 LBS | HEART RATE: 56 BPM | TEMPERATURE: 97.6 F | SYSTOLIC BLOOD PRESSURE: 134 MMHG | DIASTOLIC BLOOD PRESSURE: 70 MMHG | BODY MASS INDEX: 22.38 KG/M2

## 2018-08-14 DIAGNOSIS — H53.2 DOUBLE VISION: ICD-10-CM

## 2018-08-14 DIAGNOSIS — H49.02 WEAKNESS OF LEFT THIRD CRANIAL NERVE: ICD-10-CM

## 2018-08-14 DIAGNOSIS — H53.8 BLURRED VISION: Primary | ICD-10-CM

## 2018-08-14 DIAGNOSIS — Z79.4 TYPE 2 DIABETES MELLITUS WITH DIABETIC NEUROPATHY, WITH LONG-TERM CURRENT USE OF INSULIN (H): Chronic | ICD-10-CM

## 2018-08-14 DIAGNOSIS — E11.40 TYPE 2 DIABETES MELLITUS WITH DIABETIC NEUROPATHY, WITH LONG-TERM CURRENT USE OF INSULIN (H): Chronic | ICD-10-CM

## 2018-08-14 PROBLEM — J10.1 INFLUENZA B: Status: RESOLVED | Noted: 2017-04-01 | Resolved: 2018-08-14

## 2018-08-14 PROBLEM — R50.9 FEVER: Status: RESOLVED | Noted: 2017-04-01 | Resolved: 2018-08-14

## 2018-08-14 PROBLEM — D72.829 LEUKOCYTOSIS: Status: RESOLVED | Noted: 2017-04-01 | Resolved: 2018-08-14

## 2018-08-14 PROBLEM — R53.81 PHYSICAL DECONDITIONING: Status: RESOLVED | Noted: 2017-05-24 | Resolved: 2018-08-14

## 2018-08-14 LAB — HBA1C MFR BLD: 9.7 % (ref 0–5.6)

## 2018-08-14 PROCEDURE — 99214 OFFICE O/P EST MOD 30 MIN: CPT | Performed by: FAMILY MEDICINE

## 2018-08-14 PROCEDURE — 83036 HEMOGLOBIN GLYCOSYLATED A1C: CPT | Performed by: FAMILY MEDICINE

## 2018-08-14 PROCEDURE — 36415 COLL VENOUS BLD VENIPUNCTURE: CPT | Performed by: FAMILY MEDICINE

## 2018-08-14 NOTE — PROGRESS NOTES
SUBJECTIVE:   Wyatt Mahajan is a 58 year old male who presents to clinic today for the following health issues:      Headaches      Duration: 3-4 days     Description  Location: unilateral in the left occipital area   Character: dull pain    Intensity:  moderate    Accompanying signs and symptoms:    Precipitating or Alleviating factors:  Nausea/vomiting: nausea   Dizziness: no  Weakness or numbness: no  Visual change  Fever: no   Sinus or URI symptoms no     History  Head trauma: no   Family history of migraines: no   Previous tests for headaches: no   Neurologist evaluations: no   Able to do daily activities when headache present: YES  Wake with headaches: YES  Daily pain medication use: no   Any changes in: NONE    Therapies tried and outcome: Excedrin  Migraine Outcome - some relief  Frequent/daily pain medication use: no     Complains of some blurry vision and double vision on the left side.  He has history of 3rd nerve palsy in the past which resolved but he complains of some changes in the eye and mild headache.  If you have diabetes taking medication on regular basis currently on basaglar.  His last A1c was greater than 10.  He denies any other symptoms.  He has seen eye doctor yesterday and CT scan was scheduled for tomorrow.      Problem list and histories reviewed & adjusted, as indicated.  Additional history: as documented    Patient Active Problem List   Diagnosis     External hemorrhoids     Cranial nerve III palsy     Hyperlipidemia LDL goal <100     Type 2 diabetes mellitus with diabetic neuropathy (HCC)     Low back pain     Lumbar radiculopathy     PVD (peripheral vascular disease) with claudication (H)     Ischemic vascular disease     Acute respiratory failure with hypoxia (H)     MSSA (methicillin susceptible Staphylococcus aureus) septicemia (H)     MSSA (methicillin susceptible Staphylococcus aureus) pneumonia (H)     Lumbago     Past Surgical History:   Procedure Laterality Date      COLONOSCOPY  10/27/16     COLONOSCOPY N/A 10/31/2016    Procedure: COLONOSCOPY;  Surgeon: Pollo Lopez MD;  Location:  GI     HC UGI ENDOSCOPY W PLACEMENT GASTROSTOMY TUBE PERCUT N/A 4/4/2017    Procedure: COMBINED ESOPHAGOSCOPY, GASTROSCOPY, DUODENOSCOPY (EGD), PLACE PERCUTANEOUS ENDOSCOPIC GASTROSTOMY TUBE;  Surgeon: Marcial Obregon MD;  Location:  GI     NO HISTORY OF SURGERY       TRACHEOSTOMY N/A 4/4/2017    Procedure: TRACHEOSTOMY;  Surgeon: Jose Puga MD;  Location:  OR       Social History   Substance Use Topics     Smoking status: Former Smoker     Packs/day: 0.50     Years: 15.00     Types: Cigarettes     Smokeless tobacco: Never Used      Comment: quit 3/2017     Alcohol use No     Family History   Problem Relation Age of Onset     Diabetes Mother      Diabetes Father      Diabetes Sister      Diabetes Brother      Hypertension No family hx of      Cerebrovascular Disease No family hx of      Prostate Cancer No family hx of      Cancer - colorectal No family hx of      Breast Cancer No family hx of          Current Outpatient Prescriptions   Medication Sig Dispense Refill     ASPIRIN 81 MG OR TABS 1 tab per day 100 3     atorvastatin (LIPITOR) 40 MG tablet Take 1 tablet (40 mg) by mouth daily 90 tablet 3     B-D U/F 31G X 8 MM insulin pen needle USE ONCE DAILY WITH LANTUS SOLOSTAR  each 0     BASAGLAR 100 UNIT/ML injection Inject 23 Units Subcutaneous 2 times daily 30 mL 1     gabapentin (NEURONTIN) 300 MG capsule SEE NOTES 90 capsule 0     acyclovir (ZOVIRAX) 400 MG tablet Take 1 tablet (400 mg) by mouth 3 times daily (Patient not taking: Reported on 1/16/2018) 30 tablet 0     blood glucose monitoring (ONE TOUCH ULTRASOFT) lancets Use as direct 3 Box 1     blood glucose monitoring (ONETOUCH ULTRA) test strip Use QID or as directed 3 Box 1     Blood Glucose Monitoring Suppl (BLOOD GLUCOSE METER) KIT 1 Device See Admin Instructions. per patient health plan 1 kit 0      ONETOUCH ULTRA test strip USE FOUR TIMES DAILY OR AS DIRECTED 150 strip 0     SUMAtriptan (IMITREX) 25 MG tablet Take 1-2 tablets (25-50 mg) by mouth at onset of headache for migraine May repeat in 2 hours. Max 8 tablets/24 hours. (Patient not taking: Reported on 8/14/2018) 9 tablet 3       Reviewed and updated as needed this visit by clinical staff  Allergies  Meds  Problems       Reviewed and updated as needed this visit by Provider  Problems         ROS:  CONSTITUTIONAL: NEGATIVE for fever, chills, change in weight  ENT/MOUTH: NEGATIVE for ear, mouth and throat problems  RESP: NEGATIVE for significant cough or SOB  CV: NEGATIVE for chest pain, palpitations or peripheral edema    OBJECTIVE:                                                    /70  Pulse 56  Temp 97.6  F (36.4  C) (Tympanic)  Wt 156 lb (70.8 kg)  BMI 22.38 kg/m2  Body mass index is 22.38 kg/(m^2).   GENERAL: healthy, alert, well nourished, well hydrated, no distress  HENT: ear canals- normal; TMs- normal; Nose- normal; Mouth- no ulcers, no lesions  NECK: no tenderness, no adenopathy, no asymmetry, no masses, no stiffness; thyroid- normal to palpation  RESP: lungs clear to auscultation - no rales, no rhonchi, no wheezes  CV: regular rates and rhythm, normal S1 S2, no S3 or S4 and no murmur, no click or rub -  Left eyelid is slightly droopy.  Normal pupillary reflex.       ASSESSMENT/PLAN:                                                        ICD-10-CM    1. Blurred vision H53.8    2. Double vision H53.2    3. Type 2 diabetes mellitus with diabetic neuropathy, with long-term current use of insulin (H) E11.40 Hemoglobin A1c    Z79.4    4. Weakness of left third cranial nerve H49.02        Patient is advised to get a CT scan as scheduled by ophthalmologist.  I checked his diabetes which is not well controlled however not worsened since last time.  I would suggest him to increase his basaglar to 25 in the morning 15-20 units at night  and check his blood sugars in the morning.  If there is any change in symptoms including vision changes, he needs to be seen immediately.  Eczema ibuprofen to see if that helps.  Follow-up if any new or change him to otherwise follow-up with ophthalmologist for evaluation after CT scan    Alvin Eagle MD  [facility] [place]

## 2018-08-14 NOTE — MR AVS SNAPSHOT
After Visit Summary   8/14/2018    Wyatt LAWSON Keyshawn    MRN: [id number]           Patient Information     Date Of Birth          1960        Visit Information        Provider Department      8/14/2018 11:20 AM Alvin Eagle MD [facility] [place]        Today's Diagnoses     Blurred vision    -  1    Double vision        Type 2 diabetes mellitus with diabetic neuropathy, with long-term current use of insulin (H)        Weakness of left third cranial nerve           Follow-ups after your visit        Follow-up notes from your care team     Return in about 1 week (around 8/21/2018) for if symptom does not get better.      Your next 10 appointments already scheduled     Aug 15, 2018  4:00 PM CDT   CT HEAD W/O & W CONTRAST with SHCT2   [facility] ([facility])    [address] [place] [id number] [phone]           Please bring any scans or X-rays taken at other hospitals, if similar tests were done. Also bring a list of your medicines, including vitamins, minerals and over-the-counter drugs. It is safest to leave personal items at home.  Be sure to tell your doctor:   If you have any allergies.   If there s any chance you are pregnant.   If you are breastfeeding.    If you have diabetes as your medication may need to be adjusted for this exam.  You will have contrast for this exam. To prepare:   Do not eat or drink for 2 hours before your exam. If you need to take medicine, you may take it with small sips of water. (We may ask you to take liquid medicine as well.)   The day before your exam, drink extra fluids at least six 8-ounce glasses (unless your doctor tells you to restrict your fluids).  Patients over 70 or patients with diabetes or kidney problems:   If you haven t had a blood test (creatinine test) within the last 30 days, the Cardiologist/Radiologist may require you to get this test prior to your exam.  Please wear loose clothing, such  as a sweat suit or jogging clothes. Avoid snaps, zippers and other metal. We may ask you to undress and put on a hospital gown.  If you have any questions, please call the Imaging Department where you will have your exam.            Aug 15, 2018  4:30 PM CDT   CT ORBITAL WO & W CONTRAST with SHCT2   [facility] ([facility])    [address] [place] [id number] [phone]           Please bring any scans or X-rays taken at other hospitals, if similar tests were done. Also bring a list of your medicines, including vitamins, minerals and over-the-counter drugs. It is safest to leave personal items at home.  Be sure to tell your doctor:   If you have any allergies.   If there s any chance you are pregnant.   If you are breastfeeding.    If you have diabetes as your medication may need to be adjusted for this exam.  You will have contrast for this exam. To prepare:   Do not eat or drink for 2 hours before your exam. If you need to take medicine, you may take it with small sips of water. (We may ask you to take liquid medicine as well.)   The day before your exam, drink extra fluids at least six 8-ounce glasses (unless your doctor tells you to restrict your fluids).  Patients over 70 or patients with diabetes or kidney problems:   If you haven t had a blood test (creatinine test) within the last 30 days, the Cardiologist/Radiologist may require you to get this test prior to your exam.  Please wear loose clothing, such as a sweat suit or jogging clothes. Avoid snaps, zippers and other metal. We may ask you to undress and put on a hospital gown.  If you have any questions, please call the Imaging Department where you will have your exam.              Future tests that were ordered for you today     Open Future Orders        Priority Expected Expires Ordered    CT Orbital w Contrast Routine  8/14/2019 8/13/2018    CT Head w/o & w Contrast Routine  8/13/2019 8/13/2018            Who  to contact     If you have questions or need follow up information about today's clinic visit or your schedule please contact [facility] [place] directly at [phone].  Normal or non-critical lab and imaging results will be communicated to you by MyChart, letter or phone within 4 business days after the clinic has received the results. If you do not hear from us within 7 days, please contact the clinic through MyChart or phone. If you have a critical or abnormal lab result, we will notify you by phone as soon as possible.  Submit refill requests through [employer] or call your pharmacy and they will forward the refill request to us. Please allow 3 business days for your refill to be completed.          Additional Information About Your Visit        Let[employer]earYaharPersonal Style Finder Information     [employer] gives you secure access to your electronic health record. If you see a primary care provider, you can also send messages to your care team and make appointments. If you have questions, please call your primary care clinic.  If you do not have a primary care provider, please call [phone] and they will assist you.        Care EveryWhere ID     This is your Care EveryWhere ID. This could be used by other organizations to access your [facility] medical records  [id number]        Your Vitals Were     Pulse Temperature BMI (Body Mass Index)             56 97.6  F (36.4  C) (Tympanic) 22.38 kg/m2          Blood Pressure from Last 3 Encounters:   08/14/18 134/70   01/16/18 135/78   07/10/17 136/68    Weight from Last 3 Encounters:   08/14/18 156 lb (70.8 kg)   01/16/18 159 lb (72.1 kg)   07/10/17 150 lb (68 kg)              We Performed the Following     Hemoglobin A1c        Primary Care Provider Office Phone # Fax #    Shaun Bedolla [phone]       [address] [place] [address]        Equal Access to Services     JAKOB PEREZ AH: Ozzy Aguillon, deni trevino, delia diaz  abhilash mccloudbrittanie caalaan ah. [facility] [phone].    ATENCIÓN: Si violetla delfin, tiene a champion disposición servicios gratuitos de asistencia lingüística. Manuel al [phone].    We comply with applicable federal civil rights laws and [place] laws. We do not discriminate on the basis of race, color, national origin, age, disability, sex, sexual orientation, or gender identity.            Thank you!     Thank you for choosing [facility] [place]  for your care. Our goal is always to provide you with excellent care. Hearing back from our patients is one way we can continue to improve our services. Please take a few minutes to complete the written survey that you may receive in the mail after your visit with us. Thank you!             Your Updated Medication List - Protect others around you: Learn how to safely use, store and throw away your medicines at www.disposemymeds.org.          This list is accurate as of 8/14/18 12:33 PM.  Always use your most recent med list.                   Brand Name Dispense Instructions for use Diagnosis    acyclovir 400 MG tablet    ZOVIRAX    30 tablet    Take 1 tablet (400 mg) by mouth 3 times daily    Herpes zoster without complication       aspirin 81 MG tablet     100    1 tab per day    Type II or unspecified type diabetes mellitus without mention of complication, not stated as uncontrolled       atorvastatin 40 MG tablet    LIPITOR    90 tablet    Take 1 tablet (40 mg) by mouth daily    Hyperlipidemia LDL goal <100       B-D U/F 31G X 8 MM   Generic drug:  insulin pen needle     100 each    USE ONCE DAILY WITH LANTUS SOLOSTAR PEN    Lumbar radiculopathy, Diabetic polyneuropathy associated with type 2 diabetes mellitus (H)       BASAGLAR 100 UNIT/ML injection     30 mL    Inject 23 Units Subcutaneous 2 times daily    Type 2 diabetes mellitus with diabetic neuropathy, with long-term current use of insulin (H)       blood glucose monitoring  lancets     3 Box    Use as direct    Type 2 diabetes, HbA1C goal < 8% (H)       blood glucose monitoring meter device kit    no brand specified    1 kit    1 Device See Admin Instructions. per patient health plan    Type II or unspecified type diabetes mellitus without mention of complication, not stated as uncontrolled       * blood glucose monitoring test strip    ONETOUCH ULTRA    3 Box    Use QID or as directed    Diabetic polyneuropathy associated with type 2 diabetes mellitus (H)       * ONETOUCH ULTRA test strip   Generic drug:  blood glucose monitoring     150 strip    USE FOUR TIMES DAILY OR AS DIRECTED    Diabetic polyneuropathy associated with type 2 diabetes mellitus (H)       gabapentin 300 MG capsule    NEURONTIN    90 capsule    SEE NOTES    Other diabetic neurological complication associated with other specified diabetes mellitus (H)       SUMAtriptan 25 MG tablet    IMITREX    9 tablet    Take 1-2 tablets (25-50 mg) by mouth at onset of headache for migraine May repeat in 2 hours. Max 8 tablets/24 hours.    Other migraine without status migrainosus, not intractable       * Notice:  This list has 2 medication(s) that are the same as other medications prescribed for you. Read the directions carefully, and ask your doctor or other care provider to review them with you.

## 2018-08-15 ENCOUNTER — HOSPITAL ENCOUNTER (OUTPATIENT)
Dept: CT IMAGING | Facility: CLINIC | Age: 58
Discharge: HOME | End: 2018-08-15
Attending: OPHTHALMOLOGY | Admitting: OPHTHALMOLOGY
Payer: COMMERCIAL

## 2018-08-15 ENCOUNTER — HOSPITAL ENCOUNTER (OUTPATIENT)
Dept: CT IMAGING | Facility: CLINIC | Age: 58
End: 2018-08-15
Attending: OPHTHALMOLOGY
Payer: COMMERCIAL

## 2018-08-15 DIAGNOSIS — H49.02 3RD NERVE PALSY, COMPLETE, LEFT: ICD-10-CM

## 2018-08-15 LAB
CREAT BLD-MCNC: 1 MG/DL (ref 0.66–1.25)
GFR SERPL CREATININE-BSD FRML MDRD: 77 ML/MIN/1.7M2

## 2018-08-15 PROCEDURE — 82565 ASSAY OF CREATININE: CPT

## 2018-08-15 PROCEDURE — 25000125 ZZHC RX 250: Performed by: OPHTHALMOLOGY

## 2018-08-15 PROCEDURE — 25000128 H RX IP 250 OP 636: Performed by: OPHTHALMOLOGY

## 2018-08-15 PROCEDURE — 70470 CT HEAD/BRAIN W/O & W/DYE: CPT

## 2018-08-15 RX ORDER — IOPAMIDOL 755 MG/ML
50 INJECTION, SOLUTION INTRAVASCULAR ONCE
Status: COMPLETED | OUTPATIENT
Start: 2018-08-15 | End: 2018-08-15

## 2018-08-15 RX ADMIN — SODIUM CHLORIDE, PRESERVATIVE FREE 60 ML: 5 INJECTION INTRAVENOUS at 16:15

## 2018-08-15 RX ADMIN — IOPAMIDOL 50 ML: 755 INJECTION, SOLUTION INTRAVENOUS at 16:15

## 2018-08-30 ENCOUNTER — OFFICE VISIT (OUTPATIENT)
Dept: FAMILY MEDICINE | Facility: CLINIC | Age: 58
End: 2018-08-30
Payer: COMMERCIAL

## 2018-08-30 VITALS
RESPIRATION RATE: 16 BRPM | SYSTOLIC BLOOD PRESSURE: 123 MMHG | OXYGEN SATURATION: 100 % | BODY MASS INDEX: 22.48 KG/M2 | HEIGHT: 70 IN | DIASTOLIC BLOOD PRESSURE: 77 MMHG | TEMPERATURE: 96.7 F | WEIGHT: 157 LBS | HEART RATE: 77 BPM

## 2018-08-30 DIAGNOSIS — G44.51 HEMICRANIA CONTINUA: ICD-10-CM

## 2018-08-30 DIAGNOSIS — G89.29 CHRONIC HEAD PAIN: Primary | ICD-10-CM

## 2018-08-30 DIAGNOSIS — R51.9 CHRONIC HEAD PAIN: Primary | ICD-10-CM

## 2018-08-30 DIAGNOSIS — E11.65 POORLY CONTROLLED TYPE 2 DIABETES MELLITUS (H): ICD-10-CM

## 2018-08-30 PROCEDURE — 99214 OFFICE O/P EST MOD 30 MIN: CPT | Performed by: FAMILY MEDICINE

## 2018-08-30 RX ORDER — DIVALPROEX SODIUM 250 MG/1
250 TABLET, DELAYED RELEASE ORAL 2 TIMES DAILY
Qty: 30 TABLET | Refills: 0 | Status: ON HOLD | OUTPATIENT
Start: 2018-08-30 | End: 2020-07-17

## 2018-08-30 NOTE — MR AVS SNAPSHOT
After Visit Summary   8/30/2018    Wyatt LAWSON Keyshawn    MRN: [id number]           Patient Information     Date Of Birth          1960        Visit Information        Provider Department      8/30/2018 1:00 PM [facility]        Today's Diagnoses     Chronic head pain    -  1    Hemicrania continua        Poorly controlled type 2 diabetes mellitus (H)           Follow-ups after your visit        Additional Services     NEUROLOGY ADULT REFERRAL       Your provider has referred you for the following:   Consult at [facility]: [facility]  [place] [phone]   [email or web address]    Please be aware that coverage of these services is subject to the terms and limitations of your health insurance plan.  Call member services at your health plan with any benefit or coverage questions.      Please bring the following with you to your appointment:    (1) Any X-Rays, CTs or MRIs which have been performed.  Contact the facility where they were done to arrange for  prior to your scheduled appointment.    (2) List of current medications  (3) This referral request   (4) Any documents/labs given to you for this referral                  Follow-up notes from your care team     Return in about 4 months (around 12/30/2018) for Routine Visit, DM recheck.      Who to contact     If you have questions or need follow up information about today's clinic visit or your schedule please contact [facility] [place] directly at [phone].  Normal or non-critical lab and imaging results will be communicated to you by MyChart, letter or phone within 4 business days after the clinic has received the results. If you do not hear from us within 7 days, please contact the clinic through MyChart or phone. If you have a critical or abnormal lab result, we will notify you by phone as soon as possible.  Submit refill requests through [employer]  "or call your pharmacy and they will forward the refill request to us. Please allow 3 business days for your refill to be completed.          Additional Information About Your Visit        [employer] Information     [employer] gives you secure access to your electronic health record. If you see a primary care provider, you can also send messages to your care team and make appointments. If you have questions, please call your primary care clinic.  If you do not have a primary care provider, please call [phone] and they will assist you.        Care EveryWhere ID     This is your Care EveryWhere ID. This could be used by other organizations to access your [facility] medical records  [id number]        Your Vitals Were     Pulse Temperature Respirations Height Pulse Oximetry BMI (Body Mass Index)    77 96.7  F (35.9  C) (Tympanic) 16 5' 10\" (1.778 m) 100% 22.53 kg/m2       Blood Pressure from Last 3 Encounters:   08/30/18 123/77   08/14/18 134/70   01/16/18 135/78    Weight from Last 3 Encounters:   08/30/18 157 lb (71.2 kg)   08/14/18 156 lb (70.8 kg)   01/16/18 159 lb (72.1 kg)              We Performed the Following     NEUROLOGY ADULT REFERRAL          Today's Medication Changes          These changes are accurate as of 8/30/18  1:22 PM.  If you have any questions, ask your nurse or doctor.               Start taking these medicines.        Dose/Directions    divalproex sodium delayed-release 250 MG DR tablet   Commonly known as:  DEPAKOTE   Used for:  Chronic head pain   Started by:  Shaun Bedolla MD        Dose:  250 mg   Take 1 tablet (250 mg) by mouth 2 times daily   Quantity:  30 tablet   Refills:  0            Where to get your medicines      These medications were sent to [id number] - [place], [place] - [address] AT [facility] [address] [place] [address]  [address], [place] [address]    Hours:  24-hours Phone:  [phone]     divalproex sodium delayed-release 250 MG DR tablet       "          Primary Care Provider Office Phone # Fax #    Shaun Bedolla [phone]       [address] [facility] [address]        Equal Access to Services     JAKOB PEREZ : Hadii vianey ku hadkimo Someghannali, waaxda luqadaha, qaybta kaalmada adeegjakeda, abhilash overton laMarinajaciel rivas. So [facility] [phone].    ATENCIÓN: Si habla español, tiene a champion disposición servicios gratuitos de asistencia lingüística. Llame al [phone].    We comply with applicable federal civil rights laws and [place] laws. We do not discriminate on the basis of race, color, national origin, age, disability, sex, sexual orientation, or gender identity.            Thank you!     Thank you for choosing [facility]  for your [place]. Our goal is always to provide you with excellent care. Hearing back from our patients is one way we can continue to improve our services. Please take a few minutes to complete the written survey that you may receive in the mail after your visit with us. Thank you!             Your Updated Medication List - Protect others around you: Learn how to safely use, store and throw away your medicines at www.disposemymeds.org.          This list is accurate as of 8/30/18  1:22 PM.  Always use your most recent med list.                   Brand Name Dispense Instructions for use Diagnosis    acyclovir 400 MG tablet    ZOVIRAX    30 tablet    Take 1 tablet (400 mg) by mouth 3 times daily    Herpes zoster without complication       aspirin 81 MG tablet     100    1 tab per day    Type II or unspecified type diabetes mellitus without mention of complication, not stated as uncontrolled       atorvastatin 40 MG tablet    LIPITOR    90 tablet    Take 1 tablet (40 mg) by mouth daily    Hyperlipidemia LDL goal <100       B-D U/F 31G X 8 MM   Generic drug:  insulin pen needle     100 each    USE ONCE DAILY WITH LANTUS SOLOSTAR PEN    Lumbar radiculopathy, Diabetic polyneuropathy  associated with type 2 diabetes mellitus (H)       BASAGLAR 100 UNIT/ML injection     30 mL    Inject 23 Units Subcutaneous 2 times daily    Type 2 diabetes mellitus with diabetic neuropathy, with long-term current use of insulin (H)       blood glucose monitoring lancets     3 Box    Use as direct    Type 2 diabetes, HbA1C goal < 8% (H)       blood glucose monitoring meter device kit    no brand specified    1 kit    1 Device See Admin Instructions. per patient health plan    Type II or unspecified type diabetes mellitus without mention of complication, not stated as uncontrolled       divalproex sodium delayed-release 250 MG DR tablet    DEPAKOTE    30 tablet    Take 1 tablet (250 mg) by mouth 2 times daily    Chronic head pain       gabapentin 300 MG capsule    NEURONTIN    90 capsule    SEE NOTES    Other diabetic neurological complication associated with other specified diabetes mellitus (H)       ONETOUCH ULTRA test strip   Generic drug:  blood glucose monitoring     150 strip    USE FOUR TIMES DAILY OR AS DIRECTED    Diabetic polyneuropathy associated with type 2 diabetes mellitus (H)       SUMAtriptan 25 MG tablet    IMITREX    9 tablet    Take 1-2 tablets (25-50 mg) by mouth at onset of headache for migraine May repeat in 2 hours. Max 8 tablets/24 hours.    Other migraine without status migrainosus, not intractable

## 2018-08-30 NOTE — PROGRESS NOTES
SUBJECTIVE:   Wyatt Mahajan is a 58 year old male who presents to clinic today for the following health issues:      Headache      Duration: 1.5 months     Description  Location: left side    Character: sharp pain  Frequency:  Every day     Intensity:  moderate    Accompanying signs and symptoms:    Precipitating or Alleviating factors:  Nausea/vomiting: no  Dizziness: no  Weakness or numbness: no  Visual changes: unable to open left eye for about two months blurry vision and dizziness. Had a similar problem about 2 years ago.  Seen by an eye doctor and also had a CT scan.   Fever: no   Sinus or URI symptoms no     History  Head trauma: no   Family history of migraines: no   Previous tests for headaches: no   Neurologist evaluations: no   Able to do daily activities when headache present: YES  Wake with headaches: YES  Daily pain medication use: no   Any changes in: none     Precipitating or Alleviating factors (light/sound/sleep/caffeine): none     Therapies tried and outcome: Ibuprofen (Advil, Motrin)    Outcome - usually effective  Frequent/daily pain medication use: no    Please abstract the following data from this visit with this patient into the appropriate field in Epic:    Eye exam with ophthalmology on this date: 8/1/18.    Problem list and histories reviewed & adjusted, as indicated.  Additional history: as documented    Patient Active Problem List   Diagnosis     External hemorrhoids     Cranial nerve III palsy     Hyperlipidemia LDL goal <100     Type 2 diabetes mellitus with diabetic neuropathy (HCC)     Low back pain     Lumbar radiculopathy     PVD (peripheral vascular disease) with claudication (H)     Ischemic vascular disease     Acute respiratory failure with hypoxia (H)     MSSA (methicillin susceptible Staphylococcus aureus) septicemia (H)     MSSA (methicillin susceptible Staphylococcus aureus) pneumonia (H)     Lumbago     Past Surgical History:   Procedure Laterality Date      COLONOSCOPY  10/27/16     COLONOSCOPY N/A 10/31/2016    Procedure: COLONOSCOPY;  Surgeon: Pollo Lopez MD;  Location:  GI     [facility] UGI ENDOSCOPY W PLACEMENT GASTROSTOMY TUBE PERCUT N/A 4/4/2017    Procedure: COMBINED ESOPHAGOSCOPY, GASTROSCOPY, DUODENOSCOPY (EGD), PLACE PERCUTANEOUS ENDOSCOPIC GASTROSTOMY TUBE;  Surgeon: Marcial Obregon MD;  Location:  GI     NO HISTORY OF SURGERY       TRACHEOSTOMY N/A 4/4/2017    Procedure: TRACHEOSTOMY;  Surgeon: Jose Puga MD;  Location:  OR       Social History   Substance Use Topics     Smoking status: Former Smoker     Packs/day: 0.50     Years: 15.00     Types: Cigarettes     Smokeless tobacco: Never Used      Comment: quit 3/2017     Alcohol use No     Family History   Problem Relation Age of Onset     Diabetes Mother      Diabetes Father      Diabetes Sister      Diabetes Brother      Hypertension No family hx of      Cerebrovascular Disease No family hx of      Prostate Cancer No family hx of      Cancer - colorectal No family hx of      Breast Cancer No family hx of          Current Outpatient Prescriptions   Medication Sig Dispense Refill     ASPIRIN 81 MG OR TABS 1 tab per day 100 3     B-D U/F 31G X 8 MM insulin pen needle USE ONCE DAILY WITH LANTUS SOLOSTAR  each 0     BASAGLAR 100 UNIT/ML injection Inject 23 Units Subcutaneous 2 times daily 30 mL 1     blood glucose monitoring (ONE TOUCH ULTRASOFT) lancets Use as direct 3 Box 1     Blood Glucose Monitoring Suppl (BLOOD GLUCOSE METER) KIT 1 Device See Admin Instructions. per patient health plan 1 kit 0     divalproex sodium delayed-release (DEPAKOTE) 250 MG DR tablet Take 1 tablet (250 mg) by mouth 2 times daily 30 tablet 0     gabapentin (NEURONTIN) 300 MG capsule SEE NOTES 90 capsule 0     ONETOUCH ULTRA test strip USE FOUR TIMES DAILY OR AS DIRECTED 150 strip 0     acyclovir (ZOVIRAX) 400 MG tablet Take 1 tablet (400 mg) by mouth 3 times daily (Patient not taking: Reported  on 1/16/2018) 30 tablet 0     atorvastatin (LIPITOR) 40 MG tablet Take 1 tablet (40 mg) by mouth daily (Patient not taking: Reported on 8/30/2018) 90 tablet 3     SUMAtriptan (IMITREX) 25 MG tablet Take 1-2 tablets (25-50 mg) by mouth at onset of headache for migraine May repeat in 2 hours. Max 8 tablets/24 hours. (Patient not taking: Reported on 8/14/2018) 9 tablet 3     Allergies   Allergen Reactions     No Known Drug Allergies      Recent Labs   Lab Test  08/15/18   1558  08/14/18   1141  01/17/18   0829  05/22/17   1355  04/11/17   0430  04/10/17   0405   03/24/17   0430   03/17/17   0635   01/17/17   0838  12/09/16   1018   10/08/13   1455   07/22/11   1320   A1C   --   9.7*  10.6*   --    --    --    --    --    --   8.1*   --    --    --    < >  9.4*   < >  7.1*   LDL   --    --   103*   --    --    --    --    --    --    --    --   84  101*   < >  165*   < >  114   HDL   --    --   49   --    --    --    --    --    --    --    --   37*  36*   < >   --    < >  38*   TRIG   --    --   96   --   163*   --    --   677*   --    --    --   55  116   < >   --    < >  56   ALT   --    --   25  28   --   24   < >  119*   --   35   < >  7  9   < >   --    < >  25   CR   --    --   0.77  0.78  1.13  1.20   < >  1.77*   < >   --    < >   --    --    < >   --    < >  0.83   GFRESTIMATED  77   --   >90  >90  Non African American GFR Calc    67  63   < >  40*   < >   --    < >   --    --    < >   --    < >  >90   GFRESTBLACK  >90   --   >90  >90  African American GFR Calc    81  76   < >  49*   < >   --    < >   --    --    < >   --    < >  >90   POTASSIUM   --    --   4.2  4.0  5.0  4.1   < >  3.6   < >   --    < >   --    --    < >   --    < >  4.4   TSH   --    --    --    --    --    --    --    --    --    --    --    --    --    --   0.64   --   0.59    < > = values in this interval not displayed.      BP Readings from Last 3 Encounters:   08/30/18 123/77   08/14/18 134/70   01/16/18 135/78    Wt Readings from Last  "3 Encounters:   08/30/18 157 lb (71.2 kg)   08/14/18 156 lb (70.8 kg)   01/16/18 159 lb (72.1 kg)                    Reviewed and updated as needed this visit by clinical staff       Reviewed and updated as needed this visit by Provider         ROS:  Constitutional, HEENT, cardiovascular, pulmonary, gi and gu systems are negative, except as otherwise noted.    OBJECTIVE:     /77 (Cuff Size: Adult Regular)  Pulse 77  Temp 96.7  F (35.9  C) (Tympanic)  Resp 16  Ht 5' 10\" (1.778 m)  Wt 157 lb (71.2 kg)  SpO2 100%  BMI 22.53 kg/m2  Body mass index is 22.53 kg/(m^2).  GENERAL: healthy, alert and no distress  NECK: no adenopathy, no asymmetry, masses, or scars and thyroid normal to palpation  RESP: lungs clear to auscultation - no rales, rhonchi or wheezes  CV: regular rate and rhythm, normal S1 S2, no S3 or S4, no murmur, click or rub, no peripheral edema and peripheral pulses strong  ABDOMEN: soft, nontender, no hepatosplenomegaly, no masses and bowel sounds normal  MS: no gross musculoskeletal defects noted, no edema        ASSESSMENT/PLAN:   ASSESSMENT / PLAN:  (R51,  G89.29) Chronic head pain  (primary encounter diagnosis)  Comment: has been for 2 months, worsening with 3rd CN palsy, he's currently doing w/u with eye clinic, CT showed no abnormality, has no worsening other focal  Neurologic sign   Will have him to see neurology for further evaluation, will have him to start taking Depakote for control HA as well   Plan: divalproex sodium delayed-release (DEPAKOTE)         250 MG DR tablet, NEUROLOGY ADULT REFERRAL            (G44.51) Hemicrania continua  Comment: mentioned above   Plan: NEUROLOGY ADULT REFERRAL            (E11.65) Poorly controlled type 2 diabetes mellitus (H)  Comment: has been improving after last visit with close monitoring   His HA  Hasn't been changing yet  Plan: will keep watching       Shaun Bedolla MD  [facility] [place]    "

## 2018-09-09 DIAGNOSIS — E11.42 DIABETIC POLYNEUROPATHY ASSOCIATED WITH TYPE 2 DIABETES MELLITUS (H): ICD-10-CM

## 2018-09-10 NOTE — TELEPHONE ENCOUNTER
"Requested Prescriptions   Pending Prescriptions Disp Refills     ONETOUCH ULTRA test strip [Pharmacy Med Name: ONE TOUCH ULTRA BLUE TEST ST(NEW)50]  Last Written Prescription Date:  8/13/18  Last Fill Quantity: 150,  # refills: 0   Last office visit: 8/30/2018 with prescribing provider:  Graeme   Future Office Visit:     150 strip 0     Sig: USE FOUR TIMES DAILY OR AS DIRECTED    Diabetic Supplies Protocol Passed    9/9/2018  9:34 AM       Passed - Patient is 18 years of age or older       Passed - Recent (6 mo) or future (30 days) visit within the authorizing provider's specialty    Patient had office visit in the last 6 months or has a visit in the next 30 days with authorizing provider.  See \"Patient Info\" tab in inbasket, or \"Choose Columns\" in Meds & Orders section of the refill encounter.              "

## 2018-09-10 NOTE — TELEPHONE ENCOUNTER
Prescription approved per [facility], [facility] or [facility] refill protocol.  Mirella Agrawal RN - Triage  [facility]

## 2018-12-14 ENCOUNTER — OFFICE VISIT (OUTPATIENT)
Dept: FAMILY MEDICINE | Facility: CLINIC | Age: 58
End: 2018-12-14
Payer: COMMERCIAL

## 2018-12-14 VITALS
RESPIRATION RATE: 14 BRPM | DIASTOLIC BLOOD PRESSURE: 89 MMHG | HEART RATE: 85 BPM | WEIGHT: 160 LBS | BODY MASS INDEX: 22.9 KG/M2 | OXYGEN SATURATION: 98 % | HEIGHT: 70 IN | SYSTOLIC BLOOD PRESSURE: 121 MMHG | TEMPERATURE: 96.6 F

## 2018-12-14 DIAGNOSIS — E11.40 TYPE 2 DIABETES MELLITUS WITH DIABETIC NEUROPATHY, WITH LONG-TERM CURRENT USE OF INSULIN (H): Primary | Chronic | ICD-10-CM

## 2018-12-14 DIAGNOSIS — Z79.4 TYPE 2 DIABETES MELLITUS WITH DIABETIC NEUROPATHY, WITH LONG-TERM CURRENT USE OF INSULIN (H): Primary | Chronic | ICD-10-CM

## 2018-12-14 LAB — HBA1C MFR BLD: 9.4 % (ref 0–5.6)

## 2018-12-14 PROCEDURE — 80048 BASIC METABOLIC PNL TOTAL CA: CPT | Performed by: FAMILY MEDICINE

## 2018-12-14 PROCEDURE — 82043 UR ALBUMIN QUANTITATIVE: CPT | Performed by: FAMILY MEDICINE

## 2018-12-14 PROCEDURE — 99213 OFFICE O/P EST LOW 20 MIN: CPT | Performed by: FAMILY MEDICINE

## 2018-12-14 PROCEDURE — 84460 ALANINE AMINO (ALT) (SGPT): CPT | Performed by: FAMILY MEDICINE

## 2018-12-14 PROCEDURE — 83036 HEMOGLOBIN GLYCOSYLATED A1C: CPT | Performed by: FAMILY MEDICINE

## 2018-12-14 PROCEDURE — 36415 COLL VENOUS BLD VENIPUNCTURE: CPT | Performed by: FAMILY MEDICINE

## 2018-12-14 ASSESSMENT — MIFFLIN-ST. JEOR: SCORE: 1562.01

## 2018-12-14 NOTE — PROGRESS NOTES
SUBJECTIVE:   Wyatt Mahajan is a 58 year old male who presents to clinic today for the following health issues:      Diabetes Follow-up    Patient is checking blood sugars: twice daily.    Results are as follows:         am - 180-200         bedtime - 180-200    Diabetic concerns: None     Symptoms of hypoglycemia (low blood sugar): none     Paresthesias (numbness or burning in feet) or sores: No     Date of last diabetic eye exam: 8/1/18    BP Readings from Last 2 Encounters:   08/30/18 123/77   08/14/18 134/70     Hemoglobin A1C (%)   Date Value   08/14/2018 9.7 (H)   01/17/2018 10.6 (H)     LDL Cholesterol Calculated (mg/dL)   Date Value   01/17/2018 103 (H)   01/17/2017 84       Diabetes Management Resources    Amount of exercise or physical activity: 2-3 days/week for an average of 15-30 minutes    Problems taking medications regularly: Not taking Basaglar regularly     Medication side effects: itchy skin, lack of appetite     Diet: regular (no restrictions)      Problem list and histories reviewed & adjusted, as indicated.  Additional history: as documented    Patient Active Problem List   Diagnosis     External hemorrhoids     Cranial nerve III palsy     Hyperlipidemia LDL goal <100     Type 2 diabetes mellitus with diabetic neuropathy (HCC)     Low back pain     Lumbar radiculopathy     PVD (peripheral vascular disease) with claudication (H)     Ischemic vascular disease     Acute respiratory failure with hypoxia (H)     MSSA (methicillin susceptible Staphylococcus aureus) septicemia (H)     MSSA (methicillin susceptible Staphylococcus aureus) pneumonia (H)     Lumbago     Past Surgical History:   Procedure Laterality Date     COLONOSCOPY  10/27/16     COLONOSCOPY N/A 10/31/2016    Procedure: COLONOSCOPY;  Surgeon: Pollo Lopez MD;  Location: [facility] UGI ENDOSCOPY W PLACEMENT GASTROSTOMY TUBE PERCUT N/A 4/4/2017    Procedure: COMBINED ESOPHAGOSCOPY, GASTROSCOPY, DUODENOSCOPY (EGD), PLACE  PERCUTANEOUS ENDOSCOPIC GASTROSTOMY TUBE;  Surgeon: Marcial Obregon MD;  Location:  GI     NO HISTORY OF SURGERY       TRACHEOSTOMY N/A 4/4/2017    Procedure: TRACHEOSTOMY;  Surgeon: Jose Puga MD;  Location:  OR       Social History     Tobacco Use     Smoking status: Current Some Day Smoker     Packs/day: 0.50     Years: 15.00     Pack years: 7.50     Types: Cigarettes     Smokeless tobacco: Never Used     Tobacco comment: quit 3/2017   Substance Use Topics     Alcohol use: No     Family History   Problem Relation Age of Onset     Diabetes Mother      Diabetes Father      Diabetes Sister      Diabetes Brother      Hypertension No family hx of      Cerebrovascular Disease No family hx of      Prostate Cancer No family hx of      Cancer - colorectal No family hx of      Breast Cancer No family hx of          Current Outpatient Medications   Medication Sig Dispense Refill     ASPIRIN 81 MG OR TABS 1 tab per day 100 3     B-D U/F 31G X 8 MM insulin pen needle USE ONCE DAILY WITH LANTUS SOLOSTAR  each 0     BASAGLAR 100 UNIT/ML injection Inject 23 Units Subcutaneous 2 times daily 30 mL 1     blood glucose monitoring (ONE TOUCH ULTRASOFT) lancets Use as direct 3 Box 1     Blood Glucose Monitoring Suppl (BLOOD GLUCOSE METER) KIT 1 Device See Admin Instructions. per patient health plan 1 kit 0     gabapentin (NEURONTIN) 300 MG capsule SEE NOTES 90 capsule 0     insulin glargine (LANTUS SOLOSTAR PEN) 100 UNIT/ML pen INJECT 31 UNITS SUBCUTANEOUSLY TWICE DAILY 15 mL 3     ONETOUCH ULTRA test strip USE FOUR TIMES DAILY OR AS DIRECTED 150 strip 1     acyclovir (ZOVIRAX) 400 MG tablet Take 1 tablet (400 mg) by mouth 3 times daily (Patient not taking: Reported on 1/16/2018) 30 tablet 0     atorvastatin (LIPITOR) 40 MG tablet Take 1 tablet (40 mg) by mouth daily (Patient not taking: Reported on 8/30/2018) 90 tablet 3     divalproex sodium delayed-release (DEPAKOTE) 250 MG DR tablet Take 1 tablet  (250 mg) by mouth 2 times daily (Patient not taking: Reported on 12/14/2018) 30 tablet 0     SUMAtriptan (IMITREX) 25 MG tablet Take 1-2 tablets (25-50 mg) by mouth at onset of headache for migraine May repeat in 2 hours. Max 8 tablets/24 hours. (Patient not taking: Reported on 8/14/2018) 9 tablet 3     Allergies   Allergen Reactions     No Known Drug Allergies      Recent Labs   Lab Test 08/15/18  1558 08/14/18  1141 01/17/18  0829 05/22/17  1355 04/11/17  0430 04/10/17  0405  03/24/17  0430  03/17/17  0635  01/17/17  0838 12/09/16  1018  10/08/13  1455  07/22/11  1320   A1C  --  9.7* 10.6*  --   --   --   --   --   --  8.1*  --   --   --    < > 9.4*   < > 7.1*   LDL  --   --  103*  --   --   --   --   --   --   --   --  84 101*   < > 165*   < > 114   HDL  --   --  49  --   --   --   --   --   --   --   --  37* 36*   < >  --    < > 38*   TRIG  --   --  96  --  163*  --   --  677*  --   --   --  55 116   < >  --    < > 56   ALT  --   --  25 28  --  24   < > 119*  --  35   < > 7 9   < >  --    < > 25   CR  --   --  0.77 0.78 1.13 1.20   < > 1.77*   < >  --    < >  --   --    < >  --    < > 0.83   GFRESTIMATED 77  --  >90 >90  Non  GFR Calc   67 63   < > 40*   < >  --    < >  --   --    < >  --    < > >90   GFRESTBLACK >90  --  >90 >90   GFR Calc   81 76   < > 49*   < >  --    < >  --   --    < >  --    < > >90   POTASSIUM  --   --  4.2 4.0 5.0 4.1   < > 3.6   < >  --    < >  --   --    < >  --    < > 4.4   TSH  --   --   --   --   --   --   --   --   --   --   --   --   --   --  0.64  --  0.59    < > = values in this interval not displayed.      BP Readings from Last 3 Encounters:   12/14/18 121/89   08/30/18 123/77   08/14/18 134/70    Wt Readings from Last 3 Encounters:   12/14/18 72.6 kg (160 lb)   08/30/18 71.2 kg (157 lb)   08/14/18 70.8 kg (156 lb)                    Reviewed and updated as needed this visit by clinical staff       Reviewed and updated as needed this visit by  "Provider         ROS:  Constitutional, HEENT, cardiovascular, pulmonary, gi and gu systems are negative, except as otherwise noted.    OBJECTIVE:     /89 (Cuff Size: Adult Regular)   Pulse 85   Temp 96.6  F (35.9  C) (Tympanic)   Resp 14   Ht 1.778 m (5' 10\")   Wt 72.6 kg (160 lb)   SpO2 98%   BMI 22.96 kg/m    Body mass index is 22.96 kg/m .  GENERAL: healthy, alert and no distress  EYES: Eyes grossly normal to inspection, PERRL and conjunctivae and sclerae normal  HENT: ear canals and TM's normal, nose and mouth without ulcers or lesions  NECK: no adenopathy, no asymmetry, masses, or scars and thyroid normal to palpation  RESP: lungs clear to auscultation - no rales, rhonchi or wheezes  CV: regular rate and rhythm, normal S1 S2, no S3 or S4, no murmur, click or rub, no peripheral edema and peripheral pulses strong  ABDOMEN: soft, nontender, no hepatosplenomegaly, no masses and bowel sounds normal  MS: no gross musculoskeletal defects noted, no edema        ASSESSMENT/PLAN:   ASSESSMENT / PLAN:  (E11.40,  Z79.4) Type 2 diabetes mellitus with diabetic neuropathy, with long-term current use of insulin (H)  (primary encounter diagnosis)  Comment: has been on Basaglar with formulary change, his glucose has been fluctuating, and started having rash and itching  With the sx, he has been missing and skipping the schedule, which made his DM control worsened  Will have him to refill Lantus for above reason   Plan: Hemoglobin A1c, Basic metabolic panel  (Ca, Cl,        CO2, Creat, Gluc, K, Na, BUN), ALT, Albumin         Random Urine Quantitative with Creat Ratio,         insulin glargine (LANTUS SOLOSTAR PEN) 100         UNIT/ML pen            FUTURE APPOINTMENTS:       - Follow-up visit in 4 months     Shaun Bedolla MD  [facility] [place]    "

## 2018-12-15 LAB
ALT SERPL W P-5'-P-CCNC: 50 U/L (ref 0–70)
ANION GAP SERPL CALCULATED.3IONS-SCNC: 7 MMOL/L (ref 3–14)
BUN SERPL-MCNC: 15 MG/DL (ref 7–30)
CALCIUM SERPL-MCNC: 8.8 MG/DL (ref 8.5–10.1)
CHLORIDE SERPL-SCNC: 107 MMOL/L (ref 94–109)
CO2 SERPL-SCNC: 24 MMOL/L (ref 20–32)
CREAT SERPL-MCNC: 0.75 MG/DL (ref 0.66–1.25)
GFR SERPL CREATININE-BSD FRML MDRD: >90 ML/MIN/1.7M2
GLUCOSE SERPL-MCNC: 216 MG/DL (ref 70–99)
POTASSIUM SERPL-SCNC: 4.3 MMOL/L (ref 3.4–5.3)
SODIUM SERPL-SCNC: 138 MMOL/L (ref 133–144)

## 2018-12-16 LAB
CREAT UR-MCNC: 159 MG/DL
MICROALBUMIN UR-MCNC: 157 MG/L
MICROALBUMIN/CREAT UR: 98.74 MG/G CR (ref 0–17)

## 2018-12-21 ENCOUNTER — TELEPHONE (OUTPATIENT)
Dept: FAMILY MEDICINE | Facility: CLINIC | Age: 58
End: 2018-12-21

## 2018-12-21 DIAGNOSIS — Z79.4 TYPE 2 DIABETES MELLITUS WITH DIABETIC NEUROPATHY, WITH LONG-TERM CURRENT USE OF INSULIN (H): Chronic | ICD-10-CM

## 2018-12-21 DIAGNOSIS — E11.40 TYPE 2 DIABETES MELLITUS WITH DIABETIC NEUROPATHY, WITH LONG-TERM CURRENT USE OF INSULIN (H): Chronic | ICD-10-CM

## 2018-12-21 NOTE — TELEPHONE ENCOUNTER
Lantus is not covered. There preferred alternative is Basaglar KwikPen. Please advise. Sruthi Diaz, CMA

## 2018-12-27 NOTE — TELEPHONE ENCOUNTER
Prior Authorization Retail Medication Request    Medication/Dose: insulin glargine (LANTUS SOLOSTAR PEN) 100 UNIT/ML pen  ICD code (if different than what is on RX):    Previously Tried and Failed:    Rationale:      Insurance Name:    Insurance ID:        Pharmacy Information (if different than what is on RX)  Name:    Phone:     Please see message bellow about complication he experienced with Basaglar. Sruthi Diaz, CMA

## 2018-12-27 NOTE — TELEPHONE ENCOUNTER
PA Initiation    Medication: Lantus Solostar  Insurance Company: [employer] - Phone [phone] Fax [phone]  Pharmacy Filling the Rx: [facility] - [place], [place] - [address] AT [facility] [address] [place] [address]  Filling Pharmacy Phone:  [phone]  Filling Pharmacy Fax:    Start Date: 12/27/2018

## 2018-12-28 NOTE — TELEPHONE ENCOUNTER
Rx for lantus solostar insulin inject 34 units bid escribed to pharmacy.    Sabrina DUNHAM RN  EP Triage

## 2018-12-28 NOTE — TELEPHONE ENCOUNTER
Prior Authorization Approval    Authorization Effective Date: 11/27/2018  Authorization Expiration Date: 12/27/2019  Medication: Lantus Solostar  Approved Dose/Quantity:   Reference #: [id number]   Insurance Company: [employer] - Phone [phone] Fax [phone]  Expected CoPay:       CoPay Card Available:      Foundation Assistance Needed:    Which Pharmacy is filling the prescription (Not needed for infusion/clinic administered): [facility] [place] - [place], [place] - [address] AT [facility] [place] [address]  Pharmacy Notified: Yes  Patient Notified: No

## 2019-04-11 ENCOUNTER — TRANSFERRED RECORDS (OUTPATIENT)
Dept: HEALTH INFORMATION MANAGEMENT | Facility: CLINIC | Age: 59
End: 2019-04-11

## 2019-04-15 ENCOUNTER — TELEPHONE (OUTPATIENT)
Dept: FAMILY MEDICINE | Facility: CLINIC | Age: 59
End: 2019-04-15

## 2019-04-15 NOTE — TELEPHONE ENCOUNTER
Please abstract the following data from this visit with this patient into the appropriate field in Epic:    Eye exam with ophthalmology on this date: 4/11/19.

## 2019-10-03 ENCOUNTER — HEALTH MAINTENANCE LETTER (OUTPATIENT)
Age: 59
End: 2019-10-03

## 2020-03-22 ENCOUNTER — HEALTH MAINTENANCE LETTER (OUTPATIENT)
Age: 60
End: 2020-03-22

## 2020-04-02 ENCOUNTER — VIRTUAL VISIT (OUTPATIENT)
Dept: FAMILY MEDICINE | Facility: CLINIC | Age: 60
End: 2020-04-02
Payer: COMMERCIAL

## 2020-04-02 DIAGNOSIS — E78.5 HYPERLIPIDEMIA LDL GOAL <100: ICD-10-CM

## 2020-04-02 DIAGNOSIS — Z12.11 SCREENING FOR COLON CANCER: ICD-10-CM

## 2020-04-02 DIAGNOSIS — Z79.4 TYPE 2 DIABETES MELLITUS WITH DIABETIC NEUROPATHY, WITH LONG-TERM CURRENT USE OF INSULIN (H): Primary | Chronic | ICD-10-CM

## 2020-04-02 DIAGNOSIS — E11.40 TYPE 2 DIABETES MELLITUS WITH DIABETIC NEUROPATHY, WITH LONG-TERM CURRENT USE OF INSULIN (H): Primary | Chronic | ICD-10-CM

## 2020-04-02 DIAGNOSIS — E11.42 DIABETIC POLYNEUROPATHY ASSOCIATED WITH TYPE 2 DIABETES MELLITUS (H): ICD-10-CM

## 2020-04-02 PROCEDURE — 99442 ZZC PHYSICIAN TELEPHONE EVALUATION 11-20 MIN: CPT | Performed by: NURSE PRACTITIONER

## 2020-04-02 RX ORDER — METFORMIN HCL 500 MG
TABLET, EXTENDED RELEASE 24 HR ORAL
Qty: 375 TABLET | Refills: 0 | Status: ON HOLD | OUTPATIENT
Start: 2020-04-02 | End: 2020-07-17

## 2020-04-02 RX ORDER — PEN NEEDLE, DIABETIC 31 GX5/16"
NEEDLE, DISPOSABLE MISCELLANEOUS
Qty: 100 EACH | Refills: 1 | Status: ON HOLD | OUTPATIENT
Start: 2020-04-02 | End: 2020-07-17

## 2020-04-02 RX ORDER — GLUCOSAMINE HCL/CHONDROITIN SU 500-400 MG
CAPSULE ORAL
Qty: 100 EACH | Refills: 3 | Status: ON HOLD | OUTPATIENT
Start: 2020-04-02 | End: 2020-07-17

## 2020-04-02 RX ORDER — ATORVASTATIN CALCIUM 40 MG/1
40 TABLET, FILM COATED ORAL DAILY
Qty: 90 TABLET | Refills: 3 | Status: ON HOLD | OUTPATIENT
Start: 2020-04-02 | End: 2020-07-17

## 2020-04-02 RX ORDER — LANCETS
EACH MISCELLANEOUS
Qty: 100 EACH | Refills: 6 | Status: ON HOLD | OUTPATIENT
Start: 2020-04-02 | End: 2020-07-17

## 2020-04-02 NOTE — PROGRESS NOTES
"Subjective     Wyatt Mahajan is a 60 year old male who is being evaluated via a billable telephone visit.      The patient has been notified of following:     \"This telephone visit will be conducted via a call between you and your physician/provider. We have found that certain health care needs can be provided without the need for a physical exam.  This service lets us provide the care you need with a short phone conversation.  If a prescription is necessary we can send it directly to your pharmacy.  If lab work is needed we can place an order for that and you can then stop by our lab to have the test done at a later time.    If during the course of the call the physician/provider feels a telephone visit is not appropriate, you will not be charged for this service.\"     Patient has given verbal consent for Telephone visit?  Yes    Wyatt Mahajan complains of   Chief Complaint   Patient presents with     Diabetes       ALLERGIES  No known drug allergies    Diabetes Follow-up - needs a meter       How often are you checking your blood sugar? Not at all    What concerns do you have today about your diabetes?  Feeling fatigued, sleepy, excessive thirst, urinating a lot      Do you have any of these symptoms? (Select all that apply)  Numbness in feet, Burning in feet and Weight loss    Have you had a diabetic eye exam in the last 12 months? No        BP Readings from Last 2 Encounters:   12/14/18 121/89   08/30/18 123/77     Hemoglobin A1C (%)   Date Value   12/14/2018 9.4 (H)   08/14/2018 9.7 (H)     LDL Cholesterol Calculated (mg/dL)   Date Value   01/17/2018 103 (H)   01/17/2017 84           How many servings of fruits and vegetables do you eat daily?  2-3    On average, how many sweetened beverages do you drink each day (Examples: soda, juice, sweet tea, etc.  Do NOT count diet or artificially sweetened beverages)?   0    How many days per week do you exercise enough to make your heart beat faster? 3 or " less    How many minutes a day do you exercise enough to make your heart beat faster? 9 or less    How many days per week do you miss taking your medication? 0    HPI: Wyatt calls today reporting that he is not feeling well and is wondering if it could be related to him having stopped all of his diabetes (and other) medications about 6 months ago. He states that his insurance coverage changed in such a way that he was no longer able to afford his medications. He did not alert anyone at the clinic. At the time, he was taking nearly 70 units of Lantus daily, and his A1c remained over 9%. He thought he was doing well despite stopping this until recently when he began noting excessive thirst, excessive voiding, fatigue, and weight loss. Vision is ok and he denies headache or confusion. He does have neuropathy at baseline but doesn't feel this is markedly worse.      Patient Active Problem List   Diagnosis     External hemorrhoids     Cranial nerve III palsy     Hyperlipidemia LDL goal <100     Type 2 diabetes mellitus with diabetic neuropathy (HCC)     Low back pain     Lumbar radiculopathy     PVD (peripheral vascular disease) with claudication (H)     Ischemic vascular disease     Acute respiratory failure with hypoxia (H)     MSSA (methicillin susceptible Staphylococcus aureus) septicemia (H)     MSSA (methicillin susceptible Staphylococcus aureus) pneumonia (H)     Lumbago     Past Surgical History:   Procedure Laterality Date     COLONOSCOPY  10/27/16     COLONOSCOPY N/A 10/31/2016    Procedure: COLONOSCOPY;  Surgeon: Pollo Lopez MD;  Location:  GI     [facility] UGI ENDOSCOPY W PLACEMENT GASTROSTOMY TUBE PERCUT N/A 4/4/2017    Procedure: COMBINED ESOPHAGOSCOPY, GASTROSCOPY, DUODENOSCOPY (EGD), PLACE PERCUTANEOUS ENDOSCOPIC GASTROSTOMY TUBE;  Surgeon: Marcial Obregon MD;  Location:  GI     NO HISTORY OF SURGERY       TRACHEOSTOMY N/A 4/4/2017    Procedure: TRACHEOSTOMY;  Surgeon: Jose Puga  MD [place];  Location:  OR       Social History     Tobacco Use     Smoking status: Current Some Day Smoker     Packs/day: 0.50     Years: 15.00     Pack years: 7.50     Types: Cigarettes     Smokeless tobacco: Never Used     Tobacco comment: quit 3/2017   Substance Use Topics     Alcohol use: No     Family History   Problem Relation Age of Onset     Diabetes Mother      Diabetes Father      Diabetes Sister      Diabetes Brother      Hypertension No family hx of      Cerebrovascular Disease No family hx of      Prostate Cancer No family hx of      Cancer - colorectal No family hx of      Breast Cancer No family hx of            Reviewed and updated as needed this visit by Provider  Tobacco  Allergies  Meds  Problems  Med Hx  Surg Hx  Fam Hx         Review of Systems   ROS COMP: Constitutional, HEENT, cardiovascular, GI, , musculoskeletal, neuro, skin, endocrine systems are negative, except as otherwise noted.       Objective   Reported vitals:  There were no vitals taken for this visit.   alert, no distress and cooperative  Psych: Alert and oriented times 3; coherent speech, normal   rate and volume, able to articulate logical thoughts, able   to abstract reason, no tangential thoughts, no hallucinations   or delusions  His affect is bright / appropriate     Diagnostic Test Results:  Labs reviewed in Epic        Assessment/Plan:  1. Type 2 diabetes mellitus with diabetic neuropathy, with long-term current use of insulin (H)  Comment: Unfortunately, Wyatt stopped all of his crucial medications without alerting his care team (6 months ago). His A1c was quite high WHILE taking his medications, so I fear it will reflect severely out-of-control blood sugars now that he has been off these medications for 6 months. Fortunately, despite polydipsia, polyuria, 10 pound weight loss, and fatigue, he is without other red flag signs to suggest ketoacidosis or other emergency. He states that metformin had been  tried several years ago, but it affected his appetite. Despite that, he is willing to attempt restarting this given its noted benefits (other than BG control) in those with DM. Will titrate this up and restart his Lantus slowly (titrating by 4 units a day until FBG is ). I anticipate we will end up on at least 70-80 units daily, at which point, we will add another agent (either SGLT-2, GLP-1 agonist, or mealtime insulin). He agrees. He will also schedule fasting labs in the next couple weeks. Finally, he will see CDE in the next couple weeks for additional help.   - insulin glargine (LANTUS SOLOSTAR) 100 UNIT/ML pen; INJECT 20 UNITS SUBCUTANEOUSLY ONCE DAILY AND INCREASE DOSE BY 4 UNITS EVERY 3 DAYS UNTIL FASTING BG is . IF OVER 40 UNITS, SPLIT DOSE INTO TWO ADMINISTRATIONS DAILY.  Dispense: 15 mL; Refill: 3  - metFORMIN (GLUCOPHAGE-XR) 500 MG 24 hr tablet; Take 1 tablet (500 mg) by mouth daily (with dinner) for 5 days, THEN 1 tablet (500 mg) 2 times daily (with meals) for 5 days, THEN 2 tablets (1,000 mg) 2 times daily (with meals).  Dispense: 375 tablet; Refill: 0  - blood glucose monitoring (NO BRAND SPECIFIED) meter device kit; Use to test blood sugar 2 times daily or as directed. Preferred blood glucose meter OR supplies to accompany: Blood Glucose Monitor Brands: per insurance.  Dispense: 1 kit; Refill: 0  - blood glucose (NO BRAND SPECIFIED) test strip; Use to test blood sugar 2 times daily or as directed. To accompany: Blood Glucose Monitor Brands: per insurance.  Dispense: 100 strip; Refill: 6  - blood glucose calibration (NO BRAND SPECIFIED) solution; To accompany: Blood Glucose Monitor Brands: per insurance.  Dispense: 1 Bottle; Refill: 3  - thin (NO BRAND SPECIFIED) lancets; Use with lanceting device. To accompany: Blood Glucose Monitor Brands: per insurance.  Dispense: 100 each; Refill: 6  - alcohol swab prep pads; Use to swab area of injection/becky as directed.  Dispense: 100 each; Refill:  3  - insulin pen needle (B-D U/F) 31G X 8 MM miscellaneous; Use 1 pen needles daily or as directed.  Dispense: 100 each; Refill: 1  - AMBULATORY ADULT DIABETES EDUCATOR REFERRAL  - Hemoglobin A1c; Future  - Albumin Random Urine Quantitative with Creat Ratio; Future  - Basic metabolic panel; Future  - Lipid panel reflex to direct LDL Fasting; Future  - TSH with free T4 reflex; Future    2. Diabetic polyneuropathy associated with type 2 diabetes mellitus (H)  Comment: Unfortunately, Wyatt stopped all of his crucial medications without alerting his care team (6 months ago). His A1c was quite high WHILE taking his medications, so I fear it will reflect severely out-of-control blood sugars now that he has been off these medications for 6 months. Fortunately, despite polydipsia, polyuria, 10 pound weight loss, and fatigue, he is without other red flag signs to suggest ketoacidosis or other emergency. He states that metformin had been tried several years ago, but it affected his appetite. Despite that, he is willing to attempt restarting this given its noted benefits (other than BG control) in those with DM. Will titrate this up and restart his Lantus slowly (titrating by 4 units a day until FBG is ). I anticipate we will end up on at least 70-80 units daily, at which point, we will add another agent (either SGLT-2, GLP-1 agonist, or mealtime insulin). He agrees. He will also schedule fasting labs in the next couple weeks. Finally, he will see CDE in the next couple weeks for additional help.   - insulin glargine (LANTUS SOLOSTAR) 100 UNIT/ML pen; INJECT 20 UNITS SUBCUTANEOUSLY ONCE DAILY AND INCREASE DOSE BY 4 UNITS EVERY 3 DAYS UNTIL FASTING BG is . IF OVER 40 UNITS, SPLIT DOSE INTO TWO ADMINISTRATIONS DAILY.  Dispense: 15 mL; Refill: 3  - metFORMIN (GLUCOPHAGE-XR) 500 MG 24 hr tablet; Take 1 tablet (500 mg) by mouth daily (with dinner) for 5 days, THEN 1 tablet (500 mg) 2 times daily (with meals) for 5  days, THEN 2 tablets (1,000 mg) 2 times daily (with meals).  Dispense: 375 tablet; Refill: 0  - blood glucose monitoring (NO BRAND SPECIFIED) meter device kit; Use to test blood sugar 2 times daily or as directed. Preferred blood glucose meter OR supplies to accompany: Blood Glucose Monitor Brands: per insurance.  Dispense: 1 kit; Refill: 0  - blood glucose (NO BRAND SPECIFIED) test strip; Use to test blood sugar 2 times daily or as directed. To accompany: Blood Glucose Monitor Brands: per insurance.  Dispense: 100 strip; Refill: 6  - blood glucose calibration (NO BRAND SPECIFIED) solution; To accompany: Blood Glucose Monitor Brands: per insurance.  Dispense: 1 Bottle; Refill: 3  - thin (NO BRAND SPECIFIED) lancets; Use with lanceting device. To accompany: Blood Glucose Monitor Brands: per insurance.  Dispense: 100 each; Refill: 6  - alcohol swab prep pads; Use to swab area of injection/becky as directed.  Dispense: 100 each; Refill: 3  - insulin pen needle (B-D U/F) 31G X 8 MM miscellaneous; Use 1 pen needles daily or as directed.  Dispense: 100 each; Refill: 1  - AMBULATORY ADULT DIABETES EDUCATOR REFERRAL  - Hemoglobin A1c; Future  - Albumin Random Urine Quantitative with Creat Ratio; Future  - Basic metabolic panel; Future  - Lipid panel reflex to direct LDL Fasting; Future  - TSH with free T4 reflex; Future    3. Hyperlipidemia LDL goal <100  Comment: Will resume statin and check fasting lipids in the next few days.   - atorvastatin (LIPITOR) 40 MG tablet; Take 1 tablet (40 mg) by mouth daily  Dispense: 90 tablet; Refill: 3    4. Screening for colon cancer  - Fecal colorectal cancer screen FIT; Future    Return in about 3 months (around 7/2/2020) for Routine Visit, Lab Work.      Phone call duration:  17 minutes      Nilesh Ward NP

## 2020-04-02 NOTE — PATIENT INSTRUCTIONS
1. Begin taking metformin as we discussed.   - Take one with evening meal for 5 days.  - Take one in the morning and one with evening meal for 5 days.  - Take one in the morning and two with evening meal for 5 days.  - Take two in the morning and two with evening meal every day.    2. Start Lantus at 20 units nightly. Increase by 4 units every 3 days until your morning BG is . If you end up over 40 units, let me know and we can split your daily dose into morning and evening (like you were doing before).     3. Check BG every morning (before eating) and an hour BEFORE your evening meal. Write these down and save them, so we can review them.    4. Come in for fasting labs soon.    5. Schedule with diabetes education.    6. Resume your atorvastatin (Lipitor).     7. Keep me posted, and we will see you in 3 months or so in the clinic.

## 2020-04-07 ENCOUNTER — ALLIED HEALTH/NURSE VISIT (OUTPATIENT)
Dept: EDUCATION SERVICES | Facility: CLINIC | Age: 60
End: 2020-04-07
Payer: COMMERCIAL

## 2020-04-07 DIAGNOSIS — E11.40 TYPE 2 DIABETES MELLITUS WITH DIABETIC NEUROPATHY (H): Primary | ICD-10-CM

## 2020-04-07 DIAGNOSIS — Z79.4 TYPE 2 DIABETES MELLITUS WITH DIABETIC NEUROPATHY, WITH LONG-TERM CURRENT USE OF INSULIN (H): Primary | Chronic | ICD-10-CM

## 2020-04-07 DIAGNOSIS — E11.40 TYPE 2 DIABETES MELLITUS WITH DIABETIC NEUROPATHY, WITH LONG-TERM CURRENT USE OF INSULIN (H): Primary | Chronic | ICD-10-CM

## 2020-04-07 PROCEDURE — G0108 DIAB MANAGE TRN  PER INDIV: HCPCS

## 2020-04-07 RX ORDER — FLASH GLUCOSE SENSOR
KIT MISCELLANEOUS
Qty: 2 EACH | Refills: 11 | Status: ON HOLD | OUTPATIENT
Start: 2020-04-07 | End: 2020-07-17

## 2020-04-07 RX ORDER — FLASH GLUCOSE SCANNING READER
EACH MISCELLANEOUS
Qty: 1 EACH | Refills: 0 | Status: ON HOLD | OUTPATIENT
Start: 2020-04-07 | End: 2020-07-17

## 2020-04-07 NOTE — TELEPHONE ENCOUNTER
Patient has self-discontinued Metformin and taking Lantus only once daily vs twice.   He is checking BG only fasting once/day.     He requests the Jordy CGM system, feels this would allow him to check BG much more often. He is an appropriate candidate with high A1C and now only taking Lantus.    Described to him the cost and general use.     Will pend order for Dr. Bedolla requesting order for Freestyle Jordy for home use.     Deisi Traylor RD, LD, CDE

## 2020-04-07 NOTE — PROGRESS NOTES
"Diabetes Self-Management Education & Support  Telephone Appointment  Patient verbally consented to the telephone visit service today: yes      Presents for: Individual review    SUBJECTIVE/OBJECTIVE:  Presents for: Individual review  Accompanied by: Self  Diabetes education in the past 24mo: Yes(has had DB ~ 15 years, ed 5-6 years ago)  Focus of Visit: Monitoring(He has a friend with CGM sensor Jordy- he is interested)  Diabetes type: Type 2  Date of diagnosis: 15 years ago  Disease course: Getting harder to manage(not taking care of it)  Other concerns:: English as a second language  Cultural Influences/Ethnic Background:  [place]    Diabetes Symptoms & Complications:     Complications assessed today?: Yes(he has neuropathy)    Patient Problem List and Family Medical History reviewed for relevant medical history, current medical status, and diabetes risk factors.    Vitals:  There were no vitals taken for this visit.  Estimated body mass index is 22.96 kg/m  as calculated from the following:    Height as of 12/14/18: 1.778 m (5' 10\").    Weight as of 12/14/18: 72.6 kg (160 lb).   Last 3 BP:   BP Readings from Last 3 Encounters:   12/14/18 121/89   08/30/18 123/77   08/14/18 134/70       History   Smoking Status     Current Some Day Smoker     Packs/day: 0.50     Years: 15.00     Types: Cigarettes   Smokeless Tobacco     Never Used     Comment: quit 3/2017       Labs:  Lab Results   Component Value Date    A1C 9.4 12/14/2018     Lab Results   Component Value Date     12/14/2018     Lab Results   Component Value Date     01/17/2018     HDL Cholesterol   Date Value Ref Range Status   01/17/2018 49 >39 mg/dL Final   ]  GFR Estimate   Date Value Ref Range Status   12/14/2018 >90 >60 mL/min/1.7m2 Final     Comment:     Non  GFR Calc     GFR Estimate If Black   Date Value Ref Range Status   12/14/2018 >90 >60 mL/min/1.7m2 Final     Comment:      GFR Calc     Lab Results "   Component Value Date    CR 0.75 12/14/2018     No results found for: MICROALBUMIN    Healthy Eating:  Meals include: Breakfast, Dinner, Afternoon Snack  Breakfast: egg sandwich with cup of coffee  Lunch: snacks- chicken strips, banana or grapes  Dinner: rice with fish or chicken or beef, frozen vegetables (wife cooks)  Beverages: Coffee, Water    Being Active:  Being Active Assessed Today: Yes  Exercise:: Currently not exercising    Monitoring:  Monitoring Assessed Today: Yes (just started recently, did not have machine before)  Did patient bring glucose meter to appointment? : Yes (offers report over phone)  Blood Glucose Meter: Accu-chek (Zenia)  Times checking blood sugar at home (number): 1 (fasting only)  Times checking blood sugar at home (per): Day    Blood glucose trend: (Fasting): this , yesterday 157, 148, 91, 159, 142, 227). He is uncertain about why the # >200.     He is able to voice that the goal is  fasting. Only 29% in goal.     Taking Medications:  Diabetes Medication(s)     Biguanides       metFORMIN (GLUCOPHAGE-XR) 500 MG 24 hr tablet    Take 1 tablet (500 mg) by mouth daily (with dinner) for 5 days, THEN 1 tablet (500 mg) 2 times daily (with meals) for 5 days, THEN 2 tablets (1,000 mg) 2 times daily (with meals).    Insulin       insulin glargine (LANTUS SOLOSTAR) 100 UNIT/ML pen    INJECT 20 UNITS SUBCUTANEOUSLY ONCE DAILY AND INCREASE DOSE BY 4 UNITS EVERY 3 DAYS UNTIL FASTING BG is . IF OVER 40 UNITS, SPLIT DOSE INTO TWO ADMINISTRATIONS DAILY.        Not taking as ordered: Taking 21 units Lantus before dinner (just once daily).   Has discontinued Metformin 2 days ago.     Taking Medication Assessed Today: Yes  Current Treatments: Insulin Injectionsv(taking 21 units with evening meal; has self discontinued Metformin due to it decreaseing his appetite)  Dose schedule: Pre-dinner  Given by: Patient  Injection/Infusion sites: Thighs  Problems taking diabetes medications  regularly?: No  Diabetes medication side effects?: Yes(self d/c'd metformin due to it decreasing appetite)    Problem Solving:    Reducing Risks:  Diabetes Risks: Age over 45 years, Ethnicity, Sedentary Lifestyle  CAD Risks: Diabetes Mellitus, Male sex, Sedentary lifestyle    Healthy Coping:  Healthy Coping Assessed Today: No  Emotional response to diabetes: Ready to learn, Acceptance, Confidence diabetes can be controlled  Informal Support system:: Family  Stage of change: PREPARATION (Decided to change - considering how)  Patient Activation Measure Survey Score:  No flowsheet data found.    Diabetes knowledge and skills assessment:   Patient is knowledgeable in diabetes management concepts related to: Needs comprehensive review, but has some prior knowledge:  ~ used to exercise in the past, but not currently  ~ avoids juice or pop  ~ is checking BG and taking insulin, but not as frequently as recommended    Patient needs further education on the following diabetes management concepts: Healthy Eating, Monitoring, Taking Medication and Reducing Risks    Based on learning assessment above, most appropriate setting for further diabetes education would be: Individual setting.      INTERVENTIONS:    Education provided today on:  AADE Self-Care Behaviors:  Healthy Eating: Praised & encouraged eating throughout the day (2 meals + snack or 2), getting fairly  balanced nutrition with protien + carb including fruit and veggies  Monitoring: purpose, log and interpret results, individual blood glucose targets, frequency of monitoring and recommended more frequent monitoring, he is very interested in Jordy CGM, that is his priority for our visit.   Agrees to check before dinner next few days and we will talk again Friday.   ~ does not take his meter with him which is a barrier to checking, feels Jordy would change this  Taking Medication: Advised that checking 3-4 times a day with meter, or >4 times/day with Jordy would show  how well the insulin is working, if it is the right amount at the right time.     Opportunities for ongoing education and support in diabetes-self management were discussed.    Pt verbalized understanding of concepts discussed and recommendations provided today.       Education Materials Provided:  No new materials provided today      ASSESSMENT:      Patient's most recent   Lab Results   Component Value Date    A1C 9.4 12/14/2018    is not meeting goal of <7.0    PLAN  See Patient Instructions for co-developed, patient-stated behavior change goals.  See Follow-Up section for recommended follow-up. Will speak on Friday    1) will pend order for Jordy CGM system to Dr. Bedolla  2) will alert PCP that patient has d/c'd Metformin- if starts Jordy sensor and BG elevated (expected), may consider additional med if patient will not resume Metformin.     Deisi Traylor RD, LD, CDE    Time Spent: 25 minutes  Encounter Type: Individual    Any diabetes medication dose changes were made via the CDE Protocol and Collaborative Practice Agreement with the patient's referring provider. A copy of this encounter was shared with the provider.

## 2020-04-10 ENCOUNTER — ALLIED HEALTH/NURSE VISIT (OUTPATIENT)
Dept: EDUCATION SERVICES | Facility: CLINIC | Age: 60
End: 2020-04-10
Payer: COMMERCIAL

## 2020-04-10 DIAGNOSIS — E11.40 TYPE 2 DIABETES MELLITUS WITH DIABETIC NEUROPATHY (H): Primary | Chronic | ICD-10-CM

## 2020-04-10 PROCEDURE — G0108 DIAB MANAGE TRN  PER INDIV: HCPCS

## 2020-04-10 NOTE — PROGRESS NOTES
"Diabetes Follow-up- Telephone  Patient verbally consented to the telephone visit service today: yes      Subjective/Objective:    Wyatt A Jama sent in blood glucose log for review. Last date of communication was: 4/7.    Diabetes is being managed with   Diabetes Medications   Diabetes Medication(s)     Biguanides       metFORMIN (GLUCOPHAGE-XR) 500 MG 24 hr tablet    Take 1 tablet (500 mg) by mouth daily (with dinner) for 5 days, THEN 1 tablet (500 mg) 2 times daily (with meals) for 5 days, THEN 2 tablets (1,000 mg) 2 times daily (with meals).    Insulin       insulin glargine (LANTUS SOLOSTAR) 100 UNIT/ML pen    INJECT 20 UNITS SUBCUTANEOUSLY ONCE DAILY AND INCREASE DOSE BY 4 UNITS EVERY 3 DAYS UNTIL FASTING BG is . IF OVER 40 UNITS, SPLIT DOSE INTO TWO ADMINISTRATIONS DAILY.        NOT taking as directed:   1) he told me on 4/7 that he stopped taking Metformin 2 days ago due to decreased appetite- today he says he takes 1 pill (500mg) every day, but not at a set time  2) taking 21 units Lantus before dinner in evening    BG/Food Log:   This morning was 136, last night 256  \"always during the evening it is high \"    Assessment:  No BG in goal at this time.    Patient started eddi sensor at home this week, says he checked 3-4 times yesterday. Reviewed (as discussed before) that he must scan or check at least once every 8 hours or it will turn off.     He has email, but refused invitation to share data via [email or web address] Advised that then, he must write down the numbers when he checks so that he can report them when we call.   Rec'd check before and 2 hours after meals to see how insulin is working.     Described that without taking full dose of Metformin XR, BG will increase. He will need additional insulin and possibly other medication.     Plan/Response:  Scheduled phone appointment next week to review BG numbers, reminded he needs to write them down to tell us so we can adjust medication.   ~ Need to " eventually schedule in July to download Jordy in clinic- A1C 9.4%    Deisi Traylor RD, LD, CDE  Time spent 14 minutes      Any diabetes medication dose changes were made via the CDE Protocol and Collaborative Practice Agreement with the patient's referring provider.

## 2020-04-14 DIAGNOSIS — E11.42 DIABETIC POLYNEUROPATHY ASSOCIATED WITH TYPE 2 DIABETES MELLITUS (H): ICD-10-CM

## 2020-04-14 DIAGNOSIS — E11.40 TYPE 2 DIABETES MELLITUS WITH DIABETIC NEUROPATHY, WITH LONG-TERM CURRENT USE OF INSULIN (H): Chronic | ICD-10-CM

## 2020-04-14 DIAGNOSIS — Z79.4 TYPE 2 DIABETES MELLITUS WITH DIABETIC NEUROPATHY, WITH LONG-TERM CURRENT USE OF INSULIN (H): Chronic | ICD-10-CM

## 2020-04-14 LAB — HBA1C MFR BLD: 11.1 % (ref 0–5.6)

## 2020-04-14 PROCEDURE — 83036 HEMOGLOBIN GLYCOSYLATED A1C: CPT | Performed by: NURSE PRACTITIONER

## 2020-04-14 PROCEDURE — 82043 UR ALBUMIN QUANTITATIVE: CPT | Performed by: NURSE PRACTITIONER

## 2020-04-14 PROCEDURE — 80048 BASIC METABOLIC PNL TOTAL CA: CPT | Performed by: NURSE PRACTITIONER

## 2020-04-14 PROCEDURE — 84443 ASSAY THYROID STIM HORMONE: CPT | Performed by: NURSE PRACTITIONER

## 2020-04-14 PROCEDURE — 36415 COLL VENOUS BLD VENIPUNCTURE: CPT | Performed by: NURSE PRACTITIONER

## 2020-04-14 PROCEDURE — 80061 LIPID PANEL: CPT | Performed by: NURSE PRACTITIONER

## 2020-04-15 LAB
ANION GAP SERPL CALCULATED.3IONS-SCNC: 3 MMOL/L (ref 3–14)
BUN SERPL-MCNC: 15 MG/DL (ref 7–30)
CALCIUM SERPL-MCNC: 8.7 MG/DL (ref 8.5–10.1)
CHLORIDE SERPL-SCNC: 108 MMOL/L (ref 94–109)
CHOLEST SERPL-MCNC: 149 MG/DL
CO2 SERPL-SCNC: 28 MMOL/L (ref 20–32)
CREAT SERPL-MCNC: 0.9 MG/DL (ref 0.66–1.25)
CREAT UR-MCNC: 225 MG/DL
GFR SERPL CREATININE-BSD FRML MDRD: >90 ML/MIN/{1.73_M2}
GLUCOSE SERPL-MCNC: 108 MG/DL (ref 70–99)
HDLC SERPL-MCNC: 45 MG/DL
LDLC SERPL CALC-MCNC: 90 MG/DL
MICROALBUMIN UR-MCNC: 378 MG/L
MICROALBUMIN/CREAT UR: 168 MG/G CR (ref 0–17)
NONHDLC SERPL-MCNC: 104 MG/DL
POTASSIUM SERPL-SCNC: 4.6 MMOL/L (ref 3.4–5.3)
SODIUM SERPL-SCNC: 139 MMOL/L (ref 133–144)
TRIGL SERPL-MCNC: 71 MG/DL
TSH SERPL DL<=0.005 MIU/L-ACNC: 1.3 MU/L (ref 0.4–4)

## 2020-04-16 ENCOUNTER — ALLIED HEALTH/NURSE VISIT (OUTPATIENT)
Dept: EDUCATION SERVICES | Facility: CLINIC | Age: 60
End: 2020-04-16
Payer: COMMERCIAL

## 2020-04-16 DIAGNOSIS — Z79.4 TYPE 2 DIABETES MELLITUS WITH DIABETIC NEUROPATHY, WITH LONG-TERM CURRENT USE OF INSULIN (H): Primary | ICD-10-CM

## 2020-04-16 DIAGNOSIS — E11.40 TYPE 2 DIABETES MELLITUS WITH DIABETIC NEUROPATHY, WITH LONG-TERM CURRENT USE OF INSULIN (H): Primary | ICD-10-CM

## 2020-04-16 NOTE — PROGRESS NOTES
Attempted first call for scheduled appointment and patient did not answer.     Called back 10 minutes later. Patient answered but says he's sleeping, can he call back? Provided [facility] scheduling line to reschedule appointment. Will pass patient info to [facility] team to follow up to reschedule as well.     Samantha Pak, RD, LD, CDE

## 2020-04-22 ENCOUNTER — TELEPHONE (OUTPATIENT)
Dept: EDUCATION SERVICES | Facility: CLINIC | Age: 60
End: 2020-04-22

## 2020-04-22 ENCOUNTER — ALLIED HEALTH/NURSE VISIT (OUTPATIENT)
Dept: EDUCATION SERVICES | Facility: CLINIC | Age: 60
End: 2020-04-22
Payer: COMMERCIAL

## 2020-04-22 DIAGNOSIS — E11.40 TYPE 2 DIABETES MELLITUS WITH DIABETIC NEUROPATHY, WITH LONG-TERM CURRENT USE OF INSULIN (H): Primary | Chronic | ICD-10-CM

## 2020-04-22 DIAGNOSIS — Z79.4 TYPE 2 DIABETES MELLITUS WITH DIABETIC NEUROPATHY, WITH LONG-TERM CURRENT USE OF INSULIN (H): Primary | Chronic | ICD-10-CM

## 2020-04-22 DIAGNOSIS — Z79.4 TYPE 2 DIABETES MELLITUS WITH DIABETIC NEUROPATHY, WITH LONG-TERM CURRENT USE OF INSULIN (H): Primary | ICD-10-CM

## 2020-04-22 DIAGNOSIS — E11.40 TYPE 2 DIABETES MELLITUS WITH DIABETIC NEUROPATHY, WITH LONG-TERM CURRENT USE OF INSULIN (H): Primary | ICD-10-CM

## 2020-04-22 PROCEDURE — 98968 PH1 ASSMT&MGMT NQHP 21-30: CPT

## 2020-04-22 RX ORDER — LIRAGLUTIDE 6 MG/ML
1.2 INJECTION SUBCUTANEOUS DAILY
Qty: 3 ML | Refills: 3 | Status: SHIPPED | OUTPATIENT
Start: 2020-04-22 | End: 2020-10-29

## 2020-04-22 NOTE — TELEPHONE ENCOUNTER
Clay Galloway,   Spoke with patient this morning. 85% of the time, per his Freestyle Jordy, he is above his target range of 100-140 mg/dL. In addition, recent A1C came back at 11.1%! He is also experiencing hypoglycemia weekly, down in the 50s. I'm hesitant to increase insulin dosing without more data. However, I think he would benefit from start of GLP1ra medication. Per EMR, it appears that Victoza once daily injection is on his formulary. I spoke with him about this medication and he is agreeable to trying it. I've pended orders for 1.2 mg/day dose, starting at 0.6 mg/day x first 7 days. Please sign off if you agree or provide an alternative plan.   Thank you!  Samantha Pak, YIFAN, LD, CDE

## 2020-04-22 NOTE — TELEPHONE ENCOUNTER
I have reviewed the orders and I agree with them. I have singed orders in Buckley absence.    Marita Tucker MD  [facility], [place]

## 2020-04-22 NOTE — Clinical Note
Clay Ruiz,   Patient requested another call in 1 week. I requested start of Victoza due to high A1C, majority high blood sugars but occasional episodes of hypoglycemia.Can you see how he is doing with this? I'm hoping we'll have linked him to your Jordy account by then so you can obtain a little more data and he can upload his reader.   Thanks!  Samantha

## 2020-04-22 NOTE — PROGRESS NOTES
"Diabetes Self-Management Education & Support    Telephone Visit for: Follow-up    SUBJECTIVE/OBJECTIVE:  Presents for: Follow-up  Accompanied by: Self  Diabetes education in the past 24mo: Yes(has had DB ~ 15 years, ed 5-6 years ago)  Focus of Visit: Monitoring, Taking Medication(He has a friend with CGM sensor Jordy- he is interested)  Diabetes type: Type 2  Date of diagnosis: 15 years ago  Disease course: Getting harder to manage(not taking care of it)  How confident are you filling out medical forms by yourself:: Not Assessed  Transportation concerns: No  Other concerns:: English as a second language  Cultural Influences/Ethnic Background:  [place]    Diabetes Symptoms & Complications:  Weight trend: Stable  Complications assessed today?: No(he has neuropathy)    Patient Problem List and Family Medical History reviewed for relevant medical history, current medical status, and diabetes risk factors.    Vitals:  There were no vitals taken for this visit.  Estimated body mass index is 22.96 kg/m  as calculated from the following:    Height as of 12/14/18: 1.778 m (5' 10\").    Weight as of 12/14/18: 72.6 kg (160 lb).   Last 3 BP:   BP Readings from Last 3 Encounters:   12/14/18 121/89   08/30/18 123/77   08/14/18 134/70       History   Smoking Status     Current Some Day Smoker     Packs/day: 0.50     Years: 15.00     Types: Cigarettes   Smokeless Tobacco     Never Used     Comment: quit 3/2017       Labs:  Lab Results   Component Value Date    A1C 11.1 04/14/2020     Lab Results   Component Value Date     04/14/2020     Lab Results   Component Value Date    LDL 90 04/14/2020     HDL Cholesterol   Date Value Ref Range Status   04/14/2020 45 >39 mg/dL Final   ]  GFR Estimate   Date Value Ref Range Status   04/14/2020 >90 >60 mL/min/[1.73_m2] Final     Comment:     Non  GFR Calc  Starting 12/18/2018, serum creatinine based estimated GFR (eGFR) will be   calculated using the Chronic Kidney Disease " Epidemiology Collaboration   (CKD-EPI) equation.       GFR Estimate If Black   Date Value Ref Range Status   04/14/2020 >90 >60 mL/min/[1.73_m2] Final     Comment:      GFR Calc  Starting 12/18/2018, serum creatinine based estimated GFR (eGFR) will be   calculated using the Chronic Kidney Disease Epidemiology Collaboration   (CKD-EPI) equation.       Lab Results   Component Value Date    CR 0.90 04/14/2020     No results found for: MICROALBUMIN    Healthy Eating:  Healthy Eating Assessed Today: Yes  Cultural/Restorationist diet restrictions?: No  Meals include: Breakfast, Dinner, Afternoon Snack  Breakfast: pizza bread with cheese, coffee  Lunch: smaller meal  Dinner: rice with fish or chicken or beef, frozen vegetables (wife cooks)  Beverages: Coffee, Water    Being Active:  Being Active Assessed Today: Yes  Exercise:: Currently not exercising  Barrier to exercise: None    Monitoring:  Monitoring Assessed Today: Yes(just started recently, did not have machine)  Did patient bring glucose meter to appointment? : Yes(offers report over phone)  Blood Glucose Meter: CGM(StudyMax)  Times checking blood sugar at home (number): 4(fasting only)  Times checking blood sugar at home (per): Day  Blood glucose trend: Fluctuating(Fasting (going): this , yesterday 157, 148, 91, 159, 142, 227)    Utilizing Freestyle Jordy and scanning ~4 times/day. He has difficulty reporting numbers back to writer but did report time in target numbers. His target range is 100-140. He also reports 2 low blood sugars - 50 on 4/15 and 55 on 4/21. Usually his fasting blood sugars are in the 180s to 200s by report. Discussed linking to [employer] website to allow for download of meter and he is agreeable. Plan to send him instruction via [employer] for this.     Above target 85%   Time in target 12% (100-140)  Below target 3%    Taking Medications:  Diabetes Medication(s)     Biguanides       metFORMIN (GLUCOPHAGE-XR) 500 MG 24 hr tablet     Take 1 tablet (500 mg) by mouth daily (with dinner) for 5 days, THEN 1 tablet (500 mg) 2 times daily (with meals) for 5 days, THEN 2 tablets (1,000 mg) 2 times daily (with meals).    Insulin       insulin glargine (LANTUS SOLOSTAR) 100 UNIT/ML pen    INJECT 20 UNITS SUBCUTANEOUSLY ONCE DAILY AND INCREASE DOSE BY 4 UNITS EVERY 3 DAYS UNTIL FASTING BG is . IF OVER 40 UNITS, SPLIT DOSE INTO TWO ADMINISTRATIONS DAILY.      Metformin 2-3 tablets/day, complains of constipation with this med?   Lantus 0-0-0-25    Taking Medication Assessed Today: Yes  Current Treatments: Insulin Injections  Dose schedule: Pre-dinner  Given by: Patient  Injection/Infusion sites: Thighs  Problems taking diabetes medications regularly?: No  Diabetes medication side effects?: Yes(self d/c'd metformin due to it decreasing appetite)    Problem Solving:  Problem Solving Assessed Today: Yes  Is the patient at risk for hypoglycemia?: Yes  Hypoglycemia Frequency: Weekly  Hypoglycemia Treatment: Candy, Other food  Medical ID: No  Is the patient at risk for DKA?: No    Reducing Risks:  Diabetes Risks: Age over 45 years, Ethnicity, Sedentary Lifestyle  CAD Risks: Diabetes Mellitus, Male sex, Sedentary lifestyle    Healthy Coping:  Healthy Coping Assessed Today: No  Emotional response to diabetes: Ready to learn, Acceptance, Confidence diabetes can be controlled  Informal Support system:: Family  Stage of change: PREPARATION (Decided to change - considering how)  Patient Activation Measure Survey Score:  No flowsheet data found.    Diabetes knowledge and skills assessment:   Patient is knowledgeable in diabetes management concepts related to: Healthy Eating, Monitoring and Problem Solving    Patient needs further education on the following diabetes management concepts: Healthy Eating, Being Active, Monitoring, Taking Medication, Problem Solving, Reducing Risks and Healthy Coping    Based on learning assessment above, most appropriate setting for  further diabetes education would be: Group class or Individual setting.      INTERVENTIONS:    Education provided today on:  AADE Self-Care Behaviors:  Being Active: relationship to blood glucose, describe appropriate activity program and precautions to take  Monitoring: individual blood glucose targets, frequency of monitoring and reviewed need to upload info to website on home computer, will send info via [facility]   Taking Medication: action of prescribed medication, drawing up, administering and storing injectable diabetes medications, side effects of prescribed medications, when to take medications and included info about GLP1ra meds, so he is aware if/when provider signs off on this start. Explained that with very high A1C, we need additional meds to control things. He is agreeable.   Problem Solving: low blood glucose - causes, signs/symptoms, treatment and prevention and carrying a carbohydrate source at all times  Reducing Risks: A1C - goals, relating to blood glucose levels, how often to check    Opportunities for ongoing education and support in diabetes-self management were discussed.    Pt verbalized understanding of concepts discussed and recommendations provided today.       Education Materials Provided:  No new materials provided today      ASSESSMENT:      Patient's most recent   Lab Results   Component Value Date    A1C 11.1 04/14/2020    is not meeting goal of <7.0    PLAN  Recommend initiation of GLP1ra - appears Victoza is covered on formulary per EMR - start at 0.6 mg/day x 7 days then increase to 1.2 mg/day dosing. Sent telephone encounter to provider this AM requesting this medication start.   Hold Lantus dosing at 25 units/day - patient plans to start exercising & has already experienced several episodes of hypoglycemia recently. With start of additional med, patient is at higher risk of hypoglycemia moving forward. Will continue to watch closely.    Link [employer] to [facility] account &  share with provider in order to better assess blood sugar trends   Follow up in 1 week, per patient request, to see how things are going then    Samantha Pak RD, LD, CDE   Time Spent: 24 minutes over the phone  Encounter Type: Individual    Any diabetes medication dose changes were made via the CDE Protocol and Collaborative Practice Agreement with the patient's referring provider. A copy of this encounter was shared with the provider.

## 2020-05-01 ENCOUNTER — TELEPHONE (OUTPATIENT)
Dept: EDUCATION SERVICES | Facility: CLINIC | Age: 60
End: 2020-05-01

## 2020-05-01 ENCOUNTER — ALLIED HEALTH/NURSE VISIT (OUTPATIENT)
Dept: EDUCATION SERVICES | Facility: CLINIC | Age: 60
End: 2020-05-01
Payer: COMMERCIAL

## 2020-05-01 DIAGNOSIS — E11.42 DIABETIC POLYNEUROPATHY ASSOCIATED WITH TYPE 2 DIABETES MELLITUS (H): ICD-10-CM

## 2020-05-01 DIAGNOSIS — Z79.4 TYPE 2 DIABETES MELLITUS WITH DIABETIC NEUROPATHY, WITH LONG-TERM CURRENT USE OF INSULIN (H): Chronic | ICD-10-CM

## 2020-05-01 DIAGNOSIS — E11.40 TYPE 2 DIABETES MELLITUS WITH DIABETIC NEUROPATHY, WITH LONG-TERM CURRENT USE OF INSULIN (H): Chronic | ICD-10-CM

## 2020-05-01 PROCEDURE — 98967 PH1 ASSMT&MGMT NQHP 11-20: CPT

## 2020-05-01 NOTE — TELEPHONE ENCOUNTER
Left a non-detailed message and call back number [phone].     Sent [employer] message to patient as well with NP response below.     Odilia Alejandro RN, BSN  [facility]

## 2020-05-01 NOTE — TELEPHONE ENCOUNTER
"Please call and triage Wyatt. His diabetes educator sent me this encounter stating that he was complaining about one of his toes feeling \"soft\" and \"not right.\" Let me know what he says. He may need to be seen in person (possibly at urgent care if it sounds bad enough). He has out-of-control diabetes, so a skin infection on his foot could be quite problematic, so we shouldn't let it get away.    Thanks.  "

## 2020-05-01 NOTE — TELEPHONE ENCOUNTER
Vish's diabetes educator sent Damon a message saying that he is feeling pain on one side of the toe. (Right big toe).  It is red and swollen and soft.   Not an open sore.  He doesn't feel it is related to shoes.    Not sick no chills or fever.   -180  This has been there 10 days.    He will send a pic to [facility] and then we will route to Damon Campos RN- Triage [employer]

## 2020-05-01 NOTE — TELEPHONE ENCOUNTER
"Dr. Dunn,   We are working with Wyatt on diabetes care which is gradually improving.   However, he reports today leg pain and in particular, \"toe is not right, it feels soft\".     Advised him to call you ASAP about the toe, suggested annettet a picture, but wanted to let you know in case you feel he needs to be seen somewhere urgently.     Will also enter this note as a phone call in hopes it reaches you soon.   Thank you, Deisi Traylor RD, LD, CDE      "

## 2020-05-01 NOTE — PROGRESS NOTES
"Diabetes Follow-up visit via telephone appointment.   Patient verbally consented to the telephone visit service today: yes    Subjective/Objective:    Wyatt Mahajan sent in blood glucose log for review. Last date of communication was: 4/22.    Diabetes is being managed with   Diabetes Medications   Diabetes Medication(s)     Biguanides       metFORMIN (GLUCOPHAGE-XR) 500 MG 24 hr tablet    Take 1 tablet (500 mg) by mouth daily (with dinner) for 5 days, THEN 1 tablet (500 mg) 2 times daily (with meals) for 5 days, THEN 2 tablets (1,000 mg) 2 times daily (with meals).    Insulin       insulin glargine (LANTUS SOLOSTAR) 100 UNIT/ML pen    INJECT 20 UNITS SUBCUTANEOUSLY ONCE DAILY AND INCREASE DOSE BY 4 UNITS EVERY 3 DAYS UNTIL FASTING BG is . IF OVER 40 UNITS, SPLIT DOSE INTO TWO ADMINISTRATIONS DAILY.    Incretin Mimetic Agents (GLP-1 Receptor Agonists)       liraglutide (VICTOZA) 18 MG/3ML solution    Inject 1.2 mg Subcutaneous daily Start with 0.6 mg dose x first 7 days then increase to 1.2 mg/day dose.        Confirmed taking 0.6mg Victoza and 25 units Lantus every night.     BG/Food Log: Uses Freestyle CGM system  April 18-May 1 data        Assessment:  Wyatt describes that he is taking the 0.6 mg dose of Vicotza and 25 units of Lantus every night. He says the Victoza made an immediate improvement in BG's.    He is fasting for Ramadan right now and this is the 8th day. 22 more days of fasting.   His meal and medication patterns have changed as the schedule has changed, he is eating between 8:30 PM and 4:30 AM only right now so taking the meds about the same times as meal/s.    Jordy pattern shows BG rising starting at 8:30 PM and elevated all night with eating. It does drop during the day and median is in goal between ~9AM and 8PM.     He denies symptoms of hypoglycemia, but does complain of legs getting pain and \"toe is not right, it feels soft\".   Confirmed he uses mychart and advised take a " picture and message PCP right away about toe.  I will also message PCP to alert to foot issue in patient with uncontrolled diabetes and neuropoathy.    Plan/Response:  No changes in the patient's current treatment plan given that he is fasting.     Recommended take Victoza earlier in the evening, before eating to see if that helps with high numbers overnight. DO NOT INCREASE to 1.2 mg at this time with BG dropping and no food during the day.     Scheduled follow up for 5/29, when he will have a week or more of Jordy CGM data showing food intake during the day. At that time, may need to change time or dose of medications.   He has accepted invitation to share Jordy data with provider.       Deisi Traylor RD, LD, CDE  Time spent 18 minutes      Any diabetes medication dose changes were made via the CDE Protocol and Collaborative Practice Agreement with the patient's referring provider. A copy of this encounter was shared with the provider.

## 2020-05-29 ENCOUNTER — ALLIED HEALTH/NURSE VISIT (OUTPATIENT)
Dept: EDUCATION SERVICES | Facility: CLINIC | Age: 60
End: 2020-05-29
Payer: COMMERCIAL

## 2020-05-29 DIAGNOSIS — E11.40 TYPE 2 DIABETES MELLITUS WITH DIABETIC NEUROPATHY (H): Primary | ICD-10-CM

## 2020-05-29 NOTE — PROGRESS NOTES
Patient was scheduled for phone follow up this morning to review Jordy report and make adjustments.   No changes were made at 5/01 visit as he was fasting for Ramadan.    Called at appointment time, 9:30, got voice mail. Left message that I would try again at 9:45, got voice mail again.     He had not uploaded to the Jordy site, so a reminder was sent this AM.    Left a message- Sara at [place], had you on my schedule for a follow up this morning to look at the Jordy report but we did not connect.   Please call (number left) to reschedule this visit so that we can take a look at the Jordy and make any adjustments if needed. Please do upload to the [email or web address] site.     Thank you!     Deisi Traylor RD, LD, CDE

## 2020-06-04 ENCOUNTER — TRANSFERRED RECORDS (OUTPATIENT)
Dept: HEALTH INFORMATION MANAGEMENT | Facility: CLINIC | Age: 60
End: 2020-06-04

## 2020-06-04 LAB — RETINOPATHY: POSITIVE

## 2020-06-23 ENCOUNTER — ALLIED HEALTH/NURSE VISIT (OUTPATIENT)
Dept: EDUCATION SERVICES | Facility: CLINIC | Age: 60
End: 2020-06-23
Payer: COMMERCIAL

## 2020-06-23 DIAGNOSIS — E11.40 TYPE 2 DIABETES MELLITUS WITH DIABETIC NEUROPATHY (H): Primary | ICD-10-CM

## 2020-06-23 NOTE — PROGRESS NOTES
"Patient has had a \"no show\" (not answering phone at appointment time) for past 2 appointments.   He has a Jordy CGM but has not connected to [email or web address] site to share data since 5/1/20.    Sent an email \"invite\" to share Jordy info before last appointment and this one, but no response.     Left phone message for Wyatt when he did not  for phone appointment today:  Sara at [place] calling, I had you on my schedule for a phone visit this afternoon.   To provide the best care, it would be helpful if you can connect to [employer] web site to share your report.   If you would like to schedule another appointment and share the information so we can discuss results, please call [phone].    Otherwise, I will try to reach out and connect with you once I am back in clinic so we can look at the reports, however, this may be September or October or possibly later.   Thank you.     Deisi Traylor RD, LD, CDE    "

## 2020-07-10 ENCOUNTER — OFFICE VISIT (OUTPATIENT)
Dept: FAMILY MEDICINE | Facility: CLINIC | Age: 60
End: 2020-07-10
Payer: COMMERCIAL

## 2020-07-10 VITALS
WEIGHT: 152.6 LBS | HEIGHT: 70 IN | TEMPERATURE: 96.5 F | SYSTOLIC BLOOD PRESSURE: 128 MMHG | OXYGEN SATURATION: 97 % | HEART RATE: 90 BPM | BODY MASS INDEX: 21.85 KG/M2 | DIASTOLIC BLOOD PRESSURE: 70 MMHG

## 2020-07-10 DIAGNOSIS — L03.031 PARONYCHIA OF TOE, RIGHT: ICD-10-CM

## 2020-07-10 DIAGNOSIS — I73.9 CLAUDICATION IN PERIPHERAL VASCULAR DISEASE (H): Primary | ICD-10-CM

## 2020-07-10 DIAGNOSIS — E11.40 TYPE 2 DIABETES MELLITUS WITH DIABETIC NEUROPATHY, WITH LONG-TERM CURRENT USE OF INSULIN (H): ICD-10-CM

## 2020-07-10 DIAGNOSIS — Z79.4 TYPE 2 DIABETES MELLITUS WITH DIABETIC NEUROPATHY, WITH LONG-TERM CURRENT USE OF INSULIN (H): ICD-10-CM

## 2020-07-10 DIAGNOSIS — I73.9 PVD (PERIPHERAL VASCULAR DISEASE) WITH CLAUDICATION (H): ICD-10-CM

## 2020-07-10 LAB — HBA1C MFR BLD: 9.5 % (ref 0–5.6)

## 2020-07-10 PROCEDURE — 36415 COLL VENOUS BLD VENIPUNCTURE: CPT | Performed by: NURSE PRACTITIONER

## 2020-07-10 PROCEDURE — 83036 HEMOGLOBIN GLYCOSYLATED A1C: CPT | Performed by: NURSE PRACTITIONER

## 2020-07-10 PROCEDURE — 99214 OFFICE O/P EST MOD 30 MIN: CPT | Performed by: NURSE PRACTITIONER

## 2020-07-10 RX ORDER — CEPHALEXIN 500 MG/1
500 CAPSULE ORAL 4 TIMES DAILY
Qty: 28 CAPSULE | Refills: 0 | Status: SHIPPED | OUTPATIENT
Start: 2020-07-10 | End: 2020-08-04

## 2020-07-10 RX ORDER — MUPIROCIN 20 MG/G
OINTMENT TOPICAL 3 TIMES DAILY
Qty: 15 G | Refills: 1 | Status: SHIPPED | OUTPATIENT
Start: 2020-07-10 | End: 2021-12-24

## 2020-07-10 ASSESSMENT — MIFFLIN-ST. JEOR: SCORE: 1518.44

## 2020-07-10 NOTE — PATIENT INSTRUCTIONS
Warm soaks three times a day followed by mupirocin ointment    Keflex 4 times a day with food for 7 days    See vascular    See diabetes education. I'll call you with A1c result.

## 2020-07-10 NOTE — PROGRESS NOTES
Subjective     Wyatt Mahajan is a 60 year old male who presents to clinic today for the following health issues:      Joint or Musculoskeletal Pain     Right leg 3 weeks pain full to walk     HPI: Wyatt presents today with two complaints.     1. He has been noting progressively-worsening pain in medial thigh and calf with walking. Also notes cold skin periodically. No color change. No swelling, heat, or redness. No increase in calf girth. Known PVD. Also has diabetes (not well-controlled).  He was following with vascular surgery for a few years, but he has not seen them recently. He states that these symptoms are identical in nature to those he was having in his left leg in 2016. Stenting was performed during that episode, and he has not had any recurrence of pain in that extremity. He states that there is no pain at rest and when leg is in dependent position. However, with walking, he notes progressively-worsening pain in his thigh, calf, and foot that is relieved only with rest. He is a current smoker. He takes a baby aspirin daily, as well as 40 mg Lipitor.     2. He has noted red, warm, tender skin on the lateral aspect of the nailbed of his right great toe. This has been going on for a few weeks. No drainage or bleeding. No obvious ingrown nail.       Patient Active Problem List   Diagnosis     External hemorrhoids     Cranial nerve III palsy     Hyperlipidemia LDL goal <100     Type 2 diabetes mellitus with diabetic neuropathy (HCC)     Low back pain     Lumbar radiculopathy     PVD (peripheral vascular disease) with claudication (H)     Ischemic vascular disease     Acute respiratory failure with hypoxia (H)     MSSA (methicillin susceptible Staphylococcus aureus) septicemia (H)     MSSA (methicillin susceptible Staphylococcus aureus) pneumonia (H)     Lumbago     Past Surgical History:   Procedure Laterality Date     COLONOSCOPY  10/27/16     COLONOSCOPY N/A 10/31/2016    Procedure: COLONOSCOPY;   "Surgeon: Pollo Lopez MD;  Location:  GI     [facility] UGI ENDOSCOPY W PLACEMENT GASTROSTOMY TUBE PERCUT N/A 4/4/2017    Procedure: COMBINED ESOPHAGOSCOPY, GASTROSCOPY, DUODENOSCOPY (EGD), PLACE PERCUTANEOUS ENDOSCOPIC GASTROSTOMY TUBE;  Surgeon: Marcial Obregon MD;  Location:  GI     NO HISTORY OF SURGERY       TRACHEOSTOMY N/A 4/4/2017    Procedure: TRACHEOSTOMY;  Surgeon: Jose Puga MD;  Location:  OR       Social History     Tobacco Use     Smoking status: Current Some Day Smoker     Packs/day: 0.50     Years: 15.00     Pack years: 7.50     Types: Cigarettes     Smokeless tobacco: Never Used     Tobacco comment: quit 3/2017   Substance Use Topics     Alcohol use: No     Family History   Problem Relation Age of Onset     Diabetes Mother      Diabetes Father      Diabetes Sister      Diabetes Brother      Hypertension No family hx of      Cerebrovascular Disease No family hx of      Prostate Cancer No family hx of      Cancer - colorectal No family hx of      Breast Cancer No family hx of            Reviewed and updated as needed this visit by Provider  Tobacco  Allergies  Meds  Problems  Med Hx  Surg Hx  Fam Hx         Review of Systems   Constitutional, cardiovascular, musculoskeletal, neuro, skin, endocrine systems are negative, except as otherwise noted.      Objective    /70   Pulse 90   Temp 96.5  F (35.8  C) (Tympanic)   Ht 1.778 m (5' 10\")   Wt 69.2 kg (152 lb 9.6 oz)   SpO2 97%   BMI 21.90 kg/m    Body mass index is 21.9 kg/m .  Physical Exam   GENERAL: healthy, alert and no distress  RESP: lungs clear to auscultation - no rales, rhonchi or wheezes  CV: regular rate and rhythm, normal S1 S2, no S3 or S4, no murmur, click or rub, no peripheral edema and peripheral pulses strong  MS: no gross musculoskeletal defects noted, no edema; Antalgic gait (favors right leg)  SKIN: Right great toe - lateral nail fold with swelling, redness, heat, and tenderness. Not " fluctuant. No drainage or bleeding. Nail intact without evidence of ingrowing.   NEURO: Normal strength and tone, mentation intact and speech normal    Diagnostic Test Results:  Labs reviewed in Epic  Results for orders placed or performed in visit on 07/10/20 (from the past 24 hour(s))   Hemoglobin A1c   Result Value Ref Range    Hemoglobin A1C 9.5 (H) 0 - 5.6 %           Assessment & Plan     Wyatt was seen today for musculoskeletal problem.    Diagnoses and all orders for this visit:    Claudication in peripheral vascular disease (H)  Comment: He is quite confident that this represents claudication related to vascular disease. Will have him see vascular surgery for further workup and to plan treatment. No red flags to suggest DVT or other potentially life-threatening etiology today. Encouraged him to gain better control over his DM, quit smoking, and ensure that he takes his statin and ASA everyday.   -     VASCULAR SURGERY REFERRAL; Future    PVD (peripheral vascular disease) with claudication (H)  Comment: He is quite confident that this represents claudication related to vascular disease. Will have him see vascular surgery for further workup and to plan treatment. No red flags to suggest DVT or other potentially life-threatening etiology today. Encouraged him to gain better control over his DM, quit smoking, and ensure that he takes his statin and ASA everyday.   -     VASCULAR SURGERY REFERRAL; Future    Type 2 diabetes mellitus with diabetic neuropathy, with long-term current use of insulin (H)  Comment: A1c has dropped to 9.5% (from over 11% a couple months ago). He has no-showed DM education recently, so I have encouraged him to re-establish a relationship with that group, so we can continue to make strides getting his A1c down. He agrees to do so.   -     Hemoglobin A1c    Paronychia of toe, right  Comment: No red flags. Appears to be simple paronychia. Given duration of symptoms, I will use  mupirocin, warm soaks, and Keflex for a week. He is to call if this is not contributing to improvement. No I & D indicated today (non-fluctuant). Discussed reasons to call or return to clinic. Wyatt acknowledges and demonstrates understanding of circumstances under which care should be sought urgently or emergently. Follow up as discussed. Discussed risks, benefits, alternatives, potential side effects, and proper administration of new medication / treatment. Agrees with plan of care. All questions answered.   -     cephALEXin (KEFLEX) 500 MG capsule; Take 1 capsule (500 mg) by mouth 4 times daily for 7 days  -     mupirocin (BACTROBAN) 2 % external ointment; Apply topically 3 times daily to right infected toe after warm soaks         Tobacco Cessation:   reports that he has been smoking cigarettes. He has a 7.50 pack-year smoking history. He has never used smokeless tobacco.  Tobacco Cessation Action Plan: Information offered: Patient not interested at this time        See Patient Instructions    Return in about 4 weeks (around 8/7/2020) for persistent or worsening symptoms.    Nilesh Ward NP  [facility]

## 2020-07-13 ENCOUNTER — OFFICE VISIT (OUTPATIENT)
Dept: OTHER | Facility: CLINIC | Age: 60
End: 2020-07-13
Attending: NURSE PRACTITIONER
Payer: COMMERCIAL

## 2020-07-13 VITALS
DIASTOLIC BLOOD PRESSURE: 76 MMHG | WEIGHT: 153 LBS | RESPIRATION RATE: 16 BRPM | BODY MASS INDEX: 21.9 KG/M2 | SYSTOLIC BLOOD PRESSURE: 144 MMHG | HEART RATE: 85 BPM | OXYGEN SATURATION: 98 % | HEIGHT: 70 IN

## 2020-07-13 DIAGNOSIS — I73.9 CLAUDICATION IN PERIPHERAL VASCULAR DISEASE (H): ICD-10-CM

## 2020-07-13 DIAGNOSIS — E78.5 HYPERLIPIDEMIA LDL GOAL <70: ICD-10-CM

## 2020-07-13 DIAGNOSIS — I73.9 PVD (PERIPHERAL VASCULAR DISEASE) WITH CLAUDICATION (H): ICD-10-CM

## 2020-07-13 DIAGNOSIS — R09.89 BILATERAL CAROTID BRUITS: Primary | ICD-10-CM

## 2020-07-13 PROCEDURE — G0463 HOSPITAL OUTPT CLINIC VISIT: HCPCS

## 2020-07-13 PROCEDURE — 99204 OFFICE O/P NEW MOD 45 MIN: CPT | Mod: ZP | Performed by: INTERNAL MEDICINE

## 2020-07-13 RX ORDER — ROSUVASTATIN CALCIUM 20 MG/1
20 TABLET, COATED ORAL DAILY
Qty: 30 TABLET | Refills: 3 | Status: SHIPPED | OUTPATIENT
Start: 2020-07-13 | End: 2022-01-17

## 2020-07-13 ASSESSMENT — MIFFLIN-ST. JEOR: SCORE: 1520.25

## 2020-07-13 NOTE — PROGRESS NOTES
"Wyatt Mahajan is a 60 year old male who presents for:  Chief Complaint   Patient presents with     RECHECK     WELLNESS SCREENING COMPLETED 7/10/2020 [facility]- NEW CONSULT -  Referred by Nilesh Ward, Claudication in peripheral vascular disease (H), PVD (peripheral vascular disease) with claudication (H). *vg        Vitals:    Vitals:    07/13/20 1338 07/13/20 1339   BP: (!) 148/88 (!) 163/85   BP Location: Right arm Left arm   Patient Position: Chair Chair   Cuff Size: Adult Regular Adult Regular   Pulse: 85    Resp: 16    SpO2: 98%    Weight: 153 lb (69.4 kg)    Height: 5' 10\" (1.778 m)        BMI:  Estimated body mass index is 21.95 kg/m  as calculated from the following:    Height as of this encounter: 5' 10\" (1.778 m).    Weight as of this encounter: 153 lb (69.4 kg).    Pain Score:  Data Unavailable        Mary Sutton, JENA    "

## 2020-07-13 NOTE — PROGRESS NOTES
Vascular Medicine Progress Note     Wyatt Mahajan is a 60 year old male who is here for PAD    Interval History   Patient is status post PAD, status post left lower extremity S/p left SFA and popliteal angioplasty, now patient is presenting with right lower extremity claudication, patient is having pain and claudication in the right lower extremity involving the back of the thigh and back of the calf if he walks for 20 steps patient denies any history of nocturnal pain yet he has occasional rest pain affecting his right big toe no skin ulceration no skin breakdown yet the patient did lose the pad of fat in the right big toe  Doppler ultrasound done at the bedside showed very weak signal almost absent on the DP artery, and the signal was monophasic bilaterally    The right big toe was kind of inflamed and tender to touch yet no ulceration or gangrene    Patient denies any history of chest pain shortness of breath or palpitations    Patient vascular risk factors includes  Age  Hypertension  Smoking patient is back to smoking again  Diabetes mellitus latest A1c was 9.5%  Hyperlipidemia, latest LDL was 90        Physical Exam       BP: (!) 144/76 Pulse: 85   Resp: 16 SpO2: 98 %      Vitals:    07/13/20 1338   Weight: 69.4 kg (153 lb)     Vital Signs with Ranges  Pulse:  [85] 85  Resp:  [16] 16  BP: (132-163)/(72-88) 144/76  SpO2:  [98 %] 98 %  [unfilled]    Constitutional: awake, alert, cooperative, no apparent distress, and appears stated age  Eyes: Lids and lashes normal, pupils equal, round and reactive to light, extra ocular muscles intact, sclera clear, conjunctiva normal  ENT: normocepalic, without obvious abnormality, oropharynx pink and moist  Hematologic / Lymphatic: no lymphadenopathy  Respiratory: No increased work of breathing, good air exchange, clear to auscultation bilaterally, no crackles or wheezing  Cardiovascular: regular rate and rhythm, normal S1 and S2 and no murmur noted  GI: Normal  bowel sounds, soft, non-distended, non-tender  Skin: no redness, warmth, or swelling, no rashes and no lesions  Musculoskeletal: There is no redness, warmth, or swelling of the joints.  Full range of motion noted.  Motor strength is 5 out of 5 all extremities bilaterally.  Tone is normal.  Neurologic: Awake, alert, oriented to name, place and time.  Cranial nerves II-XII are grossly intact.  Motor is 5 out of 5 bilaterally.    Neuropsychiatric:  Normal affect, memory, insight.  Pulses: Skin temperature appears to be normal bilaterally, no discoloration bilaterally apart from mild erythema at the base of the right big toe, absence of fat pad in the big toe on the right side, Doppler signals monophasic bilaterally very weak on the right side, could not palpate femoral pulses bilaterally  .  Bilateral carotid bruits appreciated.     Medications         Data   No results found for this or any previous visit (from the past 24 hour(s)).    Assessment & Plan   (R09.89) Bilateral carotid bruits  (primary encounter diagnosis)  Comment: Patient has PAD, with bilateral carotid bruit  Plan: US Carotid Bilateral        Plus vascular risk factors modifications, target A1c less than 7%, target LDL less than 70, target blood pressure 130/80, quit smoking    (I73.9) Claudication in peripheral vascular disease (H)  Comment: PAD with possible CLI of the right lower extremity  Plan: US ELOISA Doppler with Exercise Bilateral, CTA         Abdomen Pelvis Bilat Leg Runoff w Contr,         Follow-Up with Vascular Medicine, rosuvastatin         (CRESTOR) 20 MG tablet  With vascular risk factors modifications            (I73.9) PVD (peripheral vascular disease) with claudication (H)  Comment: Same as above  Plan: US ELOISA Doppler with Exercise Bilateral, CTA         Abdomen Pelvis Bilat Leg Runoff w Contr,         Follow-Up with Vascular Medicine              (E78.5) Hyperlipidemia LDL goal <70  Comment: Target LDL less than 70, change his Lipitor  to Crestor 20 mg  Plan: rosuvastatin (CRESTOR) 20 MG tablet                  Summary: We will see the patient once test results are available, patient will definitely need referral to vascular surgery/IR    Johnny Gonzalez MD

## 2020-07-14 ENCOUNTER — HOSPITAL ENCOUNTER (OUTPATIENT)
Dept: CT IMAGING | Facility: CLINIC | Age: 60
Discharge: HOME | End: 2020-07-14
Attending: INTERNAL MEDICINE | Admitting: INTERNAL MEDICINE
Payer: COMMERCIAL

## 2020-07-14 DIAGNOSIS — I73.9 CLAUDICATION IN PERIPHERAL VASCULAR DISEASE (H): ICD-10-CM

## 2020-07-14 DIAGNOSIS — I73.9 PVD (PERIPHERAL VASCULAR DISEASE) WITH CLAUDICATION (H): ICD-10-CM

## 2020-07-14 LAB
CREAT BLD-MCNC: 1 MG/DL (ref 0.66–1.25)
GFR SERPL CREATININE-BSD FRML MDRD: 77 ML/MIN/{1.73_M2}

## 2020-07-14 PROCEDURE — 82565 ASSAY OF CREATININE: CPT

## 2020-07-14 PROCEDURE — 25000128 H RX IP 250 OP 636: Performed by: INTERNAL MEDICINE

## 2020-07-14 PROCEDURE — 40000257 ZZH STATISTIC CONSULT NO CHARGE VASC ACCESS

## 2020-07-14 PROCEDURE — 40000556 ZZH STATISTIC PERIPHERAL IV START W US GUIDANCE

## 2020-07-14 PROCEDURE — 25000125 ZZHC RX 250: Performed by: INTERNAL MEDICINE

## 2020-07-14 PROCEDURE — 75635 CT ANGIO ABDOMINAL ARTERIES: CPT

## 2020-07-14 RX ORDER — IOPAMIDOL 755 MG/ML
100 INJECTION, SOLUTION INTRAVASCULAR ONCE
Status: COMPLETED | OUTPATIENT
Start: 2020-07-14 | End: 2020-07-14

## 2020-07-14 RX ADMIN — SODIUM CHLORIDE 80 ML: 9 INJECTION, SOLUTION INTRAVENOUS at 17:42

## 2020-07-14 RX ADMIN — IOPAMIDOL 100 ML: 755 INJECTION, SOLUTION INTRAVENOUS at 17:38

## 2020-07-16 ENCOUNTER — HOSPITAL ENCOUNTER (OUTPATIENT)
Dept: ULTRASOUND IMAGING | Facility: CLINIC | Age: 60
End: 2020-07-16
Attending: INTERNAL MEDICINE
Payer: COMMERCIAL

## 2020-07-16 ENCOUNTER — OFFICE VISIT (OUTPATIENT)
Dept: OTHER | Facility: CLINIC | Age: 60
End: 2020-07-16
Attending: INTERNAL MEDICINE
Payer: COMMERCIAL

## 2020-07-16 VITALS
OXYGEN SATURATION: 100 % | HEIGHT: 70 IN | DIASTOLIC BLOOD PRESSURE: 86 MMHG | BODY MASS INDEX: 21.76 KG/M2 | SYSTOLIC BLOOD PRESSURE: 142 MMHG | RESPIRATION RATE: 16 BRPM | HEART RATE: 74 BPM | WEIGHT: 152 LBS

## 2020-07-16 DIAGNOSIS — I73.9 CLAUDICATION IN PERIPHERAL VASCULAR DISEASE (H): ICD-10-CM

## 2020-07-16 DIAGNOSIS — I73.9 PVD (PERIPHERAL VASCULAR DISEASE) WITH CLAUDICATION (H): Primary | ICD-10-CM

## 2020-07-16 DIAGNOSIS — M79.671 PAIN IN BOTH FEET: ICD-10-CM

## 2020-07-16 DIAGNOSIS — I73.9 CLAUDICATION IN PERIPHERAL VASCULAR DISEASE (H): Primary | ICD-10-CM

## 2020-07-16 DIAGNOSIS — R09.89 BILATERAL CAROTID BRUITS: ICD-10-CM

## 2020-07-16 DIAGNOSIS — M79.672 PAIN IN BOTH FEET: ICD-10-CM

## 2020-07-16 DIAGNOSIS — I73.9 PVD (PERIPHERAL VASCULAR DISEASE) WITH CLAUDICATION (H): ICD-10-CM

## 2020-07-16 PROCEDURE — 93922 UPR/L XTREMITY ART 2 LEVELS: CPT

## 2020-07-16 PROCEDURE — G0463 HOSPITAL OUTPT CLINIC VISIT: HCPCS

## 2020-07-16 PROCEDURE — 93926 LOWER EXTREMITY STUDY: CPT | Mod: RT

## 2020-07-16 PROCEDURE — 93880 EXTRACRANIAL BILAT STUDY: CPT

## 2020-07-16 PROCEDURE — 99215 OFFICE O/P EST HI 40 MIN: CPT | Mod: ZP | Performed by: INTERNAL MEDICINE

## 2020-07-16 ASSESSMENT — MIFFLIN-ST. JEOR: SCORE: 1515.72

## 2020-07-16 NOTE — PROGRESS NOTES
SUBJECTIVE:    CC: Wyatt Mahajan is a 60 year old male who presents for:    Claudication in peripheral vascular disease (H)     HPI: Wyatt Mahajan  is a 60 year old poorly glycemically controlled type 2 diabetic with poorly controlled hyperlipidemia who unfortunately continues to smoke. He has moderate asymptomatic carotid disease as well as a known PAD. In 2016, Dr. Vish Mahajan stented his Rt BRAIN. He was lost to f/u. His PCP noted his claudication has worsened into evolving rest pain with superficial great toe skin sloughing and referred him to my partner Dr. Gonzalez who saw the patient recently and proceeded to order a LE runoff CTA, and stress ABIs. The CTA revealed he has a now occluded Rt BRAIN stent, as well as occluded bilateral SFAs. He has two vessel runoff on the right via PT and peroneal. ELAINA is occluded. ABIs today revealed no infrageniculate RLE flow which is new in comparison to his CTA of two days ago, and a value of .46 on the left with monophasic distal flow. As this was a new finding, he was placed on my schedule in Dr. Gonzalez's absence (working at a different site today). I obtained RLE duplex revealing only very sluggish infrageniculate flow.            HISTORIES:    PROBLEM LIST:                   Patient Active Problem List   Diagnosis     External hemorrhoids     Cranial nerve III palsy     Hyperlipidemia LDL goal <100     Type 2 diabetes mellitus with diabetic neuropathy (HCC)     Low back pain     Lumbar radiculopathy     PVD (peripheral vascular disease) with claudication (H)     Ischemic vascular disease     Acute respiratory failure with hypoxia (H)     MSSA (methicillin susceptible Staphylococcus aureus) septicemia (H)     MSSA (methicillin susceptible Staphylococcus aureus) pneumonia (H)     Lumbago       PAST MEDICAL HISTORY:                  Past Medical History:   Diagnosis Date     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    eval by optho, considered be related to microvascular problem  ie DM     Influenza B 4/1/2017     Mixed hyperlipidemia 3/04     MSSA (methicillin susceptible Staphylococcus aureus) pneumonia (H) 4/1/2017     MSSA (methicillin susceptible Staphylococcus aureus) septicemia (H) 4/1/2017     PVD (peripheral vascular disease) with claudication (H) 10/12/2015     Tobacco use disorder QUIT 9/08    smokes for stress     Type II or unspecified type diabetes mellitus with neurological manifestations, not stated as uncontrolled(250.60) (H) 6/23/2014       PAST SURGICAL HISTORY:                  Past Surgical History:   Procedure Laterality Date     COLONOSCOPY  10/27/16     COLONOSCOPY N/A 10/31/2016    Procedure: COLONOSCOPY;  Surgeon: Pollo Lopez MD;  Location:  GI     HC UGI ENDOSCOPY W PLACEMENT GASTROSTOMY TUBE PERCUT N/A 4/4/2017    Procedure: COMBINED ESOPHAGOSCOPY, GASTROSCOPY, DUODENOSCOPY (EGD), PLACE PERCUTANEOUS ENDOSCOPIC GASTROSTOMY TUBE;  Surgeon: Marcial Obregon MD;  Location:  GI     NO HISTORY OF SURGERY       TRACHEOSTOMY N/A 4/4/2017    Procedure: TRACHEOSTOMY;  Surgeon: Jose Puga MD;  Location:  OR       CURRENT MEDICATIONS:                  Current Outpatient Medications   Medication Sig Dispense Refill     acyclovir (ZOVIRAX) 400 MG tablet Take 1 tablet (400 mg) by mouth 3 times daily 30 tablet 0     alcohol swab prep pads Use to swab area of injection/becky as directed. 100 each 3     ASPIRIN 81 MG OR TABS 1 tab per day 100 3     atorvastatin (LIPITOR) 40 MG tablet Take 1 tablet (40 mg) by mouth daily 90 tablet 3     blood glucose (NO BRAND SPECIFIED) test strip Use to test blood sugar 2 times daily or as directed. To accompany: Blood Glucose Monitor Brands: per insurance. 100 strip 6     blood glucose calibration (NO BRAND SPECIFIED) solution To accompany: Blood Glucose Monitor Brands: per insurance. 1 Bottle 3     blood glucose monitoring (NO BRAND SPECIFIED) meter device kit Use to test blood sugar 2 times daily or as  directed. Preferred blood glucose meter OR supplies to accompany: Blood Glucose Monitor Brands: per insurance. 1 kit 0     cephALEXin (KEFLEX) 500 MG capsule Take 1 capsule (500 mg) by mouth 4 times daily for 7 days 28 capsule 0     Continuous Blood Gluc  (FREESTYLE VARSHA 14 DAY READER) ABDIAZIZ Use to read blood sugars at least 4 times daily 1 each 0     Continuous Blood Gluc Sensor (FREESTYLE VARSHA 14 DAY SENSOR) MISC Change every 14 days. 2 each 11     divalproex sodium delayed-release (DEPAKOTE) 250 MG DR tablet Take 1 tablet (250 mg) by mouth 2 times daily 30 tablet 0     gabapentin (NEURONTIN) 300 MG capsule SEE NOTES 90 capsule 0     insulin glargine (LANTUS SOLOSTAR) 100 UNIT/ML pen He INJECTS 25 UNITS SUBCUTANEOUSLY ONCE DAILY IN EVENING (has not: INCREASE DOSE BY 4 UNITS EVERY 3 DAYS UNTIL FASTING BG is . IF OVER 40 UNITS, SPLIT DOSE INTO TWO ADMINISTRATIONS DAILY) 15 mL 3     insulin pen needle (32G X 4 MM) 32G X 4 MM miscellaneous Use 2 pen needles daily or as directed. 100 each 4     insulin pen needle (B-D U/F) 31G X 8 MM miscellaneous Use 1 pen needles daily or as directed. 100 each 1     liraglutide (VICTOZA) 18 MG/3ML solution Inject 1.2 mg Subcutaneous daily Start with 0.6 mg dose x first 7 days then increase to 1.2 mg/day dose. 3 mL 3     mupirocin (BACTROBAN) 2 % external ointment Apply topically 3 times daily to right infected toe after warm soaks 15 g 1     rosuvastatin (CRESTOR) 20 MG tablet Take 1 tablet (20 mg) by mouth daily 30 tablet 3     SUMAtriptan (IMITREX) 25 MG tablet Take 1-2 tablets (25-50 mg) by mouth at onset of headache for migraine May repeat in 2 hours. Max 8 tablets/24 hours. 9 tablet 3     thin (NO BRAND SPECIFIED) lancets Use with lanceting device. To accompany: Blood Glucose Monitor Brands: per insurance. 100 each 6     metFORMIN (GLUCOPHAGE-XR) 500 MG 24 hr tablet Take 1 tablet (500 mg) by mouth daily (with dinner) for 5 days, THEN 1 tablet (500 mg) 2 times  daily (with meals) for 5 days, THEN 2 tablets (1,000 mg) 2 times daily (with meals). 375 tablet 0       ALLERGIES:                  Allergies   Allergen Reactions     No Known Drug Allergies        SOCIAL HISTORY:                  Social History     Socioeconomic History     Marital status:      Spouse name: Mary     Number of children: 7     Years of education: 16     Highest education level: Not on file   Occupational History     Occupation:  store     Employer: [employer]   Social Needs     Financial resource strain: Not on file     Food insecurity     Worry: Not on file     Inability: Not on file     Transportation needs     Medical: Not on file     Non-medical: Not on file   Tobacco Use     Smoking status: Current Some Day Smoker     Packs/day: 0.50     Years: 15.00     Pack years: 7.50     Types: Cigarettes     Smokeless tobacco: Never Used     Tobacco comment: quit 3/2017   Substance and Sexual Activity     Alcohol use: No     Drug use: No     Sexual activity: Yes     Partners: Female   Lifestyle     Physical activity     Days per week: Not on file     Minutes per session: Not on file     Stress: Not on file   Relationships     Social connections     Talks on phone: Not on file     Gets together: Not on file     Attends [employer]ism service: Not on file     Active member of club or organization: Not on file     Attends meetings of clubs or organizations: Not on file     Relationship status: Not on file     Intimate partner violence     Fear of current or ex partner: Not on file     Emotionally abused: Not on file     Physically abused: Not on file     Forced sexual activity: Not on file   Other Topics Concern      Service No     Blood Transfusions No     Caffeine Concern No     Comment: 1 coffee,pop 1 time weekly     Occupational Exposure No     Hobby Hazards No     Sleep Concern No     Stress Concern No     Weight Concern Yes     Special Diet No     Back Care No     Exercise  "No     Bike Helmet No     Comment: No Bike     Seat Belt Yes     Self-Exams No     Parent/sibling w/ CABG, MI or angioplasty before 65F 55M? No   Social History Narrative    moved here from [facility] [facility], moved here 1990           FAMILY HISTORY:                   Family History   Problem Relation Age of Onset     Diabetes Mother      Diabetes Father      Diabetes Sister      Diabetes Brother      Hypertension No family hx of      Cerebrovascular Disease No family hx of      Prostate Cancer No family hx of      Cancer - colorectal No family hx of      Breast Cancer No family hx of                              REVIEW OF SYSTEMS:  CONSTITUTIONAL:no malaise, fatigue, or other general symptoms  EYES: no subjective changes in visual acuity, no photophobia  ENT/MOUTH: no complaints of rhinorrhea, nasal congestion, sore throat, hearing changes  RESP:no SOB  CV: no c/o exertional chest pressure or BERGER and RLE ischemic rest pain as above  GI: No abdominal pain, constipation, change in bowel movements, nausea, pyrosis, BRBPR  :no polyuria or polydipsia, no dysuria, no gross hematuria  MUSCULOSKELATAL:no arthalgias or myalgias  INTEGUMENTARY/SKIN: no pruritis, rashes, or moles with recent change in size, shape, or pigmentation  BREAST: no lumps, masses, or discharges  NEURO: no gross sensory or motor symptoms, no dizziness, no confusion  ENDOCRINE: no polyuria or polydipsia, no heat or cold intolerance  HEME/ALLERGY/IMMUNE: no fevers, chills, night sweats, or unwanted weight loss  PSYCHIATRIC: no depression, anxiety, or internal stimuli    EXAM:    BP (!) 142/86 (BP Location: Left arm, Patient Position: Chair, Cuff Size: Adult Regular)   Pulse 74   Resp 16   Ht 5' 10\" (1.778 m)   Wt 152 lb (68.9 kg)   SpO2 100%   BMI 21.81 kg/m    BMI: Body mass index is 21.81 kg/m .  GENERAL APPEARANCE: healthy, alert and no distress   EXAM:  EYES: clear conjunctiva, no cataracts, no obvious fundoscopic abnormalities  HENT: oropharynx, " nares, and TMs are WNL  NECK: no JVD, thyromegaly or lymphadenopathy, no cervical bruits  RESP: clear to auscultation without rales, wheezes, or rhonchi  CV: RRR, no murmurs, gallops, or rubs  LYMPH: no cervical , axillary, or inguinal lymphadenopathy appreciated  GI: NABS, ND/NT, no masses or organomegally appreciated  MS: no obvoius clinicallly relevant arthropathy, no evidence vasculitis; superficial right great toe skin sloughing  SKIN: no nevi clinically suspicious for malignancy are noted  NEURO: CN II-XII intact, no localizing sensory or motor abnoramlities noted, DTRs symmetrical bilaterally  PSYCH: Mental status exam reveals the pt to have normal mood and affect. There is no disorder of thought form or content. There is no response to internal stimuli. There is no suicidal or homicidal ideation.    Rt femoral:   NP  Rt popliteal: NP  Rt DP:  NP, ND  Rt PT:  NP, ND    Lt femoral:   NP  Lt popliteal: NP  Lt DP:  NP, MP Dopplerable  Lt PT:  NP, MP Dopplerable    Of note, motor and sensory are intact to the bilateral feet. His feet are not cool to the touch and are presntly tenuously perfused.                                         I have discussed with patient the risks, benefits, medications, treatment options and modalities.   I have instructed the patient to call or schedule a follow-up appointment if any problems or failure to improve.      A/P:    (I73.9) Short distance lifestyle limiting claudication w/ evolving RLE ischemic rest pain in a still smoking hypertensive hyperlipidemic poorly contorlled type 2 diabetic (H)  (primary encounter diagnosis)    Comment: Of note, motor and sensory are intact to the bilateral feet. I have advised the patient to be admitted, placed on an IV UFH gtt overnight, and proceed to angio first thing in the AM. He needs an attempt at occluded Rt BRAIN stent recanalization. He will then likely need a Rt fem pop followed by a left fem pop in the future once he recovers form RLE  revascularization.    Plan: The patient is competent to make his own decisions and refused admission tonight. I told him I disagreed with his decision. I had a elis discussion with him stating that IV UFH may help stabilize a presently tenuous situation. I am concerned he is at risk of limb loss and informed him of this. His leg is not acutely threatened at present however given his intact motor and sensory function. He is perfusing his distal RLE via collateral flow, but this is tenuous at best. He needs to stop smoking and achieve better atherosclerotic risk factor control. See Dr. Gonzalez's last note. I will obtain an angio on him tomorrow. He knows to go to the ED if he develops a cool extremity overnight.      Greater than one half the forty minutes total spent on the pt's visit were spent providing education and counselling to the patient regarding the above matters. Extended time secondary to new patient to me, acute evolving changes as above, need to coordinate admission which pt then refused, and need to coordinate next day angiogram .

## 2020-07-16 NOTE — PROGRESS NOTES
"Wyatt Mahajan is a 60 year old male who presents for:  No chief complaint on file.       Vitals:    Vitals:    07/16/20 1509 07/16/20 1515 07/16/20 1516 07/16/20 1517   BP: (!) 148/86 (!) 152/85 (!) 140/84 (!) 142/86   BP Location: Right arm Left arm Right arm Left arm   Patient Position: Chair Chair Chair Chair   Cuff Size: Adult Regular Adult Regular Adult Regular Adult Regular   Pulse: 74      Resp: 16      SpO2: 100%      Weight: 152 lb (68.9 kg)      Height: 5' 10\" (1.778 m)          BMI:  Estimated body mass index is 21.81 kg/m  as calculated from the following:    Height as of this encounter: 5' 10\" (1.778 m).    Weight as of this encounter: 152 lb (68.9 kg).    Pain Score:  Data Unavailable        Mary Sutton CMA    "

## 2020-07-17 ENCOUNTER — TELEPHONE (OUTPATIENT)
Dept: OTHER | Facility: CLINIC | Age: 60
End: 2020-07-17

## 2020-07-17 ENCOUNTER — HOSPITAL ENCOUNTER (OUTPATIENT)
Facility: CLINIC | Age: 60
Discharge: HOME | End: 2020-07-17
Attending: RADIOLOGY | Admitting: RADIOLOGY
Payer: COMMERCIAL

## 2020-07-17 ENCOUNTER — APPOINTMENT (OUTPATIENT)
Dept: INTERVENTIONAL RADIOLOGY/VASCULAR | Facility: CLINIC | Age: 60
End: 2020-07-17
Attending: INTERNAL MEDICINE
Payer: COMMERCIAL

## 2020-07-17 VITALS
WEIGHT: 152.7 LBS | SYSTOLIC BLOOD PRESSURE: 144 MMHG | OXYGEN SATURATION: 100 % | HEIGHT: 70 IN | HEART RATE: 83 BPM | DIASTOLIC BLOOD PRESSURE: 66 MMHG | TEMPERATURE: 97.7 F | RESPIRATION RATE: 16 BRPM | BODY MASS INDEX: 21.86 KG/M2

## 2020-07-17 DIAGNOSIS — I99.8 ISCHEMIC VASCULAR DISEASE: Primary | ICD-10-CM

## 2020-07-17 DIAGNOSIS — I73.9 PVD (PERIPHERAL VASCULAR DISEASE) WITH CLAUDICATION (H): ICD-10-CM

## 2020-07-17 LAB
APTT PPP: 29 SEC (ref 22–37)
CHOLEST SERPL-MCNC: 195 MG/DL
ERYTHROCYTE [DISTWIDTH] IN BLOOD BY AUTOMATED COUNT: 13.2 % (ref 10–15)
HCT VFR BLD AUTO: 46.2 % (ref 40–53)
HDLC SERPL-MCNC: 43 MG/DL
HGB BLD-MCNC: 15.8 G/DL (ref 13.3–17.7)
INR PPP: 1.04 (ref 0.86–1.14)
LDLC SERPL CALC-MCNC: 136 MG/DL
MCH RBC QN AUTO: 31.5 PG (ref 26.5–33)
MCHC RBC AUTO-ENTMCNC: 34.2 G/DL (ref 31.5–36.5)
MCV RBC AUTO: 92 FL (ref 78–100)
NONHDLC SERPL-MCNC: 152 MG/DL
PLATELET # BLD AUTO: 159 10E9/L (ref 150–450)
RBC # BLD AUTO: 5.02 10E12/L (ref 4.4–5.9)
TRIGL SERPL-MCNC: 80 MG/DL
WBC # BLD AUTO: 6.2 10E9/L (ref 4–11)

## 2020-07-17 PROCEDURE — 25000125 ZZHC RX 250: Performed by: NURSE PRACTITIONER

## 2020-07-17 PROCEDURE — 85610 PROTHROMBIN TIME: CPT | Performed by: RADIOLOGY

## 2020-07-17 PROCEDURE — C1769 GUIDE WIRE: HCPCS

## 2020-07-17 PROCEDURE — 27210886 ZZH ACCESSORY CR5

## 2020-07-17 PROCEDURE — 27210805 ZZH SHEATH CR4

## 2020-07-17 PROCEDURE — 25500064 ZZH RX 255 OP 636: Performed by: RADIOLOGY

## 2020-07-17 PROCEDURE — 37221 IR LOWER EXTREMITY ANGIOGRAM RIGHT: CPT

## 2020-07-17 PROCEDURE — 40000853 ZZH STATISTIC ANGIOGRAM, STENT, VERTEBRO PLASTY

## 2020-07-17 PROCEDURE — 85730 THROMBOPLASTIN TIME PARTIAL: CPT | Performed by: RADIOLOGY

## 2020-07-17 PROCEDURE — 25000128 H RX IP 250 OP 636: Performed by: NURSE PRACTITIONER

## 2020-07-17 PROCEDURE — 80061 LIPID PANEL: CPT | Performed by: RADIOLOGY

## 2020-07-17 PROCEDURE — 25000128 H RX IP 250 OP 636: Performed by: RADIOLOGY

## 2020-07-17 PROCEDURE — 27210742 ZZH CATH CR1

## 2020-07-17 PROCEDURE — 85027 COMPLETE CBC AUTOMATED: CPT | Performed by: RADIOLOGY

## 2020-07-17 PROCEDURE — 36415 COLL VENOUS BLD VENIPUNCTURE: CPT

## 2020-07-17 PROCEDURE — 25000132 ZZH RX MED GY IP 250 OP 250 PS 637: Performed by: NURSE PRACTITIONER

## 2020-07-17 PROCEDURE — 27210845 ZZH DEVICE INFLATION CR5

## 2020-07-17 RX ORDER — NALOXONE HYDROCHLORIDE 0.4 MG/ML
.1-.4 INJECTION, SOLUTION INTRAMUSCULAR; INTRAVENOUS; SUBCUTANEOUS
Status: DISCONTINUED | OUTPATIENT
Start: 2020-07-17 | End: 2020-07-17 | Stop reason: HOSPADM

## 2020-07-17 RX ORDER — CLOPIDOGREL 300 MG/1
300 TABLET, FILM COATED ORAL ONCE
Status: COMPLETED | OUTPATIENT
Start: 2020-07-17 | End: 2020-07-17

## 2020-07-17 RX ORDER — HYDRALAZINE HYDROCHLORIDE 20 MG/ML
10 INJECTION INTRAMUSCULAR; INTRAVENOUS ONCE
Status: COMPLETED | OUTPATIENT
Start: 2020-07-17 | End: 2020-07-17

## 2020-07-17 RX ORDER — SODIUM CHLORIDE 9 MG/ML
INJECTION, SOLUTION INTRAVENOUS CONTINUOUS
Status: DISCONTINUED | OUTPATIENT
Start: 2020-07-17 | End: 2020-07-17 | Stop reason: HOSPADM

## 2020-07-17 RX ORDER — LIDOCAINE 40 MG/G
CREAM TOPICAL
Status: DISCONTINUED | OUTPATIENT
Start: 2020-07-17 | End: 2020-07-17 | Stop reason: HOSPADM

## 2020-07-17 RX ORDER — HEPARIN SODIUM 200 [USP'U]/100ML
1 INJECTION, SOLUTION INTRAVENOUS CONTINUOUS PRN
Status: DISCONTINUED | OUTPATIENT
Start: 2020-07-17 | End: 2020-07-17 | Stop reason: HOSPADM

## 2020-07-17 RX ORDER — DEXTROSE MONOHYDRATE 25 G/50ML
25-50 INJECTION, SOLUTION INTRAVENOUS
Status: DISCONTINUED | OUTPATIENT
Start: 2020-07-17 | End: 2020-07-17 | Stop reason: HOSPADM

## 2020-07-17 RX ORDER — CEFAZOLIN SODIUM 2 G/100ML
2 INJECTION, SOLUTION INTRAVENOUS ONCE
Status: COMPLETED | OUTPATIENT
Start: 2020-07-17 | End: 2020-07-17

## 2020-07-17 RX ORDER — CLOPIDOGREL BISULFATE 75 MG/1
75 TABLET ORAL DAILY
Qty: 90 TABLET | Refills: 4 | Status: SHIPPED | OUTPATIENT
Start: 2020-07-17 | End: 2022-01-17

## 2020-07-17 RX ORDER — NICOTINE POLACRILEX 4 MG
15-30 LOZENGE BUCCAL
Status: DISCONTINUED | OUTPATIENT
Start: 2020-07-17 | End: 2020-07-17 | Stop reason: HOSPADM

## 2020-07-17 RX ORDER — FLUMAZENIL 0.1 MG/ML
0.2 INJECTION, SOLUTION INTRAVENOUS
Status: DISCONTINUED | OUTPATIENT
Start: 2020-07-17 | End: 2020-07-17 | Stop reason: HOSPADM

## 2020-07-17 RX ORDER — FENTANYL CITRATE 50 UG/ML
25-50 INJECTION, SOLUTION INTRAMUSCULAR; INTRAVENOUS EVERY 5 MIN PRN
Status: DISCONTINUED | OUTPATIENT
Start: 2020-07-17 | End: 2020-07-17 | Stop reason: HOSPADM

## 2020-07-17 RX ORDER — HEPARIN SODIUM 1000 [USP'U]/ML
500-6000 INJECTION, SOLUTION INTRAVENOUS; SUBCUTANEOUS
Status: COMPLETED | OUTPATIENT
Start: 2020-07-17 | End: 2020-07-17

## 2020-07-17 RX ORDER — ACETAMINOPHEN 500 MG
500 TABLET ORAL EVERY 6 HOURS PRN
Status: DISCONTINUED | OUTPATIENT
Start: 2020-07-17 | End: 2020-07-17 | Stop reason: HOSPADM

## 2020-07-17 RX ORDER — IODIXANOL 320 MG/ML
150 INJECTION, SOLUTION INTRAVASCULAR ONCE
Status: COMPLETED | OUTPATIENT
Start: 2020-07-17 | End: 2020-07-17

## 2020-07-17 RX ADMIN — HEPARIN SODIUM 3000 UNITS: 1000 INJECTION INTRAVENOUS; SUBCUTANEOUS at 14:22

## 2020-07-17 RX ADMIN — FENTANYL CITRATE 50 MCG: 50 INJECTION, SOLUTION INTRAMUSCULAR; INTRAVENOUS at 14:08

## 2020-07-17 RX ADMIN — IODIXANOL 36 ML: 320 INJECTION, SOLUTION INTRAVASCULAR at 14:52

## 2020-07-17 RX ADMIN — CEFAZOLIN SODIUM 2 G: 2 INJECTION, SOLUTION INTRAVENOUS at 14:26

## 2020-07-17 RX ADMIN — HYDRALAZINE HYDROCHLORIDE 10 MG: 20 INJECTION INTRAMUSCULAR; INTRAVENOUS at 17:37

## 2020-07-17 RX ADMIN — MIDAZOLAM HYDROCHLORIDE 1 MG: 1 INJECTION, SOLUTION INTRAMUSCULAR; INTRAVENOUS at 14:08

## 2020-07-17 RX ADMIN — HEPARIN SODIUM 1 BAG: 200 INJECTION, SOLUTION INTRAVENOUS at 14:57

## 2020-07-17 RX ADMIN — FENTANYL CITRATE 25 MCG: 50 INJECTION, SOLUTION INTRAMUSCULAR; INTRAVENOUS at 14:32

## 2020-07-17 RX ADMIN — CLOPIDOGREL BISULFATE 300 MG: 300 TABLET, FILM COATED ORAL at 15:39

## 2020-07-17 RX ADMIN — MIDAZOLAM HYDROCHLORIDE 0.5 MG: 1 INJECTION, SOLUTION INTRAMUSCULAR; INTRAVENOUS at 14:16

## 2020-07-17 RX ADMIN — LIDOCAINE HYDROCHLORIDE 5 ML: 10 INJECTION, SOLUTION INFILTRATION; PERINEURAL at 14:22

## 2020-07-17 RX ADMIN — FENTANYL CITRATE 25 MCG: 50 INJECTION, SOLUTION INTRAMUSCULAR; INTRAVENOUS at 14:15

## 2020-07-17 RX ADMIN — HEPARIN SODIUM 1 BAG: 200 INJECTION, SOLUTION INTRAVENOUS at 14:56

## 2020-07-17 RX ADMIN — MIDAZOLAM HYDROCHLORIDE 0.5 MG: 1 INJECTION, SOLUTION INTRAMUSCULAR; INTRAVENOUS at 14:32

## 2020-07-17 ASSESSMENT — MIFFLIN-ST. JEOR: SCORE: 1518.89

## 2020-07-17 NOTE — PROGRESS NOTES
1600- pt has his call light on and states he is bleeding.  Right groin site is bleeding- manual pressure held for approx. 10 minutes and thrombix and tegaderm applied.  Restart 5 hour bedrest so pt up at 2100 and dsicharge at 2130 if groin site stable.  Monitor.    1700- Dr Munroe notified of patient bp 178/90.  Ordered 10mg IV hydralazine and given at 1737.  BP decreased to 140/77 at 1800.      1800-  AVS reviewed with patient and given new RX of plavix to start tomorrow.  Given new stent card.  Patient and wife verbalize understanding.     1815- Report called to [facility] [address] [facility] and bedside handoff and groin site check done.

## 2020-07-17 NOTE — PROGRESS NOTES
Care Suites Admission Nursing Note    Patient Information  Name: Wyatt Mahajan  Age: 60 year old  Reason for admission: Right leg angiogram  Care Suites arrival time: 1115    Patient Admission/Assessment   Pre-procedure assessment complete: Yes  If abnormal assessment/labs, provider notified: Yes, Gloria GARCÍA NP notified that patient did not have a COVID test prior to procedure.  NPO: Yes  Medications held per instructions/orders: N/A  Consent: obtained  If applicable, pregnancy test status: deferred  Patient oriented to room: Yes  Education/questions answered: Yes  Plan/other: Proceed as scheduled.    Discharge Planning  Accompanied by: Mary-spouse  Discharge name/phone number: Mary [phone]  Overnight post sedation caregiver: Mary  Discharge location: home vs overnight in hospital    Cherri Young RN

## 2020-07-17 NOTE — TELEPHONE ENCOUNTER
----- Message from Ashley Darnell [facility], TERRENCE CNP sent at 7/17/2020  3:20 PM CDT -----  Hi,   I'm not sure who to send this to but Keyshawn (he said to call him that) should f/up with Dr Gonzalez in 2 weeks. He had an angiogram and his BRAIN stent was opened today but he has a long SFA occlusion and may need surgery in the future (possibly on the left leg also).     He received a loading dose of 300 mg of Plavix in care suites and given a script for 75 mg Plavix daily.     Thanks Gloria

## 2020-07-17 NOTE — PROGRESS NOTES
PATIENT/VISITOR WELLNESS SCREENING    Step 1 Patient Screening    1. In the last month, have you been in contact with someone who was confirmed or suspected to have Coronavirus/COVID-19? No    2. Do you have the following symptoms?  Fever/Chills? No   Cough? No   Shortness of breath? No   New loss of taste or smell? No  Sore throat? No  Muscle or body aches? No  Headaches? No  Fatigue? No  Vomiting or diarrhea? No    Step 2 Visitor Screening    1. Name of Visitor (1 visitor per patient): Mary-spouse    2. In the last month, have you been in contact with someone who was confirmed or suspected to have Coronavirus/COVID-19? No    3. Do you have the following symptoms?  Fever/Chills? No   Cough? No   Shortness of breath? No   Skin rash? No   Loss of taste or smell? No  Sore throat? No  Runny or stuffy nose? No  Muscle or body aches? No  Headaches? No  Fatigue? No  Vomiting or diarrhea? No    If the visitor has positive symptoms, notify supervisor/manger  Per policy, the visitor will need to leave the facility     Step 3 Refer to logic grid below for actions    NO SYMPTOM(S)    ACTIONS:  1. Standard rooming process  2. Provider to assess per normal protocol  3. Implement precautions as needed and per guidelines     POSITIVE SYMPTOM(S)  If positive for ANY of the following symptoms: fever, cough, shortness of breath, rash    ACTION:  1. Continue to have the patient wear a mask   2. Room patient as soon as possible  3. Don appropriate PPE when entering room  4. Provider evaluation

## 2020-07-17 NOTE — PRE-PROCEDURE
GENERAL PRE-PROCEDURE:   Procedure:  Right leg angiogram with possible angioplasty, stent placement, catheter directed lytics with intravenous moderate sedation   Date/Time:  7/17/2020 12:52 PM    Written consent obtained?: Yes    Risks and benefits: Risks, benefits and alternatives were discussed    Consent given by:  Patient  Patient states understanding of procedure being performed: Yes    Patient's understanding of procedure matches consent: Yes    Procedure consent matches procedure scheduled: Yes    Expected level of sedation:  Moderate  Appropriately NPO:  Yes  ASA Class:  Class 3- Severe systemic disease, definite functional limitations  Mallampati  :  Grade 2- soft palate, base of uvula, tonsillar pillars, and portion of posterior pharyngeal wall visible  Lungs:  Lungs clear with good breath sounds bilaterally and other (comment)  Lung exam comment:  Decreased bilaterally  Heart:  Normal heart sounds and rate and other (comment)  Heart exam comment:  Distant  History & Physical reviewed:  History and physical reviewed and no updates needed  Statement of review:  I have reviewed the lab findings, diagnostic data, medications, and the plan for sedation

## 2020-07-17 NOTE — PROGRESS NOTES
Patient arrived from Care Suites at 1830. VSS on room air. Right groin site w/some bloody drainage from pressure being held in care suites. Bedrest until 2100 and if no bleeding, can discharge at 2100. Wife, Mary, can be reached at [phone]. Discharge gone through in care suites, has new prescription and stent card.

## 2020-07-17 NOTE — DISCHARGE INSTRUCTIONS
Peripheral Angiogram Discharge Instructions - Femoral     After you go home:      Have an adult stay with you until tomorrow.    Drink extra fluids for 2 days.    You may resume your normal diet.    No smoking       For 24 hours - due to the sedation you received:    Relax and take it easy.    Do NOT make any important or legal decisions.    Do NOT drive or operate machines at home or at work.    Do NOT drink alcohol.    Care of Groin Puncture Site:      For the first 24 hrs - check the puncture site every 1-2 hours while awake.    For 2 days, when you cough, sneeze, laugh or move your bowels, hold your hand over the puncture site and press firmly.    Remove the bandaid after 24 hours. If there is minor oozing, apply another bandaid and remove it after 12 hours.    It is normal to have a small bruise or pea size lump at the site.    You may shower tomorrow.  Do NOT take a bath, or use a hot tub or pool for at least 3 days. Do NOT scrub the site. Do not use lotion or powder near the puncture site.     Activity:            For 2 days:    No stooping or squatting    Do NOT do any heavy activity such as exercise, lifting, or straining.     No housework, yard work or any activity that make you sweat    Do NOT lift more than 10 pounds    Bleeding:      If you start bleeding from the site in your groin, lie down flat and press firmly on/above the site for 10 minutes.     Once bleeding stops, lay flat for 2 hours.     [facility] as soon as you can.       Call 911 right away if you have heavy bleeding or bleeding that does not stop.      Medicines:      If you are on Metformin (Glucophage) and your GFR (kidney function level) is >30, you may continue taking your Metformin.    If you are on Metformin (Glucophage) and your GFR (kidney function level) is <30, do not restart the Metformin for 48 hours after your procedure. Check with your primary care giver before restarting the Metformin to see if you need  to have blood drawn to recheck your kidney function (GFR).    If you are taking an antiplatelet medication such as Plavix, do not stop taking it until you talk to your provider.       Take your medications, including blood thinners, unless your provider tells you not to.      If you take Coumadin (Warfarin), have your INR checked by your provider in  3-5 days. Call your clinic to schedule this.    If you have stopped any medicines, check with your provider about when to restart them.        Follow Up Appointments:      Follow up with [facility] as directed.    Call the clinic if:      You have increased pain or a large or growing hard lump around the site.    The site is red, swollen, hot or tender.    Blood or fluid is draining from the site.    You have chills or a fever greater than 101 F (38 C).    Your leg feels numb, cool or changes color.    You have hives, a rash or unusual itching.    New pain in the back or belly that you cannot control with Tylenol.    Any questions or concerns.    Other Instructions:      If you received a stent - carry your stent card with you at all times.      If you have questions or your original symptoms do not improve, call:         [facility] @ [phone]

## 2020-07-17 NOTE — TELEPHONE ENCOUNTER
Please arrange for in-clinic visit with Dr. Gonzalez in two weeks.    Mimi Rouse RN BSN  [facility]  [phone]

## 2020-07-17 NOTE — IR NOTE
Interventional Radiology Intra-procedural Nursing Note     Patient Name:  Wyatt LAWSON [facility]    Medical Record Number: [id number]  Today's Date:  July 17, 2020  Procedure: Right Lower Extremity Angiogram  Start Time: 1413  End of procedure time: 1450    Report given to: Rama RUIZ, care suites  Time pt departs:  1530       Notes:  Stent placed to right iliac artery, angioplasty done to same area.    Patient brought into IR room # 2 at 1350.      Informed consent obtained by Lance Munroe MD.         Allergies   Allergen Reactions     No Known Drug Allergies        Labs reviewed:  Results for orders placed or performed during the hospital encounter of 07/17/20 (from the past 24 hour(s))   CBC with platelets   Result Value Ref Range    WBC 6.2 4.0 - 11.0 10e9/L    RBC Count 5.02 4.4 - 5.9 10e12/L    Hemoglobin 15.8 13.3 - 17.7 g/dL    Hematocrit 46.2 40.0 - 53.0 %    MCV 92 78 - 100 fl    MCH 31.5 26.5 - 33.0 pg    MCHC 34.2 31.5 - 36.5 g/dL    RDW 13.2 10.0 - 15.0 %    Platelet Count 159 150 - 450 10e9/L   Lipid panel   Result Value Ref Range    Cholesterol 195 <200 mg/dL    Triglycerides 80 <150 mg/dL    HDL Cholesterol 43 >39 mg/dL    LDL Cholesterol Calculated 136 (H) <100 mg/dL    Non HDL Cholesterol 152 (H) <130 mg/dL   INR   Result Value Ref Range    INR 1.04 0.86 - 1.14   Partial thromboplastin time   Result Value Ref Range    PTT 29 22 - 37 sec       Neuro assessment completed and found to be WNL.     Peripheral pulses checked and documented in Epic Flowsheet.     Patient prepped with 2% Chlorhexidine and draped in supine position. VSS.  Monitor reads NSR with rate in the 70's.      MD first needle stick time was 1416. A total of 5 ml of 1% lidocaine was used locally at the puncture site.    A 7Fr RFA sheath was placed at 1418.    Debrief was completed by Lance Munroe MD.       Patient received a total of 2 mg Versed and 100 mcg fentanyl for sedation during the procedure.The last dose was given at 1432.       The patient tolerated the procedure well.     Neuro assessment remained unchanged at procedure end.     A 7Fr RFA sheath was removed at 1455 and pressure was held by Jeffrey Tineo RTR. Hemostasis time 1520. The site is C/D/I at procedure end without hematoma.       Maryana Ugalde RN

## 2020-07-17 NOTE — PROVIDER NOTIFICATION
Notified/discussed with Gloria Wood NP, over the phone, that patient states he vomited when he got home post CT scan with contrast on Tuesday, 7/14/2020.  She states no concerns at this time.

## 2020-07-18 NOTE — PLAN OF CARE
Ambulated patient. No further bleeding in groin site. Denies pain. AVSS is signed in front of chart. Pt discharged home with wife.

## 2020-07-20 DIAGNOSIS — I73.9 PAD (PERIPHERAL ARTERY DISEASE) (H): Primary | ICD-10-CM

## 2020-07-20 DIAGNOSIS — R09.89 OTHER SPECIFIED SYMPTOMS AND SIGNS INVOLVING THE CIRCULATORY AND RESPIRATORY SYSTEMS: ICD-10-CM

## 2020-07-20 NOTE — TELEPHONE ENCOUNTER
Keyshawn scheduled for in person with Dr. Gonzalez Aug. 4, 2020 @ 8:20.    CTA completed 7/14/2020  US's completed 7/16/2020    Nessa DEVINE

## 2020-08-04 ENCOUNTER — OFFICE VISIT (OUTPATIENT)
Dept: OTHER | Facility: CLINIC | Age: 60
End: 2020-08-04
Attending: INTERNAL MEDICINE
Payer: COMMERCIAL

## 2020-08-04 VITALS
HEIGHT: 70 IN | RESPIRATION RATE: 16 BRPM | DIASTOLIC BLOOD PRESSURE: 83 MMHG | WEIGHT: 153 LBS | BODY MASS INDEX: 21.9 KG/M2 | HEART RATE: 80 BPM | SYSTOLIC BLOOD PRESSURE: 133 MMHG | OXYGEN SATURATION: 100 %

## 2020-08-04 DIAGNOSIS — I73.9 PVD (PERIPHERAL VASCULAR DISEASE) WITH CLAUDICATION (H): ICD-10-CM

## 2020-08-04 DIAGNOSIS — I73.9 CLAUDICATION IN PERIPHERAL VASCULAR DISEASE (H): ICD-10-CM

## 2020-08-04 PROCEDURE — 99214 OFFICE O/P EST MOD 30 MIN: CPT | Mod: ZP | Performed by: INTERNAL MEDICINE

## 2020-08-04 PROCEDURE — G0463 HOSPITAL OUTPT CLINIC VISIT: HCPCS

## 2020-08-04 ASSESSMENT — MIFFLIN-ST. JEOR: SCORE: 1520.25

## 2020-08-04 NOTE — PROGRESS NOTES
Vascular Medicine Progress Note     Wyatt Mahajan is a 60 year old male who is here today for follow-up post procedure    Interval History   Patient was having critical limb ischemia of the right lower limb, patient is status post stenting of an occluded right common iliac artery he still have long segment occlusion of the superficial femoral arteries bilaterally as well as moderate stenosis of the right common femoral artery, patient is asymptomatic, he can walk 45 minutes daily with no pain, patient's DP and PT pulses are palpable, leg is warm foot is warm no discoloration no skin breakdown patient is feeling fine, patient did quit smoking 100%, and his target LDL is less than 70 and his A1c target is less than 7%    Carotid Doppler arterial ultrasound revealed less than 50% bilateral stenosis of the carotid arteries    Physical Exam       BP: 133/83 Pulse: 80   Resp: 16 SpO2: 100 %      Vitals:    08/04/20 0812   Weight: 69.4 kg (153 lb)     Vital Signs with Ranges  Pulse:  [80] 80  Resp:  [16] 16  BP: (133-135)/(83-85) 133/83  SpO2:  [100 %] 100 %  [unfilled]    Constitutional: awake, alert, cooperative, no apparent distress, and appears stated age  Eyes: Lids and lashes normal, pupils equal, round and reactive to light, extra ocular muscles intact, sclera clear, conjunctiva normal  ENT: normocepalic, without obvious abnormality, oropharynx pink and moist  Hematologic / Lymphatic: no lymphadenopathy  Respiratory: No increased work of breathing, good air exchange, clear to auscultation bilaterally, no crackles or wheezing  Cardiovascular: regular rate and rhythm, normal S1 and S2 and no murmur noted  GI: Normal bowel sounds, soft, non-distended, non-tender  Skin: no redness, warmth, or swelling, no rashes and no lesions  Musculoskeletal: There is no redness, warmth, or swelling of the joints.  Full range of motion noted.  Motor strength is 5 out of 5 all extremities bilaterally.  Tone is  normal.  Neurologic: Awake, alert, oriented to name, place and time.  Cranial nerves II-XII are grossly intact.  Motor is 5 out of 5 bilaterally.    Neuropsychiatric:  Normal affect, memory, insight.  Pulses: Palpable DP and PT pulses bilaterally  . No carotid bruits appreciated.     Medications         Data   No results found for this or any previous visit (from the past 24 hour(s)).    Assessment & Plan   (I73.9) Claudication in peripheral vascular disease (H)  Comment: Patient is asymptomatic will obtain labs and ELOISA with exercise including toe pressures in 3 months and will continue the same current medications for now  Plan: Lipid panel reflex to direct LDL Non-fasting,         Hemoglobin A1c, US ELOISA Doppler with Exercise         Bilateral, Comprehensive metabolic panel            (I73.9) PVD (peripheral vascular disease) with claudication (H)  Comment: Same as above  Plan: Lipid panel reflex to direct LDL Non-fasting,         Hemoglobin A1c, US ELOISA Doppler with Exercise         Bilateral, Comprehensive metabolic panel                Summary: Follow-up in 3 months with lab results and imaging results    Johnny Gonzalez MD

## 2020-08-04 NOTE — PROGRESS NOTES
"Wyatt Mahajan is a 60 year old male who presents for:  Chief Complaint   Patient presents with     RECHECK     Wellness screening completed. vb 8.3.2020 IN-PERSON follow up to 7/13/20. CTA completed 7/14, US completed 7/16.   *vg        Vitals:    Vitals:    08/04/20 0812 08/04/20 0813   BP: 135/85 133/83   BP Location: Right arm Left arm   Patient Position: Chair Chair   Cuff Size: Adult Regular Adult Regular   Pulse: 80    Resp: 16    SpO2: 100%    Weight: 153 lb (69.4 kg)    Height: 5' 10\" (1.778 m)        BMI:  Estimated body mass index is 21.95 kg/m  as calculated from the following:    Height as of this encounter: 5' 10\" (1.778 m).    Weight as of this encounter: 153 lb (69.4 kg).    Pain Score:  Data Unavailable        Mary Sutton CMA    "

## 2020-08-10 DIAGNOSIS — R09.89 OTHER SPECIFIED SYMPTOMS AND SIGNS INVOLVING THE CIRCULATORY AND RESPIRATORY SYSTEMS: Primary | ICD-10-CM

## 2020-09-08 ENCOUNTER — HOSPITAL ENCOUNTER (OUTPATIENT)
Dept: ULTRASOUND IMAGING | Facility: CLINIC | Age: 60
End: 2020-09-08
Attending: RADIOLOGY
Payer: COMMERCIAL

## 2020-09-08 DIAGNOSIS — R09.89 OTHER SPECIFIED SYMPTOMS AND SIGNS INVOLVING THE CIRCULATORY AND RESPIRATORY SYSTEMS: ICD-10-CM

## 2020-09-08 PROCEDURE — 93979 VASCULAR STUDY: CPT | Mod: TC

## 2020-09-08 PROCEDURE — 93924 LWR XTR VASC STDY BILAT: CPT

## 2020-09-15 DIAGNOSIS — E11.40 TYPE 2 DIABETES MELLITUS WITH DIABETIC NEUROPATHY, WITH LONG-TERM CURRENT USE OF INSULIN (H): Chronic | ICD-10-CM

## 2020-09-15 DIAGNOSIS — Z79.4 TYPE 2 DIABETES MELLITUS WITH DIABETIC NEUROPATHY, WITH LONG-TERM CURRENT USE OF INSULIN (H): Chronic | ICD-10-CM

## 2020-09-15 RX ORDER — FLASH GLUCOSE SCANNING READER
EACH MISCELLANEOUS
Qty: 1 EACH | Refills: 4 | Status: SHIPPED | OUTPATIENT
Start: 2020-09-15 | End: 2021-08-27

## 2020-09-15 NOTE — TELEPHONE ENCOUNTER
Routing refill request to provider for review/approval because:  Drug not on the [facility] refill protocol     Odilia Alejandro RN, BSN  [facility]

## 2020-09-21 ENCOUNTER — VIRTUAL VISIT (OUTPATIENT)
Dept: OTHER | Facility: CLINIC | Age: 60
End: 2020-09-21
Attending: RADIOLOGY
Payer: COMMERCIAL

## 2020-09-21 DIAGNOSIS — R09.89 OTHER SPECIFIED SYMPTOMS AND SIGNS INVOLVING THE CIRCULATORY AND RESPIRATORY SYSTEMS: Primary | ICD-10-CM

## 2020-09-21 DIAGNOSIS — I73.9 PAD (PERIPHERAL ARTERY DISEASE) (H): ICD-10-CM

## 2020-09-21 NOTE — PROGRESS NOTES
"Wyatt Mahajan is a 60 year old male who is being evaluated via a billable video visit.      The patient has been notified of following:     \"This video visit will be conducted via a call between you and your physician/provider. We have found that certain health care needs can be provided without the need for an in-person physical exam.  This service lets us provide the care you need with a video conversation.  If a prescription is necessary we can send it directly to your pharmacy.  If lab work is needed we can place an order for that and you can then stop by our lab to have the test done at a later time.    Video visits are billed at different rates depending on your insurance coverage.  Please reach out to your insurance provider with any questions.    If during the course of the call the physician/provider feels a video visit is not appropriate, you will not be charged for this service.\"    Patient has given verbal consent for Video visit? Yes, mobile phone number [phone]    How would you like to obtain your AVS? Mailed     Will anyone else be joining your video visit? No   "

## 2020-09-22 NOTE — PROGRESS NOTES
Patient Name: Wyatt Mahajan  Patient MRN: [id number]  Patient : 1960    HPI: This is a 60 year old male who is being seen in virtual clinic today for follow up right lower extremity claudication.  The patient was referred by vascular medicine. In , Dr. Vish Mahajan stented his Rt BRAIN. He was lost to f/u. His PCP noted his claudication has worsened into evolving rest pain with superficial great toe skin sloughing  He has known occlusion of the right femoral artery that was documented back in .  CTA runoff done in  also revealed a stenosis of the left superficial femoral artery with two vessel right and single vessel left tibial peroneal runoff. CTA done 2020, showed occluded right common iliac artery stent, occluded superficial femoral arteries bilaterally.  The patient underwent an angiogram of the right lower extremity on 2020 for significant claudication with inflamed great toe.  Recannulization of the occluded right common iliac artery with placement of 8 mm x 39 mm balloon expandable VBX covered stent.  Follow up angiogram showed improvement in the luminal diameter with improved flow.   Today, the patient states his symptoms are much improved.  He currently does not have any pain with walking.  He is walking 20 minutes a day with no issues.  He does not have any wounds on his feet to report.  He is happy overall with the results.   He currently is on Plavix with no reports of unusual bleeding.  He continues to smoke.      Imaging:   Results for orders placed or performed during the hospital encounter of 20   US Aorta/IVC/Iliac Duplex Limited    Narrative    ULTRASOUND AORTA/IVC/ILIAC DUPLEX LIMITED 2020 10:33 AM    HISTORY: Status post right common iliac stenting done on 2020  with Dr. Munroe one month followup. Other specified symptoms and  signs involving the circulatory and respiratory systems.    COMPARISON: 2020.    FINDINGS: Color Doppler and  spectral waveform analysis was performed  throughout the right common iliac to external iliac artery stent. The  stent is patent with diameters ranging between 6.1 and 7.1 mm. Inflow  and outflow arteries are patent.       Impression    IMPRESSION: Patent right common to external iliac artery stent.    FRANKY KRAUSE DO     ULTRASOUND ELOISA DOPPLER WITH EXERCISE BILATERAL September 8, 2020 10:32  AM      HISTORY: Peripheral vascular disease, status post right common iliac  stenting done on 7/17/2020 with Dr. Munroe. One month followup.  Other specified symptoms and signs involving the circulatory and  respiratory systems.     COMPARISON: None.     FINDINGS:  Right ELOISA:   DP: 0.72.  PT: 0.83.     Left ELOISA:   DP: 0.57.   PT: 0.59.     Waveforms: Monophasic bilaterally.     Exercise: Patient was exercised on a treadmill with a 10% incline for  1.5 miles per hour for 5 minutes. Patient noticed left lower extremity  tightness after completion of exercise.     Right exercise ELOISA: 0.3.  Left exercise ELOISA: 0.2.                                                                      IMPRESSION:   1. Right lower extremity: The ELOISA is 0.83, the resting ELOISA is 0.83,  postexercise ELOISA is 0.3.  Previously, in July 20, 2020, the ELOISA could  not be obtained due to nondetectable pedal waveforms. The resting ELOISA  suggests moderate  right lower extremity arterial insufficiency that  progresses with exercise.   2. Left lower extremity: The resting ELOISA is 0.59, postexercise ELOISA is  0.2.  Previously, the resting ELOISA was 0.46. The resting ELOISA is  suggestive of moderate lower extremity arterial insufficiency that  progresses with exercise.      ELOISA CRITERIA:  >0.95 Normal  0.90 - 0.94 Mild  0.5 - 0.89 Moderate  0.2 - 0.49 Severe  <0.2 Critical     ERROL COLEMAN MD    Assessment/Plan:    This is a pleasant 60 year old gentlemen with significant peripheral arterial disease.  He underwent angioplasty and stenting of the right common iliac  artery.  He has known bilateral superficial femoral artery occlusions.    We discussed his imaging that was performed on 9/8/2020.  He continues to have moderate arterial insufficiency, although his right resting ELOISA has improved compared to previous.  He denies claudication or rest pain.  We did discussed that he may need a bypass in the near future if his symptoms redevelop.      I recommended that we repeat duplex ultrasound of the right lower extremity with ABIs in 6 months.  He is to contact us sooner if claudication develops or any other concerns.    I met with the patient for 20minutes of which >50% of the time was used to discuss treatment options and/or coordination of care.    Thank you for the consult. We will continue to monitor this patient for their vascular needs.    Dr. Lance Munroe   Interventional Radiology  Pager# [phone]  [facility]

## 2020-09-22 NOTE — PATIENT INSTRUCTIONS
Repeat right lower extremity duplex and ABIs in 6 months.  Contact us sooner if claudication develops  Stop smoking

## 2020-10-06 NOTE — PLAN OF CARE
Problem: Pneumonia (Adult)  Goal: Signs and Symptoms of Listed Potential Problems Will be Absent or Manageable (Pneumonia)  Signs and symptoms of listed potential problems will be absent or manageable by discharge/transition of care (reference Pneumonia (Adult) CPG).   Outcome: Declining  Patient arrived to unit aound 0945. Patient vented by 1030. Propofol and insulin gtts. Family at bedside and updated by MD and RN numerous times. FiO2 weaned to 50%. Vanco and zithromax. Cultures sent. Plan to rest on vent over NOC. Get bowel regimen started and if BM tonight, start nutrition tomorrow via OG. MOnitor closely.        Detail Level: Simple Additional Notes: Patient consent was obtained to proceed with the visit and recommended plan of care after discussion of all risks and benefits, including the risks of COVID-19 exposure.

## 2020-10-29 DIAGNOSIS — E11.40 TYPE 2 DIABETES MELLITUS WITH DIABETIC NEUROPATHY, WITH LONG-TERM CURRENT USE OF INSULIN (H): ICD-10-CM

## 2020-10-29 DIAGNOSIS — Z79.4 TYPE 2 DIABETES MELLITUS WITH DIABETIC NEUROPATHY, WITH LONG-TERM CURRENT USE OF INSULIN (H): ICD-10-CM

## 2020-10-29 RX ORDER — LIRAGLUTIDE 6 MG/ML
1.2 INJECTION SUBCUTANEOUS DAILY
Qty: 3 ML | Refills: 3 | Status: SHIPPED | OUTPATIENT
Start: 2020-10-29 | End: 2022-01-17

## 2020-10-29 NOTE — TELEPHONE ENCOUNTER
Routing refill request to provider for review/approval because:  Labs not current:  A1C last done on 7/10/20 was 9.5    Sabrina DUNHAM RN  EP Triage

## 2020-11-12 DIAGNOSIS — E11.42 DIABETIC POLYNEUROPATHY ASSOCIATED WITH TYPE 2 DIABETES MELLITUS (H): ICD-10-CM

## 2020-11-12 DIAGNOSIS — E11.40 TYPE 2 DIABETES MELLITUS WITH DIABETIC NEUROPATHY, WITH LONG-TERM CURRENT USE OF INSULIN (H): Chronic | ICD-10-CM

## 2020-11-12 DIAGNOSIS — Z79.4 TYPE 2 DIABETES MELLITUS WITH DIABETIC NEUROPATHY, WITH LONG-TERM CURRENT USE OF INSULIN (H): Chronic | ICD-10-CM

## 2020-11-12 NOTE — TELEPHONE ENCOUNTER
Prescription approved per [facility] Refill Protocol.    Sabrina DUNHAM RN  EP Triage

## 2020-12-09 ENCOUNTER — TELEPHONE (OUTPATIENT)
Dept: OTHER | Facility: CLINIC | Age: 60
End: 2020-12-09

## 2021-01-15 ENCOUNTER — HEALTH MAINTENANCE LETTER (OUTPATIENT)
Age: 61
End: 2021-01-15

## 2021-05-03 ENCOUNTER — IMMUNIZATION (OUTPATIENT)
Dept: NURSING | Facility: CLINIC | Age: 61
End: 2021-05-03
Payer: COMMERCIAL

## 2021-05-03 PROCEDURE — 91300 PR COVID VAC PFIZER DIL RECON 30 MCG/0.3 ML IM: CPT

## 2021-05-03 PROCEDURE — 0001A PR COVID VAC PFIZER DIL RECON 30 MCG/0.3 ML IM: CPT

## 2021-05-16 ENCOUNTER — HEALTH MAINTENANCE LETTER (OUTPATIENT)
Age: 61
End: 2021-05-16

## 2021-05-24 ENCOUNTER — IMMUNIZATION (OUTPATIENT)
Dept: NURSING | Facility: CLINIC | Age: 61
End: 2021-05-24
Attending: INTERNAL MEDICINE
Payer: COMMERCIAL

## 2021-05-24 PROCEDURE — 91300 PR COVID VAC PFIZER DIL RECON 30 MCG/0.3 ML IM: CPT

## 2021-05-24 PROCEDURE — 0002A PR COVID VAC PFIZER DIL RECON 30 MCG/0.3 ML IM: CPT

## 2021-06-21 ENCOUNTER — TRANSFERRED RECORDS (OUTPATIENT)
Dept: HEALTH INFORMATION MANAGEMENT | Facility: CLINIC | Age: 61
End: 2021-06-21

## 2021-06-21 LAB — RETINOPATHY: POSITIVE

## 2021-08-27 ENCOUNTER — OFFICE VISIT (OUTPATIENT)
Dept: FAMILY MEDICINE | Facility: CLINIC | Age: 61
End: 2021-08-27
Payer: COMMERCIAL

## 2021-08-27 VITALS
HEIGHT: 70 IN | WEIGHT: 158 LBS | TEMPERATURE: 96.8 F | HEART RATE: 75 BPM | SYSTOLIC BLOOD PRESSURE: 132 MMHG | RESPIRATION RATE: 16 BRPM | BODY MASS INDEX: 22.62 KG/M2 | OXYGEN SATURATION: 97 % | DIASTOLIC BLOOD PRESSURE: 84 MMHG

## 2021-08-27 DIAGNOSIS — M54.2 NECK PAIN: ICD-10-CM

## 2021-08-27 DIAGNOSIS — E11.42 DIABETIC POLYNEUROPATHY ASSOCIATED WITH TYPE 2 DIABETES MELLITUS (H): ICD-10-CM

## 2021-08-27 DIAGNOSIS — E11.40 TYPE 2 DIABETES MELLITUS WITH DIABETIC NEUROPATHY, WITH LONG-TERM CURRENT USE OF INSULIN (H): ICD-10-CM

## 2021-08-27 DIAGNOSIS — M25.512 ACUTE PAIN OF BOTH SHOULDERS: ICD-10-CM

## 2021-08-27 DIAGNOSIS — M25.511 ACUTE PAIN OF BOTH SHOULDERS: ICD-10-CM

## 2021-08-27 DIAGNOSIS — Z79.4 TYPE 2 DIABETES MELLITUS WITH DIABETIC NEUROPATHY, WITH LONG-TERM CURRENT USE OF INSULIN (H): ICD-10-CM

## 2021-08-27 DIAGNOSIS — Z13.220 SCREENING FOR HYPERLIPIDEMIA: Primary | ICD-10-CM

## 2021-08-27 LAB
ANION GAP SERPL CALCULATED.3IONS-SCNC: 7 MMOL/L (ref 3–14)
BUN SERPL-MCNC: 23 MG/DL (ref 7–30)
CALCIUM SERPL-MCNC: 9.3 MG/DL (ref 8.5–10.1)
CHLORIDE BLD-SCNC: 110 MMOL/L (ref 94–109)
CHOLEST SERPL-MCNC: 196 MG/DL
CO2 SERPL-SCNC: 21 MMOL/L (ref 20–32)
CREAT SERPL-MCNC: 0.96 MG/DL (ref 0.66–1.25)
CREAT UR-MCNC: 200 MG/DL
FASTING STATUS PATIENT QL REPORTED: YES
GFR SERPL CREATININE-BSD FRML MDRD: 86 ML/MIN/1.73M2
GLUCOSE BLD-MCNC: 128 MG/DL (ref 70–99)
HBA1C MFR BLD: 8.9 % (ref 0–5.6)
HDLC SERPL-MCNC: 41 MG/DL
LDLC SERPL CALC-MCNC: 140 MG/DL
MICROALBUMIN UR-MCNC: 960 MG/L
MICROALBUMIN/CREAT UR: 480 MG/G CR (ref 0–17)
NONHDLC SERPL-MCNC: 155 MG/DL
POTASSIUM BLD-SCNC: 3.8 MMOL/L (ref 3.4–5.3)
SODIUM SERPL-SCNC: 138 MMOL/L (ref 133–144)
TRIGL SERPL-MCNC: 73 MG/DL

## 2021-08-27 PROCEDURE — 80061 LIPID PANEL: CPT | Performed by: FAMILY MEDICINE

## 2021-08-27 PROCEDURE — 82043 UR ALBUMIN QUANTITATIVE: CPT | Performed by: FAMILY MEDICINE

## 2021-08-27 PROCEDURE — 99214 OFFICE O/P EST MOD 30 MIN: CPT | Performed by: FAMILY MEDICINE

## 2021-08-27 PROCEDURE — 36415 COLL VENOUS BLD VENIPUNCTURE: CPT | Performed by: FAMILY MEDICINE

## 2021-08-27 PROCEDURE — 83036 HEMOGLOBIN GLYCOSYLATED A1C: CPT | Performed by: FAMILY MEDICINE

## 2021-08-27 PROCEDURE — 80048 BASIC METABOLIC PNL TOTAL CA: CPT | Performed by: FAMILY MEDICINE

## 2021-08-27 RX ORDER — CELECOXIB 50 MG/1
50 CAPSULE ORAL 2 TIMES DAILY
Qty: 30 CAPSULE | Refills: 0 | Status: SHIPPED | OUTPATIENT
Start: 2021-08-27 | End: 2022-01-17

## 2021-08-27 RX ORDER — CYCLOBENZAPRINE HCL 5 MG
5 TABLET ORAL 2 TIMES DAILY PRN
Qty: 30 TABLET | Refills: 0 | Status: SHIPPED | OUTPATIENT
Start: 2021-08-27 | End: 2022-01-17

## 2021-08-27 RX ORDER — LANCETS
EACH MISCELLANEOUS
Qty: 100 EACH | Refills: 3 | Status: SHIPPED | OUTPATIENT
Start: 2021-08-27 | End: 2022-01-17

## 2021-08-27 ASSESSMENT — MIFFLIN-ST. JEOR: SCORE: 1537.93

## 2021-08-27 ASSESSMENT — PAIN SCALES - GENERAL: PAINLEVEL: MODERATE PAIN (5)

## 2021-08-27 NOTE — PROGRESS NOTES
Assessment & Plan     Screening for hyperlipidemia    - REVIEW OF HEALTH MAINTENANCE PROTOCOL ORDERS  - HEMOGLOBIN A1C; Future  - BASIC METABOLIC PANEL; Future  - Albumin Random Urine Quantitative with Creat Ratio; Future  - Lipid panel reflex to direct LDL Fasting; Future    Type 2 diabetes mellitus with diabetic neuropathy, with long-term current use of insulin (H)  Has been fluctuating, will have him to recheck lab for further evaluation   - REVIEW OF HEALTH MAINTENANCE PROTOCOL ORDERS  - HEMOGLOBIN A1C; Future  - BASIC METABOLIC PANEL; Future  - Albumin Random Urine Quantitative with Creat Ratio; Future  - Lipid panel reflex to direct LDL Fasting; Future  - insulin glargine (LANTUS SOLOSTAR) 100 UNIT/ML pen; He INJECTS 32 UNITS SUBCUTANEOUSLY ONCE DAILY IN EVENING (has not: INCREASE DOSE BY 4 UNITS EVERY 3 DAYS UNTIL FASTING BG is . IF OVER 40 UNITS, SPLIT DOSE INTO TWO ADMINISTRATIONS DAILY)  - blood glucose (NO BRAND SPECIFIED) test strip; Use to test blood sugar 1 times daily or as directed. To accompany: Blood Glucose Monitor Brands: ACCU-CHEK SANDRA  - thin (NO BRAND SPECIFIED) lancets; Use with lanceting device. To accompany: Blood Glucose Monitor Brands: ACCU-CHEK SANDRA    Diabetic polyneuropathy associated with type 2 diabetes mellitus (H)  mentioned above   - REVIEW OF HEALTH MAINTENANCE PROTOCOL ORDERS  - HEMOGLOBIN A1C; Future  - BASIC METABOLIC PANEL; Future  - Albumin Random Urine Quantitative with Creat Ratio; Future  - Lipid panel reflex to direct LDL Fasting; Future  - insulin glargine (LANTUS SOLOSTAR) 100 UNIT/ML pen; He INJECTS 32 UNITS SUBCUTANEOUSLY ONCE DAILY IN EVENING (has not: INCREASE DOSE BY 4 UNITS EVERY 3 DAYS UNTIL FASTING BG is . IF OVER 40 UNITS, SPLIT DOSE INTO TWO ADMINISTRATIONS DAILY)  - blood glucose (NO BRAND SPECIFIED) test strip; Use to test blood sugar 1 times daily or as directed. To accompany: Blood Glucose Monitor Brands: ACCU-CHEK SANDRA  - thin (NO  BRAND SPECIFIED) lancets; Use with lanceting device. To accompany: Blood Glucose Monitor Brands: ACCU-CHEK SANDRA    Neck pain  Has been worsening with heavy lifting, has no radiculopathy, will have him to try PT and NSAIDs as needed   - [facility]; Future  - celecoxib (CELEBREX) 50 MG capsule; Take 1 capsule (50 mg) by mouth 2 times daily  - cyclobenzaprine (FLEXERIL) 5 MG tablet; Take 1 tablet (5 mg) by mouth 2 times daily as needed for muscle spasms    Acute pain of both shoulders  mnetioned above   - [facility] Referral; Future  - celecoxib (CELEBREX) 50 MG capsule; Take 1 capsule (50 mg) by mouth 2 times daily  - cyclobenzaprine (FLEXERIL) 5 MG tablet; Take 1 tablet (5 mg) by mouth 2 times daily as needed for muscle spasms             Tobacco Cessation:   reports that he has been smoking cigarettes. He has a 7.50 pack-year smoking history. He has never used smokeless tobacco.  Tobacco Cessation Action Plan: Information offered: Patient not interested at this time    FUTURE APPOINTMENTS:       - Follow-up visit in 4 months for CPE    No follow-ups on file.    Shaun Bedolla MD  [facility] DAWOOD Schneider is a 61 year old who presents for the following health issues     HPI     Musculoskeletal problem/pain  Onset/Duration: 2 weeks  Description  Location: shoulder/neck - left  Joint Swelling: no  Redness: no  Pain: YES  Warmth: no  Intensity:  moderate, 5/10  Progression of Symptoms:  same and constant  Accompanying signs and symptoms:   Fevers: no  Numbness/tingling/weakness: no  History  Trauma to the area: no  Recent illness:  no  Previous similar problem: no  Previous evaluation:  no  Precipitating or alleviating factors:  Aggravating factors include: lifting and overuse  Therapies tried and outcome: Ibuprofen and laying on the ground with his arm above his head        Review of Systems   Constitutional, HEENT, cardiovascular, pulmonary, gi and gu systems  "are negative, except as otherwise noted.      Objective    /84   Pulse 75   Temp 96.8  F (36  C) (Tympanic)   Resp 16   Ht 1.778 m (5' 10\")   Wt 71.7 kg (158 lb)   SpO2 97%   BMI 22.67 kg/m    Body mass index is 22.67 kg/m .  Physical Exam   GENERAL: healthy, alert and no distress  NECK: no adenopathy, no asymmetry, masses, or scars and thyroid normal to palpation  RESP: lungs clear to auscultation - no rales, rhonchi or wheezes  CV: regular rate and rhythm, normal S1 S2, no S3 or S4, no murmur, click or rub, no peripheral edema and peripheral pulses strong  ABDOMEN: soft, nontender, no hepatosplenomegaly, no masses and bowel sounds normal  MS: no gross musculoskeletal defects noted, no edema                "

## 2021-09-01 ENCOUNTER — THERAPY VISIT (OUTPATIENT)
Dept: PHYSICAL THERAPY | Facility: CLINIC | Age: 61
End: 2021-09-01
Attending: FAMILY MEDICINE
Payer: COMMERCIAL

## 2021-09-01 DIAGNOSIS — M25.511 ACUTE PAIN OF BOTH SHOULDERS: ICD-10-CM

## 2021-09-01 DIAGNOSIS — M54.2 NECK PAIN: ICD-10-CM

## 2021-09-01 DIAGNOSIS — M25.512 ACUTE PAIN OF BOTH SHOULDERS: ICD-10-CM

## 2021-09-01 PROCEDURE — 97161 PT EVAL LOW COMPLEX 20 MIN: CPT | Mod: GP | Performed by: PHYSICAL THERAPIST

## 2021-09-01 PROCEDURE — 97110 THERAPEUTIC EXERCISES: CPT | Mod: GP | Performed by: PHYSICAL THERAPIST

## 2021-09-01 NOTE — PROGRESS NOTES
Physical Therapy Initial Evaluation  Subjective:  The history is provided by the patient. No  was used.   Patient Health History  Wyatt Mahajan being seen for Neck, shoulder L pain.     Problem began: 8/11/2021.   Problem occurred: no known injury   Pain is reported as 5/10 on pain scale.  General health as reported by patient is good.  Pertinent medical history includes: diabetes.   Red flags:  None as reported by patient.  Medical allergies: none.   Surgeries include:  None.    Current medications:  Muscle relaxants.    Current occupation is .   Primary job tasks include:  Lifting/carrying.                  Therapist Generated HPI Evaluation         Type of problem:  Cervical spine.    This is a new condition.  Condition occurred with:  Insidious onset.    Patient reports pain:  Lower cervical spine, central cervical spine and cervical left side.  Pain is described as aching and is intermittent.  Pain radiates to:  Shoulder right. Pain is worse during the night.  Since onset symptoms are unchanged.  Associated symptoms:  Loss of motion/stiffness and loss of strength. Symptoms are exacerbated by change of position and certain positions (reaching with L UE, had a massage and worse)  and relieved by muscle relaxants.      Restrictions due to condition include:  Working in normal job without restrictions.  Barriers include:  None as reported by patient.                        Objective:  Standing Alignment:    Cervical/Thoracic:  Forward head  Shoulder/UE:  Rounded shoulders                                  Cervical/Thoracic Evaluation  Arom wnl cervical: retractions x 5 feels tight, less pain.     AROM:  AROM Cervical:    Flexion:            WNL little pain  Extension:       Min loss more pain  Rotation:         Left: min loss w pain     Right: WNL no pain  Side Bend:      Left: min loss w pain     Right:  Min loss w pain      Headaches: cervical (pt reports having B HA, starting  in Suboccipital region)                         Shoulder Evaluation:  ROM:  AROM:  normal  Flexion:  Left:  160    Right:  160    Abduction:  Left: 160   Right:  160      External Rotation:  Left:  30    Right:  30            Extension/Internal Rotation:  Left:  T11    Right:  L4    PROM:              External Rotation:  Left:  45    Right:  70                Pain: AROM pain with ER B    Strength:    Flexion: Left:5-/5   Pain:    Right: 5-/5     Pain:     Abduction:  Left: 4+/5  Pain:    Right: 4+/5     Pain:    Internal Rotation:  Left:5-/5     Pain:    Right: 5-/5     Pain:  External Rotation:   Left:4+/5     Pain:   Right:4+/5     Pain:        Elbow Flexion:  Left:5/5     Pain:    Right:5/5     Pain:  Elbow Extension:  Left:5-/5     Pain:    Right:5-/5     Pain:                                           General     ROS    Assessment/Plan:    Patient is a 61 year old male with cervical complaints.    Patient has the following significant findings with corresponding treatment plan.                Diagnosis 1:  Neck, shoulder pain    Pain -  self management, education, directional preference exercise and home program  Decreased ROM/flexibility - manual therapy and therapeutic exercise  Decreased strength - therapeutic exercise and therapeutic activities  Decreased function - therapeutic activities  Impaired posture - neuro re-education    Previous and current functional limitations:  (See Goal Flow Sheet for this information)    Short term and Long term goals: (See Goal Flow Sheet for this information)     Communication ability:  Patient appears to be able to clearly communicate and understand verbal and written communication and follow directions correctly.  Treatment Explanation - The following has been discussed with the patient:   RX ordered/plan of care  Anticipated outcomes  Possible risks and side effects  This patient would benefit from PT intervention to resume normal activities.   Rehab potential is  good.    Frequency:  3 X a month, once daily  Duration:  for 2 months  Discharge Plan:  Achieve all LTG.  Independent in home treatment program.  Reach maximal therapeutic benefit.    Please refer to the daily flowsheet for treatment today, total treatment time and time spent performing 1:1 timed codes.

## 2021-09-05 ENCOUNTER — HEALTH MAINTENANCE LETTER (OUTPATIENT)
Age: 61
End: 2021-09-05

## 2021-12-24 ENCOUNTER — OFFICE VISIT (OUTPATIENT)
Dept: INTERNAL MEDICINE | Facility: CLINIC | Age: 61
End: 2021-12-24
Payer: COMMERCIAL

## 2021-12-24 VITALS
TEMPERATURE: 97.1 F | SYSTOLIC BLOOD PRESSURE: 160 MMHG | HEART RATE: 73 BPM | OXYGEN SATURATION: 100 % | RESPIRATION RATE: 18 BRPM | BODY MASS INDEX: 22.86 KG/M2 | DIASTOLIC BLOOD PRESSURE: 84 MMHG | WEIGHT: 159.3 LBS

## 2021-12-24 DIAGNOSIS — I73.9 PVD (PERIPHERAL VASCULAR DISEASE) WITH CLAUDICATION (H): ICD-10-CM

## 2021-12-24 DIAGNOSIS — Z79.4 TYPE 2 DIABETES MELLITUS WITH DIABETIC NEUROPATHY, WITH LONG-TERM CURRENT USE OF INSULIN (H): ICD-10-CM

## 2021-12-24 DIAGNOSIS — I10 BENIGN ESSENTIAL HYPERTENSION: Primary | ICD-10-CM

## 2021-12-24 DIAGNOSIS — R03.0 ELEVATED BLOOD PRESSURE READING WITHOUT DIAGNOSIS OF HYPERTENSION: ICD-10-CM

## 2021-12-24 DIAGNOSIS — E11.40 TYPE 2 DIABETES MELLITUS WITH DIABETIC NEUROPATHY, WITH LONG-TERM CURRENT USE OF INSULIN (H): ICD-10-CM

## 2021-12-24 LAB
ANION GAP SERPL CALCULATED.3IONS-SCNC: 4 MMOL/L (ref 3–14)
BUN SERPL-MCNC: 18 MG/DL (ref 7–30)
CALCIUM SERPL-MCNC: 8.5 MG/DL (ref 8.5–10.1)
CHLORIDE BLD-SCNC: 107 MMOL/L (ref 94–109)
CO2 SERPL-SCNC: 28 MMOL/L (ref 20–32)
CREAT SERPL-MCNC: 0.94 MG/DL (ref 0.66–1.25)
ERYTHROCYTE [DISTWIDTH] IN BLOOD BY AUTOMATED COUNT: 13.1 % (ref 10–15)
GFR SERPL CREATININE-BSD FRML MDRD: >90 ML/MIN/1.73M2
GLUCOSE BLD-MCNC: 159 MG/DL (ref 70–99)
HCT VFR BLD AUTO: 47.3 % (ref 40–53)
HGB BLD-MCNC: 15.6 G/DL (ref 13.3–17.7)
MCH RBC QN AUTO: 30.4 PG (ref 26.5–33)
MCHC RBC AUTO-ENTMCNC: 33 G/DL (ref 31.5–36.5)
MCV RBC AUTO: 92 FL (ref 78–100)
PLATELET # BLD AUTO: 170 10E3/UL (ref 150–450)
POTASSIUM BLD-SCNC: 4.3 MMOL/L (ref 3.4–5.3)
RBC # BLD AUTO: 5.13 10E6/UL (ref 4.4–5.9)
SODIUM SERPL-SCNC: 139 MMOL/L (ref 133–144)
WBC # BLD AUTO: 7 10E3/UL (ref 4–11)

## 2021-12-24 PROCEDURE — 85027 COMPLETE CBC AUTOMATED: CPT | Performed by: PHYSICIAN ASSISTANT

## 2021-12-24 PROCEDURE — 99214 OFFICE O/P EST MOD 30 MIN: CPT | Performed by: PHYSICIAN ASSISTANT

## 2021-12-24 PROCEDURE — 93000 ELECTROCARDIOGRAM COMPLETE: CPT | Performed by: PHYSICIAN ASSISTANT

## 2021-12-24 PROCEDURE — 36415 COLL VENOUS BLD VENIPUNCTURE: CPT | Performed by: PHYSICIAN ASSISTANT

## 2021-12-24 PROCEDURE — 80048 BASIC METABOLIC PNL TOTAL CA: CPT | Performed by: PHYSICIAN ASSISTANT

## 2021-12-24 RX ORDER — LISINOPRIL 5 MG/1
5 TABLET ORAL DAILY
Qty: 30 TABLET | Refills: 1 | Status: SHIPPED | OUTPATIENT
Start: 2021-12-24 | End: 2022-01-17

## 2021-12-24 NOTE — PROGRESS NOTES
Assessment & Plan     Benign essential hypertension  New dx  - lisinopril (ZESTRIL) 5 MG tablet; Take 1 tablet (5 mg) by mouth daily  - Basic metabolic panel; Future  - CBC with platelets; Future  - Basic metabolic panel  - CBC with platelets    Type 2 diabetes mellitus with diabetic neuropathy, with long-term current use of insulin (H)  Overdue for labs and follow up with pcp    Elevated blood pressure reading without diagnosis of hypertension    - EKG 12-lead complete w/read - Clinics    PVD (peripheral vascular disease) with claudication (H)                     Return in about 2 weeks (around 1/7/2022) for BP Recheck, regular primary provider.    Marina Carpio PA-C  [facility] JONATHAN Schneider is a 61 year old who presents for the following health issues     HPI     Concern - Elevated BP   Onset: 2 weeks ago   Description: BP has been up around 200s  Intensity: mild, moderate  Progression of Symptoms:  same  Accompanying Signs & Symptoms: Chest pain middle of chest tender to palpation noted yesterday  , headache, elevated BP x 2 weeks   Previous history of similar problem:   Precipitating factors:        Worsened by:   Alleviating factors:        Improved by:   Therapies tried and outcome:   Denies any SOB cough, no abdominal pain  NO palpitations        Review of Systems   Constitutional, HEENT, cardiovascular, pulmonary, gi and gu systems are negative, except as otherwise noted.      Objective    BP (!) 160/84   Pulse 73   Temp 97.1  F (36.2  C) (Tympanic)   Resp 18   Wt 72.3 kg (159 lb 4.8 oz)   SpO2 100%   BMI 22.86 kg/m    Body mass index is 22.86 kg/m .  Physical Exam   GENERAL: healthy, alert and no distress  NECK: no adenopathy, no asymmetry, masses, or scars and thyroid normal to palpation  RESP: lungs clear to auscultation - no rales, rhonchi or wheezes  CV: regular rates and rhythm and normal S1 S2, no S3 or S4  ABDOMEN: soft, nontender, no  hepatosplenomegaly, no masses and bowel sounds normal  MS: no gross musculoskeletal defects noted, no edema  SKIN: no suspicious lesions or rashes    EKG - Reviewed and interpreted by me appears normal, NSR, normal axis, normal intervals, no acute ST/T changes c/w ischemia, no LVH by voltage criteria, unchanged from previous tracings

## 2021-12-26 ENCOUNTER — HEALTH MAINTENANCE LETTER (OUTPATIENT)
Age: 61
End: 2021-12-26

## 2022-01-10 ENCOUNTER — TELEPHONE (OUTPATIENT)
Dept: OTHER | Facility: CLINIC | Age: 62
End: 2022-01-10
Payer: COMMERCIAL

## 2022-01-10 NOTE — TELEPHONE ENCOUNTER
Scheduled ELOISA US for 1/11/22. Scheduled to see Dr. Gonzalez 1/17/22.       Zhane Menendez    [facility]   [phone]

## 2022-01-10 NOTE — TELEPHONE ENCOUNTER
RCIA stent in 2016 by Dr. Mahajan.  RLE Angiogram 7/17/20.      last imaging 9/8/20.      LOV 9/21/20 with Dr. Munroe who recommended repeat RLE US and ABIs in 6 months.    Patient c/o Left leg pain.  Pain rated 9-10/10 after walking 5 minutes.  Sitting or sleeping it is fine.  Foot it sometimes cold.  Leg is not discolored.  Not swollen.  Sometimes it is numb to the touch, sometimes has tingling.      Please arrange for:      ELOISA with exercise and toe pressures ASAP (Ordered by Dr. Munroe)    Follow up with Dr. Munroe or Dr. Gonzalez at next available (whomever can see patient first).    I explained to Vish that if his foot is cold, if his pain is uncontrolled, he experiences an increase in numbness/tingling or discoloration he must go to the ED to be assessed.    He expressed distaste at the wait times in the emergency room.  I  explained that he would have to wait, but that it would be vital to him keeping his leg if these symptoms occurred.      Mimi Rouse RN BSN  [facility]  [phone]

## 2022-01-10 NOTE — TELEPHONE ENCOUNTER
[facility]    Who is the name of the provider?:  Dr. Gonzalez      What is the location you see this provider at?: [place]    Can we leave a detailed message on this number? Yes    Reason for call:  Pt calling to schedule. He has not been see by vascular doctor in over a year. Has new concerns; leg in a lot of pain, difficult to walk - has to stop after a few minutes, and thinks there is a new blot clot.    Call back: [phone]      Zhane Menendez    [facility]   [phone]

## 2022-01-11 ENCOUNTER — HOSPITAL ENCOUNTER (OUTPATIENT)
Dept: ULTRASOUND IMAGING | Facility: CLINIC | Age: 62
Discharge: HOME | End: 2022-01-11
Attending: RADIOLOGY | Admitting: RADIOLOGY
Payer: COMMERCIAL

## 2022-01-11 DIAGNOSIS — I73.9 PAD (PERIPHERAL ARTERY DISEASE) (H): ICD-10-CM

## 2022-01-11 PROCEDURE — 93922 UPR/L XTREMITY ART 2 LEVELS: CPT

## 2022-01-17 ENCOUNTER — TELEPHONE (OUTPATIENT)
Dept: OTHER | Facility: CLINIC | Age: 62
End: 2022-01-17

## 2022-01-17 ENCOUNTER — OFFICE VISIT (OUTPATIENT)
Dept: OTHER | Facility: CLINIC | Age: 62
End: 2022-01-17
Attending: RADIOLOGY
Payer: COMMERCIAL

## 2022-01-17 ENCOUNTER — MYC REFILL (OUTPATIENT)
Dept: FAMILY MEDICINE | Facility: CLINIC | Age: 62
End: 2022-01-17

## 2022-01-17 VITALS
DIASTOLIC BLOOD PRESSURE: 91 MMHG | RESPIRATION RATE: 16 BRPM | SYSTOLIC BLOOD PRESSURE: 171 MMHG | OXYGEN SATURATION: 99 % | WEIGHT: 155 LBS | HEIGHT: 70 IN | BODY MASS INDEX: 22.19 KG/M2 | HEART RATE: 87 BPM

## 2022-01-17 DIAGNOSIS — Z79.4 TYPE 2 DIABETES MELLITUS WITH DIABETIC NEUROPATHY, WITH LONG-TERM CURRENT USE OF INSULIN (H): ICD-10-CM

## 2022-01-17 DIAGNOSIS — E78.5 HYPERLIPIDEMIA LDL GOAL <70: ICD-10-CM

## 2022-01-17 DIAGNOSIS — M25.511 ACUTE PAIN OF BOTH SHOULDERS: ICD-10-CM

## 2022-01-17 DIAGNOSIS — M25.512 ACUTE PAIN OF BOTH SHOULDERS: ICD-10-CM

## 2022-01-17 DIAGNOSIS — I73.9 CLAUDICATION IN PERIPHERAL VASCULAR DISEASE (H): ICD-10-CM

## 2022-01-17 DIAGNOSIS — I73.9 PVD (PERIPHERAL VASCULAR DISEASE) WITH CLAUDICATION (H): ICD-10-CM

## 2022-01-17 DIAGNOSIS — I10 BENIGN ESSENTIAL HYPERTENSION: ICD-10-CM

## 2022-01-17 DIAGNOSIS — E11.42 DIABETIC POLYNEUROPATHY ASSOCIATED WITH TYPE 2 DIABETES MELLITUS (H): ICD-10-CM

## 2022-01-17 DIAGNOSIS — M54.2 NECK PAIN: ICD-10-CM

## 2022-01-17 DIAGNOSIS — E11.40 TYPE 2 DIABETES MELLITUS WITH DIABETIC NEUROPATHY, WITH LONG-TERM CURRENT USE OF INSULIN (H): ICD-10-CM

## 2022-01-17 PROCEDURE — G0463 HOSPITAL OUTPT CLINIC VISIT: HCPCS

## 2022-01-17 PROCEDURE — 99214 OFFICE O/P EST MOD 30 MIN: CPT | Performed by: INTERNAL MEDICINE

## 2022-01-17 RX ORDER — LIRAGLUTIDE 6 MG/ML
1.2 INJECTION SUBCUTANEOUS DAILY
Qty: 3 ML | Refills: 3 | Status: SHIPPED | OUTPATIENT
Start: 2022-01-17 | End: 2022-06-14

## 2022-01-17 RX ORDER — LISINOPRIL 10 MG/1
10 TABLET ORAL DAILY
Qty: 30 TABLET | Refills: 3 | Status: SHIPPED | OUTPATIENT
Start: 2022-01-17 | End: 2022-01-28

## 2022-01-17 RX ORDER — INSULIN GLARGINE 100 [IU]/ML
INJECTION, SOLUTION SUBCUTANEOUS
Qty: 15 ML | Refills: 3 | Status: SHIPPED | OUTPATIENT
Start: 2022-01-17 | End: 2022-05-24

## 2022-01-17 RX ORDER — CILOSTAZOL 50 MG/1
50 TABLET ORAL 2 TIMES DAILY
Qty: 30 TABLET | Refills: 3 | Status: SHIPPED | OUTPATIENT
Start: 2022-01-17 | End: 2024-03-28

## 2022-01-17 RX ORDER — ROSUVASTATIN CALCIUM 20 MG/1
20 TABLET, COATED ORAL DAILY
Qty: 30 TABLET | Refills: 3 | Status: SHIPPED | OUTPATIENT
Start: 2022-01-17 | End: 2022-06-14

## 2022-01-17 RX ORDER — AMLODIPINE BESYLATE 10 MG/1
10 TABLET ORAL DAILY
Qty: 30 TABLET | Refills: 3 | Status: SHIPPED | OUTPATIENT
Start: 2022-01-17 | End: 2022-10-26

## 2022-01-17 RX ORDER — LANCETS
EACH MISCELLANEOUS
Qty: 100 EACH | Refills: 3 | Status: SHIPPED | OUTPATIENT
Start: 2022-01-17

## 2022-01-17 RX ORDER — CLOPIDOGREL BISULFATE 75 MG/1
75 TABLET ORAL DAILY
Qty: 90 TABLET | Refills: 4 | Status: SHIPPED | OUTPATIENT
Start: 2022-01-17 | End: 2023-02-24

## 2022-01-17 ASSESSMENT — MIFFLIN-ST. JEOR: SCORE: 1524.33

## 2022-01-17 NOTE — PROGRESS NOTES
[facility]  Johnny Gonzalez MD - Vascular Medicine Progress Note    LOCATION: [facility]    Wyatt Mahajan  Medical Record #:  [id number]  YOB: 1960  Age:  61 year old     Date of Service: 1/17/2022     PRIMARY CARE PROVIDER: Shaun Bedolla    REASON FOR VISIT:   follow up  for on lower extremity ELOISA    IMPRESSION: Has not been here for a long time patient is status post IR procedure with occluded right common iliac artery for which she had covered stenting patient has long segment of occlusion of the superficial femoral arteries bilaterally as well as moderate stenosis of the right common femoral artery, patient is not complaining of the right leg yet his left leg is giving him grief, there is evidence of nocturnal pain, rest pain, left foot swelling with dependent rubor (difficult to assess in dark skin yet there is definitely change in color on dependency)    His ELOISA today showed a resting ELOISA of 0.59 previously was 0.83 back in September, left lower extremity showed a resting ankle brachial index of 0.2 from 0.59 previously  Definitely patient is symptomatic    Patient stopped taking his Plavix, his blood pressure is out of control as he does not have medication because he did not renew his meds  Patient denies any history of chest pain shortness of breath or palpitations  RECOMMENDATION:  1- renewed all the patient's medication so he had his medication list renewed  2-CTA of the lower extremities with runoff  3- check A1c, lipid profile, CMP  4- added Norvasc 10 mg to his regimen, increased his ACE inhibitor to 10 mg daily  5- added Pletal 50 mg daily  6-we will see the patient once lab and imaging results are available    HPI: Wyattkatina Mahajan is a 61 year old male who was seen today for evaluation of PAD status post IR procedure to the right lower extremity now patient is complaining of his left lower extremity      PAST MEDICAL HISTORY  Past Medical  History:   Diagnosis Date     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    eval by optho, considered be related to microvascular problem ie DM     Diabetes (H)      Influenza B 4/1/2017     Mixed hyperlipidemia 3/04     MSSA (methicillin susceptible Staphylococcus aureus) pneumonia (H) 4/1/2017     MSSA (methicillin susceptible Staphylococcus aureus) septicemia (H) 4/1/2017     PVD (peripheral vascular disease) with claudication (H) 10/12/2015     Tobacco use disorder QUIT 9/08    smokes for stress     Type II or unspecified type diabetes mellitus with neurological manifestations, not stated as uncontrolled(250.60) (H) 6/23/2014       PAST SURGICAL HISTORY:  Past Surgical History:   Procedure Laterality Date     COLONOSCOPY  10/27/16     COLONOSCOPY N/A 10/31/2016    Procedure: COLONOSCOPY;  Surgeon: Pollo Lopez MD;  Location:  GI     HC UGI ENDOSCOPY W PLACEMENT GASTROSTOMY TUBE PERCUT N/A 4/4/2017    Procedure: COMBINED ESOPHAGOSCOPY, GASTROSCOPY, DUODENOSCOPY (EGD), PLACE PERCUTANEOUS ENDOSCOPIC GASTROSTOMY TUBE;  Surgeon: Marcial Obregon MD;  Location:  GI     IR LOWER EXTREMITY ANGIOGRAM RIGHT  7/17/2020     NO HISTORY OF SURGERY       TRACHEOSTOMY N/A 4/4/2017    Procedure: TRACHEOSTOMY;  Surgeon: Jose Puga MD;  Location:  OR         CURRENT MEDICATIONS  ASPIRIN 81 MG OR TABS, 1 tab per day  celecoxib (CELEBREX) 50 MG capsule, Take 1 capsule (50 mg) by mouth 2 times daily  cyclobenzaprine (FLEXERIL) 5 MG tablet, Take 1 tablet (5 mg) by mouth 2 times daily as needed for muscle spasms    No current facility-administered medications on file prior to visit.      ALLERGIES     Allergies   Allergen Reactions     No Known Drug Allergies        FAMILY HISTORY  Family History   Problem Relation Age of Onset     Diabetes Mother      Diabetes Father      Diabetes Sister      Diabetes Brother      Hypertension No family hx of      Cerebrovascular Disease No family hx of      Prostate Cancer No  "family hx of      Cancer - colorectal No family hx of      Breast Cancer No family hx of        SOCIAL HISTORY  Social History     Socioeconomic History     Marital status:      Spouse name: Mary     Number of children: 7     Years of education: 16     Highest education level: Not on file   Occupational History     Occupation:  store     Employer: [employer]   Tobacco Use     Smoking status: Current Some Day Smoker     Packs/day: 0.50     Years: 15.00     Pack years: 7.50     Types: Cigarettes     Smokeless tobacco: Never Used   Substance and Sexual Activity     Alcohol use: No     Drug use: No     Sexual activity: Yes     Partners: Female   Other Topics Concern      Service No     Blood Transfusions No     Caffeine Concern No     Comment: 1 coffee,pop 1 time weekly     Occupational Exposure No     Hobby Hazards No     Sleep Concern No     Stress Concern No     Weight Concern Yes     Special Diet No     Back Care No     Exercise No     Bike Helmet No     Comment: No Bike     Seat Belt Yes     Self-Exams No     Parent/sibling w/ CABG, MI or angioplasty before 65F 55M? No   Social History Narrative    moved here from [facility], moved here 1990         Social Determinants of Health     Financial Resource Strain: Not on file   Food Insecurity: Not on file   Transportation Needs: Not on file   Physical Activity: Not on file   Stress: Not on file   Social Connections: Not on file   Intimate Partner Violence: Not on file   Housing Stability: Not on file       ROS:   Complete ROS negative other than what is stated in HPI.     EXAM:  BP (!) 171/91 (BP Location: Left arm, Patient Position: Chair, Cuff Size: Adult Regular)   Pulse 87   Resp 16   Ht 5' 10\" (1.778 m)   Wt 155 lb (70.3 kg)   SpO2 99%   BMI 22.24 kg/m    In general, the patient is a pleasant male in no apparent distress.    HEENT: NC/AT.  PERRLA.  EOMI.  Sclerae white, not injected.    Neck: No adenopathy.  Carotids +2/2 " bilaterally without bruits.   Heart: RRR. Normal S1, S2 splits physiologically. No murmur, rub, click, or gallop.   Lungs: CTA.  No ronchi, wheezes, rales.    Abdomen: Soft, nontender, nondistended.   Extremities: No clubbing, cyanosis, or edema.  Left foot swelling with evidence of change in color equivalent to dependent rubor  No wounds.     Labs:  LIPID RESULTS:  Lab Results   Component Value Date    CHOL 196 08/27/2021    CHOL 195 07/17/2020    HDL 41 08/27/2021    HDL 43 07/17/2020     (H) 08/27/2021     (H) 07/17/2020    TRIG 73 08/27/2021    TRIG 80 07/17/2020    CHOLHDLRATIO 5.6 (H) 08/07/2015       LIVER ENZYME RESULTS:  Lab Results   Component Value Date    AST 11 05/22/2017    ALT 50 12/14/2018       CBC RESULTS:  Lab Results   Component Value Date    WBC 7.0 12/24/2021    WBC 6.2 07/17/2020    RBC 5.13 12/24/2021    RBC 5.02 07/17/2020    HGB 15.6 12/24/2021    HGB 15.8 07/17/2020    HCT 47.3 12/24/2021    HCT 46.2 07/17/2020    MCV 92 12/24/2021    MCV 92 07/17/2020    MCH 30.4 12/24/2021    MCH 31.5 07/17/2020    MCHC 33.0 12/24/2021    MCHC 34.2 07/17/2020    RDW 13.1 12/24/2021    RDW 13.2 07/17/2020     12/24/2021     07/17/2020       BMP RESULTS:  Lab Results   Component Value Date     12/24/2021     04/14/2020    POTASSIUM 4.3 12/24/2021    POTASSIUM 4.6 04/14/2020    CHLORIDE 107 12/24/2021    CHLORIDE 108 04/14/2020    CO2 28 12/24/2021    CO2 28 04/14/2020    ANIONGAP 4 12/24/2021    ANIONGAP 3 04/14/2020     (H) 12/24/2021     (H) 04/14/2020    BUN 18 12/24/2021    BUN 15 04/14/2020    CR 0.94 12/24/2021    CR 0.90 04/14/2020    GFRESTIMATED >90 12/24/2021    GFRESTIMATED 77 07/14/2020    GFRESTBLACK >90 07/14/2020    LEONIE 8.5 12/24/2021    LEONIE 8.7 04/14/2020        A1C RESULTS:  Lab Results   Component Value Date    A1C 8.9 (H) 08/27/2021    A1C 9.5 (H) 07/10/2020       THYROID RESULTS:  Lab Results   Component Value Date    TSH 1.30  04/14/2020         DIAGNOSTIC STUDIES:     Images:  US ELOISA Doppler No Exercise    Result Date: 1/11/2022  DATE: 1/11/2022 EXAM: RESTING ANKLE-BRACHIAL INDICES (ABIs) INDICATION: Decreased lower extremity pulses, lower extremity pain COMPARISON: ELOISA study dated 9/8/2020 ELOISA FINDINGS: The right posterior tibial and dorsalis pedis arteries demonstrate monophasic waveforms. Monophasic flow in the left posterior tibial and absent flow in the left dorsalis pedis.     IMPRESSION: 1. RIGHT LOWER EXTREMITY: Resting ankle brachial index of 0.59 reflecting moderate peripheral arterial disease (previously 0.83 on 9/8/2020). 2. LEFT LOWER EXTREMITY: Resting ankle-brachial index of 0.2 reflecting severe peripheral arterial disease (previously 0.59). Unable to obtain a dorsalis pedis waveform. BRENNEN WATTS MD   SYSTEM ID:  [id number]      I personally reviewed the images and my interpretation is reviewed his ELOISA myself.  And did the interpretation      Johnny Gonzalez MD        40 minutes spent on the date of the encounter doing chart review, history and exam, documentation, and further activities as noted above.

## 2022-01-17 NOTE — PROGRESS NOTES
"[facility]        Patient is here for a follow up  to discuss about RLE US results      BP (!) 171/91 (BP Location: Left arm, Patient Position: Chair, Cuff Size: Adult Regular)   Pulse 87   Resp 16   Ht 5' 10\" (1.778 m)   Wt 155 lb (70.3 kg)   SpO2 99%   BMI 22.24 kg/m      The provider has been notified that the patient has concerns about his left leg/swelling    Questions patient would like addressed today are: N/A.    Refills are needed: No    Has homecare services and agency name:  Geovanna Sutton CMA      "

## 2022-01-17 NOTE — TELEPHONE ENCOUNTER
Patient needs to be scheduled for fasting labs, CTA abdomen pelvis with runoff and in-person follow up with Dr. Gonzalez.     Sarah VELASQUEZN, RN    [facility]  Office: [phone]  Fax: [phone]

## 2022-01-18 RX ORDER — CYCLOBENZAPRINE HCL 5 MG
5 TABLET ORAL 2 TIMES DAILY PRN
Qty: 30 TABLET | Refills: 0 | Status: SHIPPED | OUTPATIENT
Start: 2022-01-18 | End: 2023-02-24

## 2022-01-18 RX ORDER — CELECOXIB 50 MG/1
50 CAPSULE ORAL 2 TIMES DAILY
Qty: 30 CAPSULE | Refills: 0 | Status: SHIPPED | OUTPATIENT
Start: 2022-01-18 | End: 2023-02-24

## 2022-01-18 NOTE — TELEPHONE ENCOUNTER
Failed protocol.  please route to  team if patient needs an appointment     Angela CLARKERN BSN  [facility]  [phone]

## 2022-01-18 NOTE — TELEPHONE ENCOUNTER
Spoke with patient and scheduled for fasting labs on 1/21/22 and CTA abdomen pelvis with runoff and in-person follow up with Dr. Gonzalez 1/28/22.    agustin

## 2022-01-21 ENCOUNTER — LAB (OUTPATIENT)
Dept: LAB | Facility: CLINIC | Age: 62
End: 2022-01-21
Payer: COMMERCIAL

## 2022-01-21 DIAGNOSIS — E78.5 HYPERLIPIDEMIA LDL GOAL <70: ICD-10-CM

## 2022-01-21 DIAGNOSIS — Z79.4 TYPE 2 DIABETES MELLITUS WITH DIABETIC NEUROPATHY, WITH LONG-TERM CURRENT USE OF INSULIN (H): ICD-10-CM

## 2022-01-21 DIAGNOSIS — E11.40 TYPE 2 DIABETES MELLITUS WITH DIABETIC NEUROPATHY, WITH LONG-TERM CURRENT USE OF INSULIN (H): ICD-10-CM

## 2022-01-21 LAB
CHOLEST SERPL-MCNC: 140 MG/DL
FASTING STATUS PATIENT QL REPORTED: YES
HBA1C MFR BLD: 9.3 % (ref 0–5.6)
HDLC SERPL-MCNC: 33 MG/DL
LDLC SERPL CALC-MCNC: 90 MG/DL
NONHDLC SERPL-MCNC: 107 MG/DL
TRIGL SERPL-MCNC: 83 MG/DL

## 2022-01-21 PROCEDURE — 80061 LIPID PANEL: CPT

## 2022-01-21 PROCEDURE — 36415 COLL VENOUS BLD VENIPUNCTURE: CPT

## 2022-01-21 PROCEDURE — 83036 HEMOGLOBIN GLYCOSYLATED A1C: CPT

## 2022-01-28 ENCOUNTER — OFFICE VISIT (OUTPATIENT)
Dept: OTHER | Facility: CLINIC | Age: 62
End: 2022-01-28
Attending: INTERNAL MEDICINE
Payer: COMMERCIAL

## 2022-01-28 ENCOUNTER — HOSPITAL ENCOUNTER (OUTPATIENT)
Dept: CT IMAGING | Facility: CLINIC | Age: 62
End: 2022-01-28
Attending: INTERNAL MEDICINE
Payer: COMMERCIAL

## 2022-01-28 ENCOUNTER — TELEPHONE (OUTPATIENT)
Dept: OTHER | Facility: CLINIC | Age: 62
End: 2022-01-28

## 2022-01-28 VITALS
BODY MASS INDEX: 22.81 KG/M2 | HEART RATE: 101 BPM | WEIGHT: 159 LBS | SYSTOLIC BLOOD PRESSURE: 150 MMHG | DIASTOLIC BLOOD PRESSURE: 80 MMHG | OXYGEN SATURATION: 100 %

## 2022-01-28 DIAGNOSIS — I73.9 PVD (PERIPHERAL VASCULAR DISEASE) WITH CLAUDICATION (H): ICD-10-CM

## 2022-01-28 DIAGNOSIS — I10 BENIGN ESSENTIAL HYPERTENSION: ICD-10-CM

## 2022-01-28 LAB
CREAT BLD-MCNC: 1.2 MG/DL (ref 0.7–1.3)
GFR SERPL CREATININE-BSD FRML MDRD: >60 ML/MIN/1.73M2
RADIOLOGIST FLAGS: ABNORMAL

## 2022-01-28 PROCEDURE — 75635 CT ANGIO ABDOMINAL ARTERIES: CPT

## 2022-01-28 PROCEDURE — 82565 ASSAY OF CREATININE: CPT

## 2022-01-28 PROCEDURE — G0463 HOSPITAL OUTPT CLINIC VISIT: HCPCS | Mod: 25

## 2022-01-28 PROCEDURE — 99214 OFFICE O/P EST MOD 30 MIN: CPT | Performed by: INTERNAL MEDICINE

## 2022-01-28 PROCEDURE — 250N000009 HC RX 250: Performed by: INTERNAL MEDICINE

## 2022-01-28 PROCEDURE — 250N000011 HC RX IP 250 OP 636: Performed by: INTERNAL MEDICINE

## 2022-01-28 RX ORDER — LISINOPRIL 30 MG/1
30 TABLET ORAL DAILY
Qty: 30 TABLET | Refills: 4 | Status: SHIPPED | OUTPATIENT
Start: 2022-01-28 | End: 2022-06-14

## 2022-01-28 RX ORDER — IOPAMIDOL 755 MG/ML
100 INJECTION, SOLUTION INTRAVASCULAR ONCE
Status: COMPLETED | OUTPATIENT
Start: 2022-01-28 | End: 2022-01-28

## 2022-01-28 RX ADMIN — IOPAMIDOL 100 ML: 755 INJECTION, SOLUTION INTRAVENOUS at 12:37

## 2022-01-28 RX ADMIN — SODIUM CHLORIDE 80 ML: 9 INJECTION, SOLUTION INTRAVENOUS at 12:37

## 2022-01-28 NOTE — TELEPHONE ENCOUNTER
Dr. Gonzalez discussed this with the patient today at his visit.  If the patient is declining to schedule then that is his choice and we will await his call back should he change his mind.     Sarah OTOOLE, RN    [facility]  [facility]  Office: [phone]  Fax: [phone]

## 2022-01-28 NOTE — TELEPHONE ENCOUNTER
Patient needs to be scheduled for CTA abdomen pelvis with runoff, bilateral lower extremity vein mapping in the next few days and in-person consult with Dr. Wilson for PAD once these things are completed. Please try and see if imaging can be done today or this weekend and to see Dr. Wilson Monday.     Patient needs to be scheduled for 1 month in-person BP check up with Dr. Gonzalez as well.     Sarah VELASQUEZN, RN    [facility]  Vascular [facility]  Office: [phone]  Fax: [phone]

## 2022-01-28 NOTE — PROGRESS NOTES
[facility]        Patient is here for a  follow up.    Pt is currently taking Aspirin, Statin and Plavix.    BP (!) 150/80 (BP Location: Right arm, Patient Position: Chair, Cuff Size: Adult Regular)   Pulse 101   Wt 159 lb (72.1 kg)   SpO2 100%   BMI 22.81 kg/m      The provider has been notified that the patient has no concerns.     Questions patient would like addressed today are: N/A.    Refills are needed: N/A    Has homecare services and agency name:  Geovanna Capellan MA

## 2022-01-28 NOTE — PROGRESS NOTES
[facility] VASCULAR CLINIC  Johnny Gonzalez MD - Vascular Medicine Progress Note    LOCATION: [facility]  Medical Record #:  [id number]  YOB: 1960  Age:  61 year old     Date of Service: 1/28/2022     PRIMARY CARE PROVIDER: Shaun Bedolla    REASON FOR VISIT:   follow up  for ELOISA resting with TBI    IMPRESSION: Patient has PAD is status post right common iliac stenting which is occluded now patient is presenting with left lower extremity rest pain and sometimes nocturnal pain, his ELOISA revealed a resting ELOISA of 0.59 on the right side previously was 0.83 a year and a half ago, his left lower extremity resting ELOISA is 0.2 previously was 0.59 and it was very difficult to obtain a dorsalis pedis waveform    Patient's A1c is 9.2% up from 8.6, and his blood pressure is 170/94 based on his most recent reading of which was done today  Patient blood pressure medications includes amlodipine 10 mg daily in addition to lisinopril 10 mg daily    Patient blood pressure and A1c is not to target this for sure and patient is complaining of rest pain and sometimes nocturnal pain affecting mainly his left lower extremity    RECOMMENDATION:  1-CTA of the abdomen pelvis with runoffs today if possible  2-referral to vascular surgery for possible bypass after obtaining vein mapping as well  3-vascular risk factors modifications mainly the blood pressure and his diabetes mellitus, increase his lisinopril to 30 mg daily in addition to amlodipine  4-follow-up in 1 month to monitor blood pressure    If patients imaging is normal patient should follow up in 1 month    HPI: Wyatt A Jama is a 61 year old male who was seen today for follow-up on ELOISA resting with TBI, patient is complaining of left lower extremity rest pain and nocturnal pain      PAST MEDICAL HISTORY  Past Medical History:   Diagnosis Date     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   microvascular problem ie DM     Diabetes (H)      Influenza B 4/1/2017     Mixed hyperlipidemia 3/04     MSSA (methicillin susceptible Staphylococcus aureus) pneumonia (H) 4/1/2017     MSSA (methicillin susceptible Staphylococcus aureus) septicemia (H) 4/1/2017     PVD (peripheral vascular disease) with claudication (H) 10/12/2015     Tobacco use disorder QUIT 9/08    smokes for stress     Type II or unspecified type diabetes mellitus with neurological manifestations, not stated as uncontrolled(250.60) (H) 6/23/2014       PAST SURGICAL HISTORY:  Past Surgical History:   Procedure Laterality Date     COLONOSCOPY  10/27/16     COLONOSCOPY N/A 10/31/2016    Procedure: COLONOSCOPY;  Surgeon: Pollo Lopez MD;  Location:  GI     HC UGI ENDOSCOPY W PLACEMENT GASTROSTOMY TUBE PERCUT N/A 4/4/2017    Procedure: COMBINED ESOPHAGOSCOPY, GASTROSCOPY, DUODENOSCOPY (EGD), PLACE PERCUTANEOUS ENDOSCOPIC GASTROSTOMY TUBE;  Surgeon: Marcial Obregon MD;  Location:  GI     IR LOWER EXTREMITY ANGIOGRAM RIGHT  7/17/2020     NO HISTORY OF SURGERY       TRACHEOSTOMY N/A 4/4/2017    Procedure: TRACHEOSTOMY;  Surgeon: Jose Puga MD;  Location:  OR         CURRENT MEDICATIONS  amLODIPine (NORVASC) 10 MG tablet, Take 1 tablet (10 mg) by mouth daily  ASPIRIN 81 MG OR TABS, 1 tab per day  blood glucose (NO BRAND SPECIFIED) test strip, Use to test blood sugar 1 times daily or as directed. To accompany: Blood Glucose Monitor Brands: ACCU-CHEK SANDRA  celecoxib (CELEBREX) 50 MG capsule, Take 1 capsule (50 mg) by mouth 2 times daily  cilostazol (PLETAL) 50 MG tablet, Take 1 tablet (50 mg) by mouth 2 times daily  clopidogrel (PLAVIX) 75 MG tablet, Take 1 tablet (75 mg) by mouth daily Start taking medication the day after the procedure  cyclobenzaprine (FLEXERIL) 5 MG tablet, Take 1 tablet (5 mg) by mouth 2 times daily as needed for muscle spasms  insulin glargine (LANTUS SOLOSTAR) 100 UNIT/ML pen, He INJECTS 36 UNITS  SUBCUTANEOUSLY ONCE DAILY IN EVENING (has not: INCREASE DOSE BY 4 UNITS EVERY 3 DAYS UNTIL FASTING BG is . IF OVER 40 UNITS, SPLIT DOSE INTO TWO ADMINISTRATIONS DAILY)  liraglutide (VICTOZA) 18 MG/3ML solution, Inject 1.2 mg Subcutaneous daily Start with 0.6 mg dose x first 7 days then increase to 1.2 mg/day dose.  rosuvastatin (CRESTOR) 20 MG tablet, Take 1 tablet (20 mg) by mouth daily  thin (NO BRAND SPECIFIED) lancets, Use with lanceting device. To accompany: Blood Glucose Monitor Brands: ACCU-CHEK SANDRA    No current facility-administered medications on file prior to visit.      ALLERGIES     Allergies   Allergen Reactions     No Known Drug Allergies        FAMILY HISTORY  Family History   Problem Relation Age of Onset     Diabetes Mother      Diabetes Father      Diabetes Sister      Diabetes Brother      Hypertension No family hx of      Cerebrovascular Disease No family hx of      Prostate Cancer No family hx of      Cancer - colorectal No family hx of      Breast Cancer No family hx of        SOCIAL HISTORY  Social History     Socioeconomic History     Marital status:      Spouse name: Mary     Number of children: 7     Years of education: 16     Highest education level: Not on file   Occupational History     Occupation:  store     Employer: [employer]   Tobacco Use     Smoking status: Current Some Day Smoker     Packs/day: 0.50     Years: 15.00     Pack years: 7.50     Types: Cigarettes     Smokeless tobacco: Never Used   Substance and Sexual Activity     Alcohol use: No     Drug use: No     Sexual activity: Yes     Partners: Female   Other Topics Concern      Service No     Blood Transfusions No     Caffeine Concern No     Comment: 1 coffee,pop 1 time weekly     Occupational Exposure No     Hobby Hazards No     Sleep Concern No     Stress Concern No     Weight Concern Yes     Special Diet No     Back Care No     Exercise No     Bike Helmet No     Comment: No Bike      Seat Belt Yes     Self-Exams No     Parent/sibling w/ CABG, MI or angioplasty before 65F 55M? No   Social History Narrative    moved here from [place], moved here 1990         Social Determinants of Health     Financial Resource Strain: Not on file   Food Insecurity: Not on file   Transportation Needs: Not on file   Physical Activity: Not on file   Stress: Not on file   Social Connections: Not on file   Intimate Partner Violence: Not on file   Housing Stability: Not on file       ROS:   Complete ROS negative other than what is stated in HPI.     EXAM:  BP (!) 150/80 (BP Location: Right arm, Patient Position: Chair, Cuff Size: Adult Regular)   Pulse 101   Wt 159 lb (72.1 kg)   SpO2 100%   BMI 22.81 kg/m    In general, the patient is a pleasant male in no apparent distress.    HEENT: NC/AT.  PERRLA.  EOMI.  Sclerae white, not injected.    Neck: No adenopathy.  Carotids +2/2 bilaterally without bruits.   Heart: RRR. Normal S1, S2 splits physiologically. No murmur, rub, click, or gallop.   Lungs: CTA.  No ronchi, wheezes, rales.    Abdomen: Soft, nontender, nondistended.   Extremities: No clubbing, cyanosis, or edema.  No wounds.     Labs:  LIPID RESULTS:  Lab Results   Component Value Date    CHOL 140 01/21/2022    CHOL 195 07/17/2020    HDL 33 (L) 01/21/2022    HDL 43 07/17/2020    LDL 90 01/21/2022     (H) 07/17/2020    TRIG 83 01/21/2022    TRIG 80 07/17/2020    CHOLHDLRATIO 5.6 (H) 08/07/2015       LIVER ENZYME RESULTS:  Lab Results   Component Value Date    AST 11 05/22/2017    ALT 50 12/14/2018       CBC RESULTS:  Lab Results   Component Value Date    WBC 7.0 12/24/2021    WBC 6.2 07/17/2020    RBC 5.13 12/24/2021    RBC 5.02 07/17/2020    HGB 15.6 12/24/2021    HGB 15.8 07/17/2020    HCT 47.3 12/24/2021    HCT 46.2 07/17/2020    MCV 92 12/24/2021    MCV 92 07/17/2020    MCH 30.4 12/24/2021    MCH 31.5 07/17/2020    MCHC 33.0 12/24/2021    MCHC 34.2 07/17/2020    RDW 13.1 12/24/2021    RDW 13.2  07/17/2020     12/24/2021     07/17/2020       BMP RESULTS:  Lab Results   Component Value Date     12/24/2021     04/14/2020    POTASSIUM 4.3 12/24/2021    POTASSIUM 4.6 04/14/2020    CHLORIDE 107 12/24/2021    CHLORIDE 108 04/14/2020    CO2 28 12/24/2021    CO2 28 04/14/2020    ANIONGAP 4 12/24/2021    ANIONGAP 3 04/14/2020     (H) 12/24/2021     (H) 04/14/2020    BUN 18 12/24/2021    BUN 15 04/14/2020    CR 0.94 12/24/2021    CR 0.90 04/14/2020    GFRESTIMATED >90 12/24/2021    GFRESTIMATED 77 07/14/2020    GFRESTBLACK >90 07/14/2020    LEONIE 8.5 12/24/2021    LEONIE 8.7 04/14/2020        A1C RESULTS:  Lab Results   Component Value Date    A1C 9.3 (H) 01/21/2022    A1C 9.5 (H) 07/10/2020       THYROID RESULTS:  Lab Results   Component Value Date    TSH 1.30 04/14/2020         DIAGNOSTIC STUDIES:     Images:  US ELOISA Doppler No Exercise    Result Date: 1/11/2022  DATE: 1/11/2022 EXAM: RESTING ANKLE-BRACHIAL INDICES (ABIs) INDICATION: Decreased lower extremity pulses, lower extremity pain COMPARISON: ELOISA study dated 9/8/2020 ELOISA FINDINGS: The right posterior tibial and dorsalis pedis arteries demonstrate monophasic waveforms. Monophasic flow in the left posterior tibial and absent flow in the left dorsalis pedis.     IMPRESSION: 1. RIGHT LOWER EXTREMITY: Resting ankle brachial index of 0.59 reflecting moderate peripheral arterial disease (previously 0.83 on 9/8/2020). 2. LEFT LOWER EXTREMITY: Resting ankle-brachial index of 0.2 reflecting severe peripheral arterial disease (previously 0.59). Unable to obtain a dorsalis pedis waveform. BRENNEN WATTS MD   SYSTEM ID:  [id number]      I personally reviewed the images and my interpretation is did interpret the ELOISA myself.      Johnny Gonzalez MD        30 minutes spent on the date of the encounter doing chart review, history and exam, documentation, and further activities as noted above.

## 2022-01-28 NOTE — TELEPHONE ENCOUNTER
Spoke to pt. He stated that he is currently en route to get a CTA, did not specify where. Pt declined to schedule CTA and US.      I tried to schedule an appointment with Dr. Wilson on Monday, 1/31/22 but pt declined.     I also tried to schedule a one month follow up with Dr. Gonzalez but pt declined and stated that he would call back to schedule this appointment.     Time is currently held for pt on 1/31/22 at 4:00 pm to see Dr. Wilson.         Routing back as ENRIQUE.       Zhane Menendez    [facility]   [phone]

## 2022-01-31 ENCOUNTER — TELEPHONE (OUTPATIENT)
Dept: OTHER | Facility: CLINIC | Age: 62
End: 2022-01-31
Payer: COMMERCIAL

## 2022-01-31 NOTE — TELEPHONE ENCOUNTER
You are correct.  CTA abdomen/pelvis with runoff done 1/28/22 is sufficient.  Mimi Rouse RN BSN  [facility]  [phone]

## 2022-01-31 NOTE — TELEPHONE ENCOUNTER
Patient called today to schedule.  I see orders for CTA, ultrasound, consult with Dr. Wilson and 1 month BP check with Dr. Gonzalez.  Scheduled patient for all appointments but CTA.  Per patient and chart review, patient had CTA Chest, Abdomen, Pelvis and Runoff done 01/28/22.  See orders for CTA abd/pelvis and runoff in chart, but believe the scan done 01/28/22 is sufficient.  Will route to nurse to verify if CTA abd/pelvis and runoff needs to be scheduled or order deleted. Patient did agree to see Dr. Wilson at the [place] location so he could be seen this week.

## 2022-02-01 ENCOUNTER — HOSPITAL ENCOUNTER (OUTPATIENT)
Dept: ULTRASOUND IMAGING | Facility: CLINIC | Age: 62
Discharge: HOME | End: 2022-02-01
Attending: INTERNAL MEDICINE | Admitting: INTERNAL MEDICINE
Payer: COMMERCIAL

## 2022-02-01 DIAGNOSIS — I73.9 PVD (PERIPHERAL VASCULAR DISEASE) WITH CLAUDICATION (H): ICD-10-CM

## 2022-02-01 PROCEDURE — 93970 EXTREMITY STUDY: CPT

## 2022-02-03 ENCOUNTER — TELEPHONE (OUTPATIENT)
Dept: OTHER | Facility: CLINIC | Age: 62
End: 2022-02-03

## 2022-02-03 ENCOUNTER — OFFICE VISIT (OUTPATIENT)
Dept: SURGERY | Facility: CLINIC | Age: 62
End: 2022-02-03
Payer: COMMERCIAL

## 2022-02-03 VITALS
WEIGHT: 159 LBS | SYSTOLIC BLOOD PRESSURE: 150 MMHG | HEART RATE: 105 BPM | DIASTOLIC BLOOD PRESSURE: 88 MMHG | BODY MASS INDEX: 22.76 KG/M2 | HEIGHT: 70 IN | RESPIRATION RATE: 16 BRPM | OXYGEN SATURATION: 98 %

## 2022-02-03 DIAGNOSIS — I70.229 CRITICAL ISCHEMIA OF LOWER EXTREMITY (H): Primary | ICD-10-CM

## 2022-02-03 PROCEDURE — 99212 OFFICE O/P EST SF 10 MIN: CPT | Performed by: SURGERY

## 2022-02-03 ASSESSMENT — MIFFLIN-ST. JEOR: SCORE: 1542.47

## 2022-02-03 NOTE — PROGRESS NOTES
I had the pleasure of seeing Mr. Mahajan at the request of Dr. Gonzalez in the vascular surgery clinic today.  He is a very pleasant 61-year-old diabetic male with peripheral arterial disease with previously undergone a right common iliac artery stent in July 2020.  He continues to have rest pain in the left lower extremity.  Recent imaging shows occlusion of bilateral superficial femoral arteries, patent right common iliac artery stent and occlusion of proximal left common iliac artery.    Resting right ankle-brachial index is 0.59 and left is 0.2.    We will proceed with left common iliac artery balloon angioplasty and stenting.  And if that does not improve his rest pain then he we can proceed with femoropopliteal bypass he has adequate vein in bilateral extremity for femoropopliteal bypasses.

## 2022-02-03 NOTE — TELEPHONE ENCOUNTER
Sx  will call on 2/8/22 to coordinate, Verified with Dr. Steve kirkpatrick to do so then. Pt to be called with this update.   Verified with Dr. Wilson, no Plavix/Pletal Holds.     Called pt, left vm explaining  will call on 2/8/22, requested call back to discuss preop education.     Patient Education  Procedure: LEFT LOWER EXTREMITY ANGIOGRAM WITH INTERVENTION  Diagnosis: CRITICAL LIMB ISCHEMIA  Anticoagulation Instruction: CONTINUE PLAVIX AND PLETAL. NO HOLDS.  Pre-Operative Physical Exam: You need to have a pre-op physical exam within 30 days of your procedure. Your procedure may be cancelled if you do not have a current History and Physical. Call your PCP's office to schedule.  Allergies:  Updated in Epic  Bowel Prep: N/A  NPO per protocol.   Post Procedure Education: [facility] post-procedure fact sheet reviewed with patient.    COVID-19 instructions for isolation and pre testing reviewed with pt.    Learner(s):patient  Method: Listening  Barriers to Learning:No Barrier  Outcome: Patient did verbalize understanding of above education.    Pt requested instructions be sent by [employer]. Information sent.     XIANG Gifford, RN  [facility]  Office:  [phone] Fax: [phone]

## 2022-02-04 ENCOUNTER — TELEPHONE (OUTPATIENT)
Dept: OTHER | Facility: CLINIC | Age: 62
End: 2022-02-04
Payer: COMMERCIAL

## 2022-02-04 NOTE — TELEPHONE ENCOUNTER
Please see other tele enc.   XIANG Gifford, RN  [facility]  Office:  [phone] Fax: [phone]

## 2022-02-04 NOTE — TELEPHONE ENCOUNTER
[facility]     Who is the name of the provider? Steve    What is the location you see this provider at?    [place]    Reason for call:  He is returning Beth's call to talk about scheduling surgery.      :  Wyatt Mahajan    Phone number to call:  [phone]    Additional Notes:      Fernanda Rivera    [facility]   [phone]

## 2022-02-08 ENCOUNTER — TELEPHONE (OUTPATIENT)
Dept: OTHER | Facility: CLINIC | Age: 62
End: 2022-02-08
Payer: COMMERCIAL

## 2022-02-09 ENCOUNTER — ANCILLARY PROCEDURE (OUTPATIENT)
Dept: GENERAL RADIOLOGY | Facility: CLINIC | Age: 62
End: 2022-02-09
Attending: FAMILY MEDICINE
Payer: COMMERCIAL

## 2022-02-09 ENCOUNTER — OFFICE VISIT (OUTPATIENT)
Dept: FAMILY MEDICINE | Facility: CLINIC | Age: 62
End: 2022-02-09
Payer: COMMERCIAL

## 2022-02-09 VITALS
DIASTOLIC BLOOD PRESSURE: 80 MMHG | OXYGEN SATURATION: 98 % | BODY MASS INDEX: 21.9 KG/M2 | HEART RATE: 114 BPM | WEIGHT: 153 LBS | HEIGHT: 70 IN | SYSTOLIC BLOOD PRESSURE: 134 MMHG | RESPIRATION RATE: 16 BRPM | TEMPERATURE: 97.9 F

## 2022-02-09 DIAGNOSIS — I73.9 PVD (PERIPHERAL VASCULAR DISEASE) WITH CLAUDICATION (H): ICD-10-CM

## 2022-02-09 DIAGNOSIS — Z01.818 PREOPERATIVE EXAMINATION: Primary | ICD-10-CM

## 2022-02-09 DIAGNOSIS — E11.40 TYPE 2 DIABETES MELLITUS WITH DIABETIC NEUROPATHY, WITH LONG-TERM CURRENT USE OF INSULIN (H): ICD-10-CM

## 2022-02-09 DIAGNOSIS — S99.912A ANKLE INJURY, LEFT, INITIAL ENCOUNTER: ICD-10-CM

## 2022-02-09 DIAGNOSIS — N18.2 CHRONIC KIDNEY DISEASE, STAGE 2 (MILD): ICD-10-CM

## 2022-02-09 DIAGNOSIS — Z79.4 TYPE 2 DIABETES MELLITUS WITH DIABETIC NEUROPATHY, WITH LONG-TERM CURRENT USE OF INSULIN (H): ICD-10-CM

## 2022-02-09 DIAGNOSIS — E78.5 HYPERLIPIDEMIA LDL GOAL <100: ICD-10-CM

## 2022-02-09 DIAGNOSIS — L08.9 LOCAL INFECTION OF SKIN AND SUBCUTANEOUS TISSUE: ICD-10-CM

## 2022-02-09 PROBLEM — J96.01 ACUTE RESPIRATORY FAILURE WITH HYPOXIA (H): Status: RESOLVED | Noted: 2017-03-17 | Resolved: 2022-02-09

## 2022-02-09 LAB — HGB BLD-MCNC: 15.3 G/DL (ref 13.3–17.7)

## 2022-02-09 PROCEDURE — 99214 OFFICE O/P EST MOD 30 MIN: CPT | Performed by: FAMILY MEDICINE

## 2022-02-09 PROCEDURE — 36415 COLL VENOUS BLD VENIPUNCTURE: CPT | Performed by: FAMILY MEDICINE

## 2022-02-09 PROCEDURE — 83721 ASSAY OF BLOOD LIPOPROTEIN: CPT | Performed by: FAMILY MEDICINE

## 2022-02-09 PROCEDURE — 80048 BASIC METABOLIC PNL TOTAL CA: CPT | Performed by: FAMILY MEDICINE

## 2022-02-09 PROCEDURE — 85018 HEMOGLOBIN: CPT | Performed by: FAMILY MEDICINE

## 2022-02-09 PROCEDURE — 73610 X-RAY EXAM OF ANKLE: CPT | Mod: LT | Performed by: RADIOLOGY

## 2022-02-09 RX ORDER — TRAMADOL HYDROCHLORIDE 50 MG/1
50 TABLET ORAL AT BEDTIME
Qty: 5 TABLET | Refills: 0 | Status: SHIPPED | OUTPATIENT
Start: 2022-02-09 | End: 2023-02-24

## 2022-02-09 RX ORDER — FLASH GLUCOSE SENSOR
KIT MISCELLANEOUS
COMMUNITY
Start: 2021-03-05

## 2022-02-09 RX ORDER — PEN NEEDLE, DIABETIC 29 G X1/2"
NEEDLE, DISPOSABLE MISCELLANEOUS
COMMUNITY
Start: 2022-01-13 | End: 2022-04-29

## 2022-02-09 RX ORDER — CEPHALEXIN 500 MG/1
500 CAPSULE ORAL 2 TIMES DAILY
Qty: 14 CAPSULE | Refills: 0 | Status: SHIPPED | OUTPATIENT
Start: 2022-02-09 | End: 2023-02-24

## 2022-02-09 ASSESSMENT — PAIN SCALES - GENERAL: PAINLEVEL: EXTREME PAIN (8)

## 2022-02-09 ASSESSMENT — MIFFLIN-ST. JEOR: SCORE: 1515.25

## 2022-02-09 NOTE — PROGRESS NOTES
[address] [address]  Phone: [phone]  Primary Provider: Shaun Bedolla    PREOPERATIVE EVALUATION:  Today's date: 2/9/2022    Wyatt Mahajan is a 61 year old male who presents for a preoperative evaluation.    Surgical Information:  Surgery/Procedure: Leg  Surgery Location: [facility]  Surgeon: Steve  Surgery Date: 02/18/22  Time of Surgery: 7 am  Where patient plans to recover: At home with family  Fax number for surgical facility: Note does not need to be faxed, will be available electronically in Epic.    Type of Anesthesia Anticipated: to be determined    Assessment & Plan     The proposed surgical procedure is considered INTERMEDIATE risk.    Preoperative examination    - Hemoglobin; Future  - Basic metabolic panel  (Ca, Cl, CO2, Creat, Gluc, K, Na, BUN); Future    Type 2 diabetes mellitus with diabetic neuropathy, with long-term current use of insulin (H)    - Hemoglobin; Future  - Basic metabolic panel  (Ca, Cl, CO2, Creat, Gluc, K, Na, BUN); Future    PVD (peripheral vascular disease) with claudication (H)    - Basic metabolic panel  (Ca, Cl, CO2, Creat, Gluc, K, Na, BUN); Future  - LDL cholesterol direct; Future    Hyperlipidemia LDL goal <100    - Basic metabolic panel  (Ca, Cl, CO2, Creat, Gluc, K, Na, BUN); Future  - LDL cholesterol direct; Future    Chronic kidney disease, stage 2 (mild)      Ankle injury, left, initial encounter    - XR Ankle Left G/E 3 Views; Future  - traMADol (ULTRAM) 50 MG tablet; Take 1 tablet (50 mg) by mouth At Bedtime    Local infection of skin and subcutaneous tissue    - cephALEXin (KEFLEX) 500 MG capsule; Take 1 capsule (500 mg) by mouth 2 times daily         Risks and Recommendations:  The patient has the following additional risks and recommendations for perioperative complications:  Diabetes:  - Patient is on insulin therapy; diabetic NPO guidelines provided and discussed.    Medication  Instructions:   - aspirin: continue on per vascular clinic suggestion    - apixaban (Eliquis), edoxaban (Savaysa), rivaroxaban (Xarelto): continue on per vascular clinic suggestion    - cilostazal (Pletal): continue on per vascular clinic suggestion    - Long acting insulin (e.g. glargine, detemir): Take 80% of the usual evening or morning dose before surgery.   - GLP-1 Injectable (exenitide, liraglutide, semaglutide, dulaglutide, etc.): HOLD day of surgery    - celecoxib (Celebrex): HOLD 3 days before surgery. May continue without modification for management of severe pain.     RECOMMENDATION:  APPROVAL GIVEN to proceed with proposed procedure, without further diagnostic evaluation.    Review of external notes as documented above           Subjective       Preop Questions 2/9/2022   1. Have you ever had a heart attack or stroke? No   2. Have you ever had surgery on your heart or blood vessels, such as a stent placement, a coronary artery bypass, or surgery on an artery in your head, neck, heart, or legs? No   3. Do you have chest pain with activity? No   4. Do you have a history of  heart failure? No   5. Do you currently have a cold, bronchitis or symptoms of other infection? No   6. Do you have a cough, shortness of breath, or wheezing? No   7. Do you or anyone in your family have previous history of blood clots? YES    8. Do you or does anyone in your family have a serious bleeding problem such as prolonged bleeding following surgeries or cuts? No   9. Have you ever had problems with anemia or been told to take iron pills? No   10. Have you had any abnormal blood loss such as black, tarry or bloody stools? No   11. Have you ever had a blood transfusion? No   12. Are you willing to have a blood transfusion if it is medically needed before, during, or after your surgery? Yes   13. Have you or any of your relatives ever had problems with anesthesia? No   14. Do you have sleep apnea, excessive snoring or daytime  drowsiness? No   15. Do you have any artifical heart valves or other implanted medical devices like a pacemaker, defibrillator, or continuous glucose monitor? No   16. Do you have artificial joints? No   17. Are you allergic to latex? No       Health Care Directive:  Patient does not have a Health Care Directive or Living Will: Discussed advance care planning with patient; however, patient declined at this time.    Preoperative Review of :   reviewed - no record of controlled substances prescribed.      Status of Chronic Conditions:  See problem list for active medical problems.  Problems all longstanding and stable, except as noted/documented.  See ROS for pertinent symptoms related to these conditions.      Review of Systems  CONSTITUTIONAL: NEGATIVE for fever, chills, change in weight  ENT/MOUTH: NEGATIVE for ear, mouth and throat problems  RESP: NEGATIVE for significant cough or SOB  CV: NEGATIVE for chest pain, palpitations or peripheral edema    Patient Active Problem List    Diagnosis Date Noted     MSSA (methicillin susceptible Staphylococcus aureus) septicemia (H) 04/01/2017     Priority: High     MSSA (methicillin susceptible Staphylococcus aureus) pneumonia (H) 04/01/2017     Priority: High     Type 2 diabetes mellitus with diabetic neuropathy (HCC) 02/17/2012     Priority: High     Hyperlipidemia LDL goal <100 10/31/2010     Priority: High     Neck pain 09/01/2021     Priority: Medium     Acute pain of both shoulders 09/01/2021     Priority: Medium     Lumbago 05/24/2017     Priority: Medium     Acute respiratory failure with hypoxia (H) 03/17/2017     Priority: Medium     Ischemic vascular disease 12/22/2015     Priority: Medium     PVD (peripheral vascular disease) with claudication (H) 10/12/2015     Priority: Medium     Low back pain 04/10/2014     Priority: Medium     Diagnosis updated by automated process. Provider to review and confirm.       Lumbar radiculopathy 04/10/2014     Priority:  Medium     Cranial nerve III palsy      Priority: Medium     eval by optho, considered be related to microvascular problem       External hemorrhoids 05/23/2008     Priority: Medium      Past Medical History:   Diagnosis Date     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    eval by optho, considered be related to microvascular problem ie DM     Diabetes (H)      Influenza B 4/1/2017     Mixed hyperlipidemia 3/04     MSSA (methicillin susceptible Staphylococcus aureus) pneumonia (H) 4/1/2017     MSSA (methicillin susceptible Staphylococcus aureus) septicemia (H) 4/1/2017     PVD (peripheral vascular disease) with claudication (H) 10/12/2015     Tobacco use disorder QUIT 9/08    smokes for stress     Type II or unspecified type diabetes mellitus with neurological manifestations, not stated as uncontrolled(250.60) (H) 6/23/2014     Past Surgical History:   Procedure Laterality Date     COLONOSCOPY  10/27/16     COLONOSCOPY N/A 10/31/2016    Procedure: COLONOSCOPY;  Surgeon: Pollo Lopez MD;  Location:  GI     HC UGI ENDOSCOPY W PLACEMENT GASTROSTOMY TUBE PERCUT N/A 4/4/2017    Procedure: COMBINED ESOPHAGOSCOPY, GASTROSCOPY, DUODENOSCOPY (EGD), PLACE PERCUTANEOUS ENDOSCOPIC GASTROSTOMY TUBE;  Surgeon: Marcial Obregon MD;  Location:  GI     IR LOWER EXTREMITY ANGIOGRAM RIGHT  7/17/2020     NO HISTORY OF SURGERY       TRACHEOSTOMY N/A 4/4/2017    Procedure: TRACHEOSTOMY;  Surgeon: Jose Puga MD;  Location:  OR     Current Outpatient Medications   Medication Sig Dispense Refill     amLODIPine (NORVASC) 10 MG tablet Take 1 tablet (10 mg) by mouth daily 30 tablet 3     ASPIRIN 81 MG OR TABS 1 tab per day 100 3     BD ULTRA-FINE 29G X 12.7MM insulin pen needle        blood glucose (NO BRAND SPECIFIED) test strip Use to test blood sugar 1 times daily or as directed. To accompany: Blood Glucose Monitor Brands: ACCU-CHEK SANDRA 100 strip 6     celecoxib (CELEBREX) 50 MG capsule Take 1 capsule (50 mg) by  mouth 2 times daily 30 capsule 0     cilostazol (PLETAL) 50 MG tablet Take 1 tablet (50 mg) by mouth 2 times daily 30 tablet 3     clopidogrel (PLAVIX) 75 MG tablet Take 1 tablet (75 mg) by mouth daily Start taking medication the day after the procedure 90 tablet 4     Continuous Blood Gluc Sensor (FREESTYLE VARSHA 14 DAY SENSOR) MISC CHANGE EVERY 14 DAYS       cyclobenzaprine (FLEXERIL) 5 MG tablet Take 1 tablet (5 mg) by mouth 2 times daily as needed for muscle spasms 30 tablet 0     insulin glargine (LANTUS SOLOSTAR) 100 UNIT/ML pen He INJECTS 36 UNITS SUBCUTANEOUSLY ONCE DAILY IN EVENING (has not: INCREASE DOSE BY 4 UNITS EVERY 3 DAYS UNTIL FASTING BG is . IF OVER 40 UNITS, SPLIT DOSE INTO TWO ADMINISTRATIONS DAILY) 15 mL 3     liraglutide (VICTOZA) 18 MG/3ML solution Inject 1.2 mg Subcutaneous daily Start with 0.6 mg dose x first 7 days then increase to 1.2 mg/day dose. 3 mL 3     lisinopril (ZESTRIL) 30 MG tablet Take 1 tablet (30 mg) by mouth daily 30 tablet 4     rosuvastatin (CRESTOR) 20 MG tablet Take 1 tablet (20 mg) by mouth daily 30 tablet 3     thin (NO BRAND SPECIFIED) lancets Use with lanceting device. To accompany: Blood Glucose Monitor Brands: ACCU-CHEK SANDRA 100 each 3       Allergies   Allergen Reactions     No Known Drug Allergies         Social History     Tobacco Use     Smoking status: Current Some Day Smoker     Packs/day: 0.50     Years: 15.00     Pack years: 7.50     Types: Cigarettes     Smokeless tobacco: Never Used   Substance Use Topics     Alcohol use: No     Family History   Problem Relation Age of Onset     Diabetes Mother      Diabetes Father      Diabetes Sister      Diabetes Brother      Hypertension No family hx of      Cerebrovascular Disease No family hx of      Prostate Cancer No family hx of      Cancer - colorectal No family hx of      Breast Cancer No family hx of      History   Drug Use No         Objective     There were no vitals taken for this visit.    Physical  Exam  GENERAL APPEARANCE: healthy, alert and no distress  HENT: ear canals and TM's normal and nose and mouth without ulcers or lesions  RESP: lungs clear to auscultation - no rales, rhonchi or wheezes  CV: regular rate and rhythm, normal S1 S2, no S3 or S4 and no murmur, click or rub   ABDOMEN: soft, nontender, no HSM or masses and bowel sounds normal  NEURO: Normal strength and tone, sensory exam grossly normal, mentation intact and speech normal    Recent Labs   Lab Test 01/28/22  1235 01/21/22  0946 12/24/21  0855 08/27/21  0811 08/27/21  0811 07/17/20  1150   HGB  --   --  15.6  --   --  15.8   PLT  --   --  170  --   --  159   INR  --   --   --   --   --  1.04   NA  --   --  139  --  138  --    POTASSIUM  --   --  4.3  --  3.8  --    CR 1.2  --  0.94   < > 0.96  --    A1C  --  9.3*  --   --  8.9*  --     < > = values in this interval not displayed.        Diagnostics:  Recent Results (from the past 168 hour(s))   Hemoglobin    Collection Time: 02/09/22 10:59 AM   Result Value Ref Range    Hemoglobin 15.3 13.3 - 17.7 g/dL   Basic metabolic panel  (Ca, Cl, CO2, Creat, Gluc, K, Na, BUN)    Collection Time: 02/09/22 10:59 AM   Result Value Ref Range    Sodium 137 133 - 144 mmol/L    Potassium 4.6 3.4 - 5.3 mmol/L    Chloride 107 94 - 109 mmol/L    Carbon Dioxide (CO2) 28 20 - 32 mmol/L    Anion Gap 2 (L) 3 - 14 mmol/L    Urea Nitrogen 23 7 - 30 mg/dL    Creatinine 1.16 0.66 - 1.25 mg/dL    Calcium 8.8 8.5 - 10.1 mg/dL    Glucose 215 (H) 70 - 99 mg/dL    GFR Estimate 73 >60 mL/min/1.73m2   LDL cholesterol direct    Collection Time: 02/09/22 10:59 AM   Result Value Ref Range    LDL Cholesterol Direct 52 <100 mg/dL      EKG: Normal Sinus Rhythm, nonspecific ST-T changes, unchanged from previous tracings    Revised Cardiac Risk Index (RCRI):  The patient has the following serious cardiovascular risks for perioperative complications:   - High risk surgery (>5% cardiac complication risk) = 1 point     RCRI  Interpretation: 1 point: Class II (low risk - 0.9% complication rate)           Signed Electronically by: Shaun Bedolla MD  Copy of this evaluation report is provided to requesting physician.

## 2022-02-10 LAB
ANION GAP SERPL CALCULATED.3IONS-SCNC: 2 MMOL/L (ref 3–14)
BUN SERPL-MCNC: 23 MG/DL (ref 7–30)
CALCIUM SERPL-MCNC: 8.8 MG/DL (ref 8.5–10.1)
CHLORIDE BLD-SCNC: 107 MMOL/L (ref 94–109)
CO2 SERPL-SCNC: 28 MMOL/L (ref 20–32)
CREAT SERPL-MCNC: 1.16 MG/DL (ref 0.66–1.25)
GFR SERPL CREATININE-BSD FRML MDRD: 73 ML/MIN/1.73M2
GLUCOSE BLD-MCNC: 215 MG/DL (ref 70–99)
LDLC SERPL CALC-MCNC: 52 MG/DL
POTASSIUM BLD-SCNC: 4.6 MMOL/L (ref 3.4–5.3)
SODIUM SERPL-SCNC: 137 MMOL/L (ref 133–144)

## 2022-02-10 NOTE — TELEPHONE ENCOUNTER
Spoke with patient on 02/08/22 and provided date/time of procedure, and COVID testing.   Wyatt Mahajan,    Your visit to [facility] for your surgery or procedure is coming soon and we look forward to seeing you! This friendly reminder and pre-procedure checklist will help to ensure your procedure/surgery goes smoothly and meets your expectations. At [facility], our goal is to provide you with a great patient experience and to deliver genuine, professional care to every patient.     Please complete all the steps in advance of your visit. If you have any questions about the items listed below, please give our office a call. We can be reached at [phone].    Procedure: Left Leg angiogram     Procedure Date :  02/18/22    Procedure Time :  8:30AM    Arrival Time: 7:00AM    Covid Test: 02/28/2022 at 12:40PM at [facility]    2 week Post Procedure Appointment with Jono Wilson MD  and also ELOISA: You will receive a call to schedule this appointment.         Ordering Provider: Jono Wilson MD     Performing Provider: Jono Wilson MD     Procedure Location: [facility]:  [address] [place] [id number] (Fax: [phone])    If you take blood thinners: SEE SPECIFIC INSTRUCTIONS BELOW     PLEASE DO NOT STOP YOUR ASPIRIN OR PLAVIX UNLESS SPECIFICALLY DIRECTED BY THE VASCULAR SURGEON TO STOP!  - In most cases Vascular surgeons want you to continue these. This is different from most NON vascular surgeries and may not be well known by your Primary Care Provider      IF YOU NEED TO RESCHEDULE OR CANCEL YOUR PROCEDURE FOR ANY REASON PLEASE CALL THE CLINIC AS SOON AS POSSIBLE -[phone].    Before the procedure  Prepare for the peripheral angiography as follows:     [x] A Pre-op physical within 30 days of the procedure is required. You will need to set up an appointment with your primary care provider.      Do not eat or drink anything after  midnight before your procedure. If your provider says to take your normal medicines, swallow them with only small sips of water.    Tell your healthcare provider about all medicines you take and any allergies you may have.    Arrange for a family member or friend to drive you home.    PLEASE BE SURE TO ADDRESS WITH YOUR PCP WHAT TO DO WITH YOUR INSULIN AND METFORMIN PRIOR TO YOUR ANGIOGRAM    Peripheral Angiography    Peripheral angiography is an outpatient procedure that makes a  map  of the vessels (arteries) in your lower body, legs, and arms, using X-ray and dye.This map can show where blood flow may be blocked.    An angiogram is commonly performed under sedation with the use of local anesthesia.      The procedure usually starts with a needle put into the femoral (groin) artery. From one treatment site, areas all over the body can be treated.  After access is established, catheters (thin tubes) and wires are threaded through the arterial system to a specific area of interest or throughout the entire body.  As a contrast agent (iodine dye) is injected, X-ray images are taken to let your vascular surgeon view the flow of the dye and identify blockages. The surgeon can then choose the best mode of therapy for you - whether during or following the angiogram. This decision depends on your symptoms and the severity and characteristics of the blockages.  Two common therapies that can be provided during the angiogram are balloon angioplasty and stent placement.                 Angioplasty can be used to open arterial blockages. Guided by X-ray, your vascular surgeon navigates through the blockage with a wire and introduces a special device equipped with an inflatable balloon. After positioning the balloon device across the blocked portion of the artery, the vascular surgeon inflates the balloon to expand the artery and compress the blockage. The balloon is then deflated and removed while keeping the wire in place  across the area that has been treated. Next, contrast dye is injected to assess the result. Treatment is considered a success if blood flow is improved and less than 30% of the blockage remains. If the vessel is still considerably narrowed, placing a stent may be the next step.      Stents are used to prop open an artery at the site of a narrowing. Stents are generally placed after balloon angioplasty when there is residual narrowing or insufficient blood flow in a treated vessel. Stents are considered a permanent implant and cannot be used if you have a metal allergy. Stents that are used in the leg are constructed of a nickel-titanium alloy (Nitinol), a memory-shaped metal. This alloy has a predetermined size and shape at body temperature and expands to this size and shape after being introduced through a catheter. These stents resist kinking and are flexible so that damage from activities that involve your legs is minimized.    If surgery is felt to be a better option, your vascular surgeon will obtain any additional X-ray images needed to plan a surgical bypass of the blocked vessel/s and will then conclude the angiogram.    During the procedure    You may get medicine through an IV (intravenous) line to relax you. You re given an injection to numb the insertion site. Then, a tiny skin cut (incision) is made near an artery in your groin.    Your provider inserts a thin tube (catheter) through the incision. He or she then threads the catheter into an artery while looking at a video monitor.    Contrast  dye  is injected into the catheter to confirm position. You may feel warmth or pressure in your legs and back. You lie still as X-rays are taken. The catheter is then taken out.    After the procedure  You ll be taken to a recovery area. A healthcare provider will apply pressure to the site for about 10 minutes. Your healthcare provider will tell you how long to lie down and keep the insertion site still. Your  healthcare provider will discuss the results with you soon after the procedure.    Back at home  On the day you get home, don t drive, don t exercise, avoid walking and taking stairs, and avoid bending and lifting. Your healthcare provider may give you other care instructions.    Call your healthcare provider  Call your healthcare provider right away if:    You notice a lump or bleeding at the insertion site    You feel pain at the insertion site    You become lightheaded or dizzy    You have leg pain or numbness    You do not urinate in 8 hours      Angiogram Procedure Discharge Instructions:     1. If you received sedation for your procedure: Do not drive or operate heavy machinery for the rest of the day.     2. Avoid strenuous activity for 72 hours (3 days):                        - Do not lift greater than 10 pounds.                        - Excessive exercise                        - Straining                        - Return to your normal activities as you tolerate after the 3 days restriction     3. Avoid tub baths, Jacuzzis, hot tubs and pools for 72 hours (3 days) or until puncture site is will healed.     4. You may shower beginning tomorrow. Do not scrub puncture site(s) until well healed, pat dry.     5. You can expect to return to work 1-2 days after your procedure - depending on the nature of your profession.     6. It is normal to have some tenderness and minimal swelling at puncture site. A small area of discoloration may be present. Tenderness typically subsides in 24-48 hours. A small knot may also be present at puncture site for 6-8 weeks, this can be a normal part of the healing process.     After the angiogram, if you:      1. Experience any bleeding or active swelling from puncture site: Lie down, firmly apply pressure to puncture site and CALL 9-1-1     2. Fever greater than 101 degrees Fahrenheit.     3. Redness, swelling, warmth to touch, or purulent (yellow/green/foul smelling) drainage  from the puncture site.     4. Increasing pain, tenderness or swelling at puncture site OR of arm/leg near puncture site.     5. Feeling weak or faint.     6. Change in color, temperature, or sensation of arm/leg where puncture was made.     Call us with any other questions or concerns after your procedure: [phone].    You will need to have an ultrasound 2-3 weeks after your angiogram and should be scheduled at the time of your follow up appt.  Further follow up will be based on ultrasound results. Typical follow up is every 3 months for the first year, then every 6 months to one year thereafter.       All invasive procedures can have complications. While the risk of an angiogram is low it is not zero. The most common complications are related to the arterial access site.      Risks/ Complications     Bruising is Common  You will likely have bruising (ecchymosis) where the artery was entered.      Pain and Bleeding  Less commonly, patients experience pain and bleeding that may include blood collecting under the skin (hematoma).      Blockage and Leakage   In rare cases, the access artery can become blocked. Infrequently, patients experience persistent leakage of blood where the artery was entered, which can result in the formation of a pseudoaneurysm--a blood-filled sac--that may require further treatment.    Other complications related to an angiogram include:   Allergic reaction to the iodine contrast dye, which can lead to the development of kidney failure.  Very rarely during balloon angioplasty and/or stent placement, part of the arterial blockage can break off (embolism) and travel to more distant arteries. This can worsen blood flow.        Notify our office right away, if you have any changes in your health status, or if you develop a cold, flu, diarrhea, infection, fever or sore throat before your scheduled surgery date. We can be reached at [phone].  Monday-Friday 8 am-4:30 pm if you have any  questions.   Thank you for choosing [facility]  If you have any questions or need to reschedule your appointment, please call [phone]

## 2022-02-14 ENCOUNTER — LAB (OUTPATIENT)
Dept: URGENT CARE | Facility: URGENT CARE | Age: 62
End: 2022-02-14
Payer: COMMERCIAL

## 2022-02-14 DIAGNOSIS — Z20.822 ENCOUNTER FOR LABORATORY TESTING FOR COVID-19 VIRUS: ICD-10-CM

## 2022-02-14 PROCEDURE — U0003 INFECTIOUS AGENT DETECTION BY NUCLEIC ACID (DNA OR RNA); SEVERE ACUTE RESPIRATORY SYNDROME CORONAVIRUS 2 (SARS-COV-2) (CORONAVIRUS DISEASE [COVID-19]), AMPLIFIED PROBE TECHNIQUE, MAKING USE OF HIGH THROUGHPUT TECHNOLOGIES AS DESCRIBED BY CMS-2020-01-R: HCPCS

## 2022-02-14 PROCEDURE — U0005 INFEC AGEN DETEC AMPLI PROBE: HCPCS

## 2022-02-15 LAB — SARS-COV-2 RNA RESP QL NAA+PROBE: NEGATIVE

## 2022-02-16 ENCOUNTER — TELEPHONE (OUTPATIENT)
Dept: OTHER | Facility: CLINIC | Age: 62
End: 2022-02-16
Payer: COMMERCIAL

## 2022-02-16 DIAGNOSIS — I73.9 CLAUDICATION IN PERIPHERAL VASCULAR DISEASE (H): Primary | ICD-10-CM

## 2022-02-16 NOTE — TELEPHONE ENCOUNTER
Patient is scheduled for LEFT LOWER EXTREMITY ANGIOGRAM WITH INTERVENTION on 02/18/22.  We will need 2 week follow up orders entered and routed to scheduling to coordinate.

## 2022-02-16 NOTE — TELEPHONE ENCOUNTER
Per notes:  2 week Post Procedure Appointment with Jono Wilson MD  and also ELOISA.      Procedure scheduled 2/18/22.   Orders entered.    Appt note: LEFT LOWER EXTREMITY ANGIOGRAM WITH INTERVENTION on 02/18/22; 2 week post-procedure; ELOISA w/exercise prior to OV with MARIXAK.    Routing to Scheduling to contact patient and arrange.    Mimi Rouse RN BSN  [facility]  [phone]

## 2022-02-17 ENCOUNTER — TELEPHONE (OUTPATIENT)
Dept: INTERVENTIONAL RADIOLOGY/VASCULAR | Facility: CLINIC | Age: 62
End: 2022-02-17
Payer: COMMERCIAL

## 2022-02-17 RX ORDER — HEPARIN SODIUM 200 [USP'U]/100ML
1 INJECTION, SOLUTION INTRAVENOUS CONTINUOUS PRN
Status: CANCELLED | OUTPATIENT
Start: 2022-02-17

## 2022-02-17 NOTE — TELEPHONE ENCOUNTER
Pre-procedural telephone call completed.    Confirmed procedure, procedure date and time, arrival time, location, NPO status and need for a designated  home.    COVID test performed on 02/14/2022; negative result.

## 2022-02-18 ENCOUNTER — HOSPITAL ENCOUNTER (OUTPATIENT)
Facility: CLINIC | Age: 62
Discharge: HOME | End: 2022-02-18
Attending: SURGERY | Admitting: SURGERY
Payer: COMMERCIAL

## 2022-02-18 ENCOUNTER — APPOINTMENT (OUTPATIENT)
Dept: SURGERY | Facility: PHYSICIAN GROUP | Age: 62
End: 2022-02-18
Payer: COMMERCIAL

## 2022-02-18 ENCOUNTER — APPOINTMENT (OUTPATIENT)
Dept: INTERVENTIONAL RADIOLOGY/VASCULAR | Facility: CLINIC | Age: 62
End: 2022-02-18
Attending: SURGERY
Payer: COMMERCIAL

## 2022-02-18 VITALS
SYSTOLIC BLOOD PRESSURE: 172 MMHG | HEIGHT: 70 IN | HEART RATE: 90 BPM | TEMPERATURE: 98 F | OXYGEN SATURATION: 98 % | BODY MASS INDEX: 21.3 KG/M2 | RESPIRATION RATE: 18 BRPM | WEIGHT: 148.8 LBS | DIASTOLIC BLOOD PRESSURE: 87 MMHG

## 2022-02-18 DIAGNOSIS — I73.9 PVD (PERIPHERAL VASCULAR DISEASE) WITH CLAUDICATION (H): ICD-10-CM

## 2022-02-18 DIAGNOSIS — I70.229 CRITICAL ISCHEMIA OF LOWER EXTREMITY (H): Primary | ICD-10-CM

## 2022-02-18 LAB
GLUCOSE BLDC GLUCOMTR-MCNC: 152 MG/DL (ref 70–99)
LACTATE SERPL-SCNC: 0.8 MMOL/L (ref 0.7–2)

## 2022-02-18 PROCEDURE — 272N000567 HC SHEATH CR4

## 2022-02-18 PROCEDURE — 272N000302 HC DEVICE INFLATION CR5

## 2022-02-18 PROCEDURE — 83605 ASSAY OF LACTIC ACID: CPT | Performed by: SURGERY

## 2022-02-18 PROCEDURE — C1760 CLOSURE DEV, VASC: HCPCS

## 2022-02-18 PROCEDURE — 250N000009 HC RX 250: Performed by: SURGERY

## 2022-02-18 PROCEDURE — 999N000012 HC STATISTIC ANGIOGRAM, STENT, VERTEBRO PLASTY

## 2022-02-18 PROCEDURE — 82962 GLUCOSE BLOOD TEST: CPT | Mod: GZ

## 2022-02-18 PROCEDURE — 272N000116 HC CATH CR1

## 2022-02-18 PROCEDURE — 76937 US GUIDE VASCULAR ACCESS: CPT | Performed by: SURGERY

## 2022-02-18 PROCEDURE — C1769 GUIDE WIRE: HCPCS

## 2022-02-18 PROCEDURE — 99152 MOD SED SAME PHYS/QHP 5/>YRS: CPT | Performed by: SURGERY

## 2022-02-18 PROCEDURE — 250N000011 HC RX IP 250 OP 636: Performed by: SURGERY

## 2022-02-18 PROCEDURE — 272N000196 HC ACCESSORY CR5

## 2022-02-18 PROCEDURE — 36415 COLL VENOUS BLD VENIPUNCTURE: CPT | Performed by: SURGERY

## 2022-02-18 PROCEDURE — 258N000003 HC RX IP 258 OP 636: Performed by: SURGERY

## 2022-02-18 PROCEDURE — 75716 ARTERY X-RAYS ARMS/LEGS: CPT | Mod: 26 | Performed by: SURGERY

## 2022-02-18 PROCEDURE — 272N000566 HC SHEATH CR3

## 2022-02-18 PROCEDURE — 99152 MOD SED SAME PHYS/QHP 5/>YRS: CPT

## 2022-02-18 PROCEDURE — 255N000002 HC RX 255 OP 636: Performed by: SURGERY

## 2022-02-18 PROCEDURE — 37221 PR REVASC ILIAC ART, ANGIO/STENT, INIT VESSEL: CPT | Mod: 50 | Performed by: SURGERY

## 2022-02-18 PROCEDURE — 250N000013 HC RX MED GY IP 250 OP 250 PS 637: Performed by: SURGERY

## 2022-02-18 RX ORDER — PROTAMINE SULFATE 10 MG/ML
30 INJECTION, SOLUTION INTRAVENOUS ONCE
Status: COMPLETED | OUTPATIENT
Start: 2022-02-18 | End: 2022-02-18

## 2022-02-18 RX ORDER — GABAPENTIN 100 MG/1
300 CAPSULE ORAL 3 TIMES DAILY
Qty: 90 CAPSULE | Refills: 3 | Status: SHIPPED | OUTPATIENT
Start: 2022-02-18 | End: 2024-03-28

## 2022-02-18 RX ORDER — IODIXANOL 320 MG/ML
150 INJECTION, SOLUTION INTRAVASCULAR ONCE
Status: COMPLETED | OUTPATIENT
Start: 2022-02-18 | End: 2022-02-18

## 2022-02-18 RX ORDER — LIDOCAINE 40 MG/G
CREAM TOPICAL
Status: DISCONTINUED | OUTPATIENT
Start: 2022-02-18 | End: 2022-02-18 | Stop reason: HOSPADM

## 2022-02-18 RX ORDER — NALOXONE HYDROCHLORIDE 0.4 MG/ML
0.2 INJECTION, SOLUTION INTRAMUSCULAR; INTRAVENOUS; SUBCUTANEOUS
Status: DISCONTINUED | OUTPATIENT
Start: 2022-02-18 | End: 2022-02-18 | Stop reason: HOSPADM

## 2022-02-18 RX ORDER — FENTANYL CITRATE 50 UG/ML
25-50 INJECTION, SOLUTION INTRAMUSCULAR; INTRAVENOUS EVERY 5 MIN PRN
Status: DISCONTINUED | OUTPATIENT
Start: 2022-02-18 | End: 2022-02-18 | Stop reason: HOSPADM

## 2022-02-18 RX ORDER — SODIUM CHLORIDE 9 MG/ML
INJECTION, SOLUTION INTRAVENOUS CONTINUOUS
Status: DISCONTINUED | OUTPATIENT
Start: 2022-02-18 | End: 2022-02-18 | Stop reason: HOSPADM

## 2022-02-18 RX ORDER — HEPARIN SODIUM 1000 [USP'U]/ML
6000 INJECTION, SOLUTION INTRAVENOUS; SUBCUTANEOUS ONCE
Status: COMPLETED | OUTPATIENT
Start: 2022-02-18 | End: 2022-02-18

## 2022-02-18 RX ORDER — NALOXONE HYDROCHLORIDE 0.4 MG/ML
0.4 INJECTION, SOLUTION INTRAMUSCULAR; INTRAVENOUS; SUBCUTANEOUS
Status: DISCONTINUED | OUTPATIENT
Start: 2022-02-18 | End: 2022-02-18 | Stop reason: HOSPADM

## 2022-02-18 RX ORDER — CEFAZOLIN SODIUM 2 G/100ML
2 INJECTION, SOLUTION INTRAVENOUS ONCE
Status: COMPLETED | OUTPATIENT
Start: 2022-02-18 | End: 2022-02-18

## 2022-02-18 RX ORDER — HEPARIN SODIUM 200 [USP'U]/100ML
1 INJECTION, SOLUTION INTRAVENOUS CONTINUOUS PRN
Status: DISCONTINUED | OUTPATIENT
Start: 2022-02-18 | End: 2022-02-18 | Stop reason: HOSPADM

## 2022-02-18 RX ORDER — OXYCODONE AND ACETAMINOPHEN 5; 325 MG/1; MG/1
1 TABLET ORAL EVERY 4 HOURS PRN
Status: DISCONTINUED | OUTPATIENT
Start: 2022-02-18 | End: 2022-02-18 | Stop reason: HOSPADM

## 2022-02-18 RX ORDER — FLUMAZENIL 0.1 MG/ML
0.2 INJECTION, SOLUTION INTRAVENOUS
Status: DISCONTINUED | OUTPATIENT
Start: 2022-02-18 | End: 2022-02-18 | Stop reason: HOSPADM

## 2022-02-18 RX ORDER — OXYCODONE AND ACETAMINOPHEN 5; 325 MG/1; MG/1
1 TABLET ORAL EVERY 6 HOURS PRN
Qty: 20 TABLET | Refills: 0 | Status: SHIPPED | OUTPATIENT
Start: 2022-02-18 | End: 2022-02-25

## 2022-02-18 RX ADMIN — SODIUM CHLORIDE 100 ML/HR: 9 INJECTION, SOLUTION INTRAVENOUS at 08:09

## 2022-02-18 RX ADMIN — MIDAZOLAM 1 MG: 1 INJECTION INTRAMUSCULAR; INTRAVENOUS at 08:56

## 2022-02-18 RX ADMIN — HEPARIN SODIUM 6000 UNITS: 1000 INJECTION INTRAVENOUS; SUBCUTANEOUS at 09:07

## 2022-02-18 RX ADMIN — IODIXANOL 80 ML: 320 INJECTION, SOLUTION INTRAVASCULAR at 09:52

## 2022-02-18 RX ADMIN — CEFAZOLIN SODIUM 2 G: 2 INJECTION, SOLUTION INTRAVENOUS at 09:03

## 2022-02-18 RX ADMIN — FENTANYL CITRATE 50 MCG: 50 INJECTION INTRAMUSCULAR; INTRAVENOUS at 08:56

## 2022-02-18 RX ADMIN — MIDAZOLAM 1 MG: 1 INJECTION INTRAMUSCULAR; INTRAVENOUS at 09:28

## 2022-02-18 RX ADMIN — LIDOCAINE HYDROCHLORIDE 10 ML: 10 INJECTION, SOLUTION INFILTRATION; PERINEURAL at 08:59

## 2022-02-18 RX ADMIN — FENTANYL CITRATE 50 MCG: 50 INJECTION INTRAMUSCULAR; INTRAVENOUS at 09:28

## 2022-02-18 RX ADMIN — OXYCODONE HYDROCHLORIDE AND ACETAMINOPHEN 1 TABLET: 5; 325 TABLET ORAL at 11:11

## 2022-02-18 RX ADMIN — HEPARIN SODIUM 4 BAG: 200 INJECTION, SOLUTION INTRAVENOUS at 09:20

## 2022-02-18 RX ADMIN — PROTAMINE SULFATE 30 MG: 10 INJECTION, SOLUTION INTRAVENOUS at 09:47

## 2022-02-18 NOTE — DISCHARGE INSTRUCTIONS
Peripheral Angiogram Discharge Instructions - Femoral     After you go home:      Have an adult stay with you until tomorrow.    Drink extra fluids for 2 days.    You may resume your normal diet.    No smoking       For 24 hours - due to the sedation you received:    Relax and take it easy.    Do NOT make any important or legal decisions.    Do NOT drive or operate machines at home or at work.    Do NOT drink alcohol.    Care of Groin Puncture Site:      For the first 24 hrs - check the puncture site every 1-2 hours while awake.    For 2 days, when you cough, sneeze, laugh or move your bowels, hold your hand over the puncture site and press firmly.    Remove the bandaid after 24 hours. If there is minor oozing, apply another bandaid and remove it after 12 hours.    It is normal to have a small bruise or pea size lump at the site.    You may shower tomorrow.  Do NOT take a bath, or use a hot tub or pool for at least 3 days. Do NOT scrub the site. Do not use lotion or powder near the puncture site.     Activity:            For 2 days:    No stooping or squatting    Do NOT do any heavy activity such as exercise, lifting, or straining.     No housework, yard work or any activity that make you sweat    Do NOT lift more than 10 pounds    Bleeding:      If you start bleeding from the site in your groin, lie down flat and press firmly on/above the site for 10 minutes.     Once bleeding stops, lay flat for 2 hours.     Call the [facility] as soon as you can.       Call 911 right away if you have heavy bleeding or bleeding that does not stop.      Medicines:      If you are taking an antiplatelet medication such as Plavix, do not stop taking it until you talk to your provider.       Take your medications, including blood thinners, unless your provider tells you not to.      If you have stopped any medicines, check with your provider about when to restart them.        Follow Up Appointments:      Follow up with  [facility] as directed.    Call the clinic if:      You have increased pain or a large or growing hard lump around the site.    The site is red, swollen, hot or tender.    Blood or fluid is draining from the site.    You have chills or a fever greater than 101 F (38 C).    Your leg feels numb, cool or changes color.    You have hives, a rash or unusual itching.    New pain in the back or belly that you cannot control with Tylenol.    Any questions or concerns.    Other Instructions:      If you received a stent - carry your stent card with you at all times.      If you have questions or your original symptoms do not improve, call:         [facility] @ [phone]

## 2022-02-18 NOTE — IR NOTE
Interventional Radiology Intra-procedural Nursing Note    Patient Name: Wyatt LAWSON [facility]  Medical Record Number: [id number]  Today's Date: February 18, 2022    Start Time: 858  End of procedure time: 957  Procedure: BILATERAL ILIAC ARTERIOGRAM , BALLOON ANGIOPLASTY AND STENTING  Report given to: Chintan RUIZ  Time pt departs:  905    Other Notes: Pt into IR suite 2 via cart. Pt awake and alert. To table in supine position. Monitoring equipment applied. VSS. Tele SR. Dr. Wilson in room. Time out and procedure started. Pt tolerated procedure well. Debrief with Dr. Wilson. Angio-seal x 2 placed and pressure held until hemostasis achieved. Bedrest for 4 hours until 1400. Dressing CDI. No complications. Pt transferred back to Care Suites.    Medications:    Versed 2 mg  Fentanyl 100mcg  Lidocaine 1% 10 ml  Heparin 6,000 units  Protamine 30 mg    Luke Tidwell RN

## 2022-02-18 NOTE — PRE-PROCEDURE
GENERAL PRE-PROCEDURE:   Procedure:  BILATERAL ILIAC ARTERIOGRAM , BALLOON ANGIOPLASTY AND STENTING  Date/Time:  2/18/2022 8:09 AM    Written consent obtained?: Yes    Risks and benefits: Risks, benefits and alternatives were discussed    Consent given by:  Patient  Patient states understanding of procedure being performed: Yes    Patient's understanding of procedure matches consent: Yes    Procedure consent matches procedure scheduled: Yes    Expected level of sedation:  Moderate  Appropriately NPO:  Yes  ASA Class:  2  Mallampati  :  Grade 2- soft palate, base of uvula, tonsillar pillars, and portion of posterior pharyngeal wall visible  Lungs:  Lungs clear with good breath sounds bilaterally  Heart:  Normal heart sounds and rate  History & Physical reviewed:  History and physical reviewed and no updates needed  Statement of review:  I have reviewed the lab findings, diagnostic data, medications, and the plan for sedation

## 2022-02-18 NOTE — PROGRESS NOTES
PATIENT/VISITOR WELLNESS SCREENING    Step 1 Patient Screening    1. In the last month, have you been in contact with someone who was confirmed or suspected to have Coronavirus/COVID-19? No  2/14 Negative COVID screen   2. Do you have the following symptoms?  Fever/Chills? No   Cough? No   Shortness of breath? No   New loss of taste or smell? No  Sore throat? No  Muscle or body aches? No  Headaches? No  Fatigue? No  Vomiting or diarrhea? No    Step 2 Visitor Screening    1. Name of Visitor (1 visitor per patient): Mary  Spouse     2. In the last month, have you been in contact with someone who was confirmed or suspected to have Coronavirus/COVID-19? No    3. Do you have the following symptoms?  Fever/Chills? No   Cough? No   Shortness of breath? No   Skin rash? No   Loss of taste or smell? No  Sore throat? No  Runny or stuffy nose? No  Muscle or body aches? No  Headaches? No  Fatigue? No  Vomiting or diarrhea? No    If the visitor has positive symptoms, notify supervisor/manger  Per policy, the visitor will need to leave the facility     Step 3 Refer to logic grid below for actions    NO SYMPTOM(S)    ACTIONS:  1. Standard rooming process  2. Provider to assess per normal protocol  3. Implement precautions as needed and per guidelines     POSITIVE SYMPTOM(S)  If positive for ANY of the following symptoms: fever, cough, shortness of breath, rash    ACTION:  1. Continue to have the patient wear a mask   2. Room patient as soon as possible  3. Don appropriate PPE when entering room  4. Provider evaluation

## 2022-02-18 NOTE — PROGRESS NOTES
Care Suites Admission Nursing Note    Patient Information  Name: Wyatt Mahajan  Age: 62 year old  Reason for admission: L/E angiogram with intervention   Care Suites arrival time: 0700    Visitor Information  Name: Mary  Informed of visitor restrictions: Yes  1 visitor allowed per patient   Visitor must screen negative for COVID symptoms   Visitor must wear a mask  Waiting rooms closed to visitors    Patient Admission/Assessment   Pre-procedure assessment complete: Yes  If abnormal assessment/labs, provider notified: Yes,   Absent LE pulses x3   C/O severe 10/10 pain of left foot and ankle   NPO: Yes  Medications held per instructions/orders: Yes  Consent: obtained  Patient oriented to room: Yes  Education/questions answered: Yes  Pt. Educated on smoking cessation   Plan/other: L/E angiogram   Bilat. Groin approach - 4 hours bedrest post procedure     Discharge Planning  Discharge name/phone number: Mary   Spouse  [phone]  Overnight post sedation caregiver: Mary  spoouse   Discharge location: home    Chintan Martines RN

## 2022-02-18 NOTE — PROGRESS NOTES
Care Suites Post Procedure Note    Patient Information  Name: Wyatt Mahajan  Age: 62 year old    Post Procedure L/E Angiogram  2 stents to left iliac stents   1 stent to right iliac stent  Time patient returned to Care Suites: 1010  Concerns/abnormal assessment:   Bilat. Groin sites intact with angioseals    HTN noted. Pt. C/o continued left foot pain 10/10   If abnormal assessment, provider notified: Yes  Lactic Acid fired and drawn  *Lactic acid result .8   Plan:   Bedrest x4 hours, then discharge   Pain meds as ordered.   1230- Pain meds given and pain assessed at 5/10- pt. Resting well   1415- off bedrest. Up to BR voiding. VSS. Sites intact.   1445-sites intact, VSS. Moving well walking and pain in left foot is at a minimum     Care Suites Discharge Nursing Note    Discharge Education:  Discharge instructions reviewed: Yes  Additional education/resources provided:  angioseal phamphlet given   Smoking cessation discussion  Patient/patient representative verbalizes understanding: Yes  Patient discharging on new medications: No  Medication education completed: yes    Discharge Plans:   Discharge location: home  Discharge ride contacted: Yes  Approximate discharge time: 1500    Discharge Criteria:  Discharge criteria met and vital signs stable: Yes    Patient Belongs:  Patient belongings returned to patient: Yes    Chintan Martines RN

## 2022-02-20 ENCOUNTER — HEALTH MAINTENANCE LETTER (OUTPATIENT)
Age: 62
End: 2022-02-20

## 2022-02-28 ENCOUNTER — OFFICE VISIT (OUTPATIENT)
Dept: OTHER | Facility: CLINIC | Age: 62
End: 2022-02-28
Attending: INTERNAL MEDICINE
Payer: COMMERCIAL

## 2022-02-28 VITALS
SYSTOLIC BLOOD PRESSURE: 130 MMHG | WEIGHT: 150.6 LBS | DIASTOLIC BLOOD PRESSURE: 84 MMHG | TEMPERATURE: 96.6 F | HEART RATE: 103 BPM | OXYGEN SATURATION: 97 % | BODY MASS INDEX: 21.61 KG/M2

## 2022-02-28 DIAGNOSIS — I10 BENIGN ESSENTIAL HYPERTENSION: ICD-10-CM

## 2022-02-28 DIAGNOSIS — I73.9 PVD (PERIPHERAL VASCULAR DISEASE) WITH CLAUDICATION (H): ICD-10-CM

## 2022-02-28 DIAGNOSIS — M79.662 PAIN OF LEFT LOWER LEG: Primary | ICD-10-CM

## 2022-02-28 PROCEDURE — G0463 HOSPITAL OUTPT CLINIC VISIT: HCPCS

## 2022-02-28 PROCEDURE — 99214 OFFICE O/P EST MOD 30 MIN: CPT | Performed by: INTERNAL MEDICINE

## 2022-02-28 RX ORDER — BISOPROLOL FUMARATE 5 MG/1
5 TABLET, FILM COATED ORAL DAILY
Qty: 30 TABLET | Refills: 3 | Status: SHIPPED | OUTPATIENT
Start: 2022-02-28 | End: 2022-06-14

## 2022-02-28 NOTE — PROGRESS NOTES
[facility]        Patient is here for a  follow up.     Pt is currently taking Aspirin, Statin and Plavix.    /84 (BP Location: Right arm, Patient Position: Chair, Cuff Size: Adult Regular)   Pulse 103   Temp (!) 96.6  F (35.9  C) (Temporal)   Wt 150 lb 9.6 oz (68.3 kg)   SpO2 97%   BMI 21.61 kg/m      The provider has been notified that the patient has no concerns.     Questions patient would like addressed today are: N/A.    Refills are needed: N/A    Has homecare services and agency name:  Geovanna Capellan MA

## 2022-02-28 NOTE — PROGRESS NOTES
[facility] VASCULAR CLINIC  Johnny Gonzalez MD - Vascular Medicine Progress Note    LOCATION: [facility]    Wyatt Mahajan 1 the 1 ate of Birth:  1960  Age:  62 year old     Date of Service: 2/28/2022     PRIMARY CARE PROVIDER: Shaun Bedolla    REASON FOR VISIT:   follow up for PAD/hypertension  IMPRESSION: Blood pressure is very well controlled now 136/84 with a pulse of 106, I would rather see the pulse in the 70s and blood pressure 130/80 or less    RECOMMENDATION:  1-continue with vascular risk factors modifications  2-bisoprolol 5 mg daily  3-left lower extremity x-ray 2 views for the fibula as the patient did fall and now is experiencing pain in the left lower extremity lateral side lower and and he experienced the pain only when he stands    If patients imaging is normal patient should follow up in 3 months    HPI: Wyatt Mahajan is a 62 year old male who was seen today for follow-up on blood pressure, blood pressure was not to target      PAST MEDICAL HISTORY  Past Medical History:   Diagnosis Date     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    eval by optho, considered be related to microvascular problem ie DM     Diabetes (H)      Influenza B 4/1/2017     Mixed hyperlipidemia 3/04     MSSA (methicillin susceptible Staphylococcus aureus) pneumonia (H) 4/1/2017     MSSA (methicillin susceptible Staphylococcus aureus) septicemia (H) 4/1/2017     PVD (peripheral vascular disease) with claudication (H) 10/12/2015     Tobacco use disorder QUIT 9/08    smokes for stress     Type II or unspecified type diabetes mellitus with neurological manifestations, not stated as uncontrolled(250.60) (H) 6/23/2014       PAST SURGICAL HISTORY:  Past Surgical History:   Procedure Laterality Date     COLONOSCOPY  10/27/16     COLONOSCOPY N/A 10/31/2016    Procedure: COLONOSCOPY;  Surgeon: Pollo Lopez MD;  Location:  GI     HC UGI ENDOSCOPY W PLACEMENT GASTROSTOMY TUBE PERCUT N/A 4/4/2017     Procedure: COMBINED ESOPHAGOSCOPY, GASTROSCOPY, DUODENOSCOPY (EGD), PLACE PERCUTANEOUS ENDOSCOPIC GASTROSTOMY TUBE;  Surgeon: Marcial Obregon MD;  Location:  GI     IR LOWER EXTREMITY ANGIOGRAM BILATERAL  2/18/2022     IR LOWER EXTREMITY ANGIOGRAM RIGHT  7/17/2020     NO HISTORY OF SURGERY       TRACHEOSTOMY N/A 4/4/2017    Procedure: TRACHEOSTOMY;  Surgeon: Jose Puga MD;  Location:  OR         CURRENT MEDICATIONS  amLODIPine (NORVASC) 10 MG tablet, Take 1 tablet (10 mg) by mouth daily  ASPIRIN 81 MG OR TABS, 1 tab per day  BD ULTRA-FINE 29G X 12.7MM insulin pen needle,   blood glucose (NO BRAND SPECIFIED) test strip, Use to test blood sugar 1 times daily or as directed. To accompany: Blood Glucose Monitor Brands: ACCU-CHEK SANDRA  celecoxib (CELEBREX) 50 MG capsule, Take 1 capsule (50 mg) by mouth 2 times daily  cephALEXin (KEFLEX) 500 MG capsule, Take 1 capsule (500 mg) by mouth 2 times daily  cilostazol (PLETAL) 50 MG tablet, Take 1 tablet (50 mg) by mouth 2 times daily  clopidogrel (PLAVIX) 75 MG tablet, Take 1 tablet (75 mg) by mouth daily Start taking medication the day after the procedure  Continuous Blood Gluc Sensor (flck.meSTYLE VARSHA 14 DAY SENSOR) [facility], CHANGE EVERY 14 DAYS  cyclobenzaprine (FLEXERIL) 5 MG tablet, Take 1 tablet (5 mg) by mouth 2 times daily as needed for muscle spasms  gabapentin (NEURONTIN) 100 MG capsule, Take 3 capsules (300 mg) by mouth 3 times daily  insulin glargine (LANTUS SOLOSTAR) 100 UNIT/ML pen, He INJECTS 36 UNITS SUBCUTANEOUSLY ONCE DAILY IN EVENING (has not: INCREASE DOSE BY 4 UNITS EVERY 3 DAYS UNTIL FASTING BG is . IF OVER 40 UNITS, SPLIT DOSE INTO TWO ADMINISTRATIONS DAILY)  liraglutide (VICTOZA) 18 MG/3ML solution, Inject 1.2 mg Subcutaneous daily Start with 0.6 mg dose x first 7 days then increase to 1.2 mg/day dose.  lisinopril (ZESTRIL) 30 MG tablet, Take 1 tablet (30 mg) by mouth daily  rosuvastatin (CRESTOR) 20 MG tablet, Take 1 tablet  (20 mg) by mouth daily  thin (NO BRAND SPECIFIED) lancets, Use with lanceting device. To accompany: Blood Glucose Monitor Brands: ACCU-CHEK SANDRA  traMADol (ULTRAM) 50 MG tablet, Take 1 tablet (50 mg) by mouth At Bedtime    No current facility-administered medications on file prior to visit.      ALLERGIES     Allergies   Allergen Reactions     No Known Drug Allergies        FAMILY HISTORY  Family History   Problem Relation Age of Onset     Diabetes Mother      Diabetes Father      Diabetes Sister      Diabetes Brother      Hypertension No family hx of      Cerebrovascular Disease No family hx of      Prostate Cancer No family hx of      Cancer - colorectal No family hx of      Breast Cancer No family hx of        SOCIAL HISTORY  Social History     Socioeconomic History     Marital status:      Spouse name: Mary     Number of children: 7     Years of education: 16     Highest education level: Not on file   Occupational History     Occupation:  store     Employer: [employer]   Tobacco Use     Smoking status: Former Smoker     Packs/day: 0.50     Years: 15.00     Pack years: 7.50     Types: Cigarettes     Quit date: 2022     Years since quittin.0     Smokeless tobacco: Never Used   Substance and Sexual Activity     Alcohol use: No     Drug use: No     Sexual activity: Yes     Partners: Female   Other Topics Concern      Service No     Blood Transfusions No     Caffeine Concern No     Comment: 1 coffee,pop 1 time weekly     Occupational Exposure No     Hobby Hazards No     Sleep Concern No     Stress Concern No     Weight Concern Yes     Special Diet No     Back Care No     Exercise No     Bike Helmet No     Comment: No Bike     Seat Belt Yes     Self-Exams No     Parent/sibling w/ CABG, MI or angioplasty before 65F 55M? No   Social History Narrative    moved here from [facility], moved here          Social Determinants of Health     Financial Resource Strain: Not on file    Food Insecurity: Not on file   Transportation Needs: Not on file   Physical Activity: Not on file   Stress: Not on file   Social Connections: Not on file   Intimate Partner Violence: Not on file   Housing Stability: Not on file       ROS:   Complete ROS negative other than what is stated in HPI.     EXAM:  /84 (BP Location: Right arm, Patient Position: Chair, Cuff Size: Adult Regular)   Pulse 103   Temp (!) 96.6  F (35.9  C) (Temporal)   Wt 150 lb 9.6 oz (68.3 kg)   SpO2 97%   BMI 21.61 kg/m    In general, the patient is a pleasant male in no apparent distress.    HEENT: NC/AT.  PERRLA.  EOMI.  Sclerae white, not injected.    Neck: No adenopathy.  Carotids +2/2 bilaterally without bruits.   Heart: RRR. Normal S1, S2 splits physiologically. No murmur, rub, click, or gallop.   Lungs: CTA.  No ronchi, wheezes, rales.    Abdomen: Soft, nontender, nondistended.   Extremities: No clubbing, cyanosis, or edema.  No wounds.     Labs:  LIPID RESULTS:  Lab Results   Component Value Date    CHOL 140 01/21/2022    CHOL 195 07/17/2020    HDL 33 (L) 01/21/2022    HDL 43 07/17/2020    LDL 52 02/09/2022     (H) 07/17/2020    TRIG 83 01/21/2022    TRIG 80 07/17/2020    CHOLHDLRATIO 5.6 (H) 08/07/2015       LIVER ENZYME RESULTS:  Lab Results   Component Value Date    AST 11 05/22/2017    ALT 50 12/14/2018       CBC RESULTS:  Lab Results   Component Value Date    WBC 7.0 12/24/2021    WBC 6.2 07/17/2020    RBC 5.13 12/24/2021    RBC 5.02 07/17/2020    HGB 15.3 02/09/2022    HGB 15.8 07/17/2020    HCT 47.3 12/24/2021    HCT 46.2 07/17/2020    MCV 92 12/24/2021    MCV 92 07/17/2020    MCH 30.4 12/24/2021    MCH 31.5 07/17/2020    MCHC 33.0 12/24/2021    MCHC 34.2 07/17/2020    RDW 13.1 12/24/2021    RDW 13.2 07/17/2020     12/24/2021     07/17/2020       BMP RESULTS:  Lab Results   Component Value Date     02/09/2022     04/14/2020    POTASSIUM 4.6 02/09/2022    POTASSIUM 4.6 04/14/2020     CHLORIDE 107 02/09/2022    CHLORIDE 108 04/14/2020    CO2 28 02/09/2022    CO2 28 04/14/2020    ANIONGAP 2 (L) 02/09/2022    ANIONGAP 3 04/14/2020     (H) 02/18/2022     (H) 02/09/2022     (H) 04/14/2020    BUN 23 02/09/2022    BUN 15 04/14/2020    CR 1.16 02/09/2022    CR 0.90 04/14/2020    GFRESTIMATED 73 02/09/2022    GFRESTIMATED >60 01/28/2022    GFRESTIMATED 77 07/14/2020    GFRESTBLACK >90 07/14/2020    LEONIE 8.8 02/09/2022    LEONIE 8.7 04/14/2020        A1C RESULTS:  Lab Results   Component Value Date    A1C 9.3 (H) 01/21/2022    A1C 9.5 (H) 07/10/2020       THYROID RESULTS:  Lab Results   Component Value Date    TSH 1.30 04/14/2020         DIAGNOSTIC STUDIES:     Images:  IR Lower Extremity Angiogram Bilateral    Result Date: 2/18/2022  Preprocedure diagnosis: 1. Left lower extremity ischemic rest pain, critical limb ischemia. 2. Recurrent stenosis of distal right common iliac and proximal right external iliac artery. PROCEDURE: 1. Ultrasound-guided bilateral common femoral artery access placement. 2. Aortoiliac arteriogram. 3. Left common iliac artery balloon angioplasty using 7 mm x 8 cm angioplasty balloon with completion arteriogram. 4. Right common and external iliac artery balloon angioplasty using 7 mm x 8 cm angioplasty balloon. 5. Left common iliac artery stenting using 8 mm x 39 mm balloon-expandable [place] VBX stent graft with  completion aortoiliac arteriogram. 6. Right common iliac artery stenting using 8 mm x 29 mm core VBX balloon-expandable stent graft and left common iliac artery stenting using 9 mm x 29 mm [place] VBX stent graft with completion aortoiliac arteriogram. 7. Bilateral common femoral artery access site closure with 8 [place] Angio-Seal devices. Surgeon: Jono Wilson M.D. Assistant: LAM Hunt. Sedation: Patient received 2 mg of intravenous Versed and 100 mcg of intravenous fentanyl. Medication was administered under my direct supervision. Medication was  administered by registered nurse in the Department of interventional radiology. Patient was continuously monitored. Heparin: 6000 units. Protamine: 30 mg. Sedation: 61 minutes. Fluoroscopy time: 10.3 minutes. Fluoroscopy dose: 240 mGy. Contrast: 80 mL of Visipaque was used. Indication for procedure: This is a 62-year-old male with bilateral lower extremity peripheral arterial disease. He had a previous the right common iliac artery stent. He has now developed stenosis beyond the stent going into the right external iliac artery. He also has rest pain in the left lower extremity with occlusion of the left common iliac artery. He also has occlusion of the left superficial femoral artery. The plan procedure will be to balloon angioplasty and possibly stenting of the left common iliac artery and balloon angioplasty of the recurrent stenosis in the distal right common iliac and external iliac arteries. Procedure details: Patient was identified and then taken to the radiology suite and placed in supine position. Both the groins were prepped and a sterile surgical field was created. Preprocedure timeout was conducted. Preoperative intravenous antibiotics were administered. The left common femoral artery was located anterior to the left femoral head by way of fluoroscopy. It was further localized with sonography. Images were stored. Local anesthetic was administered in the left groin and the left common femoral artery was accessed with a micropuncture needle followed by placement of the microwire. This was then converted to a 0.035 inch wire system and a short 5 Guinean sheath was placed. Then the right common femoral artery was located with sonography. Images were stored. Local anesthetic was administered in the right groin and the right common femoral artery was accessed with a micropuncture needle followed by placement of a microwire and then converted to a 0.035 inch wire system and placement of a 6 Guinean sheath. A 0.035  inch Glidewire was advanced from the right side and positioned in the distal aorta. Omni Flush catheter was placed and aortoiliac arteriogram was performed which showed patent distal aorta, patent right common iliac artery stent with moderate to high-grade stenosis beyond the stent on the right side. The left proximal common iliac artery was occluded and reconstituted as a distal common iliac artery. External iliac and hypogastric arteries were patent. 6000 units of heparin were administered. With the help of a 0.035 inch straight Glidewire and a KMP catheter the occlusion in the common iliac artery was traversed. Catheter was positioned in the aorta and arteriogram confirmed intraluminal position. The left side sheath was upsized to a 7 Cambodian sheath.  The left common iliac artery occlusion was Balloon angioplastied using the 7 mm x 8 cm angioplasty balloon. Balloon was inflated for 1 minute to 8 maggie and deflated and removed. Completion arteriogram showed significant residual stenosis. I decided that we would have stent this. Then a 7 mm balloon was placed on the right side and the right external iliac and the distal common iliac artery was balloon angioplastied.. At this time the sheath from the left side was advanced into the aorta. 8 mm x 39 mm VBX balloon-expandable stent graft was advanced to the origin of the left common iliac artery. Sheath was pulled back and while the 7 mm balloon was inflated on the right, the 8 mm x 39 mm stent graft was deployed. Both balloons were deflated and removed. Omni Flush catheter was placed from the right side and arteriogram were performed. It appeared that there was some shift of thrombus from the proximal common iliac artery into the distal aorta. I decided that prevent the thrombus from embolizing we will stent across it with stent grafts from both sides. We upsized the right side sheath to a 7 Cambodian and the left side sheath  8 Cambodian. From the right side we advanced a 8  mm x 29 mm [place] VBX stent graft and from the left side a 9mm x 29 mm [place] VBX stent graft was advanced. Stents were positioned about 5 mm into the distal aorta above the bifurcation and deployed. Completion arteriogram showed there there was no residual thrombus and it was pushed across the stents into the distal aorta without any intraluminal presence. Satisfied with that result patient was given 30 mg of protamine. Arteriogram showed that there was no embolization into the pelvic or femoral arteries. Procedure was concluded. 8 Ukrainian Angio-Seal was deployed on both sides. Patient tolerated the procedure well. I went outside and spoke with family and updated them on our progress. Our plan is to see him back with resting ankle-brachial indices in the clinic in 2 weeks. ANNA MARIE STONER MD   SYSTEM ID:  [id number]    US Lower Extremity Venous Mapping Bilateral    Result Date: 2/1/2022  ULTRASOUND BILATERAL LOWER EXTREMITIES VENOUS MAPPING February 1, 2022 at 0932 hours HISTORY: 61-year-old patient with history of peripheral arterial disease with claudication. Request made for vein mapping for possible surgical arterial bypass graft. Patient previous angiogram on July 17, 2020 with occlusion of the right SFA. Patient also has a right common iliac arterial stent. CT angiogram performed January 28, 2022 demonstrates occlusion of both superficial femoral arteries as well as the left common iliac artery. FINDINGS: The right greater saphenous vein ranges from 2.6 to 3.7 mm throughout the thigh and 2.1 to 3.7 mm at and below the level of the knee. At least three side branches noted along the length of the greater saphenous vein. The left greater saphenous vein ranges from 2.6 to 4.6 mm throughout the thigh. There is a bifurcation of the greater saphenous vein, though below knee measurements proximal to the bifurcation range from 2.1 to 3.4 mm. The main segment beyond the bifurcation of the greater saphenous vein is 1.1 to 2.3  mm with a single branch in the medial branch after the bifurcation of the greater saphenous vein is larger measuring 2.6 to 3.1 mm with a single branch distally. At least three side branches noted in the greater saphenous vein segment of the thigh.     IMPRESSION: Bilateral greater saphenous vein measurements as described. MARY RUSSELL MD   SYSTEM ID:  [id number]    XR Ankle Left G/E 3 Views    Result Date: 2/9/2022  ANKLE LEFT THREE OR MORE VIEWS   2/9/2022 11:02 AM HISTORY: Ankle injury, left, initial encounter. COMPARISON: None.     IMPRESSION: No acute fracture. Mortise and talar dome are intact. Calcaneal spurring. TARIQ RAMIREZ MD   SYSTEM ID:  [id number]      .      Johnny Gonzalez MD        20 minutes spent on the date of the encounter doing chart review, history and exam, documentation, and further activities as noted above.

## 2022-03-07 ENCOUNTER — OFFICE VISIT (OUTPATIENT)
Dept: OTHER | Facility: CLINIC | Age: 62
End: 2022-03-07
Attending: SURGERY
Payer: COMMERCIAL

## 2022-03-07 ENCOUNTER — TELEPHONE (OUTPATIENT)
Dept: OTHER | Facility: CLINIC | Age: 62
End: 2022-03-07

## 2022-03-07 ENCOUNTER — HOSPITAL ENCOUNTER (OUTPATIENT)
Dept: ULTRASOUND IMAGING | Facility: CLINIC | Age: 62
End: 2022-03-07
Attending: SURGERY
Payer: COMMERCIAL

## 2022-03-07 VITALS — SYSTOLIC BLOOD PRESSURE: 157 MMHG | DIASTOLIC BLOOD PRESSURE: 87 MMHG | HEART RATE: 80 BPM

## 2022-03-07 DIAGNOSIS — I73.9 CLAUDICATION IN PERIPHERAL VASCULAR DISEASE (H): ICD-10-CM

## 2022-03-07 DIAGNOSIS — I70.229 CRITICAL ISCHEMIA OF LOWER EXTREMITY (H): Primary | ICD-10-CM

## 2022-03-07 DIAGNOSIS — Z98.890 CRITICAL ISCHEMIA OF EXTREMITY WITH HISTORY OF REVASCULARIZATION OF SAME EXTREMITY (H): Primary | ICD-10-CM

## 2022-03-07 DIAGNOSIS — I70.229 CRITICAL ISCHEMIA OF EXTREMITY WITH HISTORY OF REVASCULARIZATION OF SAME EXTREMITY (H): Primary | ICD-10-CM

## 2022-03-07 PROCEDURE — 93924 LWR XTR VASC STDY BILAT: CPT

## 2022-03-07 PROCEDURE — 99212 OFFICE O/P EST SF 10 MIN: CPT | Performed by: SURGERY

## 2022-03-07 PROCEDURE — G0463 HOSPITAL OUTPT CLINIC VISIT: HCPCS

## 2022-03-07 PROCEDURE — 93924 LWR XTR VASC STDY BILAT: CPT | Mod: 26 | Performed by: SURGERY

## 2022-03-07 RX ORDER — GABAPENTIN 300 MG/1
300 CAPSULE ORAL 3 TIMES DAILY
Qty: 90 CAPSULE | Refills: 1 | Status: SHIPPED | OUTPATIENT
Start: 2022-03-07 | End: 2022-05-24

## 2022-03-07 NOTE — PROGRESS NOTES
[facility] Vascular Clinic        Patient is here for a  follow up.     Pt is currently taking Aspirin, Statin and Plavix.    BP (!) 157/87 (BP Location: Left arm, Patient Position: Chair, Cuff Size: Adult Regular)   Pulse 80     The provider has been notified that the patient has no concerns.     Questions patient would like addressed today are: N/A.    Refills are needed: N/A    Has homecare services and agency name:  Geovanna Capellan MA

## 2022-03-07 NOTE — TELEPHONE ENCOUNTER
F/u with Dr. Wilson 4 weeks from 3/7/22 in person OV with Dr. Aly. No imaging.     Appt notes: 4 week f/u; left foot wound reassessment. Potential plan for left LE bypass. *RON BurtonN, RN  [facility]  Office:  [phone] Fax: [phone]

## 2022-03-07 NOTE — PROGRESS NOTES
Mr. Mahajan is a very pleasant 62-year-old male who was treated with bilateral common iliac artery stents on 18 February 2022.  Prior to that he had ischemic rest pain and nocturnal pain.    He tells me that he is able to walk and his nocturnal pain has resolved and he is finally able to sleep.    He has 3+ palpable bilateral femoral pulses.    Noninvasive arterial studies show a right ankle-brachial index of 0.66 and left ankle-brachial index of 0.38.  His previous left ankle-brachial index was 0.2.    He brought up 2 areas of concern.  One was a scab on the left lateral malleolus and the other 1 was another scab at the tip of his left great toe.    If these do not heal with conservative management then we will have no choice but to proceed with a left common femoral artery to above-knee popliteal artery bypass.  I have put in a request for the patient to see Dr. Page in podiatry for preventative foot care.    I have also increased his gabapentin to 300 mg 3 times daily.    I will see him back in 4 weeks to reassess his progress.  If there is any worsening of the wound in his left foot then we will proceed with a femoropopliteal bypass to further revascularize his left lower extremity for limb salvage.

## 2022-03-08 ENCOUNTER — TELEPHONE (OUTPATIENT)
Dept: PODIATRY | Facility: CLINIC | Age: 62
End: 2022-03-08
Payer: COMMERCIAL

## 2022-03-08 NOTE — TELEPHONE ENCOUNTER
Message was sent to Dr. Page from Dr. Wilson that patient is being referred for routine foot care.     Please contact patient to schedule. Patient can see any of our providers.     ISSA Pimentel RN

## 2022-03-09 ENCOUNTER — OFFICE VISIT (OUTPATIENT)
Dept: PODIATRY | Facility: CLINIC | Age: 62
End: 2022-03-09
Attending: SURGERY
Payer: COMMERCIAL

## 2022-03-09 VITALS
BODY MASS INDEX: 21.47 KG/M2 | DIASTOLIC BLOOD PRESSURE: 76 MMHG | SYSTOLIC BLOOD PRESSURE: 132 MMHG | WEIGHT: 150 LBS | HEIGHT: 70 IN

## 2022-03-09 DIAGNOSIS — L97.322 ULCER OF LEFT ANKLE, WITH FAT LAYER EXPOSED (H): ICD-10-CM

## 2022-03-09 DIAGNOSIS — I70.229 CRITICAL ISCHEMIA OF LOWER EXTREMITY (H): ICD-10-CM

## 2022-03-09 DIAGNOSIS — L97.522 ULCER OF LEFT FOOT WITH FAT LAYER EXPOSED (H): ICD-10-CM

## 2022-03-09 DIAGNOSIS — E11.40 TYPE 2 DIABETES MELLITUS WITH DIABETIC NEUROPATHY, UNSPECIFIED WHETHER LONG TERM INSULIN USE (H): Primary | Chronic | ICD-10-CM

## 2022-03-09 DIAGNOSIS — M25.572 LEFT ANKLE PAIN, UNSPECIFIED CHRONICITY: ICD-10-CM

## 2022-03-09 DIAGNOSIS — L85.3 XEROSIS OF SKIN: ICD-10-CM

## 2022-03-09 PROCEDURE — 99243 OFF/OP CNSLTJ NEW/EST LOW 30: CPT | Performed by: PODIATRIST

## 2022-03-09 RX ORDER — AMMONIUM LACTATE 12 G/100G
CREAM TOPICAL DAILY
Qty: 385 G | Refills: 4 | Status: SHIPPED | OUTPATIENT
Start: 2022-03-09 | End: 2023-02-24

## 2022-03-09 RX ORDER — SILVER SULFADIAZINE 10 MG/G
CREAM TOPICAL DAILY
Qty: 25 G | Refills: 1 | Status: SHIPPED | OUTPATIENT
Start: 2022-03-09 | End: 2023-02-24

## 2022-03-09 NOTE — PROGRESS NOTES
PATIENT HISTORY:  Dr. Wilson requested I see this patient for their foot issue.  Wyatt Mahajan is a 62 year old male who presents to clinic for diabetic foot check.  Patient notes that he has been having pain to the left ankle.  Before his angiogram and stent placing in his legs for increasing blood flow was pain with walking but now which is pain with standing.  He does not have pain when he is walking or laying in bed.  Notes that it will burn or ache.  Usually last about 20 minutes.  Can be 7 out of 10.  Denies specific injury.  Wondering what is causing the pain and what can be done for it.    Review of Systems:  Patient denies fever, chills, rash, wound, stiffness, limping, numbness, weakness, heart burn, blood in stool, chest pain with activity, calf pain when walking, shortness of breath with activity, chronic cough, easy bleeding/bruising, swelling of ankles, excessive thirst, fatigue, depression, anxiety.       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    eval by optho, considered be related to microvascular problem ie DM     Diabetes (H)      Influenza B 4/1/2017     Mixed hyperlipidemia 3/04     MSSA (methicillin susceptible Staphylococcus aureus) pneumonia (H) 4/1/2017     MSSA (methicillin susceptible Staphylococcus aureus) septicemia (H) 4/1/2017     PVD (peripheral vascular disease) with claudication (H) 10/12/2015     Tobacco use disorder QUIT 9/08    smokes for stress     Type II or unspecified type diabetes mellitus with neurological manifestations, not stated as uncontrolled(250.60) (H) 6/23/2014        PAST SURGICAL HISTORY:   Past Surgical History:   Procedure Laterality Date     COLONOSCOPY  10/27/16     COLONOSCOPY N/A 10/31/2016    Procedure: COLONOSCOPY;  Surgeon: Pollo Lopez MD;  Location:  GI      UGI ENDOSCOPY W PLACEMENT GASTROSTOMY TUBE PERCUT N/A 4/4/2017    Procedure: COMBINED ESOPHAGOSCOPY, GASTROSCOPY,  DUODENOSCOPY (EGD), PLACE PERCUTANEOUS ENDOSCOPIC GASTROSTOMY TUBE;  Surgeon: Marcial Obregon MD;  Location:  GI     IR LOWER EXTREMITY ANGIOGRAM BILATERAL  2/18/2022     IR LOWER EXTREMITY ANGIOGRAM RIGHT  7/17/2020     NO HISTORY OF SURGERY       TRACHEOSTOMY N/A 4/4/2017    Procedure: TRACHEOSTOMY;  Surgeon: Jose Puga MD;  Location:  OR        MEDICATIONS:   Current Outpatient Medications:      amLODIPine (NORVASC) 10 MG tablet, Take 1 tablet (10 mg) by mouth daily, Disp: 30 tablet, Rfl: 3     ASPIRIN 81 MG OR TABS, 1 tab per day, Disp: 100, Rfl: 3     BD ULTRA-FINE 29G X 12.7MM insulin pen needle, , Disp: , Rfl:      bisoprolol (ZEBETA) 5 MG tablet, Take 1 tablet (5 mg) by mouth daily, Disp: 30 tablet, Rfl: 3     blood glucose (NO BRAND SPECIFIED) test strip, Use to test blood sugar 1 times daily or as directed. To accompany: Blood Glucose Monitor Brands: ACCU-CHEK SANDRA, Disp: 100 strip, Rfl: 6     celecoxib (CELEBREX) 50 MG capsule, Take 1 capsule (50 mg) by mouth 2 times daily, Disp: 30 capsule, Rfl: 0     cephALEXin (KEFLEX) 500 MG capsule, Take 1 capsule (500 mg) by mouth 2 times daily, Disp: 14 capsule, Rfl: 0     cilostazol (PLETAL) 50 MG tablet, Take 1 tablet (50 mg) by mouth 2 times daily, Disp: 30 tablet, Rfl: 3     clopidogrel (PLAVIX) 75 MG tablet, Take 1 tablet (75 mg) by mouth daily Start taking medication the day after the procedure, Disp: 90 tablet, Rfl: 4     Continuous Blood Gluc Sensor (FREESTYLE VARSHA 14 DAY SENSOR) [facility] [place], CHANGE EVERY 14 DAYS, Disp: , Rfl:      cyclobenzaprine (FLEXERIL) 5 MG tablet, Take 1 tablet (5 mg) by mouth 2 times daily as needed for muscle spasms, Disp: 30 tablet, Rfl: 0     gabapentin (NEURONTIN) 100 MG capsule, Take 3 capsules (300 mg) by mouth 3 times daily, Disp: 90 capsule, Rfl: 3     gabapentin (NEURONTIN) 300 MG capsule, Take 1 capsule (300 mg) by mouth 3 times daily, Disp: 90 capsule, Rfl: 1     insulin glargine (LANTUS SOLOSTAR) 100  UNIT/ML pen, He INJECTS 36 UNITS SUBCUTANEOUSLY ONCE DAILY IN EVENING (has not: INCREASE DOSE BY 4 UNITS EVERY 3 DAYS UNTIL FASTING BG is . IF OVER 40 UNITS, SPLIT DOSE INTO TWO ADMINISTRATIONS DAILY), Disp: 15 mL, Rfl: 3     liraglutide (VICTOZA) 18 MG/3ML solution, Inject 1.2 mg Subcutaneous daily Start with 0.6 mg dose x first 7 days then increase to 1.2 mg/day dose., Disp: 3 mL, Rfl: 3     lisinopril (ZESTRIL) 30 MG tablet, Take 1 tablet (30 mg) by mouth daily, Disp: 30 tablet, Rfl: 4     rosuvastatin (CRESTOR) 20 MG tablet, Take 1 tablet (20 mg) by mouth daily, Disp: 30 tablet, Rfl: 3     thin (NO BRAND SPECIFIED) lancets, Use with lanceting device. To accompany: Blood Glucose Monitor Brands: ACCU-CHEK SANDRA, Disp: 100 each, Rfl: 3     traMADol (ULTRAM) 50 MG tablet, Take 1 tablet (50 mg) by mouth At Bedtime, Disp: 5 tablet, Rfl: 0     ALLERGIES:    Allergies   Allergen Reactions     No Known Drug Allergies         SOCIAL HISTORY:   Social History     Socioeconomic History     Marital status:      Spouse name: Mary     Number of children: 7     Years of education: 16     Highest education level: Not on file   Occupational History     Occupation:  store     Employer: [employer]   Tobacco Use     Smoking status: Former Smoker     Packs/day: 0.50     Years: 15.00     Pack years: 7.50     Types: Cigarettes     Quit date: 2022     Years since quittin.0     Smokeless tobacco: Never Used   Substance and Sexual Activity     Alcohol use: No     Drug use: No     Sexual activity: Yes     Partners: Female   Other Topics Concern      Service No     Blood Transfusions No     Caffeine Concern No     Comment: 1 coffee,pop 1 time weekly     Occupational Exposure No     Hobby Hazards No     Sleep Concern No     Stress Concern No     Weight Concern Yes     Special Diet No     Back Care No     Exercise No     Bike Helmet No     Comment: No Bike     Seat Belt Yes     Self-Exams No  "    Parent/sibling w/ CABG, MI or angioplasty before 65F 55M? No   Social History Narrative    moved here from [place], moved here 1990         Social Determinants of Health     Financial Resource Strain: Not on file   Food Insecurity: Not on file   Transportation Needs: Not on file   Physical Activity: Not on file   Stress: Not on file   Social Connections: Not on file   Intimate Partner Violence: Not on file   Housing Stability: Not on file        FAMILY HISTORY:   Family History   Problem Relation Age of Onset     Diabetes Mother      Diabetes Father      Diabetes Sister      Diabetes Brother      Hypertension No family hx of      Cerebrovascular Disease No family hx of      Prostate Cancer No family hx of      Cancer - colorectal No family hx of      Breast Cancer No family hx of         EXAM:Vitals: /76   Ht 1.778 m (5' 10\")   Wt 68 kg (150 lb)   BMI 21.52 kg/m    BMI= Body mass index is 21.52 kg/m .    A1C: 9.3 (1/2022)    General appearance: Patient is alert and fully cooperative with history & exam.  No sign of distress is noted during the visit.     Psychiatric: Affect is pleasant & appropriate.  Patient appears motivated to improve health.     Respiratory: Breathing is regular & unlabored while sitting.     HEENT: Hearing is intact to spoken word.  Speech is clear.  No gross evidence of visual impairment that would impact ambulation.     Dermatologic: Full-thickness ulcer to the lateral aspect of the left ankle over the distal fibula.  This measures approximately 1 cm x 1.5 cm x 0.1 cm.  No purulent drainage or signs of acute infection noted.  Diffuse scaling to both feet.     Vascular: DP & PT pulses are not palpable bilaterally.  No significant edema or varicosities noted.  CFT and skin temperature is normal to both lower extremities.     Neurologic: Lower extremity sensation is diminished to feet.     Musculoskeletal: Patient is ambulatory without assistive device or brace.  No gross ankle " deformity noted.  No foot or ankle joint effusion is noted.    Radiographs: left ankle xrays -  I personally reviewed the xrays - No acute fracture. Mortise and talar dome are intact. Calcaneal spurring.     ASSESSMENT:    Critical ischemia of lower extremity (H)  Type 2 diabetes mellitus with diabetic neuropathy, unspecified whether long term insulin use (H)  Ulcer of left ankle, with fat layer exposed (H)  Ulcer of left foot with fat layer exposed (H)  Xerosis of skin  Left ankle pain, unspecified chronicity     Medical Decision Making/Plan:  Reviewed patient's chart in epic.  Discussed that the pain might likely be from neuropathy to his left ankle.  We will order an MRI to assess for any deeper pathology.  Talked about over-the-counter topical pain cream.  Was for the ulcers we will have him apply Silvadene cream daily to the ankle ulcer and toe ulcer with a Band-Aid.  He was given an order for diabetic shoes and inserts.  Was also given an order for diabetic foot cream to apply to the feet daily.  We will call him with the MRI results.  We will have him also follow-up in 1 month to reassess the ulcers.  All questions were answered to patient satisfaction he will call for the questions or concerns.    Patient risk factor: Patient is at medium risk for infection.        Moni Page DPM, Podiatry/Foot and Ankle Surgery

## 2022-03-09 NOTE — TELEPHONE ENCOUNTER
Future Appointments   Date Time Provider Department Center   3/9/2022  1:00 PM Moni Page DPM, Podiatry/Foot and Ankle Surgery [facility]   4/4/2022 10:40 AM Johnny Gonzalez MD [facility]

## 2022-03-09 NOTE — PATIENT INSTRUCTIONS
Thank you for choosing [facility] Podiatry / Foot & Ankle Surgery!    DR FRANKEL'S CLINIC:  [facility]   [address]   [place] [address]      TRIAGE LINE: [phone] - Opt[address]  APPOINTMENTS: [phone]  RADIOLOGY: [phone]  SET UP SURGERY: [phone]  BILLING QUESTIONS: [phone]  FAX: [phone]       Follow up: 1 month    MRI, Orthotics, and Silvadine cream ordered    [facility]  [facility]  [address]  [place] [address]  Phone: [phone]  Fax: [phone] [facility]  [address]  [place] [address]  Phone: [phone]   Fax: [phone]   [facility]  [address]  [place] [address]  Phone: [phone]  Fax: [phone] [facility]  [address]  [place] [address]  Phone: [phone]   Fax: [phone]   [facility]   [address]  [place] [address]  Phone: [phone]  Fax: [phone] * Please call any location listed to make an appointment for a casting/fitting. Your referral was sent to their central office and they will all have the order on file.         FOOT ULCER (WOUND) EDUCATION  Ulceration ofthe foot involves a break or hole in the skin. Skin is our best protection against infection. Skin is quite durable, however, the underlying tissues are fragile. For this reason, the wound is likely to deepen rapidly. Deep wounds usually get infected and require amputation. Prompt healing is therefore essential to avoid limb loss.     Foot ulcers do not heal without intervention. Walking on the foot and living your normal life is not typically compatible with healing the sore. Successful healing will require several months of significant alteration of your daily activities.   Ulcer complications frequently develop. This primarily includes infection of skin,  which then spreads deep into your joints, bones and tendons. Spreading infection may travel up your leg and into other parts of your body. Deep infection is usually treated with amputation ofpart ofyour foot or your leg. Signs of infection include fever, chills, nausea, vomiting, erratic blood sugars, local redness, pus, strong odor and localized warmth. Signs of infection should be taken seriously. Prompt evaluation in the clinic or hospital emergency room is required.   Ulcer treatment requires debridement or surgical removal of devitalized tissue. Your doctor will trim away callused, moistened, unhealthy tissue from the wound surface and margin. This helps to clean the wound and allows proper inspection. Debridement also stimulates healing even though the wound originally appears larger. Expect some bleeding with each debridement. You will be given instruction regarding wound bandaging. This often includes ointment and gauze. Avoid tape directly on the skin. Hand washing is essential since most infections will come from your fingertips. Ulcer care requires a no touch technique. Your fingers should not touch the margin or base of the wound.    HELPFUL HEALING TIPS:  1. Debridement: Getting rid of bad tissue makes way for good tissue to promote healing  2. Addressing Foot Deformities: Hammertoes and bunions can cause increased pressure  3. Pressure Reduction: If pressure remains to the wound, it won't heal  4. Good Pulses: If bloodflow is not getting to the foot, the ulcer will not heal  5. Good Nutrition: If you are not getting proper nutrition your body can't heal.Protein! At least 90g a day.  Supplements are a good way to help get this, such as Ron, Glucerna, Ensure. Also taking 5000units of Vitamin D a day.   6. Infection Control: Keep the ulcer clean with wound cleanser. DO NOT SOAK IT!  7. Moisture Control: Keep edema down and make sure that drainage is getting pulled away from the ulcer    IMPORTANCE OF  "DEBRIDEMENT    Reduces bioburden to help control or reduce infection. Even if an ulcer is not  infected,  the bacterial bioburden causes increased local inflammation.    Allows more accurate visualization of the wound base and edges, which allows for more precise staging.    Removes necrotic/non-viable tissue, which impedes wound healing, causes protein loss and can be a nidus for infection.    Stimulates new circulation (angiogenesis) and allows adequate oxygen delivery to the wound.    Removes undermining and tunneling, and may help reduce abscess formation.    Releases healing growth factors at the edge of the wound.    Prepares the wound bed by leaving only tissues that are capable of regenerating.    DIABETES AND YOUR FEET  Diabetes can result in several problems in the feet including ulcers (open sores) and amputations. Two of the most important reasons why people develop foot problems when they have diabetes is : 1. Neuropathy (loss of feeling)  2. Vascular disease (loss or decrease of blood flow).    Neuropathy is a term used to describe a loss of nerve function.  Patients with diabetes are at risk of developing neuropathy if their sugars continue to run high and are above the normal value. One theory for neuropathy is that the \"extra\" sugar in the body enters the nerves and is broken down. These by-products build up in the nerve causing it to swell and impairing nerve function. Often times, this can be prevented by controlling your sugars, dieting and exercise.    When a person develops neuropathy, they usually begin to feel numbness or tingling in their feet and sometime in their legs.  Other symptoms may include painful burning or hot feet, tingling or feeling like insects or ants are crawling on your feet or legs.  If the diabetes is sever and the sugars run high for long periods of time, neuropathy can also occur in the hands.    Vascular disease  is a term used to describe a loss or decrease in " circulation (blood flow). There is a problem in getting blood and oxygen to areas that need it. Similar to neuropathy, sugars can build up in the walls of the arteries (blood vessels) and cause them to become swollen, thickened and hardened. This decreases the amount of blood that can go to an area that needs it. Though this is common in the legs of diabetic patients, it can also affect other arteries (blood vessels) in the body such as in the heart and eyes.    In the legs, vascular disease usually results in cramping. Patients who develop leg cramps after walking the same distance every time (i.e. One block, half a mile, ect.) need to let their doctors know so that their circulation may be checked. Cramps causing severe pain in the feet and/or legs while sleeping and the cramps go away when you stand or hang your legs off the side of the bed, may also be a sign of poor blood circulation.  Occasional cramping in cold weather or on rare occasions with activity may not be due to poor circulation, but you should inform your doctor.    PREVENTION OF THESE DISEASES  The key to prevention is good blood sugar control. Poor blood sugar control is a big reason many of these problems start. Physical activity (exercise) is a very good way to help decrease your blood sugars. Exercise can lower your blood sugar, blood pressure, and cholesterol. It also reduces your risk for heart disease and stroke, relieves stress, and strengthens your heart, muscles and bones.  In addition, regular activity helps insulin work better, improves your blood circulation, and keeps your joints flexible. If you're trying to lose weight, a combination of exercise and wise food choices can help you reach your target weight and maintain it.      PAIN MANAGEMENT (**Please speak with your primary doctor about any medications**)  1.Blood Sugar Control - Most important  2. Medications such as:  Amytriptylline, duloxetine, gabapentin, lyrica, tramadol (talk  "with your primary care doctor about this).     NUTRITION:  Nutrition is also important to help with healing. If your body does not have what it needs, it can't heal.   1. Increasing your protein intake is important.  With wounds you need 60-90gm of protein a day to help with healing. Over the counter protein shakes such as Ron, Glucerna, Ensure, ect... can help to supplement your daily protein intake.   2. It is also important to take Vitamins to help with healing.  Vitamins such as B12, B6 and Vitamin D3 are important for healing. These can be gotten over the counter at pharmacies or at stores like [employer] or the Vitamin [employer].    I can also prescribe a dietary supplement called \"Rheumate\" that has a lot of essential vitamins in one capsule.  This may not be covered by insurance though.     FOOT CARE RECOMMENDATIONS   1. Wash your feet with lukewarm water and a mild soap and then dry them thoroughly, especially between the toes.     2. Examine your feet daily looking for cuts, corns, blisters, cracks, ect, especially after wearing new shoes. Make sure to look between your toes. If you cannot see the bottom of your feet, set a mirror on the floor and hold your foot over it, or ask a spouse, friend or family member to examine your feet for you. Contact your doctor immediately if new problems are noted or if sores are not healing.     3. Immediately apply moisturizer to the tops and bottoms of your feet, avoiding areas between the toes. Hand lotion (Intesive Care, Ching, Eucerin, Neutrogena, Curel, ect) is sufficient unless your doctor prescribes a medicated lotion. Apply sunscreen to your feet when going swimming outside.     4. Use clean comfortable shoes, wear white socks (if you have any bleeding or drainage, you will see it on white socks). Socks should not have thick seams or cut off the circulation around the leg. Break in new shoes slowly and rotate with older shoes until broken in. Check the inside of your " shoes with your hand to look for areas of irritation or objects that may have fallen into your shoes.       5. Keep slippers by the side of your bed for use during the night.     6.  Shoes should be fitted by a professional and should not cause areas of irritation.  Check your feet regularly when wearing a new pair of shoes and replace them as needed.     7.  Talk to your doctor about proper exercise. Exercise and stretching stimulate blood flow to your feet and maintain proper glucose levels.     8.  Monitor your blood glucose level as instructed by your doctor. Notify your doctor immediately if your blood sugar is abnormally high or low.    9. Cut your nails straight across, but then gently round any sharp edges with a cardboard nail file. If you have neuropathy, peripheral vascular disease or cannot see that well to trim your own toenails contact [employer] ([phone]) or [employer] ([phone]).      THINGS TO AVOID DOING   1.  Do not soak your feet if you have an open sore. Use only lukewarm water and always check the temperature with your hand as hot water can easily burn your feet.       2.  Never use a hot water bottle or heating pad on your feet. Also do not apply cold compresses to your feet. With decreased sensation, you could burn or freeze your feet.       3.  Do not apply any of these to your feet:    -  Over the counter medicine for corns or warts    -  Harsh chemicals like boric acid    -  Do not self-treat corns, cuts, blisters or infections. Always consult your doctor.       4.  Do not wear sandals, slippers or walk barefoot, especially on hot sand or concrete or other harsh surfaces.     5.  If you smoke, stop!!!

## 2022-03-09 NOTE — LETTER
3/9/2022         RE: Wyatt Mahajan  [address]  [place] [address]        Dear Colleague,    Thank you for referring your patient, Wyatt Mahajan, to the [facility] PODIATRY. Please see a copy of my visit note below.    PATIENT HISTORY:  Dr. Wilson requested I see this patient for their foot issue.  Wyatt Mahajan is a 62 year old male who presents to clinic for diabetic foot check.  Patient notes that he has been having pain to the left ankle.  Before his angiogram and stent placing in his legs for increasing blood flow was pain with walking but now which is pain with standing.  He does not have pain when he is walking or laying in bed.  Notes that it will burn or ache.  Usually last about 20 minutes.  Can be 7 out of 10.  Denies specific injury.  Wondering what is causing the pain and what can be done for it.    Review of Systems:  Patient denies fever, chills, rash, wound, stiffness, limping, numbness, weakness, heart burn, blood in stool, chest pain with activity, calf pain when walking, shortness of breath with activity, chronic cough, easy bleeding/bruising, swelling of ankles, excessive thirst, fatigue, depression, anxiety.       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    eval by optho, considered be related to microvascular problem ie DM     Diabetes (H)      Influenza B 4/1/2017     Mixed hyperlipidemia 3/04     MSSA (methicillin susceptible Staphylococcus aureus) pneumonia (H) 4/1/2017     MSSA (methicillin susceptible Staphylococcus aureus) septicemia (H) 4/1/2017     PVD (peripheral vascular disease) with claudication (H) 10/12/2015     Tobacco use disorder QUIT 9/08    smokes for stress     Type II or unspecified type diabetes mellitus with neurological manifestations, not stated as uncontrolled(250.60) (H) 6/23/2014        PAST SURGICAL HISTORY:   Past Surgical History:   Procedure  Laterality Date     COLONOSCOPY  10/27/16     COLONOSCOPY N/A 10/31/2016    Procedure: COLONOSCOPY;  Surgeon: Pollo Lopez MD;  Location:  GI     HC UGI ENDOSCOPY W PLACEMENT GASTROSTOMY TUBE PERCUT N/A 4/4/2017    Procedure: COMBINED ESOPHAGOSCOPY, GASTROSCOPY, DUODENOSCOPY (EGD), PLACE PERCUTANEOUS ENDOSCOPIC GASTROSTOMY TUBE;  Surgeon: Marcial Obregon MD;  Location:  GI     IR LOWER EXTREMITY ANGIOGRAM BILATERAL  2/18/2022     IR LOWER EXTREMITY ANGIOGRAM RIGHT  7/17/2020     NO HISTORY OF SURGERY       TRACHEOSTOMY N/A 4/4/2017    Procedure: TRACHEOSTOMY;  Surgeon: Jose Puga MD;  Location:  OR        MEDICATIONS:   Current Outpatient Medications:      amLODIPine (NORVASC) 10 MG tablet, Take 1 tablet (10 mg) by mouth daily, Disp: 30 tablet, Rfl: 3     ASPIRIN 81 MG OR TABS, 1 tab per day, Disp: 100, Rfl: 3     BD ULTRA-FINE 29G X 12.7MM insulin pen needle, , Disp: , Rfl:      bisoprolol (ZEBETA) 5 MG tablet, Take 1 tablet (5 mg) by mouth daily, Disp: 30 tablet, Rfl: 3     blood glucose (NO BRAND SPECIFIED) test strip, Use to test blood sugar 1 times daily or as directed. To accompany: Blood Glucose Monitor Brands: ACCU-CHEK SANDRA, Disp: 100 strip, Rfl: 6     celecoxib (CELEBREX) 50 MG capsule, Take 1 capsule (50 mg) by mouth 2 times daily, Disp: 30 capsule, Rfl: 0     cephALEXin (KEFLEX) 500 MG capsule, Take 1 capsule (500 mg) by mouth 2 times daily, Disp: 14 capsule, Rfl: 0     cilostazol (PLETAL) 50 MG tablet, Take 1 tablet (50 mg) by mouth 2 times daily, Disp: 30 tablet, Rfl: 3     clopidogrel (PLAVIX) 75 MG tablet, Take 1 tablet (75 mg) by mouth daily Start taking medication the day after the procedure, Disp: 90 tablet, Rfl: 4     Continuous Blood Gluc Sensor (FREESTYLE VARSHA 14 DAY SENSOR) [facility], CHANGE EVERY 14 DAYS, Disp: , Rfl:      cyclobenzaprine (FLEXERIL) 5 MG tablet, Take 1 tablet (5 mg) by mouth 2 times daily as needed for muscle spasms, Disp: 30 tablet, Rfl: 0      gabapentin (NEURONTIN) 100 MG capsule, Take 3 capsules (300 mg) by mouth 3 times daily, Disp: 90 capsule, Rfl: 3     gabapentin (NEURONTIN) 300 MG capsule, Take 1 capsule (300 mg) by mouth 3 times daily, Disp: 90 capsule, Rfl: 1     insulin glargine (LANTUS SOLOSTAR) 100 UNIT/ML pen, He INJECTS 36 UNITS SUBCUTANEOUSLY ONCE DAILY IN EVENING (has not: INCREASE DOSE BY 4 UNITS EVERY 3 DAYS UNTIL FASTING BG is . IF OVER 40 UNITS, SPLIT DOSE INTO TWO ADMINISTRATIONS DAILY), Disp: 15 mL, Rfl: 3     liraglutide (VICTOZA) 18 MG/3ML solution, Inject 1.2 mg Subcutaneous daily Start with 0.6 mg dose x first 7 days then increase to 1.2 mg/day dose., Disp: 3 mL, Rfl: 3     lisinopril (ZESTRIL) 30 MG tablet, Take 1 tablet (30 mg) by mouth daily, Disp: 30 tablet, Rfl: 4     rosuvastatin (CRESTOR) 20 MG tablet, Take 1 tablet (20 mg) by mouth daily, Disp: 30 tablet, Rfl: 3     thin (NO BRAND SPECIFIED) lancets, Use with lanceting device. To accompany: Blood Glucose Monitor Brands: ACCU-CHEK SANDRA, Disp: 100 each, Rfl: 3     traMADol (ULTRAM) 50 MG tablet, Take 1 tablet (50 mg) by mouth At Bedtime, Disp: 5 tablet, Rfl: 0     ALLERGIES:    Allergies   Allergen Reactions     No Known Drug Allergies         SOCIAL HISTORY:   Social History     Socioeconomic History     Marital status:      Spouse name: Mary     Number of children: 7     Years of education: 16     Highest education level: Not on file   Occupational History     Occupation:  store     Employer: [employer]   Tobacco Use     Smoking status: Former Smoker     Packs/day: 0.50     Years: 15.00     Pack years: 7.50     Types: Cigarettes     Quit date: 2022     Years since quittin.0     Smokeless tobacco: Never Used   Substance and Sexual Activity     Alcohol use: No     Drug use: No     Sexual activity: Yes     Partners: Female   Other Topics Concern      Service No     Blood Transfusions No     Caffeine Concern No      "Comment: 1 coffee,pop 1 time weekly     Occupational Exposure No     Hobby Hazards No     Sleep Concern No     Stress Concern No     Weight Concern Yes     Special Diet No     Back Care No     Exercise No     Bike Helmet No     Comment: No Bike     Seat Belt Yes     Self-Exams No     Parent/sibling w/ CABG, MI or angioplasty before 65F 55M? No   Social History Narrative    [place] here from [facility] [place], moved here 1990         Social Determinants of Health     Financial Resource Strain: Not on file   Food Insecurity: Not on file   Transportation Needs: Not on file   Physical Activity: Not on file   Stress: Not on file   Social Connections: Not on file   Intimate Partner Violence: Not on file   Housing Stability: Not on file        FAMILY HISTORY:   Family History   Problem Relation Age of Onset     Diabetes Mother      Diabetes Father      Diabetes Sister      Diabetes Brother      Hypertension No family hx of      Cerebrovascular Disease No family hx of      Prostate Cancer No family hx of      Cancer - colorectal No family hx of      Breast Cancer No family hx of         EXAM:Vitals: /76   Ht 1.778 m (5' 10\")   Wt 68 kg (150 lb)   BMI 21.52 kg/m    BMI= Body mass index is 21.52 kg/m .    A1C: 9.3 (1/2022)    General appearance: Patient is alert and fully cooperative with history & exam.  No sign of distress is noted during the visit.     Psychiatric: Affect is pleasant & appropriate.  Patient appears motivated to improve health.     Respiratory: Breathing is regular & unlabored while sitting.     HEENT: Hearing is intact to spoken word.  Speech is clear.  No gross evidence of visual impairment that would impact ambulation.     Dermatologic: Full-thickness ulcer to the lateral aspect of the left ankle over the distal fibula.  This measures approximately 1 cm x 1.5 cm x 0.1 cm.  No purulent drainage or signs of acute infection noted.  Diffuse scaling to both feet.     Vascular: DP & PT pulses are not palpable " bilaterally.  No significant edema or varicosities noted.  CFT and skin temperature is normal to both lower extremities.     Neurologic: Lower extremity sensation is diminished to feet.     Musculoskeletal: Patient is ambulatory without assistive device or brace.  No gross ankle deformity noted.  No foot or ankle joint effusion is noted.    Radiographs: left ankle xrays -  I personally reviewed the xrays - No acute fracture. Mortise and talar dome are intact. Calcaneal spurring.     ASSESSMENT:    Critical ischemia of lower extremity (H)  Type 2 diabetes mellitus with diabetic neuropathy, unspecified whether long term insulin use (H)  Ulcer of left ankle, with fat layer exposed (H)  Ulcer of left foot with fat layer exposed (H)  Xerosis of skin  Left ankle pain, unspecified chronicity     Medical Decision Making/Plan:  Reviewed patient's chart in epic.  Discussed that the pain might likely be from neuropathy to his left ankle.  We will order an MRI to assess for any deeper pathology.  Talked about over-the-counter topical pain cream.  Was for the ulcers we will have him apply Silvadene cream daily to the ankle ulcer and toe ulcer with a Band-Aid.  He was given an order for diabetic shoes and inserts.  Was also given an order for diabetic foot cream to apply to the feet daily.  We will call him with the MRI results.  We will have him also follow-up in 1 month to reassess the ulcers.  All questions were answered to patient satisfaction he will call for the questions or concerns.    Patient risk factor: Patient is at medium risk for infection.        Moni Page DPM, Podiatry/Foot and Ankle Surgery        Again, thank you for allowing me to participate in the care of your patient.        Sincerely,        Moni Page DPM, Podiatry/Foot and Ankle Surgery

## 2022-03-14 NOTE — TELEPHONE ENCOUNTER
Spoke with patient to reschedule the following:    Future Appointments   Date Time Provider Department Center   3/19/2022  7:00 PM [address] [facility]   4/4/2022  1:30 PM Jono Wilson MD [facility]     Please review and sign if error has been addressed. Apologies regardless.    Pelon Goff I   [address] [id number] [phone]

## 2022-03-30 ENCOUNTER — HOSPITAL ENCOUNTER (OUTPATIENT)
Dept: MRI IMAGING | Facility: CLINIC | Age: 62
Discharge: HOME | End: 2022-03-30
Attending: PODIATRIST | Admitting: PODIATRIST
Payer: COMMERCIAL

## 2022-03-30 DIAGNOSIS — L97.322 ULCER OF LEFT ANKLE, WITH FAT LAYER EXPOSED (H): ICD-10-CM

## 2022-03-30 DIAGNOSIS — I70.229 CRITICAL ISCHEMIA OF LOWER EXTREMITY (H): ICD-10-CM

## 2022-03-30 DIAGNOSIS — L97.522 ULCER OF LEFT FOOT WITH FAT LAYER EXPOSED (H): ICD-10-CM

## 2022-03-30 DIAGNOSIS — E11.40 TYPE 2 DIABETES MELLITUS WITH DIABETIC NEUROPATHY, UNSPECIFIED WHETHER LONG TERM INSULIN USE (H): Chronic | ICD-10-CM

## 2022-03-30 DIAGNOSIS — L85.3 XEROSIS OF SKIN: ICD-10-CM

## 2022-03-30 DIAGNOSIS — M25.572 LEFT ANKLE PAIN, UNSPECIFIED CHRONICITY: ICD-10-CM

## 2022-03-30 PROCEDURE — 73721 MRI JNT OF LWR EXTRE W/O DYE: CPT | Mod: LT

## 2022-03-30 PROCEDURE — 73721 MRI JNT OF LWR EXTRE W/O DYE: CPT | Mod: 26 | Performed by: RADIOLOGY

## 2022-03-31 ENCOUNTER — TELEPHONE (OUTPATIENT)
Dept: PODIATRY | Facility: CLINIC | Age: 62
End: 2022-03-31
Payer: COMMERCIAL

## 2022-03-31 NOTE — TELEPHONE ENCOUNTER
MRI is negative for fractures or tendon issues. Notes possibly some arthrititis to the foot and ankle.     Pain to left ankle is more likely from his neuropathy and his decrease bloodflow.    Would have him follow up with is vascular doctor and primary care doctor.     Please let patient know.     Thanks,    Moni Page DPM

## 2022-03-31 NOTE — TELEPHONE ENCOUNTER
Phone call to patient and informed of recommendations and results below.   He has not scheduled a 1 month follow up visit and offered to schedule.   Appointment scheduled for 4/12/22 with 1 pm arrival. He verbalized understanding.     ISSA Pimentel RN

## 2022-04-04 ENCOUNTER — OFFICE VISIT (OUTPATIENT)
Dept: OTHER | Facility: CLINIC | Age: 62
End: 2022-04-04
Attending: INTERNAL MEDICINE
Payer: COMMERCIAL

## 2022-04-04 VITALS
OXYGEN SATURATION: 98 % | HEIGHT: 70 IN | HEART RATE: 81 BPM | DIASTOLIC BLOOD PRESSURE: 86 MMHG | WEIGHT: 150 LBS | BODY MASS INDEX: 21.47 KG/M2 | SYSTOLIC BLOOD PRESSURE: 144 MMHG

## 2022-04-04 DIAGNOSIS — M79.662 PAIN OF LEFT LOWER LEG: ICD-10-CM

## 2022-04-04 DIAGNOSIS — I70.229 CRITICAL ISCHEMIA OF EXTREMITY WITH HISTORY OF REVASCULARIZATION OF SAME EXTREMITY (H): Primary | ICD-10-CM

## 2022-04-04 DIAGNOSIS — I73.9 PVD (PERIPHERAL VASCULAR DISEASE) WITH CLAUDICATION (H): ICD-10-CM

## 2022-04-04 DIAGNOSIS — Z98.890 CRITICAL ISCHEMIA OF EXTREMITY WITH HISTORY OF REVASCULARIZATION OF SAME EXTREMITY (H): Primary | ICD-10-CM

## 2022-04-04 DIAGNOSIS — I10 BENIGN ESSENTIAL HYPERTENSION: ICD-10-CM

## 2022-04-04 PROCEDURE — 99213 OFFICE O/P EST LOW 20 MIN: CPT | Performed by: SURGERY

## 2022-04-04 PROCEDURE — G0463 HOSPITAL OUTPT CLINIC VISIT: HCPCS

## 2022-04-04 NOTE — NURSING NOTE
Patient Education    Procedure: Left femoral to popliteal bypass with vein  Diagnosis: CLI  Anticoagulation Instruction: Hold plavix and pletal 7 days prior  Pre-Operative Physical Exam: You need to have a pre-op physical exam within 30 days of your procedure. Your procedure may be cancelled if you do not have a current History and Physical. Call your PCP's office to schedule.  Allergies:  Updated in Epic  Bowel Prep: NA  NPO per protocol.   Post Procedure Education: [facility] patient post-procedure fact sheet reviewed with patient.    COVID-19 test to be done within 2-4 days prior to procedure. Our surgery scheduler or staff at the hospital will call you to coordinate the COVID test date/time. Once COVID test is obtained, pt to isolate to reduce risk of exposure up to date of surgery.    Learner(s):patient  Method: Listening  Barriers to Learning:No Barrier  Outcome: Patient did verbalize understanding of above education.    Sarah VELASQUEZN, RN    [facility]  Office: [phone]  Fax: [phone]

## 2022-04-04 NOTE — PROGRESS NOTES
Mr. Mahajan is a very pleasant 62-year-old man with congenital bilateral common iliac artery stents on 18 February 2022.  Prior to that he had ischemic rest pain and nocturnal pain of the left lower extremity.  His walking has improved and he has bilateral 3+ palpable femoral pulses.  However, the area of the wound on his great toe has not improved.  He recently underwent an MRI and there is concern for osteonecrosis.    With no significant improvement in the left great toe we need to improve the perfusion in his left foot and we will proceed with left common femoral artery to above-knee popliteal artery bypass using in situ great saphenous vein.  This was explained to the patient.  He will hold his Pletal and his Plavix for 1 week prior to the planned surgical procedure.

## 2022-04-04 NOTE — PROGRESS NOTES
"Patient is here to discuss 4 week f/u; left foot wound reassessment. Potential plan for left LE bypass.    BP (!) 144/86 (BP Location: Left arm, Patient Position: Chair, Cuff Size: Adult Regular)   Pulse 81   Ht 5' 10\" (1.778 m)   Wt 150 lb (68 kg)   SpO2 98%   BMI 21.52 kg/m      Questions patient would like addressed today are: N/A.    Refills are needed: No    Has homecare services and agency name:  Geovanna Avelar  "

## 2022-04-06 ENCOUNTER — TELEPHONE (OUTPATIENT)
Dept: OTHER | Facility: CLINIC | Age: 62
End: 2022-04-06
Payer: COMMERCIAL

## 2022-04-06 ENCOUNTER — HOSPITAL ENCOUNTER (INPATIENT)
Facility: CLINIC | Age: 62
Setting detail: SURGERY ADMIT
End: 2022-04-06
Attending: SURGERY | Admitting: SURGERY
Payer: COMMERCIAL

## 2022-04-06 DIAGNOSIS — Z11.59 ENCOUNTER FOR SCREENING FOR OTHER VIRAL DISEASES: Primary | ICD-10-CM

## 2022-04-12 ENCOUNTER — OFFICE VISIT (OUTPATIENT)
Dept: PODIATRY | Facility: CLINIC | Age: 62
End: 2022-04-12
Payer: COMMERCIAL

## 2022-04-12 VITALS
BODY MASS INDEX: 22.19 KG/M2 | DIASTOLIC BLOOD PRESSURE: 74 MMHG | HEIGHT: 70 IN | WEIGHT: 155 LBS | SYSTOLIC BLOOD PRESSURE: 150 MMHG

## 2022-04-12 DIAGNOSIS — L97.522 ULCER OF LEFT FOOT, WITH FAT LAYER EXPOSED (H): ICD-10-CM

## 2022-04-12 DIAGNOSIS — E11.42 DIABETIC POLYNEUROPATHY ASSOCIATED WITH TYPE 2 DIABETES MELLITUS (H): Primary | ICD-10-CM

## 2022-04-12 DIAGNOSIS — I73.9 PAD (PERIPHERAL ARTERY DISEASE) (H): ICD-10-CM

## 2022-04-12 PROCEDURE — 11042 DBRDMT SUBQ TIS 1ST 20SQCM/<: CPT | Performed by: PODIATRIST

## 2022-04-12 NOTE — PROGRESS NOTES
"Podiatry / Foot and Ankle Surgery Progress Note    April 12, 2022    Subject: Patient was seen for left foot and ankle ulcer.  Notes that the pain is doing better now.  He just been putting on oil to his feet to try to help keep the motion and.  Notes the ankle ulcer has healed up quite well.  He is scheduled for a bypass to the leg with Dr. Wilson the beginning of May to try to help improve blood flow to the foot.  Denies fever, nausea, vomiting.  No other concerns today.    Objective:  Vitals: Ht 1.778 m (5' 10\")   BMI 21.52 kg/m    BMI= Body mass index is 21.52 kg/m .    A1C: 9.3 (1/2022)     General appearance: Patient is alert and fully cooperative with history & exam.  No sign of distress is noted during the visit.     Psychiatric: Affect is pleasant & appropriate.  Patient appears motivated to improve health.     Respiratory: Breathing is regular & unlabored while sitting.     HEENT: Hearing is intact to spoken word.  Speech is clear.  No gross evidence of visual impairment that would impact ambulation.     Dermatologic: Full-thickness ulcer to the lateral aspect of the left ankle over the distal fibula.  This measures approximately 0.1cm x 0.2cmx 0.1 cm.  No purulent drainage or signs of acute infection noted.  Full-thickness ulceration to the distal aspect of the left great toe after debridement measures 1.3 cm x 0.7 cm x 0.1 cm.  No redness, dehiscence or signs of acute infection noted.  Dry black eschar over the wound bed.  This is stable. Diffuse scaling to both feet.     Vascular: DP & PT pulses are not palpable bilaterally.  No significant edema or varicosities noted.  CFT and skin temperature is normal to both lower extremities.     Neurologic: Lower extremity sensation is diminished to feet.     Musculoskeletal: Patient is ambulatory without assistive device or brace.  No gross ankle deformity noted.  No foot or ankle joint effusion is noted.     Radiographs: left ankle xrays -  I personally " reviewed the xrays - No acute fracture. Mortise and talar dome are intact. Calcaneal spurring.    MRI left foot - Multifocal bony edema. No associated fracture line. These are nonspecific findings with differential consideration including stress reaction. Although current imaging appearance is not typical, given multiplicity and clinical history, other consideration include entities such as osteonecrosis.     ASSESSMENT:     Diabetic polyneuropathy associated with type 2 diabetes mellitus (H)  Ulcer of left foot, with fat layer exposed (H)  PAD (peripheral artery disease) (H)  Left ankle pain, unspecified chronicity     Medical Decision Making/Plan:  Reviewed patient's chart in epic.    At this time the left great toe ulcer was debrided.  Please see procedure note below.  We will have him apply a little bit of iodine to the area and a Band-Aid daily.  He will continue to lotion his feet daily.  Continue offloading in his offloading shoe on the left foot.  We will have him follow-up in 6 weeks which would be after his bypass to reassess his ulcers. All questions were answered to patient satisfaction he will call for the questions or concerns.    PRocedure: After verbal consent, excisional debridement was performed on ulcer.  #15 blade was used to debride ulcer down to and including subcutaneous tissue. Bleeding controlled with light pressure.   No drainage noted.  No anesthesia was used due to neuropathy. Dry dressing applied to foot.  Patient tolerated procedure well.       Patient risk factor: Patient is at medium risk for infection.    Moni Page DPM, Podiatry/Foot and Ankle Surgery

## 2022-04-12 NOTE — PATIENT INSTRUCTIONS
Thank you for choosing [facility] Podiatry / Foot & Ankle Surgery!    DR FRANKEL'S CLINIC:     [address]   [place] [address]      TRIAGE LINE: [phone]  APPOINTMENTS: [phone]  RADIOLOGY: [phone]  SET UP SURGERY: [phone]  BILLING QUESTIONS: [phone]  FAX: [phone]       Follow up: 6 weeks

## 2022-04-12 NOTE — LETTER
"    4/12/2022         RE: Wyatt Mahajan  [address]  [place] [address]        Dear Colleague,    Thank you for referring your patient, Wyatt Mahajan, to the [facility] PODIATRY. Please see a copy of my visit note below.    Podiatry / Foot and Ankle Surgery Progress Note    April 12, 2022    Subject: Patient was seen for left foot and ankle ulcer.  Notes that the pain is doing better now.  He just been putting on oil to his feet to try to help keep the motion and.  Notes the ankle ulcer has healed up quite well.  He is scheduled for a bypass to the leg with Dr. Wilson the beginning of May to try to help improve blood flow to the foot.  Denies fever, nausea, vomiting.  No other concerns today.    Objective:  Vitals: Ht 1.778 m (5' 10\")   BMI 21.52 kg/m    BMI= Body mass index is 21.52 kg/m .    A1C: 9.3 (1/2022)     General appearance: Patient is alert and fully cooperative with history & exam.  No sign of distress is noted during the visit.     Psychiatric: Affect is pleasant & appropriate.  Patient appears motivated to improve health.     Respiratory: Breathing is regular & unlabored while sitting.     HEENT: Hearing is intact to spoken word.  Speech is clear.  No gross evidence of visual impairment that would impact ambulation.     Dermatologic: Full-thickness ulcer to the lateral aspect of the left ankle over the distal fibula.  This measures approximately 0.1cm x 0.2cmx 0.1 cm.  No purulent drainage or signs of acute infection noted.  Full-thickness ulceration to the distal aspect of the left great toe after debridement measures 1.3 cm x 0.7 cm x 0.1 cm.  No redness, dehiscence or signs of acute infection noted.  Dry black eschar over the wound bed.  This is stable. Diffuse scaling to both feet.     Vascular: DP & PT pulses are not palpable bilaterally.  No significant edema or varicosities noted.  CFT and skin temperature is normal to both lower " extremities.     Neurologic: Lower extremity sensation is diminished to feet.     Musculoskeletal: Patient is ambulatory without assistive device or brace.  No gross ankle deformity noted.  No foot or ankle joint effusion is noted.     Radiographs: left ankle xrays -  I personally reviewed the xrays - No acute fracture. Mortise and talar dome are intact. Calcaneal spurring.    MRI left foot - Multifocal bony edema. No associated fracture line. These are nonspecific findings with differential consideration including stress reaction. Although current imaging appearance is not typical, given multiplicity and clinical history, other consideration include entities such as osteonecrosis.     ASSESSMENT:     Diabetic polyneuropathy associated with type 2 diabetes mellitus (H)  Ulcer of left foot, with fat layer exposed (H)  PAD (peripheral artery disease) (H)  Left ankle pain, unspecified chronicity     Medical Decision Making/Plan:  Reviewed patient's chart in epic.    At this time the left great toe ulcer was debrided.  Please see procedure note below.  We will have him apply a little bit of iodine to the area and a Band-Aid daily.  He will continue to lotion his feet daily.  Continue offloading in his offloading shoe on the left foot.  We will have him follow-up in 6 weeks which would be after his bypass to reassess his ulcers. All questions were answered to patient satisfaction he will call for the questions or concerns.    PRocedure: After verbal consent, excisional debridement was performed on ulcer.  #15 blade was used to debride ulcer down to and including subcutaneous tissue. Bleeding controlled with light pressure.   No drainage noted.  No anesthesia was used due to neuropathy. Dry dressing applied to foot.  Patient tolerated procedure well.       Patient risk factor: Patient is at medium risk for infection.    Moni Page DPM, Podiatry/Foot and Ankle Surgery              Again, thank you for allowing me to  participate in the care of your patient.        Sincerely,        Moni Page DPM, Podiatry/Foot and Ankle Surgery

## 2022-04-26 DIAGNOSIS — Z79.4 TYPE 2 DIABETES MELLITUS WITH DIABETIC NEUROPATHY, WITH LONG-TERM CURRENT USE OF INSULIN (H): Primary | ICD-10-CM

## 2022-04-26 DIAGNOSIS — E11.40 TYPE 2 DIABETES MELLITUS WITH DIABETIC NEUROPATHY, WITH LONG-TERM CURRENT USE OF INSULIN (H): Primary | ICD-10-CM

## 2022-04-29 RX ORDER — PEN NEEDLE, DIABETIC 29 G X1/2"
NEEDLE, DISPOSABLE MISCELLANEOUS
Qty: 100 EACH | Refills: 1 | Status: SHIPPED | OUTPATIENT
Start: 2022-04-29 | End: 2022-05-11

## 2022-04-29 NOTE — TELEPHONE ENCOUNTER
Routing refill request to provider for review/approval because:  Medication is reported/historical    Cookie Montoya RN on 4/29/2022 at 10:29 AM

## 2022-05-04 ENCOUNTER — OFFICE VISIT (OUTPATIENT)
Dept: OTHER | Facility: CLINIC | Age: 62
End: 2022-05-04
Attending: SURGERY
Payer: COMMERCIAL

## 2022-05-04 VITALS — HEART RATE: 88 BPM | DIASTOLIC BLOOD PRESSURE: 86 MMHG | SYSTOLIC BLOOD PRESSURE: 149 MMHG

## 2022-05-04 DIAGNOSIS — I73.9 PVD (PERIPHERAL VASCULAR DISEASE) WITH CLAUDICATION (H): Primary | ICD-10-CM

## 2022-05-04 PROCEDURE — G0463 HOSPITAL OUTPT CLINIC VISIT: HCPCS

## 2022-05-04 PROCEDURE — 99212 OFFICE O/P EST SF 10 MIN: CPT | Performed by: SURGERY

## 2022-05-04 NOTE — PROGRESS NOTES
[facility]        Patient is here for a  follow up.     Pt is currently taking Aspirin, Statin and Antibiotics.    BP (!) 149/86 (BP Location: Left arm, Patient Position: Chair, Cuff Size: Adult Regular)   Pulse 88     The provider has been notified that the patient has no concerns.     Questions patient would like addressed today are: N/A.    Refills are needed: N/A    Has homecare services and agency name:  Geovanna Capellan MA

## 2022-05-04 NOTE — PROGRESS NOTES
Mr. Mahajan returns to clinic today.  He is a very pleasant 62-year-old diabetic male with bilateral lower extremity peripheral arterial disease.  He had developed a wound in his left great toe along with rest pain.  On 18th February 2022 he underwent recanalization, balloon angioplasty and stenting of the left common iliac artery occlusion.  This relieved his rest pain but he still had the wound in his left great toe.  We had actually planned on proceeding with a left common femoral artery to above-knee popliteal artery bypass on this coming Friday.  He had called and said that his foot is feeling better.  Therefore I had him visit with us today.  There is no active infection in the left great toe and the foot is warm and well-perfused.  There is a scab over where the wound used to be so it is slowly trying to heal.    I would agree with the patient that there is no urgency to proceed with an arterial intervention given that his rest pain is resolved and the wound albeit slowly is actually healing.    We will cancel his surgery for now with the caveat that if he has recurrent great toe infection on the left then we will proceed with revascularization.    As for our surveillance we will see him back in 6 months with duplex sonography of bilateral iliac stents and ankle-brachial indices.

## 2022-05-07 DIAGNOSIS — Z79.4 TYPE 2 DIABETES MELLITUS WITH DIABETIC NEUROPATHY, WITH LONG-TERM CURRENT USE OF INSULIN (H): ICD-10-CM

## 2022-05-07 DIAGNOSIS — E11.40 TYPE 2 DIABETES MELLITUS WITH DIABETIC NEUROPATHY, WITH LONG-TERM CURRENT USE OF INSULIN (H): ICD-10-CM

## 2022-05-11 RX ORDER — PEN NEEDLE, DIABETIC 29 G X1/2"
NEEDLE, DISPOSABLE MISCELLANEOUS
Qty: 100 EACH | Refills: 1 | Status: SHIPPED | OUTPATIENT
Start: 2022-05-11 | End: 2022-06-22

## 2022-05-11 NOTE — TELEPHONE ENCOUNTER
Prescription approved per [facility] Refill Protocol.    Liz VARGAS RN  EP Triage

## 2022-05-24 ENCOUNTER — TELEPHONE (OUTPATIENT)
Dept: OTHER | Facility: CLINIC | Age: 62
End: 2022-05-24

## 2022-05-24 ENCOUNTER — OFFICE VISIT (OUTPATIENT)
Dept: FAMILY MEDICINE | Facility: CLINIC | Age: 62
End: 2022-05-24
Payer: COMMERCIAL

## 2022-05-24 VITALS
WEIGHT: 146 LBS | TEMPERATURE: 97.8 F | OXYGEN SATURATION: 99 % | SYSTOLIC BLOOD PRESSURE: 170 MMHG | HEART RATE: 80 BPM | DIASTOLIC BLOOD PRESSURE: 91 MMHG | BODY MASS INDEX: 20.95 KG/M2

## 2022-05-24 DIAGNOSIS — I70.229 CRITICAL ISCHEMIA OF LOWER EXTREMITY (H): ICD-10-CM

## 2022-05-24 DIAGNOSIS — S99.911A ANKLE INJURY, RIGHT, INITIAL ENCOUNTER: Primary | ICD-10-CM

## 2022-05-24 DIAGNOSIS — I73.9 PVD (PERIPHERAL VASCULAR DISEASE) WITH CLAUDICATION (H): ICD-10-CM

## 2022-05-24 DIAGNOSIS — E11.42 DIABETIC POLYNEUROPATHY ASSOCIATED WITH TYPE 2 DIABETES MELLITUS (H): ICD-10-CM

## 2022-05-24 DIAGNOSIS — N18.2 CHRONIC KIDNEY DISEASE, STAGE 2 (MILD): ICD-10-CM

## 2022-05-24 DIAGNOSIS — Z79.4 TYPE 2 DIABETES MELLITUS WITH DIABETIC NEUROPATHY, WITH LONG-TERM CURRENT USE OF INSULIN (H): ICD-10-CM

## 2022-05-24 DIAGNOSIS — E11.40 TYPE 2 DIABETES MELLITUS WITH DIABETIC NEUROPATHY, WITH LONG-TERM CURRENT USE OF INSULIN (H): ICD-10-CM

## 2022-05-24 LAB — HBA1C MFR BLD: 9.1 % (ref 0–5.6)

## 2022-05-24 PROCEDURE — 99214 OFFICE O/P EST MOD 30 MIN: CPT | Performed by: FAMILY MEDICINE

## 2022-05-24 PROCEDURE — 83036 HEMOGLOBIN GLYCOSYLATED A1C: CPT | Performed by: FAMILY MEDICINE

## 2022-05-24 PROCEDURE — 36415 COLL VENOUS BLD VENIPUNCTURE: CPT | Performed by: FAMILY MEDICINE

## 2022-05-24 PROCEDURE — 80048 BASIC METABOLIC PNL TOTAL CA: CPT | Performed by: FAMILY MEDICINE

## 2022-05-24 RX ORDER — INSULIN GLARGINE 100 [IU]/ML
INJECTION, SOLUTION SUBCUTANEOUS
Qty: 15 ML | Refills: 3 | Status: SHIPPED | OUTPATIENT
Start: 2022-05-24 | End: 2022-05-25

## 2022-05-24 ASSESSMENT — PAIN SCALES - GENERAL: PAINLEVEL: EXTREME PAIN (8)

## 2022-05-24 NOTE — TELEPHONE ENCOUNTER
Refill for Gabapentin sent to Dr. Wilson.  Refill for Lantus sent to Dr. Gonzalez.    Sarah VELASQUEZN, RN    [facility]  Vascular [facility]  Office: [phone]  Fax: [phone]

## 2022-05-24 NOTE — TELEPHONE ENCOUNTER
[facility]    Who is the provider?:  Carlos Wilson    Preferred provider location: [place]    Person calling: Enrique with [facility]  Call back phone: [phone]  Pharmacy FAX: [phone]       Nurse call back needed: ??          Can we leave a message?  YES        Reason for call:    Enrique with [facility] called about refills for:    gabapentin (NEURONTIN) 300 MG   insulin glargine (LANTUS SOLOSTAR) 100 UNIT/ML pen     He also stated that the fax number for his pharmacy is incorrect in Epic. The correct fax is [phone]    Outside imaging: n/a    Name / Loc of pharmacy: [facility] - [place], [place] - [facility] [address]

## 2022-05-24 NOTE — TELEPHONE ENCOUNTER
Meds and pharm loaded.    Sarah VELASQUEZN, RN    [facility]  Vascular [facility]  Office: [phone]  Fax: [phone]

## 2022-05-24 NOTE — TELEPHONE ENCOUNTER
Med and pharm loaded.    Sarah VELASQUEZN, RN    [facility]  Vascular [facility]  Office: [phone]  Fax: [phone]

## 2022-05-24 NOTE — PROGRESS NOTES
Assessment & Plan     Chronic kidney disease, stage 2 (mild)  Has been worsening with DM  Encouraged him to work on diabetes control     Type 2 diabetes mellitus with diabetic neuropathy, with long-term current use of insulin (H)  Mentioned above, will review the lab results and update pt     PVD (peripheral vascular disease) with claudication (H)  Seeing vascular clinic    Ankle injury, right, initial encounter  xr showed no fracture, has soft tissue swelling vascular calcification   Will have him to use cam boot for pain control and prevent further injury   - XR Ankle Right G/E 3 Views; Future         FUTURE APPOINTMENTS:       - Follow-up visit in 4 months     No follow-ups on file.    Shaun Bedolla MD  [facility] DAWOOD Schneider is a 62 year old who presents for the following health issues     History of Present Illness       Reason for visit:  Leg injury  Symptom onset:  1-3 days ago  Symptom intensity:  Moderate  Symptom progression:  Staying the same  Had these symptoms before:  No    He eats 2-3 servings of fruits and vegetables daily.He consumes 1 sweetened beverage(s) daily.He exercises with enough effort to increase his heart rate 10 to 19 minutes per day.  He exercises with enough effort to increase his heart rate 3 or less days per week.   He is taking medications regularly.    Review of Systems   Constitutional, HEENT, cardiovascular, pulmonary, gi and gu systems are negative, except as otherwise noted.      Objective    BP (!) 170/91   Pulse 80   Temp 97.8  F (36.6  C) (Temporal)   Wt 66.2 kg (146 lb)   SpO2 99%   BMI 20.95 kg/m    Body mass index is 20.95 kg/m .  Physical Exam   GENERAL: healthy, alert and no distress  NECK: no adenopathy, no asymmetry, masses, or scars and thyroid normal to palpation  RESP: lungs clear to auscultation - no rales, rhonchi or wheezes  CV: regular rate and rhythm, normal S1 S2, no S3 or S4, no murmur, click or rub, no  peripheral edema and peripheral pulses strong  ABDOMEN: soft, nontender, no hepatosplenomegaly, no masses and bowel sounds normal  MS: swelling on posterior talocalcaneal junction and a location Achilles tendon insertion on calcaneous, negative anterior drawer tests, negative talar tilt tests

## 2022-05-25 LAB
ANION GAP SERPL CALCULATED.3IONS-SCNC: 5 MMOL/L (ref 3–14)
BUN SERPL-MCNC: 17 MG/DL (ref 7–30)
CALCIUM SERPL-MCNC: 9.2 MG/DL (ref 8.5–10.1)
CHLORIDE BLD-SCNC: 106 MMOL/L (ref 94–109)
CO2 SERPL-SCNC: 28 MMOL/L (ref 20–32)
CREAT SERPL-MCNC: 0.85 MG/DL (ref 0.66–1.25)
GFR SERPL CREATININE-BSD FRML MDRD: >90 ML/MIN/1.73M2
GLUCOSE BLD-MCNC: 125 MG/DL (ref 70–99)
POTASSIUM BLD-SCNC: 4.3 MMOL/L (ref 3.4–5.3)
SODIUM SERPL-SCNC: 139 MMOL/L (ref 133–144)

## 2022-05-25 RX ORDER — INSULIN GLARGINE 100 [IU]/ML
INJECTION, SOLUTION SUBCUTANEOUS
Qty: 15 ML | Refills: 3
Start: 2022-05-25 | End: 2022-10-26

## 2022-05-25 RX ORDER — GABAPENTIN 300 MG/1
300 CAPSULE ORAL 3 TIMES DAILY
Qty: 90 CAPSULE | Refills: 1 | Status: SHIPPED | OUTPATIENT
Start: 2022-05-25 | End: 2023-02-24

## 2022-06-14 ENCOUNTER — TELEPHONE (OUTPATIENT)
Dept: OTHER | Facility: CLINIC | Age: 62
End: 2022-06-14
Payer: COMMERCIAL

## 2022-06-14 DIAGNOSIS — E11.40 TYPE 2 DIABETES MELLITUS WITH DIABETIC NEUROPATHY, WITH LONG-TERM CURRENT USE OF INSULIN (H): ICD-10-CM

## 2022-06-14 DIAGNOSIS — E78.5 HYPERLIPIDEMIA LDL GOAL <70: ICD-10-CM

## 2022-06-14 DIAGNOSIS — I73.9 CLAUDICATION IN PERIPHERAL VASCULAR DISEASE (H): ICD-10-CM

## 2022-06-14 DIAGNOSIS — Z79.4 TYPE 2 DIABETES MELLITUS WITH DIABETIC NEUROPATHY, WITH LONG-TERM CURRENT USE OF INSULIN (H): ICD-10-CM

## 2022-06-14 DIAGNOSIS — I10 BENIGN ESSENTIAL HYPERTENSION: ICD-10-CM

## 2022-06-14 NOTE — TELEPHONE ENCOUNTER
[facility]    Who is the provider?:  Carlos      Preferred provider location: [place]    Person calling: [facility]  Call back phone: [phone]       Nurse call back needed: NO          Can we leave a message?  YES        Reason for call:    [facility] requested refills for the following:    Crestor  Zebeta  Victoza  Zestril    Outside imaging: n/a    Name / Loc of pharmacy: [facility] - [place], [place] - [facility] [address]

## 2022-06-14 NOTE — TELEPHONE ENCOUNTER
Unable to refill per [facility] protocol.  Meds loaded.    Sarah OTOOLE, RN    [facility]  Vascular [facility] [place]  Office: [phone]  Fax: [phone]

## 2022-06-14 NOTE — TELEPHONE ENCOUNTER
Refill request sent to Dr. Gonzalez.    Sarah OTOOLE, RN    [facility]  Vascular [facility]  Office: [phone]  Fax: [phone]

## 2022-06-15 RX ORDER — LIRAGLUTIDE 6 MG/ML
1.2 INJECTION SUBCUTANEOUS DAILY
Qty: 3 ML | Refills: 11 | Status: SHIPPED | OUTPATIENT
Start: 2022-06-15 | End: 2023-02-24

## 2022-06-15 RX ORDER — BISOPROLOL FUMARATE 5 MG/1
5 TABLET, FILM COATED ORAL DAILY
Qty: 90 TABLET | Refills: 3 | Status: SHIPPED | OUTPATIENT
Start: 2022-06-15 | End: 2024-03-28

## 2022-06-15 RX ORDER — ROSUVASTATIN CALCIUM 20 MG/1
20 TABLET, COATED ORAL DAILY
Qty: 90 TABLET | Refills: 3 | Status: SHIPPED | OUTPATIENT
Start: 2022-06-15 | End: 2024-03-28

## 2022-06-15 RX ORDER — LISINOPRIL 30 MG/1
30 TABLET ORAL DAILY
Qty: 90 TABLET | Refills: 3 | Status: SHIPPED | OUTPATIENT
Start: 2022-06-15 | End: 2023-02-24

## 2022-06-28 ENCOUNTER — TRANSFERRED RECORDS (OUTPATIENT)
Dept: HEALTH INFORMATION MANAGEMENT | Facility: CLINIC | Age: 62
End: 2022-06-28

## 2022-06-28 LAB — RETINOPATHY: POSITIVE

## 2022-10-10 ENCOUNTER — NURSE TRIAGE (OUTPATIENT)
Dept: FAMILY MEDICINE | Facility: CLINIC | Age: 62
End: 2022-10-10

## 2022-10-10 NOTE — TELEPHONE ENCOUNTER
S/w pt and inquired about his currently lanuts dose. Pt stated he is  currently taking 26 units at nighttime. Pt told to decrease dose by 75% to equal 20 units nightly. Pt verbalized understanding. Pt scheduled for VV with Dr Bedolla 10/17 at 510pm.    Liz VARGAS RN  EP Triage

## 2022-10-10 NOTE — TELEPHONE ENCOUNTER
Nurse Triage [facility]    Is this a 2nd Level Triage? YES, LICENSED PRACTITIONER REVIEW IS REQUIRED    Situation: patient reports low blood sugars for the last couple of months. Reports 79 with current Lantus dose and goes up to 220 without Lantus    Background: Type 2 diabetes.     Assessment: hypoglycemia that needs medication adjustment.      Protocol Recommended Disposition:   No disposition on file.    Recommendation: Patient requests oral medication. Does not want metformin.   Patient wanted to see Dr. Bedolla, but no appointments available. Please advise if OK for same day slot.    Routed to provider    Does the patient meet one of the following criteria for ADS visit consideration? 16+ years old, with an [facility] PCP     TIP  Providers, please consider if this condition is appropriate for management at one of our Acute and Diagnostic Services sites.     If patient is a good candidate, please use dotphrase <dot>triageresponse and select Refer to ADS to document.      Reason for Disposition    Morning (before breakfast) blood glucose < 80 mg/dL (4.4 mmol/L) and more than once in past week    Additional Information    Negative: Unconscious or difficult to awaken    Negative: Seizure occurs    Negative: Acting confused (e.g., disoriented, slurred speech)    Negative: Very weak (can't stand)    Negative: Sounds like a life-threatening emergency to the triager    Negative: Vomiting and signs of dehydration (e.g., very dry mouth, lightheaded, dark urine, etc.)    Negative: Low blood sugar symptoms persist > 30 minutes AND using low blood sugar Care Advice    Negative: Low blood glucose (< 70 mg/dL or 3.9 mmol/L) persists > 30 minutes AND using low blood sugar Care Advice    Negative: Patient sounds very sick or weak to the triager    Negative: Diabetes medication overdose (e.g., insulin error) and triager unable to answer question    Negative: Caller has URGENT medication or insulin pump question and triager unable to answer  "question    Negative: Low blood sugar symptoms with no other adult present AND hasn't tried Care Advice    Negative: Low blood glucose (< 70 mg/dL or 3.9 mmol/L) with no other adult present AND hasn't tried Care Advice    Negative: Low blood glucose (< 70 mg/dL or 3.9 mmol/L) or symptomatic, now improved with Care Advice AND cause unknown    Negative: Patient wants to be seen    Answer Assessment - Initial Assessment Questions  1. SYMPTOMS: \"What symptoms are you concerned about?\"      Low blood sugar. Sometimes a little shakey. BS of 79. Improves with eating something sweet.   2. ONSET:  \"When did the symptoms start?\"      2 months. Only now uses a couple days a week. Blood sugar goes up to 200-220  3. BLOOD GLUCOSE: \"What is your blood glucose level?\"         4. USUAL RANGE: \"What is your blood glucose level usually?\" (e.g., usual fasting morning value, usual evening value)      Unknown.   5. TYPE 1 or 2:  \"Do you know what type of diabetes you have?\"  (e.g., Type 1, Type 2, Gestational; doesn't know)       Type 2  6. INSULIN: \"Do you take insulin?\" \"What type of insulin(s) do you use? What is the mode of delivery? (syringe, pen; injection or pump) \"When did you last give yourself an insulin dose?\" (i.e., time or hours/minutes ago) \"How much did you give?\" (i.e., how many units)      Lantus.   7. DIABETES PILLS: \"Do you take any pills for your diabetes?\"      no  8. OTHER SYMPTOMS: \"Do you have any symptoms?\" (e.g., fever, frequent urination, difficulty breathing, vomiting)      no  9. LOW BLOOD GLUCOSE TREATMENT: \"What have you done so far to treat the low blood glucose level?\"      Eat sweets.   10. FOOD: \"When did you last eat or drink?\"        Not symptoms of low blood sugar at time of call.   11. ALONE: \"Are you alone right now or is someone with you?\"         NA  12. PREGNANCY: \"Is there any chance you are pregnant?\" \"When was your last menstrual period?\"        NA    Protocols used: DIABETES - LOW " BLOOD SUGAR-A-OH  Leela Traylor RN

## 2022-10-10 NOTE — TELEPHONE ENCOUNTER
Patient is calling and asking if provider can review her most recent blood work with her. She is wondering if she will have to stay on Eliquis the rest of her life. Please advise. karlo         Last Written Prescription Date: 4/11/17  Last Fill Quantity: unknown , # refills: 0  Last Office Visit with [facility], [facility] or [facility] prescribing provider:  5/25/17   Next 5 appointments (look out 90 days)     Jul 10, 2017  9:45 AM CDT   Return Visit with Vish Mahajan MD   [facility] ([facility])    [address] [place] [id number] [phone]                   BP Readings from Last 3 Encounters:   05/25/17 122/72   05/22/17 138/84   05/09/17 112/68     Lab Results   Component Value Date    MICROL 32 05/10/2016     Lab Results   Component Value Date    UMALCR 20.97 05/10/2016     Creatinine   Date Value Ref Range Status   05/22/2017 0.78 0.66 - 1.25 mg/dL Final   ]  GFR Estimate   Date Value Ref Range Status   05/22/2017 >90  Non  GFR Calc   >60 mL/min/1.7m2 Final   04/11/2017 67 >60 mL/min/1.7m2 Final     Comment:     Non  GFR Calc   04/10/2017 63 >60 mL/min/1.7m2 Final     Comment:     Non  GFR Calc     GFR Estimate If Black   Date Value Ref Range Status   05/22/2017 >90   GFR Calc   >60 mL/min/1.7m2 Final   04/11/2017 81 >60 mL/min/1.7m2 Final     Comment:      GFR Calc   04/10/2017 76 >60 mL/min/1.7m2 Final     Comment:      GFR Calc     Lab Results   Component Value Date    CHOL 132 01/17/2017     Lab Results   Component Value Date    HDL 37 01/17/2017     Lab Results   Component Value Date    LDL 84 01/17/2017     Lab Results   Component Value Date    TRIG 163 04/11/2017     Lab Results   Component Value Date    CHOLHDLRATIO 5.6 08/07/2015     Lab Results   Component Value Date    AST 11 05/22/2017     Lab Results   Component Value Date    ALT 28 05/22/2017     Lab Results   Component Value Date    A1C 8.1 03/17/2017    A1C 11.0 05/10/2016    A1C 10.0 08/07/2015    A1C 10.6 02/16/2015    A1C 10.6 06/03/2014     Potassium   Date  Value Ref Range Status   05/22/2017 4.0 3.4 - 5.3 mmol/L Final

## 2022-10-10 NOTE — TELEPHONE ENCOUNTER
What is his current dose of lantus?  Please have him to take 75% of each lantus dose daily for next 1 week, and f/u visit with aisha flores

## 2022-10-23 ENCOUNTER — HEALTH MAINTENANCE LETTER (OUTPATIENT)
Age: 62
End: 2022-10-23

## 2022-10-25 DIAGNOSIS — I10 BENIGN ESSENTIAL HYPERTENSION: ICD-10-CM

## 2022-10-25 DIAGNOSIS — E11.42 DIABETIC POLYNEUROPATHY ASSOCIATED WITH TYPE 2 DIABETES MELLITUS (H): ICD-10-CM

## 2022-10-25 DIAGNOSIS — E11.40 TYPE 2 DIABETES MELLITUS WITH DIABETIC NEUROPATHY, WITH LONG-TERM CURRENT USE OF INSULIN (H): ICD-10-CM

## 2022-10-25 DIAGNOSIS — Z79.4 TYPE 2 DIABETES MELLITUS WITH DIABETIC NEUROPATHY, WITH LONG-TERM CURRENT USE OF INSULIN (H): ICD-10-CM

## 2022-10-26 RX ORDER — PEN NEEDLE, DIABETIC 29 G X1/2"
NEEDLE, DISPOSABLE MISCELLANEOUS
Qty: 100 EACH | Refills: 2 | Status: SHIPPED | OUTPATIENT
Start: 2022-10-26 | End: 2023-02-16

## 2022-10-26 RX ORDER — AMLODIPINE BESYLATE 10 MG/1
10 TABLET ORAL DAILY
Qty: 30 TABLET | Refills: 3 | Status: SHIPPED | OUTPATIENT
Start: 2022-10-26 | End: 2023-08-15

## 2022-10-26 RX ORDER — INSULIN GLARGINE 100 [IU]/ML
INJECTION, SOLUTION SUBCUTANEOUS
Qty: 15 ML | Refills: 3 | Status: SHIPPED | OUTPATIENT
Start: 2022-10-26 | End: 2023-02-16

## 2023-02-14 DIAGNOSIS — E11.42 DIABETIC POLYNEUROPATHY ASSOCIATED WITH TYPE 2 DIABETES MELLITUS (H): ICD-10-CM

## 2023-02-14 DIAGNOSIS — E11.40 TYPE 2 DIABETES MELLITUS WITH DIABETIC NEUROPATHY, WITH LONG-TERM CURRENT USE OF INSULIN (H): ICD-10-CM

## 2023-02-14 DIAGNOSIS — Z79.4 TYPE 2 DIABETES MELLITUS WITH DIABETIC NEUROPATHY, WITH LONG-TERM CURRENT USE OF INSULIN (H): ICD-10-CM

## 2023-02-16 RX ORDER — INSULIN GLARGINE 100 [IU]/ML
INJECTION, SOLUTION SUBCUTANEOUS
Qty: 45 ML | Refills: 1 | Status: SHIPPED | OUTPATIENT
Start: 2023-02-16 | End: 2023-03-27

## 2023-02-24 ENCOUNTER — VIRTUAL VISIT (OUTPATIENT)
Dept: FAMILY MEDICINE | Facility: CLINIC | Age: 63
End: 2023-02-24
Payer: COMMERCIAL

## 2023-02-24 DIAGNOSIS — I10 BENIGN ESSENTIAL HYPERTENSION: ICD-10-CM

## 2023-02-24 DIAGNOSIS — N18.2 CHRONIC KIDNEY DISEASE, STAGE 2 (MILD): ICD-10-CM

## 2023-02-24 DIAGNOSIS — Z13.220 SCREENING FOR HYPERLIPIDEMIA: ICD-10-CM

## 2023-02-24 DIAGNOSIS — I70.229 CRITICAL ISCHEMIA OF LOWER EXTREMITY (H): ICD-10-CM

## 2023-02-24 DIAGNOSIS — I73.9 PVD (PERIPHERAL VASCULAR DISEASE) WITH CLAUDICATION (H): ICD-10-CM

## 2023-02-24 DIAGNOSIS — E11.40 TYPE 2 DIABETES MELLITUS WITH DIABETIC NEUROPATHY, UNSPECIFIED WHETHER LONG TERM INSULIN USE (H): ICD-10-CM

## 2023-02-24 PROCEDURE — 99214 OFFICE O/P EST MOD 30 MIN: CPT | Mod: VID | Performed by: FAMILY MEDICINE

## 2023-02-24 RX ORDER — CLOPIDOGREL BISULFATE 75 MG/1
75 TABLET ORAL DAILY
Qty: 90 TABLET | Refills: 4 | Status: SHIPPED | OUTPATIENT
Start: 2023-02-24 | End: 2024-03-18

## 2023-02-24 RX ORDER — LISINOPRIL 30 MG/1
30 TABLET ORAL DAILY
Qty: 90 TABLET | Refills: 3 | Status: SHIPPED | OUTPATIENT
Start: 2023-02-24 | End: 2024-03-06

## 2023-02-24 RX ORDER — GABAPENTIN 300 MG/1
300 CAPSULE ORAL AT BEDTIME
Qty: 90 CAPSULE | Refills: 1 | Status: SHIPPED | OUTPATIENT
Start: 2023-02-24 | End: 2023-04-27

## 2023-02-24 NOTE — PROGRESS NOTES
Wyatt is a 63 year old who is being evaluated via a billable video visit.      How would you like to obtain your AVS? MyChart  If the video visit is dropped, the invitation should be resent by: Text to cell phone: [phone]  Will anyone else be joining your video visit? No          Assessment & Plan     Type 2 diabetes mellitus with diabetic neuropathy, unspecified whether long term insulin use (H)  Has been fluctuating with lowering BS, will have him to try Januvia instead of GLP-1  Will have him to recheck in 3 months with me   Will review the lab results and update   - HEMOGLOBIN A1C; Future  - Lipid panel reflex to direct LDL Non-fasting; Future  - Basic metabolic panel  (Ca, Cl, CO2, Creat, Gluc, K, Na, BUN); Future  - sitagliptin (JANUVIA) 25 MG tablet; Take 1 tablet (25 mg) by mouth daily    Chronic kidney disease, stage 2 (mild)  Stable, keep monitoring   - Albumin Random Urine Quantitative with Creat Ratio; Future  - Basic metabolic panel  (Ca, Cl, CO2, Creat, Gluc, K, Na, BUN); Future    Screening for hyperlipidemia    - Lipid panel reflex to direct LDL Non-fasting; Future    Critical ischemia of lower extremity (H)  Peripheral neuropathy on legs are stable with current dose of meds, will keep monitoring   - gabapentin (NEURONTIN) 300 MG capsule; Take 1 capsule (300 mg) by mouth At Bedtime    PVD (peripheral vascular disease) with claudication (H)  Stable   - clopidogrel (PLAVIX) 75 MG tablet; Take 1 tablet (75 mg) by mouth daily Start taking medication the day after the procedure  - Basic metabolic panel  (Ca, Cl, CO2, Creat, Gluc, K, Na, BUN); Future    Benign essential hypertension    - lisinopril (ZESTRIL) 30 MG tablet; Take 1 tablet (30 mg) by mouth daily  - Basic metabolic panel  (Ca, Cl, CO2, Creat, Gluc, K, Na, BUN); Future           FUTURE APPOINTMENTS:       - Follow-up visit in 3 months for DM med check     Return in about 3 months (around 5/24/2023) for DM recheck, Lab Work.    Shaun SIDDIQUI  MD [place]  [facility] DAWOOD Schneider is a 63 year old presenting for the following health issues:  No chief complaint on file.      HPI     DM medication recheck       How many servings of fruits and vegetables do you eat daily?  2-3     On average, how many sweetened beverages do you drink each day (Examples: soda, juice, sweet tea, etc.  Do NOT count diet or artificially sweetened beverages)?   1    How many days per week do you exercise enough to make your heart beat faster? 7    How many minutes a day do you exercise enough to make your heart beat faster? 10 - 19  How many days per week do you miss taking your medication? Just insulin     What makes it hard for you to take your medications?  side effects    Diabetes Follow-up    How often are you checking your blood sugar? One time daily  What time of day are you checking your blood sugars (select all that apply)?  Before meals  Have you had any blood sugars above 200?  Few   Have you had any blood sugars below 70?  No    What symptoms do you notice when your blood sugar is low?  None    What concerns do you have today about your diabetes? None     Do you have any of these symptoms? (Select all that apply)  Numbness in feet              Hyperlipidemia Follow-Up      Are you regularly taking any medication or supplement to lower your cholesterol?   Yes- crestor    Are you having muscle aches or other side effects that you think could be caused by your cholesterol lowering medication?  No    Hypertension Follow-up      Do you check your blood pressure regularly outside of the clinic? Yes     Are you following a low salt diet? Yes    Are your blood pressures ever more than 140 on the top number (systolic) OR more   than 90 on the bottom number (diastolic), for example 140/90? Yes    BP Readings from Last 2 Encounters:   05/24/22 (!) 170/91   05/04/22 (!) 149/86     Hemoglobin A1C (%)   Date Value   05/24/2022 9.1 (H)    01/21/2022 9.3 (H)   07/10/2020 9.5 (H)   04/14/2020 11.1 (H)     LDL Cholesterol Calculated (mg/dL)   Date Value   01/21/2022 90   08/27/2021 140 (H)   07/17/2020 136 (H)   04/14/2020 90     LDL Cholesterol Direct (mg/dL)   Date Value   02/09/2022 52         Review of Systems   Constitutional, HEENT, cardiovascular, pulmonary, gi and gu systems are negative, except as otherwise noted.      Objective           Vitals:  No vitals were obtained today due to virtual visit.    Physical Exam   GENERAL: Healthy, alert and no distress  EYES: Eyes grossly normal to inspection.  No discharge or erythema, or obvious scleral/conjunctival abnormalities.  RESP: No audible wheeze, cough, or visible cyanosis.  No visible retractions or increased work of breathing.    SKIN: Visible skin clear. No significant rash, abnormal pigmentation or lesions.  NEURO: Cranial nerves grossly intact.  Mentation and speech appropriate for age.  PSYCH: Mentation appears normal, affect normal/bright, judgement and insight intact, normal speech and appearance well-groomed.                Video-Visit Details    Type of service:  Video Visit   Video Start Time: 7:45  Video End Time:8:27 AM    Originating Location (pt. Location): Home    Distant Location (provider location):  On-site  Platform used for Video Visit: Renato

## 2023-03-18 ENCOUNTER — TRANSFERRED RECORDS (OUTPATIENT)
Dept: MULTI SPECIALTY CLINIC | Facility: CLINIC | Age: 63
End: 2023-03-18

## 2023-03-18 LAB — RETINOPATHY: NORMAL

## 2023-03-27 ENCOUNTER — LAB (OUTPATIENT)
Dept: LAB | Facility: CLINIC | Age: 63
End: 2023-03-27
Payer: COMMERCIAL

## 2023-03-27 ENCOUNTER — NURSE TRIAGE (OUTPATIENT)
Dept: FAMILY MEDICINE | Facility: CLINIC | Age: 63
End: 2023-03-27

## 2023-03-27 ENCOUNTER — TELEPHONE (OUTPATIENT)
Dept: FAMILY MEDICINE | Facility: CLINIC | Age: 63
End: 2023-03-27

## 2023-03-27 DIAGNOSIS — I10 BENIGN ESSENTIAL HYPERTENSION: ICD-10-CM

## 2023-03-27 DIAGNOSIS — Z79.4 TYPE 2 DIABETES MELLITUS WITH DIABETIC NEUROPATHY, WITH LONG-TERM CURRENT USE OF INSULIN (H): ICD-10-CM

## 2023-03-27 DIAGNOSIS — E11.42 DIABETIC POLYNEUROPATHY ASSOCIATED WITH TYPE 2 DIABETES MELLITUS (H): ICD-10-CM

## 2023-03-27 DIAGNOSIS — E11.40 TYPE 2 DIABETES MELLITUS WITH DIABETIC NEUROPATHY, WITH LONG-TERM CURRENT USE OF INSULIN (H): ICD-10-CM

## 2023-03-27 DIAGNOSIS — I73.9 PVD (PERIPHERAL VASCULAR DISEASE) WITH CLAUDICATION (H): ICD-10-CM

## 2023-03-27 DIAGNOSIS — E11.40 TYPE 2 DIABETES MELLITUS WITH DIABETIC NEUROPATHY, UNSPECIFIED WHETHER LONG TERM INSULIN USE (H): ICD-10-CM

## 2023-03-27 DIAGNOSIS — N18.2 CHRONIC KIDNEY DISEASE, STAGE 2 (MILD): ICD-10-CM

## 2023-03-27 DIAGNOSIS — Z13.220 SCREENING FOR HYPERLIPIDEMIA: ICD-10-CM

## 2023-03-27 LAB
ANION GAP SERPL CALCULATED.3IONS-SCNC: 13 MMOL/L (ref 7–15)
BUN SERPL-MCNC: 18.5 MG/DL (ref 8–23)
CALCIUM SERPL-MCNC: 9.3 MG/DL (ref 8.8–10.2)
CHLORIDE SERPL-SCNC: 96 MMOL/L (ref 98–107)
CHOLEST SERPL-MCNC: 134 MG/DL
CREAT SERPL-MCNC: 1 MG/DL (ref 0.67–1.17)
CREAT UR-MCNC: 36 MG/DL
DEPRECATED HCO3 PLAS-SCNC: 24 MMOL/L (ref 22–29)
GFR SERPL CREATININE-BSD FRML MDRD: 86 ML/MIN/1.73M2
GLUCOSE SERPL-MCNC: 595 MG/DL (ref 70–99)
HBA1C MFR BLD: 13.3 % (ref 0–5.6)
HDLC SERPL-MCNC: 29 MG/DL
LDLC SERPL CALC-MCNC: 59 MG/DL
MICROALBUMIN UR-MCNC: 124 MG/L
MICROALBUMIN/CREAT UR: 344.44 MG/G CR (ref 0–17)
NONHDLC SERPL-MCNC: 105 MG/DL
POTASSIUM SERPL-SCNC: 4.4 MMOL/L (ref 3.4–5.3)
SODIUM SERPL-SCNC: 133 MMOL/L (ref 136–145)
TRIGL SERPL-MCNC: 232 MG/DL

## 2023-03-27 PROCEDURE — 80061 LIPID PANEL: CPT

## 2023-03-27 PROCEDURE — 82043 UR ALBUMIN QUANTITATIVE: CPT

## 2023-03-27 PROCEDURE — 83036 HEMOGLOBIN GLYCOSYLATED A1C: CPT

## 2023-03-27 PROCEDURE — 36415 COLL VENOUS BLD VENIPUNCTURE: CPT

## 2023-03-27 PROCEDURE — 82570 ASSAY OF URINE CREATININE: CPT

## 2023-03-27 PROCEDURE — 80048 BASIC METABOLIC PNL TOTAL CA: CPT

## 2023-03-27 RX ORDER — INSULIN GLARGINE 100 [IU]/ML
INJECTION, SOLUTION SUBCUTANEOUS
Qty: 45 ML | Refills: 1
Start: 2023-03-27 | End: 2023-04-27

## 2023-03-27 NOTE — TELEPHONE ENCOUNTER
Reason for Call:  Other Pt has cough for 2-3 weeks and wants to talk to a nurse     Detailed comments: na     Phone Number Patient can be reached at: Home number on file [phone] (home)    Best Time: anytime    Can we leave a detailed message on this number? YES    Call taken on 3/27/2023 at 8:44 AM by Rosalva Malik

## 2023-03-27 NOTE — TELEPHONE ENCOUNTER
----- Message from Shaun Bedolla MD sent at 3/27/2023  2:28 PM CDT -----  His DM has been worsening. He hasn't been taking GLP-1 due to the financial burden, so I had him to start Januvia instead recently. Please call him and check on with him if he started it. Plus, he should increase the dose of Lantus from (22 am and 20pm) to (24 am and 22 pm).    If he has acute sx concerning DKA, he should be seen at ER.   Otherwise, he should schedule seeing me in 1 month.       thx

## 2023-03-27 NOTE — TELEPHONE ENCOUNTER
S-(situation): Pt c/o ongoing dry cough x 2-3 weeks. Pt denies any SOB, fever, CP, runny nose, congestion.     B-(background): Returned from international travel on 3/25.     A-(assessment): See below    R-(recommendations): Take at home COVID test and notify clinic of results to determine further assessment and evaluation. Pt given other home care advice.      Reason for Disposition    Cough    Additional Information    Negative: SEVERE difficulty breathing (e.g., struggling for each breath, speaks in single words)    Negative: Bluish (or gray) lips or face now    Negative: [1] Rapid onset of cough AND [2] has hives    Negative: Coughing started suddenly after medicine, an allergic food or bee sting    Negative: [1] Difficulty breathing AND [2] exposure to flames, smoke, or fumes    Negative: [1] Stridor AND [2] difficulty breathing    Negative: Sounds like a life-threatening emergency to the triager    Negative: Choked on object of food that could be caught in the throat    Negative: Chest pain is main symptom    Negative: [1] Previous asthma attacks AND [2] this feels like asthma attack    Negative: Cough lasts > 3 weeks    Negative: Wet cough (productive; white-yellow, yellow, green, or pedro luis colored sputum)    Negative: [1] Dry cough (non-productive;  no sputum or minimal clear sputum) AND [2] within 14 days of COVID-19 Exposure    Negative: [1] MODERATE difficulty breathing (e.g., speaks in phrases, SOB even at rest, pulse 100-120) AND [2] still present when not coughing    Negative: Chest pain  (Exception: MILD central chest pain, present only when coughing)    Negative: Patient sounds very sick or weak to the triager    Negative: [1] MILD difficulty breathing (e.g., minimal/no SOB at rest, SOB with walking, pulse <100) AND [2] still present when not coughing    Negative: [1] Coughed up blood AND [2] > 1 tablespoon (15 ml)  (Exception: Blood-tinged sputum.)    Negative: Fever > 103 F (39.4 C)    Negative: [1]  "Fever > 101 F (38.3 C) AND [2] age > 60 years    Negative: [1] Fever > 100.0 F (37.8 C) AND [2] bedridden (e.g., nursing home patient, CVA, chronic illness, recovering from surgery)    Negative: [1] Fever > 100.0 F (37.8 C) AND [2] diabetes mellitus or weak immune system (e.g., HIV positive, cancer chemo, splenectomy, organ transplant, chronic steroids)    Negative: Wheezing is present    Negative: [1] Ankle swelling AND [2] swelling is increasing    Negative: SEVERE coughing spells (e.g., whooping sound after coughing, vomiting after coughing)    Negative: [1] Continuous (nonstop) coughing interferes with work or school AND [2] no improvement using cough treatment per Care Advice    Negative: Fever present > 3 days (72 hours)    Negative: [1] Fever returns after gone for over 24 hours AND [2] symptoms worse or not improved    Negative: [1] Using nasal washes and pain medicine > 24 hours AND [2] sinus pain (around cheekbone or eye) persists    Negative: Earache is present    Negative: Cough has been present for > 3 weeks    Negative: [1] Nasal discharge AND [2] present > 10 days    Negative: [1] Coughed up blood-tinged sputum AND [2] more than once    Negative: [1] Patient also has allergy symptoms (e.g., itchy eyes, clear nasal discharge, postnasal drip) AND [2] they are acting up    Negative: Taking an ACE Inhibitor medication (e.g., benazepril/LOTENSIN, captopril/CAPOTEN, enalapril/VASOTEC, lisinopril/ZESTRIL)    Negative: Exposure to TB (Tuberculosis)    Answer Assessment - Initial Assessment Questions  1. ONSET: \"When did the cough begin?\"       2-3 weeks ago     2. SEVERITY: \"How bad is the cough today?\"       Comes and and goes    3. SPUTUM: \"Describe the color of your sputum\" (none, dry cough; clear, white, yellow, green)      None    4. HEMOPTYSIS: \"Are you coughing up any blood?\" If so ask: \"How much?\" (flecks, streaks, tablespoons, etc.)      No    5. DIFFICULTY BREATHING: \"Are you having difficulty " "breathing?\" If Yes, ask: \"How bad is it?\" (e.g., mild, moderate, severe)     - MILD: No SOB at rest, mild SOB with walking, speaks normally in sentences, can lie down, no retractions, pulse < 100.     - MODERATE: SOB at rest, SOB with minimal exertion and prefers to sit, cannot lie down flat, speaks in phrases, mild retractions, audible wheezing, pulse 100-120.     - SEVERE: Very SOB at rest, speaks in single words, struggling to breathe, sitting hunched forward, retractions, pulse > 120       None    6. FEVER: \"Do you have a fever?\" If Yes, ask: \"What is your temperature, how was it measured, and when did it start?\"      None    7. CARDIAC HISTORY: \"Do you have any history of heart disease?\" (e.g., heart attack, congestive heart failure)       No     8. LUNG HISTORY: \"Do you have any history of lung disease?\"  (e.g., pulmonary embolus, asthma, emphysema)      No     9. PE RISK FACTORS: \"Do you have a history of blood clots?\" (or: recent major surgery, recent prolonged travel, bedridden)      o     10. OTHER SYMPTOMS: \"Do you have any other symptoms?\" (e.g., runny nose, wheezing, chest pain)        No    11. PREGNANCY: \"Is there any chance you are pregnant?\" \"When was your last menstrual period?\"        No     12. TRAVEL: \"Have you traveled out of the country in the last month?\" (e.g., travel history, exposures)        Yes, just got back from international fight on 3/25    Protocols used: COUGH - ACUTE NON-PRODUCTIVE-A-AH    HOME CARE:   * You should be able to treat this at home.      CARE ADVICE given per Cough - Acute Non-Productive (Adult) guideline.      CALL BACK IF:   * Cough lasts over 3 weeks  * Continuous coughing persists over 2 hours after cough treatment  * Fever over 103 F (39.4 C)  * Fever lasts more than 3 days  * Difficulty breathing occurs  * You become worse      COUGHING SPELLS:   * Drink warm fluids. Inhale warm mist. This can help relax the airway and also loosen up phlegm.  * Suck on cough " drops or hard candy to coat the irritated throat.        Patient/Caregiver understands and will follow care advice? Yes, plans to follow advice          Liz VARGAS RN  [facility] Triage Team

## 2023-03-27 NOTE — TELEPHONE ENCOUNTER
S/w pt who states he stopped taking Januvia  yesterday d/t side effect of loss of appetite. Pt states appetite has resumed as he was able to eat large bowl of spaghetti and apple juice prior to blood draw this morning.     Pt states his  at home 2 days ago. Pt denies any s/s of DKA- no abd pain/n/v, SOB, rapid breathing, confusion, fruity breath, polyuria/polydipsia/polyphagia.    Pt states he has been taking Lantus as prescribed. Pt will increase Lantus dose as recommended. Pt scheduled for f/u VV on 4/27 with PCP.    Pt verbalized understanding to seek immediate care if symptoms worsen.         Liz VARGAS RN  [facility] Triage Team

## 2023-03-28 NOTE — TELEPHONE ENCOUNTER
Called the patient. He states that he has not yet tested for Covid 19. Patient states that he will test later today. Leela Traylor RN

## 2023-03-29 ENCOUNTER — OFFICE VISIT (OUTPATIENT)
Dept: FAMILY MEDICINE | Facility: CLINIC | Age: 63
End: 2023-03-29
Payer: COMMERCIAL

## 2023-03-29 VITALS
HEIGHT: 69 IN | SYSTOLIC BLOOD PRESSURE: 125 MMHG | TEMPERATURE: 97.4 F | HEART RATE: 89 BPM | RESPIRATION RATE: 16 BRPM | WEIGHT: 144.4 LBS | OXYGEN SATURATION: 97 % | BODY MASS INDEX: 21.39 KG/M2 | DIASTOLIC BLOOD PRESSURE: 80 MMHG

## 2023-03-29 DIAGNOSIS — R05.2 SUBACUTE COUGH: Primary | ICD-10-CM

## 2023-03-29 PROCEDURE — 99213 OFFICE O/P EST LOW 20 MIN: CPT | Performed by: PHYSICIAN ASSISTANT

## 2023-03-29 RX ORDER — BENZONATATE 100 MG/1
100 CAPSULE ORAL 3 TIMES DAILY PRN
Qty: 21 CAPSULE | Refills: 0 | Status: SHIPPED | OUTPATIENT
Start: 2023-03-29 | End: 2024-03-28

## 2023-03-29 ASSESSMENT — PAIN SCALES - GENERAL: PAINLEVEL: NO PAIN (0)

## 2023-03-29 NOTE — PROGRESS NOTES
Assessment & Plan     Subacute cough  Given length of symptoms and international travel we will treat empirically with Augmentin 875-125 twice daily X 7 days.  Tessalon Perles as needed for cough.  Use Robitussin/NyQuil at night.  Push fluids.  Discussed signs and symptoms that warrant reevaluation.  Comfortable with plan.    - amoxicillin-clavulanate (AUGMENTIN) 875-125 MG tablet  Dispense: 14 tablet; Refill: 0  - benzonatate (TESSALON) 100 MG capsule  Dispense: 21 capsule; Refill: 0      20 minutes spent by me on the date of the encounter doing chart review, review of test results, interpretation of tests, patient visit and documentation          No follow-ups on file.    The likelihood of other entities in the differential is insufficient to justify any further testing for them at this time. This was explained to the patient. The patient was advised that persistent or worsening symptoms would require further evaluation. Patient advised to call the office and if unable to reach to go to the emergency room if they develop any new or worsening symptoms. Expressed understanding and agreement with above stated plan.     [facility] Keyshawn is a 63 year old male presenting for the following health issues:  Patient presents with:  Cough    Here for evaluation of a 3 to 4-week productive cough.  Recently returned from a trip to [place].  Tested negative for COVID-19.  No known sick contacts.  He is afebrile.  No sinus congestion/rhinorrhea/sore throat.  No fever/chills.  No shortness of breath, chest pain, Jesse pain, nausea or vomiting.    History of type 2 diabetes.    Review of Systems   Constitutional, HEENT, cardiovascular, pulmonary, GI, , musculoskeletal, neuro, skin, endocrine and psych systems are negative, except as otherwise noted.      Objective    /80   Pulse 89   Temp 97.4  F (36.3  C) (Tympanic)   Resp 16   Ht  "1.74 m (5' 8.5\")   Wt 65.5 kg (144 lb 6.4 oz)   SpO2 97%   BMI 21.64 kg/m    5' 8.5\"  144 lbs 6.4 oz    Allergies   Allergen Reactions     No Known Drug Allergies      Current Outpatient Medications   Medication Sig Dispense Refill     amoxicillin-clavulanate (AUGMENTIN) 875-125 MG tablet Take 1 tablet by mouth 2 times daily for 7 days 14 tablet 0     ASPIRIN 81 MG OR TABS 1 tab per day 100 3     BD ULTRA-FINE 29G X 12.7MM insulin pen needle USE AS DIRECTED 100 each 0     benzonatate (TESSALON) 100 MG capsule Take 1 capsule (100 mg) by mouth 3 times daily as needed for cough 21 capsule 0     blood glucose (NO BRAND SPECIFIED) test strip Use to test blood sugar 1 times daily or as directed. To accompany: Blood Glucose Monitor Brands: ACCU-CHEK SANDRA 100 strip 6     clopidogrel (PLAVIX) 75 MG tablet Take 1 tablet (75 mg) by mouth daily Start taking medication the day after the procedure 90 tablet 4     Continuous Blood Gluc Sensor (AI ExchangeSTYLE VARSHA 14 DAY SENSOR) MISC CHANGE EVERY 14 DAYS       gabapentin (NEURONTIN) 300 MG capsule Take 1 capsule (300 mg) by mouth At Bedtime 90 capsule 1     insulin glargine (LANTUS SOLOSTAR) 100 UNIT/ML pen HE INJECTS 24 UNITS IN THE MORNING, AND 22 UNITS IN THE EVENING SUBCUTANEOUSLY ONCE DAILY IN EVENING (HAS NOT: INCREASE DOSE BY FOUR (4) UNITS EVERY THREE (3) DAYS UNTIL FASTING BG IS . IF OVER 40 UNITS, SPLIT DOSE INTO TWO ADMINISTRATIONS DAILY) 45 mL 1     lisinopril (ZESTRIL) 30 MG tablet Take 1 tablet (30 mg) by mouth daily 90 tablet 3     rosuvastatin (CRESTOR) 20 MG tablet Take 1 tablet (20 mg) by mouth daily 90 tablet 3     thin (NO BRAND SPECIFIED) lancets Use with lanceting device. To accompany: Blood Glucose Monitor Brands: ACCU-CHEK SANDRA 100 each 3     amLODIPine (NORVASC) 10 MG tablet Take 1 tablet (10 mg) by mouth daily (Patient not taking: Reported on 3/29/2023) 30 tablet 3     bisoprolol (ZEBETA) 5 MG tablet Take 1 tablet (5 mg) by mouth daily (Patient not " taking: Reported on 2/24/2023) 90 tablet 3     cilostazol (PLETAL) 50 MG tablet Take 1 tablet (50 mg) by mouth 2 times daily (Patient not taking: Reported on 2/24/2023) 30 tablet 3     gabapentin (NEURONTIN) 100 MG capsule Take 3 capsules (300 mg) by mouth 3 times daily 90 capsule 3     sitagliptin (JANUVIA) 25 MG tablet Take 1 tablet (25 mg) by mouth daily (Patient not taking: Reported on 3/29/2023) 90 tablet 0     Past Medical History:   Diagnosis Date     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    eval by optho, considered be related to microvascular problem ie DM     Diabetes (H)      Influenza B 4/1/2017     Mixed hyperlipidemia 3/04     MSSA (methicillin susceptible Staphylococcus aureus) pneumonia (H) 4/1/2017     MSSA (methicillin susceptible Staphylococcus aureus) septicemia (H) 4/1/2017     PVD (peripheral vascular disease) with claudication (H) 10/12/2015     Tobacco use disorder QUIT 9/08    smokes for stress     Type II or unspecified type diabetes mellitus with neurological manifestations, not stated as uncontrolled(250.60) (H) 6/23/2014     Past Surgical History:   Procedure Laterality Date     COLONOSCOPY  10/27/16     COLONOSCOPY N/A 10/31/2016    Procedure: COLONOSCOPY;  Surgeon: Pollo Lopez MD;  Location:  GI     HC UGI ENDOSCOPY W PLACEMENT GASTROSTOMY TUBE PERCUT N/A 4/4/2017    Procedure: COMBINED ESOPHAGOSCOPY, GASTROSCOPY, DUODENOSCOPY (EGD), PLACE PERCUTANEOUS ENDOSCOPIC GASTROSTOMY TUBE;  Surgeon: Marcial Obregon MD;  Location:  GI     IR LOWER EXTREMITY ANGIOGRAM BILATERAL  2/18/2022     IR LOWER EXTREMITY ANGIOGRAM RIGHT  7/17/2020     NO HISTORY OF SURGERY       TRACHEOSTOMY N/A 4/4/2017    Procedure: TRACHEOSTOMY;  Surgeon: Jose Puga MD;  Location:  OR       Physical Exam   GENERAL: healthy, alert and no distress  EYES: Eyes grossly normal to inspection, PERRL and conjunctivae and sclerae normal  HENT: ear canals and TM's normal, nose and mouth without ulcers  or lesions  NECK: no adenopathy, no asymmetry, masses, or scars and thyroid normal to palpation  RESP: lungs clear to auscultation - no rales, rhonchi or wheezes  CV: regular rate and rhythm, normal S1 S2, no S3 or S4, no murmur, click or rub, no peripheral edema  MS: no gross musculoskeletal defects noted, no edema  SKIN: no suspicious lesions or rashes  NEURO: Normal strength and tone, mentation intact and speech normal  PSYCH: mentation appears normal, affect normal/bright

## 2023-03-29 NOTE — TELEPHONE ENCOUNTER
Pt complains of dry cough for 3 weeks. He denies chest pain, breathing problems or fever. He tested for COVID-19 and reports test was negative. Triage advised he can take OTC cough medication Robitussin. Pt opts to schedule an appt. OV was scheduled today.

## 2023-04-02 ENCOUNTER — HEALTH MAINTENANCE LETTER (OUTPATIENT)
Age: 63
End: 2023-04-02

## 2023-04-27 ENCOUNTER — VIRTUAL VISIT (OUTPATIENT)
Dept: FAMILY MEDICINE | Facility: CLINIC | Age: 63
End: 2023-04-27
Payer: COMMERCIAL

## 2023-04-27 DIAGNOSIS — Z79.4 TYPE 2 DIABETES MELLITUS WITH DIABETIC NEUROPATHY, WITH LONG-TERM CURRENT USE OF INSULIN (H): ICD-10-CM

## 2023-04-27 DIAGNOSIS — I73.9 PVD (PERIPHERAL VASCULAR DISEASE) WITH CLAUDICATION (H): ICD-10-CM

## 2023-04-27 DIAGNOSIS — N18.2 CHRONIC KIDNEY DISEASE, STAGE 2 (MILD): ICD-10-CM

## 2023-04-27 DIAGNOSIS — E11.40 TYPE 2 DIABETES MELLITUS WITH DIABETIC NEUROPATHY, UNSPECIFIED WHETHER LONG TERM INSULIN USE (H): Primary | Chronic | ICD-10-CM

## 2023-04-27 DIAGNOSIS — I70.229 CRITICAL ISCHEMIA OF LOWER EXTREMITY (H): ICD-10-CM

## 2023-04-27 DIAGNOSIS — E11.40 TYPE 2 DIABETES MELLITUS WITH DIABETIC NEUROPATHY, WITH LONG-TERM CURRENT USE OF INSULIN (H): ICD-10-CM

## 2023-04-27 DIAGNOSIS — E11.42 DIABETIC POLYNEUROPATHY ASSOCIATED WITH TYPE 2 DIABETES MELLITUS (H): ICD-10-CM

## 2023-04-27 DIAGNOSIS — I99.8 ISCHEMIC VASCULAR DISEASE: ICD-10-CM

## 2023-04-27 PROCEDURE — 99215 OFFICE O/P EST HI 40 MIN: CPT | Mod: VID | Performed by: FAMILY MEDICINE

## 2023-04-27 RX ORDER — GABAPENTIN 300 MG/1
300 CAPSULE ORAL AT BEDTIME
Qty: 90 CAPSULE | Refills: 1 | Status: SHIPPED | OUTPATIENT
Start: 2023-04-27 | End: 2023-10-17

## 2023-04-27 RX ORDER — LIRAGLUTIDE 6 MG/ML
1.2 INJECTION SUBCUTANEOUS DAILY
Qty: 18 ML | Refills: 1 | Status: SHIPPED | OUTPATIENT
Start: 2023-04-27 | End: 2023-11-13

## 2023-04-27 RX ORDER — INSULIN GLARGINE 100 [IU]/ML
INJECTION, SOLUTION SUBCUTANEOUS
Qty: 45 ML | Refills: 1
Start: 2023-04-27 | End: 2023-09-07

## 2023-04-27 NOTE — PROGRESS NOTES
Wyatt is a 63 year old who is being evaluated via a billable video visit.      How would you like to obtain your AVS? MyChart  If the video visit is dropped, the invitation should be resent by: Text to cell phone: [phone]  Will anyone else be joining your video visit? No          Assessment & Plan     Type 2 diabetes mellitus with diabetic neuropathy, unspecified whether long term insulin use (H)  Worsening, his A1C is 13.3, had been off of DM meds during home country visit, GLP-1 was resumed after he found worsening A1C, encouraged him to increase the dose of Lantus 26u am daily yo 26u am and 4u pm   Will recheck them in 4 months    - liraglutide (VICTOZA) 18 MG/3ML solution; Inject 1.2 mg Subcutaneous daily    Chronic kidney disease, stage 2 (mild)  Worsening due to poorly controlled DM, pt has poor compliance with treatment, will have him to see me frequently for the issue    Ischemic vascular disease  Discussed about increased risk of it from past hx and poor compliance, will keep monitoring     PVD (peripheral vascular disease) with claudication (H)  Mentioned above  Has no worsening fortunately this time, will keep monitoring     Type 2 diabetes mellitus with diabetic neuropathy, with long-term current use of insulin (H)  Mentioned above   - insulin glargine (LANTUS SOLOSTAR) 100 UNIT/ML pen; HE INJECTS 26 UNITS IN THE MORNING, AND 4 UNITS IN THE EVENING SUBCUTANEOUSLY ONCE DAILY IN EVENING (HAS NOT: INCREASE DOSE BY FOUR (4) UNITS EVERY THREE (3) DAYS UNTIL FASTING BG IS . IF OVER 40 UNITS, SPLIT DOSE INTO TWO ADMINISTRATIONS DAILY)    Diabetic polyneuropathy associated with type 2 diabetes mellitus (H)  Mentioned above   - insulin glargine (LANTUS SOLOSTAR) 100 UNIT/ML pen; HE INJECTS 26 UNITS IN THE MORNING, AND 4 UNITS IN THE EVENING SUBCUTANEOUSLY ONCE DAILY IN EVENING (HAS NOT: INCREASE DOSE BY FOUR (4) UNITS EVERY THREE (3) DAYS UNTIL FASTING BG IS . IF OVER 40 UNITS, SPLIT DOSE  INTO TWO ADMINISTRATIONS DAILY)    Critical ischemia of lower extremity (H)  Mentioned above   - gabapentin (NEURONTIN) 300 MG capsule; Take 1 capsule (300 mg) by mouth At Bedtime           FUTURE APPOINTMENTS:       - Follow-up visit in 4 months     Shaun Bedolla MD  [facility] DAWOOD Schneider is a 63 year old, presenting for the following health issues:  Diabetes         View : No data to display.              HPI     Diabetes Follow-up    How often are you checking your blood sugar? One time daily  What time of day are you checking your blood sugars (select all that apply)?  Before meals  Have you had any blood sugars above 200?  Yes 220-230 at times  Have you had any blood sugars below 70?  No    What symptoms do you notice when your blood sugar is low?  None    What concerns do you have today about your diabetes? None     Do you have any of these symptoms? (Select all that apply)  No numbness or tingling in feet.  No redness, sores or blisters on feet.  No complaints of excessive thirst.  No reports of blurry vision.  No significant changes to weight.      BP Readings from Last 2 Encounters:   03/29/23 125/80   05/24/22 (!) 170/91     Hemoglobin A1C (%)   Date Value   03/27/2023 13.3 (H)   05/24/2022 9.1 (H)   07/10/2020 9.5 (H)   04/14/2020 11.1 (H)     LDL Cholesterol Calculated (mg/dL)   Date Value   03/27/2023 59   01/21/2022 90   07/17/2020 136 (H)   04/14/2020 90     LDL Cholesterol Direct (mg/dL)   Date Value   02/09/2022 52             How many servings of fruits and vegetables do you eat daily?  2-3    On average, how many sweetened beverages do you drink each day (Examples: soda, juice, sweet tea, etc.  Do NOT count diet or artificially sweetened beverages)?   0    How many days per week do you exercise enough to make your heart beat faster? 3 or less    How many minutes a day do you exercise enough to make your heart beat faster? 9 or less    How many days per  week do you miss taking your medication? 0          Review of Systems   Constitutional, HEENT, cardiovascular, pulmonary, GI, , musculoskeletal, neuro, skin, endocrine and psych systems are negative, except as otherwise noted.      Objective           Vitals:  No vitals were obtained today due to virtual visit.    Physical Exam   GENERAL: Healthy, alert and no distress  EYES: Eyes grossly normal to inspection.  No discharge or erythema, or obvious scleral/conjunctival abnormalities.  RESP: No audible wheeze, cough, or visible cyanosis.  No visible retractions or increased work of breathing.    SKIN: Visible skin clear. No significant rash, abnormal pigmentation or lesions.  NEURO: Cranial nerves grossly intact.  Mentation and speech appropriate for age.  PSYCH: Mentation appears normal, affect normal/bright, judgement and insight intact, normal speech and appearance well-groomed.                Video-Visit Details    Type of service:  Video Visit   Video Start Time: 9:30  Video End Time:10:16 AM    Originating Location (pt. Location): Home    Distant Location (provider location):  On-site  Platform used for Video Visit: Renato

## 2023-05-24 DIAGNOSIS — E11.40 TYPE 2 DIABETES MELLITUS WITH DIABETIC NEUROPATHY, UNSPECIFIED WHETHER LONG TERM INSULIN USE (H): ICD-10-CM

## 2023-05-24 RX ORDER — SITAGLIPTIN 25 MG/1
TABLET, FILM COATED ORAL
Qty: 90 TABLET | Refills: 0 | Status: SHIPPED | OUTPATIENT
Start: 2023-05-24 | End: 2023-10-30

## 2023-08-15 DIAGNOSIS — I10 BENIGN ESSENTIAL HYPERTENSION: ICD-10-CM

## 2023-08-15 RX ORDER — AMLODIPINE BESYLATE 10 MG/1
TABLET ORAL
Qty: 90 TABLET | Refills: 1 | Status: SHIPPED | OUTPATIENT
Start: 2023-08-15 | End: 2024-03-28

## 2023-08-27 ENCOUNTER — HEALTH MAINTENANCE LETTER (OUTPATIENT)
Age: 63
End: 2023-08-27

## 2023-09-06 DIAGNOSIS — E11.42 DIABETIC POLYNEUROPATHY ASSOCIATED WITH TYPE 2 DIABETES MELLITUS (H): ICD-10-CM

## 2023-09-06 DIAGNOSIS — Z79.4 TYPE 2 DIABETES MELLITUS WITH DIABETIC NEUROPATHY, WITH LONG-TERM CURRENT USE OF INSULIN (H): ICD-10-CM

## 2023-09-06 DIAGNOSIS — E11.40 TYPE 2 DIABETES MELLITUS WITH DIABETIC NEUROPATHY, WITH LONG-TERM CURRENT USE OF INSULIN (H): ICD-10-CM

## 2023-09-07 ENCOUNTER — TELEPHONE (OUTPATIENT)
Dept: FAMILY MEDICINE | Facility: CLINIC | Age: 63
End: 2023-09-07
Payer: COMMERCIAL

## 2023-09-07 RX ORDER — INSULIN GLARGINE 100 [IU]/ML
INJECTION, SOLUTION SUBCUTANEOUS
Qty: 45 ML | Refills: 0 | Status: SHIPPED | OUTPATIENT
Start: 2023-09-07 | End: 2024-01-09

## 2023-09-07 NOTE — TELEPHONE ENCOUNTER
Prior Authorization Retail Medication Request    Medication/Dose: liraglutide (VICTOZA) 18 MG/3ML solution  ICD code (if different than what is on RX):    Previously Tried and Failed:    Rationale:      Insurance Name:  na  Insurance ID:  [id number]      Pharmacy Information (if different than what is on RX)  Name:  same  Phone:  same

## 2023-09-08 NOTE — TELEPHONE ENCOUNTER
Prior Authorization Not Needed per Insurance    Medication: VICTOZA 18 MG/3ML SC SOPN  Insurance Company: [employer] - Phone [phone] Fax [phone]  Expected CoPay:      Pharmacy Filling the Rx: [facility] - [place], [place] - [address] [place], [place] [address]  Pharmacy Notified: Yes  Patient Notified: No

## 2023-10-17 DIAGNOSIS — I70.229 CRITICAL ISCHEMIA OF LOWER EXTREMITY (H): ICD-10-CM

## 2023-10-17 RX ORDER — GABAPENTIN 300 MG/1
CAPSULE ORAL
Qty: 90 CAPSULE | Refills: 1 | Status: SHIPPED | OUTPATIENT
Start: 2023-10-17 | End: 2024-04-19

## 2023-10-30 DIAGNOSIS — E11.40 TYPE 2 DIABETES MELLITUS WITH DIABETIC NEUROPATHY, UNSPECIFIED WHETHER LONG TERM INSULIN USE (H): ICD-10-CM

## 2023-10-30 RX ORDER — SITAGLIPTIN 25 MG/1
TABLET, FILM COATED ORAL
Qty: 90 TABLET | Refills: 0 | Status: SHIPPED | OUTPATIENT
Start: 2023-10-30 | End: 2024-02-15

## 2023-11-13 DIAGNOSIS — E11.40 TYPE 2 DIABETES MELLITUS WITH DIABETIC NEUROPATHY, UNSPECIFIED WHETHER LONG TERM INSULIN USE (H): Chronic | ICD-10-CM

## 2023-11-13 RX ORDER — LIRAGLUTIDE 6 MG/ML
INJECTION SUBCUTANEOUS
Qty: 18 ML | Refills: 1 | Status: SHIPPED | OUTPATIENT
Start: 2023-11-13 | End: 2024-09-17

## 2024-01-09 DIAGNOSIS — Z79.4 TYPE 2 DIABETES MELLITUS WITH DIABETIC NEUROPATHY, WITH LONG-TERM CURRENT USE OF INSULIN (H): ICD-10-CM

## 2024-01-09 DIAGNOSIS — E11.40 TYPE 2 DIABETES MELLITUS WITH DIABETIC NEUROPATHY, WITH LONG-TERM CURRENT USE OF INSULIN (H): ICD-10-CM

## 2024-01-09 DIAGNOSIS — E11.42 DIABETIC POLYNEUROPATHY ASSOCIATED WITH TYPE 2 DIABETES MELLITUS (H): ICD-10-CM

## 2024-01-09 RX ORDER — INSULIN GLARGINE 100 [IU]/ML
INJECTION, SOLUTION SUBCUTANEOUS
Qty: 45 ML | Refills: 11 | Status: SHIPPED | OUTPATIENT
Start: 2024-01-09

## 2024-01-14 ENCOUNTER — HEALTH MAINTENANCE LETTER (OUTPATIENT)
Age: 64
End: 2024-01-14

## 2024-02-14 DIAGNOSIS — E11.40 TYPE 2 DIABETES MELLITUS WITH DIABETIC NEUROPATHY, UNSPECIFIED WHETHER LONG TERM INSULIN USE (H): ICD-10-CM

## 2024-02-15 RX ORDER — SITAGLIPTIN 25 MG/1
TABLET, FILM COATED ORAL
Qty: 60 TABLET | Refills: 0 | Status: SHIPPED | OUTPATIENT
Start: 2024-02-15 | End: 2024-03-28

## 2024-02-15 NOTE — TELEPHONE ENCOUNTER
Due for med check/CPE and fasting lab, 2 month supplies sent, please help him to schedule       thx

## 2024-02-19 NOTE — TELEPHONE ENCOUNTER
Spoke to pt and helped schedule appointment for 3/28/24 with PCP.     Anitra Nance RN on 2/19/2024 at 2:29 PM

## 2024-03-06 DIAGNOSIS — I10 BENIGN ESSENTIAL HYPERTENSION: ICD-10-CM

## 2024-03-06 RX ORDER — LISINOPRIL 30 MG/1
TABLET ORAL
Qty: 90 TABLET | Refills: 0 | Status: SHIPPED | OUTPATIENT
Start: 2024-03-06 | End: 2024-03-28

## 2024-03-15 DIAGNOSIS — I73.9 PVD (PERIPHERAL VASCULAR DISEASE) WITH CLAUDICATION (H): ICD-10-CM

## 2024-03-18 RX ORDER — CLOPIDOGREL BISULFATE 75 MG/1
75 TABLET ORAL DAILY
Qty: 90 TABLET | Refills: 3 | Status: SHIPPED | OUTPATIENT
Start: 2024-03-18

## 2024-03-27 DIAGNOSIS — E11.40 TYPE 2 DIABETES MELLITUS WITH DIABETIC NEUROPATHY, UNSPECIFIED WHETHER LONG TERM INSULIN USE (H): ICD-10-CM

## 2024-03-27 NOTE — TELEPHONE ENCOUNTER
Spoke to patient, he is going to wait until his appointment tomorrow to discuss the medications.       Danielle Marquez RN  [facility]

## 2024-03-27 NOTE — TELEPHONE ENCOUNTER
If he is on victoza(GLP-1), he may not have benefit from Januvia(DPP-4). Please check on with him if he is on victoza      thx

## 2024-03-28 ENCOUNTER — OFFICE VISIT (OUTPATIENT)
Dept: FAMILY MEDICINE | Facility: CLINIC | Age: 64
End: 2024-03-28
Payer: COMMERCIAL

## 2024-03-28 ENCOUNTER — TELEPHONE (OUTPATIENT)
Dept: FAMILY MEDICINE | Facility: CLINIC | Age: 64
End: 2024-03-28

## 2024-03-28 VITALS
DIASTOLIC BLOOD PRESSURE: 80 MMHG | HEIGHT: 69 IN | WEIGHT: 147 LBS | TEMPERATURE: 97 F | HEART RATE: 74 BPM | SYSTOLIC BLOOD PRESSURE: 130 MMHG | OXYGEN SATURATION: 97 % | RESPIRATION RATE: 16 BRPM | BODY MASS INDEX: 21.77 KG/M2

## 2024-03-28 DIAGNOSIS — I73.9 CLAUDICATION IN PERIPHERAL VASCULAR DISEASE (H): ICD-10-CM

## 2024-03-28 DIAGNOSIS — N18.2 CHRONIC KIDNEY DISEASE, STAGE 2 (MILD): ICD-10-CM

## 2024-03-28 DIAGNOSIS — Z87.891 HISTORY OF SMOKING 25-50 PACK YEARS: ICD-10-CM

## 2024-03-28 DIAGNOSIS — Z00.00 HEALTH MAINTENANCE EXAMINATION: Primary | ICD-10-CM

## 2024-03-28 DIAGNOSIS — I73.9 PVD (PERIPHERAL VASCULAR DISEASE) WITH CLAUDICATION (H): ICD-10-CM

## 2024-03-28 DIAGNOSIS — I70.229 CRITICAL ISCHEMIA OF LOWER EXTREMITY (H): ICD-10-CM

## 2024-03-28 DIAGNOSIS — I10 BENIGN ESSENTIAL HYPERTENSION: ICD-10-CM

## 2024-03-28 DIAGNOSIS — E11.40 TYPE 2 DIABETES MELLITUS WITH DIABETIC NEUROPATHY, UNSPECIFIED WHETHER LONG TERM INSULIN USE (H): ICD-10-CM

## 2024-03-28 DIAGNOSIS — L97.522 ULCER OF LEFT FOOT, WITH FAT LAYER EXPOSED (H): ICD-10-CM

## 2024-03-28 DIAGNOSIS — Z12.5 SCREENING FOR PROSTATE CANCER: ICD-10-CM

## 2024-03-28 DIAGNOSIS — E78.5 HYPERLIPIDEMIA LDL GOAL <70: ICD-10-CM

## 2024-03-28 LAB
ERYTHROCYTE [DISTWIDTH] IN BLOOD BY AUTOMATED COUNT: 12.1 % (ref 10–15)
HBA1C MFR BLD: 11.1 % (ref 0–5.6)
HCT VFR BLD AUTO: 44.7 % (ref 40–53)
HGB BLD-MCNC: 15.2 G/DL (ref 13.3–17.7)
MCH RBC QN AUTO: 31.3 PG (ref 26.5–33)
MCHC RBC AUTO-ENTMCNC: 34 G/DL (ref 31.5–36.5)
MCV RBC AUTO: 92 FL (ref 78–100)
PLATELET # BLD AUTO: 190 10E3/UL (ref 150–450)
RBC # BLD AUTO: 4.86 10E6/UL (ref 4.4–5.9)
WBC # BLD AUTO: 9 10E3/UL (ref 4–11)

## 2024-03-28 PROCEDURE — 83036 HEMOGLOBIN GLYCOSYLATED A1C: CPT | Performed by: FAMILY MEDICINE

## 2024-03-28 PROCEDURE — 80053 COMPREHEN METABOLIC PANEL: CPT | Performed by: FAMILY MEDICINE

## 2024-03-28 PROCEDURE — G0103 PSA SCREENING: HCPCS | Performed by: FAMILY MEDICINE

## 2024-03-28 PROCEDURE — 82043 UR ALBUMIN QUANTITATIVE: CPT | Performed by: FAMILY MEDICINE

## 2024-03-28 PROCEDURE — 99207 PR FOOT EXAM NO CHARGE: CPT | Performed by: FAMILY MEDICINE

## 2024-03-28 PROCEDURE — 80061 LIPID PANEL: CPT | Performed by: FAMILY MEDICINE

## 2024-03-28 PROCEDURE — 99396 PREV VISIT EST AGE 40-64: CPT | Performed by: FAMILY MEDICINE

## 2024-03-28 PROCEDURE — 82570 ASSAY OF URINE CREATININE: CPT | Performed by: FAMILY MEDICINE

## 2024-03-28 PROCEDURE — 85027 COMPLETE CBC AUTOMATED: CPT | Performed by: FAMILY MEDICINE

## 2024-03-28 PROCEDURE — 36415 COLL VENOUS BLD VENIPUNCTURE: CPT | Performed by: FAMILY MEDICINE

## 2024-03-28 PROCEDURE — 99214 OFFICE O/P EST MOD 30 MIN: CPT | Mod: 25 | Performed by: FAMILY MEDICINE

## 2024-03-28 RX ORDER — CILOSTAZOL 50 MG/1
50 TABLET ORAL 2 TIMES DAILY
Qty: 30 TABLET | Refills: 3 | Status: SHIPPED | OUTPATIENT
Start: 2024-03-28 | End: 2024-06-28

## 2024-03-28 RX ORDER — LISINOPRIL 30 MG/1
30 TABLET ORAL DAILY
Qty: 90 TABLET | Refills: 1 | Status: SHIPPED | OUTPATIENT
Start: 2024-03-28 | End: 2024-09-05

## 2024-03-28 RX ORDER — SITAGLIPTIN 25 MG/1
TABLET, FILM COATED ORAL
Qty: 60 TABLET | Refills: 0 | OUTPATIENT
Start: 2024-03-28

## 2024-03-28 RX ORDER — ROSUVASTATIN CALCIUM 20 MG/1
20 TABLET, COATED ORAL DAILY
Qty: 90 TABLET | Refills: 3 | Status: SHIPPED | OUTPATIENT
Start: 2024-03-28

## 2024-03-28 SDOH — HEALTH STABILITY: PHYSICAL HEALTH: ON AVERAGE, HOW MANY DAYS PER WEEK DO YOU ENGAGE IN MODERATE TO STRENUOUS EXERCISE (LIKE A BRISK WALK)?: 3 DAYS

## 2024-03-28 ASSESSMENT — SOCIAL DETERMINANTS OF HEALTH (SDOH): HOW OFTEN DO YOU GET TOGETHER WITH FRIENDS OR RELATIVES?: MORE THAN THREE TIMES A WEEK

## 2024-03-28 NOTE — PROGRESS NOTES
Preventive Care Visit  [facility] DAWOOD Bedolla MD, Family Medicine  Mar 28, 2024      Assessment & Plan     Type 2 diabetes mellitus with diabetic neuropathy, unspecified whether long term insulin use (H)  Has been worsening, will review the lab and update pt  Will have him to add Jardiance, and recheck in 6 months   - HEMOGLOBIN A1C; Future  - Lipid panel reflex to direct LDL Non-fasting; Future  - empagliflozin (JARDIANCE) 10 MG TABS tablet; Take 1 tablet (10 mg) by mouth daily  - FOOT EXAM    Chronic kidney disease, stage 2 (mild)  Has been stable   Keep monitoring   - Albumin Random Urine Quantitative with Creat Ratio; Future    Health maintenance examination    - HEMOGLOBIN A1C; Future  - Lipid panel reflex to direct LDL Non-fasting; Future  - Albumin Random Urine Quantitative with Creat Ratio; Future  - CBC with platelets; Future  - Comprehensive metabolic panel (BMP + Alb, Alk Phos, ALT, AST, Total. Bili, TP); Future  - PSA, screen; Future    Screening for prostate cancer    - PSA, screen; Future    Ulcer of left foot, with fat layer exposed (H)  Stable, has no worsening sx  Will keep montoring     Critical ischemia of lower extremity (H)  Worsening with neuropathy, will have him to start cilostazol     PVD (peripheral vascular disease) with claudication (H24)  Mentioned above   - cilostazol (PLETAL) 50 MG tablet; Take 1 tablet (50 mg) by mouth 2 times daily    Benign essential hypertension  Stable   Keep monitoring   - lisinopril (ZESTRIL) 30 MG tablet; Take 1 tablet (30 mg) by mouth daily    Claudication in peripheral vascular disease (H24)    - rosuvastatin (CRESTOR) 20 MG tablet; Take 1 tablet (20 mg) by mouth daily  - cilostazol (PLETAL) 50 MG tablet; Take 1 tablet (50 mg) by mouth 2 times daily    Hyperlipidemia LDL goal <70    - rosuvastatin (CRESTOR) 20 MG tablet; Take 1 tablet (20 mg) by mouth daily    History of smoking 25-50 pack years    - CT Chest Lung Cancer Scrn Low  Dose wo; Future    Patient has been advised of split billing requirements and indicates understanding: Yes          Counseling  Appropriate preventive services were discussed with this patient, including applicable screening as appropriate for fall prevention, nutrition, physical activity, Tobacco-use cessation, weight loss and cognition.  Checklist reviewing preventive services available has been given to the patient.  Reviewed patient's diet, addressing concerns and/or questions.   He is at risk for lack of exercise and has been provided with information to increase physical activity for the benefit of his well-being.       FUTURE APPOINTMENTS:       - Follow-up visit in 6 months for diabetes     Harsh Schneider is a 64 year old, presenting for the following:  Physical        3/28/2024     1:05 PM   Additional Questions   Roomed by Rama RUBIO CMA        Via the Health Maintenance questionnaire, the patient has reported the following services have been completed -Eye Exam, this information has been sent to the abstraction team.  Health Care Directive  Patient does not have a Health Care Directive or Living Will: Discussed advance care planning with patient; however, patient declined at this time.    HPI      Diabetes Follow-up    How often are you checking your blood sugar? A few times a week  What time of day are you checking your blood sugars (select all that apply)?  Before meals  Have you had any blood sugars above 200?  No-rarely  Have you had any blood sugars below 70?  No  What symptoms do you notice when your blood sugar is low?  None  What concerns do you have today about your diabetes? None   Do you have any of these symptoms? (Select all that apply)  Numbness in feet and Burning in feet          Hyperlipidemia Follow-Up    Are you regularly taking any medication or supplement to lower your cholesterol?   Yes- rosuvastatin  Are you having muscle aches or other side effects that you think could be  caused by your cholesterol lowering medication?  Yes- sometimes feels muscle aches    Hypertension Follow-up    Do you check your blood pressure regularly outside of the clinic? No   Are you following a low salt diet? Yes  Are your blood pressures ever more than 140 on the top number (systolic) OR more   than 90 on the bottom number (diastolic), for example 140/90? N/A    BP Readings from Last 2 Encounters:   03/28/24 130/80   03/29/23 125/80     Hemoglobin A1C (%)   Date Value   03/27/2023 13.3 (H)   05/24/2022 9.1 (H)   07/10/2020 9.5 (H)   04/14/2020 11.1 (H)     LDL Cholesterol Calculated (mg/dL)   Date Value   03/27/2023 59   01/21/2022 90   07/17/2020 136 (H)   04/14/2020 90     LDL Cholesterol Direct (mg/dL)   Date Value   02/09/2022 52           3/28/2024   General Health   How would you rate your overall physical health? (!) FAIR   Feel stress (tense, anxious, or unable to sleep) Not at all         3/28/2024   Nutrition   Three or more servings of calcium each day? Yes   Diet: Regular (no restrictions)    I don't know   How many servings of fruit and vegetables per day? (!) 2-3   How many sweetened beverages each day? 0-1         3/28/2024   Exercise   Days per week of moderate/strenous exercise 3 days         3/28/2024   Social Factors   Frequency of gathering with friends or relatives More than three times a week   Worry food won't last until get money to buy more No   Food not last or not have enough money for food? No   Do you have housing?  Yes   Are you worried about losing your housing? No   Lack of transportation? No   Unable to get utilities (heat,electricity)? No          No data to display                   3/28/2024   Dental   Dentist two times every year? Yes            Today's PHQ-2 Score:       3/28/2024    12:56 PM   PHQ-2 ( 1999 [employer])   Q1: Little interest or pleasure in doing things 0   Q2: Feeling down, depressed or hopeless 0   PHQ-2 Score 0   Q1: Little interest or pleasure in doing  things Not at all   Q2: Feeling down, depressed or hopeless Not at all   PHQ-2 Score 0           3/28/2024   Substance Use   Alcohol more than 3/day or more than 7/wk Not Applicable   Do you use any other substances recreationally? No     Social History     Tobacco Use    Smoking status: Former     Packs/day: 0.50     Years: 15.00     Additional pack years: 0.00     Total pack years: 7.50     Types: Cigarettes     Quit date: 2022     Years since quittin.1    Smokeless tobacco: Never   Vaping Use    Vaping Use: Never used   Substance Use Topics    Alcohol use: No    Drug use: No           3/28/2024   STI Screening   New sexual partner(s) since last STI/HIV test? 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    eval by optho, considered be related to microvascular problem ie DM    Diabetes (H)     Influenza B 2017    Mixed hyperlipidemia 3/04    MSSA (methicillin susceptible Staphylococcus aureus) pneumonia (H) 2017    MSSA (methicillin susceptible Staphylococcus aureus) septicemia (H) 2017    PVD (peripheral vascular disease) with claudication (H24) 10/12/2015    Tobacco use disorder QUIT     smokes for stress    Type II or unspecified type diabetes mellitus with neurological manifestations, not stated as uncontrolled(250.60) (H) 2014     Past Surgical History:   Procedure Laterality Date    COLONOSCOPY  10/27/16     COLONOSCOPY N/A 10/31/2016    Procedure: COLONOSCOPY;  Surgeon: Pollo Lopez MD;  Location:  GI    HC UGI ENDOSCOPY W PLACEMENT GASTROSTOMY TUBE PERCUT N/A 4/4/2017    Procedure: COMBINED ESOPHAGOSCOPY, GASTROSCOPY, DUODENOSCOPY (EGD), PLACE PERCUTANEOUS ENDOSCOPIC GASTROSTOMY TUBE;  Surgeon: Marcial Obregon MD;  Location:  GI    IR LOWER EXTREMITY ANGIOGRAM BILATERAL  2/18/2022    IR LOWER EXTREMITY ANGIOGRAM RIGHT  7/17/2020    NO HISTORY OF SURGERY      TRACHEOSTOMY N/A 4/4/2017    Procedure: TRACHEOSTOMY;  Surgeon: Jose Puga MD;  Location:  OR     BP Readings from Last 3 Encounters:   03/28/24 130/80   03/29/23 125/80   05/24/22 (!) 170/91    Wt Readings from Last 3 Encounters:   03/28/24 66.7 kg (147 lb)   03/29/23 65.5 kg (144 lb 6.4 oz)   05/24/22 66.2 kg (146 lb)                  Patient Active Problem List   Diagnosis    External hemorrhoids    Cranial nerve III palsy    Hyperlipidemia LDL goal <100    Type 2 diabetes mellitus with diabetic neuropathy (HCC)    Low back pain    Lumbar radiculopathy    PVD (peripheral vascular disease) with claudication (H24)    Ischemic vascular disease    MSSA (methicillin susceptible Staphylococcus aureus) septicemia (H)    MSSA (methicillin susceptible Staphylococcus aureus) pneumonia (H)    Lumbago    Neck pain    Acute pain of both shoulders    Chronic kidney disease, stage 2 (mild)    Ulcer of left foot, with fat layer exposed (H)    Critical ischemia of lower extremity (H)     Past Surgical History:   Procedure Laterality Date    COLONOSCOPY  10/27/16    COLONOSCOPY N/A 10/31/2016    Procedure: COLONOSCOPY;  Surgeon: Pollo Lopez MD;  Location:  GI    HC UGI ENDOSCOPY W PLACEMENT GASTROSTOMY TUBE PERCUT N/A 4/4/2017    Procedure: COMBINED ESOPHAGOSCOPY, GASTROSCOPY, DUODENOSCOPY (EGD), PLACE PERCUTANEOUS ENDOSCOPIC GASTROSTOMY TUBE;  Surgeon: Marcial Obregon MD;  Location:  GI    IR LOWER EXTREMITY ANGIOGRAM  BILATERAL  2022    IR LOWER EXTREMITY ANGIOGRAM RIGHT  2020    NO HISTORY OF SURGERY      TRACHEOSTOMY N/A 2017    Procedure: TRACHEOSTOMY;  Surgeon: Jose Puga MD;  Location:  OR       Social History     Tobacco Use    Smoking status: Former     Packs/day: 0.50     Years: 15.00     Additional pack years: 0.00     Total pack years: 7.50     Types: Cigarettes     Quit date: 2022     Years since quittin.1    Smokeless tobacco: Never   Substance Use Topics    Alcohol use: No     Family History   Problem Relation Age of Onset    Diabetes Mother     Diabetes Father     Diabetes Sister     Diabetes Brother     Hypertension No family hx of     Cerebrovascular Disease No family hx of     Prostate Cancer No family hx of     Cancer - colorectal No family hx of     Breast Cancer No family hx of          Current Outpatient Medications   Medication Sig Dispense Refill    ASPIRIN 81 MG OR TABS 1 tab per day 100 3    BD ULTRA-FINE 29G X 12.7MM insulin pen needle USE AS DIRECTED 100 each 0    blood glucose (NO BRAND SPECIFIED) test strip Use to test blood sugar 1 times daily or as directed. To accompany: Blood Glucose Monitor Brands: ACCU-CHEK SANDRA 100 strip 6    cilostazol (PLETAL) 50 MG tablet Take 1 tablet (50 mg) by mouth 2 times daily 30 tablet 3    clopidogrel (PLAVIX) 75 MG tablet Take 1 tablet (75 mg) by mouth daily 90 tablet 3    Continuous Blood Gluc Sensor (FREESTYLE VARSHA 14 DAY SENSOR) MISC CHANGE EVERY 14 DAYS      empagliflozin (JARDIANCE) 10 MG TABS tablet Take 1 tablet (10 mg) by mouth daily 90 tablet 1    gabapentin (NEURONTIN) 300 MG capsule TAKE ONE (1) CAPSULE (300 MG) BY MOUTH AT BEDTIME 90 capsule 1    insulin glargine (LANTUS SOLOSTAR) 100 UNIT/ML pen HE INJECTS 22 UNITS IN THE MORNING, AND 20 UNITS IN THE EVENING SUBCUTANEOUSLY ONCE DAILY IN EVENING (HAS NOT: INCREASE DOSE BY FOUR (4) UNITS EVERY THREE (3) DAYS UNTIL FASTING BG IS . IF OVER 40 UNITS, SPLIT DOSE  INTO TWO ADMINISTRATIONS DAILY) 45 mL 11    liraglutide (VICTOZA PEN) 18 MG/3ML solution INJECT 1.2 MG UNDER THE SKIN  DAILY 18 mL 1    lisinopril (ZESTRIL) 30 MG tablet Take 1 tablet (30 mg) by mouth daily 90 tablet 1    rosuvastatin (CRESTOR) 20 MG tablet Take 1 tablet (20 mg) by mouth daily 90 tablet 3    thin (NO BRAND SPECIFIED) lancets Use with lanceting device. To accompany: Blood Glucose Monitor Brands: ACCU-CHEK SANDRA 100 each 3     Allergies   Allergen Reactions    No Known Drug Allergy      Recent Labs   Lab Test 03/27/23  0834 05/24/22  1545 02/09/22  1059 01/28/22  1235 01/21/22  0946 12/24/21  0855 08/27/21  0811 07/17/20  1150 07/14/20  1700 07/10/20  1213 04/14/20  1043 12/14/18  1417 08/14/18  1141 01/17/18  0829 05/22/17  1355   A1C 13.3* 9.1*  --   --  9.3*  --  8.9*   < >  --    < > 11.1* 9.4*   < > 10.6*  --    LDL 59  --  52  --  90  --  140*   < >  --   --  90  --   --  103*  --    HDL 29*  --   --   --  33*  --  41   < >  --   --  45  --   --  49  --    TRIG 232*  --   --   --  83  --  73   < >  --   --  71  --   --  96  --    ALT  --   --   --   --   --   --   --   --   --   --   --  50  --  25 28   CR 1.00 0.85 1.16   < >  --    < > 0.96  --   --   --  0.90 0.75  --  0.77 0.78   GFRESTIMATED 86 >90 73   < >  --    < > 86  --  77  --  >90 >90   < > >90 >90  Non African American GFR Calc     GFRESTBLACK  --   --   --   --   --   --   --   --  >90  --  >90 >90   < > >90 >90  African American GFR Calc     POTASSIUM 4.4 4.3 4.6  --   --    < > 3.8  --   --   --  4.6 4.3  --  4.2 4.0   TSH  --   --   --   --   --   --   --   --   --   --  1.30  --   --   --   --     < > = values in this interval not displayed.          Review of Systems  Constitutional, HEENT, cardiovascular, pulmonary, GI, , musculoskeletal, neuro, skin, endocrine and psych systems are negative, except as otherwise noted.     Objective    Exam  /80   Pulse 74   Temp 97  F (36.1  C) (Tympanic)   Resp 16   Ht 1.74 m  "(5' 8.5\")   Wt 66.7 kg (147 lb)   SpO2 97%   BMI 22.03 kg/m     Estimated body mass index is 22.03 kg/m  as calculated from the following:    Height as of this encounter: 1.74 m (5' 8.5\").    Weight as of this encounter: 66.7 kg (147 lb).    Physical Exam  GENERAL: alert and no distress  EYES: Eyes grossly normal to inspection, PERRL and conjunctivae and sclerae normal  HENT: ear canals and TM's normal, nose and mouth without ulcers or lesions  NECK: no adenopathy, no asymmetry, masses, or scars  RESP: lungs clear to auscultation - no rales, rhonchi or wheezes  CV: regular rate and rhythm, normal S1 S2, no S3 or S4, no murmur, click or rub, no peripheral edema  ABDOMEN: soft, nontender, no hepatosplenomegaly, no masses and bowel sounds normal  MS: no gross musculoskeletal defects noted, no edema  SKIN: no suspicious lesions or rashes  NEURO: Normal strength and tone, mentation intact and speech normal  Diabetic foot exam: normal DP and PT pulses, no trophic changes or ulcerative lesions, and normal sensory exam        Signed Electronically by: Shaun Bedolla MD    "

## 2024-03-28 NOTE — TELEPHONE ENCOUNTER
Disability Parking form completed by Dr. Bedolla, copy was made and given to pt at the time of the appt. Form sent to abstraction.    Nga De Leon,  [facility]

## 2024-03-28 NOTE — TELEPHONE ENCOUNTER
Reason for Call:  Form, our goal is to have forms completed with 72 hours, however, some forms may require a visit or additional information.    Type of letter, form or note:  handicap    Who is the form from?: Patient    Where did the form come from: Patient or family brought in       What clinic location was the form placed at?: [facility]    Where the form was placed: Given to physician    What number is listed as a contact on the form?:   [place] Dept of Public Safety       Additional comments:   Completed during OV on 3/28/2024 with Dr. Graeme De Leon,  [facility]

## 2024-03-29 LAB
ALBUMIN SERPL BCG-MCNC: 3.9 G/DL (ref 3.5–5.2)
ALP SERPL-CCNC: 68 U/L (ref 40–150)
ALT SERPL W P-5'-P-CCNC: 20 U/L (ref 0–70)
ANION GAP SERPL CALCULATED.3IONS-SCNC: 10 MMOL/L (ref 7–15)
AST SERPL W P-5'-P-CCNC: 18 U/L (ref 0–45)
BILIRUB SERPL-MCNC: 0.2 MG/DL
BUN SERPL-MCNC: 18.6 MG/DL (ref 8–23)
CALCIUM SERPL-MCNC: 8.8 MG/DL (ref 8.8–10.2)
CHLORIDE SERPL-SCNC: 105 MMOL/L (ref 98–107)
CHOLEST SERPL-MCNC: 96 MG/DL
CREAT SERPL-MCNC: 0.99 MG/DL (ref 0.67–1.17)
CREAT UR-MCNC: 159 MG/DL
DEPRECATED HCO3 PLAS-SCNC: 23 MMOL/L (ref 22–29)
EGFRCR SERPLBLD CKD-EPI 2021: 86 ML/MIN/1.73M2
FASTING STATUS PATIENT QL REPORTED: YES
GLUCOSE SERPL-MCNC: 174 MG/DL (ref 70–99)
HDLC SERPL-MCNC: 42 MG/DL
LDLC SERPL CALC-MCNC: 44 MG/DL
MICROALBUMIN UR-MCNC: 1039 MG/L
MICROALBUMIN/CREAT UR: 653.46 MG/G CR (ref 0–17)
NONHDLC SERPL-MCNC: 54 MG/DL
POTASSIUM SERPL-SCNC: 4.5 MMOL/L (ref 3.4–5.3)
PROT SERPL-MCNC: 6.9 G/DL (ref 6.4–8.3)
PSA SERPL DL<=0.01 NG/ML-MCNC: 1.02 NG/ML (ref 0–4.5)
SODIUM SERPL-SCNC: 138 MMOL/L (ref 135–145)
TRIGL SERPL-MCNC: 48 MG/DL

## 2024-04-18 DIAGNOSIS — I70.229 CRITICAL ISCHEMIA OF LOWER EXTREMITY (H): ICD-10-CM

## 2024-04-19 RX ORDER — GABAPENTIN 300 MG/1
CAPSULE ORAL
Qty: 90 CAPSULE | Refills: 1 | Status: SHIPPED | OUTPATIENT
Start: 2024-04-19 | End: 2024-09-16

## 2024-06-08 DIAGNOSIS — Z79.4 TYPE 2 DIABETES MELLITUS WITH DIABETIC NEUROPATHY, WITH LONG-TERM CURRENT USE OF INSULIN (H): ICD-10-CM

## 2024-06-08 DIAGNOSIS — E11.40 TYPE 2 DIABETES MELLITUS WITH DIABETIC NEUROPATHY, WITH LONG-TERM CURRENT USE OF INSULIN (H): ICD-10-CM

## 2024-06-08 DIAGNOSIS — E11.42 DIABETIC POLYNEUROPATHY ASSOCIATED WITH TYPE 2 DIABETES MELLITUS (H): ICD-10-CM

## 2024-06-10 NOTE — TELEPHONE ENCOUNTER
Prescription approved per [facility] Refill Protocol.  Shirlene Santos RN  [facility]

## 2024-06-27 DIAGNOSIS — I73.9 PVD (PERIPHERAL VASCULAR DISEASE) WITH CLAUDICATION (H): ICD-10-CM

## 2024-06-27 DIAGNOSIS — I73.9 CLAUDICATION IN PERIPHERAL VASCULAR DISEASE (H): ICD-10-CM

## 2024-06-28 RX ORDER — CILOSTAZOL 50 MG/1
TABLET ORAL
Qty: 30 TABLET | Refills: 3 | Status: SHIPPED | OUTPATIENT
Start: 2024-06-28 | End: 2024-10-01

## 2024-08-11 ENCOUNTER — HEALTH MAINTENANCE LETTER (OUTPATIENT)
Age: 64
End: 2024-08-11

## 2024-08-19 DIAGNOSIS — E11.40 TYPE 2 DIABETES MELLITUS WITH DIABETIC NEUROPATHY, UNSPECIFIED WHETHER LONG TERM INSULIN USE (H): ICD-10-CM

## 2024-08-20 RX ORDER — EMPAGLIFLOZIN 10 MG/1
TABLET, FILM COATED ORAL
Qty: 90 TABLET | Refills: 1 | Status: SHIPPED | OUTPATIENT
Start: 2024-08-20

## 2024-09-05 DIAGNOSIS — I10 BENIGN ESSENTIAL HYPERTENSION: ICD-10-CM

## 2024-09-05 RX ORDER — LISINOPRIL 30 MG/1
TABLET ORAL
Qty: 90 TABLET | Refills: 0 | Status: SHIPPED | OUTPATIENT
Start: 2024-09-05

## 2024-09-16 DIAGNOSIS — I70.229 CRITICAL ISCHEMIA OF LOWER EXTREMITY (H): ICD-10-CM

## 2024-09-16 RX ORDER — GABAPENTIN 300 MG/1
CAPSULE ORAL
Qty: 90 CAPSULE | Refills: 1 | Status: SHIPPED | OUTPATIENT
Start: 2024-09-16

## 2024-09-17 ENCOUNTER — TELEPHONE (OUTPATIENT)
Dept: FAMILY MEDICINE | Facility: CLINIC | Age: 64
End: 2024-09-17
Payer: COMMERCIAL

## 2024-09-17 DIAGNOSIS — E11.40 TYPE 2 DIABETES MELLITUS WITH DIABETIC NEUROPATHY, UNSPECIFIED WHETHER LONG TERM INSULIN USE (H): Chronic | ICD-10-CM

## 2024-09-17 RX ORDER — LIRAGLUTIDE 6 MG/ML
INJECTION SUBCUTANEOUS
Qty: 18 ML | Refills: 0 | Status: SHIPPED | OUTPATIENT
Start: 2024-09-17

## 2024-09-17 NOTE — TELEPHONE ENCOUNTER
Medication Question or Refill    Contacts       Contact Date/Time Type Contact Phone/Fax    09/17/2024 12:28 PM CDT Phone (Incoming) Wyatt Mahajan (Self) [phone] (M)     pt says he needs a refill on Jardiance        What medication are you calling about (include dose and sig)?:   JARDIANCE 10 MG TABS tablet 90 tablet     Preferred Pharmacy:     [address] [address]  Phone: [phone] Fax: [phone]    Who prescribed the medication?: Bedolla    Do you need a refill? Yes    When did you use the medication last? na    Patient offered an appointment? Yes: Nov 1, 2024    Do you have any questions or concerns?  No      Could we send this information to you in [facility] or would you prefer to receive a phone call?:   Patient would prefer a phone call   Okay to leave a detailed message?: Yes at Cell number on file:    Telephone Information:   Mobile [phone]

## 2024-10-23 ENCOUNTER — TRANSFERRED RECORDS (OUTPATIENT)
Dept: HEALTH INFORMATION MANAGEMENT | Facility: CLINIC | Age: 64
End: 2024-10-23
Payer: COMMERCIAL

## 2024-10-23 LAB — RETINOPATHY: POSITIVE

## 2024-11-01 ENCOUNTER — OFFICE VISIT (OUTPATIENT)
Dept: FAMILY MEDICINE | Facility: CLINIC | Age: 64
End: 2024-11-01
Payer: COMMERCIAL

## 2024-11-01 VITALS
OXYGEN SATURATION: 100 % | TEMPERATURE: 98 F | DIASTOLIC BLOOD PRESSURE: 65 MMHG | BODY MASS INDEX: 22.1 KG/M2 | WEIGHT: 145.8 LBS | HEART RATE: 79 BPM | RESPIRATION RATE: 16 BRPM | SYSTOLIC BLOOD PRESSURE: 89 MMHG | HEIGHT: 68 IN

## 2024-11-01 DIAGNOSIS — E11.40 TYPE 2 DIABETES MELLITUS WITH DIABETIC NEUROPATHY, UNSPECIFIED WHETHER LONG TERM INSULIN USE (H): Primary | ICD-10-CM

## 2024-11-01 DIAGNOSIS — I10 BENIGN ESSENTIAL HYPERTENSION: ICD-10-CM

## 2024-11-01 DIAGNOSIS — E11.42 DIABETIC POLYNEUROPATHY ASSOCIATED WITH TYPE 2 DIABETES MELLITUS (H): ICD-10-CM

## 2024-11-01 DIAGNOSIS — I73.9 PVD (PERIPHERAL VASCULAR DISEASE) WITH CLAUDICATION (H): ICD-10-CM

## 2024-11-01 LAB
ALBUMIN SERPL BCG-MCNC: 4.2 G/DL (ref 3.5–5.2)
ALP SERPL-CCNC: 76 U/L (ref 40–150)
ALT SERPL W P-5'-P-CCNC: 31 U/L (ref 0–70)
ANION GAP SERPL CALCULATED.3IONS-SCNC: 8 MMOL/L (ref 7–15)
AST SERPL W P-5'-P-CCNC: 25 U/L (ref 0–45)
BILIRUB SERPL-MCNC: 0.3 MG/DL
BUN SERPL-MCNC: 19.9 MG/DL (ref 8–23)
CALCIUM SERPL-MCNC: 9.4 MG/DL (ref 8.8–10.4)
CHLORIDE SERPL-SCNC: 105 MMOL/L (ref 98–107)
CHOLEST SERPL-MCNC: 150 MG/DL
CREAT SERPL-MCNC: 1.01 MG/DL (ref 0.67–1.17)
EGFRCR SERPLBLD CKD-EPI 2021: 84 ML/MIN/1.73M2
EST. AVERAGE GLUCOSE BLD GHB EST-MCNC: 226 MG/DL
FASTING STATUS PATIENT QL REPORTED: YES
FASTING STATUS PATIENT QL REPORTED: YES
GLUCOSE SERPL-MCNC: 165 MG/DL (ref 70–99)
HBA1C MFR BLD: 9.5 % (ref 0–5.6)
HCO3 SERPL-SCNC: 27 MMOL/L (ref 22–29)
HDLC SERPL-MCNC: 46 MG/DL
LDLC SERPL CALC-MCNC: 91 MG/DL
NONHDLC SERPL-MCNC: 104 MG/DL
POTASSIUM SERPL-SCNC: 4.6 MMOL/L (ref 3.4–5.3)
PROT SERPL-MCNC: 7.3 G/DL (ref 6.4–8.3)
SODIUM SERPL-SCNC: 140 MMOL/L (ref 135–145)
TRIGL SERPL-MCNC: 67 MG/DL

## 2024-11-01 PROCEDURE — 90677 PCV20 VACCINE IM: CPT | Performed by: FAMILY MEDICINE

## 2024-11-01 PROCEDURE — 99207 PR FOOT EXAM NO CHARGE: CPT | Mod: 25 | Performed by: FAMILY MEDICINE

## 2024-11-01 PROCEDURE — 80053 COMPREHEN METABOLIC PANEL: CPT | Performed by: FAMILY MEDICINE

## 2024-11-01 PROCEDURE — 99214 OFFICE O/P EST MOD 30 MIN: CPT | Mod: 25 | Performed by: FAMILY MEDICINE

## 2024-11-01 PROCEDURE — 90471 IMMUNIZATION ADMIN: CPT | Performed by: FAMILY MEDICINE

## 2024-11-01 PROCEDURE — 83036 HEMOGLOBIN GLYCOSYLATED A1C: CPT | Performed by: FAMILY MEDICINE

## 2024-11-01 PROCEDURE — 36415 COLL VENOUS BLD VENIPUNCTURE: CPT | Performed by: FAMILY MEDICINE

## 2024-11-01 PROCEDURE — 80061 LIPID PANEL: CPT | Performed by: FAMILY MEDICINE

## 2024-11-01 RX ORDER — LIRAGLUTIDE 6 MG/ML
1.2 INJECTION SUBCUTANEOUS DAILY
Qty: 18 ML | Refills: 1 | Status: SHIPPED | OUTPATIENT
Start: 2024-11-01

## 2024-11-01 RX ORDER — SITAGLIPTIN 25 MG/1
TABLET, FILM COATED ORAL
COMMUNITY
Start: 2024-04-08 | End: 2024-11-01

## 2024-11-01 RX ORDER — LISINOPRIL 20 MG/1
20 TABLET ORAL DAILY
Qty: 90 TABLET | Refills: 1 | Status: SHIPPED | OUTPATIENT
Start: 2024-11-01

## 2024-11-01 ASSESSMENT — PAIN SCALES - GENERAL: PAINLEVEL_OUTOF10: NO PAIN (0)

## 2024-11-01 NOTE — PROGRESS NOTES
Assessment & Plan     Type 2 diabetes mellitus with diabetic neuropathy, unspecified whether long term insulin use (H)  Has been improving, will review the lab and update pt with considering adjustment of SGLT-2  - HEMOGLOBIN A1C; Future  - Comprehensive metabolic panel (BMP + Alb, Alk Phos, ALT, AST, Total. Bili, TP); Future  - Lipid panel reflex to direct LDL Fasting; Future  - lisinopril (ZESTRIL) 20 MG tablet; Take 1 tablet (20 mg) by mouth daily.  - liraglutide (VICTOZA PEN) 18 MG/3ML solution; Inject 1.2 mg subcutaneously daily.    PVD (peripheral vascular disease) with claudication (H)  Has been fluctuating, will have him to continue cilostazol and gabapentin with close monitoring   - HEMOGLOBIN A1C; Future  - Comprehensive metabolic panel (BMP + Alb, Alk Phos, ALT, AST, Total. Bili, TP); Future  - Lipid panel reflex to direct LDL Fasting; Future    Benign essential hypertension  Has been running low with lightheaded and dizziness, has no sign concerning CHF or cardiopulmonic instability, will have him to cut the dose of lisinopril to 20mg and recheck in 6 months   - HEMOGLOBIN A1C; Future  - Comprehensive metabolic panel (BMP + Alb, Alk Phos, ALT, AST, Total. Bili, TP); Future  - Lipid panel reflex to direct LDL Fasting; Future  - lisinopril (ZESTRIL) 20 MG tablet; Take 1 tablet (20 mg) by mouth daily.    Diabetic polyneuropathy associated with type 2 diabetes mellitus (H)  stable  - HEMOGLOBIN A1C; Future  - Comprehensive metabolic panel (BMP + Alb, Alk Phos, ALT, AST, Total. Bili, TP); Future  - Lipid panel reflex to direct LDL Fasting; Future          Nicotine/Tobacco Cessation  He reports that he has been smoking cigarettes. He started smoking about 17 years ago. He has a 7.5 pack-year smoking history. He has never used smokeless tobacco.  Nicotine/Tobacco Cessation Plan  Information offered: Patient not interested at this time        FUTURE APPOINTMENTS:       - Follow-up visit in 6 months      Harsh Schneider is a 64 year old, presenting for the following health issues:  Medication Follow-up (Diabetes, lipids, and cholesterol. No other concerns today. )        11/1/2024     8:48 AM   Additional Questions   Roomed by Yareli FRANKS     History of Present Illness       Diabetes:   He presents for follow up of diabetes.  He is checking home blood glucose a few times a month.   He checks blood glucose before meals.  Blood glucose is never over 200 and never under 70. He is aware of hypoglycemia symptoms including weakness.    He has no concerns regarding his diabetes at this time.  He is having weight loss.            Hyperlipidemia:  He presents for follow up of hyperlipidemia.   He is taking medication to lower cholesterol. He is having myalgia or other side effects to statin medications.    Hypertension: He presents for follow up of hypertension.  He does not check blood pressure  regularly outside of the clinic. Outpatient blood pressures have not been over 140/90. He follows a low salt diet.     He eats 2-3 servings of fruits and vegetables daily.He consumes 1 sweetened beverage(s) daily.He exercises with enough effort to increase his heart rate 9 or less minutes per day.  He exercises with enough effort to increase his heart rate 4 days per week.   He is taking medications regularly.           Medication Followup of ALL  Taking Medication as prescribed: yes  Side Effects:  None  Medication Helping Symptoms:  yes  Diabetes Follow-up    How often are you checking your blood sugar? One time daily  What time of day are you checking your blood sugars (select all that apply)?  Before meals  Have you had any blood sugars above 200?  No  Have you had any blood sugars below 70?  No  What symptoms do you notice when your blood sugar is low?  None  What concerns do you have today about your diabetes? None   Do you have any of these symptoms? (Select all that apply)  Numbness in feet          Hyperlipidemia  "Follow-Up    Are you regularly taking any medication or supplement to lower your cholesterol?   Yes- statin  Are you having muscle aches or other side effects that you think could be caused by your cholesterol lowering medication?  No    Hypertension Follow-up    Do you check your blood pressure regularly outside of the clinic? Yes   Are you following a low salt diet? Yes  Are your blood pressures ever more than 140 on the top number (systolic) OR more   than 90 on the bottom number (diastolic), for example 140/90? Yes    BP Readings from Last 2 Encounters:   11/01/24 (!) 89/65   03/28/24 130/80     Hemoglobin A1C (%)   Date Value   03/28/2024 11.1 (H)   03/27/2023 13.3 (H)   07/10/2020 9.5 (H)   04/14/2020 11.1 (H)     LDL Cholesterol Calculated (mg/dL)   Date Value   03/28/2024 44   03/27/2023 59   07/17/2020 136 (H)   04/14/2020 90     LDL Cholesterol Direct (mg/dL)   Date Value   02/09/2022 52         Vascular Disease Follow-up    How often do you take nitroglycerin? Never  Do you take an aspirin every day? Yes    Review of Systems  Constitutional, HEENT, cardiovascular, pulmonary, GI, , musculoskeletal, neuro, skin, endocrine and psych systems are negative, except as otherwise noted.      Objective    BP (!) 89/65   Pulse 79   Temp 98  F (36.7  C) (Temporal)   Resp 16   Ht 1.73 m (5' 8.11\")   Wt 66.1 kg (145 lb 12.8 oz)   SpO2 100%   BMI 22.10 kg/m    Body mass index is 22.1 kg/m .  Physical Exam   GENERAL: alert and no distress  EYES: Eyes grossly normal to inspection, PERRL and conjunctivae and sclerae normal  HENT: ear canals and TM's normal, nose and mouth without ulcers or lesions  NECK: no adenopathy, no asymmetry, masses, or scars  RESP: lungs clear to auscultation - no rales, rhonchi or wheezes  CV: regular rate and rhythm, normal S1 S2, no S3 or S4, no murmur, click or rub, no peripheral edema  ABDOMEN: soft, nontender, no hepatosplenomegaly, no masses and bowel sounds normal  MS: no gross " musculoskeletal defects noted, no edema  SKIN: no suspicious lesions or rashes  NEURO: Normal strength and tone, mentation intact and speech normal  Diabetic foot exam: normal DP and PT pulses, no trophic changes or ulcerative lesions, and normal sensory exam            Signed Electronically by: Shaun Bedolla MD

## 2024-11-16 DIAGNOSIS — I70.229 CRITICAL ISCHEMIA OF LOWER EXTREMITY (H): ICD-10-CM

## 2024-11-17 RX ORDER — GABAPENTIN 300 MG/1
CAPSULE ORAL
Qty: 90 CAPSULE | Refills: 1 | Status: SHIPPED | OUTPATIENT
Start: 2024-11-17

## 2024-11-23 DIAGNOSIS — E78.5 HYPERLIPIDEMIA LDL GOAL <70: ICD-10-CM

## 2024-11-23 DIAGNOSIS — I73.9 CLAUDICATION IN PERIPHERAL VASCULAR DISEASE (H): ICD-10-CM

## 2024-11-25 RX ORDER — ROSUVASTATIN CALCIUM 20 MG/1
TABLET, COATED ORAL
Qty: 90 TABLET | Refills: 3 | Status: SHIPPED | OUTPATIENT
Start: 2024-11-25

## 2024-12-24 DIAGNOSIS — I73.9 PVD (PERIPHERAL VASCULAR DISEASE) WITH CLAUDICATION (H): ICD-10-CM

## 2024-12-26 RX ORDER — CLOPIDOGREL BISULFATE 75 MG/1
TABLET ORAL
Qty: 90 TABLET | Refills: 3 | OUTPATIENT
Start: 2024-12-26

## 2025-01-27 DIAGNOSIS — I73.9 PVD (PERIPHERAL VASCULAR DISEASE) WITH CLAUDICATION: ICD-10-CM

## 2025-01-27 DIAGNOSIS — I73.9 CLAUDICATION IN PERIPHERAL VASCULAR DISEASE: ICD-10-CM

## 2025-01-28 RX ORDER — CILOSTAZOL 50 MG/1
TABLET ORAL
Qty: 30 TABLET | Refills: 2 | Status: SHIPPED | OUTPATIENT
Start: 2025-01-28

## 2025-02-12 DIAGNOSIS — I10 BENIGN ESSENTIAL HYPERTENSION: ICD-10-CM

## 2025-02-12 DIAGNOSIS — Z79.4 TYPE 2 DIABETES MELLITUS WITH DIABETIC NEUROPATHY, WITH LONG-TERM CURRENT USE OF INSULIN (H): ICD-10-CM

## 2025-02-12 DIAGNOSIS — E11.40 TYPE 2 DIABETES MELLITUS WITH DIABETIC NEUROPATHY, WITH LONG-TERM CURRENT USE OF INSULIN (H): ICD-10-CM

## 2025-02-12 DIAGNOSIS — E11.42 DIABETIC POLYNEUROPATHY ASSOCIATED WITH TYPE 2 DIABETES MELLITUS (H): ICD-10-CM

## 2025-02-13 RX ORDER — LISINOPRIL 30 MG/1
TABLET ORAL
Qty: 90 TABLET | Refills: 3 | Status: SHIPPED | OUTPATIENT
Start: 2025-02-13

## 2025-02-13 RX ORDER — INSULIN GLARGINE 100 [IU]/ML
INJECTION, SOLUTION SUBCUTANEOUS
Qty: 45 ML | Refills: 11 | Status: SHIPPED | OUTPATIENT
Start: 2025-02-13

## 2025-02-23 ENCOUNTER — HEALTH MAINTENANCE LETTER (OUTPATIENT)
Age: 65
End: 2025-02-23

## 2025-02-26 ENCOUNTER — PATIENT OUTREACH (OUTPATIENT)
Dept: CARE COORDINATION | Facility: CLINIC | Age: 65
End: 2025-02-26
Payer: COMMERCIAL

## 2025-03-12 ENCOUNTER — PATIENT OUTREACH (OUTPATIENT)
Dept: CARE COORDINATION | Facility: CLINIC | Age: 65
End: 2025-03-12
Payer: COMMERCIAL

## 2025-03-27 DIAGNOSIS — I73.9 CLAUDICATION IN PERIPHERAL VASCULAR DISEASE: ICD-10-CM

## 2025-03-27 DIAGNOSIS — I73.9 PVD (PERIPHERAL VASCULAR DISEASE) WITH CLAUDICATION: ICD-10-CM

## 2025-03-27 RX ORDER — CILOSTAZOL 50 MG/1
50 TABLET ORAL 2 TIMES DAILY
Qty: 180 TABLET | Refills: 1 | Status: SHIPPED | OUTPATIENT
Start: 2025-03-27

## 2025-05-04 ENCOUNTER — HEALTH MAINTENANCE LETTER (OUTPATIENT)
Age: 65
End: 2025-05-04

## 2025-05-19 ENCOUNTER — OFFICE VISIT (OUTPATIENT)
Dept: FAMILY MEDICINE | Facility: CLINIC | Age: 65
End: 2025-05-19

## 2025-05-19 ENCOUNTER — RESULTS FOLLOW-UP (OUTPATIENT)
Dept: FAMILY MEDICINE | Facility: CLINIC | Age: 65
End: 2025-05-19

## 2025-05-19 VITALS
SYSTOLIC BLOOD PRESSURE: 100 MMHG | BODY MASS INDEX: 23.1 KG/M2 | WEIGHT: 152.4 LBS | OXYGEN SATURATION: 99 % | HEART RATE: 65 BPM | RESPIRATION RATE: 16 BRPM | HEIGHT: 68 IN | TEMPERATURE: 97 F | DIASTOLIC BLOOD PRESSURE: 67 MMHG

## 2025-05-19 DIAGNOSIS — N32.81 OAB (OVERACTIVE BLADDER): ICD-10-CM

## 2025-05-19 DIAGNOSIS — E11.42 DIABETIC POLYNEUROPATHY ASSOCIATED WITH TYPE 2 DIABETES MELLITUS (H): ICD-10-CM

## 2025-05-19 DIAGNOSIS — E11.40 TYPE 2 DIABETES MELLITUS WITH DIABETIC NEUROPATHY, WITH LONG-TERM CURRENT USE OF INSULIN (H): ICD-10-CM

## 2025-05-19 DIAGNOSIS — I10 BENIGN ESSENTIAL HYPERTENSION: ICD-10-CM

## 2025-05-19 DIAGNOSIS — I73.9 PVD (PERIPHERAL VASCULAR DISEASE) WITH CLAUDICATION: ICD-10-CM

## 2025-05-19 DIAGNOSIS — Z00.00 HEALTH MAINTENANCE EXAMINATION: Primary | ICD-10-CM

## 2025-05-19 DIAGNOSIS — L97.522 ULCER OF LEFT FOOT WITH FAT LAYER EXPOSED (H): ICD-10-CM

## 2025-05-19 DIAGNOSIS — E11.40 TYPE 2 DIABETES MELLITUS WITH DIABETIC NEUROPATHY, UNSPECIFIED WHETHER LONG TERM INSULIN USE (H): ICD-10-CM

## 2025-05-19 DIAGNOSIS — Z12.5 SCREENING FOR PROSTATE CANCER: ICD-10-CM

## 2025-05-19 DIAGNOSIS — Z79.4 TYPE 2 DIABETES MELLITUS WITH DIABETIC NEUROPATHY, WITH LONG-TERM CURRENT USE OF INSULIN (H): ICD-10-CM

## 2025-05-19 DIAGNOSIS — I73.9 CLAUDICATION IN PERIPHERAL VASCULAR DISEASE: ICD-10-CM

## 2025-05-19 DIAGNOSIS — N18.2 CHRONIC KIDNEY DISEASE, STAGE 2 (MILD): ICD-10-CM

## 2025-05-19 DIAGNOSIS — E78.5 HYPERLIPIDEMIA LDL GOAL <70: ICD-10-CM

## 2025-05-19 DIAGNOSIS — I70.229 CRITICAL ISCHEMIA OF LOWER EXTREMITY (H): ICD-10-CM

## 2025-05-19 LAB
ALBUMIN SERPL BCG-MCNC: 3.8 G/DL (ref 3.5–5.2)
ALP SERPL-CCNC: 82 U/L (ref 40–150)
ALT SERPL W P-5'-P-CCNC: 21 U/L (ref 0–70)
ANION GAP SERPL CALCULATED.3IONS-SCNC: 12 MMOL/L (ref 7–15)
AST SERPL W P-5'-P-CCNC: 22 U/L (ref 0–45)
BILIRUB SERPL-MCNC: 0.2 MG/DL
BUN SERPL-MCNC: 18.7 MG/DL (ref 8–23)
CALCIUM SERPL-MCNC: 9.1 MG/DL (ref 8.8–10.4)
CHLORIDE SERPL-SCNC: 102 MMOL/L (ref 98–107)
CREAT SERPL-MCNC: 0.91 MG/DL (ref 0.67–1.17)
CREAT UR-MCNC: 69.2 MG/DL
EGFRCR SERPLBLD CKD-EPI 2021: >90 ML/MIN/1.73M2
EST. AVERAGE GLUCOSE BLD GHB EST-MCNC: 266 MG/DL
GLUCOSE SERPL-MCNC: 350 MG/DL (ref 70–99)
HBA1C MFR BLD: 10.9 % (ref 0–5.6)
HCO3 SERPL-SCNC: 23 MMOL/L (ref 22–29)
LDLC SERPL DIRECT ASSAY-MCNC: 122 MG/DL
MICROALBUMIN UR-MCNC: 800 MG/L
MICROALBUMIN/CREAT UR: 1156.07 MG/G CR (ref 0–17)
POTASSIUM SERPL-SCNC: 4.4 MMOL/L (ref 3.4–5.3)
PROT SERPL-MCNC: 6.8 G/DL (ref 6.4–8.3)
PSA SERPL DL<=0.01 NG/ML-MCNC: 0.95 NG/ML (ref 0–4.5)
SODIUM SERPL-SCNC: 137 MMOL/L (ref 135–145)

## 2025-05-19 PROCEDURE — 99207 PR FOOT EXAM NO CHARGE: CPT | Performed by: FAMILY MEDICINE

## 2025-05-19 PROCEDURE — 83036 HEMOGLOBIN GLYCOSYLATED A1C: CPT | Performed by: FAMILY MEDICINE

## 2025-05-19 PROCEDURE — 82570 ASSAY OF URINE CREATININE: CPT | Performed by: FAMILY MEDICINE

## 2025-05-19 PROCEDURE — 90471 IMMUNIZATION ADMIN: CPT | Performed by: FAMILY MEDICINE

## 2025-05-19 PROCEDURE — 99396 PREV VISIT EST AGE 40-64: CPT | Mod: 25 | Performed by: FAMILY MEDICINE

## 2025-05-19 PROCEDURE — 80053 COMPREHEN METABOLIC PANEL: CPT | Performed by: FAMILY MEDICINE

## 2025-05-19 PROCEDURE — 83721 ASSAY OF BLOOD LIPOPROTEIN: CPT | Performed by: FAMILY MEDICINE

## 2025-05-19 PROCEDURE — 99214 OFFICE O/P EST MOD 30 MIN: CPT | Mod: 25 | Performed by: FAMILY MEDICINE

## 2025-05-19 PROCEDURE — G0103 PSA SCREENING: HCPCS | Performed by: FAMILY MEDICINE

## 2025-05-19 PROCEDURE — 90715 TDAP VACCINE 7 YRS/> IM: CPT | Performed by: FAMILY MEDICINE

## 2025-05-19 PROCEDURE — 82043 UR ALBUMIN QUANTITATIVE: CPT | Performed by: FAMILY MEDICINE

## 2025-05-19 PROCEDURE — 36415 COLL VENOUS BLD VENIPUNCTURE: CPT | Performed by: FAMILY MEDICINE

## 2025-05-19 RX ORDER — GABAPENTIN 300 MG/1
300 CAPSULE ORAL AT BEDTIME
Qty: 90 CAPSULE | Refills: 3 | Status: SHIPPED | OUTPATIENT
Start: 2025-05-19

## 2025-05-19 RX ORDER — TOLTERODINE 2 MG/1
2 CAPSULE, EXTENDED RELEASE ORAL DAILY
Qty: 90 CAPSULE | Refills: 1 | Status: SHIPPED | OUTPATIENT
Start: 2025-05-19

## 2025-05-19 RX ORDER — LIRAGLUTIDE 6 MG/ML
1.8 INJECTION SUBCUTANEOUS DAILY
Qty: 27 ML | Refills: 1 | Status: SHIPPED | OUTPATIENT
Start: 2025-05-19

## 2025-05-19 RX ORDER — LISINOPRIL 30 MG/1
30 TABLET ORAL DAILY
Qty: 90 TABLET | Refills: 3 | Status: SHIPPED | OUTPATIENT
Start: 2025-05-19

## 2025-05-19 RX ORDER — INSULIN GLARGINE 100 [IU]/ML
INJECTION, SOLUTION SUBCUTANEOUS
Qty: 45 ML | Refills: 11 | Status: SHIPPED | OUTPATIENT
Start: 2025-05-19

## 2025-05-19 RX ORDER — CILOSTAZOL 50 MG/1
50 TABLET ORAL 2 TIMES DAILY
Qty: 180 TABLET | Refills: 1 | Status: SHIPPED | OUTPATIENT
Start: 2025-05-19

## 2025-05-19 RX ORDER — ROSUVASTATIN CALCIUM 20 MG/1
20 TABLET, COATED ORAL DAILY
Qty: 90 TABLET | Refills: 3 | Status: SHIPPED | OUTPATIENT
Start: 2025-05-19

## 2025-05-19 SDOH — HEALTH STABILITY: PHYSICAL HEALTH: ON AVERAGE, HOW MANY DAYS PER WEEK DO YOU ENGAGE IN MODERATE TO STRENUOUS EXERCISE (LIKE A BRISK WALK)?: 2 DAYS

## 2025-05-19 SDOH — HEALTH STABILITY: PHYSICAL HEALTH: ON AVERAGE, HOW MANY MINUTES DO YOU ENGAGE IN EXERCISE AT THIS LEVEL?: 10 MIN

## 2025-05-19 ASSESSMENT — SOCIAL DETERMINANTS OF HEALTH (SDOH): HOW OFTEN DO YOU GET TOGETHER WITH FRIENDS OR RELATIVES?: THREE TIMES A WEEK

## 2025-05-19 NOTE — PROGRESS NOTES
Preventive Care Visit  [facility] DAWOOD Bedolla MD, Family Medicine  May 19, 2025      Assessment & Plan     Type 2 diabetes mellitus with diabetic neuropathy, unspecified whether long term insulin use (H)  Has been fluctuating, will review the lab and consider changing GLP-1 from Victoza and also dose of Lantus    - HEMOGLOBIN A1C; Future  - empagliflozin (JARDIANCE) 25 MG TABS tablet; Take 1 tablet (25 mg) by mouth daily.    Chronic kidney disease, stage 2 (mild)  Stable   Keep monitoring   Will review the lab and update pt  - Albumin Random Urine Quantitative with Creat Ratio; Future    Health maintenance examination    - HEMOGLOBIN A1C; Future  - Albumin Random Urine Quantitative with Creat Ratio; Future  - Comprehensive metabolic panel (BMP + Alb, Alk Phos, ALT, AST, Total. Bili, TP); Future  - LDL cholesterol direct; Future  - PSA, screen; Future    Screening for prostate cancer    - PSA, screen; Future    Ulcer of left foot with fat layer exposed (H)  Stable, keep monitoring     Critical ischemia of lower extremity (H)  Worsening with diabetic neuropathy, will have him to work more on diabetes control   - gabapentin (NEURONTIN) 300 MG capsule; Take 1 capsule (300 mg) by mouth at bedtime.    PVD (peripheral vascular disease) with claudication  Stable   Keep monitoring   Will review the lab and update pt  - cilostazol (PLETAL) 50 MG tablet; Take 1 tablet (50 mg) by mouth 2 times daily.    Claudication in peripheral vascular disease  Mentioned above   - cilostazol (PLETAL) 50 MG tablet; Take 1 tablet (50 mg) by mouth 2 times daily.  - rosuvastatin (CRESTOR) 20 MG tablet; Take 1 tablet (20 mg) by mouth daily.    Benign essential hypertension  Stable   Keep monitoring   Will review the lab and update pt  - lisinopril (ZESTRIL) 30 MG tablet; Take 1 tablet (30 mg) by mouth daily.    Hyperlipidemia LDL goal <70  Stable   Keep monitoring   Will review the lab and update pt  - rosuvastatin  (CRESTOR) 20 MG tablet; Take 1 tablet (20 mg) by mouth daily.    OAB (overactive bladder)  Worsening on frequency, has no sign of UTI, will have him to try detrol for sx control   - tolterodine ER (DETROL LA) 2 MG 24 hr capsule; Take 1 capsule (2 mg) by mouth daily.    Patient has been advised of split billing requirements and indicates understanding: Yes        Counseling  Appropriate preventive services were addressed with this patient via screening, questionnaire, or discussion as appropriate for fall prevention, nutrition, physical activity, Tobacco-use cessation, social engagement, weight loss and cognition.  Checklist reviewing preventive services available has been given to the patient.  Reviewed patient's diet, addressing concerns and/or questions.   He is at risk for lack of exercise and has been provided with information to increase physical activity for the benefit of his well-being.   The patient was instructed to see the dentist every 6 months.       Follow-up    Follow-up Visit   Expected date:  Nov 19, 2025 (Approximate)      Follow Up Appointment Details:     Follow-up with whom?: Me    Follow-Up for what?: Chronic Disease f/u    Chronic Disease f/u:  Diabetes  Hypertension  Hyperlipidemia       How?: In Person                 Harsh Schneider is a 65 year old, presenting for the following:  Physical    Pt not taking glucose readings at home says that he does not have a machine to check       HPI       Diabetes Follow-up    How often are you checking your blood sugar? One time daily  What time of day are you checking your blood sugars (select all that apply)?  Before meals  Have you had any blood sugars above 200?  Yes   Have you had any blood sugars below 70?  No  What symptoms do you notice when your blood sugar is low?  None  What concerns do you have today about your diabetes? None   Do you have any of these symptoms? (Select all that apply)  Numbness in feet          Hyperlipidemia  Follow-Up    Are you regularly taking any medication or supplement to lower your cholesterol?   Yes- statin  Are you having muscle aches or other side effects that you think could be caused by your cholesterol lowering medication?  No    Hypertension Follow-up    Do you check your blood pressure regularly outside of the clinic? Yes   Are you following a low salt diet? Yes  Are your blood pressures ever more than 140 on the top number (systolic) OR more   than 90 on the bottom number (diastolic), for example 140/90? Yes    BP Readings from Last 2 Encounters:   05/19/25 100/67   11/01/24 (!) 89/65     Hemoglobin A1C (%)   Date Value   11/01/2024 9.5 (H)   03/28/2024 11.1 (H)   07/10/2020 9.5 (H)   04/14/2020 11.1 (H)     LDL Cholesterol Calculated (mg/dL)   Date Value   11/01/2024 91   03/28/2024 44   07/17/2020 136 (H)   04/14/2020 90     LDL Cholesterol Direct (mg/dL)   Date Value   02/09/2022 52       Advance Care Planning    Advance care planning document is on file but is outdated.  Patient encouraged to update or provider to update POLST.        5/19/2025   General Health   How would you rate your overall physical health? (!) FAIR   Feel stress (tense, anxious, or unable to sleep) Not at all         5/19/2025   Nutrition   Three or more servings of calcium each day? Yes   Diet: I don't know   How many servings of fruit and vegetables per day? (!) 2-3   How many sweetened beverages each day? 0-1         5/19/2025   Exercise   Days per week of moderate/strenous exercise 2 days   Average minutes spent exercising at this level 10 min   (!) EXERCISE CONCERN      5/19/2025   Social Factors   Frequency of gathering with friends or relatives Three times a week   Worry food won't last until get money to buy more No   Food not last or not have enough money for food? No   Do you have housing? (Housing is defined as stable permanent housing and does not include staying outside in a car, in a tent, in an abandoned building,  in an overnight shelter, or couch-surfing.) No   Are you worried about losing your housing? No   Lack of transportation? No   Unable to get utilities (heat,electricity)? No   Want help with housing or utility concern? No   (!) HOUSING CONCERN PRESENT      5/19/2025   Fall Risk   Fallen 2 or more times in the past year? No   Trouble with walking or balance? No          5/19/2025   Dental   Dentist two times every year? (!) NO         Today's PHQ-2 Score:       5/19/2025     1:53 PM   PHQ-2 ( 1999 [employer])   Q1: Little interest or pleasure in doing things 0   Q2: Feeling down, depressed or hopeless 0   PHQ-2 Score 0    Q1: Little interest or pleasure in doing things Not at all   Q2: Feeling down, depressed or hopeless Not at all   PHQ-2 Score 0       Patient-reported           5/19/2025   Substance Use   Alcohol more than 3/day or more than 7/wk No   Do you use any other substances recreationally? No     Social History     Tobacco Use    Smoking status: Every Day     Current packs/day: 0.00     Average packs/day: 0.5 packs/day for 15.0 years (7.5 ttl pk-yrs)     Types: Cigarettes     Start date: 2/18/2007     Last attempt to quit: 2/18/2022     Years since quitting: 3.2    Smokeless tobacco: Never   Vaping Use    Vaping status: Never Used   Substance Use Topics    Alcohol use: No    Drug use: No           5/19/2025   STI Screening   New sexual partner(s) since last STI/HIV test? No   Last PSA:   PSA   Date Value Ref Range Status   08/07/2015 0.60 0 - 4 ug/L Final     Prostate Specific Antigen Screen   Date Value Ref Range Status   03/28/2024 1.02 0.00 - 4.50 ng/mL Final     ASCVD Risk   The 10-year ASCVD risk score (Harpal TOM, et al., 2019) is: 27%    Values used to calculate the score:      Age: 65 years      Sex: Male      Is Non- : Yes      Diabetic: Yes      Tobacco smoker: Yes      Systolic Blood Pressure: 100 mmHg      Is BP treated: Yes      HDL Cholesterol: 46 mg/dL      Total  Cholesterol: 150 mg/dL           Reviewed and updated as needed this visit by Provider                    Past Medical History:   Diagnosis Date    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    eval by optho, considered be related to microvascular problem ie DM    Diabetes (H)     Influenza B 4/1/2017    Mixed hyperlipidemia 3/04    MSSA (methicillin susceptible Staphylococcus aureus) pneumonia (H) 4/1/2017    MSSA (methicillin susceptible Staphylococcus aureus) septicemia (H) 4/1/2017    PVD (peripheral vascular disease) with claudication 10/12/2015    Tobacco use disorder QUIT 9/08    smokes for stress    Type II or unspecified type diabetes mellitus with neurological manifestations, not stated as uncontrolled(250.60) (H) 6/23/2014     Past Surgical History:   Procedure Laterality Date    COLONOSCOPY  10/27/16    COLONOSCOPY N/A 10/31/2016    Procedure: COLONOSCOPY;  Surgeon: Pollo Lopez MD;  Location:  GI    HC UGI ENDOSCOPY W PLACEMENT GASTROSTOMY TUBE PERCUT N/A 4/4/2017    Procedure: COMBINED ESOPHAGOSCOPY, GASTROSCOPY, DUODENOSCOPY (EGD), PLACE PERCUTANEOUS ENDOSCOPIC GASTROSTOMY TUBE;  Surgeon: Marcial Obregon MD;  Location:  GI    IR LOWER EXTREMITY ANGIOGRAM BILATERAL  2/18/2022    IR LOWER EXTREMITY ANGIOGRAM RIGHT  7/17/2020    NO HISTORY OF SURGERY      TRACHEOSTOMY N/A 4/4/2017    Procedure: TRACHEOSTOMY;  Surgeon: Jose Puga MD;  Location:  OR     Labs reviewed in EPIC  BP Readings from Last 3 Encounters:   05/19/25 100/67   11/01/24 (!) 89/65   03/28/24 130/80    Wt Readings from Last 3 Encounters:   05/19/25 69.1 kg (152 lb 6.4 oz)   11/01/24 66.1 kg (145 lb 12.8 oz)   03/28/24 66.7 kg (147 lb)                  Patient Active Problem List   Diagnosis    External hemorrhoids    Cranial nerve III palsy    Hyperlipidemia LDL goal <100    Type 2 diabetes mellitus with diabetic neuropathy (HCC)    Low back pain    Lumbar radiculopathy    PVD (peripheral vascular disease) with  claudication    Ischemic vascular disease    MSSA (methicillin susceptible Staphylococcus aureus) septicemia (H)    MSSA (methicillin susceptible Staphylococcus aureus) pneumonia (H)    Lumbago    Neck pain    Acute pain of both shoulders    Chronic kidney disease, stage 2 (mild)    Critical ischemia of lower extremity (H)     Past Surgical History:   Procedure Laterality Date    COLONOSCOPY  10/27/16    COLONOSCOPY N/A 10/31/2016    Procedure: COLONOSCOPY;  Surgeon: Pollo Lopez MD;  Location:  GI    HC UGI ENDOSCOPY W PLACEMENT GASTROSTOMY TUBE PERCUT N/A 4/4/2017    Procedure: COMBINED ESOPHAGOSCOPY, GASTROSCOPY, DUODENOSCOPY (EGD), PLACE PERCUTANEOUS ENDOSCOPIC GASTROSTOMY TUBE;  Surgeon: Marcial Obregon MD;  Location:  GI    IR LOWER EXTREMITY ANGIOGRAM BILATERAL  2/18/2022    IR LOWER EXTREMITY ANGIOGRAM RIGHT  7/17/2020    NO HISTORY OF SURGERY      TRACHEOSTOMY N/A 4/4/2017    Procedure: TRACHEOSTOMY;  Surgeon: Jose Puga MD;  Location:  OR       Social History     Tobacco Use    Smoking status: Every Day     Current packs/day: 0.00     Average packs/day: 0.5 packs/day for 15.0 years (7.5 ttl pk-yrs)     Types: Cigarettes     Start date: 2/18/2007     Last attempt to quit: 2/18/2022     Years since quitting: 3.2    Smokeless tobacco: Never   Substance Use Topics    Alcohol use: No     Family History   Problem Relation Age of Onset    Diabetes Mother     Diabetes Father     Diabetes Sister     Diabetes Brother     Diabetes Sister     Diabetes Brother     Hypertension No family hx of     Cerebrovascular Disease No family hx of     Prostate Cancer No family hx of     Cancer - colorectal No family hx of     Breast Cancer No family hx of          Current Outpatient Medications   Medication Sig Dispense Refill    ASPIRIN 81 MG OR TABS 1 tab per day 100 3    BD ULTRA-FINE 29G X 12.7MM insulin pen needle USE AS DIRECTED 100 each 0    cilostazol (PLETAL) 50 MG tablet Take 1 tablet  (50 mg) by mouth 2 times daily. 180 tablet 1    clopidogrel (PLAVIX) 75 MG tablet Take 1 tablet (75 mg) by mouth daily 90 tablet 3    empagliflozin (JARDIANCE) 25 MG TABS tablet Take 1 tablet (25 mg) by mouth daily. 90 tablet 1    gabapentin (NEURONTIN) 300 MG capsule Take 1 capsule (300 mg) by mouth at bedtime. 90 capsule 3    insulin glargine (LANTUS SOLOSTAR) 100 UNIT/ML pen HE INJECTS 22 UNITS IN THE MORNING, AND 20 UNITS IN THE EVENING SUBCUTANEOUSLY ONCE DAILY IN EVENING (HAS NOT: INCREASE DOSE BY FOUR (4) UNITS EVERY THREE (3) DAYS UNTIL FASTING BG IS . IF OVER 40 UNITS, SPLIT DOSE INTO TWO ADMINISTRATIONS DAILY) 45 mL 11    liraglutide (VICTOZA PEN) 18 MG/3ML solution Inject 1.2 mg subcutaneously daily. 18 mL 1    lisinopril (ZESTRIL) 20 MG tablet Take 1 tablet (20 mg) by mouth daily. 90 tablet 1    lisinopril (ZESTRIL) 30 MG tablet Take 1 tablet (30 mg) by mouth daily. 90 tablet 3    rosuvastatin (CRESTOR) 20 MG tablet Take 1 tablet (20 mg) by mouth daily. 90 tablet 3    tolterodine ER (DETROL LA) 2 MG 24 hr capsule Take 1 capsule (2 mg) by mouth daily. 90 capsule 1    Continuous Blood Gluc Sensor (FREESTYLE VARSHA 14 DAY SENSOR) MISC CHANGE EVERY 14 DAYS (Patient not taking: Reported on 5/19/2025)      CONTOUR NEXT TEST test strip USE TO TEST BLOOD SUGAR ONE (1) TIMES DAILY OR AS DIRECTED. TO ACCOMPANY: BLOOD GLUCOSE MONITOR BRANDS: ACCU-CHEK SANDRA (Patient not taking: Reported on 5/19/2025) 100 strip 6    thin (NO BRAND SPECIFIED) lancets Use with lanceting device. To accompany: Blood Glucose Monitor Brands: ACCU-CHEK SANDRA (Patient not taking: Reported on 5/19/2025) 100 each 3     Allergies   Allergen Reactions    No Known Drug Allergy      Recent Labs   Lab Test 11/01/24  0913 03/28/24  1339 03/27/23  0834 07/17/20  1150 07/14/20  1700 07/10/20  1213 04/14/20  1043 12/14/18  1417   A1C 9.5* 11.1* 13.3*   < >  --    < > 11.1* 9.4*   LDL 91 44 59   < >  --   --  90  --    HDL 46 42 29*   < >  --   --  " 45  --    TRIG 67 48 232*   < >  --   --  71  --    ALT 31 20  --   --   --   --   --  50   CR 1.01 0.99 1.00   < >  --   --  0.90 0.75   GFRESTIMATED 84 86 86   < > 77  --  >90 >90   GFRESTBLACK  --   --   --   --  >90  --  >90 >90   POTASSIUM 4.6 4.5 4.4   < >  --   --  4.6 4.3   TSH  --   --   --   --   --   --  1.30  --     < > = values in this interval not displayed.               Review of Systems  Constitutional, HEENT, cardiovascular, pulmonary, GI, , musculoskeletal, neuro, skin, endocrine and psych systems are negative, except as otherwise noted.     Objective    Exam  /67   Pulse 65   Temp 97  F (36.1  C) (Temporal)   Resp 16   Ht 1.73 m (5' 8.11\")   Wt 69.1 kg (152 lb 6.4 oz)   SpO2 99%   BMI 23.10 kg/m     Estimated body mass index is 23.1 kg/m  as calculated from the following:    Height as of this encounter: 1.73 m (5' 8.11\").    Weight as of this encounter: 69.1 kg (152 lb 6.4 oz).    Physical Exam  GENERAL: alert and no distress  EYES: Eyes grossly normal to inspection, PERRL and conjunctivae and sclerae normal  HENT: ear canals and TM's normal, nose and mouth without ulcers or lesions  NECK: no adenopathy, no asymmetry, masses, or scars  RESP: lungs clear to auscultation - no rales, rhonchi or wheezes  CV: regular rate and rhythm, normal S1 S2, no S3 or S4, no murmur, click or rub, no peripheral edema  ABDOMEN: soft, nontender, no hepatosplenomegaly, no masses and bowel sounds normal  MS: no gross musculoskeletal defects noted, no edema  SKIN: no suspicious lesions or rashes  NEURO: Normal strength and tone, mentation intact and speech normal  BACK: no CVA tenderness, no paralumbar tenderness  PSYCH: mentation appears normal, affect normal/bright  LYMPH: no cervical, supraclavicular, axillary, or inguinal adenopathy  Diabetic foot exam: normal DP and PT pulses, no trophic changes or ulcerative lesions, and normal sensory exam        Signed Electronically by: Shaun Bedolla MD    "

## 2025-05-28 DIAGNOSIS — E11.42 DIABETIC POLYNEUROPATHY ASSOCIATED WITH TYPE 2 DIABETES MELLITUS (H): ICD-10-CM

## 2025-05-28 DIAGNOSIS — Z79.4 TYPE 2 DIABETES MELLITUS WITH DIABETIC NEUROPATHY, WITH LONG-TERM CURRENT USE OF INSULIN (H): ICD-10-CM

## 2025-05-28 DIAGNOSIS — E11.40 TYPE 2 DIABETES MELLITUS WITH DIABETIC NEUROPATHY, WITH LONG-TERM CURRENT USE OF INSULIN (H): ICD-10-CM

## 2025-05-28 DIAGNOSIS — I73.9 PVD (PERIPHERAL VASCULAR DISEASE) WITH CLAUDICATION: ICD-10-CM

## 2025-05-28 RX ORDER — BLOOD SUGAR DIAGNOSTIC
STRIP MISCELLANEOUS
Qty: 100 STRIP | Refills: 2 | Status: SHIPPED | OUTPATIENT
Start: 2025-05-28

## 2025-05-28 RX ORDER — CLOPIDOGREL BISULFATE 75 MG/1
TABLET ORAL
Qty: 90 TABLET | Refills: 3 | Status: SHIPPED | OUTPATIENT
Start: 2025-05-28

## 2025-06-20 DIAGNOSIS — E11.40 TYPE 2 DIABETES MELLITUS WITH DIABETIC NEUROPATHY, UNSPECIFIED WHETHER LONG TERM INSULIN USE (H): ICD-10-CM

## 2025-06-23 RX ORDER — EMPAGLIFLOZIN 25 MG/1
25 TABLET, FILM COATED ORAL DAILY
Qty: 90 TABLET | Refills: 1 | Status: SHIPPED | OUTPATIENT
Start: 2025-06-23

## 2025-07-11 DIAGNOSIS — E11.40 TYPE 2 DIABETES MELLITUS WITH DIABETIC NEUROPATHY, UNSPECIFIED WHETHER LONG TERM INSULIN USE (H): ICD-10-CM

## 2025-07-11 DIAGNOSIS — I10 BENIGN ESSENTIAL HYPERTENSION: ICD-10-CM

## 2025-07-14 NOTE — TELEPHONE ENCOUNTER
Clinic RN: Please investigate patient's chart or contact patient if the information cannot be found because noting 2 different doses of medication.     Cookie Garnett RN on 7/14/2025 at 1:38 PM

## 2025-07-16 RX ORDER — LISINOPRIL 20 MG/1
20 TABLET ORAL DAILY
Refills: 1 | OUTPATIENT
Start: 2025-07-16

## 2025-08-21 ENCOUNTER — TELEPHONE (OUTPATIENT)
Dept: FAMILY MEDICINE | Facility: CLINIC | Age: 65
End: 2025-08-21
Payer: COMMERCIAL

## (undated) DEVICE — LINEN TOWEL PACK X5 5464

## (undated) DEVICE — GLOVE PROTEXIS W/NEU-THERA 7.5  2D73TE75

## (undated) DEVICE — PREP CHLORAPREP W/ORANGE TINT 10.5ML 260715

## (undated) DEVICE — DRSG GAUZE 4X4" TOPPER

## (undated) DEVICE — GLOVE PROTEXIS MICRO 6.0  2D73PM60

## (undated) DEVICE — JELLY LUBRICATING SURGILUBE 4OZ TUBE

## (undated) DEVICE — DRAPE PEDS  LAP 29493

## (undated) DEVICE — ESU ELEC BLADE 2.75" COATED/INSULATED E1455

## (undated) DEVICE — SOL NACL 0.9% IRRIG 1000ML BOTTLE 07138-09

## (undated) DEVICE — GLOVE PROTEXIS W/NEU-THERA 6.5  2D73TE65

## (undated) DEVICE — GLOVE PROTEXIS BLUE W/NEU-THERA 6.5  2D73EB65

## (undated) DEVICE — GOWN IMPERVIOUS ZONED LG

## (undated) DEVICE — TUBE GASTROSTOMY MIC PULL PEG KIT 20FR 7160-20

## (undated) DEVICE — SYR 10ML SLIP TIP W/O NDL

## (undated) DEVICE — ESU GROUND PAD UNIVERSAL W/O CORD

## (undated) DEVICE — TUBING SUCTION MEDI-VAC SOFT 3/16"X20' N520A

## (undated) DEVICE — SU PROLENE 2-0 RB-1DA 36" 8559H

## (undated) DEVICE — SU ETHILON 3-0 FS-1 18" 669H

## (undated) DEVICE — PACK MINOR SBA15MIFSE

## (undated) RX ORDER — HEPARIN SODIUM 1000 [USP'U]/ML
INJECTION, SOLUTION INTRAVENOUS; SUBCUTANEOUS
Status: DISPENSED
Start: 2022-02-18

## (undated) RX ORDER — HYDRALAZINE HYDROCHLORIDE 20 MG/ML
INJECTION INTRAMUSCULAR; INTRAVENOUS
Status: DISPENSED
Start: 2020-07-17

## (undated) RX ORDER — HEPARIN SODIUM 200 [USP'U]/100ML
INJECTION, SOLUTION INTRAVENOUS
Status: DISPENSED
Start: 2020-07-17

## (undated) RX ORDER — FENTANYL CITRATE 50 UG/ML
INJECTION, SOLUTION INTRAMUSCULAR; INTRAVENOUS
Status: DISPENSED
Start: 2020-07-17

## (undated) RX ORDER — CEFAZOLIN SODIUM 2 G/100ML
INJECTION, SOLUTION INTRAVENOUS
Status: DISPENSED
Start: 2022-02-18

## (undated) RX ORDER — CEFAZOLIN SODIUM 2 G/100ML
INJECTION, SOLUTION INTRAVENOUS
Status: DISPENSED
Start: 2020-07-17

## (undated) RX ORDER — HEPARIN SODIUM 200 [USP'U]/100ML
INJECTION, SOLUTION INTRAVENOUS
Status: DISPENSED
Start: 2022-02-18

## (undated) RX ORDER — CLOPIDOGREL 300 MG/1
TABLET, FILM COATED ORAL
Status: DISPENSED
Start: 2020-07-17

## (undated) RX ORDER — FENTANYL CITRATE 50 UG/ML
INJECTION, SOLUTION INTRAMUSCULAR; INTRAVENOUS
Status: DISPENSED
Start: 2022-02-18

## (undated) RX ORDER — LIDOCAINE HYDROCHLORIDE 10 MG/ML
INJECTION, SOLUTION INFILTRATION; PERINEURAL
Status: DISPENSED
Start: 2022-02-18

## (undated) RX ORDER — LIDOCAINE HYDROCHLORIDE 10 MG/ML
INJECTION, SOLUTION INFILTRATION; PERINEURAL
Status: DISPENSED
Start: 2020-07-17

## (undated) RX ORDER — OXYCODONE AND ACETAMINOPHEN 5; 325 MG/1; MG/1
TABLET ORAL
Status: DISPENSED
Start: 2022-02-18

## (undated) RX ORDER — HEPARIN SODIUM 1000 [USP'U]/ML
INJECTION, SOLUTION INTRAVENOUS; SUBCUTANEOUS
Status: DISPENSED
Start: 2020-07-17

## (undated) RX ORDER — PROTAMINE SULFATE 10 MG/ML
INJECTION, SOLUTION INTRAVENOUS
Status: DISPENSED
Start: 2022-02-18